# Patient Record
Sex: MALE | Race: WHITE | HISPANIC OR LATINO | Employment: FULL TIME | URBAN - METROPOLITAN AREA
[De-identification: names, ages, dates, MRNs, and addresses within clinical notes are randomized per-mention and may not be internally consistent; named-entity substitution may affect disease eponyms.]

---

## 2017-01-26 ENCOUNTER — PROCEDURE VISIT (OUTPATIENT)
Dept: OPHTHALMOLOGY | Facility: CLINIC | Age: 67
End: 2017-01-26
Payer: COMMERCIAL

## 2017-01-26 VITALS — SYSTOLIC BLOOD PRESSURE: 129 MMHG | DIASTOLIC BLOOD PRESSURE: 81 MMHG | HEART RATE: 66 BPM

## 2017-01-26 DIAGNOSIS — E11.3212 DIABETIC MACULAR EDEMA OF LEFT EYE WITH MILD NONPROLIFERATIVE RETINOPATHY ASSOCIATED WITH TYPE 2 DIABETES MELLITUS: Primary | ICD-10-CM

## 2017-01-26 DIAGNOSIS — E11.3291 MILD NONPROLIFERATIVE DIABETIC RETINOPATHY OF RIGHT EYE WITHOUT MACULAR EDEMA ASSOCIATED WITH TYPE 2 DIABETES MELLITUS: ICD-10-CM

## 2017-01-26 DIAGNOSIS — H25.13 NS (NUCLEAR SCLEROSIS), BILATERAL: ICD-10-CM

## 2017-01-26 PROCEDURE — 67028 INJECTION EYE DRUG: CPT | Mod: LT,S$GLB,, | Performed by: OPHTHALMOLOGY

## 2017-01-26 PROCEDURE — 92235 FLUORESCEIN ANGRPH MLTIFRAME: CPT | Mod: S$GLB,,, | Performed by: OPHTHALMOLOGY

## 2017-01-26 PROCEDURE — 92134 CPTRZ OPH DX IMG PST SGM RTA: CPT | Mod: S$GLB,,, | Performed by: OPHTHALMOLOGY

## 2017-01-26 PROCEDURE — 92014 COMPRE OPH EXAM EST PT 1/>: CPT | Mod: 25,S$GLB,, | Performed by: OPHTHALMOLOGY

## 2017-01-26 RX ADMIN — Medication 1.25 MG: at 03:01

## 2017-01-26 NOTE — PROGRESS NOTES
oCT - OD - No DME  OS DME     FA - late macular leakage OS > OD    A/P    1. Mild NPDR OU T2  2. DME OS    S/p Avastin OS x 1    Avastin OS #2 today    BS/BP/chol control    3. NS OU      1 month OCT      Risks, benefits, and alternatives to treatment discussed in detail with the patient.  The patient voiced understanding and wished to proceed with the procedure    Injection Procedure Note:  Diagnosis: DME OS    Topical Proparacaine and Betadine.  Inject Avastin OS at 6:00 @ 3.5-4mm posterior to limbus  Post Operative Dx: Same  Complications: None  Follow up as above.

## 2017-01-26 NOTE — MR AVS SNAPSHOT
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      [facility]     [facility] - Asistencia   Enfermeras registradas de [facility] pueden ayudarle a reservar nori [place], proveer educación para la lindsey, asesoría clínica, y otros servicios de asesoramiento.   Llame para rahat servicio gratuito a [phone].             Medicamentos           Mensaje sobre Medicamentos     Verificar los cambios y / o adiciones a sanchez régimen de medicación son los mismos que discutir con sanchez médico. Si cualquiera de estos cambios o adiciones son incorrectos, por favor notifique a sanchez proveedor de atención médica.        These medications were administered today        Dose Freq    bevacizumab (AVASTIN) 2.5 mg/0.10 mL 1.25 mg 1.25 mg Clinic/HOD 1 time    Sig: Inject 0.05 mLs (1.25 mg total) into the eye one time.    Categoría: Normal    Vía: Intraocular           Verifique que la siguiente lista de medicamentos es nori representación exacta de los medicamentos que está tomando actualmente. Si no hay ningunos reportados, la lista puede estar en schultz. Si no es correcta, por favor póngase en contacto  con sanchez proveedor de atención médica. Lleve esta lista con usted en tiffanie de emergencia.           Medicamentos Actuales     aspirin (ECOTRIN) 81 MG EC tablet Take 1 tablet (81 mg total) by mouth once daily.    blood sugar diagnostic Strp To use as directed to monitor blood sugars daily    butalbital-acetaminophen-caffeine -40 mg (FIORICET, ESGIC) -40 mg per tablet TAKE ONE TABLET BY MOUTH EVERY 4 HOURS AS NEEDED FOR PAIN OR HEADACHE    FLUARIX QUAD 8250-5972, PF, 60 mcg (15 mcg x 4)/0.5 mL Syrg     glimepiride (AMARYL) 1 MG tablet TAKE ONE TABLET BY MOUTH BEFORE BREAKFAST    levocetirizine (XYZAL) 5 MG tablet     lisinopril-hydrochlorothiazide (PRINZIDE,ZESTORETIC) 10-12.5 mg per tablet TAKE ONE TABLET BY MOUTH EVERY DAY    metformin (GLUCOPHAGE) 1000 MG tablet TAKE ONE TABLET BY MOUTH TWICE A DAY WITH MEALS    metoprolol tartrate (LOPRESSOR) 25 MG tablet Take 0.5 tablets (12.5 mg total) by mouth 2 (two) times daily.    naproxen (NAPROSYN) 500 MG tablet TK 1 T PO  BID FOR INFLAMTION    simvastatin (ZOCOR) 40 MG tablet Take 1 tablet (40 mg total) by mouth every evening.    tamsulosin (FLOMAX) 0.4 mg Cp24 Take 1 capsule (0.4 mg total) by mouth once daily.           Información de referencia clínica           Signos vitales - más recientes  Última actualización: 1/26/2017  2:29 PM por Анна AIDEE Susan    PS Pulso                129/81 (BP Location: Right arm, Patient Position: Sitting, BP Method: Automatic) 66          Blood Pressure          Most Recent Value    BP  129/81      Alergias     A partir del:  1/26/2017        No Known Allergies      Vacunas     Administradas en la fecha de la visita:  1/26/2017        None      Orders Placed During Today's Visit      Órdenes normales de esta visita    Fluorescein Angiography - OU - Both Eyes     Posterior Segment OCT Retina-Both eyes     Prior Authorization Order     Exámenes/Procedimientos futuros Se espera el Vence    Posterior Segment OCT Retina-Both eyes  As  directed 1/26/2018      Registrarse para [facility]     La activación de sanchez cuenta [facility] es tan fácil filiberto 1-2-3!    1) Ir a [email or web address], seleccione Registrarse Ahora, meter el código de activación y sanchez fecha de nacimiento, y seleccione Próximo.    [id number]  Expires: 3/12/2017  3:52 PM      2) Crear un nombre de usuario y contraseña para usar cuando se visita [facility] en el futuro y selecciona nori pregunta de seguridad en tiffanie de que pierda sanchez contraseña y seleccione Próximo.    3) Introduzca sanchez dirección de correo electrónico y queta clic en Registrarse!    Información Adicional  Si tiene alguna pregunta, por favor, e-mail [email or web address] o llame al [phone] para hablar con nuestro personal. Recuerde, [facility] no debe ser usada para necesidades urgentes. En tiffanie de emergencia médica, llcharisse al [phone].        Instrucciones    Fluorescein angiogram procedure explained to patient and FA handout attached below on AVS.       Fluorescein Angiography  Fluorescein angiography is an eye test. It is done to look at the back of your eye, including:  · The blood vessels in your eye  · The layer of tissue at the back of your eye (the retina)  · The center of your retina (the macula)  · The optic nerve  This test can diagnose diseases found in these areas. It can also diagnose other conditions that affect these areas. To do this test, a dye called fluorescein is shot (injected) into your arm. The dye goes into your bloodstream and up into the blood vessels in your eyes. A special camera is then used to take images (angiograms) of your eyes.     A fluorescein angiogram of the retina   Getting ready for your test  Tell your healthcare provider if you:  · Are pregnant or think you may be pregnant  · Are breastfeeding  · Have a history of severe allergic reactions, including to X-ray dye or other medicines  · Have kidney problems  Tell your provider about any medicines you are taking. You may need to stop  taking all or some of these before the test. This includes:  · All prescription medicines  · Over-the-counter medicines such as aspirin or ibuprofen  · Street drugs  · Herbs, vitamins, and other supplements  You should arrange for an adult family member or friend to drive you home after your test. Your vision will be blurry for up to 12 hours.  Follow any other instructions from your healthcare provider.  During your test  · You are given eye drops to enlarge (dilate) your pupils.  · You then sit in front of a special camera. You place your chin on the chin rest and look into the camera.  · Images are taken of your eyes, one eye at a time.  · Fluorescein dye is then injected into your arm. The lights in the room are turned off. You may have mild nausea. You may have a warm feeling in your arm or upper body. Tell your provider if your skin feels itchy or if you are having trouble breathing. If so, you could be having an allergic reaction to the dye.  · More pictures of your eyes are taken over 15 to 30 minutes. The camera shines a bright light into your eyes. Try to keep your head still and your eyes open.  · When enough images have been taken, the test is over.  After your test  Your vision will be blurry for up to 4 to 12 hours. This is because of your dilated pupils. Your eye will be more sensitive to light for up to 12 hours. You may want to wear sunglasses during this time. Do not drive if your vision is very blurry. You may also find it uncomfortable to read. Your skin may look yellow for a few hours. This is from the dye. Your urine will be bright yellow or orange for 24 to 48 hours after the test.     Risks and possible complications  All procedures have some risks. Possible risks of fluorescein angiography include:  · Upset stomach (nausea) and vomiting  · Leaking dye around the injection site that causes pain and swelling  · Metallic taste in your mouth  · Infection at injection site  · Allergic reaction to  the dye  · Dry mouth or too much saliva  · Faster heart rate  · Sweating  · Lower back pain   © 8106-3426 [employer]. [address] [place] [address], [place] [address]. All rights reserved. This information is not intended as a substitute for professional medical care. Always follow your healthcare professional's instructions.                      Ned Oliveira   2017 2:10 PM   Procedure visit    Description:  Male : 1950   Provider:  CHRISTOPHER Rivers MD   Department:  [place] - Op[facility]mology           Reason for Visit     Eye Problem           Diagnoses this Visit        Comments    Diabetic macular edema of left eye with mild nonproliferative retinopathy associated with type 2 diabetes mellitus    -  Primary     Mild nonproliferative diabetic retinopathy of right eye without macular edema associated with type 2 diabetes mellitus         NS (nuclear sclerosis), bilateral                To Do List           Goals     None      Follow-Up and Disposition     Return in about 4 weeks (around 2017).      [facility]     [facility] On Call Nurse [facility] -  Assistance  Registered nurses in the [facility] On Call Center provide clinical advisement, health education, appointment booking, and other advisory services.  Call for this free service at [phone].             Medications           Message regarding Medications     Verify the changes and/or additions to your medication regime listed below are the same as discussed with your clinician today.  If any of these changes or additions are incorrect, please notify your healthcare provider.        These medications were administered today        Dose Freq    bevacizumab (AVASTIN) 2.5 mg/0.10 mL 1.25 mg 1.25 mg Clinic/HOD 1 time    Sig: Inject 0.05 mLs (1.25 mg total) into the eye one time.    Class: Normal    Route: Intraocular           Verify that the below list of medications is an accurate representation of the medications you are  currently taking.  If none reported, the list may be blank. If incorrect, please contact your healthcare provider. Carry this list with you in case of emergency.           Current Medications     aspirin (ECOTRIN) 81 MG EC tablet Take 1 tablet (81 mg total) by mouth once daily.    blood sugar diagnostic Strp To use as directed to monitor blood sugars daily    butalbital-acetaminophen-caffeine -40 mg (FIORICET, ESGIC) -40 mg per tablet TAKE ONE TABLET BY MOUTH EVERY 4 HOURS AS NEEDED FOR PAIN OR HEADACHE    FLUARIX QUAD 1208-2711, PF, 60 mcg (15 mcg x 4)/0.5 mL Syrg     glimepiride (AMARYL) 1 MG tablet TAKE ONE TABLET BY MOUTH BEFORE BREAKFAST    levocetirizine (XYZAL) 5 MG tablet     lisinopril-hydrochlorothiazide (PRINZIDE,ZESTORETIC) 10-12.5 mg per tablet TAKE ONE TABLET BY MOUTH EVERY DAY    metformin (GLUCOPHAGE) 1000 MG tablet TAKE ONE TABLET BY MOUTH TWICE A DAY WITH MEALS    metoprolol tartrate (LOPRESSOR) 25 MG tablet Take 0.5 tablets (12.5 mg total) by mouth 2 (two) times daily.    naproxen (NAPROSYN) 500 MG tablet TK 1 T PO  BID FOR INFLAMTION    simvastatin (ZOCOR) 40 MG tablet Take 1 tablet (40 mg total) by mouth every evening.    tamsulosin (FLOMAX) 0.4 mg Cp24 Take 1 capsule (0.4 mg total) by mouth once daily.           Clinical Reference Information           Vital Signs - Last Recorded  Most recent update: 1/26/2017  2:29 PM by Анна Davis    BP Pulse                129/81 (BP Location: Right arm, Patient Position: Sitting, BP Method: Automatic) 66          Blood Pressure          Most Recent Value    BP  129/81      Allergies as of 1/26/2017     No Known Allergies      Immunizations Administered on Date of Encounter - 1/26/2017     None      Orders Placed During Today's Visit      Normal Orders This Visit    Fluorescein Angiography - OU - Both Eyes     Posterior Segment OCT Retina-Both eyes     Prior Authorization Order     Future Labs/Procedures Expected by Expires    Posterior Segment  OCT Retina-Both eyes  As directed 1/26/2018      [facility] Sign-Up     Activating your [facility] account is as easy as 1-2-3!     1) Visit [email or web address], select Sign Up Now, enter this activation code and your date of birth, then select Next.  [email or web address]  Expires: 3/12/2017  3:52 PM      2) Create a username and password to use when you visit [facility] in the future and select a security question in case you lose your password and select Next.    3) Enter your e-mail address and click Sign Up!    Additional Information  If you have questions, please e-mail [email or web address] or call [phone] to talk to our [facility] staff. Remember, [facility] is NOT to be used for urgent needs. For medical emergencies, dial 911.         Instructions    Fluorescein angiogram procedure explained to patient and FA handout attached below on AVS.       Fluorescein Angiography  Fluorescein angiography is an eye test. It is done to look at the back of your eye, including:  · The blood vessels in your eye  · The layer of tissue at the back of your eye (the retina)  · The center of your retina (the macula)  · The optic nerve  This test can diagnose diseases found in these areas. It can also diagnose other conditions that affect these areas. To do this test, a dye called fluorescein is shot (injected) into your arm. The dye goes into your bloodstream and up into the blood vessels in your eyes. A special camera is then used to take images (angiograms) of your eyes.     A fluorescein angiogram of the retina   Getting ready for your test  Tell your healthcare provider if you:  · Are pregnant or think you may be pregnant  · Are breastfeeding  · Have a history of severe allergic reactions, including to X-ray dye or other medicines  · Have kidney problems  Tell your provider about any medicines you are taking. You may need to stop taking all or some of these before the test. This includes:  · All prescription  medicines  · Over-the-counter medicines such as aspirin or ibuprofen  · Street drugs  · Herbs, vitamins, and other supplements  You should arrange for an adult family member or friend to drive you home after your test. Your vision will be blurry for up to 12 hours.  Follow any other instructions from your healthcare provider.  During your test  · You are given eye drops to enlarge (dilate) your pupils.  · You then sit in front of a special camera. You place your chin on the chin rest and look into the camera.  · Images are taken of your eyes, one eye at a time.  · Fluorescein dye is then injected into your arm. The lights in the room are turned off. You may have mild nausea. You may have a warm feeling in your arm or upper body. Tell your provider if your skin feels itchy or if you are having trouble breathing. If so, you could be having an allergic reaction to the dye.  · More pictures of your eyes are taken over 15 to 30 minutes. The camera shines a bright light into your eyes. Try to keep your head still and your eyes open.  · When enough images have been taken, the test is over.  After your test  Your vision will be blurry for up to 4 to 12 hours. This is because of your dilated pupils. Your eye will be more sensitive to light for up to 12 hours. You may want to wear sunglasses during this time. Do not drive if your vision is very blurry. You may also find it uncomfortable to read. Your skin may look yellow for a few hours. This is from the dye. Your urine will be bright yellow or orange for 24 to 48 hours after the test.     Risks and possible complications  All procedures have some risks. Possible risks of fluorescein angiography include:  · Upset stomach (nausea) and vomiting  · Leaking dye around the injection site that causes pain and swelling  · Metallic taste in your mouth  · Infection at injection site  · Allergic reaction to the dye  · Dry mouth or too much saliva  · Faster heart  rate  · Sweating  · Lower back pain   © 8183-3561 [employer] [address] [place] [address], [place] [address]. All rights reserved. This information is not intended as a substitute for professional medical care. Always follow your healthcare professional's instructions.

## 2017-01-26 NOTE — PATIENT INSTRUCTIONS
Fluorescein angiogram procedure explained to patient and FA handout attached below on AVS.       Fluorescein Angiography  Fluorescein angiography is an eye test. It is done to look at the back of your eye, including:  · The blood vessels in your eye  · The layer of tissue at the back of your eye (the retina)  · The center of your retina (the macula)  · The optic nerve  This test can diagnose diseases found in these areas. It can also diagnose other conditions that affect these areas. To do this test, a dye called fluorescein is shot (injected) into your arm. The dye goes into your bloodstream and up into the blood vessels in your eyes. A special camera is then used to take images (angiograms) of your eyes.     A fluorescein angiogram of the retina   Getting ready for your test  Tell your healthcare provider if you:  · Are pregnant or think you may be pregnant  · Are breastfeeding  · Have a history of severe allergic reactions, including to X-ray dye or other medicines  · Have kidney problems  Tell your provider about any medicines you are taking. You may need to stop taking all or some of these before the test. This includes:  · All prescription medicines  · Over-the-counter medicines such as aspirin or ibuprofen  · Street drugs  · Herbs, vitamins, and other supplements  You should arrange for an adult family member or friend to drive you home after your test. Your vision will be blurry for up to 12 hours.  Follow any other instructions from your healthcare provider.  During your test  · You are given eye drops to enlarge (dilate) your pupils.  · You then sit in front of a special camera. You place your chin on the chin rest and look into the camera.  · Images are taken of your eyes, one eye at a time.  · Fluorescein dye is then injected into your arm. The lights in the room are turned off. You may have mild nausea. You may have a warm feeling in your arm or upper body. Tell your provider if your skin feels itchy or  if you are having trouble breathing. If so, you could be having an allergic reaction to the dye.  · More pictures of your eyes are taken over 15 to 30 minutes. The camera shines a bright light into your eyes. Try to keep your head still and your eyes open.  · When enough images have been taken, the test is over.  After your test  Your vision will be blurry for up to 4 to 12 hours. This is because of your dilated pupils. Your eye will be more sensitive to light for up to 12 hours. You may want to wear sunglasses during this time. Do not drive if your vision is very blurry. You may also find it uncomfortable to read. Your skin may look yellow for a few hours. This is from the dye. Your urine will be bright yellow or orange for 24 to 48 hours after the test.     Risks and possible complications  All procedures have some risks. Possible risks of fluorescein angiography include:  · Upset stomach (nausea) and vomiting  · Leaking dye around the injection site that causes pain and swelling  · Metallic taste in your mouth  · Infection at injection site  · Allergic reaction to the dye  · Dry mouth or too much saliva  · Faster heart rate  · Sweating  · Lower back pain   © 8805-1407 [employer]. [address] [place] [address], [place] [address]. All rights reserved. This information is not intended as a substitute for professional medical care. Always follow your healthcare professional's instructions.      .POST INTRAVITREAL INJECTION PATIENT INSTRUCTIONS   Below are some guidelines for what to expect after your treatment and additional care instructions.   * you may experience mild discomfort in your eye after the treatment. Your eye usually feels better within 24 to 48 hours after the procedure.   * you have been given drops that numb the surface of your eye.   DO NOT rub or touch your eye, DO NOT wear contacts until numbness goes away.   * you may experience small spots that appear in your field of vision, these are  usually caused by an air bubble or from the medication. It taked a few hours or days for these to go away.   * use of sunglasses will help reduce light sensitivity and glare.   * DO NOT swim or put sink water ( tap water ) or swin for at least 24 hours after the injection   * If you have a gritty, burning, irritating or stinging feeling in the injected eye. This may be a result of the antiseptic used. Use artifical tears or eye lubricant ( from over- the- counter from your local pharmacy ). If you have some at home already please check the expiration date, so not to use anything contaminated in your eye. A cool pack over your eye brow above the injected eye may also relieve discomfort.   Call us right away or go to the Emergency Department if you have a dramatic decrease in vision or extreme pain in the eye.   [facility] [phone]

## 2017-02-20 ENCOUNTER — TELEPHONE (OUTPATIENT)
Dept: INTERNAL MEDICINE | Facility: CLINIC | Age: 67
End: 2017-02-20

## 2017-02-20 DIAGNOSIS — Z00.00 ANNUAL PHYSICAL EXAM: Primary | ICD-10-CM

## 2017-02-20 NOTE — TELEPHONE ENCOUNTER
----- Message from Cinthya Prajapati sent at 2/20/2017  2:07 PM CST -----  Contact: wife, Ileana  Doctor appointment and lab have been scheduled.  Please link lab orders to the lab appointment.  Date of doctor appointment:  4/3  Physical or EP:  Epp  Date of lab appointment:  3/27  Comments:

## 2017-03-15 DIAGNOSIS — I10 ESSENTIAL HYPERTENSION: ICD-10-CM

## 2017-03-15 DIAGNOSIS — N40.0 BENIGN NON-NODULAR PROSTATIC HYPERPLASIA WITHOUT LOWER URINARY TRACT SYMPTOMS: ICD-10-CM

## 2017-03-15 RX ORDER — LISINOPRIL AND HYDROCHLOROTHIAZIDE 10; 12.5 MG/1; MG/1
TABLET ORAL
Qty: 30 TABLET | Refills: 1 | Status: SHIPPED | OUTPATIENT
Start: 2017-03-15 | End: 2017-05-29 | Stop reason: SDUPTHER

## 2017-03-15 RX ORDER — TAMSULOSIN HYDROCHLORIDE 0.4 MG/1
CAPSULE ORAL
Qty: 30 CAPSULE | Refills: 2 | Status: SHIPPED | OUTPATIENT
Start: 2017-03-15 | End: 2017-07-27 | Stop reason: SDUPTHER

## 2017-03-15 RX ORDER — LEVOCETIRIZINE DIHYDROCHLORIDE 5 MG/1
TABLET, FILM COATED ORAL
Qty: 30 TABLET | Refills: 2 | Status: SHIPPED | OUTPATIENT
Start: 2017-03-15 | End: 2018-03-12 | Stop reason: SDUPTHER

## 2017-03-15 RX ORDER — GLIMEPIRIDE 1 MG/1
TABLET ORAL
Qty: 30 TABLET | Refills: 1 | Status: SHIPPED | OUTPATIENT
Start: 2017-03-15 | End: 2017-04-03

## 2017-03-23 ENCOUNTER — PROCEDURE VISIT (OUTPATIENT)
Dept: OPHTHALMOLOGY | Facility: CLINIC | Age: 67
End: 2017-03-23
Attending: OPHTHALMOLOGY
Payer: COMMERCIAL

## 2017-03-23 VITALS — DIASTOLIC BLOOD PRESSURE: 78 MMHG | HEART RATE: 70 BPM | SYSTOLIC BLOOD PRESSURE: 121 MMHG

## 2017-03-23 DIAGNOSIS — E11.3212 DIABETIC MACULAR EDEMA OF LEFT EYE WITH MILD NONPROLIFERATIVE RETINOPATHY ASSOCIATED WITH TYPE 2 DIABETES MELLITUS: Primary | ICD-10-CM

## 2017-03-23 DIAGNOSIS — H25.13 NS (NUCLEAR SCLEROSIS), BILATERAL: ICD-10-CM

## 2017-03-23 DIAGNOSIS — E11.3291 MILD NONPROLIFERATIVE DIABETIC RETINOPATHY OF RIGHT EYE WITHOUT MACULAR EDEMA ASSOCIATED WITH TYPE 2 DIABETES MELLITUS: ICD-10-CM

## 2017-03-23 PROCEDURE — 92134 CPTRZ OPH DX IMG PST SGM RTA: CPT | Mod: S$GLB,,, | Performed by: OPHTHALMOLOGY

## 2017-03-23 PROCEDURE — 67028 INJECTION EYE DRUG: CPT | Mod: LT,S$GLB,, | Performed by: OPHTHALMOLOGY

## 2017-03-23 PROCEDURE — 92014 COMPRE OPH EXAM EST PT 1/>: CPT | Mod: 25,S$GLB,, | Performed by: OPHTHALMOLOGY

## 2017-03-23 RX ADMIN — Medication 1.25 MG: at 05:03

## 2017-03-23 NOTE — PROGRESS NOTES
oCT - OD - No DME  OS DME     FA - late macular leakage OS > OD    A/P    1. Mild NPDR OU T2  2. DME OS    S/p Avastin OS x 2    Avastin OS #3 today    BS/BP/chol control    3. NS OU      1 month OCT      Risks, benefits, and alternatives to treatment discussed in detail with the patient.  The patient voiced understanding and wished to proceed with the procedure    Injection Procedure Note:  Diagnosis: DME OS    Topical Proparacaine and Betadine.  Inject Avastin OS at 6:00 @ 3.5-4mm posterior to limbus  Post Operative Dx: Same  Complications: None  Follow up as above.

## 2017-03-23 NOTE — PATIENT INSTRUCTIONS

## 2017-03-23 NOTE — MR AVS SNAPSHOT
[place] [facility]   [facility]  [place] [address]  Phone: [phone]  Fax: [phone]                  Ned Oliveira   3/23/2017 2:50 PM   Procedure visit    Descripción:  Male : 1950   Personal Médico:  CHRISTOPHER Rivers MD   Departamento:  [place] [facility]           [place]     Eye Problem           Diagnósticos de Esta Visita        Comentarios    Diabetic macular edema of left eye with mild nonproliferative retinopathy associated with type 2 diabetes mellitus    -  Primario     Mild nonproliferative diabetic retinopathy of right eye without macular edema associated with type 2 diabetes mellitus         NS (nuclear sclerosis), bilateral                Lista de tareas           Citas próximas        Personal Médico Departamento Tfno del dpto    3/27/2017 11:00 AM LAB, [facility] [place] [facility] [phone]    3/27/2017 11:15 AM [facility] [place] [facility] [phone]    4/3/2017 3:00 PM Jose Ramachandran DO [place] [facility] [phone]      Metas (5 Years of Data)     Ninguna      Follow-Up and Disposition     Return in about 4 weeks (around 2017).      [facility]     [facility] - Asistencia   Enfermeras registradas de [facility] pueden ayudarle a reservar nori [place], proveer educación para la lindsey, asesoría clínica, y otros servicios de asesoramiento.   Llame para rahat servicio gratuito a [phone].             Medicamentos           Mensaje sobre Medicamentos     Verificar los cambios y / o adiciones a sanchez régimen de medicación son los mismos que discutir con sanchez médico. Si cualquiera de estos cambios o adiciones son incorrectos, por favor notifique a sanchez proveedor de atención médica.        These medications were administered today        Dose Freq    bevacizumab (AVASTIN) 2.5 mg/0.10 mL 1.25 mg 1.25 mg Clinic/[facility] 1 time    Sig: Inject 0.05 mLs (1.25 mg total) into the  eye one time.    Categoría: Normal    Vía: Intraocular           Verifique que la siguiente lista de medicamentos es nori representación exacta de los medicamentos que está tomando actualmente. Si no hay ningunos reportados, la lista puede estar en schultz. Si no es correcta, por favor póngase en contacto con sanchez proveedor de atención médica. Lleve esta lista con usted en tiffanie de emergencia.           Medicamentos Actuales     aspirin (ECOTRIN) 81 MG EC tablet Take 1 tablet (81 mg total) by mouth once daily.    blood sugar diagnostic Strp To use as directed to monitor blood sugars daily    butalbital-acetaminophen-caffeine -40 mg (FIORICET, ESGIC) -40 mg per tablet TAKE ONE TABLET BY MOUTH EVERY 4 HOURS AS NEEDED FOR PAIN OR HEADACHE    FLUARIX QUAD [id number], PF, 60 mcg (15 mcg x 4)/0.5 mL Syrg     glimepiride (AMARYL) 1 MG tablet TAKE ONE TABLET BY MOUTH BEFORE BREAKFAST    levocetirizine (XYZAL) 5 MG tablet TAKE ONE TABLET BY MOUTH EVERY EVENING    lisinopril-hydrochlorothiazide (PRINZIDE,ZESTORETIC) 10-12.5 mg per tablet TAKE ONE TABLET BY MOUTH EVERY DAY    metformin (GLUCOPHAGE) 1000 MG tablet TAKE ONE TABLET BY MOUTH TWICE A DAY WITH MEALS    metoprolol tartrate (LOPRESSOR) 25 MG tablet Take 0.5 tablets (12.5 mg total) by mouth 2 (two) times daily.    naproxen (NAPROSYN) 500 MG tablet TK 1 T PO  BID FOR INFLAMTION    simvastatin (ZOCOR) 40 MG tablet Take 1 tablet (40 mg total) by mouth every evening.    tamsulosin (FLOMAX) 0.4 mg Cp24 TAKE ONE CAPSULE BY MOUTH EVERY DAY           Información de referencia clínica           Kelly signos vitales [place]     PS Pulso                121/78 (BP Location: Right arm, Patient Position: Sitting, BP Method: Automatic) 70          Blood Pressure          Most Recent Value    BP  121/78      Alergias     A partir del:  3/23/2017        No Known Allergies      Vacunas     Administradas en la fecha de la visita:  3/23/2017        None      Orders Placed During Today's  Visit      Órdenes normales de esta visita    Posterior Segment OCT Retina-Both eyes     Prior Authorization Order     Exámenes/Procedimientos futuros Se espera el Vence    Posterior Segment OCT Retina-Both eyes  As directed 3/23/2018      Registrarse para [facility]     La activación de sanchez cuenta [facility] es tan fácil filiberto 1-2-3!    1) Ir a [email or web address], seleccione Registrarse Ahora, meter el código de activación y sanchez fecha de nacimiento, y seleccione Próximo.    [id number]  Expires: 2017  4:57 PM      2) Crear un nombre de usuario y contraseña para usar cuando se visita [facility] en el futuro y selecciona nori pregunta de seguridad en tiffanie de que pierda sanchez contraseña y seleccione Próximo.    3) Introduzca sanchez dirección de correo electrónico y queta martha en Registrarse!    Información Adicional  Si tiene alguna pregunta, por favor, e-mail [email or web address] o llame al [phone] para hablar con nuestro personal. Recuerde, [facility] no debe ser usada para necesidades urgentes. En tiffanie de emergencia médica, llame al 911.        Language Assistance Services     ATTENTION: Language assistance services are available, free of charge. Please call [phone].      ATENCIÓN: Si habla español, tiene a sanchez disposición servicios gratuitos de asistencia lingüística. Llame al [phone].     CHÚ Ý: N?u b?n nói Ti?ng Vi?t, có các d?ch v? h? tr? ngôn ng? mi?n phí dành cho b?n. G?i s? [phone].         [place] [facility] cumple con las leyes federales aplicables de derechos civiles y no discrimina por motivos de farzaneh, color, origen nacional, edad, discapacidad, o sexo.                 Ned Oliveira   3/23/2017 2:50 PM   Procedure visit    Description:  Male : 1950   Provider:  CHRISTOPHER Rivers MD   Department:  [place] [facility]           Reason for Visit     Eye Problem           Diagnoses this Visit        Comments    Diabetic macular edema of left eye with mild nonproliferative  retinopathy associated with type 2 diabetes mellitus    -  Primary     Mild nonproliferative diabetic retinopathy of right eye without macular edema associated with type 2 diabetes mellitus         NS (nuclear sclerosis), bilateral                To Do List           Future Appointments        Provider Department Dept Phone    3/27/2017 11:00 AM LAB, [facility] [phone]    3/27/2017 11:15 AM SPECIMEN, [facility] [place] - Specimen Lab [phone]    4/3/2017 3:00 PM Jose Ramachandran,  [place] [facility] [phone]      Goals     None      Follow-Up and Disposition     Return in about 4 weeks (around 4/20/2017).      [facility]     [facility] On Call Nurse [facility] - 24/7 Assistance  Registered nurses in the [facility] On Call Center provide clinical advisement, health education, appointment booking, and other advisory services.  Call for this free service at [phone].             Medications           Message regarding Medications     Verify the changes and/or additions to your medication regime listed below are the same as discussed with your clinician today.  If any of these changes or additions are incorrect, please notify your healthcare provider.        These medications were administered today        Dose Freq    bevacizumab (AVASTIN) 2.5 mg/0.10 mL 1.25 mg 1.25 mg Clinic/[facility] 1 time    Sig: Inject 0.05 mLs (1.25 mg total) into the eye one time.    Class: Normal    Route: Intraocular           Verify that the below list of medications is an accurate representation of the medications you are currently taking.  If none reported, the list may be blank. If incorrect, please contact your healthcare provider. Carry this list with you in case of emergency.           Current Medications     aspirin (ECOTRIN) 81 MG EC tablet Take 1 tablet (81 mg total) by mouth once daily.    blood sugar diagnostic Strp To use as directed to monitor blood sugars daily     butalbital-acetaminophen-caffeine -40 mg (FIORICET, ESGIC) -40 mg per tablet TAKE ONE TABLET BY MOUTH EVERY 4 HOURS AS NEEDED FOR PAIN OR HEADACHE    FLUARIX QUAD [id number], PF, 60 mcg (15 mcg x 4)/0.5 mL Syrg     glimepiride (AMARYL) 1 MG tablet TAKE ONE TABLET BY MOUTH BEFORE BREAKFAST    levocetirizine (XYZAL) 5 MG tablet TAKE ONE TABLET BY MOUTH EVERY EVENING    lisinopril-hydrochlorothiazide (PRINZIDE,ZESTORETIC) 10-12.5 mg per tablet TAKE ONE TABLET BY MOUTH EVERY DAY    metformin (GLUCOPHAGE) 1000 MG tablet TAKE ONE TABLET BY MOUTH TWICE A DAY WITH MEALS    metoprolol tartrate (LOPRESSOR) 25 MG tablet Take 0.5 tablets (12.5 mg total) by mouth 2 (two) times daily.    naproxen (NAPROSYN) 500 MG tablet TK 1 T PO  BID FOR INFLAMTION    simvastatin (ZOCOR) 40 MG tablet Take 1 tablet (40 mg total) by mouth every evening.    tamsulosin (FLOMAX) 0.4 mg Cp24 TAKE ONE CAPSULE BY MOUTH EVERY DAY           Clinical Reference Information           Your Vitals Were     BP Pulse                121/78 (BP Location: Right arm, Patient Position: Sitting, BP Method: Automatic) 70          Blood Pressure          Most Recent Value    BP  121/78      Allergies as of 3/23/2017     No Known Allergies      Immunizations Administered on Date of Encounter - 3/23/2017     None      Orders Placed During Today's Visit      Normal Orders This Visit    Posterior Segment OCT Retina-Both eyes     Prior Authorization Order     Future Labs/Procedures Expected by Expires    Posterior Segment OCT Retina-Both eyes  As directed 3/23/2018      [facility] Sign-Up     Activating your [facility] account is as easy as 1-2-3!     1) Visit [email or web address], select Sign Up Now, enter this activation code and your date of birth, then select Next.  [email or web address]  Expires: 5/7/2017  4:57 PM      2) Create a username and password to use when you visit [facility] in the future and select a security question in case you lose your password and select  Next.    3) Enter your e-mail address and click Sign Up!    Additional Information  If you have questions, please e-mail [email or web address] or call [phone] to talk to our [facility] staff. Remember, [facility] is NOT to be used for urgent needs. For medical emergencies, dial 911.         Language Assistance Services     ATTENTION: Language assistance services are available, free of charge. Please call [phone].      ATENCIÓN: Si habla pinky, tiene a sanchez disposición servicios gratuitos de asistencia lingüística. Llame al [phone].     CHÚ Ý: N?u b?n nói Ti?ng Vi?t, có các d?ch v? h? tr? ngôn ng? mi?n phí dành cho b?n. G?i s? [phone].         [place] [facility] complies with applicable Federal civil rights laws and does not discriminate on the basis of race, color, national origin, age, disability, or sex.

## 2017-03-27 ENCOUNTER — LAB VISIT (OUTPATIENT)
Dept: LAB | Facility: HOSPITAL | Age: 67
End: 2017-03-27
Attending: INTERNAL MEDICINE
Payer: COMMERCIAL

## 2017-03-27 DIAGNOSIS — Z00.00 ANNUAL PHYSICAL EXAM: ICD-10-CM

## 2017-03-27 LAB
ALBUMIN SERPL BCP-MCNC: 3.7 G/DL
ALP SERPL-CCNC: 78 U/L
ALT SERPL W/O P-5'-P-CCNC: 35 U/L
ANION GAP SERPL CALC-SCNC: 9 MMOL/L
AST SERPL-CCNC: 27 U/L
BASOPHILS # BLD AUTO: 0.02 K/UL
BASOPHILS NFR BLD: 0.3 %
BILIRUB SERPL-MCNC: 1.2 MG/DL
BUN SERPL-MCNC: 21 MG/DL
CALCIUM SERPL-MCNC: 9.4 MG/DL
CHLORIDE SERPL-SCNC: 104 MMOL/L
CHOLEST/HDLC SERPL: 3.6 {RATIO}
CO2 SERPL-SCNC: 27 MMOL/L
COMPLEXED PSA SERPL-MCNC: 0.6 NG/ML
CREAT SERPL-MCNC: 1.1 MG/DL
DIFFERENTIAL METHOD: ABNORMAL
EOSINOPHIL # BLD AUTO: 0.2 K/UL
EOSINOPHIL NFR BLD: 3.4 %
ERYTHROCYTE [DISTWIDTH] IN BLOOD BY AUTOMATED COUNT: 13 %
EST. GFR  (AFRICAN AMERICAN): >60 ML/MIN/1.73 M^2
EST. GFR  (NON AFRICAN AMERICAN): >60 ML/MIN/1.73 M^2
GLUCOSE SERPL-MCNC: 174 MG/DL
HCT VFR BLD AUTO: 41.6 %
HDL/CHOLESTEROL RATIO: 27.8 %
HDLC SERPL-MCNC: 151 MG/DL
HDLC SERPL-MCNC: 42 MG/DL
HGB BLD-MCNC: 14.3 G/DL
LDLC SERPL CALC-MCNC: 96.2 MG/DL
LYMPHOCYTES # BLD AUTO: 2.1 K/UL
LYMPHOCYTES NFR BLD: 32.1 %
MCH RBC QN AUTO: 33.4 PG
MCHC RBC AUTO-ENTMCNC: 34.4 %
MCV RBC AUTO: 97 FL
MONOCYTES # BLD AUTO: 0.4 K/UL
MONOCYTES NFR BLD: 6.3 %
NEUTROPHILS # BLD AUTO: 3.8 K/UL
NEUTROPHILS NFR BLD: 57.7 %
NONHDLC SERPL-MCNC: 109 MG/DL
PLATELET # BLD AUTO: 186 K/UL
PMV BLD AUTO: 11.7 FL
POTASSIUM SERPL-SCNC: 4.5 MMOL/L
PROT SERPL-MCNC: 6.6 G/DL
RBC # BLD AUTO: 4.28 M/UL
SODIUM SERPL-SCNC: 140 MMOL/L
TRIGL SERPL-MCNC: 64 MG/DL
TSH SERPL DL<=0.005 MIU/L-ACNC: 0.96 UIU/ML
WBC # BLD AUTO: 6.55 K/UL

## 2017-03-27 PROCEDURE — 85025 COMPLETE CBC W/AUTO DIFF WBC: CPT

## 2017-03-27 PROCEDURE — 80061 LIPID PANEL: CPT

## 2017-03-27 PROCEDURE — 83036 HEMOGLOBIN GLYCOSYLATED A1C: CPT

## 2017-03-27 PROCEDURE — 84153 ASSAY OF PSA TOTAL: CPT

## 2017-03-27 PROCEDURE — 84443 ASSAY THYROID STIM HORMONE: CPT

## 2017-03-27 PROCEDURE — 36415 COLL VENOUS BLD VENIPUNCTURE: CPT | Mod: PO

## 2017-03-27 PROCEDURE — 80053 COMPREHEN METABOLIC PANEL: CPT

## 2017-03-28 LAB
ESTIMATED AVG GLUCOSE: 194 MG/DL
HBA1C MFR BLD HPLC: 8.4 %

## 2017-04-03 ENCOUNTER — OFFICE VISIT (OUTPATIENT)
Dept: INTERNAL MEDICINE | Facility: CLINIC | Age: 67
End: 2017-04-03
Payer: COMMERCIAL

## 2017-04-03 VITALS
BODY MASS INDEX: 28.03 KG/M2 | HEIGHT: 62 IN | TEMPERATURE: 98 F | SYSTOLIC BLOOD PRESSURE: 126 MMHG | DIASTOLIC BLOOD PRESSURE: 88 MMHG | HEART RATE: 69 BPM | RESPIRATION RATE: 16 BRPM | WEIGHT: 152.31 LBS

## 2017-04-03 DIAGNOSIS — Z00.00 ANNUAL PHYSICAL EXAM: Primary | ICD-10-CM

## 2017-04-03 DIAGNOSIS — I70.0 AORTIC ATHEROSCLEROSIS: ICD-10-CM

## 2017-04-03 DIAGNOSIS — E27.8 ADRENAL NODULE: ICD-10-CM

## 2017-04-03 DIAGNOSIS — E78.00 HYPERCHOLESTEREMIA: ICD-10-CM

## 2017-04-03 DIAGNOSIS — E11.3319 TYPE 2 DIABETES MELLITUS WITH MODERATE NONPROLIFERATIVE RETINOPATHY AND MACULAR EDEMA, WITHOUT LONG-TERM CURRENT USE OF INSULIN, UNSPECIFIED LATERALITY: ICD-10-CM

## 2017-04-03 DIAGNOSIS — N40.0 BENIGN NON-NODULAR PROSTATIC HYPERPLASIA WITHOUT LOWER URINARY TRACT SYMPTOMS: ICD-10-CM

## 2017-04-03 DIAGNOSIS — I25.10 CAD IN NATIVE ARTERY: ICD-10-CM

## 2017-04-03 DIAGNOSIS — I10 ESSENTIAL HYPERTENSION: ICD-10-CM

## 2017-04-03 PROCEDURE — 99396 PREV VISIT EST AGE 40-64: CPT | Mod: S$GLB,,, | Performed by: INTERNAL MEDICINE

## 2017-04-03 PROCEDURE — 3079F DIAST BP 80-89 MM HG: CPT | Mod: S$GLB,,, | Performed by: INTERNAL MEDICINE

## 2017-04-03 PROCEDURE — 99999 PR PBB SHADOW E&M-EST. PATIENT-LVL III: CPT | Mod: PBBFAC,,, | Performed by: INTERNAL MEDICINE

## 2017-04-03 PROCEDURE — 3074F SYST BP LT 130 MM HG: CPT | Mod: S$GLB,,, | Performed by: INTERNAL MEDICINE

## 2017-04-03 RX ORDER — GLIMEPIRIDE 2 MG/1
2 TABLET ORAL
Qty: 90 TABLET | Refills: 3 | Status: SHIPPED | OUTPATIENT
Start: 2017-04-03 | End: 2017-07-27

## 2017-04-03 RX ORDER — LANCETS
EACH MISCELLANEOUS
Qty: 200 EACH | Refills: 11 | Status: SHIPPED | OUTPATIENT
Start: 2017-04-03 | End: 2020-10-09 | Stop reason: SDUPTHER

## 2017-04-03 NOTE — PROGRESS NOTES
Subjective:       Patient ID: Ned Oliveira is a 66 y.o. male.    Chief Complaint: Annual Exam    HPI   66 y.o. Male          here for annual exam.      Cholesterol: (controlled)  Vaccines: Influenza (declined); Tetanus (declined); Pneumovax (2015)  Sexual Screening: no concern  Eye exam: 12/16  Colonoscopy: declined     Exercise: no  Diet: regular     Past Medical History:  Diabetes mellitus, type 2 with retinopathy   Hypertension   BPH (benign prostatic hyperplasia)   Hyperlipidemia   Adrenal adenoma  Past Surgical History:  BICEPS TENDON REPAIR Right   Social History  Marital Status:  Spouse Name:   Years of Education: Number of children: 2      Occupational History  Occupation Employer Comment   Maintenance      Social History Main Topics  Smoking Status: Former Smoker Packs/Day: 2.00 Years: 10   Types: Cigarettes  Smokeless Status: Not on file   Comment: quit smoking 20 yrs ago  Alcohol Use: No   Drug Use: No   Sexual Activity: Yes Partners with: Female     No Known Allergies  Mr. Oliveira does not currently have medications on file.  Review of Systems   Constitutional: Negative for activity change, appetite change, chills, diaphoresis, fatigue, fever and unexpected weight change.   HENT: Negative for congestion, mouth sores, postnasal drip, rhinorrhea, sinus pressure, sneezing, sore throat, trouble swallowing and voice change.    Eyes: Negative for discharge, itching and visual disturbance.   Respiratory: Negative for cough, chest tightness, shortness of breath and wheezing.    Cardiovascular: Negative for chest pain, palpitations and leg swelling.   Gastrointestinal: Negative for abdominal pain, blood in stool, constipation, diarrhea, nausea and vomiting.   Endocrine: Negative for cold intolerance and heat intolerance.   Genitourinary: Negative for difficulty urinating, dysuria, flank pain, hematuria and urgency.   Musculoskeletal: Negative for arthralgias, back pain, myalgias and neck pain.   Skin:  Negative for rash and wound.   Allergic/Immunologic: Negative for environmental allergies and food allergies.   Neurological: Negative for dizziness, tremors, seizures, syncope, weakness and headaches.   Hematological: Negative for adenopathy. Does not bruise/bleed easily.   Psychiatric/Behavioral: Negative for confusion, sleep disturbance and suicidal ideas. The patient is not nervous/anxious.        Objective:      Physical Exam   Constitutional: He is oriented to person, place, and time. He appears well-developed and well-nourished. No distress.   HENT:   Head: Normocephalic and atraumatic.   Right Ear: External ear normal.   Left Ear: External ear normal.   Nose: Nose normal.   Mouth/Throat: Oropharynx is clear and moist. No oropharyngeal exudate.   Eyes: Conjunctivae and EOM are normal. Pupils are equal, round, and reactive to light. Right eye exhibits no discharge. Left eye exhibits no discharge. No scleral icterus.   Neck: Normal range of motion. Neck supple. No JVD present. No thyromegaly present.   Cardiovascular: Normal rate, regular rhythm, normal heart sounds and intact distal pulses.    No murmur heard.  Pulses:       Dorsalis pedis pulses are 2+ on the right side, and 2+ on the left side.        Posterior tibial pulses are 2+ on the right side, and 2+ on the left side.   Pulmonary/Chest: Effort normal and breath sounds normal. No respiratory distress. He has no wheezes. He has no rales.   Abdominal: Soft. Bowel sounds are normal. He exhibits no distension. There is no tenderness. There is no guarding.   Musculoskeletal: He exhibits no edema.   Feet:   Right Foot:   Protective Sensation: 4 sites tested. 4 sites sensed.   Skin Integrity: Negative for ulcer, blister or skin breakdown.   Left Foot:   Protective Sensation: 4 sites tested. 4 sites sensed.   Skin Integrity: Negative for ulcer, blister or skin breakdown.   Lymphadenopathy:     He has no cervical adenopathy.   Neurological: He is alert and  oriented to person, place, and time.   Skin: Skin is warm and dry. No rash noted. He is not diaphoretic. No pallor.   Psychiatric: He has a normal mood and affect. Judgment normal.       Assessment:       1. Annual physical exam    2. Essential hypertension    3. Type 2 diabetes mellitus with moderate nonproliferative retinopathy and macular edema, without long-term current use of insulin, unspecified laterality    4. CAD in native artery    5. Hypercholesteremia    6. Aortic atherosclerosis    7. Benign non-nodular prostatic hyperplasia without lower urinary tract symptoms    8. Adrenal nodule        Plan:    Blood work reviewed with pt   Vaccines: Influenza (declined); Tetanus (declined) ; Pneumovax (2015)   Sexual Screening: no concern   Eye exam: 12/16   Colonoscopy: declined      1. HTN- controlled, CPT   2. T2DM with retinopathy- Fair control, followed by Endocrine       Increase Amaryl to 2 mg daily and continue Metformin 1 gm BID       Monitor blood sugar BID       A1C in 3 months   3. CAD- continue ASA/statin       Followed by Cardiology    4. Adrenal adenoma- followed by Endocrine   5. Aortic atherosclerosis- stable on ASA/statin    6. HLD- stable on Zocor 40 mg daily   6. BPH- fair control on Flomax 0.4 mg daily   7. F/u in 6 months

## 2017-04-12 RX ORDER — METFORMIN HYDROCHLORIDE 1000 MG/1
TABLET ORAL
Qty: 60 TABLET | Refills: 5 | Status: SHIPPED | OUTPATIENT
Start: 2017-04-12 | End: 2017-07-27 | Stop reason: SDUPTHER

## 2017-04-27 ENCOUNTER — PROCEDURE VISIT (OUTPATIENT)
Dept: OPHTHALMOLOGY | Facility: CLINIC | Age: 67
End: 2017-04-27
Payer: COMMERCIAL

## 2017-04-27 VITALS — HEART RATE: 63 BPM | SYSTOLIC BLOOD PRESSURE: 119 MMHG | DIASTOLIC BLOOD PRESSURE: 71 MMHG

## 2017-04-27 DIAGNOSIS — E11.3291 MILD NONPROLIFERATIVE DIABETIC RETINOPATHY OF RIGHT EYE WITHOUT MACULAR EDEMA ASSOCIATED WITH TYPE 2 DIABETES MELLITUS: ICD-10-CM

## 2017-04-27 DIAGNOSIS — E11.3212 DIABETIC MACULAR EDEMA OF LEFT EYE WITH MILD NONPROLIFERATIVE RETINOPATHY ASSOCIATED WITH TYPE 2 DIABETES MELLITUS: Primary | ICD-10-CM

## 2017-04-27 PROCEDURE — 92134 CPTRZ OPH DX IMG PST SGM RTA: CPT | Mod: S$GLB,,, | Performed by: OPHTHALMOLOGY

## 2017-04-27 PROCEDURE — 92014 COMPRE OPH EXAM EST PT 1/>: CPT | Mod: 25,S$GLB,, | Performed by: OPHTHALMOLOGY

## 2017-04-27 PROCEDURE — 67028 INJECTION EYE DRUG: CPT | Mod: LT,S$GLB,, | Performed by: OPHTHALMOLOGY

## 2017-04-27 RX ADMIN — Medication 1.25 MG: at 04:04

## 2017-04-27 NOTE — PROGRESS NOTES
oCT - OD - No DME  OS DME     FA - late macular leakage OS > OD    A/P    1. Mild NPDR OU T2  2. DME OS    S/p Avastin OS x 3    Avastin OS #4 today    BS/BP/chol control    3. NS OU      2-3 weeks Focal OS      Risks, benefits, and alternatives to treatment discussed in detail with the patient.  The patient voiced understanding and wished to proceed with the procedure    Injection Procedure Note:  Diagnosis: DME OS    Topical Proparacaine and Betadine.  Inject Avastin OS at 6:00 @ 3.5-4mm posterior to limbus  Post Operative Dx: Same  Complications: None  Follow up as above.

## 2017-04-27 NOTE — MR AVS SNAPSHOT
[place] [facility]   [facility]  [place] [address]  Phone: [phone]  Fax: [phone]                  Ned Oliveira   2017 2:50 PM   Procedure visit    Descripción:  Male : 1950   Personal Médico:  CHRISTOPHER Rivers MD   Departamento:  [place] [facility]           [place]     Diabetic Eye Exam           Diagnósticos de Esta Visita        Comentarios    Diabetic macular edema of left eye with mild nonproliferative retinopathy associated with type 2 diabetes mellitus    -  Primario     Mild nonproliferative diabetic retinopathy of right eye without macular edema associated with type 2 diabetes mellitus                Lista de tareas           Citas próximas        Personal Médico Departamento Tfno del dpto    7/3/2017 10:00 AM LAB, [facility] [phone]      Metas (5 Years of Data)     Ninguna      Follow-Up and Disposition     Return in about 2 weeks (around 2017).      [facility]     [facility] - Asistencia   Enfermeras registradas de [facility] pueden ayudarle a reservar nori [place], proveer educación para la lindsey, asesoría clínica, y otros servicios de asesoramiento.   Llame para rahat servicio gratuito a [phone].             Medicamentos           Mensaje sobre Medicamentos     Verificar los cambios y / o adiciones a sanchez régimen de medicación son los mismos que discutir con sanchez médico. Si cualquiera de estos cambios o adiciones son incorrectos, por favor notifique a sanchez proveedor de atención médica.        These medications were administered today        Dose Freq    bevacizumab (AVASTIN) 2.5 mg/0.10 mL 1.25 mg 1.25 mg Clinic/HOD 1 time    Sig: Inject 0.05 mLs (1.25 mg total) into the eye one time.    Categoría: Normal    Vía: Intraocular           Verifique que la siguiente lista de medicamentos es nori representación exacta de los medicamentos que está tomando actualmente. Si no hay  ningunos reportados, la lista puede estar en schultz. Si no es correcta, por favor póngase en contacto con sanchez proveedor de atención médica. Lleve esta lista con usted en tiffanie de emergencia.           Medicamentos Actuales     aspirin (ECOTRIN) 81 MG EC tablet Take 1 tablet (81 mg total) by mouth once daily.    blood sugar diagnostic Strp To use as directed to monitor blood sugars daily    butalbital-acetaminophen-caffeine -40 mg (FIORICET, ESGIC) -40 mg per tablet TAKE ONE TABLET BY MOUTH EVERY 4 HOURS AS NEEDED FOR PAIN OR HEADACHE    FLUARIX QUAD [id number], PF, 60 mcg (15 mcg x 4)/0.5 mL Syrg     glimepiride (AMARYL) 2 MG tablet Take 1 tablet (2 mg total) by mouth before breakfast.    lancets (ONETOUCH ULTRASOFT LANCETS) Misc Check blood sugars BID    levocetirizine (XYZAL) 5 MG tablet TAKE ONE TABLET BY MOUTH EVERY EVENING    lisinopril-hydrochlorothiazide (PRINZIDE,ZESTORETIC) 10-12.5 mg per tablet TAKE ONE TABLET BY MOUTH EVERY DAY    metformin (GLUCOPHAGE) 1000 MG tablet TAKE ONE TABLET BY MOUTH TWICE A DAY WITH MEALS    metoprolol tartrate (LOPRESSOR) 25 MG tablet Take 0.5 tablets (12.5 mg total) by mouth 2 (two) times daily.    simvastatin (ZOCOR) 40 MG tablet Take 1 tablet (40 mg total) by mouth every evening.    tamsulosin (FLOMAX) 0.4 mg Cp24 TAKE ONE CAPSULE BY MOUTH EVERY DAY           Información de referencia clínica           Kelly signos vitales [place]     PS Pulso                119/71 (BP Location: Right arm, Patient Position: Sitting, BP Method: Automatic) 63          Blood Pressure          Most Recent Value    BP  119/71      Alergias     A partir del:  4/27/2017        No Known Allergies      Vacunas     Administradas en la fecha de la visita:  4/27/2017        None      Orders Placed During Today's Visit      Órdenes normales de esta visita    Posterior Segment OCT Retina-Both eyes     Prior Authorization Order       Registrarse para [facility]     La activación de sanchez cuenta [facility]  es tan fácil filiberto 1-2-3!    1) Ir a [email or web address], seleccione Registrarse Ahora, meter el código de activación y sanchez fecha de nacimiento, y seleccione Próximo.    [id number]  Expires: 2017  4:57 PM      2) Crear un nombre de usuario y contraseña para usar cuando se visita [place] en el futuro y selecciona nori pregunta de seguridad en tiffanie de que pierda sanchez contraseña y seleccione Próximo.    3) Introduzca sanchez dirección de correo electrónico y queta clsilvio en Registrarse!    Información Adicional  Si tiene alguna pregunta, por favor, e-mail [email or web address] o llame al [phone] para hablar con nuestro personal. Recuerde, [facility] no debe ser usada para necesidades urgentes. En tiffanie de emergencia médica, llame al [phone].        Language Assistance Services     ATTENTION: Language assistance services are available, free of charge. Please call [phone].      ATENCIÓN: Si habla español, tiene a sanchez disposición servicios gratuitos de asistencia lingüística. Llame al [phone].     CHÚ Ý: N?u b?n nói Ti?ng Vi?t, có các d?ch v? h? tr? ngôn ng? mi?n phí dành cho b?n. G?i s? [phone].         [facility] cumple con las leyes federales aplicables de derechos civiles y no discrimina por motivos de farzaneh, color, origen nacional, edad, discapacidad, o sexo.                 Ned CARRASQUILLO Oliveira   2017 2:50 PM   Procedure visit    Description:  Male : 1950   Provider:  CHRISTOPHER Rivers MD   Department:  [place] [facility]           Reason for Visit     Diabetic Eye Exam           Diagnoses this Visit        Comments    Diabetic macular edema of left eye with mild nonproliferative retinopathy associated with type 2 diabetes mellitus    -  Primary     Mild nonproliferative diabetic retinopathy of right eye without macular edema associated with type 2 diabetes mellitus                To Do List           Future Appointments        Provider Department Dept Phone    7/3/2017 10:00  AM LAB, [facility] [place] [facility] [phone]      Goals     None      Follow-Up and Disposition     Return in about 2 weeks (around 5/11/2017).      [facility]     [facility] Nurse Care [facility] - 24/7 Assistance  Unless otherwise directed by your provider, please contact [facility], our nurse care line that is available for 24/7 assistance.     Registered nurses in the [facility] provide: appointment scheduling, clinical advisement, health education, and other advisory services.  Call: [phone] (toll free)               Medications           Message regarding Medications     Verify the changes and/or additions to your medication regime listed below are the same as discussed with your clinician today.  If any of these changes or additions are incorrect, please notify your healthcare provider.        These medications were administered today        Dose Freq    bevacizumab (AVASTIN) 2.5 mg/0.10 mL 1.25 mg 1.25 mg Clinic/HOD 1 time    Sig: Inject 0.05 mLs (1.25 mg total) into the eye one time.    Class: Normal    Route: Intraocular           Verify that the below list of medications is an accurate representation of the medications you are currently taking.  If none reported, the list may be blank. If incorrect, please contact your healthcare provider. Carry this list with you in case of emergency.           Current Medications     aspirin (ECOTRIN) 81 MG EC tablet Take 1 tablet (81 mg total) by mouth once daily.    blood sugar diagnostic Strp To use as directed to monitor blood sugars daily    butalbital-acetaminophen-caffeine -40 mg (FIORICET, ESGIC) -40 mg per tablet TAKE ONE TABLET BY MOUTH EVERY 4 HOURS AS NEEDED FOR PAIN OR HEADACHE    FLUARIX QUAD [id number], PF, 60 mcg (15 mcg x 4)/0.5 mL Syrg     glimepiride (AMARYL) 2 MG tablet Take 1 tablet (2 mg total) by mouth before breakfast.    lancets (ONETOUCH ULTRASOFT LANCETS) Misc Check blood sugars BID     levocetirizine (XYZAL) 5 MG tablet TAKE ONE TABLET BY MOUTH EVERY EVENING    lisinopril-hydrochlorothiazide (PRINZIDE,ZESTORETIC) 10-12.5 mg per tablet TAKE ONE TABLET BY MOUTH EVERY DAY    metformin (GLUCOPHAGE) 1000 MG tablet TAKE ONE TABLET BY MOUTH TWICE A DAY WITH MEALS    metoprolol tartrate (LOPRESSOR) 25 MG tablet Take 0.5 tablets (12.5 mg total) by mouth 2 (two) times daily.    simvastatin (ZOCOR) 40 MG tablet Take 1 tablet (40 mg total) by mouth every evening.    tamsulosin (FLOMAX) 0.4 mg Cp24 TAKE ONE CAPSULE BY MOUTH EVERY DAY           Clinical Reference Information           Your Vitals Were     BP Pulse                119/71 (BP Location: Right arm, Patient Position: Sitting, BP Method: Automatic) 63          Blood Pressure          Most Recent Value    BP  119/71      Allergies as of 4/27/2017     No Known Allergies      Immunizations Administered on Date of Encounter - 4/27/2017     None      Orders Placed During Today's Visit      Normal Orders This Visit    Posterior Segment OCT Retina-Both eyes     Prior Authorization Order       [facility] Sign-Up     Activating your [facility] account is as easy as 1-2-3!     1) Visit [email or web address], select Sign Up Now, enter this activation code and your date of birth, then select Next.  [email or web address]  Expires: 5/7/2017  4:57 PM      2) Create a username and password to use when you visit [facility] in the future and select a security question in case you lose your password and select Next.    3) Enter your e-mail address and click Sign Up!    Additional Information  If you have questions, please e-mail [email or web address] or call [phone] to talk to our [facility] staff. Remember, [facility] is NOT to be used for urgent needs. For medical emergencies, dial 911.         Language Assistance Services     ATTENTION: Language assistance services are available, free of charge. Please call [phone].      ATENCIÓN: Si habla español, tiene a sanchez disposición  servicios gratuitos de asistencia lingüística. Ramu ferreira [phone].     GOVIND Ý: N?u b?n nói Ti?ng Vi?t, có các d?ch v? h? tr? ngôn ng? mi?n phí dành cho b?n. G?i s? [phone].         [facility] complies with applicable Federal civil rights laws and does not discriminate on the basis of race, color, national origin, age, disability, or sex.

## 2017-04-27 NOTE — PATIENT INSTRUCTIONS

## 2017-05-25 ENCOUNTER — OFFICE VISIT (OUTPATIENT)
Dept: OPHTHALMOLOGY | Facility: CLINIC | Age: 67
End: 2017-05-25
Payer: COMMERCIAL

## 2017-05-25 VITALS — DIASTOLIC BLOOD PRESSURE: 73 MMHG | HEART RATE: 60 BPM | SYSTOLIC BLOOD PRESSURE: 102 MMHG

## 2017-05-25 DIAGNOSIS — E11.3212 DIABETIC MACULAR EDEMA OF LEFT EYE WITH MILD NONPROLIFERATIVE RETINOPATHY ASSOCIATED WITH TYPE 2 DIABETES MELLITUS: Primary | ICD-10-CM

## 2017-05-25 PROCEDURE — 99999 PR PBB SHADOW E&M-EST. PATIENT-LVL III: CPT | Mod: PBBFAC,,, | Performed by: OPHTHALMOLOGY

## 2017-05-25 PROCEDURE — 99499 UNLISTED E&M SERVICE: CPT | Mod: S$GLB,,, | Performed by: OPHTHALMOLOGY

## 2017-05-25 PROCEDURE — 67210 TREATMENT OF RETINAL LESION: CPT | Mod: LT,S$GLB,, | Performed by: OPHTHALMOLOGY

## 2017-05-25 RX ORDER — GLIMEPIRIDE 1 MG/1
TABLET ORAL
COMMUNITY
Start: 2017-04-12 | End: 2017-07-27

## 2017-05-25 NOTE — PROGRESS NOTES
oCT - OD - No DME  OS DME     FA - late macular leakage OS > OD    A/P    1. Mild NPDR OU T2  2. DME OS    S/p Avastin OS x 5    Focal OS today    BS/BP/chol control    3. NS OU      3 months OCT      Risks, benefits, and alternatives to treatment discussed in detail with the patient.  The patient voiced understanding and wished to proceed with the procedure    Laser Procedure Note  Dx: DME OS  Laser: Focal OS  Argon  Spot: 100  Power:   Dur: 0.1  #:   58  Complications: None  F/U as above

## 2017-05-29 DIAGNOSIS — I10 ESSENTIAL HYPERTENSION: ICD-10-CM

## 2017-05-30 RX ORDER — LISINOPRIL AND HYDROCHLOROTHIAZIDE 10; 12.5 MG/1; MG/1
1 TABLET ORAL DAILY
Qty: 30 TABLET | Refills: 5 | Status: SHIPPED | OUTPATIENT
Start: 2017-05-30 | End: 2017-07-27 | Stop reason: SDUPTHER

## 2017-05-30 RX ORDER — GLIMEPIRIDE 1 MG/1
1 TABLET ORAL
Qty: 30 TABLET | Refills: 5 | OUTPATIENT
Start: 2017-05-30

## 2017-07-07 ENCOUNTER — TELEPHONE (OUTPATIENT)
Dept: OPHTHALMOLOGY | Facility: CLINIC | Age: 67
End: 2017-07-07

## 2017-07-14 ENCOUNTER — TELEPHONE (OUTPATIENT)
Dept: OPTOMETRY | Facility: CLINIC | Age: 67
End: 2017-07-14

## 2017-07-20 ENCOUNTER — TELEPHONE (OUTPATIENT)
Dept: INTERNAL MEDICINE | Facility: CLINIC | Age: 67
End: 2017-07-20

## 2017-07-20 ENCOUNTER — LAB VISIT (OUTPATIENT)
Dept: LAB | Facility: HOSPITAL | Age: 67
End: 2017-07-20
Attending: INTERNAL MEDICINE
Payer: COMMERCIAL

## 2017-07-20 DIAGNOSIS — E11.3319 TYPE 2 DIABETES MELLITUS WITH MODERATE NONPROLIFERATIVE RETINOPATHY AND MACULAR EDEMA, WITHOUT LONG-TERM CURRENT USE OF INSULIN, UNSPECIFIED LATERALITY: ICD-10-CM

## 2017-07-20 LAB
ESTIMATED AVG GLUCOSE: 154 MG/DL
HBA1C MFR BLD HPLC: 7 %

## 2017-07-20 PROCEDURE — 36415 COLL VENOUS BLD VENIPUNCTURE: CPT | Mod: PO

## 2017-07-20 PROCEDURE — 83036 HEMOGLOBIN GLYCOSYLATED A1C: CPT

## 2017-07-20 NOTE — TELEPHONE ENCOUNTER
----- Message from Jose Ramachandran DO sent at 7/20/2017  1:30 PM CDT -----  Decent control of diabetes

## 2017-07-27 ENCOUNTER — OFFICE VISIT (OUTPATIENT)
Dept: INTERNAL MEDICINE | Facility: CLINIC | Age: 67
End: 2017-07-27
Payer: COMMERCIAL

## 2017-07-27 VITALS
SYSTOLIC BLOOD PRESSURE: 138 MMHG | HEIGHT: 62 IN | BODY MASS INDEX: 27.59 KG/M2 | TEMPERATURE: 98 F | RESPIRATION RATE: 16 BRPM | HEART RATE: 65 BPM | DIASTOLIC BLOOD PRESSURE: 88 MMHG | WEIGHT: 149.94 LBS

## 2017-07-27 DIAGNOSIS — I70.0 AORTIC ATHEROSCLEROSIS: ICD-10-CM

## 2017-07-27 DIAGNOSIS — I10 ESSENTIAL HYPERTENSION: Primary | ICD-10-CM

## 2017-07-27 DIAGNOSIS — E27.8 ADRENAL NODULE: ICD-10-CM

## 2017-07-27 DIAGNOSIS — N40.0 BENIGN NON-NODULAR PROSTATIC HYPERPLASIA WITHOUT LOWER URINARY TRACT SYMPTOMS: ICD-10-CM

## 2017-07-27 DIAGNOSIS — I25.10 CAD IN NATIVE ARTERY: ICD-10-CM

## 2017-07-27 DIAGNOSIS — E78.00 HYPERCHOLESTEREMIA: ICD-10-CM

## 2017-07-27 DIAGNOSIS — E11.3319 TYPE 2 DIABETES MELLITUS WITH MODERATE NONPROLIFERATIVE RETINOPATHY AND MACULAR EDEMA, WITHOUT LONG-TERM CURRENT USE OF INSULIN, UNSPECIFIED LATERALITY: ICD-10-CM

## 2017-07-27 PROCEDURE — 99999 PR PBB SHADOW E&M-EST. PATIENT-LVL III: CPT | Mod: PBBFAC,,, | Performed by: INTERNAL MEDICINE

## 2017-07-27 PROCEDURE — 99214 OFFICE O/P EST MOD 30 MIN: CPT | Mod: S$GLB,,, | Performed by: INTERNAL MEDICINE

## 2017-07-27 PROCEDURE — 3045F PR MOST RECENT HEMOGLOBIN A1C LEVEL 7.0-9.0%: CPT | Mod: S$GLB,,, | Performed by: INTERNAL MEDICINE

## 2017-07-27 RX ORDER — METFORMIN HYDROCHLORIDE 1000 MG/1
1000 TABLET ORAL 2 TIMES DAILY WITH MEALS
Qty: 180 TABLET | Refills: 3 | Status: SHIPPED | OUTPATIENT
Start: 2017-07-27 | End: 2018-03-12 | Stop reason: SDUPTHER

## 2017-07-27 RX ORDER — TAMSULOSIN HYDROCHLORIDE 0.4 MG/1
1 CAPSULE ORAL DAILY
Qty: 90 CAPSULE | Refills: 3 | Status: SHIPPED | OUTPATIENT
Start: 2017-07-27 | End: 2018-03-12 | Stop reason: SDUPTHER

## 2017-07-27 RX ORDER — SIMVASTATIN 40 MG/1
40 TABLET, FILM COATED ORAL NIGHTLY
Qty: 90 TABLET | Refills: 3 | Status: SHIPPED | OUTPATIENT
Start: 2017-07-27 | End: 2018-03-12 | Stop reason: SDUPTHER

## 2017-07-27 RX ORDER — METOPROLOL TARTRATE 25 MG/1
12.5 TABLET, FILM COATED ORAL 2 TIMES DAILY
Qty: 90 TABLET | Refills: 3 | Status: SHIPPED | OUTPATIENT
Start: 2017-07-27 | End: 2018-03-12 | Stop reason: SDUPTHER

## 2017-07-27 RX ORDER — GLIMEPIRIDE 4 MG/1
4 TABLET ORAL
Qty: 90 TABLET | Refills: 3 | Status: SHIPPED | OUTPATIENT
Start: 2017-07-27 | End: 2018-03-12 | Stop reason: SDUPTHER

## 2017-07-27 RX ORDER — LISINOPRIL AND HYDROCHLOROTHIAZIDE 10; 12.5 MG/1; MG/1
1 TABLET ORAL DAILY
Qty: 90 TABLET | Refills: 3 | Status: SHIPPED | OUTPATIENT
Start: 2017-07-27 | End: 2018-03-12 | Stop reason: SDUPTHER

## 2017-07-27 RX ORDER — CICLOPIROX 80 MG/ML
SOLUTION TOPICAL NIGHTLY
Qty: 6.6 ML | Refills: 11 | Status: SHIPPED | OUTPATIENT
Start: 2017-07-27 | End: 2020-10-09

## 2017-07-27 NOTE — PROGRESS NOTES
Subjective:       Patient ID: Ned Oliveira is a 67 y.o. male.    Chief Complaint: Medication Refill (lab results)    HPI   Pt with HTN, T2DM, CAD, adrenal adenoma, aortic atherosclerosis, HLD, BPH is here for 3 month f/u. Pt has been doing well and denies any acute complaints. He is here to discuss his last HA1C test which has improved.   Review of Systems   Constitutional: Negative for activity change, appetite change, chills, diaphoresis, fatigue, fever and unexpected weight change.   HENT: Negative for postnasal drip, rhinorrhea, sinus pressure, sneezing, sore throat, trouble swallowing and voice change.    Respiratory: Negative for cough, shortness of breath and wheezing.    Cardiovascular: Negative for chest pain, palpitations and leg swelling.   Gastrointestinal: Negative for abdominal pain, blood in stool, constipation, diarrhea, nausea and vomiting.   Genitourinary: Negative for dysuria.   Musculoskeletal: Negative for arthralgias and myalgias.   Skin: Negative for rash and wound.   Allergic/Immunologic: Negative for environmental allergies and food allergies.   Hematological: Negative for adenopathy. Does not bruise/bleed easily.       Objective:      Physical Exam   Constitutional: He is oriented to person, place, and time. He appears well-developed and well-nourished. No distress.   HENT:   Head: Normocephalic and atraumatic.   Eyes: Conjunctivae and EOM are normal. Pupils are equal, round, and reactive to light. Right eye exhibits no discharge. Left eye exhibits no discharge. No scleral icterus.   Neck: Normal range of motion. Neck supple. No JVD present.   Cardiovascular: Normal rate, regular rhythm and normal heart sounds.    No murmur heard.  Pulmonary/Chest: Effort normal and breath sounds normal. No respiratory distress. He has no wheezes. He has no rales.   Abdominal: Soft. Bowel sounds are normal. There is no tenderness.   Musculoskeletal: He exhibits no edema.   Lymphadenopathy:     He has no  cervical adenopathy.   Neurological: He is alert and oriented to person, place, and time.   Skin: Skin is warm and dry. No rash noted. He is not diaphoretic. No pallor.       Assessment:       1. Essential hypertension    2. Type 2 diabetes mellitus with moderate nonproliferative retinopathy and macular edema, without long-term current use of insulin, unspecified laterality    3. CAD in native artery    4. Adrenal nodule    5. Aortic atherosclerosis    6. Hypercholesteremia    7. Benign non-nodular prostatic hyperplasia without lower urinary tract symptoms        Plan:    1. HTN- controlled, CPT   2. T2DM with retinopathy- last HA1C of 7.0(7/17)<--8.4(3/17)       Increase Amaryl to 4 mg daily(previously on 2 mg) and continue Metformin 1 gm BID       Monitor blood sugar BID       A1C in 3 months   3. CAD- continue ASA/statin       Followed by Cardiology    4. Adrenal adenoma- followed by Endocrine   5. Aortic atherosclerosis- stable on ASA/statin    6. HLD- stable on Zocor 40 mg daily   6. BPH- fair control on Flomax 0.4 mg daily

## 2017-08-18 ENCOUNTER — TELEPHONE (OUTPATIENT)
Dept: OPHTHALMOLOGY | Facility: CLINIC | Age: 67
End: 2017-08-18

## 2017-08-24 ENCOUNTER — TELEPHONE (OUTPATIENT)
Dept: OPHTHALMOLOGY | Facility: CLINIC | Age: 67
End: 2017-08-24

## 2017-08-25 DIAGNOSIS — Z12.11 COLON CANCER SCREENING: ICD-10-CM

## 2017-08-28 ENCOUNTER — TELEPHONE (OUTPATIENT)
Dept: OPHTHALMOLOGY | Facility: CLINIC | Age: 67
End: 2017-08-28

## 2017-08-30 ENCOUNTER — TELEPHONE (OUTPATIENT)
Dept: OPHTHALMOLOGY | Facility: CLINIC | Age: 67
End: 2017-08-30

## 2017-11-06 ENCOUNTER — LAB VISIT (OUTPATIENT)
Dept: LAB | Facility: HOSPITAL | Age: 67
End: 2017-11-06
Attending: INTERNAL MEDICINE
Payer: MEDICARE

## 2017-11-06 DIAGNOSIS — E11.3319 TYPE 2 DIABETES MELLITUS WITH MODERATE NONPROLIFERATIVE RETINOPATHY AND MACULAR EDEMA, WITHOUT LONG-TERM CURRENT USE OF INSULIN, UNSPECIFIED LATERALITY: ICD-10-CM

## 2017-11-06 DIAGNOSIS — I10 ESSENTIAL HYPERTENSION: ICD-10-CM

## 2017-11-06 LAB
ALBUMIN SERPL BCP-MCNC: 3.5 G/DL
ALP SERPL-CCNC: 88 U/L
ALT SERPL W/O P-5'-P-CCNC: 28 U/L
ANION GAP SERPL CALC-SCNC: 6 MMOL/L
AST SERPL-CCNC: 22 U/L
BASOPHILS # BLD AUTO: 0.04 K/UL
BASOPHILS NFR BLD: 0.5 %
BILIRUB SERPL-MCNC: 0.8 MG/DL
BUN SERPL-MCNC: 20 MG/DL
CALCIUM SERPL-MCNC: 9 MG/DL
CHLORIDE SERPL-SCNC: 105 MMOL/L
CO2 SERPL-SCNC: 28 MMOL/L
CREAT SERPL-MCNC: 1.1 MG/DL
DIFFERENTIAL METHOD: ABNORMAL
EOSINOPHIL # BLD AUTO: 0.3 K/UL
EOSINOPHIL NFR BLD: 3.8 %
ERYTHROCYTE [DISTWIDTH] IN BLOOD BY AUTOMATED COUNT: 12.3 %
EST. GFR  (AFRICAN AMERICAN): >60 ML/MIN/1.73 M^2
EST. GFR  (NON AFRICAN AMERICAN): >60 ML/MIN/1.73 M^2
ESTIMATED AVG GLUCOSE: 169 MG/DL
GLUCOSE SERPL-MCNC: 159 MG/DL
HBA1C MFR BLD HPLC: 7.5 %
HCT VFR BLD AUTO: 41.7 %
HGB BLD-MCNC: 14.2 G/DL
IMM GRANULOCYTES # BLD AUTO: 0.02 K/UL
IMM GRANULOCYTES NFR BLD AUTO: 0.3 %
LYMPHOCYTES # BLD AUTO: 2.5 K/UL
LYMPHOCYTES NFR BLD: 32 %
MCH RBC QN AUTO: 32.7 PG
MCHC RBC AUTO-ENTMCNC: 34.1 G/DL
MCV RBC AUTO: 96 FL
MONOCYTES # BLD AUTO: 0.6 K/UL
MONOCYTES NFR BLD: 7.3 %
NEUTROPHILS # BLD AUTO: 4.3 K/UL
NEUTROPHILS NFR BLD: 56.1 %
NRBC BLD-RTO: 0 /100 WBC
PLATELET # BLD AUTO: 190 K/UL
PMV BLD AUTO: 11.2 FL
POTASSIUM SERPL-SCNC: 4.2 MMOL/L
PROT SERPL-MCNC: 6.5 G/DL
RBC # BLD AUTO: 4.34 M/UL
SODIUM SERPL-SCNC: 139 MMOL/L
WBC # BLD AUTO: 7.72 K/UL

## 2017-11-06 PROCEDURE — 83036 HEMOGLOBIN GLYCOSYLATED A1C: CPT

## 2017-11-06 PROCEDURE — 80053 COMPREHEN METABOLIC PANEL: CPT

## 2017-11-06 PROCEDURE — 85025 COMPLETE CBC W/AUTO DIFF WBC: CPT

## 2017-11-06 PROCEDURE — 36415 COLL VENOUS BLD VENIPUNCTURE: CPT | Mod: PO

## 2017-11-08 ENCOUNTER — PATIENT MESSAGE (OUTPATIENT)
Dept: RESEARCH | Facility: HOSPITAL | Age: 67
End: 2017-11-08

## 2017-11-13 ENCOUNTER — TELEPHONE (OUTPATIENT)
Dept: INTERNAL MEDICINE | Facility: CLINIC | Age: 67
End: 2017-11-13

## 2017-11-13 ENCOUNTER — OFFICE VISIT (OUTPATIENT)
Dept: INTERNAL MEDICINE | Facility: CLINIC | Age: 67
End: 2017-11-13
Payer: COMMERCIAL

## 2017-11-13 VITALS
DIASTOLIC BLOOD PRESSURE: 78 MMHG | SYSTOLIC BLOOD PRESSURE: 128 MMHG | WEIGHT: 147.69 LBS | TEMPERATURE: 98 F | BODY MASS INDEX: 27.18 KG/M2 | HEART RATE: 68 BPM | RESPIRATION RATE: 16 BRPM | HEIGHT: 62 IN

## 2017-11-13 DIAGNOSIS — N40.0 BENIGN NON-NODULAR PROSTATIC HYPERPLASIA WITHOUT LOWER URINARY TRACT SYMPTOMS: ICD-10-CM

## 2017-11-13 DIAGNOSIS — E27.8 ADRENAL NODULE: ICD-10-CM

## 2017-11-13 DIAGNOSIS — I25.10 CAD IN NATIVE ARTERY: ICD-10-CM

## 2017-11-13 DIAGNOSIS — I70.0 AORTIC ATHEROSCLEROSIS: ICD-10-CM

## 2017-11-13 DIAGNOSIS — E78.00 HYPERCHOLESTEREMIA: ICD-10-CM

## 2017-11-13 DIAGNOSIS — I10 ESSENTIAL HYPERTENSION: Primary | ICD-10-CM

## 2017-11-13 DIAGNOSIS — E11.3319 TYPE 2 DIABETES MELLITUS WITH MODERATE NONPROLIFERATIVE RETINOPATHY AND MACULAR EDEMA, WITHOUT LONG-TERM CURRENT USE OF INSULIN, UNSPECIFIED LATERALITY: ICD-10-CM

## 2017-11-13 DIAGNOSIS — E11.9 TYPE 2 DIABETES MELLITUS WITHOUT COMPLICATION, WITHOUT LONG-TERM CURRENT USE OF INSULIN: Primary | ICD-10-CM

## 2017-11-13 PROCEDURE — 99214 OFFICE O/P EST MOD 30 MIN: CPT | Mod: S$GLB,,, | Performed by: INTERNAL MEDICINE

## 2017-11-13 PROCEDURE — 99999 PR PBB SHADOW E&M-EST. PATIENT-LVL III: CPT | Mod: PBBFAC,,, | Performed by: INTERNAL MEDICINE

## 2017-11-13 NOTE — PROGRESS NOTES
Subjective:       Patient ID: Ned Oliveira is a 67 y.o. male.    Chief Complaint: Hypertension (and review labs, ) and Diabetes    HPI   Pt with HTN, T2DM, CAD, adrenal adenoma, aortic atherosclerosis, HLD, BPH is here for 3 month f/u. His Amary was increased at last visit. He is here to review labs.   Review of Systems   Constitutional: Negative for activity change, appetite change, chills, diaphoresis, fatigue, fever and unexpected weight change.   HENT: Negative for postnasal drip, rhinorrhea, sinus pressure, sneezing, sore throat, trouble swallowing and voice change.    Respiratory: Negative for cough, shortness of breath and wheezing.    Cardiovascular: Negative for chest pain, palpitations and leg swelling.   Gastrointestinal: Negative for abdominal pain, blood in stool, constipation, diarrhea, nausea and vomiting.   Genitourinary: Negative for dysuria.   Musculoskeletal: Negative for arthralgias and myalgias.   Skin: Negative for rash and wound.   Allergic/Immunologic: Negative for environmental allergies and food allergies.   Hematological: Negative for adenopathy. Does not bruise/bleed easily.       Objective:      Physical Exam   Constitutional: He is oriented to person, place, and time. He appears well-developed and well-nourished. No distress.   HENT:   Head: Normocephalic and atraumatic.   Eyes: Conjunctivae and EOM are normal. Pupils are equal, round, and reactive to light. Right eye exhibits no discharge. Left eye exhibits no discharge. No scleral icterus.   Neck: Normal range of motion. Neck supple. No JVD present.   Cardiovascular: Normal rate, regular rhythm and normal heart sounds.    No murmur heard.  Pulmonary/Chest: Effort normal and breath sounds normal. No respiratory distress. He has no wheezes. He has no rales.   Abdominal: Soft. Bowel sounds are normal. There is no tenderness.   Musculoskeletal: He exhibits no edema.   Lymphadenopathy:     He has no cervical adenopathy.   Neurological: He  is alert and oriented to person, place, and time.   Skin: Skin is warm and dry. No rash noted. He is not diaphoretic. No pallor.       Assessment:       1. Essential hypertension    2. Type 2 diabetes mellitus with moderate nonproliferative retinopathy and macular edema, without long-term current use of insulin, unspecified laterality    3. CAD in native artery    4. Adrenal nodule    5. Aortic atherosclerosis    6. Hypercholesteremia    7. Benign non-nodular prostatic hyperplasia without lower urinary tract symptoms        Plan:    1. HTN- controlled, CPT   2. T2DM with retinopathy- last HA1C of 7.5(11/17)<--7.0(7/17)<--8.4(3/17)       On Amaryl 4 mg daily, Metformin 1 gm BID       Rx Invokana 100 mg daily        Referral to Diabetic Education        Monitor blood sugar BID       A1C in 3 months   3. CAD- continue ASA/statin       Followed by Cardiology    4. Adrenal adenoma- followed by Endocrine   5. Aortic atherosclerosis- stable on ASA/statin    6. HLD- stable on Zocor 40 mg daily   6. BPH- fair control on Flomax 0.4 mg daily   7. F/u in 3 months for annual exam

## 2017-12-11 ENCOUNTER — CLINICAL SUPPORT (OUTPATIENT)
Dept: DIABETES | Facility: CLINIC | Age: 67
End: 2017-12-11
Payer: COMMERCIAL

## 2017-12-11 DIAGNOSIS — E11.3319 TYPE 2 DIABETES MELLITUS WITH MODERATE NONPROLIFERATIVE RETINOPATHY AND MACULAR EDEMA, WITHOUT LONG-TERM CURRENT USE OF INSULIN, UNSPECIFIED LATERALITY: ICD-10-CM

## 2017-12-11 PROCEDURE — G0108 DIAB MANAGE TRN  PER INDIV: HCPCS | Mod: S$GLB,,, | Performed by: DIETITIAN, REGISTERED

## 2017-12-19 NOTE — PROGRESS NOTES
Diabetes Education  Author: Sully Ortiz RD  Date: 12/19/2017    Diabetes Education Visit  Diabetes Education Record Assessment/Progress: Initial    Diabetes Type  Diabetes Type : Type II    Diabetes History  Diabetes Diagnosis: >10 years    Nutrition  Meal Planning: water  What type of beverages do you drink?: regular soda/tea (regular coke, orange juice, )    Meal Plan 24 Hour Recall - Breakfast:  ( coffee with splenda; toast (2 pieces), david, eggs)  Meal Plan 24 Hour Recall - Lunch:  (rice, chicken; OR potatoes, salad, fruit)  Meal Plan 24 Hour Recall - Dinner:  (meat, rice (very little), sometimes pasta)  Meal Plan 24 Hour Recall - Snack:  (no snacking)    Monitoring   Self Monitoring :  (very little; doesn't like to check)  Blood Glucose Logs: No    Exercise   Exercise Type: none, walking (minimal walking)    Current Diabetes Treatment   Current Treatment: Oral Medication (metformin 1000 mg BID: amaryl 4 mg daily; invokana 100 mg daily)    Social History  Preferred Learning Method: Face to Face  Primary Support: Self, Daughter, Family (daughter and grandson present)  Smoking Status: Ex Smoker    Barriers to Change  Barriers to Change: None  Learning Challenges : Language  Language spoken:  (C[place] Verdean)  Language -  present?: No ( refused - pt's daughter present, speaks English and provided interpretation; pt does understand some English on his own)    Readiness to Learn   Readiness to Learn : Acceptance    Cultural Influences  Cultural Influences: No      Diabetes Education Assessment/Progress  Diabetes Disease Process (diabetes disease process and treatment options): Discussion, Instructed, Individual Session, Comprehends Key Points (Reviewed disease process and need for control; reviewed current need for multiple DM medications.)    Nutrition (Incorporating nutritional management into one's lifestyle): Discussion, Instructed, Individual Session, Written Materials Provided, Comprehends Key  Points (Emphasized need to eliminate all SSB; can have 3-4 oz of OJ with breakfast if counted toward total carb allowance. Discussed carb vs non-carb foods. Reviewed appropriate amounts of carbs to have at meals. Recommended 30-45 gm carb at meals and 0-20 gm carbs at snacks. Discussed multiple meal plans and snack ideas amenable to pt.)    Physical Activity (incorporating physical activity into one's lifestyle): Discussion, Instructed, Individual Session, Comprehends Key Points (Discussed goals and benefits of regular PA. Encouraged regular walking, up to 3 hours per week.)    Medications (states correct name, dose, onset, peak, duration, side effects & timing of meds): Discussion, Instructed, Individual Session, Written Materials Provided, Comprehends Key Points (Reviewed timing and MOA of metformin, amaryl, and invokana. )    Monitoring (monitoring blood glucose/other parameters & using results): Discussion, Instructed, Individual Session (Encouraged pt to check at least once daily, fasting or pre-dinner. Reviewed BG goals.)    Acute Complications (preventing, detecting, and treating acute complications): Discussion, Instructed, Individual Session, Comprehends Key Points, Written Materials Provided (Discussed hypo symptoms and appropriate treatment.)    Chronic Complications (preventing, detecting, and treating chronic complications): Discussion, Written Materials Provided (Reviewed annual care schedule.)    Cognitive (knowledge of self-management skills, functional health literacy): Discussion, Comprehends Key Points, Needs Review  Diabetes Distress and Support Systems: Discussion, Demonstrates Understanding/Competency (verbalizes/demonstrates)  Behavioral (readiness for change, lifestyle practices, self-care behaviors): Discussion, Needs Review      Goals  Patient has selected/evaluated goals during today's session: Yes, selected    Healthy Eating: Set (Eliminate SSB)  Start Date: 12/11/17  Monitoring: Set  (SMBG once daily)  Start Date: 12/11/17         Diabetes Care Plan/Intervention  Education Plan/Intervention: Individual Follow-Up DSMT      Diabetes Meal Plan  Restrictions: Restricted Carbohydrate  Carbohydrate Per Meal: 30-45g  Carbohydrate Per Snack : 7-15g      Education Units of Time   Time Spent: 60 min      Health Maintenance Topics with due status: Not Due       Topic Last Completion Date    TETANUS VACCINE 09/01/2015    Lipid Panel 03/27/2017    Foot Exam 04/03/2017    Colonoscopy 04/03/2017    Eye Exam 04/27/2017    Hemoglobin A1c 11/06/2017     Health Maintenance Due   Topic Date Due    Pneumococcal (65+) (2 of 2 - PPSV23) 06/07/2017    Abdominal Aortic Aneurysm Screening  06/07/2017

## 2018-01-29 ENCOUNTER — LAB VISIT (OUTPATIENT)
Dept: LAB | Facility: HOSPITAL | Age: 68
End: 2018-01-29
Attending: INTERNAL MEDICINE
Payer: COMMERCIAL

## 2018-01-29 ENCOUNTER — OFFICE VISIT (OUTPATIENT)
Dept: INTERNAL MEDICINE | Facility: CLINIC | Age: 68
End: 2018-01-29
Payer: COMMERCIAL

## 2018-01-29 VITALS
BODY MASS INDEX: 27.63 KG/M2 | SYSTOLIC BLOOD PRESSURE: 138 MMHG | RESPIRATION RATE: 16 BRPM | HEART RATE: 61 BPM | DIASTOLIC BLOOD PRESSURE: 88 MMHG | WEIGHT: 150.13 LBS | TEMPERATURE: 98 F | HEIGHT: 62 IN

## 2018-01-29 DIAGNOSIS — I10 ESSENTIAL HYPERTENSION: Primary | ICD-10-CM

## 2018-01-29 DIAGNOSIS — E11.9 TYPE 2 DIABETES MELLITUS WITHOUT COMPLICATION, WITHOUT LONG-TERM CURRENT USE OF INSULIN: ICD-10-CM

## 2018-01-29 DIAGNOSIS — N40.0 BENIGN NON-NODULAR PROSTATIC HYPERPLASIA WITHOUT LOWER URINARY TRACT SYMPTOMS: ICD-10-CM

## 2018-01-29 DIAGNOSIS — I70.0 AORTIC ATHEROSCLEROSIS: ICD-10-CM

## 2018-01-29 DIAGNOSIS — E27.8 ADRENAL NODULE: ICD-10-CM

## 2018-01-29 DIAGNOSIS — I25.10 CAD IN NATIVE ARTERY: ICD-10-CM

## 2018-01-29 DIAGNOSIS — E11.69 HYPERLIPIDEMIA ASSOCIATED WITH TYPE 2 DIABETES MELLITUS: ICD-10-CM

## 2018-01-29 DIAGNOSIS — E11.3212 DIABETIC MACULAR EDEMA OF LEFT EYE WITH MILD NONPROLIFERATIVE RETINOPATHY ASSOCIATED WITH TYPE 2 DIABETES MELLITUS: ICD-10-CM

## 2018-01-29 DIAGNOSIS — E78.5 HYPERLIPIDEMIA ASSOCIATED WITH TYPE 2 DIABETES MELLITUS: ICD-10-CM

## 2018-01-29 LAB
ANION GAP SERPL CALC-SCNC: 5 MMOL/L
BASOPHILS # BLD AUTO: 0.05 K/UL
BASOPHILS NFR BLD: 0.7 %
BUN SERPL-MCNC: 21 MG/DL
CALCIUM SERPL-MCNC: 9.1 MG/DL
CHLORIDE SERPL-SCNC: 108 MMOL/L
CO2 SERPL-SCNC: 27 MMOL/L
CREAT SERPL-MCNC: 1 MG/DL
DIFFERENTIAL METHOD: ABNORMAL
EOSINOPHIL # BLD AUTO: 0.2 K/UL
EOSINOPHIL NFR BLD: 3.4 %
ERYTHROCYTE [DISTWIDTH] IN BLOOD BY AUTOMATED COUNT: 12.5 %
EST. GFR  (AFRICAN AMERICAN): >60 ML/MIN/1.73 M^2
EST. GFR  (NON AFRICAN AMERICAN): >60 ML/MIN/1.73 M^2
ESTIMATED AVG GLUCOSE: 154 MG/DL
GLUCOSE SERPL-MCNC: 149 MG/DL
HBA1C MFR BLD HPLC: 7 %
HCT VFR BLD AUTO: 41.8 %
HGB BLD-MCNC: 14.1 G/DL
IMM GRANULOCYTES # BLD AUTO: 0.02 K/UL
IMM GRANULOCYTES NFR BLD AUTO: 0.3 %
LYMPHOCYTES # BLD AUTO: 2 K/UL
LYMPHOCYTES NFR BLD: 29.3 %
MCH RBC QN AUTO: 32.5 PG
MCHC RBC AUTO-ENTMCNC: 33.7 G/DL
MCV RBC AUTO: 96 FL
MONOCYTES # BLD AUTO: 0.4 K/UL
MONOCYTES NFR BLD: 6.3 %
NEUTROPHILS # BLD AUTO: 4.1 K/UL
NEUTROPHILS NFR BLD: 60 %
NRBC BLD-RTO: 0 /100 WBC
PLATELET # BLD AUTO: 197 K/UL
PMV BLD AUTO: 11.6 FL
POTASSIUM SERPL-SCNC: 4.3 MMOL/L
RBC # BLD AUTO: 4.34 M/UL
SODIUM SERPL-SCNC: 140 MMOL/L
WBC # BLD AUTO: 6.8 K/UL

## 2018-01-29 PROCEDURE — 85025 COMPLETE CBC W/AUTO DIFF WBC: CPT

## 2018-01-29 PROCEDURE — 83036 HEMOGLOBIN GLYCOSYLATED A1C: CPT

## 2018-01-29 PROCEDURE — 99999 PR PBB SHADOW E&M-EST. PATIENT-LVL III: CPT | Mod: PBBFAC,,, | Performed by: INTERNAL MEDICINE

## 2018-01-29 PROCEDURE — 80048 BASIC METABOLIC PNL TOTAL CA: CPT

## 2018-01-29 PROCEDURE — 36415 COLL VENOUS BLD VENIPUNCTURE: CPT | Mod: PO

## 2018-01-29 PROCEDURE — 99214 OFFICE O/P EST MOD 30 MIN: CPT | Mod: S$GLB,,, | Performed by: INTERNAL MEDICINE

## 2018-01-29 NOTE — PROGRESS NOTES
Subjective:       Patient ID: Ned Oliveira is a 67 y.o. male.    Chief Complaint: Follow-up (3 mon)    HPI   Pt with HTN, T2DM, CAD, adrenal adenoma, aortic atherosclerosis, HLD, BPH is here for 3 month f/u. Pt stopped the Invokana 2/2 concerns of leg amputations. No acute complaints today.   Review of Systems   Constitutional: Negative for activity change, appetite change, chills, diaphoresis, fatigue, fever and unexpected weight change.   HENT: Negative for postnasal drip, rhinorrhea, sinus pressure, sneezing, sore throat, trouble swallowing and voice change.    Respiratory: Negative for cough, shortness of breath and wheezing.    Cardiovascular: Negative for chest pain, palpitations and leg swelling.   Gastrointestinal: Negative for abdominal pain, blood in stool, constipation, diarrhea, nausea and vomiting.   Genitourinary: Negative for dysuria.   Musculoskeletal: Negative for arthralgias and myalgias.   Skin: Negative for rash and wound.   Allergic/Immunologic: Negative for environmental allergies and food allergies.   Hematological: Negative for adenopathy. Does not bruise/bleed easily.       Objective:      Physical Exam   Constitutional: He is oriented to person, place, and time. He appears well-developed and well-nourished. No distress.   HENT:   Head: Normocephalic and atraumatic.   Eyes: Conjunctivae and EOM are normal. Pupils are equal, round, and reactive to light. Right eye exhibits no discharge. Left eye exhibits no discharge. No scleral icterus.   Neck: Normal range of motion. Neck supple. No JVD present.   Cardiovascular: Normal rate, regular rhythm and normal heart sounds.    No murmur heard.  Pulmonary/Chest: Effort normal and breath sounds normal. No respiratory distress. He has no wheezes. He has no rales.   Abdominal: Soft. Bowel sounds are normal. There is no tenderness.   Musculoskeletal: He exhibits no edema.   Lymphadenopathy:     He has no cervical adenopathy.   Neurological: He is alert  and oriented to person, place, and time.   Skin: Skin is warm and dry. No rash noted. He is not diaphoretic. No pallor.       Assessment:       1. Essential hypertension    2. 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 CAD in native artery    4. Adrenal nodule    5. Aortic atherosclerosis    6. Hyperlipidemia associated with type 2 diabetes mellitus    7. Benign non-nodular prostatic hyperplasia without lower urinary tract symptoms        Plan:    1. HTN- controlled, CPT   2. T2DM with retinopathy- last HA1C of 7.5(11/17)<--7.0(7/17)<--8.4(3/17)       On Amaryl 4 mg daily, Metformin 1 gm BID       D/c Invokana 2/2 concerns of leg amputations       Pt has seen Diabetic Education        Monitor blood sugar BID   3. CAD- continue ASA/statin       Followed by Cardiology    4. Adrenal adenoma- followed by Endocrine   5. Aortic atherosclerosis- stable on ASA/statin    6. HLD associated with diabetes- stable on Zocor 40 mg daily   6. BPH- fair control on Flomax 0.4 mg daily   7. F/u in 3 months for annual exam

## 2018-01-30 NOTE — TELEPHONE ENCOUNTER
----- Message from Justine Shah sent at 1/30/2018 12:16 PM CST -----  Contact: Rossy Tejeda [phone]  RX request - refill or new RX.  Is this a refill or new RX:Refill    RX name and strength: canagliflozin (INVOKANA) 100 mg Tab  Directions: Take 1 tablet (100 mg total) by mouth once daily. - Oral  Is this a 30 day or 90 day RX:  30  Pharmacy name and phone # :  [facility] [phone] (phone) [phone] (Fax)  Comments:  Requesting prior authorization for medication,  Pt's insurance changes on 02/01/18 would like to have it filled prior to that date.

## 2018-03-12 ENCOUNTER — OFFICE VISIT (OUTPATIENT)
Dept: INTERNAL MEDICINE | Facility: CLINIC | Age: 68
End: 2018-03-12
Payer: MEDICARE

## 2018-03-12 VITALS
SYSTOLIC BLOOD PRESSURE: 98 MMHG | BODY MASS INDEX: 28.01 KG/M2 | WEIGHT: 148.38 LBS | DIASTOLIC BLOOD PRESSURE: 62 MMHG | TEMPERATURE: 98 F | HEART RATE: 64 BPM | HEIGHT: 61 IN

## 2018-03-12 DIAGNOSIS — E11.3319 TYPE 2 DIABETES MELLITUS WITH MODERATE NONPROLIFERATIVE RETINOPATHY AND MACULAR EDEMA, WITHOUT LONG-TERM CURRENT USE OF INSULIN, UNSPECIFIED LATERALITY: ICD-10-CM

## 2018-03-12 DIAGNOSIS — I15.2 HYPERTENSION ASSOCIATED WITH DIABETES: Primary | ICD-10-CM

## 2018-03-12 DIAGNOSIS — N40.0 BENIGN NON-NODULAR PROSTATIC HYPERPLASIA WITHOUT LOWER URINARY TRACT SYMPTOMS: ICD-10-CM

## 2018-03-12 DIAGNOSIS — I10 ESSENTIAL HYPERTENSION: ICD-10-CM

## 2018-03-12 DIAGNOSIS — E78.5 HYPERLIPIDEMIA ASSOCIATED WITH TYPE 2 DIABETES MELLITUS: ICD-10-CM

## 2018-03-12 DIAGNOSIS — I70.0 AORTIC ATHEROSCLEROSIS: ICD-10-CM

## 2018-03-12 DIAGNOSIS — E11.69 HYPERLIPIDEMIA ASSOCIATED WITH TYPE 2 DIABETES MELLITUS: ICD-10-CM

## 2018-03-12 DIAGNOSIS — I25.10 CAD IN NATIVE ARTERY: ICD-10-CM

## 2018-03-12 DIAGNOSIS — E11.59 HYPERTENSION ASSOCIATED WITH DIABETES: Primary | ICD-10-CM

## 2018-03-12 PROCEDURE — 99999 PR PBB SHADOW E&M-EST. PATIENT-LVL III: CPT | Mod: PBBFAC,,, | Performed by: INTERNAL MEDICINE

## 2018-03-12 PROCEDURE — 99213 OFFICE O/P EST LOW 20 MIN: CPT | Mod: PBBFAC,PO | Performed by: INTERNAL MEDICINE

## 2018-03-12 PROCEDURE — 99214 OFFICE O/P EST MOD 30 MIN: CPT | Mod: S$PBB,,, | Performed by: INTERNAL MEDICINE

## 2018-03-12 RX ORDER — LISINOPRIL AND HYDROCHLOROTHIAZIDE 10; 12.5 MG/1; MG/1
1 TABLET ORAL DAILY
Qty: 90 TABLET | Refills: 3 | Status: SHIPPED | OUTPATIENT
Start: 2018-03-12 | End: 2018-04-06 | Stop reason: SDUPTHER

## 2018-03-12 RX ORDER — METOPROLOL TARTRATE 25 MG/1
12.5 TABLET, FILM COATED ORAL 2 TIMES DAILY
Qty: 90 TABLET | Refills: 3 | Status: SHIPPED | OUTPATIENT
Start: 2018-03-12 | End: 2018-04-06 | Stop reason: SDUPTHER

## 2018-03-12 RX ORDER — TAMSULOSIN HYDROCHLORIDE 0.4 MG/1
1 CAPSULE ORAL DAILY
Qty: 90 CAPSULE | Refills: 3 | Status: SHIPPED | OUTPATIENT
Start: 2018-03-12 | End: 2018-07-09 | Stop reason: SDUPTHER

## 2018-03-12 RX ORDER — LEVOCETIRIZINE DIHYDROCHLORIDE 5 MG/1
5 TABLET, FILM COATED ORAL NIGHTLY
Qty: 90 TABLET | Refills: 3 | Status: SHIPPED | OUTPATIENT
Start: 2018-03-12 | End: 2019-10-07

## 2018-03-12 RX ORDER — METFORMIN HYDROCHLORIDE 1000 MG/1
1000 TABLET ORAL 2 TIMES DAILY WITH MEALS
Qty: 180 TABLET | Refills: 3 | Status: SHIPPED | OUTPATIENT
Start: 2018-03-12 | End: 2018-04-06

## 2018-03-12 RX ORDER — SIMVASTATIN 40 MG/1
40 TABLET, FILM COATED ORAL NIGHTLY
Qty: 90 TABLET | Refills: 3 | Status: SHIPPED | OUTPATIENT
Start: 2018-03-12 | End: 2018-07-09 | Stop reason: SDUPTHER

## 2018-03-12 RX ORDER — GLIMEPIRIDE 4 MG/1
4 TABLET ORAL
Qty: 90 TABLET | Refills: 3 | Status: SHIPPED | OUTPATIENT
Start: 2018-03-12 | End: 2018-04-06 | Stop reason: SDUPTHER

## 2018-03-12 NOTE — PROGRESS NOTES
Subjective:       Patient ID: Ned Oliveira is a 67 y.o. male.    Chief Complaint: review lab results    HPI   Pt with HTN, T2DM, CAD, adrenal adenoma, aortic atherosclerosis, HLD, BPH is here for f/u to discuss labs. He was prescribed Invokana a few months ago but it was too expensive and he was concerned about the risk for leg amputation.   Review of Systems   Constitutional: Negative for activity change, appetite change, chills, diaphoresis, fatigue, fever and unexpected weight change.   HENT: Negative for postnasal drip, rhinorrhea, sinus pressure, sneezing, sore throat, trouble swallowing and voice change.    Respiratory: Negative for cough, shortness of breath and wheezing.    Cardiovascular: Negative for chest pain, palpitations and leg swelling.   Gastrointestinal: Negative for abdominal pain, blood in stool, constipation, diarrhea, nausea and vomiting.   Genitourinary: Negative for dysuria.   Musculoskeletal: Negative for arthralgias and myalgias.   Skin: Negative for rash and wound.   Allergic/Immunologic: Negative for environmental allergies and food allergies.   Hematological: Negative for adenopathy. Does not bruise/bleed easily.       Objective:      Physical Exam   Constitutional: He is oriented to person, place, and time. He appears well-developed and well-nourished. No distress.   HENT:   Head: Normocephalic and atraumatic.   Eyes: Conjunctivae and EOM are normal. Pupils are equal, round, and reactive to light. Right eye exhibits no discharge. Left eye exhibits no discharge. No scleral icterus.   Neck: Normal range of motion. Neck supple. No JVD present.   Cardiovascular: Normal rate, regular rhythm and normal heart sounds.    No murmur heard.  Pulmonary/Chest: Effort normal and breath sounds normal. No respiratory distress. He has no wheezes. He has no rales.   Abdominal: Soft. Bowel sounds are normal. There is no tenderness.   Musculoskeletal: He exhibits no edema.   Lymphadenopathy:     He has no  cervical adenopathy.   Neurological: He is alert and oriented to person, place, and time.   Skin: Skin is warm and dry. No rash noted. He is not diaphoretic. No pallor.       Assessment:       1. Hypertension associated with diabetes    2. Type 2 diabetes mellitus with moderate nonproliferative retinopathy and macular edema, without long-term current use of insulin, unspecified laterality    3. Benign non-nodular prostatic hyperplasia without lower urinary tract symptoms    4. Essential hypertension    5. CAD in native artery    6. Hyperlipidemia associated with type 2 diabetes mellitus    7. Aortic atherosclerosis        Plan:    1. HTN- controlled, CPT   2. T2DM with retinopathy- last HA1C of 7.0(2/18)<--7.5(11/17)<--7.0(7/17)<--8.4(3/17)       On Amaryl 4 mg daily, Metformin 1 gm BID       Pt has seen Diabetic Education        Monitor blood sugar BID   3. CAD- continue ASA/statin       Followed by Cardiology    4. Adrenal adenoma- followed by Endocrine   5. Aortic atherosclerosis- stable on ASA/statin    6. HLD associated with diabetes- stable on Zocor 40 mg daily   7. BPH- fair control on Flomax 0.4 mg daily

## 2018-04-06 ENCOUNTER — OFFICE VISIT (OUTPATIENT)
Dept: INTERNAL MEDICINE | Facility: CLINIC | Age: 68
End: 2018-04-06
Payer: MEDICARE

## 2018-04-06 DIAGNOSIS — R05.8 UPPER AIRWAY COUGH SYNDROME: Primary | ICD-10-CM

## 2018-04-06 DIAGNOSIS — I10 ESSENTIAL HYPERTENSION: ICD-10-CM

## 2018-04-06 PROCEDURE — 99999 PR PBB SHADOW E&M-EST. PATIENT-LVL II: CPT | Mod: PBBFAC,GC,, | Performed by: STUDENT IN AN ORGANIZED HEALTH CARE EDUCATION/TRAINING PROGRAM

## 2018-04-06 PROCEDURE — 99213 OFFICE O/P EST LOW 20 MIN: CPT | Mod: S$PBB,GC,, | Performed by: STUDENT IN AN ORGANIZED HEALTH CARE EDUCATION/TRAINING PROGRAM

## 2018-04-06 PROCEDURE — 99212 OFFICE O/P EST SF 10 MIN: CPT | Mod: PBBFAC | Performed by: STUDENT IN AN ORGANIZED HEALTH CARE EDUCATION/TRAINING PROGRAM

## 2018-04-06 RX ORDER — METOPROLOL TARTRATE 25 MG/1
12.5 TABLET, FILM COATED ORAL 2 TIMES DAILY
Qty: 180 TABLET | Refills: 0 | Status: SHIPPED | OUTPATIENT
Start: 2018-04-06 | End: 2018-07-09 | Stop reason: SDUPTHER

## 2018-04-06 RX ORDER — LISINOPRIL AND HYDROCHLOROTHIAZIDE 10; 12.5 MG/1; MG/1
1 TABLET ORAL DAILY
Qty: 90 TABLET | Refills: 0 | Status: SHIPPED | OUTPATIENT
Start: 2018-04-06 | End: 2018-04-06 | Stop reason: SDUPTHER

## 2018-04-06 RX ORDER — GLIMEPIRIDE 4 MG/1
4 TABLET ORAL
Qty: 90 TABLET | Refills: 0 | Status: SHIPPED | OUTPATIENT
Start: 2018-04-06 | End: 2018-07-09 | Stop reason: SDUPTHER

## 2018-04-06 RX ORDER — LORATADINE 10 MG/1
10 TABLET ORAL DAILY
Qty: 30 TABLET | Refills: 0 | Status: SHIPPED | OUTPATIENT
Start: 2018-04-06 | End: 2018-04-06 | Stop reason: SDUPTHER

## 2018-04-06 RX ORDER — METFORMIN HYDROCHLORIDE 1000 MG/1
1000 TABLET ORAL 2 TIMES DAILY WITH MEALS
Qty: 180 TABLET | Refills: 0 | Status: SHIPPED | OUTPATIENT
Start: 2018-04-06 | End: 2018-04-06 | Stop reason: SDUPTHER

## 2018-04-06 RX ORDER — FLUTICASONE PROPIONATE 50 MCG
1 SPRAY, SUSPENSION (ML) NASAL DAILY
Refills: 0 | COMMUNITY
Start: 2018-04-06 | End: 2018-11-19

## 2018-04-06 RX ORDER — LISINOPRIL AND HYDROCHLOROTHIAZIDE 10; 12.5 MG/1; MG/1
1 TABLET ORAL DAILY
Qty: 90 TABLET | Refills: 0 | Status: SHIPPED | OUTPATIENT
Start: 2018-04-06 | End: 2018-07-09 | Stop reason: SDUPTHER

## 2018-04-06 RX ORDER — METOPROLOL TARTRATE 25 MG/1
12.5 TABLET, FILM COATED ORAL 2 TIMES DAILY
Qty: 180 TABLET | Refills: 0 | Status: SHIPPED | OUTPATIENT
Start: 2018-04-06 | End: 2018-04-06 | Stop reason: SDUPTHER

## 2018-04-06 RX ORDER — LOVASTATIN 20 MG/1
20 TABLET ORAL NIGHTLY
Qty: 90 TABLET | Refills: 0 | Status: SHIPPED | OUTPATIENT
Start: 2018-04-06 | End: 2018-04-06 | Stop reason: SDUPTHER

## 2018-04-06 RX ORDER — LORATADINE 10 MG/1
10 TABLET ORAL DAILY
Qty: 30 TABLET | Refills: 0 | Status: SHIPPED | OUTPATIENT
Start: 2018-04-06 | End: 2018-11-19

## 2018-04-06 RX ORDER — GLIMEPIRIDE 4 MG/1
4 TABLET ORAL
Qty: 90 TABLET | Refills: 0 | Status: SHIPPED | OUTPATIENT
Start: 2018-04-06 | End: 2018-04-06 | Stop reason: SDUPTHER

## 2018-04-06 RX ORDER — BENZONATATE 100 MG/1
100 CAPSULE ORAL 3 TIMES DAILY PRN
Qty: 14 CAPSULE | Refills: 0 | Status: SHIPPED | OUTPATIENT
Start: 2018-04-06 | End: 2018-04-06 | Stop reason: SDUPTHER

## 2018-04-06 RX ORDER — FLUTICASONE PROPIONATE 50 MCG
1 SPRAY, SUSPENSION (ML) NASAL DAILY
Refills: 0 | COMMUNITY
Start: 2018-04-06 | End: 2018-04-06 | Stop reason: SDUPTHER

## 2018-04-06 RX ORDER — METFORMIN HYDROCHLORIDE 1000 MG/1
1000 TABLET ORAL 2 TIMES DAILY WITH MEALS
Qty: 180 TABLET | Refills: 0 | Status: SHIPPED | OUTPATIENT
Start: 2018-04-06 | End: 2018-07-09 | Stop reason: SDUPTHER

## 2018-04-06 RX ORDER — BENZONATATE 100 MG/1
100 CAPSULE ORAL 3 TIMES DAILY PRN
Qty: 14 CAPSULE | Refills: 0 | Status: SHIPPED | OUTPATIENT
Start: 2018-04-06 | End: 2018-04-16

## 2018-04-06 RX ORDER — LOVASTATIN 20 MG/1
20 TABLET ORAL NIGHTLY
Qty: 90 TABLET | Refills: 0 | Status: SHIPPED | OUTPATIENT
Start: 2018-04-06 | End: 2018-07-09

## 2018-04-06 NOTE — LETTER
April 6, 2018    [facility]  Internal Medicine  [address]  [facility] [address]  Phone: [phone]  Fax: [phone]   April 6, 2018     Patient: Ned Oliveira   YOB: 1950   Date of Visit: 4/6/2018       To Whom it May Concern:    Ned lOiveira was seen in my clinic on 4/6/2018. He may return to work on 4/7/2018. Please excuse his absence from work from 4/5/2018 to 4/6/2018.    If you have any questions or concerns, please don't hesitate to call.    Sincerely,         Louis Herrera MD

## 2018-04-06 NOTE — PROGRESS NOTES
Subjective:       Patient ID: Ned Oliveira is a 67 y.o. male.    Chief Complaint: Cough    HPI   Mr. Oliveira is a 67 y.o M with a medical history significant for HTN, T2DM, CAD, adrenal adenoma, aortic atherosclerosis, HLD, BPH who presents for a urgent care visit regarding a cough & sore throat. Patient was doing yard work over the weekend, and had excessive pollen exposure subsequently he developed rhinorrhea, congestion, watery eyes followed by irritated throat, post nasal drip and an non-productive cough. His nasal secretions are clear. Denies fevers, chills, HA, CP, SOB, wheezing, myalgias, arthralgias, abd pain.     Patient also recently switched his insurance to Medicare however he forget to apply for Part D and now needs a refill on his medications.     Review of Systems   Constitutional: Negative for chills and fever.   HENT: Positive for congestion, postnasal drip, rhinorrhea and sore throat. Negative for ear pain, sinus pain, sinus pressure and tinnitus.    Eyes: Positive for discharge (watery ) and itching. Negative for pain and visual disturbance.        Watery eyes    Respiratory: Positive for cough. Negative for chest tightness, shortness of breath and wheezing.    Cardiovascular: Negative for chest pain, palpitations and leg swelling.   Gastrointestinal: Negative for abdominal pain, constipation, diarrhea, nausea and vomiting.   Endocrine: Negative for polydipsia and polyphagia.   Genitourinary: Negative for dysuria and hematuria.   Musculoskeletal: Negative for back pain and joint swelling.   Skin: Negative for color change and pallor.   Neurological: Negative for tremors, weakness and headaches.       Objective:      Physical Exam   Constitutional: He is oriented to person, place, and time. He appears well-developed and well-nourished.   HENT:   Head: Normocephalic and atraumatic.   Nose: Mucosal edema and rhinorrhea present. Right sinus exhibits no maxillary sinus tenderness and no frontal sinus  tenderness. Left sinus exhibits no maxillary sinus tenderness and no frontal sinus tenderness.   Mouth/Throat: No oropharyngeal exudate, posterior oropharyngeal edema, posterior oropharyngeal erythema or tonsillar abscesses.   Eyes: Conjunctivae and EOM are normal. Pupils are equal, round, and reactive to light.   Neck: Normal range of motion. Neck supple.   Cardiovascular: Normal rate, regular rhythm and normal heart sounds.    Pulmonary/Chest: Effort normal and breath sounds normal. No respiratory distress. He has no wheezes.   Abdominal: Soft. Bowel sounds are normal.   Musculoskeletal: Normal range of motion. He exhibits no edema.   Neurological: He is alert and oriented to person, place, and time.       Assessment:       1. Upper airway cough syndrome    2. Essential hypertension        Plan:         Upper Airway cough Syndrome  Secondary to allergic rhinitis    - Tessalon Pearls   - OTC Flonase   - Claritin     Medications refills   - Medications were refilled/ scripts printed   - Patient will go to [facility]; medications are on the $4 dollar list   - Cholesterol med switched to lovastatin; simvastatin not on [facility] $4 dollar list            Louis Herrera MD     Internal Medicine PGY-1     #[phone]

## 2018-06-08 DIAGNOSIS — E11.9 TYPE 2 DIABETES MELLITUS WITHOUT COMPLICATION: ICD-10-CM

## 2018-06-22 ENCOUNTER — TELEPHONE (OUTPATIENT)
Dept: INTERNAL MEDICINE | Facility: CLINIC | Age: 68
End: 2018-06-22

## 2018-06-22 DIAGNOSIS — E11.3313 DIABETIC VISUAL LOSS TOTAL VISION IMPAIRMENT OF BOTH EYES, WITH MACULAR EDEMA, WITH MODERATE NONPROLIFERATIVE RETINOPATHY, ASSOCIATED WITH TYPE 2 DIABETES MELLITUS: Primary | ICD-10-CM

## 2018-06-22 DIAGNOSIS — I10 BENIGN HYPERTENSION: ICD-10-CM

## 2018-06-22 DIAGNOSIS — H54.0X55 DIABETIC VISUAL LOSS TOTAL VISION IMPAIRMENT OF BOTH EYES, WITH MACULAR EDEMA, WITH MODERATE NONPROLIFERATIVE RETINOPATHY, ASSOCIATED WITH TYPE 2 DIABETES MELLITUS: Primary | ICD-10-CM

## 2018-06-22 DIAGNOSIS — Z12.5 PROSTATE CANCER SCREENING: ICD-10-CM

## 2018-06-22 DIAGNOSIS — E78.00 PURE HYPERCHOLESTEROLEMIA: ICD-10-CM

## 2018-06-22 NOTE — TELEPHONE ENCOUNTER
----- Message from Janna Shah sent at 6/22/2018  1:10 PM CDT -----  Doctor appointment and lab have been scheduled.  Please link lab orders to the lab appointment.  Date of doctor appointment:  7/9  Physical or EP:  Physical  Date of lab appointment:  7/2  Comments:

## 2018-07-02 ENCOUNTER — LAB VISIT (OUTPATIENT)
Dept: LAB | Facility: HOSPITAL | Age: 68
End: 2018-07-02
Attending: INTERNAL MEDICINE
Payer: MEDICARE

## 2018-07-02 DIAGNOSIS — Z12.5 PROSTATE CANCER SCREENING: ICD-10-CM

## 2018-07-02 DIAGNOSIS — I10 BENIGN HYPERTENSION: ICD-10-CM

## 2018-07-02 DIAGNOSIS — H54.0X55 DIABETIC VISUAL LOSS TOTAL VISION IMPAIRMENT OF BOTH EYES, WITH MACULAR EDEMA, WITH MODERATE NONPROLIFERATIVE RETINOPATHY, ASSOCIATED WITH TYPE 2 DIABETES MELLITUS: ICD-10-CM

## 2018-07-02 DIAGNOSIS — E11.3313 DIABETIC VISUAL LOSS TOTAL VISION IMPAIRMENT OF BOTH EYES, WITH MACULAR EDEMA, WITH MODERATE NONPROLIFERATIVE RETINOPATHY, ASSOCIATED WITH TYPE 2 DIABETES MELLITUS: ICD-10-CM

## 2018-07-02 DIAGNOSIS — E78.00 PURE HYPERCHOLESTEROLEMIA: ICD-10-CM

## 2018-07-02 LAB
ALBUMIN SERPL BCP-MCNC: 3.7 G/DL
ALP SERPL-CCNC: 86 U/L
ALT SERPL W/O P-5'-P-CCNC: 24 U/L
ANION GAP SERPL CALC-SCNC: 8 MMOL/L
AST SERPL-CCNC: 19 U/L
BASOPHILS # BLD AUTO: 0.06 K/UL
BASOPHILS NFR BLD: 0.9 %
BILIRUB SERPL-MCNC: 1.1 MG/DL
BUN SERPL-MCNC: 15 MG/DL
CALCIUM SERPL-MCNC: 9.3 MG/DL
CHLORIDE SERPL-SCNC: 104 MMOL/L
CHOLEST SERPL-MCNC: 199 MG/DL
CHOLEST/HDLC SERPL: 4.5 {RATIO}
CO2 SERPL-SCNC: 25 MMOL/L
COMPLEXED PSA SERPL-MCNC: 0.56 NG/ML
CREAT SERPL-MCNC: 1.1 MG/DL
DIFFERENTIAL METHOD: ABNORMAL
EOSINOPHIL # BLD AUTO: 0.2 K/UL
EOSINOPHIL NFR BLD: 3.2 %
ERYTHROCYTE [DISTWIDTH] IN BLOOD BY AUTOMATED COUNT: 12.1 %
EST. GFR  (AFRICAN AMERICAN): >60 ML/MIN/1.73 M^2
EST. GFR  (NON AFRICAN AMERICAN): >60 ML/MIN/1.73 M^2
ESTIMATED AVG GLUCOSE: 246 MG/DL
GLUCOSE SERPL-MCNC: 242 MG/DL
HBA1C MFR BLD HPLC: 10.2 %
HCT VFR BLD AUTO: 44.1 %
HDLC SERPL-MCNC: 44 MG/DL
HDLC SERPL: 22.1 %
HGB BLD-MCNC: 14.6 G/DL
IMM GRANULOCYTES # BLD AUTO: 0.04 K/UL
IMM GRANULOCYTES NFR BLD AUTO: 0.6 %
LDLC SERPL CALC-MCNC: 140.6 MG/DL
LYMPHOCYTES # BLD AUTO: 2 K/UL
LYMPHOCYTES NFR BLD: 30.8 %
MCH RBC QN AUTO: 32.6 PG
MCHC RBC AUTO-ENTMCNC: 33.1 G/DL
MCV RBC AUTO: 98 FL
MONOCYTES # BLD AUTO: 0.5 K/UL
MONOCYTES NFR BLD: 6.9 %
NEUTROPHILS # BLD AUTO: 3.8 K/UL
NEUTROPHILS NFR BLD: 57.6 %
NONHDLC SERPL-MCNC: 155 MG/DL
NRBC BLD-RTO: 0 /100 WBC
PLATELET # BLD AUTO: 199 K/UL
PMV BLD AUTO: 11 FL
POTASSIUM SERPL-SCNC: 4.4 MMOL/L
PROT SERPL-MCNC: 6.5 G/DL
RBC # BLD AUTO: 4.48 M/UL
SODIUM SERPL-SCNC: 137 MMOL/L
TRIGL SERPL-MCNC: 72 MG/DL
TSH SERPL DL<=0.005 MIU/L-ACNC: 0.88 UIU/ML
WBC # BLD AUTO: 6.55 K/UL

## 2018-07-02 PROCEDURE — 36415 COLL VENOUS BLD VENIPUNCTURE: CPT | Mod: PO

## 2018-07-02 PROCEDURE — 84443 ASSAY THYROID STIM HORMONE: CPT

## 2018-07-02 PROCEDURE — 80061 LIPID PANEL: CPT

## 2018-07-02 PROCEDURE — 83036 HEMOGLOBIN GLYCOSYLATED A1C: CPT

## 2018-07-02 PROCEDURE — 84153 ASSAY OF PSA TOTAL: CPT

## 2018-07-02 PROCEDURE — 80053 COMPREHEN METABOLIC PANEL: CPT

## 2018-07-02 PROCEDURE — 85025 COMPLETE CBC W/AUTO DIFF WBC: CPT

## 2018-07-09 ENCOUNTER — OFFICE VISIT (OUTPATIENT)
Dept: INTERNAL MEDICINE | Facility: CLINIC | Age: 68
End: 2018-07-09
Payer: MEDICARE

## 2018-07-09 VITALS
DIASTOLIC BLOOD PRESSURE: 80 MMHG | TEMPERATURE: 98 F | HEIGHT: 62 IN | BODY MASS INDEX: 27.26 KG/M2 | WEIGHT: 148.13 LBS | SYSTOLIC BLOOD PRESSURE: 130 MMHG | HEART RATE: 62 BPM | RESPIRATION RATE: 16 BRPM

## 2018-07-09 DIAGNOSIS — I25.10 CAD IN NATIVE ARTERY: ICD-10-CM

## 2018-07-09 DIAGNOSIS — E11.69 HYPERLIPIDEMIA ASSOCIATED WITH TYPE 2 DIABETES MELLITUS: ICD-10-CM

## 2018-07-09 DIAGNOSIS — I70.0 AORTIC ATHEROSCLEROSIS: ICD-10-CM

## 2018-07-09 DIAGNOSIS — I15.2 HYPERTENSION ASSOCIATED WITH DIABETES: Primary | ICD-10-CM

## 2018-07-09 DIAGNOSIS — E11.59 HYPERTENSION ASSOCIATED WITH DIABETES: Primary | ICD-10-CM

## 2018-07-09 DIAGNOSIS — I10 ESSENTIAL HYPERTENSION: ICD-10-CM

## 2018-07-09 DIAGNOSIS — N40.0 BENIGN NON-NODULAR PROSTATIC HYPERPLASIA WITHOUT LOWER URINARY TRACT SYMPTOMS: ICD-10-CM

## 2018-07-09 DIAGNOSIS — E11.3319 TYPE 2 DIABETES MELLITUS WITH MODERATE NONPROLIFERATIVE RETINOPATHY AND MACULAR EDEMA, WITHOUT LONG-TERM CURRENT USE OF INSULIN, UNSPECIFIED LATERALITY: ICD-10-CM

## 2018-07-09 DIAGNOSIS — E27.8 ADRENAL NODULE: ICD-10-CM

## 2018-07-09 DIAGNOSIS — E78.5 HYPERLIPIDEMIA ASSOCIATED WITH TYPE 2 DIABETES MELLITUS: ICD-10-CM

## 2018-07-09 PROCEDURE — 99999 PR PBB SHADOW E&M-EST. PATIENT-LVL III: CPT | Mod: PBBFAC,,, | Performed by: INTERNAL MEDICINE

## 2018-07-09 PROCEDURE — 99213 OFFICE O/P EST LOW 20 MIN: CPT | Mod: PBBFAC,PO | Performed by: INTERNAL MEDICINE

## 2018-07-09 PROCEDURE — 99215 OFFICE O/P EST HI 40 MIN: CPT | Mod: S$PBB,,, | Performed by: INTERNAL MEDICINE

## 2018-07-09 RX ORDER — METFORMIN HYDROCHLORIDE 1000 MG/1
1000 TABLET ORAL 2 TIMES DAILY WITH MEALS
Qty: 180 TABLET | Refills: 3 | Status: SHIPPED | OUTPATIENT
Start: 2018-07-09 | End: 2018-11-16 | Stop reason: SDUPTHER

## 2018-07-09 RX ORDER — GLIMEPIRIDE 4 MG/1
4 TABLET ORAL
Qty: 90 TABLET | Refills: 3 | Status: SHIPPED | OUTPATIENT
Start: 2018-07-09 | End: 2018-11-16 | Stop reason: SDUPTHER

## 2018-07-09 RX ORDER — METOPROLOL TARTRATE 25 MG/1
12.5 TABLET, FILM COATED ORAL 2 TIMES DAILY
Qty: 180 TABLET | Refills: 3 | Status: SHIPPED | OUTPATIENT
Start: 2018-07-09 | End: 2018-11-16 | Stop reason: SDUPTHER

## 2018-07-09 RX ORDER — SIMVASTATIN 40 MG/1
40 TABLET, FILM COATED ORAL NIGHTLY
Qty: 90 TABLET | Refills: 3 | Status: SHIPPED | OUTPATIENT
Start: 2018-07-09 | End: 2018-11-16 | Stop reason: SDUPTHER

## 2018-07-09 RX ORDER — TAMSULOSIN HYDROCHLORIDE 0.4 MG/1
1 CAPSULE ORAL DAILY
Qty: 90 CAPSULE | Refills: 3 | Status: SHIPPED | OUTPATIENT
Start: 2018-07-09 | End: 2018-11-16 | Stop reason: SDUPTHER

## 2018-07-09 RX ORDER — LISINOPRIL AND HYDROCHLOROTHIAZIDE 10; 12.5 MG/1; MG/1
1 TABLET ORAL DAILY
Qty: 90 TABLET | Refills: 3 | Status: SHIPPED | OUTPATIENT
Start: 2018-07-09 | End: 2018-11-16 | Stop reason: SDUPTHER

## 2018-07-09 NOTE — PROGRESS NOTES
Subjective:       Patient ID: Ned Oliveira is a 68 y.o. male.    Chief Complaint: Annual Exam    HPI   68 y.o. Male here for annual exam.      Cholesterol: (controlled)  Vaccines: Influenza (declined); Tetanus (declined); Pneumovax (2015)  Sexual Screening: no concern  Eye exam: 12/16  Colonoscopy: declined     Exercise: no  Diet: regular     Past Medical History:  Diabetes mellitus, type 2 with retinopathy   Hypertension   BPH (benign prostatic hyperplasia)   Hyperlipidemia   Adrenal adenoma  Past Surgical History:  BICEPS TENDON REPAIR Right   Social History  Marital Status:  Spouse Name:   Years of Education: Number of children: 2      Occupational History  Occupation Employer Comment   Maintenance      Social History Main Topics  Smoking Status: Former Smoker Packs/Day: 2.00 Years: 10   Types: Cigarettes  Smokeless Status: Not on file   Comment: quit smoking 20 yrs ago  Alcohol Use: No   Drug Use: No   Sexual Activity: Yes Partners with: Female     No Known Allergies  Mr. Oliveira does not currently have medications on file.  Review of Systems   Constitutional: Negative for activity change, appetite change, chills, diaphoresis, fatigue, fever and unexpected weight change.   HENT: Negative for congestion, mouth sores, postnasal drip, rhinorrhea, sinus pressure, sneezing, sore throat, trouble swallowing and voice change.    Eyes: Negative for discharge, itching and visual disturbance.   Respiratory: Negative for cough, chest tightness, shortness of breath and wheezing.    Cardiovascular: Negative for chest pain, palpitations and leg swelling.   Gastrointestinal: Negative for abdominal pain, blood in stool, constipation, diarrhea, nausea and vomiting.   Endocrine: Negative for cold intolerance and heat intolerance.   Genitourinary: Negative for difficulty urinating, dysuria, flank pain, hematuria and urgency.   Musculoskeletal: Negative for arthralgias, back pain, myalgias and neck pain.   Skin: Negative for  rash and wound.   Allergic/Immunologic: Negative for environmental allergies and food allergies.   Neurological: Negative for dizziness, tremors, seizures, syncope, weakness and headaches.   Hematological: Negative for adenopathy. Does not bruise/bleed easily.   Psychiatric/Behavioral: Negative for confusion, sleep disturbance and suicidal ideas. The patient is not nervous/anxious.        Objective:      Physical Exam   Constitutional: He is oriented to person, place, and time. He appears well-developed and well-nourished. No distress.   HENT:   Head: Normocephalic and atraumatic.   Right Ear: External ear normal.   Left Ear: External ear normal.   Nose: Nose normal.   Mouth/Throat: Oropharynx is clear and moist. No oropharyngeal exudate.   Eyes: Conjunctivae and EOM are normal. Pupils are equal, round, and reactive to light. Right eye exhibits no discharge. Left eye exhibits no discharge. No scleral icterus.   Neck: Normal range of motion. Neck supple. No JVD present. No thyromegaly present.   Cardiovascular: Normal rate, regular rhythm, normal heart sounds and intact distal pulses.    No murmur heard.  Pulses:       Dorsalis pedis pulses are 2+ on the right side, and 2+ on the left side.        Posterior tibial pulses are 2+ on the right side, and 2+ on the left side.   Pulmonary/Chest: Effort normal and breath sounds normal. No respiratory distress. He has no wheezes. He has no rales.   Abdominal: Soft. Bowel sounds are normal. He exhibits no distension. There is no tenderness. There is no guarding.   Musculoskeletal: He exhibits no edema.   Feet:   Right Foot:   Protective Sensation: 4 sites tested. 4 sites sensed.   Skin Integrity: Negative for ulcer, blister or skin breakdown.   Left Foot:   Protective Sensation: 4 sites tested. 4 sites sensed.   Skin Integrity: Negative for ulcer, blister or skin breakdown.   Lymphadenopathy:     He has no cervical adenopathy.   Neurological: He is alert and oriented to  person, place, and time. No cranial nerve deficit. Coordination normal.   Skin: Skin is warm and dry. No rash noted. He is not diaphoretic. No pallor.   Psychiatric: He has a normal mood and affect. Judgment normal.       Assessment:       1. Hypertension associated with diabetes    2. Type 2 diabetes mellitus with moderate nonproliferative retinopathy and macular edema, without long-term current use of insulin, unspecified laterality    3. CAD in native artery    4. Adrenal nodule    5. Hyperlipidemia associated with type 2 diabetes mellitus    6. Aortic atherosclerosis    7. Benign non-nodular prostatic hyperplasia without lower urinary tract symptoms    8. Essential hypertension        Plan:    1. HTN- controlled, CPT   2. T2DM with retinopathy- last HA1C of 10.2(7/18)<--7.0(2/18)<--7.5(11/17)<--7.0(7/17)       On Amaryl 4 mg daily, Metformin 1 gm BID       Pt has seen Diabetic Education, referral back to Endocrine        Monitor blood sugar BID   3. CAD- continue ASA/statin       Followed by Cardiology    4. Adrenal adenoma- followed by Endocrine   5. Aortic atherosclerosis- stable on ASA/statin    6. HLD associated with diabetes- stable on Zocor 40 mg daily   7. BPH- fair control on Flomax 0.4 mg daily   8. F/u in 6 months           Over 1/2 of 40 minute visit spent reviewing pt's medical records, education/discussion of pt's medical conditions and medical management

## 2018-07-18 ENCOUNTER — TELEPHONE (OUTPATIENT)
Dept: INTERNAL MEDICINE | Facility: CLINIC | Age: 68
End: 2018-07-18

## 2018-08-08 RX ORDER — LOVASTATIN 20 MG/1
TABLET ORAL
Qty: 90 TABLET | Refills: 0 | OUTPATIENT
Start: 2018-08-08

## 2018-08-27 ENCOUNTER — OFFICE VISIT (OUTPATIENT)
Dept: OPHTHALMOLOGY | Facility: CLINIC | Age: 68
End: 2018-08-27
Payer: MEDICARE

## 2018-08-27 DIAGNOSIS — H25.13 NUCLEAR SCLEROSIS OF BOTH EYES: ICD-10-CM

## 2018-08-27 DIAGNOSIS — E11.3313 TYPE 2 DIABETES MELLITUS WITH BOTH EYES AFFECTED BY MODERATE NONPROLIFERATIVE RETINOPATHY AND MACULAR EDEMA, WITHOUT LONG-TERM CURRENT USE OF INSULIN: Primary | ICD-10-CM

## 2018-08-27 PROCEDURE — 92014 COMPRE OPH EXAM EST PT 1/>: CPT | Mod: 25,S$PBB,, | Performed by: OPHTHALMOLOGY

## 2018-08-27 PROCEDURE — 99999 PR PBB SHADOW E&M-EST. PATIENT-LVL III: CPT | Mod: PBBFAC,,, | Performed by: OPHTHALMOLOGY

## 2018-08-27 PROCEDURE — 99213 OFFICE O/P EST LOW 20 MIN: CPT | Mod: PBBFAC | Performed by: OPHTHALMOLOGY

## 2018-08-27 PROCEDURE — C9257 BEVACIZUMAB INJECTION: HCPCS | Mod: PBBFAC,JG | Performed by: OPHTHALMOLOGY

## 2018-08-27 PROCEDURE — 67028 INJECTION EYE DRUG: CPT | Mod: S$PBB,LT,, | Performed by: OPHTHALMOLOGY

## 2018-08-27 PROCEDURE — 67028 INJECTION EYE DRUG: CPT | Mod: PBBFAC,LT | Performed by: OPHTHALMOLOGY

## 2018-08-27 RX ADMIN — BEVACIZUMAB 1.25 MG: 100 INJECTION, SOLUTION INTRAVENOUS at 12:08

## 2018-08-27 NOTE — PROGRESS NOTES
HPI     Eye Problem      Additional comments: overdue check              Comments     DLS 5/25/17- Vision OS worse x last visit. He is now on stronger   medication for his DM. He states BS improved with change    LBS=93 last week  A1C=10.2 (7/2/18)    OCT - OD - increased  OS DME     Prior FA - late macular leakage OS > OD    A/P    1. Mild NPDR OU T2  2. DME OS    S/p Avastin OS x 5  Focal OS 5/17    Resume Avastin OS today    Watch OD for now    BS/BP/chol control    3. NS OU      1 months OCT/FA OS      Risks, benefits, and alternatives to treatment discussed in detail with the patient.  The patient voiced understanding and wished to proceed with the procedure    Injection Procedure Note:  Diagnosis: DME OS    Patient Identified and Time Out complete  Topical Proparacaine and Betadine.  Inject Avastin OS at 6:00 @ 3.5-4mm posterior to limbus  Post Operative Dx: Same  Complications: None  Follow up as above.

## 2018-08-27 NOTE — LETTER
August 27, 2018      Jose Ramachandran, DO  [address] [facility] [place] [address]           [facility]  [address]  [place] [address]  Phone: [phone]  Fax: [phone]          Patient: Ned Oliveira   MR Number: [id number]   YOB: 1950   Date of Visit: 8/27/2018       Dear Dr. Jose Ramachandran:    Thank you for referring Ned Oliveira to me for evaluation. Attached you will find relevant portions of my assessment and plan of care.    If you have questions, please do not hesitate to call me. I look forward to following Ned Oliveira along with you.    Sincerely,    CHRISTOPHER Rivers MD    Enclosure  CC:  No Recipients    If you would like to receive this communication electronically, please contact [email or web address] or [phone] to request more information on [employer] Link access.    For providers and/or their staff who would like to refer a patient to [facility], please contact us through our one-stop-shop provider referral line, [facility], at [phone].    If you feel you have received this communication in error or would no longer like to receive these types of communications, please e-mail [email or web address]

## 2018-10-02 ENCOUNTER — PROCEDURE VISIT (OUTPATIENT)
Dept: OPHTHALMOLOGY | Facility: CLINIC | Age: 68
End: 2018-10-02
Payer: MEDICARE

## 2018-10-02 VITALS — SYSTOLIC BLOOD PRESSURE: 126 MMHG | HEART RATE: 64 BPM | DIASTOLIC BLOOD PRESSURE: 80 MMHG

## 2018-10-02 DIAGNOSIS — E11.3313 TYPE 2 DIABETES MELLITUS WITH BOTH EYES AFFECTED BY MODERATE NONPROLIFERATIVE RETINOPATHY AND MACULAR EDEMA, WITHOUT LONG-TERM CURRENT USE OF INSULIN: ICD-10-CM

## 2018-10-02 DIAGNOSIS — H25.13 NS (NUCLEAR SCLEROSIS), BILATERAL: ICD-10-CM

## 2018-10-02 PROCEDURE — 67028 INJECTION EYE DRUG: CPT | Mod: 50,PBBFAC | Performed by: OPHTHALMOLOGY

## 2018-10-02 PROCEDURE — 92235 FLUORESCEIN ANGRPH MLTIFRAME: CPT | Mod: 50,PBBFAC | Performed by: OPHTHALMOLOGY

## 2018-10-02 PROCEDURE — 67028 INJECTION EYE DRUG: CPT | Mod: 50,S$PBB,, | Performed by: OPHTHALMOLOGY

## 2018-10-02 PROCEDURE — C9257 BEVACIZUMAB INJECTION: HCPCS | Mod: PBBFAC,JG | Performed by: OPHTHALMOLOGY

## 2018-10-02 PROCEDURE — 92134 CPTRZ OPH DX IMG PST SGM RTA: CPT | Mod: PBBFAC | Performed by: OPHTHALMOLOGY

## 2018-10-02 PROCEDURE — 92014 COMPRE OPH EXAM EST PT 1/>: CPT | Mod: 25,S$PBB,, | Performed by: OPHTHALMOLOGY

## 2018-10-02 RX ADMIN — BEVACIZUMAB 1.25 MG: 100 INJECTION, SOLUTION INTRAVENOUS at 12:10

## 2018-10-02 RX ADMIN — Medication 1.25 MG: at 12:10

## 2018-10-02 NOTE — PATIENT INSTRUCTIONS
Fluorescein Angiogram procedure explained to pt. FA handout attached to AVS below.    Fluorescein Angiography  Fluorescein angiography is an eye test. It is done to look at the back of your eye, including:  · The blood vessels in your eye  · The layer of tissue at the back of your eye (the retina)  · The center of your retina (the macula)  · The optic nerve  This test can diagnose diseases found in these areas. It can also diagnose other conditions that affect these areas. To do this test, a dye called fluorescein is shot (injected) into your arm. The dye goes into your bloodstream and up into the blood vessels in your eyes. A special camera is then used to take images (angiograms) of your eyes.     A fluorescein angiogram of the retina   Getting ready for your test  Tell your healthcare provider if you:  · Are pregnant or think you may be pregnant  · Are breastfeeding  · Have a history of severe allergic reactions, including to X-ray dye or other medicines  · Have kidney problems  Tell your provider about any medicines you are taking. You may need to stop taking all or some of these before the test. This includes:  · All prescription medicines  · Over-the-counter medicines such as aspirin or ibuprofen  · Street drugs  · Herbs, vitamins, and other supplements  You should arrange for an adult family member or friend to drive you home after your test. Your vision will be blurry for up to 12 hours.  Follow any other instructions from your healthcare provider.  During your test  · You are given eye drops to enlarge (dilate) your pupils.  · You then sit in front of a special camera. You place your chin on the chin rest and look into the camera.  · Images are taken of your eyes, one eye at a time.  · Fluorescein dye is then injected into your arm. The lights in the room are turned off. You may have mild nausea. You may have a warm feeling in your arm or upper body. Tell your provider if your skin feels itchy or if you  are having trouble breathing. If so, you could be having an allergic reaction to the dye.  · More pictures of your eyes are taken over 15 to 30 minutes. The camera shines a bright light into your eyes. Try to keep your head still and your eyes open.  · When enough images have been taken, the test is over.  After your test  Your vision will be blurry for up to 4 to 12 hours. This is because of your dilated pupils. Your eye will be more sensitive to light for up to 12 hours. You may want to wear sunglasses during this time. Do not drive if your vision is very blurry. You may also find it uncomfortable to read. Your skin may look yellow for a few hours. This is from the dye. Your urine will be bright yellow or orange for 24 to 48 hours after the test.     Risks and possible complications  All procedures have some risks. Possible risks of fluorescein angiography include:  · Upset stomach (nausea) and vomiting  · Leaking dye around the injection site that causes pain and swelling  · Metallic taste in your mouth  · Infection at injection site  · Allergic reaction to the dye  · Dry mouth or too much saliva  · Faster heart rate  · Sweating  · Lower back pain   © 1410-2176 [employer]. [address] [place] [address]. All rights reserved. This information is not intended as a substitute for professional medical care. Always follow your healthcare professional's instructions.        .POST INTRAVITREAL INJECTION PATIENT INSTRUCTIONS   Below are some guidelines for what to expect after your treatment and additional care instructions.   * you may experience mild discomfort in your eye after the treatment. Your eye usually feels better within 24 to 48 hours after the procedure.   * you have been given drops that numb the surface of your eye.   DO NOT rub or touch your eye, DO NOT wear contacts until numbness goes away.   * you may experience small spots that appear in your field of vision, these are  usually caused by an air bubble or from the medication. It taked a few hours or days for these to go away.   * use of sunglasses will help reduce light sensitivity and glare.   * DO NOT swim or put sink water ( tap water ) or swin for at least 24 hours after the injection   * If you have a gritty, burning, irritating or stinging feeling in the injected eye. This may be a result of the antiseptic used. Use artifical tears or eye lubricant ( from over- the- counter from your local pharmacy ). If you have some at home already please check the expiration date, so not to use anything contaminated in your eye. A cool pack over your eye brow above the injected eye may also relieve discomfort.   Call us right away or go to the Emergency Department if you have a dramatic decrease in vision or extreme pain in the eye.   [facility] [phone]

## 2018-10-02 NOTE — PROGRESS NOTES
HPI     DLS 8/27/18    4 weeks ck     No changes in VA since last exam     A1C=10.2 (7/2/18)    HPI     Eye Problem      Additional comments: overdue check              Comments     DLS 5/25/17- Vision OS worse x last visit. He is now on stronger   medication for his DM. He states BS improved with change    LBS=93 last week  A1C=10.2 (7/2/18)    OCT - OD - increased  OS DME     FA - late macular leakage OS > OD      A/P    1. Mild NPDR OU T2  2. DME OU    S/p Avastin OS x 6  Focal OS 5/17    Avastin OU today    BS/BP/chol control    3. NS OU      1 months OCT      Risks, benefits, and alternatives to treatment discussed in detail with the patient.  The patient voiced understanding and wished to proceed with the procedure    Injection Procedure Note:  Diagnosis: DME OU    Patient Identified and Time Out complete  Topical Proparacaine and Betadine.  Inject Avastin OU  at 6:00 @ 3.5-4mm posterior to limbus  Post Operative Dx: Same  Complications: None  Follow up as above.

## 2018-10-29 ENCOUNTER — LAB VISIT (OUTPATIENT)
Dept: LAB | Facility: HOSPITAL | Age: 68
End: 2018-10-29
Attending: INTERNAL MEDICINE
Payer: MEDICARE

## 2018-10-29 DIAGNOSIS — E11.9 TYPE 2 DIABETES MELLITUS WITHOUT COMPLICATION: ICD-10-CM

## 2018-10-29 LAB
CHOLEST SERPL-MCNC: 212 MG/DL
CHOLEST/HDLC SERPL: 5.3 {RATIO}
HDLC SERPL-MCNC: 40 MG/DL
HDLC SERPL: 18.9 %
LDLC SERPL CALC-MCNC: 152 MG/DL
NONHDLC SERPL-MCNC: 172 MG/DL
TRIGL SERPL-MCNC: 100 MG/DL

## 2018-10-29 PROCEDURE — 36415 COLL VENOUS BLD VENIPUNCTURE: CPT | Mod: PO

## 2018-10-29 PROCEDURE — 80061 LIPID PANEL: CPT

## 2018-11-06 ENCOUNTER — PROCEDURE VISIT (OUTPATIENT)
Dept: OPHTHALMOLOGY | Facility: CLINIC | Age: 68
End: 2018-11-06
Payer: MEDICARE

## 2018-11-06 VITALS — DIASTOLIC BLOOD PRESSURE: 73 MMHG | SYSTOLIC BLOOD PRESSURE: 106 MMHG | HEART RATE: 67 BPM

## 2018-11-06 DIAGNOSIS — H25.13 NS (NUCLEAR SCLEROSIS), BILATERAL: ICD-10-CM

## 2018-11-06 PROCEDURE — 67028 INJECTION EYE DRUG: CPT | Mod: 50,S$PBB,, | Performed by: OPHTHALMOLOGY

## 2018-11-06 PROCEDURE — C9257 BEVACIZUMAB INJECTION: HCPCS | Mod: PBBFAC,JG | Performed by: OPHTHALMOLOGY

## 2018-11-06 PROCEDURE — 67028 INJECTION EYE DRUG: CPT | Mod: 50,PBBFAC | Performed by: OPHTHALMOLOGY

## 2018-11-06 PROCEDURE — 92134 CPTRZ OPH DX IMG PST SGM RTA: CPT | Mod: PBBFAC | Performed by: OPHTHALMOLOGY

## 2018-11-06 PROCEDURE — 92014 COMPRE OPH EXAM EST PT 1/>: CPT | Mod: 25,S$PBB,, | Performed by: OPHTHALMOLOGY

## 2018-11-06 RX ADMIN — Medication 1.25 MG: at 11:11

## 2018-11-06 RX ADMIN — BEVACIZUMAB 1.25 MG: 100 INJECTION, SOLUTION INTRAVENOUS at 11:11

## 2018-11-06 NOTE — PATIENT INSTRUCTIONS

## 2018-11-06 NOTE — PROGRESS NOTES
HPI     Eye Problem      Additional comments: 1 month check              Comments     DLS 10/2/18- No changes x last visit        OCT - OD - increased  OS DME     Prior FA - late macular leakage OS > OD      A/P    1. Mild NPDR OU T2  2. DME OU    S/p Avastin OD x 1, OS x 7  Focal OS 5/17    Avastin OU today    BS/BP/chol control    Try extension, possible maintenance if recurs    3. NS OU      8-9 weeks OCT      Risks, benefits, and alternatives to treatment discussed in detail with the patient.  The patient voiced understanding and wished to proceed with the procedure    Injection Procedure Note:  Diagnosis: DME OU    Patient Identified and Time Out complete  Topical Proparacaine and Betadine.  Inject Avastin OU  at 6:00 @ 3.5-4mm posterior to limbus  Post Operative Dx: Same  Complications: None  Follow up as above.

## 2018-11-09 DIAGNOSIS — Z12.11 COLON CANCER SCREENING: ICD-10-CM

## 2018-11-16 ENCOUNTER — TELEPHONE (OUTPATIENT)
Dept: INTERNAL MEDICINE | Facility: CLINIC | Age: 68
End: 2018-11-16

## 2018-11-16 DIAGNOSIS — N40.0 BENIGN NON-NODULAR PROSTATIC HYPERPLASIA WITHOUT LOWER URINARY TRACT SYMPTOMS: ICD-10-CM

## 2018-11-16 DIAGNOSIS — I10 ESSENTIAL HYPERTENSION: ICD-10-CM

## 2018-11-16 RX ORDER — GLIMEPIRIDE 4 MG/1
4 TABLET ORAL
Qty: 90 TABLET | Refills: 3 | Status: SHIPPED | OUTPATIENT
Start: 2018-11-16 | End: 2019-03-18 | Stop reason: SDUPTHER

## 2018-11-16 RX ORDER — METFORMIN HYDROCHLORIDE 1000 MG/1
1000 TABLET ORAL 2 TIMES DAILY WITH MEALS
Qty: 180 TABLET | Refills: 3 | Status: SHIPPED | OUTPATIENT
Start: 2018-11-16 | End: 2019-03-08 | Stop reason: SDUPTHER

## 2018-11-16 RX ORDER — SIMVASTATIN 40 MG/1
40 TABLET, FILM COATED ORAL NIGHTLY
Qty: 90 TABLET | Refills: 3 | Status: SHIPPED | OUTPATIENT
Start: 2018-11-16 | End: 2019-02-12 | Stop reason: ALTCHOICE

## 2018-11-16 RX ORDER — METOPROLOL TARTRATE 25 MG/1
12.5 TABLET, FILM COATED ORAL 2 TIMES DAILY
Qty: 90 TABLET | Refills: 3 | Status: SHIPPED | OUTPATIENT
Start: 2018-11-16 | End: 2019-03-18 | Stop reason: SDUPTHER

## 2018-11-16 RX ORDER — TAMSULOSIN HYDROCHLORIDE 0.4 MG/1
1 CAPSULE ORAL DAILY
Qty: 90 CAPSULE | Refills: 3 | Status: SHIPPED | OUTPATIENT
Start: 2018-11-16 | End: 2019-01-28 | Stop reason: DRUGHIGH

## 2018-11-16 RX ORDER — LISINOPRIL AND HYDROCHLOROTHIAZIDE 10; 12.5 MG/1; MG/1
1 TABLET ORAL DAILY
Qty: 90 TABLET | Refills: 3 | Status: SHIPPED | OUTPATIENT
Start: 2018-11-16 | End: 2019-03-18 | Stop reason: SDUPTHER

## 2018-11-16 NOTE — TELEPHONE ENCOUNTER
----- Message from [place] sent at 11/16/2018  1:20 PM CST -----  Contact: Self [phone]  Patient went to [facility] pharmacy to get refills on medications and was told he needs a prior authorization. Medications are:  MetFORMIN (GLUCOPHAGE) 1000 MG tablet  Lisinopril-hydrochlorothiazide (PRINZIDE,ZESTORETIC) 10-12.5 mg per tablet  Tamsulosin (FLOMAX) 0.4 mg Cp24  Simvastatin (ZOCOR) 40 MG tablet

## 2018-11-19 ENCOUNTER — OFFICE VISIT (OUTPATIENT)
Dept: ENDOCRINOLOGY | Facility: CLINIC | Age: 68
End: 2018-11-19
Payer: MEDICARE

## 2018-11-19 VITALS
HEIGHT: 61 IN | DIASTOLIC BLOOD PRESSURE: 80 MMHG | SYSTOLIC BLOOD PRESSURE: 130 MMHG | BODY MASS INDEX: 27.39 KG/M2 | HEART RATE: 60 BPM | WEIGHT: 145.06 LBS

## 2018-11-19 DIAGNOSIS — E11.59 HYPERTENSION ASSOCIATED WITH DIABETES: ICD-10-CM

## 2018-11-19 DIAGNOSIS — E11.69 HYPERLIPIDEMIA ASSOCIATED WITH TYPE 2 DIABETES MELLITUS: ICD-10-CM

## 2018-11-19 DIAGNOSIS — E27.8 ADRENAL NODULE: ICD-10-CM

## 2018-11-19 DIAGNOSIS — E27.8: ICD-10-CM

## 2018-11-19 DIAGNOSIS — I15.2 HYPERTENSION ASSOCIATED WITH DIABETES: ICD-10-CM

## 2018-11-19 DIAGNOSIS — E78.5 HYPERLIPIDEMIA ASSOCIATED WITH TYPE 2 DIABETES MELLITUS: ICD-10-CM

## 2018-11-19 PROCEDURE — 99214 OFFICE O/P EST MOD 30 MIN: CPT | Mod: S$PBB,,, | Performed by: INTERNAL MEDICINE

## 2018-11-19 PROCEDURE — 99213 OFFICE O/P EST LOW 20 MIN: CPT | Mod: PBBFAC | Performed by: INTERNAL MEDICINE

## 2018-11-19 PROCEDURE — 99999 PR PBB SHADOW E&M-EST. PATIENT-LVL III: CPT | Mod: PBBFAC,,, | Performed by: INTERNAL MEDICINE

## 2018-11-19 RX ORDER — DEXAMETHASONE 1 MG/1
TABLET ORAL
Qty: 1 TABLET | Refills: 0 | Status: SHIPPED | OUTPATIENT
Start: 2018-11-19 | End: 2020-05-25

## 2018-11-19 NOTE — PROGRESS NOTES
Subjective:      Patient ID: Ned Oliveira is a 68 y.o. male.    Chief Complaint:  Diabetes      History of Present Illness  With regards to the diabetes:  Diagnosed: 2008   Known complications: dr     Current regimen:  Metformin 1 gm bid  amaryl 4 mg qam     Missed doses?   Rarely         # times a day testing 1   Log reviewed:  none     Pre breakfast 160s       Hypoglycemic event? None      Diabetes Management Status    Statin: Taking - does miss doses   ACE/ARB: Taking    Screening or Prevention Patient's value Goal Complete/Controlled?   HgA1C Testing and Control   Lab Results   Component Value Date    HGBA1C 10.2 (H) 07/02/2018      Annually/Less than 8% No   Lipid profile : 10/29/2018 Annually Yes   LDL control Lab Results   Component Value Date    LDLCALC 152.0 10/29/2018    Annually/Less than 100 mg/dl  No   Nephropathy screening Lab Results   Component Value Date    LABMICR 16.0 07/02/2018     Lab Results   Component Value Date    PROTEINUA Negative 07/02/2018    Annually Yes   Blood pressure BP Readings from Last 1 Encounters:   11/19/18 130/80    Less than 140/90 Yes   Dilated retinal exam : 11/06/2018 Annually Yes   Foot exam   : 07/09/2018 Annually Yes        Denies numbness or tingling of feet    Typical meals: not following a special diet     Exercise - tries to stay active     No Polyuria polydipsia nocturia or vision changes      With regards to the adrenal incidentaloma:  right adrenal gland   found incidentally during CT scan to evaluate abdominal pain.      5/21/16  Indeterminate 1.5 cm right adrenal nodule, possibly representing a small adrenal adenoma. Further evaluation with a nonemergent adrenal mass protocol could be obtained for further characterization.      Ref. Range 9/12/2016 09:45   Renin Activity Latest Units: ng/mL/h 1.0       Ref. Range 9/12/2016 09:45   Aldosterone Latest Units: ng/dL 9.3       Ref. Range 9/12/2016 09:45   DHEA-SO4 Latest Ref Range: 48.6 - 361.8 ug/dL 99.1   Nl 24  urine mets and cortisol       Pt reports no abdominal discomfort.  No h/o hypertensive emergency.  Pt denies h/o paroxysmal symptoms such as syncope, pallor, dizziness   No palpitations.    No  worsening of known HTN    No easy bruisability or abnormal stretch marks. No muscle weakness     No fractures     Review of Systems   Constitutional: Negative for unexpected weight change.   Eyes: Negative for visual disturbance.   Respiratory: Negative for shortness of breath.    Cardiovascular: Negative for chest pain.   Gastrointestinal: Negative for abdominal pain.   Musculoskeletal: Negative for myalgias.   Skin: Negative for wound.   Neurological: Negative for headaches.   Hematological: Does not bruise/bleed easily.   Psychiatric/Behavioral: Negative for sleep disturbance.       Objective:   Physical Exam   Neck: No thyromegaly present.   Cardiovascular: Normal rate.   Pulmonary/Chest: Effort normal.   Abdominal: Soft.   Musculoskeletal: He exhibits no edema.   Vitals reviewed.  Feet no cuts or  scratches  Shoes appropriate  sensation intact to vibration and monofilament      Body mass index is 27.41 kg/m².    Lab Review:   Lab Results   Component Value Date    HGBA1C 10.2 (H) 07/02/2018     Lab Results   Component Value Date    CHOL 212 (H) 10/29/2018    HDL 40 10/29/2018    LDLCALC 152.0 10/29/2018    TRIG 100 10/29/2018    CHOLHDL 18.9 (L) 10/29/2018     Lab Results   Component Value Date     07/02/2018    K 4.4 07/02/2018     07/02/2018    CO2 25 07/02/2018     (H) 07/02/2018    BUN 15 07/02/2018    CREATININE 1.1 07/02/2018    CALCIUM 9.3 07/02/2018    PROT 6.5 07/02/2018    ALBUMIN 3.7 07/02/2018    BILITOT 1.1 (H) 07/02/2018    ALKPHOS 86 07/02/2018    AST 19 07/02/2018    ALT 24 07/02/2018    ANIONGAP 8 07/02/2018    ESTGFRAFRICA >60.0 07/02/2018    EGFRNONAA >60.0 07/02/2018    TSH 0.880 07/02/2018       Assessment and Plan     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  Patient encouraged to document glucose results and bring them to every clinic visit      -Hypoglycemia precautions discussed. Instructed on precautions before driving.      -Close adherence to lifestyle changes recommended.      -Periodic follow ups for eye evaluations, foot care and dental care suggested.    rtc with me or ALEXEY in  3 months             Hypertension associated with diabetes  Controled     Hyperlipidemia associated with type 2 diabetes mellitus  Stressed compliance       Adrenal nodule  Recheck imaging   Check 1 mg dst

## 2018-11-19 NOTE — LETTER
November 19, 2018      Jose Ramachandran, DO  [address] [facility] [place] [address]           [facility]  [address]  [place] [address]  Phone: [phone]          Patient: Ned Oliveira   MR Number: [id number]   YOB: 1950   Date of Visit: 11/19/2018       Dear Dr. Jose Ramachandran:    Thank you for referring Ned Oliveira to me for evaluation. Attached you will find relevant portions of my assessment and plan of care.    If you have questions, please do not hesitate to call me. I look forward to following Ned Oliveira along with you.    Sincerely,    Alexandria Dang MD    Enclosure  CC:  No Recipients    If you would like to receive this communication electronically, please contact [email or web address] or [phone] to request more information on [employer] Link access.    For providers and/or their staff who would like to refer a patient to [facility], please contact us through our one-stop-shop provider referral line, [facility], at [phone].    If you feel you have received this communication in error or would no longer like to receive these types of communications, please e-mail [email or web address]

## 2018-11-19 NOTE — ASSESSMENT & PLAN NOTE
Reviewed goals of therapy are to get the best control we can without hypoglycemia    Refer to education for injection teaching     Medication changes:    Start: trulicity         -Advised  self blood glucose monitoring.  Patient encouraged to document glucose results and bring them to every clinic visit      -Hypoglycemia precautions discussed. Instructed on precautions before driving.      -Close adherence to lifestyle changes recommended.      -Periodic follow ups for eye evaluations, foot care and dental care suggested.    rtc with me or ALEXEY in  3 months

## 2019-01-08 ENCOUNTER — PROCEDURE VISIT (OUTPATIENT)
Dept: OPHTHALMOLOGY | Facility: CLINIC | Age: 69
End: 2019-01-08
Payer: MEDICARE

## 2019-01-08 DIAGNOSIS — H25.13 NS (NUCLEAR SCLEROSIS), BILATERAL: ICD-10-CM

## 2019-01-08 PROCEDURE — 92014 PR EYE EXAM, EST PATIENT,COMPREHESV: ICD-10-PCS | Mod: 25,S$PBB,, | Performed by: OPHTHALMOLOGY

## 2019-01-08 PROCEDURE — 92014 COMPRE OPH EXAM EST PT 1/>: CPT | Mod: 25,S$PBB,, | Performed by: OPHTHALMOLOGY

## 2019-01-08 PROCEDURE — C9257 BEVACIZUMAB INJECTION: HCPCS | Mod: PBBFAC,JG | Performed by: OPHTHALMOLOGY

## 2019-01-08 PROCEDURE — 67028 INJECTION EYE DRUG: CPT | Mod: 50,PBBFAC | Performed by: OPHTHALMOLOGY

## 2019-01-08 PROCEDURE — 92134 CPTRZ OPH DX IMG PST SGM RTA: CPT | Mod: PBBFAC | Performed by: OPHTHALMOLOGY

## 2019-01-08 PROCEDURE — 92134 POSTERIOR SEGMENT OCT RETINA (OCULAR COHERENCE TOMOGRAPHY)-BOTH EYES: ICD-10-PCS | Mod: 26,S$PBB,, | Performed by: OPHTHALMOLOGY

## 2019-01-08 PROCEDURE — 67028 PR INJECT INTRAVITREAL PHARMCOLOGIC: ICD-10-PCS | Mod: 50,S$PBB,, | Performed by: OPHTHALMOLOGY

## 2019-01-08 PROCEDURE — 67028 INJECTION EYE DRUG: CPT | Mod: 50,S$PBB,, | Performed by: OPHTHALMOLOGY

## 2019-01-08 RX ADMIN — Medication 1.25 MG: at 10:01

## 2019-01-08 RX ADMIN — BEVACIZUMAB 1.25 MG: 100 INJECTION, SOLUTION INTRAVENOUS at 10:01

## 2019-01-08 NOTE — PROGRESS NOTES
HPI     9 week / OCT / Avastin   DLS- 11/06/2018 Dr. Rivers     Pt sts no change in va since last visit denies pain.  (-)Flashes (-)Floaters  (-)Photophobia  (-)Glare    AT's PRN       OCT - OD - increased  OS DME increased    Prior FA - late macular leakage OS > OD      A/P    1. Mild NPDR OU T2  2. DME OU    S/p Avastin OD x 2, OS x 8  Focal OS 5/17 1/19 - increased DME at 9 weeks - resume 6 week tx    Avastin OU today  Will need maintenance OS  Try focal OD in 2 weeks    BS/BP/chol control        3. NS OU      2 weeks Focal OD with me, then q 6 week injections OS      Risks, benefits, and alternatives to treatment discussed in detail with the patient.  The patient voiced understanding and wished to proceed with the procedure    Injection Procedure Note:  Diagnosis: DME OU    Patient Identified and Time Out complete  Topical Proparacaine and Betadine.  Inject Avastin OU  at 6:00 @ 3.5-4mm posterior to limbus  Post Operative Dx: Same  Complications: None  Follow up as above.

## 2019-01-08 NOTE — PATIENT INSTRUCTIONS

## 2019-01-27 ENCOUNTER — HOSPITAL ENCOUNTER (EMERGENCY)
Facility: HOSPITAL | Age: 69
Discharge: HOME | End: 2019-01-27
Attending: EMERGENCY MEDICINE
Payer: MEDICARE

## 2019-01-27 VITALS
BODY MASS INDEX: 28 KG/M2 | HEART RATE: 68 BPM | OXYGEN SATURATION: 95 % | TEMPERATURE: 98 F | RESPIRATION RATE: 18 BRPM | SYSTOLIC BLOOD PRESSURE: 139 MMHG | DIASTOLIC BLOOD PRESSURE: 75 MMHG | HEIGHT: 64 IN | WEIGHT: 164 LBS

## 2019-01-27 DIAGNOSIS — R33.9 URINARY RETENTION: ICD-10-CM

## 2019-01-27 DIAGNOSIS — K40.90 RIGHT INGUINAL HERNIA: Primary | ICD-10-CM

## 2019-01-27 PROCEDURE — 99283 EMERGENCY DEPT VISIT LOW MDM: CPT

## 2019-01-27 PROCEDURE — 99283 PR EMERGENCY DEPT VISIT,LEVEL III: ICD-10-PCS | Mod: ,,, | Performed by: EMERGENCY MEDICINE

## 2019-01-27 PROCEDURE — 99283 EMERGENCY DEPT VISIT LOW MDM: CPT | Mod: ,,, | Performed by: EMERGENCY MEDICINE

## 2019-01-27 NOTE — ED TRIAGE NOTES
Presents to ER with complaint of hematuria since this am along with penial pain.  Has a a 16fr indwelling renee catheter with a 10ml ballon attached to his right anterior thigh with a stat lock.  Urine noted in leg bag is yellow and slightly cloudy.  Patient's name and date of birth checked and is correct.    LOC: The patient is awake, alert, and oriented to place, time, situation. Affect is appropriate.  Speech is appropriate and clear.      APPEARANCE: Patient resting comfortably, and is  in no acute distress.  Patient is clean and well groomed.     SKIN: The skin is warm and dry; color consistent with ethnicity.  Patient has normal skin turgor and moist mucus membranes.  Skin intact; no breakdown or bruising noted.      MUSCULOSKELETAL: Patient moving upper and lower extremities without difficulty.  Denies weakness.      RESPIRATORY: Airway is open and patent. Respirations spontaneous, even, easy, and non-labored.  Patient has a normal effort and rate.  No accessory muscle use noted. Denies cough.  BS clear.     CARDIAC:  No peripheral edema noted. No complaints of chest pain.       ABDOMEN: Soft and non tender to palpation.  No distention noted.      NEUROLOGIC: Eyes open spontaneously.  Behavior appropriate to situation.  Follows commands; facial expression symmetrical.  Purposeful motor response noted; normal sensation in all extremities.

## 2019-01-28 ENCOUNTER — OFFICE VISIT (OUTPATIENT)
Dept: UROLOGY | Facility: CLINIC | Age: 69
End: 2019-01-28
Payer: MEDICARE

## 2019-01-28 ENCOUNTER — OFFICE VISIT (OUTPATIENT)
Dept: OPHTHALMOLOGY | Facility: CLINIC | Age: 69
End: 2019-01-28
Payer: MEDICARE

## 2019-01-28 VITALS — SYSTOLIC BLOOD PRESSURE: 129 MMHG | DIASTOLIC BLOOD PRESSURE: 79 MMHG | HEART RATE: 67 BPM

## 2019-01-28 VITALS
BODY MASS INDEX: 24.65 KG/M2 | HEIGHT: 64 IN | SYSTOLIC BLOOD PRESSURE: 106 MMHG | DIASTOLIC BLOOD PRESSURE: 68 MMHG | WEIGHT: 144.38 LBS | HEART RATE: 69 BPM

## 2019-01-28 DIAGNOSIS — H25.13 NS (NUCLEAR SCLEROSIS), BILATERAL: ICD-10-CM

## 2019-01-28 DIAGNOSIS — N13.8 BPH WITH OBSTRUCTION/LOWER URINARY TRACT SYMPTOMS: ICD-10-CM

## 2019-01-28 DIAGNOSIS — N48.89 PENILE PAIN: ICD-10-CM

## 2019-01-28 DIAGNOSIS — N40.1 BPH WITH OBSTRUCTION/LOWER URINARY TRACT SYMPTOMS: ICD-10-CM

## 2019-01-28 DIAGNOSIS — R33.9 URINARY RETENTION: Primary | ICD-10-CM

## 2019-01-28 PROCEDURE — 99999 PR PBB SHADOW E&M-EST. PATIENT-LVL III: CPT | Mod: PBBFAC,,, | Performed by: OPHTHALMOLOGY

## 2019-01-28 PROCEDURE — 99204 PR OFFICE/OUTPT VISIT, NEW, LEVL IV, 45-59 MIN: ICD-10-PCS | Mod: S$PBB,,, | Performed by: PHYSICIAN ASSISTANT

## 2019-01-28 PROCEDURE — 99999 PR PBB SHADOW E&M-EST. PATIENT-LVL III: ICD-10-PCS | Mod: PBBFAC,,, | Performed by: OPHTHALMOLOGY

## 2019-01-28 PROCEDURE — 99214 OFFICE O/P EST MOD 30 MIN: CPT | Mod: PBBFAC,27 | Performed by: PHYSICIAN ASSISTANT

## 2019-01-28 PROCEDURE — 67210 TREATMENT OF RETINAL LESION: CPT | Mod: S$PBB,RT,, | Performed by: OPHTHALMOLOGY

## 2019-01-28 PROCEDURE — 99999 PR PBB SHADOW E&M-EST. PATIENT-LVL IV: CPT | Mod: PBBFAC,,, | Performed by: PHYSICIAN ASSISTANT

## 2019-01-28 PROCEDURE — 99213 OFFICE O/P EST LOW 20 MIN: CPT | Mod: PBBFAC | Performed by: OPHTHALMOLOGY

## 2019-01-28 PROCEDURE — 67210 PR DESTRUC RETINAL LESN,PHOTOCOAG: ICD-10-PCS | Mod: S$PBB,RT,, | Performed by: OPHTHALMOLOGY

## 2019-01-28 PROCEDURE — 99499 UNLISTED E&M SERVICE: CPT | Mod: S$PBB,,, | Performed by: OPHTHALMOLOGY

## 2019-01-28 PROCEDURE — 67210 TREATMENT OF RETINAL LESION: CPT | Mod: PBBFAC,RT | Performed by: OPHTHALMOLOGY

## 2019-01-28 PROCEDURE — 99204 OFFICE O/P NEW MOD 45 MIN: CPT | Mod: S$PBB,,, | Performed by: PHYSICIAN ASSISTANT

## 2019-01-28 PROCEDURE — 99499 NO LOS: ICD-10-PCS | Mod: S$PBB,,, | Performed by: OPHTHALMOLOGY

## 2019-01-28 PROCEDURE — 99999 PR PBB SHADOW E&M-EST. PATIENT-LVL IV: ICD-10-PCS | Mod: PBBFAC,,, | Performed by: PHYSICIAN ASSISTANT

## 2019-01-28 RX ORDER — TAMSULOSIN HYDROCHLORIDE 0.4 MG/1
0.8 CAPSULE ORAL NIGHTLY
Qty: 60 CAPSULE | Refills: 11 | Status: SHIPPED | OUTPATIENT
Start: 2019-01-28 | End: 2019-03-18 | Stop reason: SDUPTHER

## 2019-01-28 NOTE — ED NOTES
Wife reports they went to  last pm due to patient having rectal bleeding last pm.  Patient with an obvious inguinal hernia.

## 2019-01-28 NOTE — DISCHARGE INSTRUCTIONS
You have been given a referral to General surgery and to Urology for your inguinal hernia and your catheter care and removal.  Please follow-up with these if they have not called you in 1 week.    You may purchase a hernia belt from the store to help support your inguinal hernia.  Please refrain from any heavy lifting until follow-up with General surgery.

## 2019-01-28 NOTE — ED NOTES
Pt and wife state pt is a  and has to lift heavy  Ac units and there is no light duty in his job . Requesting  Work note for one week. DR Sun notified

## 2019-01-28 NOTE — PROGRESS NOTES
CHIEF COMPLAINT:    Mr. Oliveira is a 68 y.o. male presenting for penile pain.    PRESENTING ILLNESS:    Ned Oliveira is a 68 y.o. male  who presents for penile pain.  He went to the ED at [facility] 2 days ago due to bloody stool and painful hernia.  He reports voiding twice in the ED totaling 700ml.  However a bladder scan was done and a renee catheter was placed.  His wife states that she does not know why the catheter was placed when he voided a lot.  He reports that yesterday he started having blood in his urine and having pain.  He went to the ED for further evaluation.   He denies any trauma.  His reports having the catheter secured to his leg.    He never saw blood in his urine prior to his catheter being placed. He reports a slow stream, nocturia x 3-4, hesitancy, intermittency, incomplete bladder emptying. He has a history of BPH and has been on flomax for several years.      Previous/Current Smoker: quit 33 years ago.  Smoked for 10 years. Smoked up to 2pk/day  Radiation therapy to pelvis:no  Chemotherapy: no  Personal/ family history of bladder/ kidney/ prostate cancer: no  Exposure to harmful chemicals: exposed to lead paint and asbestos     REVIEW OF SYSTEMS:    Constitutional: Negative for fever and chills.   HENT: Negative for hearing loss.   Eyes: Negative for visual disturbance.   Respiratory: Negative for shortness of breath.   Cardiovascular: Negative for chest pain.   Gastrointestinal: Negative for vomiting, and constipation.   Genitourinary: See HPI  Neurological: Negative for dizziness.   Hematological: Does not bruise/bleed easily.   Psychiatric/Behavioral: Negative for confusion.       PATIENT HISTORY:    Past Medical History:   Diagnosis Date    Adrenal adenoma     BPH (benign prostatic hyperplasia)     Coronary artery disease     Diabetes mellitus, type 2     Hyperlipidemia     Hypertension        Past Surgical History:   Procedure Laterality Date    BICEPS TENDON REPAIR Right         Family History   Problem Relation Age of Onset    Diabetes Mother     Heart disease Mother     Heart disease Sister     No Known Problems Brother     Kidney failure Daughter         ESRD on HD    Autism Son     Asthma Son     No Known Problems Sister     No Known Problems Sister     No Known Problems Sister     No Known Problems Brother     Diabetes Brother     Cancer Neg Hx     Stroke Neg Hx        Social History     Socioeconomic History    Marital status:      Spouse name: Not on file    Number of children: 2    Years of education: Not on file    Highest education level: Not on file   Social Needs    Financial resource strain: Not on file    Food insecurity - worry: Not on file    Food insecurity - inability: Not on file    Transportation needs - medical: Not on file    Transportation needs - non-medical: Not on file   Occupational History    Occupation: Maintenance    Tobacco Use    Smoking status: Former Smoker     Packs/day: 2.00     Years: 10.00     Pack years: 20.00     Types: Cigarettes     Start date: 8/15/1987     Last attempt to quit: 8/15/1996     Years since quittin.4    Smokeless tobacco: Never Used   Substance and Sexual Activity    Alcohol use: No     Alcohol/week: 0.0 oz    Drug use: No    Sexual activity: Yes     Partners: Female   Other Topics Concern    Not on file   Social History Narrative    Not on file       Allergies:  Patient has no known allergies.    Medications:    Current Outpatient Medications:     aspirin (ECOTRIN) 81 MG EC tablet, Take 1 tablet (81 mg total) by mouth once daily., Disp: , Rfl: 0    blood sugar diagnostic Strp, To use as directed to monitor blood sugars daily, Disp: 100 strip, Rfl: 2    butalbital-acetaminophen-caffeine -40 mg (FIORICET, ESGIC) -40 mg per tablet, TAKE ONE TABLET BY MOUTH EVERY 4 HOURS AS NEEDED FOR PAIN OR HEADACHE, Disp: 30 tablet, Rfl: 1    ciclopirox (PENLAC) 8 % Soln, Apply topically  nightly., Disp: 6.6 mL, Rfl: 11    dexamethasone (DECADRON) 1 MG Tab, Take at 11 pm the night before labs are to be drawn, Disp: 1 tablet, Rfl: 0    dulaglutide (TRULICITY) 0.75 mg/0.5 mL PnIj, Inject 0.5 mLs (0.75 mg total) into the skin once a week., Disp: 2 mL, Rfl: 11    glimepiride (AMARYL) 4 MG tablet, Take 1 tablet (4 mg total) by mouth before breakfast., Disp: 90 tablet, Rfl: 3    lancets (ONETOUCH ULTRASOFT LANCETS) Misc, Check blood sugars BID, Disp: 200 each, Rfl: 11    levocetirizine (XYZAL) 5 MG tablet, Take 1 tablet (5 mg total) by mouth every evening., Disp: 90 tablet, Rfl: 3    lisinopril-hydrochlorothiazide (PRINZIDE,ZESTORETIC) 10-12.5 mg per tablet, Take 1 tablet by mouth once daily., Disp: 90 tablet, Rfl: 3    metFORMIN (GLUCOPHAGE) 1000 MG tablet, Take 1 tablet (1,000 mg total) by mouth 2 (two) times daily with meals., Disp: 180 tablet, Rfl: 3    metoprolol tartrate (LOPRESSOR) 25 MG tablet, Take 0.5 tablets (12.5 mg total) by mouth 2 (two) times daily., Disp: 90 tablet, Rfl: 3    simvastatin (ZOCOR) 40 MG tablet, Take 1 tablet (40 mg total) by mouth every evening., Disp: 90 tablet, Rfl: 3    tamsulosin (FLOMAX) 0.4 mg Cap, Take 2 capsules (0.8 mg total) by mouth every evening., Disp: 60 capsule, Rfl: 11    PHYSICAL EXAMINATION:    Constitutional: He appears well-developed and well-nourished.  He is in no apparent distress.    Eyes: No scleral icterus noted bilaterally. No discharge bilaterally.    Nose: No rhinorrhea    Cardiovascular: Normal rate.  No pitting edema noted in lower extremities bilaterally    Pulmonary/Chest: Effort normal. No respiratory distress.     Abdominal:  He exhibits no distension.  There is no CVA tenderness.     Lymphadenopathy:        Right: No supraclavicular adenopathy present.        Left: No supraclavicular adenopathy present.     Neurological: He is alert and oriented to person, place, and time.     Skin: Skin is warm and dry.     Psych: Cooperative  with normal affect.    Genitourinary: The penis is uncircumcised with no evidence of phimosis and paraphimosis. Renee catheter in place.  There is tension on the catheter.  His leg bag is around his ankles.  His catheter is not secured to his leg correctly.  Yellow urine noted in leg bag.      The prostate is 25g.  Prostate is smooth, non tender with no nodules noted.    Physical Exam      LABS:      Lab Results   Component Value Date    PSA 0.56 07/02/2018    PSA 0.60 03/27/2017    PSA 0.48 10/26/2015       IMPRESSION:    Encounter Diagnoses   Name Primary?    Urinary retention Yes    BPH with obstruction/lower urinary tract symptoms     Penile pain          PLAN:    Increased flomax to 0.8mg/day.    Adjusted bag on patients leg.  Advised him not to wear his bag on his lower leg as it will cause the catheter to pull which can lead to pain and bleeding.  Moved bag up higher on patient's leg.  Also showed patient how to properly secure catheter to leg.  Patient reports improvement in pain once catheter bag was repositioned.    Patient's bleeding likely related to renee catheter.    He will return in 1 week for a voiding trial.        Meghan Hernandez PA-C

## 2019-01-28 NOTE — PROGRESS NOTES
HPI     Eye Problem      Additional comments: laser              Comments     DLS 1/8/19- Patient here for Focal laser OD      HPI     9 week / OCT / Avastin   DLS- 11/06/2018 Dr. Rivers     Pt sts no change in va since last visit denies pain.  (-)Flashes (-)Floaters  (-)Photophobia  (-)Glare    AT's PRN       OCT - OD - increased  OS DME increased    Prior FA - late macular leakage OS > OD      A/P    1. Mild NPDR OU T2  2. DME OU    S/p Avastin OD x 3, OS x 9  Focal OS 5/17 1/19 - increased DME at 9 weeks - resume 6 week tx      Will need maintenance OS  Focal OD Today    BS/BP/chol control        3. NS OU      4 weeks OCT/Avastin, then q 6 week injections OS      Risks, benefits, and alternatives to treatment discussed in detail with the patient.  The patient voiced understanding and wished to proceed with the procedure    Laser Procedure Note  Dx: DME OD  Laser: Focal OD   Argon  Spot: 100  Power: 70-90  Dur: 0.1s  #:   24  Complications: None  F/U as above

## 2019-01-28 NOTE — ED PROVIDER NOTES
Encounter Date: 1/27/2019    SCRIBE #1 NOTE: I, Tavon Novoa , am scribing for, and in the presence of,  Kang Sun DO. I have scribed the entire note.       History     Chief Complaint   Patient presents with    Hematuria     hematuria since this am, seen at  for hernia last night, pt has catheter now that he states was placed last night      Pt is a 69 yo M w pmhx of CAD, T2DM, HLD, HTN, BPH, and adrenal adenoma presents to the ED for evaluation of a hernia with associated pain and intermittent hematuria. States that he went to [facility] last night for bloody stool and they reduced his hernia and inserted a Martinez catheter.  Today, while bearing down weight he stated his hernia on his right side popped out again.  He noticed a small amount of blood in his Martinez catheter.  Denies any chest pain, lightheadedness, dizziness, falls or trauma. He has a Martinez catheter in place to a leg bag.  Denies any SOB, n//v/d, or any other complaint at this time.  He reports 2/10 pain to his right inguinal hernia worse with palpation.       The history is provided by the patient and the spouse.     Review of patient's allergies indicates:  No Known Allergies  Past Medical History:   Diagnosis Date    Adrenal adenoma     BPH (benign prostatic hyperplasia)     Coronary artery disease     Diabetes mellitus, type 2     Hyperlipidemia     Hypertension      Past Surgical History:   Procedure Laterality Date    BICEPS TENDON REPAIR Right      Family History   Problem Relation Age of Onset    Diabetes Mother     Heart disease Mother     Heart disease Sister     No Known Problems Brother     Kidney failure Daughter         ESRD on HD    Autism Son     Asthma Son     No Known Problems Sister     No Known Problems Sister     No Known Problems Sister     No Known Problems Brother     Diabetes Brother     Cancer Neg Hx     Stroke Neg Hx      Social History     Tobacco Use    Smoking status: Former Smoker      Packs/day: 2.00     Years: 10.00     Pack years: 20.00     Types: Cigarettes     Start date: 8/15/1987     Last attempt to quit: 8/15/1996     Years since quittin.4    Smokeless tobacco: Never Used   Substance Use Topics    Alcohol use: No     Alcohol/week: 0.0 oz    Drug use: No     Review of Systems   Constitutional: Negative for chills and fever.   HENT: Negative for nosebleeds.    Eyes: Negative for visual disturbance.   Respiratory: Negative for cough.    Cardiovascular: Negative for leg swelling.   Gastrointestinal: Positive for abdominal pain.   Endocrine: Negative for cold intolerance.   Genitourinary: Positive for hematuria.   Musculoskeletal: Negative for neck pain.   Skin: Negative for wound.   Allergic/Immunologic: Negative for food allergies.   Neurological: Negative for speech difficulty.   Psychiatric/Behavioral: Negative for confusion.       Physical Exam     Initial Vitals [19 1753]   BP Pulse Resp Temp SpO2   139/75 68 18 97.7 °F (36.5 °C) 95 %      MAP       --         Physical Exam    Nursing note and vitals reviewed.      Gen/Constitutional: Interactive. No acute distress  Head: Normocephalic, Atraumatic  Neck: supple, no masses or LAD  Eyes: PERRLA, conjunctiva clear  Ears, Nose and Throat: No rhinorrhea or stridor.  Cardiac: Reg Rhythm, No murmur  Pulmonary: CTA Bilat, no wheezes, rhonchi, rales.  GI: Abdomen soft, non-distended. Right inguinal hernia. Easily reduced without incarceration or strangulation.   Rectal:  Negative for masses, negative for hemorrhoids, guaiac negative  : No CVA tenderness. Testicular exam with no tenderness to palpation. Negative Prehn's sign. Martinez catheter in place without signs of hematuria or infection  Musculoskeletal: Extremities warm, well perfused, no erythema, no edema  Skin: No rashes  Neuro: Alert and Oriented x 3; No focal motor or sensory deficits.    Psych: Normal affect    ED Course   Procedures  Labs Reviewed - No data to display        Imaging Results    None          Medical Decision Making:   History:   I obtained history from: someone other than patient.  Old Medical Records: I decided to obtain old medical records.  Old Records Summarized: records from previous admission(s).  Initial Assessment:   Pt is a 67 yo M w pmhx of CAD, T2DM, HLD, HTN, BPH, and adrenal adenoma presents to the ED for evaluation of a hernia with associated pain and hematuria.  Differential Diagnosis:   Differential diagnosis includes but is not limited to:  Incarcerated inguinal hernia, strangulated hernia, obstruction, GI bleed perforation, testicular torsion, urinary retention.      Urgent evaluation of patient presenting with an inguinal hernia on the right side and a  Martinez catheter in place that was placed last night.  He is afebrile and vital signs are stable. Physical exam findings with right inguinal hernia that is easily reducible.  No visible or palpable signs of incarceration or strangulation.  Testicular and scrotal exam without signs of edema, erythema or pain. Negative Prehn sign. Rectal exam without any gross bleeding, no masses, negative guaiac.  Abdominal exam, nontender with no focal peritoneal signs. Martinez catheter draining yellow urine without hematuria.  I had a long discussion with the patient regarding his right inguinal hernia which he has had for a long time.  He is awaiting follow-up, and I instructed him that his hernia was easily reducible without signs of incarceration. I recommended a hernia belt and no heavy lifting while awaiting surgical evaluation.  No emergent need for surgical evaluation during this ED visit and we will refer to General surgery as an outpatient, as an ambulatory referral.  We will also refer patient to Urology for voiding trial in the next 3-5 days, as patient currently has a Martinez catheter in place for urinary retention.  Patient agreeable this plan, no other red flags for any other acute or emergent pathology at  this time. Patient agreeable to discharge plan. Strict ED precautions and return instructions discussed at length and patient verbalized understanding. All questions were answered and ample time was given for questions.      Complexity:  Moderate          Scribe Attestation:   Scribe #1: I performed the above scribed service and the documentation accurately describes the services I performed. I attest to the accuracy of the note.    I, Dr. Kang Sun, personally performed the services described in this documentation. All medical record entries made by the scribe were at my direction and in my presence.  I have reviewed the chart and agree that the record reflects my personal performance and is accurate and complete.              Clinical Impression:   The primary encounter diagnosis was Right inguinal hernia. A diagnosis of Urinary retention was also pertinent to this visit.        Disposition:   Disposition: Discharged  Condition: Stable    Kang Sun DO  Dept of Emergency Medicine   [facility]  Spectralink: [id number]                    Kang Sun DO  01/28/19 1154

## 2019-01-30 ENCOUNTER — OFFICE VISIT (OUTPATIENT)
Dept: INTERNAL MEDICINE | Facility: CLINIC | Age: 69
End: 2019-01-30
Payer: MEDICARE

## 2019-01-30 VITALS
SYSTOLIC BLOOD PRESSURE: 100 MMHG | DIASTOLIC BLOOD PRESSURE: 60 MMHG | WEIGHT: 144.38 LBS | RESPIRATION RATE: 18 BRPM | BODY MASS INDEX: 24.65 KG/M2 | HEIGHT: 64 IN | HEART RATE: 76 BPM | TEMPERATURE: 98 F

## 2019-01-30 DIAGNOSIS — I25.10 CAD IN NATIVE ARTERY: ICD-10-CM

## 2019-01-30 DIAGNOSIS — K64.9 HEMORRHOIDS, UNSPECIFIED HEMORRHOID TYPE: ICD-10-CM

## 2019-01-30 DIAGNOSIS — E11.59 HYPERTENSION ASSOCIATED WITH DIABETES: Primary | ICD-10-CM

## 2019-01-30 DIAGNOSIS — E78.5 HYPERLIPIDEMIA ASSOCIATED WITH TYPE 2 DIABETES MELLITUS: ICD-10-CM

## 2019-01-30 DIAGNOSIS — K62.5 BRBPR (BRIGHT RED BLOOD PER RECTUM): ICD-10-CM

## 2019-01-30 DIAGNOSIS — E11.69 HYPERLIPIDEMIA ASSOCIATED WITH TYPE 2 DIABETES MELLITUS: ICD-10-CM

## 2019-01-30 DIAGNOSIS — K40.90 RIGHT INGUINAL HERNIA: ICD-10-CM

## 2019-01-30 DIAGNOSIS — I15.2 HYPERTENSION ASSOCIATED WITH DIABETES: Primary | ICD-10-CM

## 2019-01-30 DIAGNOSIS — I70.0 AORTIC ATHEROSCLEROSIS: ICD-10-CM

## 2019-01-30 DIAGNOSIS — E27.8 ADRENAL NODULE: ICD-10-CM

## 2019-01-30 DIAGNOSIS — N40.0 BENIGN NON-NODULAR PROSTATIC HYPERPLASIA WITHOUT LOWER URINARY TRACT SYMPTOMS: ICD-10-CM

## 2019-01-30 DIAGNOSIS — R33.9 URINARY RETENTION: ICD-10-CM

## 2019-01-30 PROCEDURE — 99214 OFFICE O/P EST MOD 30 MIN: CPT | Mod: S$PBB,,, | Performed by: INTERNAL MEDICINE

## 2019-01-30 PROCEDURE — 99214 PR OFFICE/OUTPT VISIT, EST, LEVL IV, 30-39 MIN: ICD-10-PCS | Mod: S$PBB,,, | Performed by: INTERNAL MEDICINE

## 2019-01-30 PROCEDURE — 99999 PR PBB SHADOW E&M-EST. PATIENT-LVL III: CPT | Mod: PBBFAC,,, | Performed by: INTERNAL MEDICINE

## 2019-01-30 PROCEDURE — 99213 OFFICE O/P EST LOW 20 MIN: CPT | Mod: PBBFAC,PO | Performed by: INTERNAL MEDICINE

## 2019-01-30 PROCEDURE — 99999 PR PBB SHADOW E&M-EST. PATIENT-LVL III: ICD-10-PCS | Mod: PBBFAC,,, | Performed by: INTERNAL MEDICINE

## 2019-01-30 RX ORDER — PEN NEEDLE, DIABETIC 30 GX3/16"
NEEDLE, DISPOSABLE MISCELLANEOUS
Qty: 12 EACH | Refills: 3 | Status: SHIPPED | OUTPATIENT
Start: 2019-01-30 | End: 2020-10-09 | Stop reason: SDUPTHER

## 2019-01-30 RX ORDER — HYDROCORTISONE 25 MG/G
CREAM TOPICAL 2 TIMES DAILY
Qty: 28 G | Refills: 1 | Status: SHIPPED | OUTPATIENT
Start: 2019-01-30 | End: 2019-10-07

## 2019-01-30 NOTE — PROGRESS NOTES
Subjective:       Patient ID: Ned Oliveira is a 68 y.o. male.    Chief Complaint: Follow-up ( ER- rectal bleeding- problem with bladder not emptying-renee catheter in 1-26); Abdominal Pain; and Leg Pain (left)    HPI   Pt with HTN, T2DM, CAD, Adrenal adenoma, Aortic atherosclerosis, HLD, BPH is here for 6 month f/u. Pt recently seen in the ED at  for urinary retention. He still has a renee catheter. He will f/u with Urology later this week to have it removed. He would like a referral to surgery regarding inguinal hernia.   Review of Systems   Constitutional: Negative for activity change, appetite change, chills, diaphoresis, fatigue, fever and unexpected weight change.   HENT: Negative for postnasal drip, rhinorrhea, sinus pressure, sneezing, sore throat, trouble swallowing and voice change.    Respiratory: Negative for cough, shortness of breath and wheezing.    Cardiovascular: Negative for chest pain, palpitations and leg swelling.   Gastrointestinal: Positive for abdominal pain and blood in stool. Negative for abdominal distention, anal bleeding, constipation, diarrhea, nausea, rectal pain and vomiting.   Genitourinary: Negative for dysuria.   Musculoskeletal: Negative for arthralgias and myalgias.   Skin: Negative for rash and wound.   Allergic/Immunologic: Negative for environmental allergies and food allergies.   Hematological: Negative for adenopathy. Does not bruise/bleed easily.       Objective:      Physical Exam   Constitutional: He is oriented to person, place, and time. He appears well-developed and well-nourished. No distress.   HENT:   Head: Normocephalic and atraumatic.   Right Ear: External ear normal.   Left Ear: External ear normal.   Nose: Nose normal.   Mouth/Throat: Oropharynx is clear and moist. No oropharyngeal exudate.   Eyes: Conjunctivae and EOM are normal. Pupils are equal, round, and reactive to light. Right eye exhibits no discharge. Left eye exhibits no discharge. No scleral  icterus.   Neck: Normal range of motion. Neck supple. No JVD present.   Cardiovascular: Normal rate, regular rhythm and normal heart sounds.   No murmur heard.  Pulmonary/Chest: Effort normal and breath sounds normal. No respiratory distress. He has no wheezes. He has no rales.   Abdominal: Soft. Bowel sounds are normal. There is no tenderness. A hernia is present. Hernia confirmed positive in the right inguinal area.   Musculoskeletal: He exhibits no edema.   Lymphadenopathy:     He has no cervical adenopathy.   Neurological: He is alert and oriented to person, place, and time.   Skin: Skin is warm and dry. No rash noted. He is not diaphoretic. No pallor.       Assessment:       1. Hypertension associated with diabetes    2. 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 Aortic atherosclerosis    4. CAD in native artery    5. Adrenal nodule    6. Hyperlipidemia associated with type 2 diabetes mellitus    7. Benign non-nodular prostatic hyperplasia without lower urinary tract symptoms    8. Urinary retention    9. BRBPR (bright red blood per rectum)    10. Hemorrhoids, unspecified hemorrhoid type    11. Right inguinal hernia        Plan:    1. HTN- controlled, CPT   2. T2DM with retinopathy- last HA1C of 10.2(7/18)<--7.0(2/18)<--7.5(11/17)<--7.0(7/17)       On Amaryl 4 mg daily, Metformin 1 gm BID and Trulicity       Followed by Endocrine        Monitor blood sugar BID   3. CAD- continue ASA/statin       Followed by Cardiology    4. Adrenal adenoma- followed by Endocrine   5. Aortic atherosclerosis- stable on ASA/statin    6. HLD associated with diabetes- stable on Zocor 40 mg daily   7. BPH- Flomax was increased to 0.8 mg daily   8. Pt will f/u with Urology to have catheter removed   9/10. Referral to Colorectal surgery   11. Referral to General Surgery  12. F/u in 6 months for annual exam

## 2019-02-04 ENCOUNTER — OFFICE VISIT (OUTPATIENT)
Dept: UROLOGY | Facility: CLINIC | Age: 69
End: 2019-02-04
Payer: MEDICARE

## 2019-02-04 VITALS — SYSTOLIC BLOOD PRESSURE: 116 MMHG | DIASTOLIC BLOOD PRESSURE: 70 MMHG | HEART RATE: 77 BPM

## 2019-02-04 DIAGNOSIS — N13.8 BPH WITH OBSTRUCTION/LOWER URINARY TRACT SYMPTOMS: ICD-10-CM

## 2019-02-04 DIAGNOSIS — R33.9 URINARY RETENTION: ICD-10-CM

## 2019-02-04 DIAGNOSIS — N40.1 BPH WITH OBSTRUCTION/LOWER URINARY TRACT SYMPTOMS: ICD-10-CM

## 2019-02-04 DIAGNOSIS — Z46.6 ENCOUNTER FOR FOLEY CATHETER REMOVAL: Primary | ICD-10-CM

## 2019-02-04 PROCEDURE — 99214 OFFICE O/P EST MOD 30 MIN: CPT | Mod: PBBFAC,25 | Performed by: PHYSICIAN ASSISTANT

## 2019-02-04 PROCEDURE — 51700 PR IRRIGATION, BLADDER: ICD-10-PCS | Mod: S$PBB,,, | Performed by: PHYSICIAN ASSISTANT

## 2019-02-04 PROCEDURE — 99999 PR PBB SHADOW E&M-EST. PATIENT-LVL IV: CPT | Mod: PBBFAC,,, | Performed by: PHYSICIAN ASSISTANT

## 2019-02-04 PROCEDURE — 51700 IRRIGATION OF BLADDER: CPT | Mod: PBBFAC | Performed by: PHYSICIAN ASSISTANT

## 2019-02-04 PROCEDURE — 99213 PR OFFICE/OUTPT VISIT, EST, LEVL III, 20-29 MIN: ICD-10-PCS | Mod: S$PBB,25,, | Performed by: PHYSICIAN ASSISTANT

## 2019-02-04 PROCEDURE — 99999 PR PBB SHADOW E&M-EST. PATIENT-LVL IV: ICD-10-PCS | Mod: PBBFAC,,, | Performed by: PHYSICIAN ASSISTANT

## 2019-02-04 PROCEDURE — 99213 OFFICE O/P EST LOW 20 MIN: CPT | Mod: S$PBB,25,, | Performed by: PHYSICIAN ASSISTANT

## 2019-02-04 PROCEDURE — 51700 IRRIGATION OF BLADDER: CPT | Mod: S$PBB,,, | Performed by: PHYSICIAN ASSISTANT

## 2019-02-04 NOTE — PROGRESS NOTES
CHIEF COMPLAINT:    Mr. Oliveira is a 68 y.o. male presenting for voiding trial.   PRESENTING ILLNESS:    Ned Oliveira is a 68 y.o. male  who presents for voiding trial.      Patient was last seen in clinic on 1/28/19 for penile pain and blood in his renee bag after renee catheter was placed.   His catheter has been draining well.  He denies any gross hematuria since his last visit.      He never saw blood in his urine prior to his catheter being placed. He reports a slow stream, nocturia x 3-4, hesitancy, intermittency, incomplete bladder emptying. He has a history of BPH and has been on flomax for several years.      REVIEW OF SYSTEMS:    Constitutional: Negative for fever and chills.   HENT: Negative for hearing loss.   Eyes: Negative for visual disturbance.   Respiratory: Negative for shortness of breath.   Cardiovascular: Negative for chest pain.   Gastrointestinal: Negative for vomiting, and constipation.   Genitourinary: See HPI  Neurological: Negative for dizziness.   Hematological: Does not bruise/bleed easily.   Psychiatric/Behavioral: Negative for confusion.       PATIENT HISTORY:    Past Medical History:   Diagnosis Date    Adrenal adenoma     BPH (benign prostatic hyperplasia)     Coronary artery disease     Diabetes mellitus, type 2     Hyperlipidemia     Hypertension        Past Surgical History:   Procedure Laterality Date    BICEPS TENDON REPAIR Right        Family History   Problem Relation Age of Onset    Diabetes Mother     Heart disease Mother     Heart disease Sister     No Known Problems Brother     Kidney failure Daughter         ESRD on HD    Autism Son     Asthma Son     No Known Problems Sister     No Known Problems Sister     No Known Problems Sister     No Known Problems Brother     Diabetes Brother     Cancer Neg Hx     Stroke Neg Hx        Social History     Socioeconomic History    Marital status:      Spouse name: Not on file    Number of children: 2     Years of education: Not on file    Highest education level: Not on file   Social Needs    Financial resource strain: Not on file    Food insecurity - worry: Not on file    Food insecurity - inability: Not on file    Transportation needs - medical: Not on file    Transportation needs - non-medical: Not on file   Occupational History    Occupation: Maintenance    Tobacco Use    Smoking status: Former Smoker     Packs/day: 2.00     Years: 10.00     Pack years: 20.00     Types: Cigarettes     Start date: 8/15/1987     Last attempt to quit: 8/15/1996     Years since quittin.4    Smokeless tobacco: Never Used   Substance and Sexual Activity    Alcohol use: No     Alcohol/week: 0.0 oz    Drug use: No    Sexual activity: Yes     Partners: Female   Other Topics Concern    Not on file   Social History Narrative    Not on file       Allergies:  Patient has no known allergies.    Medications:    Current Outpatient Medications:     aspirin (ECOTRIN) 81 MG EC tablet, Take 1 tablet (81 mg total) by mouth once daily., Disp: , Rfl: 0    blood sugar diagnostic Strp, To use as directed to monitor blood sugars daily, Disp: 200 strip, Rfl: 2    butalbital-acetaminophen-caffeine -40 mg (FIORICET, ESGIC) -40 mg per tablet, TAKE ONE TABLET BY MOUTH EVERY 4 HOURS AS NEEDED FOR PAIN OR HEADACHE, Disp: 30 tablet, Rfl: 1    ciclopirox (PENLAC) 8 % Soln, Apply topically nightly., Disp: 6.6 mL, Rfl: 11    dexamethasone (DECADRON) 1 MG Tab, Take at 11 pm the night before labs are to be drawn, Disp: 1 tablet, Rfl: 0    dulaglutide (TRULICITY) 0.75 mg/0.5 mL PnIj, Inject 0.5 mLs (0.75 mg total) into the skin once a week., Disp: 2 mL, Rfl: 11    glimepiride (AMARYL) 4 MG tablet, Take 1 tablet (4 mg total) by mouth before breakfast., Disp: 90 tablet, Rfl: 3    hydrocortisone 2.5 % cream, Apply topically 2 (two) times daily., Disp: 28 g, Rfl: 1    lancets (ONETOUCH ULTRASOFT LANCETS) Misc, Check blood sugars  "BID, Disp: 200 each, Rfl: 11    levocetirizine (XYZAL) 5 MG tablet, Take 1 tablet (5 mg total) by mouth every evening., Disp: 90 tablet, Rfl: 3    lisinopril-hydrochlorothiazide (PRINZIDE,ZESTORETIC) 10-12.5 mg per tablet, Take 1 tablet by mouth once daily., Disp: 90 tablet, Rfl: 3    metFORMIN (GLUCOPHAGE) 1000 MG tablet, Take 1 tablet (1,000 mg total) by mouth 2 (two) times daily with meals., Disp: 180 tablet, Rfl: 3    metoprolol tartrate (LOPRESSOR) 25 MG tablet, Take 0.5 tablets (12.5 mg total) by mouth 2 (two) times daily., Disp: 90 tablet, Rfl: 3    pen needle, diabetic (BD ULTRA-FINE WESTON PEN NEEDLE) 32 gauge x 5/32" Ndle, Use to inject Trulicity qwk, Disp: 12 each, Rfl: 3    simvastatin (ZOCOR) 40 MG tablet, Take 1 tablet (40 mg total) by mouth every evening., Disp: 90 tablet, Rfl: 3    tamsulosin (FLOMAX) 0.4 mg Cap, Take 2 capsules (0.8 mg total) by mouth every evening., Disp: 60 capsule, Rfl: 11    PHYSICAL EXAMINATION:    Constitutional: He appears well-developed and well-nourished.  He is in no apparent distress.    Eyes: No scleral icterus noted bilaterally. No discharge bilaterally.    Nose: No rhinorrhea    Cardiovascular: Normal rate.  No pitting edema noted in lower extremities bilaterally    Pulmonary/Chest: Effort normal. No respiratory distress.     Abdominal:  He exhibits no distension.  There is no CVA tenderness.     Lymphadenopathy:        Right: No supraclavicular adenopathy present.        Left: No supraclavicular adenopathy present.     Neurological: He is alert and oriented to person, place, and time.     Skin: Skin is warm and dry.     Psych: Cooperative with normal affect.    Genitourinary: MINO done 1/25/19.    Physical Exam    Yellow urine in bag.      LABS:    Lab Results   Component Value Date    PSA 0.56 07/02/2018    PSA 0.60 03/27/2017    PSA 0.48 10/26/2015       IMPRESSION:    Encounter Diagnoses   Name Primary?    Encounter for Martinez catheter removal Yes    BPH with " obstruction/lower urinary tract symptoms     Urinary retention          PLAN:    Voiding trial performed by Nurse Vizcaino.  180ml of sterile water was instilled into bladder.  Martinez catheter was removed. Patient urinated 200ml    Voiding trial passed   continue flomax 0.8mg    Return to clinic in 6-8 weeks for pvr.    Meghan Hernandez PA-C

## 2019-02-06 ENCOUNTER — HOSPITAL ENCOUNTER (OUTPATIENT)
Dept: CARDIOLOGY | Facility: CLINIC | Age: 69
Discharge: HOME | End: 2019-02-06
Attending: SURGERY
Payer: MEDICARE

## 2019-02-06 ENCOUNTER — TELEPHONE (OUTPATIENT)
Dept: ENDOSCOPY | Facility: HOSPITAL | Age: 69
End: 2019-02-06

## 2019-02-06 ENCOUNTER — OFFICE VISIT (OUTPATIENT)
Dept: SURGERY | Facility: CLINIC | Age: 69
End: 2019-02-06
Payer: MEDICARE

## 2019-02-06 VITALS
HEART RATE: 58 BPM | WEIGHT: 142.88 LBS | HEIGHT: 64 IN | SYSTOLIC BLOOD PRESSURE: 120 MMHG | DIASTOLIC BLOOD PRESSURE: 74 MMHG | TEMPERATURE: 98 F | BODY MASS INDEX: 24.39 KG/M2

## 2019-02-06 DIAGNOSIS — E11.649 UNCONTROLLED TYPE 2 DIABETES MELLITUS WITH HYPOGLYCEMIA, UNSPECIFIED HYPOGLYCEMIA COMA STATUS: ICD-10-CM

## 2019-02-06 DIAGNOSIS — Z12.11 SPECIAL SCREENING FOR MALIGNANT NEOPLASMS, COLON: Primary | ICD-10-CM

## 2019-02-06 DIAGNOSIS — K40.90 RIGHT INGUINAL HERNIA: ICD-10-CM

## 2019-02-06 DIAGNOSIS — Z12.11 SCREENING FOR COLON CANCER: Primary | ICD-10-CM

## 2019-02-06 DIAGNOSIS — K40.90 RIGHT INGUINAL HERNIA: Primary | ICD-10-CM

## 2019-02-06 PROCEDURE — 99999 PR PBB SHADOW E&M-EST. PATIENT-LVL III: CPT | Mod: PBBFAC,,, | Performed by: NURSE PRACTITIONER

## 2019-02-06 PROCEDURE — 93010 EKG 12-LEAD: ICD-10-PCS | Mod: S$PBB,,, | Performed by: INTERNAL MEDICINE

## 2019-02-06 PROCEDURE — 99214 OFFICE O/P EST MOD 30 MIN: CPT | Mod: PBBFAC,25,27 | Performed by: SURGERY

## 2019-02-06 PROCEDURE — 46600 DIAGNOSTIC ANOSCOPY SPX: CPT | Mod: PBBFAC | Performed by: NURSE PRACTITIONER

## 2019-02-06 PROCEDURE — 99999 PR PBB SHADOW E&M-EST. PATIENT-LVL IV: ICD-10-PCS | Mod: PBBFAC,,, | Performed by: SURGERY

## 2019-02-06 PROCEDURE — 99999 PR PBB SHADOW E&M-EST. PATIENT-LVL III: ICD-10-PCS | Mod: PBBFAC,,, | Performed by: NURSE PRACTITIONER

## 2019-02-06 PROCEDURE — 93005 ELECTROCARDIOGRAM TRACING: CPT | Mod: PBBFAC | Performed by: INTERNAL MEDICINE

## 2019-02-06 PROCEDURE — 99203 OFFICE O/P NEW LOW 30 MIN: CPT | Mod: S$PBB,,, | Performed by: SURGERY

## 2019-02-06 PROCEDURE — 99999 PR PBB SHADOW E&M-EST. PATIENT-LVL IV: CPT | Mod: PBBFAC,,, | Performed by: SURGERY

## 2019-02-06 PROCEDURE — 46600 PR DIAG2STIC A2SCOPY: ICD-10-PCS | Mod: S$PBB,,, | Performed by: NURSE PRACTITIONER

## 2019-02-06 PROCEDURE — 93010 ELECTROCARDIOGRAM REPORT: CPT | Mod: S$PBB,,, | Performed by: INTERNAL MEDICINE

## 2019-02-06 PROCEDURE — 99204 OFFICE O/P NEW MOD 45 MIN: CPT | Mod: S$PBB,25,, | Performed by: NURSE PRACTITIONER

## 2019-02-06 PROCEDURE — 99213 OFFICE O/P EST LOW 20 MIN: CPT | Mod: PBBFAC,25 | Performed by: NURSE PRACTITIONER

## 2019-02-06 PROCEDURE — 99204 PR OFFICE/OUTPT VISIT, NEW, LEVL IV, 45-59 MIN: ICD-10-PCS | Mod: S$PBB,25,, | Performed by: NURSE PRACTITIONER

## 2019-02-06 PROCEDURE — 99203 PR OFFICE/OUTPT VISIT, NEW, LEVL III, 30-44 MIN: ICD-10-PCS | Mod: S$PBB,,, | Performed by: SURGERY

## 2019-02-06 PROCEDURE — 46600 DIAGNOSTIC ANOSCOPY SPX: CPT | Mod: S$PBB,,, | Performed by: NURSE PRACTITIONER

## 2019-02-06 RX ORDER — SODIUM CHLORIDE 9 MG/ML
INJECTION, SOLUTION INTRAVENOUS CONTINUOUS
Status: CANCELLED | OUTPATIENT
Start: 2019-02-06

## 2019-02-06 RX ORDER — HYDROCORTISONE ACETATE 25 MG/1
25 SUPPOSITORY RECTAL 2 TIMES DAILY
Qty: 20 SUPPOSITORY | Refills: 0 | Status: SHIPPED | OUTPATIENT
Start: 2019-02-06 | End: 2019-02-06 | Stop reason: SDUPTHER

## 2019-02-06 RX ORDER — POLYETHYLENE GLYCOL 3350, SODIUM SULFATE ANHYDROUS, SODIUM BICARBONATE, SODIUM CHLORIDE, POTASSIUM CHLORIDE 236; 22.74; 6.74; 5.86; 2.97 G/4L; G/4L; G/4L; G/4L; G/4L
4 POWDER, FOR SOLUTION ORAL ONCE
Qty: 4000 ML | Refills: 0 | Status: SHIPPED | OUTPATIENT
Start: 2019-02-06 | End: 2019-02-06

## 2019-02-06 RX ORDER — HYDROCORTISONE ACETATE 25 MG/1
25 SUPPOSITORY RECTAL 2 TIMES DAILY
Qty: 20 SUPPOSITORY | Refills: 0 | Status: SHIPPED | OUTPATIENT
Start: 2019-02-06 | End: 2019-02-16

## 2019-02-06 NOTE — PROGRESS NOTES
Patient ID: Ned Oliveira is a 68 y.o. male presenting with an inguinal hernia     Chief Complaint: Consult      HPI:  HPI   Mr. Oliveira is an 88 year old male with HTN, HLD, T2DM. CAD, BPH presenting with a right inguinal hernia.     Review of Systems   Constitutional: Negative.    HENT: Negative.    Eyes: Negative.    Respiratory: Negative.    Cardiovascular: Negative.    Gastrointestinal: Negative.    Musculoskeletal: Negative.    Allergic/Immunologic: Negative.    Neurological: Negative.    Hematological: Negative.    Psychiatric/Behavioral: Negative.        Current Outpatient Medications   Medication Sig Dispense Refill    aspirin (ECOTRIN) 81 MG EC tablet Take 1 tablet (81 mg total) by mouth once daily.  0    blood sugar diagnostic Strp To use as directed to monitor blood sugars daily 200 strip 2    butalbital-acetaminophen-caffeine -40 mg (FIORICET, ESGIC) -40 mg per tablet TAKE ONE TABLET BY MOUTH EVERY 4 HOURS AS NEEDED FOR PAIN OR HEADACHE 30 tablet 1    ciclopirox (PENLAC) 8 % Soln Apply topically nightly. 6.6 mL 11    dexamethasone (DECADRON) 1 MG Tab Take at 11 pm the night before labs are to be drawn 1 tablet 0    dulaglutide (TRULICITY) 0.75 mg/0.5 mL PnIj Inject 0.5 mLs (0.75 mg total) into the skin once a week. 2 mL 11    glimepiride (AMARYL) 4 MG tablet Take 1 tablet (4 mg total) by mouth before breakfast. 90 tablet 3    hydrocortisone (ANUSOL-HC) 25 mg suppository Place 1 suppository (25 mg total) rectally 2 (two) times daily. for 10 days 20 suppository 0    hydrocortisone 2.5 % cream Apply topically 2 (two) times daily. 28 g 1    lancets (ONETOUCH ULTRASOFT LANCETS) Misc Check blood sugars  each 11    levocetirizine (XYZAL) 5 MG tablet Take 1 tablet (5 mg total) by mouth every evening. 90 tablet 3    lisinopril-hydrochlorothiazide (PRINZIDE,ZESTORETIC) 10-12.5 mg per tablet Take 1 tablet by mouth once daily. 90 tablet 3    metFORMIN (GLUCOPHAGE) 1000 MG tablet Take  "1 tablet (1,000 mg total) by mouth 2 (two) times daily with meals. 180 tablet 3    metoprolol tartrate (LOPRESSOR) 25 MG tablet Take 0.5 tablets (12.5 mg total) by mouth 2 (two) times daily. 90 tablet 3    pen needle, diabetic (BD ULTRA-FINE WESTON PEN NEEDLE) 32 gauge x 5/32" Ndle Use to inject Trulicity qwk 12 each 3    polyethylene glycol (GOLYTELY,NULYTELY) 236-22.74-6.74 -5.86 gram suspension Take 4,000 mLs (4 L total) by mouth once. for 1 dose 4000 mL 0    simvastatin (ZOCOR) 40 MG tablet Take 1 tablet (40 mg total) by mouth every evening. 90 tablet 3    tamsulosin (FLOMAX) 0.4 mg Cap Take 2 capsules (0.8 mg total) by mouth every evening. 60 capsule 11     No current facility-administered medications for this visit.        Review of patient's allergies indicates:  No Known Allergies    Past Medical History:   Diagnosis Date    Adrenal adenoma     BPH (benign prostatic hyperplasia)     Coronary artery disease     Diabetes mellitus, type 2     Hyperlipidemia     Hypertension        Past Surgical History:   Procedure Laterality Date    BICEPS TENDON REPAIR Right        Family History   Problem Relation Age of Onset    Diabetes Mother     Heart disease Mother     Heart disease Sister     No Known Problems Brother     Kidney failure Daughter         ESRD on HD    Autism Son     Asthma Son     No Known Problems Sister     No Known Problems Sister     No Known Problems Sister     No Known Problems Brother     Diabetes Brother     Cancer Neg Hx     Stroke Neg Hx        Social History     Socioeconomic History    Marital status:      Spouse name: Not on file    Number of children: 2    Years of education: Not on file    Highest education level: Not on file   Social Needs    Financial resource strain: Not on file    Food insecurity - worry: Not on file    Food insecurity - inability: Not on file    Transportation needs - medical: Not on file    Transportation needs - non-medical: " Not on file   Occupational History    Occupation: Maintenance    Tobacco Use    Smoking status: Former Smoker     Packs/day: 2.00     Years: 10.00     Pack years: 20.00     Types: Cigarettes     Start date: 8/15/1987     Last attempt to quit: 8/15/1996     Years since quittin.4    Smokeless tobacco: Never Used   Substance and Sexual Activity    Alcohol use: No     Alcohol/week: 0.0 oz    Drug use: No    Sexual activity: Yes     Partners: Female   Other Topics Concern    Not on file   Social History Narrative    Not on file       Vitals:    19 1447   BP: 120/74   Pulse: (!) 58   Temp: 97.7 °F (36.5 °C)       Physical Exam   Constitutional: He appears well-developed.   HENT:   Head: Normocephalic and atraumatic.   Eyes: EOM are normal. Pupils are equal, round, and reactive to light.   Neck: Normal range of motion. Neck supple.   Cardiovascular: Regular rhythm.   Pulmonary/Chest: Effort normal and breath sounds normal.   Abdominal: Soft. Bowel sounds are normal. A hernia is present.           Assessment:  Mr. Oliveira is an 88 year old male with HTN, HLD, T2DM. CAD, BPH presenting with a right inguinal hernia.       Plan:   - consent for right inguinal hernia repair

## 2019-02-06 NOTE — LETTER
February 6, 2019      [facility]  [address]  [facility] [address]  Phone: [phone]       Patient: Ned Oliveira   YOB: 1950  Date of Visit: 02/06/2019    To Whom It May Concern:    Elvira Oliveira  was at [facility] on 02/06/2019. He may return to work/school on 2/7/2019 with no restrictions. If you have any questions or concerns, or if I can be of further assistance, please do not hesitate to contact me.    Sincerely,    Bharti Calderon NP

## 2019-02-06 NOTE — PATIENT INSTRUCTIONS
¿Qué es nori hernia?    Nori hernia es nori debilidad o un defecto en la pared del abdomen. Esta debilidad puede existir desde el nacimiento o producirse a causa del desgaste y el esfuerzo de la reinaldo cotidiana. Si se ashley sin tratar, la hernia puede empeorar con el tiempo y con el esfuerzo físico.  Cuando se forma nori protuberancia  Si hay nori stacie debilitada en la pared abdominal, el contenido del abdomen puede hacer presión hacia afuera y crear nori protuberancia o abultamiento visible bajo la piel. La protuberancia puede aumentar de tamaño al estar de pie y desaparecer al estar acostado. También es posible que sienta presión o malestar al levantar objetos, toser, orinar o al realizar otras actividades.  Tipos de hernias  Las hernias se clasifican según la stacie del cuerpo en que se encuentran. La mayoría de las hernias se carmen en la joseph, en la stacie del anillo interno o alrededores. Esta es la entrada de un canal entre el abdomen y la joseph. Las hernias también pueden ocurrir en el abdomen, en los muslos y en los genitales.  Tipos de hernias  · La hernia incisional tiene lugar en el sitio de nori incisión quirúrgica previa.  · La hernia umbilical tiene lugar en el ombligo.  · La hernia inguinal indirecta tiene lugar en la joseph, en el anillo interno.  · La hernia inguinal directa tiene lugar en la joseph, cerca del anillo interno.  · La hernia femoral tiene lugar anthony debajo de la joseph.  · La hernia epigástrica tiene lugar en el abdomen superior a la altura de la línea media.  Cirugía: el mejor tratamiento  Las hernias no se curan por sí solas, sino que se necesita cirugía para reparar el defecto en la pared abdominal. Si no recibe tratamiento, la hernia puede aumentar de tamaño y producir complicaciones médicas serias. Ponce la cirugía de hernia puede llevarse a cabo de manera rápida y meza. En algunos casos, podrá regresar a casa el mismo día de la cirugía.  Date Last Reviewed: 3/29/2014  © 1874-6882 The  [employer]. [address] [place] [address]. Todos los derechos reservados. Esta información no pretende sustituir la atención médica profesional. Sólo sanchez médico puede diagnosticar y tratar un problema de lindsey.        La cirugía de hernia: reparación mediante parche  La cirugía trata la hernia reparando la stacie debilitada de la pared abdominal. El cirujano realiza nori incisión para tener acceso directo a la hernia y llevar a cabo la reparación a través de dicha incisión (cirugía abierta). Para reparar el área defectuosa se usa nori ketty de un material especial, a modo de parche para reforzar el área debilitada y lograr nori reparación sin tensión. Siga las recomendaciones de sanchez proveedor de atención médica sobre cómo prepararse para el procedimiento. Probablemente podrá regresar a sanchez casa el mismo día de la operación. En algunos casos, sin embargo, es posible que deba quedarse en el hospital hasta el día siguiente.  Preparativos para la operación  Sanchez proveedor de atención médica le explicará cómo prepararse para la cirugía. Siga todas las instrucciones que le den y recuerde lo siguiente:   · Informe a sanchez proveedor de atención médica de todos los medicamentos, suplementos o hierbas medicinales que esté tomando (con o sin receta).   · Deje de ermelinda aspirina, ibuprofeno, naproxeno y otros analgésicos no esteroides (NSAID, por ara siglas en inglés) según le indiquen.  · Pida a un miembro adulto de la rose o a un amigo que lo lleve a casa después de la operación.  · Deje de fumar. Fumar afecta el flujo sanguíneo y puede retardar la cicatrización.  · Lave suavemente la stacie de la operación la noche anterior a la cirugía.  · No coma ni dinesh nada a partir de la medianoche anterior a la operación. Es posible que tenga que ermelinda algún medicamento con un sorbo de agua el día de la cirugía; consulte a sanchez proveedor de atención médica sobre esto.        El día de la operación  Llegue al hospital o al  centro quirúrgico a la hora programada. Le pedirán que se ponga nori bata de hospital. A continuación le pondrán nori sonda intravenosa para administrarle líquidos y medicamentos. Un anestesiólogo o enfermera anestesista hablará con usted poco antes de la operación y le explicará los tipos de anestesia que se usan para evitar el dolor gurmeet la cirugía. Le administrarán cristian o más de los siguientes tipos de anestesia:  · Sedación controlada para que se mantenga relajado y adormilado.  · Anestesia local para bloquear el dolor en la stacie de la cirugía.  · Anestesia regional para bloquear el dolor en ciertas zonas específicas del cuerpo.  · Anestesia general para permitirle dormir gurmeet la cirugía.  Gurmeet la cirugía  La mayoría de las hernias se tratan utilizando reparación sin tensión, con nori ketty de un material especial para fortalecer el área debilitada. A diferencia de las reparaciones tradicionales, no se sutura la fascia abdominal (tejido alrededor del músculo que da fortaleza a la pared abdominal), sino que se recubre la stacie debilitada con nori ketty, filiberto si fuera un parche. Markesan repara el área defectuosa sin crear tensión en los músculos, y hace que sea menos probable que se repital la hernia. Esta ketty está hecha de un plástico edgar y flexible que permanece en el cuerpo. Con el tiempo, el tejido crece entre la ketty y fortalece la reparación.   Después de la operación  Nori vez terminado el procedimiento, lo trasladarán a nori leonora de recuperación para que descanse. Le vigilarán la presión arterial y la frecuencia cardíaca, y le pondrán un vendaje sobre la stacie operada. Para aliviarle las molestias le darán medicamentos contra el dolor. También es posible que deba hacer ejercicios de respiración para mantener los pulmones despejados. Más tarde le pedirán que se levante y que camine. Markesan le ayudará a prevenir la formación de coágulos en las piernas. Podrá regresar a casa cuando sanchez proveedor de  atención médica se lo indique.     Riesgos y complicaciones  La cirugía de hernia es meza, maggie implica ciertos riesgos, entre los cuales se encuentran los siguientes:  · Sangrado  · Infección  · Adormecimiento o dolor en la joseph o en la pierna  · Riesgo de que la hernia reaparezca  · Daño a los testículos o a la función testicular · Riesgos de la anestesia  · Complicaciones de la ketty  · Incapacidad de orinar  · Daño al intestino o a la vejiga       Date Last Reviewed: 3/17/2014  © 6296-8584 [employer] [address] [place] [address]. Todos los derechos reservados. Esta información no pretende sustituir la atención médica profesional. Sólo sanchez médico puede diagnosticar y tratar un problema de lindsey.

## 2019-02-06 NOTE — PROGRESS NOTES
Subjective:       Patient ID: Ned Oliveira is a 68 y.o. male.    Chief Complaint: No chief complaint on file.    HPI   68 M who presents to clinic for two episodes of blood in stool. Has a daily bowel movement, usually soft. +rectal pain, +abdominal pain. No unexplained weight loss or change in bowel habits.     Offered , patient declined. Wife interpreted throughout visit.     Colonoscopy: Never  Family history of colon or rectal CA: None         Review of Systems   Constitutional: Negative for appetite change, chills, fatigue, fever and unexpected weight change.   Respiratory: Negative for shortness of breath.    Cardiovascular: Negative for chest pain.   Gastrointestinal: Positive for abdominal pain, blood in stool and rectal pain. Negative for abdominal distention, anal bleeding, constipation, diarrhea, nausea and vomiting.       Objective:      Physical Exam   Constitutional: He is oriented to person, place, and time. He appears well-developed and well-nourished.   Eyes: Conjunctivae and EOM are normal.   Pulmonary/Chest: Effort normal. No respiratory distress.   Abdominal: Soft. He exhibits no distension. There is no tenderness.   Genitourinary: Rectal exam shows no mass and no tenderness.   Genitourinary Comments: Normal perianal skin. eversion of anus revealed no abnormality or fissure, MINO revealed no masses, blood or stool in vault, normal sphincter tone, anoscopy revealed grade Iinternal hemorrhoids with no bleeding or stigmata of same.     Musculoskeletal: Normal range of motion.   Neurological: He is alert and oriented to person, place, and time.   Skin: Skin is warm and dry.   Psychiatric: He has a normal mood and affect. His behavior is normal.       Assessment:       1. Screening for colon cancer        Plan:       Overdue for screening colonoscopy  anusol suppositories  High fiber diet  rtc pending cscope results

## 2019-02-06 NOTE — LETTER
February 6, 2019      Jose Ramachandran, DO  [address] [facility]  [place] [address]           [facility]  [address]  [facility] [address]  Phone: [phone]          Patient: Ned Oliveira   MR Number: [id number]   YOB: 1950   Date of Visit: 2/6/2019       Dear Dr. Jose Ramachandran:    Thank you for referring Ned Oliveira to me for evaluation. Attached you will find relevant portions of my assessment and plan of care.    If you have questions, please do not hesitate to call me. I look forward to following Ned Oliveira along with you.    Sincerely,    Bharti Calderon, NP    Enclosure  CC:  No Recipients    If you would like to receive this communication electronically, please contact [email or web address] or [phone] to request more information on [employer] Link access.    For providers and/or their staff who would like to refer a patient to [facility], please contact us through our one-stop-shop provider referral line, [facility] , at [phone].    If you feel you have received this communication in error or would no longer like to receive these types of communications, please e-mail [email or web address]

## 2019-02-06 NOTE — LETTER
February 7, 2019      Jose Ramachandran, DO  [address] [facility] [place] [address]           [facility]  [address]  [place] [address]  Phone: [phone]          Patient: Ned Oliveira   MR Number: [id number]   YOB: 1950   Date of Visit: 2/6/2019       Dear Dr. Jose Ramachandran:    Thank you for referring Ned Oliveira to me for evaluation. Attached you will find relevant portions of my assessment and plan of care.    If you have questions, please do not hesitate to call me. I look forward to following Ned Oliveira along with you.    Sincerely,    Hugh Jalloh MD    Enclosure  CC:  No Recipients    If you would like to receive this communication electronically, please contact [email or web address] or [phone] to request more information on [employer] Link access.    For providers and/or their staff who would like to refer a patient to [facility], please contact us through our one-stop-shop provider referral line, [facility], at [phone].    If you feel you have received this communication in error or would no longer like to receive these types of communications, please e-mail [email or web address]

## 2019-02-07 NOTE — TELEPHONE ENCOUNTER
Patient attempting to schedule colonoscopy ASAP.  Spoke with Ana in [employer] and she assured me that there would be an  available for the patient's arrival time of 6:45 am on 2/8/19.   form faxed as requested per Ana.

## 2019-02-08 ENCOUNTER — ANESTHESIA (OUTPATIENT)
Dept: ENDOSCOPY | Facility: HOSPITAL | Age: 69
End: 2019-02-08
Payer: MEDICARE

## 2019-02-08 ENCOUNTER — ANESTHESIA EVENT (OUTPATIENT)
Dept: SURGERY | Facility: HOSPITAL | Age: 69
End: 2019-02-08
Payer: MEDICARE

## 2019-02-08 ENCOUNTER — HOSPITAL ENCOUNTER (OUTPATIENT)
Facility: HOSPITAL | Age: 69
Discharge: HOME | End: 2019-02-08
Attending: COLON & RECTAL SURGERY | Admitting: COLON & RECTAL SURGERY
Payer: MEDICARE

## 2019-02-08 ENCOUNTER — ANESTHESIA EVENT (OUTPATIENT)
Dept: ENDOSCOPY | Facility: HOSPITAL | Age: 69
End: 2019-02-08
Payer: MEDICARE

## 2019-02-08 VITALS
DIASTOLIC BLOOD PRESSURE: 83 MMHG | SYSTOLIC BLOOD PRESSURE: 150 MMHG | HEART RATE: 55 BPM | BODY MASS INDEX: 24.24 KG/M2 | HEIGHT: 64 IN | RESPIRATION RATE: 20 BRPM | OXYGEN SATURATION: 100 % | WEIGHT: 142 LBS | TEMPERATURE: 98 F

## 2019-02-08 DIAGNOSIS — K62.5 RECTAL BLEEDING: Primary | ICD-10-CM

## 2019-02-08 LAB — POCT GLUCOSE: 136 MG/DL (ref 70–110)

## 2019-02-08 PROCEDURE — E9220 PRA ENDO ANESTHESIA: HCPCS | Mod: ,,, | Performed by: NURSE ANESTHETIST, CERTIFIED REGISTERED

## 2019-02-08 PROCEDURE — 37000009 HC ANESTHESIA EA ADD 15 MINS: Performed by: COLON & RECTAL SURGERY

## 2019-02-08 PROCEDURE — E9220 PRA ENDO ANESTHESIA: ICD-10-PCS | Mod: ,,, | Performed by: NURSE ANESTHETIST, CERTIFIED REGISTERED

## 2019-02-08 PROCEDURE — 37000008 HC ANESTHESIA 1ST 15 MINUTES: Performed by: COLON & RECTAL SURGERY

## 2019-02-08 PROCEDURE — 45378 DIAGNOSTIC COLONOSCOPY: CPT | Mod: ,,, | Performed by: COLON & RECTAL SURGERY

## 2019-02-08 PROCEDURE — 25000003 PHARM REV CODE 250: Performed by: COLON & RECTAL SURGERY

## 2019-02-08 PROCEDURE — 45378 PR COLONOSCOPY,DIAGNOSTIC: ICD-10-PCS | Mod: ,,, | Performed by: COLON & RECTAL SURGERY

## 2019-02-08 PROCEDURE — 63600175 PHARM REV CODE 636 W HCPCS: Performed by: NURSE ANESTHETIST, CERTIFIED REGISTERED

## 2019-02-08 PROCEDURE — 45378 DIAGNOSTIC COLONOSCOPY: CPT | Performed by: COLON & RECTAL SURGERY

## 2019-02-08 RX ORDER — SODIUM CHLORIDE 0.9 % (FLUSH) 0.9 %
3 SYRINGE (ML) INJECTION
Status: DISCONTINUED | OUTPATIENT
Start: 2019-02-08 | End: 2019-02-08 | Stop reason: HOSPADM

## 2019-02-08 RX ORDER — LIDOCAINE HCL/PF 100 MG/5ML
SYRINGE (ML) INTRAVENOUS
Status: DISCONTINUED | OUTPATIENT
Start: 2019-02-08 | End: 2019-02-08

## 2019-02-08 RX ORDER — PROPOFOL 10 MG/ML
VIAL (ML) INTRAVENOUS CONTINUOUS PRN
Status: DISCONTINUED | OUTPATIENT
Start: 2019-02-08 | End: 2019-02-08

## 2019-02-08 RX ORDER — SODIUM CHLORIDE 9 MG/ML
INJECTION, SOLUTION INTRAVENOUS CONTINUOUS
Status: DISCONTINUED | OUTPATIENT
Start: 2019-02-08 | End: 2019-02-08 | Stop reason: HOSPADM

## 2019-02-08 RX ORDER — PROPOFOL 10 MG/ML
VIAL (ML) INTRAVENOUS
Status: DISCONTINUED | OUTPATIENT
Start: 2019-02-08 | End: 2019-02-08

## 2019-02-08 RX ADMIN — SODIUM CHLORIDE: 0.9 INJECTION, SOLUTION INTRAVENOUS at 07:02

## 2019-02-08 RX ADMIN — PROPOFOL 100 MG: 10 INJECTION, EMULSION INTRAVENOUS at 07:02

## 2019-02-08 RX ADMIN — LIDOCAINE HYDROCHLORIDE 100 MG: 20 INJECTION, SOLUTION INTRAVENOUS at 07:02

## 2019-02-08 RX ADMIN — PROPOFOL 200 MCG/KG/MIN: 10 INJECTION, EMULSION INTRAVENOUS at 07:02

## 2019-02-08 NOTE — ANESTHESIA POSTPROCEDURE EVALUATION
"Anesthesia Post Evaluation    Patient: Ned Oliveira    Procedure(s) Performed: Procedure(s) (LRB):  COLONOSCOPY (N/A)    Final Anesthesia Type: general  Patient location during evaluation: PACU  Patient participation: Yes- Able to Participate  Level of consciousness: awake and alert  Post-procedure vital signs: reviewed and stable  Pain management: adequate  Airway patency: patent  PONV status at discharge: No PONV  Anesthetic complications: no      Cardiovascular status: blood pressure returned to baseline  Respiratory status: spontaneous ventilation and room air  Hydration status: euvolemic  Follow-up not needed.        Visit Vitals  BP (!) 150/83   Pulse (!) 55   Temp 36.8 °C (98.2 °F)   Resp 20   Ht 5' 4" (1.626 m)   Wt 64.4 kg (142 lb)   SpO2 100%   BMI 24.37 kg/m²       Pain/Sigrid Score: Sigrid Score: 10 (2/8/2019  8:27 AM)        "

## 2019-02-08 NOTE — DISCHARGE INSTRUCTIONS
La colonoscopia     Se usa nori cámara acoplada a un tubo flexible con nori lente que reese imágenes de video.     La colonoscopia es un examen que permite mirar el interior del aparato digestivo inferior (el colon y el recto). Algunas veces de puede mirar la última parte del intestino luther llamada íleon. Rahat examen puede ayudar a detectar si hay cáncer de colon, así filiberto a encontrar el origen de un dolor abdominal, sangrado y cambios en los hábitos intestinales.  Gurmeet la colonoscopia, un procedimiento ambulatorio que suele efectuarse en el hospital, el proveedor de atención médica puede extirpar (sacar) nori pequeña muestra de tejido, o biopsia, para enviarla a análisis; también puede extirpar pequeñas masas, filiberto los pólipos. La frecuencia con la que se requiere nori colonoscopia depende de muchos factores incluyendo  la edad, las afecciones médicas, los antecedentes familiares, los síntomas y lo que se descubra gurmeet la colonoscopia anterior.  Los preparativos  · Asegúrese de mencionar al proveedor de atención médica todos los medicamentos que usted reese, así filiberto cualquier problema de lindsey que tenga.   · Hable con sanchze proveedor de atención médica acerca de los riesgos de rahat examen, que incluyen sangrado y perforación del intestino, riesgos de la anestesia y los riesgos de que se pase por alto nori lesión.  · Ya que usted debe tener el recto y el colon vacíos para rahat examen, asegúrese de seguir la dieta y las instrucciones de preparación de sanchez intestino al pie de la letra. Si no lo hace, podría ser necesario postergarle el examen.  · Pregunte a sanchez médico si necesita tener a un amigo o familiar preparado para que lo lleven a sanchez casa después del examen.  Gurmeet el examen  · Le administrarán un sedante (medicamento relajante) a través de nori sonda IV. Quizás usted esté soñoliento o completamente dormido gurmeet el examen.  · El procedimiento lleva filiberto mínimo 30 minutos.  · Gurmeet esta prueba podrían  hacerse biopsias (muestras de tejido), remoción de pólipos y otros tratamientos.  · El médico realiza un examen digital del recto para nika si hay algún problema allí o en el ano. Se lubrica el recto y se introduce el colonoscopio.   · Si usted está despierto, quizás sienta nori sensación similar a la que tiene cuando necesita evacuar. También podría sentir presión a medida que le bombean aire dentro del colon. No se preocupe si tiene que eliminar gases gurmeet el procedimiento.     Nori colonoscopia permite al médico nika todo el interior del colon.     Después del examen  · Es posible que sanchez médico le informe de los resultados de inmediato, o en nori visita posterior.  · Trate de eliminar todos los gases anthony después del examen, para evitar nori hinchazón y cólicos.  · Después del examen podrá volver a sanchez alimentación normal y reanudar ara actividades.  Los riesgos y posibles complicaciones incluyen:  · Sangrado (hemorragia)  · Punción o rasgadura en el colon  · Riesgos propios de la anestesia  · Pasar por alto nori lesión          Date Last Reviewed: 3/30/2014  © 8067-4766 [employer]. [address] [place] [address]. Todos los derechos reservados. Esta información no pretende sustituir la atención médica profesional. Sólo sanchez médico puede diagnosticar y tratar un problema de lindsey.

## 2019-02-08 NOTE — TRANSFER OF CARE
"Anesthesia Transfer of Care Note    Patient: Ned Oliveira    Procedure(s) Performed: Procedure(s) (LRB):  COLONOSCOPY (N/A)    Patient location: PACU    Anesthesia Type: general    Transport from OR: Transported from OR on room air with adequate spontaneous ventilation    Post pain: adequate analgesia    Post assessment: no apparent anesthetic complications    Post vital signs: stable    Level of consciousness: sedated    Nausea/Vomiting: no nausea/vomiting    Complications: none    Transfer of care protocol was followed      Last vitals:   Visit Vitals  /61   Pulse 60   Temp 36.8 °C (98.2 °F)   Resp 18   Ht 5' 4" (1.626 m)   Wt 64.4 kg (142 lb)   SpO2 99%   BMI 24.37 kg/m²     "

## 2019-02-08 NOTE — PLAN OF CARE
ID band applied and verified. POC and procedure reviewed with patient. Patient verbalizes understanding and has no questions at this time.

## 2019-02-08 NOTE — ADDENDUM NOTE
Addendum  created 02/08/19 1044 by Saturnino Rodriguez CRNA    Intraprocedure Attestations filed

## 2019-02-08 NOTE — PLAN OF CARE
Discharge instructions given to patient per hospital . Verbalized understanding, states I'm ready for discharge.

## 2019-02-08 NOTE — [employer] PATIENT INSTRUCTIONS
Discharge Summary/Instructions after an Endoscopic Procedure  Patient Name: Ned Oliveira  Patient MRN: [id number]  Patient YOB: 1950 Friday, February 08, 2019  Tavon Montes MD  RESTRICTIONS:  During your procedure today, you received medications for sedation.  These   medications may affect your judgment, balance and coordination.  Therefore,   for 24 hours, you have the following restrictions:   - DO NOT drive a car, operate machinery, make legal/financial decisions,   sign important papers or drink alcohol.    ACTIVITY:  Today: no heavy lifting, straining or running due to procedural   sedation/anesthesia.  The following day: return to full activity including work.  DIET:  Eat and drink normally unless instructed otherwise.     TREATMENT FOR COMMON SIDE EFFECTS:  - Mild abdominal pain, nausea, belching, bloating or excessive gas:  rest,   eat lightly and use a heating pad.  - Sore Throat: treat with throat lozenges and/or gargle with warm salt   water.  - Because air was used during the procedure, expelling large amounts of air   from your rectum or belching is normal.  - If a bowel prep was taken, you may not have a bowel movement for 1-3 days.    This is normal.  SYMPTOMS TO WATCH FOR AND REPORT TO YOUR PHYSICIAN:  1. Abdominal pain or bloating, other than gas cramps.  2. Chest pain.  3. Back pain.  4. Signs of infection such as: chills or fever occurring within 24 hours   after the procedure.  5. Rectal bleeding, which would show as bright red, maroon, or black stools.   (A tablespoon of blood from the rectum is not serious, especially if   hemorrhoids are present.)  6. Vomiting.  7. Weakness or dizziness.  GO DIRECTLY TO THE NEAREST EMERGENCY ROOM IF YOU HAVE ANY OF THE FOLLOWING:      Difficulty breathing              Chills and/or fever over 101 F   Persistent vomiting and/or vomiting blood   Severe abdominal pain   Severe chest pain   Black, tarry stools   Bleeding- more than one  tablespoon   Any other symptom or condition that you feel may need urgent attention  Your doctor recommends these additional instructions:  If any biopsies were taken, your doctors clinic will contact you in 1 to 2   weeks with any results.  - Repeat colonoscopy in 10 years for screening purposes.   - Discharge patient to home (ambulatory).   - Resume previous diet.   - Continue present medications.  For questions, problems or results please call your physician - Tavon Montes MD at Work:  [phone].  [facility], EMERGENCY ROOM PHONE NUMBER: [phone]  IF A COMPLICATION OR EMERGENCY SITUATION ARISES AND YOU ARE UNABLE TO REACH   YOUR PHYSICIAN - GO DIRECTLY TO THE EMERGENCY ROOM.  Tavon Montes MD  2/8/2019 7:59:06 AM  This report has been verified and signed electronically.  [employer]

## 2019-02-08 NOTE — H&P
COLONOSCOPY HISTORY AND PE    Procedure : Colonoscopy      INDICATIONS: rectal bleeding    Family Hx of CRC: none    Last Colonoscopy:  never  Findings: n/a       Past Medical History:   Diagnosis Date    Adrenal adenoma     BPH (benign prostatic hyperplasia)     Coronary artery disease     Diabetes mellitus, type 2     Hyperlipidemia     Hypertension      Sedation Problems: NO  Family History   Problem Relation Age of Onset    Diabetes Mother     Heart disease Mother     Heart disease Sister     No Known Problems Brother     Kidney failure Daughter         ESRD on HD    Autism Son     Asthma Son     No Known Problems Sister     No Known Problems Sister     No Known Problems Sister     No Known Problems Brother     Diabetes Brother     Cancer Neg Hx     Stroke Neg Hx      Fam Hx of Sedation Problems: NO  Social History     Socioeconomic History    Marital status:      Spouse name: Not on file    Number of children: 2    Years of education: Not on file    Highest education level: Not on file   Social Needs    Financial resource strain: Not on file    Food insecurity - worry: Not on file    Food insecurity - inability: Not on file    Transportation needs - medical: Not on file    Transportation needs - non-medical: Not on file   Occupational History    Occupation: Maintenance    Tobacco Use    Smoking status: Former Smoker     Packs/day: 2.00     Years: 10.00     Pack years: 20.00     Types: Cigarettes     Start date: 8/15/1987     Last attempt to quit: 8/15/1996     Years since quittin.4    Smokeless tobacco: Never Used   Substance and Sexual Activity    Alcohol use: No     Alcohol/week: 0.0 oz    Drug use: No    Sexual activity: Yes     Partners: Female   Other Topics Concern    Not on file   Social History Narrative    Not on file       Review of Systems - Negative except   Respiratory ROS: no dyspnea  Cardiovascular ROS: no exertional chest pain  Gastrointestinal  ROS: YES intermittent abdominal discomfort,  YES intermittent rectal bleeding  Musculoskeletal ROS: no muscular pain  Neurological ROS: no recent stroke    Physical Exam:  There were no vitals taken for this visit.  General: no distress  Head: normocephalic  Mallampati Score   Neck: supple, symmetrical, trachea midline  Lungs:  clear to auscultation bilaterally and normal respiratory effort  Heart: regular rate and rhythm and no murmur  Abdomen: soft, non-tender non-distented; bowel sounds normal; no masses,  no organomegaly  Extremities: no cyanosis or edema, or clubbing    ASA:  II    PLAN  COLONOSCOPY.    SedationPlan :MAC    The details of the procedure, the possible need for biopsy or polypectomy and the potential risks including bleeding, perforation, missed polyps were discussed in detail.

## 2019-02-12 ENCOUNTER — OFFICE VISIT (OUTPATIENT)
Dept: CARDIOLOGY | Facility: CLINIC | Age: 69
End: 2019-02-12
Payer: MEDICARE

## 2019-02-12 VITALS
HEIGHT: 62 IN | BODY MASS INDEX: 26.49 KG/M2 | HEART RATE: 73 BPM | WEIGHT: 143.94 LBS | SYSTOLIC BLOOD PRESSURE: 103 MMHG | DIASTOLIC BLOOD PRESSURE: 63 MMHG

## 2019-02-12 DIAGNOSIS — Z01.810 PREOP CARDIOVASCULAR EXAM: ICD-10-CM

## 2019-02-12 DIAGNOSIS — I15.2 HYPERTENSION ASSOCIATED WITH DIABETES: ICD-10-CM

## 2019-02-12 DIAGNOSIS — E78.5 HYPERLIPIDEMIA ASSOCIATED WITH TYPE 2 DIABETES MELLITUS: ICD-10-CM

## 2019-02-12 DIAGNOSIS — E11.69 HYPERLIPIDEMIA ASSOCIATED WITH TYPE 2 DIABETES MELLITUS: ICD-10-CM

## 2019-02-12 DIAGNOSIS — E11.59 HYPERTENSION ASSOCIATED WITH DIABETES: ICD-10-CM

## 2019-02-12 DIAGNOSIS — I25.10 CAD IN NATIVE ARTERY: Primary | ICD-10-CM

## 2019-02-12 PROCEDURE — 99214 OFFICE O/P EST MOD 30 MIN: CPT | Mod: S$PBB,,, | Performed by: INTERNAL MEDICINE

## 2019-02-12 PROCEDURE — 99214 OFFICE O/P EST MOD 30 MIN: CPT | Mod: PBBFAC | Performed by: INTERNAL MEDICINE

## 2019-02-12 PROCEDURE — 99999 PR PBB SHADOW E&M-EST. PATIENT-LVL IV: CPT | Mod: PBBFAC,,, | Performed by: INTERNAL MEDICINE

## 2019-02-12 PROCEDURE — 99999 PR PBB SHADOW E&M-EST. PATIENT-LVL IV: ICD-10-PCS | Mod: PBBFAC,,, | Performed by: INTERNAL MEDICINE

## 2019-02-12 PROCEDURE — 99214 PR OFFICE/OUTPT VISIT, EST, LEVL IV, 30-39 MIN: ICD-10-PCS | Mod: S$PBB,,, | Performed by: INTERNAL MEDICINE

## 2019-02-12 RX ORDER — ROSUVASTATIN CALCIUM 40 MG/1
40 TABLET, COATED ORAL DAILY
Qty: 90 TABLET | Refills: 3 | Status: SHIPPED | OUTPATIENT
Start: 2019-02-12 | End: 2019-03-18 | Stop reason: ALTCHOICE

## 2019-02-12 NOTE — PROGRESS NOTES
"Subjective:   Patient ID:  Ned Oliveira is a 68 y.o. male who presents for follow-up of Pre-op Exam (Hernia repair)      HPI:   Pt is a 67 y/o man here for pre-op prior to inguinal hernia repair.  He has seen Dr. Parker once in the past.  He has T2DM, HTN, HLD and CAD.  In 2009 he had an abnormal stress echo (developed angina) and LHC demonstrated diffuse "3VD".  Referred to CT surgery for CABG considering DMII, but was lost to follow up because of financial reasons.  He was placed on medical therapy which has controlled his angina quite well.     On assessment today, the patient states that he denies any exertional chest discomfort, exertional dyspnea, palpitations, TIA's, syncope or presyncope.  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    Is compliant with simvastatin.    Echo   CONCLUSIONS     1 - Normal left ventricular systolic function (EF 55-60%).     2 - Normal left ventricular diastolic function.     3 - Mild mitral regurgitation.     4 - Normal right ventricular systolic function .       Vitals:    02/12/19 0843   BP: 103/63   BP Location: Left arm   Patient Position: Sitting   BP Method: Medium (Automatic)   Pulse: 73   Weight: 65.3 kg (143 lb 15.4 oz)   Height: 5' 2" (1.575 m)     Body mass index is 26.33 kg/m².  Estimated Creatinine Clearance: 62.4 mL/min (based on SCr of 0.9 mg/dL).    Lab Results   Component Value Date     02/06/2019    K 4.7 02/06/2019     02/06/2019    CO2 28 02/06/2019    BUN 21 02/06/2019    CREATININE 0.9 02/06/2019     (H) 02/06/2019    HGBA1C 7.4 (H) 02/06/2019    MG 2.3 07/27/2009    AST 19 07/02/2018    ALT 24 07/02/2018    ALBUMIN 3.7 07/02/2018    PROT 6.5 07/02/2018    BILITOT 1.1 (H) 07/02/2018    WBC 6.87 02/06/2019    HGB 14.3 02/06/2019    HCT 41.4 02/06/2019    HCT 45 05/21/2016    MCV 98 02/06/2019     02/06/2019    INR 1.0 12/05/2016    PSA 0.56 07/02/2018    TSH 0.880 07/02/2018    CHOL 212 " "(H) 10/29/2018    HDL 40 10/29/2018    LDLCALC 152.0 10/29/2018    TRIG 100 10/29/2018       Current Outpatient Medications   Medication Sig    aspirin (ECOTRIN) 81 MG EC tablet Take 1 tablet (81 mg total) by mouth once daily.    blood sugar diagnostic Strp To use as directed to monitor blood sugars daily    butalbital-acetaminophen-caffeine -40 mg (FIORICET, ESGIC) -40 mg per tablet TAKE ONE TABLET BY MOUTH EVERY 4 HOURS AS NEEDED FOR PAIN OR HEADACHE    ciclopirox (PENLAC) 8 % Soln Apply topically nightly.    dexamethasone (DECADRON) 1 MG Tab Take at 11 pm the night before labs are to be drawn    dulaglutide (TRULICITY) 0.75 mg/0.5 mL PnIj Inject 0.5 mLs (0.75 mg total) into the skin once a week.    glimepiride (AMARYL) 4 MG tablet Take 1 tablet (4 mg total) by mouth before breakfast.    hydrocortisone (ANUSOL-HC) 25 mg suppository Place 1 suppository (25 mg total) rectally 2 (two) times daily. for 10 days    hydrocortisone 2.5 % cream Apply topically 2 (two) times daily.    lancets (ONETOUCH ULTRASOFT LANCETS) Misc Check blood sugars BID    levocetirizine (XYZAL) 5 MG tablet Take 1 tablet (5 mg total) by mouth every evening.    lisinopril-hydrochlorothiazide (PRINZIDE,ZESTORETIC) 10-12.5 mg per tablet Take 1 tablet by mouth once daily.    metFORMIN (GLUCOPHAGE) 1000 MG tablet Take 1 tablet (1,000 mg total) by mouth 2 (two) times daily with meals.    metoprolol tartrate (LOPRESSOR) 25 MG tablet Take 0.5 tablets (12.5 mg total) by mouth 2 (two) times daily.    pen needle, diabetic (BD ULTRA-FINE WESTON PEN NEEDLE) 32 gauge x 5/32" Ndle Use to inject Trulicity qwk    tamsulosin (FLOMAX) 0.4 mg Cap Take 2 capsules (0.8 mg total) by mouth every evening.     No current facility-administered medications for this visit.        Review of Systems   Constitution: Negative for decreased appetite, weakness, malaise/fatigue, weight gain and weight loss.   Eyes: Negative for visual disturbance. "   Cardiovascular: Negative for chest pain, claudication, dyspnea on exertion, irregular heartbeat, orthopnea, palpitations, paroxysmal nocturnal dyspnea and syncope.   Respiratory: Negative for cough, shortness of breath and snoring.    Skin: Negative for rash.   Musculoskeletal: Negative for arthritis, muscle cramps, muscle weakness and myalgias.   Gastrointestinal: Negative for abdominal pain, anorexia, change in bowel habit and nausea.   Genitourinary: Negative for dysuria and frequency.   Neurological: Negative for excessive daytime sleepiness, dizziness, headaches, loss of balance and numbness.   Psychiatric/Behavioral: Negative for depression.       Objective:   Physical Exam   Constitutional: He is oriented to person, place, and time. He appears well-developed and well-nourished.   HENT:   Head: Normocephalic and atraumatic.   Eyes: Pupils are equal, round, and reactive to light.   Neck: Normal range of motion. Neck supple.   Cardiovascular: Normal rate, regular rhythm, normal heart sounds, intact distal pulses and normal pulses. Exam reveals no gallop.   No murmur heard.  Pulmonary/Chest: Effort normal and breath sounds normal.   Abdominal: Soft. Bowel sounds are normal. There is no hepatosplenomegaly. There is no tenderness.   Musculoskeletal: Normal range of motion.   Neurological: He is alert and oriented to person, place, and time.   Skin: Skin is warm and dry.   Psychiatric: He has a normal mood and affect. His speech is normal and behavior is normal. Judgment and thought content normal.       Assessment:     1. CAD in native artery    2. Hyperlipidemia associated with type 2 diabetes mellitus    3. Hypertension associated with diabetes    4. Preop cardiovascular exam        Plan:   Change simvastatin to rosuvastatin  Ok to proceed with hernia surgery.  Revised risk index is .9% for MACE.   Pt has no active cardiac condition (ACS/USA, decompenstated CHF, significant arrhythmias or severe valvular  disease) and can easily achieve 4 METS.  Pt does not require further cardiac evaluation prior to undergoing hernia surgery.  Pt should remain on beta-blockers throughout the entire darrius-procedure time period.  Aspirin therapy can be held but should be restarted as soon as safely possible.  The remaining cardiac meds can beheld as needed but should also be restarted after the procedure. These recommendations follow the most current Guideline on Perioperative Cardiovascular Evaluation and Management of Patients Undergoing Noncardiac Surgery released by the ACC/AHA. (JACC 2014.07.944).    F/u in ~ 6 months - consider PET at that time given past history.       No orders of the defined types were placed in this encounter.

## 2019-02-15 ENCOUNTER — TELEPHONE (OUTPATIENT)
Dept: ENDOSCOPY | Facility: HOSPITAL | Age: 69
End: 2019-02-15

## 2019-02-18 ENCOUNTER — TELEPHONE (OUTPATIENT)
Dept: SURGERY | Facility: CLINIC | Age: 69
End: 2019-02-18

## 2019-02-19 ENCOUNTER — ANESTHESIA (OUTPATIENT)
Dept: SURGERY | Facility: HOSPITAL | Age: 69
End: 2019-02-19
Payer: MEDICARE

## 2019-02-19 ENCOUNTER — HOSPITAL ENCOUNTER (OUTPATIENT)
Facility: HOSPITAL | Age: 69
Discharge: HOME | End: 2019-02-19
Attending: SURGERY | Admitting: SURGERY
Payer: MEDICARE

## 2019-02-19 VITALS
HEART RATE: 61 BPM | DIASTOLIC BLOOD PRESSURE: 69 MMHG | WEIGHT: 144 LBS | RESPIRATION RATE: 16 BRPM | BODY MASS INDEX: 24.59 KG/M2 | TEMPERATURE: 97 F | SYSTOLIC BLOOD PRESSURE: 122 MMHG | OXYGEN SATURATION: 98 % | HEIGHT: 64 IN

## 2019-02-19 DIAGNOSIS — K40.90 RIGHT INGUINAL HERNIA: Primary | ICD-10-CM

## 2019-02-19 LAB
POCT GLUCOSE: 115 MG/DL (ref 70–110)
POCT GLUCOSE: 129 MG/DL (ref 70–110)

## 2019-02-19 PROCEDURE — 88302 TISSUE EXAM BY PATHOLOGIST: CPT | Mod: 26,,, | Performed by: PATHOLOGY

## 2019-02-19 PROCEDURE — D9220A PRA ANESTHESIA: ICD-10-PCS | Mod: CRNA,,, | Performed by: NURSE ANESTHETIST, CERTIFIED REGISTERED

## 2019-02-19 PROCEDURE — 25000003 PHARM REV CODE 250: Performed by: STUDENT IN AN ORGANIZED HEALTH CARE EDUCATION/TRAINING PROGRAM

## 2019-02-19 PROCEDURE — 36000707: Performed by: SURGERY

## 2019-02-19 PROCEDURE — 37000009 HC ANESTHESIA EA ADD 15 MINS: Performed by: SURGERY

## 2019-02-19 PROCEDURE — S0020 INJECTION, BUPIVICAINE HYDRO: HCPCS | Performed by: SURGERY

## 2019-02-19 PROCEDURE — 82962 GLUCOSE BLOOD TEST: CPT | Performed by: SURGERY

## 2019-02-19 PROCEDURE — D9220A PRA ANESTHESIA: Mod: CRNA,,, | Performed by: NURSE ANESTHETIST, CERTIFIED REGISTERED

## 2019-02-19 PROCEDURE — 49505 PR REPAIR ING HERNIA,5+Y/O,REDUCIBL: ICD-10-PCS | Mod: RT,GC,, | Performed by: SURGERY

## 2019-02-19 PROCEDURE — 49505 PRP I/HERN INIT REDUC >5 YR: CPT | Mod: RT,GC,, | Performed by: SURGERY

## 2019-02-19 PROCEDURE — D9220A PRA ANESTHESIA: ICD-10-PCS | Mod: ANES,,, | Performed by: ANESTHESIOLOGY

## 2019-02-19 PROCEDURE — 36000706: Performed by: SURGERY

## 2019-02-19 PROCEDURE — 88302 TISSUE EXAM BY PATHOLOGIST: CPT | Performed by: PATHOLOGY

## 2019-02-19 PROCEDURE — 63600175 PHARM REV CODE 636 W HCPCS: Performed by: PHYSICIAN ASSISTANT

## 2019-02-19 PROCEDURE — 88302 TISSUE SPECIMEN TO PATHOLOGY - SURGERY: ICD-10-PCS | Mod: 26,,, | Performed by: PATHOLOGY

## 2019-02-19 PROCEDURE — D9220A PRA ANESTHESIA: Mod: ANES,,, | Performed by: ANESTHESIOLOGY

## 2019-02-19 PROCEDURE — 27000221 HC OXYGEN, UP TO 24 HOURS

## 2019-02-19 PROCEDURE — 94761 N-INVAS EAR/PLS OXIMETRY MLT: CPT

## 2019-02-19 PROCEDURE — 71000016 HC POSTOP RECOV ADDL HR: Performed by: SURGERY

## 2019-02-19 PROCEDURE — 37000008 HC ANESTHESIA 1ST 15 MINUTES: Performed by: SURGERY

## 2019-02-19 PROCEDURE — 25000003 PHARM REV CODE 250: Performed by: SURGERY

## 2019-02-19 PROCEDURE — 71000033 HC RECOVERY, INTIAL HOUR: Performed by: SURGERY

## 2019-02-19 PROCEDURE — C1781 MESH (IMPLANTABLE): HCPCS | Performed by: SURGERY

## 2019-02-19 PROCEDURE — 71000015 HC POSTOP RECOV 1ST HR: Performed by: SURGERY

## 2019-02-19 PROCEDURE — 25000003 PHARM REV CODE 250: Performed by: PHYSICIAN ASSISTANT

## 2019-02-19 PROCEDURE — 25000003 PHARM REV CODE 250: Performed by: NURSE ANESTHETIST, CERTIFIED REGISTERED

## 2019-02-19 PROCEDURE — 63600175 PHARM REV CODE 636 W HCPCS: Performed by: NURSE ANESTHETIST, CERTIFIED REGISTERED

## 2019-02-19 DEVICE — MESH PROGRIP RIGHT: Type: IMPLANTABLE DEVICE | Site: INGUINAL | Status: FUNCTIONAL

## 2019-02-19 RX ORDER — CEFAZOLIN SODIUM 1 G/3ML
2 INJECTION, POWDER, FOR SOLUTION INTRAMUSCULAR; INTRAVENOUS
Status: COMPLETED | OUTPATIENT
Start: 2019-02-19 | End: 2019-02-19

## 2019-02-19 RX ORDER — MIDAZOLAM HYDROCHLORIDE 1 MG/ML
INJECTION, SOLUTION INTRAMUSCULAR; INTRAVENOUS
Status: DISCONTINUED | OUTPATIENT
Start: 2019-02-19 | End: 2019-02-19

## 2019-02-19 RX ORDER — ONDANSETRON 2 MG/ML
INJECTION INTRAMUSCULAR; INTRAVENOUS
Status: DISCONTINUED | OUTPATIENT
Start: 2019-02-19 | End: 2019-02-19

## 2019-02-19 RX ORDER — GLYCOPYRROLATE 0.2 MG/ML
INJECTION INTRAMUSCULAR; INTRAVENOUS
Status: DISCONTINUED | OUTPATIENT
Start: 2019-02-19 | End: 2019-02-19

## 2019-02-19 RX ORDER — SODIUM CHLORIDE 9 MG/ML
INJECTION, SOLUTION INTRAVENOUS CONTINUOUS
Status: DISCONTINUED | OUTPATIENT
Start: 2019-02-19 | End: 2019-02-19 | Stop reason: HOSPADM

## 2019-02-19 RX ORDER — ACETAMINOPHEN 10 MG/ML
INJECTION, SOLUTION INTRAVENOUS
Status: DISCONTINUED | OUTPATIENT
Start: 2019-02-19 | End: 2019-02-19

## 2019-02-19 RX ORDER — BUPIVACAINE HYDROCHLORIDE 5 MG/ML
INJECTION, SOLUTION EPIDURAL; INTRACAUDAL
Status: DISCONTINUED | OUTPATIENT
Start: 2019-02-19 | End: 2019-02-19 | Stop reason: HOSPADM

## 2019-02-19 RX ORDER — LIDOCAINE HCL/PF 100 MG/5ML
SYRINGE (ML) INTRAVENOUS
Status: DISCONTINUED | OUTPATIENT
Start: 2019-02-19 | End: 2019-02-19

## 2019-02-19 RX ORDER — OXYCODONE AND ACETAMINOPHEN 5; 325 MG/1; MG/1
1 TABLET ORAL EVERY 4 HOURS PRN
Qty: 31 TABLET | Refills: 0 | Status: SHIPPED | OUTPATIENT
Start: 2019-02-19 | End: 2019-03-07

## 2019-02-19 RX ORDER — EPHEDRINE SULFATE 50 MG/ML
INJECTION, SOLUTION INTRAVENOUS
Status: DISCONTINUED | OUTPATIENT
Start: 2019-02-19 | End: 2019-02-19

## 2019-02-19 RX ORDER — NEOSTIGMINE METHYLSULFATE 1 MG/ML
INJECTION, SOLUTION INTRAVENOUS
Status: DISCONTINUED | OUTPATIENT
Start: 2019-02-19 | End: 2019-02-19

## 2019-02-19 RX ORDER — OXYCODONE AND ACETAMINOPHEN 5; 325 MG/1; MG/1
TABLET ORAL
Status: DISCONTINUED
Start: 2019-02-19 | End: 2019-02-19 | Stop reason: HOSPADM

## 2019-02-19 RX ORDER — ROCURONIUM BROMIDE 10 MG/ML
INJECTION, SOLUTION INTRAVENOUS
Status: DISCONTINUED | OUTPATIENT
Start: 2019-02-19 | End: 2019-02-19

## 2019-02-19 RX ORDER — OXYCODONE AND ACETAMINOPHEN 5; 325 MG/1; MG/1
1 TABLET ORAL EVERY 4 HOURS PRN
Status: DISCONTINUED | OUTPATIENT
Start: 2019-02-19 | End: 2019-02-19 | Stop reason: HOSPADM

## 2019-02-19 RX ORDER — FENTANYL CITRATE 50 UG/ML
INJECTION, SOLUTION INTRAMUSCULAR; INTRAVENOUS
Status: DISCONTINUED | OUTPATIENT
Start: 2019-02-19 | End: 2019-02-19

## 2019-02-19 RX ORDER — PROPOFOL 10 MG/ML
VIAL (ML) INTRAVENOUS
Status: DISCONTINUED | OUTPATIENT
Start: 2019-02-19 | End: 2019-02-19

## 2019-02-19 RX ADMIN — CEFAZOLIN 2 G: 330 INJECTION, POWDER, FOR SOLUTION INTRAMUSCULAR; INTRAVENOUS at 07:02

## 2019-02-19 RX ADMIN — GLYCOPYRROLATE 0.4 MG: 0.2 INJECTION, SOLUTION INTRAMUSCULAR; INTRAVENOUS at 07:02

## 2019-02-19 RX ADMIN — NEOSTIGMINE METHYLSULFATE 4 MG: 1 INJECTION INTRAVENOUS at 07:02

## 2019-02-19 RX ADMIN — SODIUM CHLORIDE, SODIUM GLUCONATE, SODIUM ACETATE, POTASSIUM CHLORIDE, MAGNESIUM CHLORIDE, SODIUM PHOSPHATE, DIBASIC, AND POTASSIUM PHOSPHATE: .53; .5; .37; .037; .03; .012; .00082 INJECTION, SOLUTION INTRAVENOUS at 07:02

## 2019-02-19 RX ADMIN — EPHEDRINE SULFATE 10 MG: 50 INJECTION, SOLUTION INTRAMUSCULAR; INTRAVENOUS; SUBCUTANEOUS at 08:02

## 2019-02-19 RX ADMIN — SODIUM CHLORIDE: 0.9 INJECTION, SOLUTION INTRAVENOUS at 05:02

## 2019-02-19 RX ADMIN — EPHEDRINE SULFATE 5 MG: 50 INJECTION, SOLUTION INTRAMUSCULAR; INTRAVENOUS; SUBCUTANEOUS at 08:02

## 2019-02-19 RX ADMIN — MIDAZOLAM HYDROCHLORIDE 2 MG: 1 INJECTION, SOLUTION INTRAMUSCULAR; INTRAVENOUS at 06:02

## 2019-02-19 RX ADMIN — ACETAMINOPHEN 1000 MG: 10 INJECTION, SOLUTION INTRAVENOUS at 07:02

## 2019-02-19 RX ADMIN — LIDOCAINE HYDROCHLORIDE 50 MG: 20 INJECTION, SOLUTION INTRAVENOUS at 06:02

## 2019-02-19 RX ADMIN — ONDANSETRON 4 MG: 2 INJECTION INTRAMUSCULAR; INTRAVENOUS at 07:02

## 2019-02-19 RX ADMIN — OXYCODONE HYDROCHLORIDE AND ACETAMINOPHEN 1 TABLET: 5; 325 TABLET ORAL at 08:02

## 2019-02-19 RX ADMIN — ROCURONIUM BROMIDE 40 MG: 10 INJECTION, SOLUTION INTRAVENOUS at 06:02

## 2019-02-19 RX ADMIN — FENTANYL CITRATE 25 MCG: 50 INJECTION, SOLUTION INTRAMUSCULAR; INTRAVENOUS at 08:02

## 2019-02-19 RX ADMIN — FENTANYL CITRATE 50 MCG: 50 INJECTION, SOLUTION INTRAMUSCULAR; INTRAVENOUS at 06:02

## 2019-02-19 RX ADMIN — PROPOFOL 120 MG: 10 INJECTION, EMULSION INTRAVENOUS at 06:02

## 2019-02-19 NOTE — ANESTHESIA PREPROCEDURE EVALUATION
02/19/2019  Ned Oliveira is a 68 y.o., male.    Anesthesia Evaluation    I have reviewed the Patient Summary Reports.     I have reviewed the Medications.     Review of Systems  Anesthesia Hx:  History of prior surgery of interest to airway management or planning:  Denies Personal Hx of Anesthesia complications.   Social:  Non-Smoker, No Alcohol Use    Hematology/Oncology:  Hematology Normal   Oncology Normal     EENT/Dental:EENT/Dental Normal   Cardiovascular:   Exercise tolerance: good Hypertension CAD (pEF) asymptomatic     Pulmonary:  Pulmonary Normal    Renal/:  Renal/ Normal     Musculoskeletal:  Musculoskeletal Normal    Neurological:  Neurology Normal    Endocrine:   Diabetes, well controlled, type 2    Dermatological:  Skin Normal    Psych:  Psychiatric Normal           Physical Exam  General:  Well nourished    Airway/Jaw/Neck:  Airway Findings: Mouth Opening: Normal Tongue: Normal  General Airway Assessment: Adult, Good  Mallampati: III  TM Distance: Normal, at least 6 cm     Eyes/Ears/Nose:  EYES/EARS/NOSE FINDINGS: Normal   Dental:  Dental Findings: In tact   Chest/Lungs:  Chest/Lungs Findings: Clear to auscultation, Normal Respiratory Rate     Heart/Vascular:  Heart Findings: Rate: Normal  Rhythm: Regular Rhythm  Sounds: Normal  Heart murmur: negative       Mental Status:  Mental Status Findings:  Cooperative, Alert and Oriented         Anesthesia Plan  Type of Anesthesia, risks & benefits discussed:  Anesthesia Type:  general  Patient's Preference:   Intra-op Monitoring Plan:   Intra-op Monitoring Plan Comments:   Post Op Pain Control Plan:   Post Op Pain Control Plan Comments:   Induction:   IV  Beta Blocker:  Patient is not currently on a Beta-Blocker (No further documentation required).       Informed Consent: Patient understands risks and agrees with Anesthesia plan.  Questions  answered. Anesthesia consent signed with patient.  ASA Score: 2     Day of Surgery Review of History & Physical:    H&P update referred to the surgeon.     Anesthesia Plan Notes:   68M stable CAD/pEF, HTN, DM2, hernia for open repair under GETA        Ready For Surgery From Anesthesia Perspective.

## 2019-02-19 NOTE — DISCHARGE INSTRUCTIONS
charge Instructions: After Your Surgery  Youve just had surgery. During surgery, you were given medicine called anesthesia to keep you relaxed and free of pain. After surgery, you may have some pain or nausea. This is common. Here are some tips for feeling better and getting well after surgery.     Stay on schedule with your medicine.   Going home  Your healthcare provider will show you how to take care of yourself when you go home. He or she will also answer your questions. Have an adult family member or friend drive you home. For the first 24 hours after your surgery:  · Do not drive or use heavy equipment.  · Do not make important decisions or sign legal papers.  · Do not drink alcohol.  · Have someone stay with you, if needed. He or she can watch for problems and help keep you safe.  Be sure to go to all follow-up visits with your healthcare provider. And rest after your surgery for as long as your healthcare provider tells you to.  Coping with pain  If you have pain after surgery, pain medicine will help you feel better. Take it as told, before pain becomes severe. Also, ask your healthcare provider or pharmacist about other ways to control pain. This might be with heat, ice, or relaxation. And follow any other instructions your surgeon or nurse gives you.  Tips for taking pain medicine  To get the best relief possible, remember these points:  · Pain medicines can upset your stomach. Taking them with a little food may help.  · Most pain relievers taken by mouth need at least 20 to 30 minutes to start to work.  · Taking medicine on a schedule can help you remember to take it. Try to time your medicine so that you can take it before starting an activity. This might be before you get dressed, go for a walk, or sit down for dinner.  · Constipation is a common side effect of pain medicines. Call your healthcare provider before taking any medicines such as laxatives or stool softeners to help ease constipation. Also  ask if you should skip any foods. Drinking lots of fluids and eating foods such as fruits and vegetables that are high in fiber can also help. Remember, do not take laxatives unless your surgeon has prescribed them.  · Drinking alcohol and taking pain medicine can cause dizziness and slow your breathing. It can even be deadly. Do not drink alcohol while taking pain medicine.  · Pain medicine can make you react more slowly to things. Do not drive or run machinery while taking pain medicine.  Your healthcare provider may tell you to take acetaminophen to help ease your pain. Ask him or her how much you are supposed to take each day. Acetaminophen or other pain relievers may interact with your prescription medicines or other over-the-counter (OTC) medicines. Some prescription medicines have acetaminophen and other ingredients. Using both prescription and OTC acetaminophen for pain can cause you to overdose. Read the labels on your OTC medicines with care. This will help you to clearly know the list of ingredients, how much to take, and any warnings. It may also help you not take too much acetaminophen. If you have questions or do not understand the information, ask your pharmacist or healthcare provider to explain it to you before you take the OTC medicine.  Managing nausea  Some people have an upset stomach after surgery. This is often because of anesthesia, pain, or pain medicine, or the stress of surgery. These tips will help you handle nausea and eat healthy foods as you get better. If you were on a special food plan before surgery, ask your healthcare provider if you should follow it while you get better. These tips may help:  · Do not push yourself to eat. Your body will tell you when to eat and how much.  · Start off with clear liquids and soup. They are easier to digest.  · Next try semi-solid foods, such as mashed potatoes, applesauce, and gelatin, as you feel ready.  · Slowly move to solid foods. Dont eat  fatty, rich, or spicy foods at first.  · Do not force yourself to have 3 large meals a day. Instead eat smaller amounts more often.  · Take pain medicines with a small amount of solid food, such as crackers or toast, to avoid nausea.     Call your surgeon if  · You still have pain an hour after taking medicine. The medicine may not be strong enough.  · You feel too sleepy, dizzy, or groggy. The medicine may be too strong.  · You have side effects like nausea, vomiting, or skin changes, such as rash, itching, or hives.       If you have obstructive sleep apnea  You were given anesthesia medicine during surgery to keep you comfortable and free of pain. After surgery, you may have more apnea spells because of this medicine and other medicines you were given. The spells may last longer than usual.   At home:  · Keep using the continuous positive airway pressure (CPAP) device when you sleep. Unless your healthcare provider tells you not to, use it when you sleep, day or night. CPAP is a common device used to treat obstructive sleep apnea.  · Talk with your provider before taking any pain medicine, muscle relaxants, or sedatives. Your provider will tell you about the possible dangers of taking these medicines.  Date Last Reviewed: 12/1/2016  © 3887-2729 [employer]. [address] [place] [address]. All rights reserved. This information is not intended as a substitute for professional medical care. Always follow your healthcare professional's instructions.      ACTIVITY LEVEL:  If you received sedation and/or an anesthetic, you may feel sleepy for several hours. Rest until you feel more  awake. Gradually resume your normal activities in 2-3 days. No heavy lifting for 5-6 weeks. (Nothing heavier  than a gallon of milk.) You will be able to return to light work 1-2 weeks after surgery. You may expect some  discomfort after surgery. This will diminish gradually between two weeks and three months. Use  this  discomfort as a guideline for your level of activity. Do not be alarmed if you notice a lump or firmness under  the scar for about 6 months after surgery. This is normal and should eventually disappear.  DIET:  At home, continue with liquids. If there is no nausea, you may eat a soft diet and gradually resume your normal  diet.  BATHING:  _____ Lap. Repair - You may shower in 1 day. Do not soak the incision for one week.  _X____ Open repair- You may shower in 1 day. Do not soak the incision for one week.    CARE:  You have Dermabond in place of a dressing.  No dressing is needed.  You may experience some bruising. This is normal.   Men may also experience some testicular swelling. This is normal and can be decreased by wearing a scrotal  support, which may be purchased at any drug store or athletic store.    MEDICATIONS:  You will receive instructions for any pain and/or antibiotic prescriptions. Pain medication should be taken only  if needed, and as directed. Antibiotics, if ordered, should be taken as directed until the entire prescription is  Completed.    ADDITIONAL INSTRUCTIONS: _________________________________________________________    WHEN TO CALL THE DOCTOR:   Any obvious bleeding   Fever over 101ºF (38.4ºC)   Redness around the surgical site   Purulent (pus) drainage   Persistent pain not relieved by the pain medication   Persistent nausea/vomiting    RETURN APPOINTMENT:________________________________________________________________  FOR EMERGENCIES: If any unusual problems or difficulties occur, contact  _______________________  or the resident at [phone] (page ) or at the Clinic office, [phone].

## 2019-02-19 NOTE — OP NOTE
DATE OF PROCEDURE:  02/19/2019    PREOPERATIVE DIAGNOSIS:  Right inguinal hernia.    POSTOPERATIVE DIAGNOSIS:  Right inguinal hernia.    PROCEDURE:  Repair of right inguinal hernia with mesh.    SURGEON:  Hugh Jalloh M.D.    ASSISTANT:  Dr. Doss.    ANESTHESIA:  General.    CLINICAL NOTE:  The patient is a 68-year-old male with a symptomatic right   inguinal hernia.    PROCEDURE NOTE:  Following induction of adequate general anesthesia, the   patient's lower abdomen and groin were shaved, prepped and draped in a sterile   fashion with ChloraPrep.  Following this, I made a transverse incision over the   lower aspect of the abdominal wall.  I dissected down to the aponeurosis of the   external oblique.  I opened the oblique in the direction of its fibers to and   through the external ring and then reflected back flaps underneath the oblique.    This exposed Hesselbach triangle.  We isolated the spermatic cord and    a very large indirect inguinal hernia sac from this.  We then opened the sac   and demonstrated that it was without contents.  We performed a high ligation   with a running Prolene suture because of wide base at the internal ring and then   removed the sac and sent it to pathology.  I then reinforced the entire floor   of Hesselbach triangle with a piece of Parietex ProGrip mesh, secured inferiorly   at the pubic symphysis, laterally along the shelving edge and medially along   the conjoined tendon to and above the internal ring.  This reinforced the   complete floor in a tension free fashion and tightened the internal ring.  I   then irrigated and obtained hemostasis with cautery.  We then closed the   external oblique with running Vicryl, the Sara fascia with interrupted Vicryl   and the skin with subcuticular 4-0 Monocryl suture.  Sterile dressings were   applied and the procedure was terminated with the patient tolerating it well.    ESTIMATED BLOOD LOSS:  10 mL      MCT/HN  dd:  02/19/2019 11:29:02 (CST)  td: 02/19/2019 13:53:18 (CST)  Doc ID   #[id number]  Job ID #[id number]    CC:

## 2019-02-19 NOTE — TRANSFER OF CARE
"Anesthesia Transfer of Care Note    Patient: Ned Oliveira    Procedure(s) Performed: Procedure(s) (LRB):  REPAIR, HERNIA, INGUINAL, WITHOUT HISTORY OF PRIOR REPAIR, AGE 5 YEARS OR OLDER Open Right with Mesh (Right)    Patient location: PACU    Anesthesia Type: general    Transport from OR: Transported from OR on 6-10 L/min O2 by face mask with adequate spontaneous ventilation    Post pain: adequate analgesia    Post assessment: no apparent anesthetic complications    Post vital signs: stable    Level of consciousness: sedated    Nausea/Vomiting: no nausea/vomiting    Complications: none    Transfer of care protocol was followed      Last vitals:   Visit Vitals  BP (!) 155/72 (BP Location: Left arm, Patient Position: Lying)   Pulse 60   Temp 36.6 °C (97.8 °F) (Oral)   Resp 16   Ht 5' 4" (1.626 m)   Wt 65.3 kg (144 lb)   SpO2 99%   BMI 24.72 kg/m²     "

## 2019-02-19 NOTE — BRIEF OP NOTE
[facility]  Brief Operative Note     SUMMARY     Surgery Date: 2/19/2019     Surgeon(s) and Role:     * Hugh Jalloh MD - Primary     * Jerardo Doss MD - Resident - Assisting        Pre-op Diagnosis:  Right inguinal hernia [K40.90]    Post-op Diagnosis:  Post-Op Diagnosis Codes:     * Right inguinal hernia [K40.90]    Procedure(s) (LRB):  REPAIR, HERNIA, INGUINAL, WITHOUT HISTORY OF PRIOR REPAIR, AGE 5 YEARS OR OLDER Open Right with Mesh (Right)    Anesthesia: General    Description of the findings of the procedure: Right inguinal hernia repair with mesh without apparent complication    Findings/Key Components: large indirect right inguinal hernia    Estimated Blood Loss: 10cc         Specimens:   Specimen (12h ago, onward)    Start     Ordered    02/19/19 0733  Specimen to Pathology - Surgery  Once     Comments:  1. Hernia sac- perm     Start Status   02/19/19 0733 Collected (02/19/19 0739)       02/19/19 0733          Discharge Note    SUMMARY     Admit Date: 2/19/2019    Discharge Date and Time:  02/19/2019 8:38 AM    Hospital Course (synopsis of major diagnoses, care, treatment, and services provided during the course of the hospital stay):     The patient came into the hospital for an outpatient procedure, tolerated it well, and was discharged in good condition from the recovery area with the below follow-up, instructions, and medications.       Final Diagnosis: Post-Op Diagnosis Codes:     * Right inguinal hernia [K40.90]    Disposition: Home or Self Care    Follow Up/Patient Instructions:     Medications:  Reconciled Home Medications:      Medication List      START taking these medications    oxyCODONE-acetaminophen 5-325 mg per tablet  Commonly known as:  PERCOCET  Take 1 tablet by mouth every 4 (four) hours as needed for Pain.        CONTINUE taking these medications    aspirin 81 MG EC tablet  Commonly known as:  ECOTRIN  Take 1 tablet (81 mg total) by mouth once daily.    "  blood sugar diagnostic Strp  To use as directed to monitor blood sugars daily     butalbital-acetaminophen-caffeine -40 mg -40 mg per tablet  Commonly known as:  FIORICET, ESGIC  TAKE ONE TABLET BY MOUTH EVERY 4 HOURS AS NEEDED FOR PAIN OR HEADACHE     ciclopirox 8 % Soln  Commonly known as:  PENLAC  Apply topically nightly.     dexamethasone 1 MG Tab  Commonly known as:  DECADRON  Take at 11 pm the night before labs are to be drawn     dulaglutide 0.75 mg/0.5 mL Pnij  Commonly known as:  TRULICITY  Inject 0.5 mLs (0.75 mg total) into the skin once a week.     glimepiride 4 MG tablet  Commonly known as:  AMARYL  Take 1 tablet (4 mg total) by mouth before breakfast.     hydrocortisone 2.5 % cream  Apply topically 2 (two) times daily.     lancets Misc  Commonly known as:  ONETOUCH ULTRASOFT LANCETS  Check blood sugars BID     levocetirizine 5 MG tablet  Commonly known as:  XYZAL  Take 1 tablet (5 mg total) by mouth every evening.     lisinopril-hydrochlorothiazide 10-12.5 mg per tablet  Commonly known as:  PRINZIDE,ZESTORETIC  Take 1 tablet by mouth once daily.     metFORMIN 1000 MG tablet  Commonly known as:  GLUCOPHAGE  Take 1 tablet (1,000 mg total) by mouth 2 (two) times daily with meals.     metoprolol tartrate 25 MG tablet  Commonly known as:  LOPRESSOR  Take 0.5 tablets (12.5 mg total) by mouth 2 (two) times daily.     pen needle, diabetic 32 gauge x 5/32" Ndle  Commonly known as:  BD ULTRA-FINE [place] PEN NEEDLE  Use to inject Trulicity qwk     rosuvastatin 40 MG Tab  Commonly known as:  CRESTOR  Take 1 tablet (40 mg total) by mouth once daily.     tamsulosin 0.4 mg Cap  Commonly known as:  FLOMAX  Take 2 capsules (0.8 mg total) by mouth every evening.          Discharge Procedure Orders   Diet diabetic     Call MD for:  temperature >100.4     Call MD for:  persistent nausea and vomiting or diarrhea     Call MD for:  severe uncontrolled pain     Call MD for:  difficulty breathing or increased cough "     Lifting restrictions   Order Comments: Nothing greater than 10Lbs for at least 6 weeks     No dressing needed   Order Comments: Surgical glue was applied to your incision, it will wear off on its own over the next 2 weeks.     Activity as tolerated     Shower on day dressing removed (No bath)   Order Comments: You can start showering 24 hours after surgery, avoid soaking in tub or pool for at least 2 weeks     Follow-up Information     Hugh Jalloh MD. Schedule an appointment as soon as possible for a visit in 2 weeks.    Specialty:  General Surgery  Why:  For wound re-check  Contact information:  Lashell GARCIA  [facility] [id number] [phone]

## 2019-02-19 NOTE — ANESTHESIA RELEASE NOTE
"Anesthesia Release from PACU Note    Patient: Ned Oliveira    Procedure(s) Performed: Procedure(s) (LRB):  REPAIR, HERNIA, INGUINAL, WITHOUT HISTORY OF PRIOR REPAIR, AGE 5 YEARS OR OLDER Open Right with Mesh (Right)    Anesthesia type: general    Post pain: Adequate analgesia    Post assessment: no apparent anesthetic complications and tolerated procedure well    Last Vitals:   Visit Vitals  /62   Pulse (!) 52   Temp 36.6 °C (97.8 °F) (Oral)   Resp 14   Ht 5' 4" (1.626 m)   Wt 65.3 kg (144 lb)   SpO2 97%   BMI 24.72 kg/m²       Post vital signs: stable    Level of consciousness: awake and alert     Nausea/Vomiting: no nausea/no vomiting    Complications: none    Airway Patency: patent    Respiratory: unassisted, spontaneous ventilation, room air    Cardiovascular: stable and blood pressure at baseline    Hydration: euvolemic  "

## 2019-03-07 ENCOUNTER — OFFICE VISIT (OUTPATIENT)
Dept: SURGERY | Facility: CLINIC | Age: 69
End: 2019-03-07
Payer: MEDICARE

## 2019-03-07 VITALS
DIASTOLIC BLOOD PRESSURE: 80 MMHG | HEIGHT: 64 IN | SYSTOLIC BLOOD PRESSURE: 133 MMHG | BODY MASS INDEX: 24.59 KG/M2 | HEART RATE: 60 BPM | WEIGHT: 144 LBS

## 2019-03-07 DIAGNOSIS — Z98.890 S/P RIGHT INGUINAL HERNIA REPAIR: Primary | ICD-10-CM

## 2019-03-07 DIAGNOSIS — Z87.19 S/P RIGHT INGUINAL HERNIA REPAIR: Primary | ICD-10-CM

## 2019-03-07 PROCEDURE — 99999 PR PBB SHADOW E&M-EST. PATIENT-LVL IV: ICD-10-PCS | Mod: PBBFAC,,, | Performed by: PHYSICIAN ASSISTANT

## 2019-03-07 PROCEDURE — 99024 POSTOP FOLLOW-UP VISIT: CPT | Mod: POP,,, | Performed by: PHYSICIAN ASSISTANT

## 2019-03-07 PROCEDURE — 99999 PR PBB SHADOW E&M-EST. PATIENT-LVL IV: CPT | Mod: PBBFAC,,, | Performed by: PHYSICIAN ASSISTANT

## 2019-03-07 PROCEDURE — 99024 PR POST-OP FOLLOW-UP VISIT: ICD-10-PCS | Mod: POP,,, | Performed by: PHYSICIAN ASSISTANT

## 2019-03-07 PROCEDURE — 99214 OFFICE O/P EST MOD 30 MIN: CPT | Mod: PBBFAC | Performed by: PHYSICIAN ASSISTANT

## 2019-03-07 NOTE — PROGRESS NOTES
SUBJECTIVE:  The patient is a 68 y.o. y/o male 2 weeks s/p right inguinal hernia repair. He denies pain, fevers, chills, nausea, vomiting, diarrhea, or constipation. Eating well with normal appetite and bowel function. Denies redness around or drainage from incisions.    OBJECTIVE:  GEN: male in NAD  ABD: soft, non-tender, non-distended  INCISIONS: clean, dry and intact, healing well without signs of infection or hernia    ASSESSMENT/PLAN:  Doing well 2 weeks s/p right inguinal hernia repair. Patient is advised to avoid heavy lifting or strenuous activity for another 2-4 weeks. May resume light cardio. Patient may bathe and continue to take a regular diet. Will follow-up with me on an as-needed basis. All questions answered; patient is comfortable with follow-up plan.

## 2019-03-08 RX ORDER — METFORMIN HYDROCHLORIDE 1000 MG/1
1000 TABLET ORAL 2 TIMES DAILY WITH MEALS
Qty: 180 TABLET | Refills: 3 | Status: SHIPPED | OUTPATIENT
Start: 2019-03-08 | End: 2020-03-16

## 2019-03-18 ENCOUNTER — OFFICE VISIT (OUTPATIENT)
Dept: INTERNAL MEDICINE | Facility: CLINIC | Age: 69
End: 2019-03-18
Payer: MEDICARE

## 2019-03-18 VITALS
BODY MASS INDEX: 25.39 KG/M2 | WEIGHT: 143.31 LBS | SYSTOLIC BLOOD PRESSURE: 135 MMHG | HEART RATE: 64 BPM | OXYGEN SATURATION: 98 % | DIASTOLIC BLOOD PRESSURE: 75 MMHG | HEIGHT: 63 IN

## 2019-03-18 DIAGNOSIS — Z76.0 MEDICATION REFILL: Primary | ICD-10-CM

## 2019-03-18 DIAGNOSIS — I10 ESSENTIAL HYPERTENSION: ICD-10-CM

## 2019-03-18 DIAGNOSIS — Z13.6 SCREENING FOR AAA (ABDOMINAL AORTIC ANEURYSM): ICD-10-CM

## 2019-03-18 DIAGNOSIS — E78.5 HYPERLIPIDEMIA, UNSPECIFIED HYPERLIPIDEMIA TYPE: ICD-10-CM

## 2019-03-18 DIAGNOSIS — R33.9 URINARY RETENTION: ICD-10-CM

## 2019-03-18 DIAGNOSIS — E11.8 TYPE 2 DIABETES MELLITUS WITH COMPLICATION, WITHOUT LONG-TERM CURRENT USE OF INSULIN: ICD-10-CM

## 2019-03-18 DIAGNOSIS — N13.8 BPH WITH OBSTRUCTION/LOWER URINARY TRACT SYMPTOMS: ICD-10-CM

## 2019-03-18 DIAGNOSIS — N40.1 BPH WITH OBSTRUCTION/LOWER URINARY TRACT SYMPTOMS: ICD-10-CM

## 2019-03-18 DIAGNOSIS — Z23 NEED FOR PNEUMOCOCCAL VACCINATION: ICD-10-CM

## 2019-03-18 PROCEDURE — 99214 PR OFFICE/OUTPT VISIT, EST, LEVL IV, 30-39 MIN: ICD-10-PCS | Mod: S$PBB,GC,, | Performed by: STUDENT IN AN ORGANIZED HEALTH CARE EDUCATION/TRAINING PROGRAM

## 2019-03-18 PROCEDURE — 99214 OFFICE O/P EST MOD 30 MIN: CPT | Mod: S$PBB,GC,, | Performed by: STUDENT IN AN ORGANIZED HEALTH CARE EDUCATION/TRAINING PROGRAM

## 2019-03-18 PROCEDURE — 99999 PR PBB SHADOW E&M-EST. PATIENT-LVL III: CPT | Mod: PBBFAC,GC,, | Performed by: STUDENT IN AN ORGANIZED HEALTH CARE EDUCATION/TRAINING PROGRAM

## 2019-03-18 PROCEDURE — G0009 ADMIN PNEUMOCOCCAL VACCINE: HCPCS | Mod: PBBFAC

## 2019-03-18 PROCEDURE — 99999 PR PBB SHADOW E&M-EST. PATIENT-LVL III: ICD-10-PCS | Mod: PBBFAC,GC,, | Performed by: STUDENT IN AN ORGANIZED HEALTH CARE EDUCATION/TRAINING PROGRAM

## 2019-03-18 PROCEDURE — 99213 OFFICE O/P EST LOW 20 MIN: CPT | Mod: PBBFAC | Performed by: STUDENT IN AN ORGANIZED HEALTH CARE EDUCATION/TRAINING PROGRAM

## 2019-03-18 RX ORDER — GLIMEPIRIDE 4 MG/1
4 TABLET ORAL
Qty: 90 TABLET | Refills: 3 | Status: SHIPPED | OUTPATIENT
Start: 2019-03-18 | End: 2020-05-19 | Stop reason: SDUPTHER

## 2019-03-18 RX ORDER — ATORVASTATIN CALCIUM 40 MG/1
40 TABLET, FILM COATED ORAL DAILY
Qty: 90 TABLET | Refills: 3 | Status: SHIPPED | OUTPATIENT
Start: 2019-03-18 | End: 2019-10-07

## 2019-03-18 RX ORDER — METOPROLOL TARTRATE 25 MG/1
12.5 TABLET, FILM COATED ORAL 2 TIMES DAILY
Qty: 90 TABLET | Refills: 3 | Status: SHIPPED | OUTPATIENT
Start: 2019-03-18 | End: 2019-10-07

## 2019-03-18 RX ORDER — TAMSULOSIN HYDROCHLORIDE 0.4 MG/1
0.8 CAPSULE ORAL NIGHTLY
Qty: 60 CAPSULE | Refills: 11 | Status: SHIPPED | OUTPATIENT
Start: 2019-03-18 | End: 2020-05-19 | Stop reason: SDUPTHER

## 2019-03-18 RX ORDER — LISINOPRIL AND HYDROCHLOROTHIAZIDE 10; 12.5 MG/1; MG/1
1 TABLET ORAL DAILY
Qty: 90 TABLET | Refills: 3 | Status: SHIPPED | OUTPATIENT
Start: 2019-03-18 | End: 2020-05-19 | Stop reason: SDUPTHER

## 2019-03-25 ENCOUNTER — LAB VISIT (OUTPATIENT)
Dept: LAB | Facility: HOSPITAL | Age: 69
End: 2019-03-25
Attending: INTERNAL MEDICINE
Payer: MEDICARE

## 2019-03-25 ENCOUNTER — CLINICAL SUPPORT (OUTPATIENT)
Dept: CARDIOLOGY | Facility: CLINIC | Age: 69
End: 2019-03-25
Attending: STUDENT IN AN ORGANIZED HEALTH CARE EDUCATION/TRAINING PROGRAM
Payer: MEDICARE

## 2019-03-25 DIAGNOSIS — E11.69 HYPERLIPIDEMIA ASSOCIATED WITH TYPE 2 DIABETES MELLITUS: ICD-10-CM

## 2019-03-25 DIAGNOSIS — E11.59 HYPERTENSION ASSOCIATED WITH DIABETES: ICD-10-CM

## 2019-03-25 DIAGNOSIS — I15.2 HYPERTENSION ASSOCIATED WITH DIABETES: ICD-10-CM

## 2019-03-25 DIAGNOSIS — Z13.6 SCREENING FOR AAA (ABDOMINAL AORTIC ANEURYSM): ICD-10-CM

## 2019-03-25 DIAGNOSIS — E78.5 HYPERLIPIDEMIA ASSOCIATED WITH TYPE 2 DIABETES MELLITUS: ICD-10-CM

## 2019-03-25 LAB
ABDOMINAL IMA AP: 1.53 CM
ABDOMINAL IMA ED VEL: 0 CM/S
ABDOMINAL IMA PS VEL: 93 CM/S
ABDOMINAL IMA TRANS: 1.53 CM
ABDOMINAL INFRARENAL AORTA AP: 1.83 CM
ABDOMINAL INFRARENAL AORTA ED VEL: 0 CM/S
ABDOMINAL INFRARENAL AORTA PS VEL: 93 CM/S
ABDOMINAL INFRARENAL AORTA TRANS: 1.82 CM
ABDOMINAL JUXTARENAL AORTA AP: 1.72 CM
ABDOMINAL JUXTARENAL AORTA ED VEL: 0 CM/S
ABDOMINAL JUXTARENAL AORTA PS VEL: 72 CM/S
ABDOMINAL JUXTARENAL AORTA TRANS: 1.7 CM
ABDOMINAL LT COM ILIAC AP: 1.04 CM
ABDOMINAL LT COM ILIAC TRANS: 1.23 CM
ABDOMINAL LT COM ILIAC VEL: 65 CM/S
ABDOMINAL LT COM ILLIAC ED VEL: 0 CM/S
ABDOMINAL RT COM ILIAC AP: 0.96 CM
ABDOMINAL RT COM ILIAC TRANS: 1.05 CM
ABDOMINAL RT COM ILIAC VEL: 88 CM/S
ABDOMINAL RT COM ILLIAC ED VEL: 0 CM/S
ABDOMINAL SUPRARENAL AORTA AP: 2.47 CM
ABDOMINAL SUPRARENAL AORTA ED VEL: 9 CM/S
ABDOMINAL SUPRARENAL AORTA PS VEL: 71 CM/S
ABDOMINAL SUPRARENAL AORTA TRANS: 2.47 CM
ALBUMIN SERPL BCP-MCNC: 3.7 G/DL (ref 3.5–5.2)
ALP SERPL-CCNC: 88 U/L (ref 55–135)
ALT SERPL W/O P-5'-P-CCNC: 15 U/L (ref 10–44)
ANION GAP SERPL CALC-SCNC: 7 MMOL/L (ref 8–16)
AST SERPL-CCNC: 19 U/L (ref 10–40)
BILIRUB SERPL-MCNC: 1.3 MG/DL (ref 0.1–1)
BUN SERPL-MCNC: 11 MG/DL (ref 8–23)
CALCIUM SERPL-MCNC: 9.3 MG/DL (ref 8.7–10.5)
CHLORIDE SERPL-SCNC: 105 MMOL/L (ref 95–110)
CHOLEST SERPL-MCNC: 154 MG/DL (ref 120–199)
CHOLEST/HDLC SERPL: 4.1 {RATIO} (ref 2–5)
CO2 SERPL-SCNC: 29 MMOL/L (ref 23–29)
CREAT SERPL-MCNC: 0.9 MG/DL (ref 0.5–1.4)
EST. GFR  (AFRICAN AMERICAN): >60 ML/MIN/1.73 M^2
EST. GFR  (NON AFRICAN AMERICAN): >60 ML/MIN/1.73 M^2
GLUCOSE SERPL-MCNC: 133 MG/DL (ref 70–110)
HDLC SERPL-MCNC: 38 MG/DL (ref 40–75)
HDLC SERPL: 24.7 % (ref 20–50)
LDLC SERPL CALC-MCNC: 103.8 MG/DL (ref 63–159)
NONHDLC SERPL-MCNC: 116 MG/DL
POTASSIUM SERPL-SCNC: 4.3 MMOL/L (ref 3.5–5.1)
PROT SERPL-MCNC: 6.3 G/DL (ref 6–8.4)
SODIUM SERPL-SCNC: 141 MMOL/L (ref 136–145)
TRIGL SERPL-MCNC: 61 MG/DL (ref 30–150)

## 2019-03-25 PROCEDURE — 93978 VASCULAR STUDY: CPT | Mod: PBBFAC | Performed by: INTERNAL MEDICINE

## 2019-03-25 PROCEDURE — 93978 CV US ABDOMINAL AORTA EVALUATION (CUPID ONLY): ICD-10-PCS | Mod: 26,S$PBB,, | Performed by: INTERNAL MEDICINE

## 2019-03-25 PROCEDURE — 80061 LIPID PANEL: CPT

## 2019-03-25 PROCEDURE — 80053 COMPREHEN METABOLIC PANEL: CPT

## 2019-03-25 PROCEDURE — 36415 COLL VENOUS BLD VENIPUNCTURE: CPT

## 2019-03-26 ENCOUNTER — TELEPHONE (OUTPATIENT)
Dept: HEPATOLOGY | Facility: HOSPITAL | Age: 69
End: 2019-03-26

## 2019-03-26 ENCOUNTER — PROCEDURE VISIT (OUTPATIENT)
Dept: OPHTHALMOLOGY | Facility: CLINIC | Age: 69
End: 2019-03-26
Payer: MEDICARE

## 2019-03-26 VITALS — DIASTOLIC BLOOD PRESSURE: 76 MMHG | HEART RATE: 59 BPM | SYSTOLIC BLOOD PRESSURE: 124 MMHG

## 2019-03-26 DIAGNOSIS — H25.13 NS (NUCLEAR SCLEROSIS), BILATERAL: ICD-10-CM

## 2019-03-26 PROCEDURE — 92014 PR EYE EXAM, EST PATIENT,COMPREHESV: ICD-10-PCS | Mod: 24,25,S$PBB, | Performed by: OPHTHALMOLOGY

## 2019-03-26 PROCEDURE — 67028 INJECTION EYE DRUG: CPT | Mod: 79,S$PBB,LT, | Performed by: OPHTHALMOLOGY

## 2019-03-26 PROCEDURE — 92134 CPTRZ OPH DX IMG PST SGM RTA: CPT | Mod: PBBFAC | Performed by: OPHTHALMOLOGY

## 2019-03-26 PROCEDURE — 67028 INJECTION EYE DRUG: CPT | Mod: PBBFAC,LT | Performed by: OPHTHALMOLOGY

## 2019-03-26 PROCEDURE — C9257 BEVACIZUMAB INJECTION: HCPCS | Mod: PBBFAC,JG | Performed by: OPHTHALMOLOGY

## 2019-03-26 PROCEDURE — 92134 POSTERIOR SEGMENT OCT RETINA (OCULAR COHERENCE TOMOGRAPHY)-BOTH EYES: ICD-10-PCS | Mod: 26,S$PBB,, | Performed by: OPHTHALMOLOGY

## 2019-03-26 PROCEDURE — 92014 COMPRE OPH EXAM EST PT 1/>: CPT | Mod: 24,25,S$PBB, | Performed by: OPHTHALMOLOGY

## 2019-03-26 PROCEDURE — 67028 PR INJECT INTRAVITREAL PHARMCOLOGIC: ICD-10-PCS | Mod: 79,S$PBB,LT, | Performed by: OPHTHALMOLOGY

## 2019-03-26 RX ADMIN — BEVACIZUMAB 1.25 MG: 100 INJECTION, SOLUTION INTRAVENOUS at 02:03

## 2019-03-26 NOTE — PROGRESS NOTES
HPI     4 wk / OCT / Avastin   DLS- 01/28/2019 Dr. Rivers     Pt sts declining va since having laser done took him a while to be able to see anything out of OD after the laser the same day can see today but not clear.   Denies pain   (-)Flashes (+)Floaters  (-)Photophobia  (-)Glare    No gtts       OCT - OD - Decreased  OS DME decreased    Prior FA - late macular leakage OS > OD      A/P    1. Mild NPDR OU T2  2. DME OU    S/p Avastin OD x 3, OS x 9  Focal OS 5/17, OD 1-19 1/19 - increased DME at 9 weeks - resume 6 week tx      Will need maintenance OS    Avastin OS today  Watch OD    BS/BP/chol control        3. NS OU      6 weeks OCT      Risks, benefits, and alternatives to treatment discussed in detail with the patient.  The patient voiced understanding and wished to proceed with the procedure    Injection Procedure Note:  Diagnosis: DME OS    Patient Identified and Time Out complete  Topical Proparacaine and Betadine.  Inject Avastin OS at 6:00 @ 3.5-4mm posterior to limbus  Post Operative Dx: Same  Complications: None  Follow up as above.

## 2019-03-26 NOTE — PATIENT INSTRUCTIONS

## 2019-03-29 NOTE — PROGRESS NOTES
I have reviewed and concur with the resident's history, physical, assessment, and plan.  I have personally interviewed and examined the patient  Ned Oliveira  at bedside.  See below addendum for my evaluation and additional findings.  Patient does describe angina, but clearly symptoms are stable and have not worsened. Clearly communicated to pt to proceed to ED if any chest pains that do not go away/and call if any crescendoing.

## 2019-04-01 ENCOUNTER — OFFICE VISIT (OUTPATIENT)
Dept: INTERNAL MEDICINE | Facility: CLINIC | Age: 69
End: 2019-04-01
Payer: MEDICARE

## 2019-04-01 VITALS
WEIGHT: 141.13 LBS | TEMPERATURE: 98 F | HEART RATE: 63 BPM | HEIGHT: 63 IN | SYSTOLIC BLOOD PRESSURE: 120 MMHG | BODY MASS INDEX: 25.01 KG/M2 | OXYGEN SATURATION: 96 % | DIASTOLIC BLOOD PRESSURE: 73 MMHG

## 2019-04-01 DIAGNOSIS — K40.90 INGUINAL HERNIA OF LEFT SIDE WITHOUT OBSTRUCTION OR GANGRENE: ICD-10-CM

## 2019-04-01 DIAGNOSIS — G47.9 SLEEP DISORDER: ICD-10-CM

## 2019-04-01 DIAGNOSIS — Z13.6 SCREENING FOR AAA (ABDOMINAL AORTIC ANEURYSM): ICD-10-CM

## 2019-04-01 DIAGNOSIS — E11.8 TYPE 2 DIABETES MELLITUS WITH COMPLICATION, WITHOUT LONG-TERM CURRENT USE OF INSULIN: ICD-10-CM

## 2019-04-01 DIAGNOSIS — G44.209 TENSION HEADACHE: ICD-10-CM

## 2019-04-01 DIAGNOSIS — I10 ESSENTIAL HYPERTENSION: ICD-10-CM

## 2019-04-01 DIAGNOSIS — E78.5 HYPERLIPIDEMIA, UNSPECIFIED HYPERLIPIDEMIA TYPE: ICD-10-CM

## 2019-04-01 DIAGNOSIS — I70.0 AORTIC ATHEROSCLEROSIS: ICD-10-CM

## 2019-04-01 DIAGNOSIS — I25.10 CAD IN NATIVE ARTERY: ICD-10-CM

## 2019-04-01 DIAGNOSIS — K40.90 RIGHT INGUINAL HERNIA: Primary | ICD-10-CM

## 2019-04-01 PROCEDURE — 99999 PR PBB SHADOW E&M-EST. PATIENT-LVL V: ICD-10-PCS | Mod: PBBFAC,,, | Performed by: INTERNAL MEDICINE

## 2019-04-01 PROCEDURE — 3045F PR MOST RECENT HEMOGLOBIN A1C LEVEL 7.0-9.0%: CPT | Mod: CPTII,S$GLB,, | Performed by: INTERNAL MEDICINE

## 2019-04-01 PROCEDURE — 3078F DIAST BP <80 MM HG: CPT | Mod: CPTII,S$GLB,, | Performed by: INTERNAL MEDICINE

## 2019-04-01 PROCEDURE — 1101F PR PT FALLS ASSESS DOC 0-1 FALLS W/OUT INJ PAST YR: ICD-10-PCS | Mod: CPTII,S$GLB,, | Performed by: INTERNAL MEDICINE

## 2019-04-01 PROCEDURE — 1101F PT FALLS ASSESS-DOCD LE1/YR: CPT | Mod: CPTII,S$GLB,, | Performed by: INTERNAL MEDICINE

## 2019-04-01 PROCEDURE — 99214 PR OFFICE/OUTPT VISIT, EST, LEVL IV, 30-39 MIN: ICD-10-PCS | Mod: S$GLB,,, | Performed by: INTERNAL MEDICINE

## 2019-04-01 PROCEDURE — 3045F PR MOST RECENT HEMOGLOBIN A1C LEVEL 7.0-9.0%: ICD-10-PCS | Mod: CPTII,S$GLB,, | Performed by: INTERNAL MEDICINE

## 2019-04-01 PROCEDURE — 3074F PR MOST RECENT SYSTOLIC BLOOD PRESSURE < 130 MM HG: ICD-10-PCS | Mod: CPTII,S$GLB,, | Performed by: INTERNAL MEDICINE

## 2019-04-01 PROCEDURE — 99999 PR PBB SHADOW E&M-EST. PATIENT-LVL V: CPT | Mod: PBBFAC,,, | Performed by: INTERNAL MEDICINE

## 2019-04-01 PROCEDURE — 3074F SYST BP LT 130 MM HG: CPT | Mod: CPTII,S$GLB,, | Performed by: INTERNAL MEDICINE

## 2019-04-01 PROCEDURE — 3078F PR MOST RECENT DIASTOLIC BLOOD PRESSURE < 80 MM HG: ICD-10-PCS | Mod: CPTII,S$GLB,, | Performed by: INTERNAL MEDICINE

## 2019-04-01 PROCEDURE — 99214 OFFICE O/P EST MOD 30 MIN: CPT | Mod: S$GLB,,, | Performed by: INTERNAL MEDICINE

## 2019-04-07 NOTE — PROGRESS NOTES
Subjective:       Patient ID: Ned Oliveira is a 68 y.o. male.    Chief Complaint: Follow-up (needs clearance for work)  HISTORY OF PRESENT ILLNESS:  A 68-year-old male patient of Dr. Estevez,   who had inguinal surgery on the right side done by Dr. Jalloh on February 19.    He was told he could return to work about a month later.  The patient is in to   get cleared for his work.  In the meantime, he had a small inguinal hernia on   the left and that has gotten larger in size.  He is having some discomfort with   that.  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    PHYSICAL EXAMINATION:  VITAL SIGNS:  Normal.  SKIN:  Fair, healthy.  ABDOMEN:  Nontender.  He has a fairly good size left inguinal hernia now.  The   right one is well healed and there is no evidence of any problem there.    IMPRESSION:  Cleared for work, but told not to lift more than 40 pounds and he   would need to get the left side repaired as well.      JDC/HN  dd: 04/07/2019 16:24:45 (CDT)  td: 04/08/2019 12:28:29 (CDT)  Doc ID   #[id number]  Job ID #[id number]    CC:     Dict [id number]  [facility]  Review of Systems   Constitutional: Negative for activity change, appetite change, fatigue and unexpected weight change.   HENT: Negative for dental problem, hearing loss, mouth sores, postnasal drip and sinus pressure.    Eyes: Negative for discharge and visual disturbance.   Respiratory: Negative for cough and shortness of breath.    Cardiovascular: Negative for chest pain and palpitations.   Gastrointestinal: Negative for abdominal pain, blood in stool, constipation, diarrhea and nausea.   Genitourinary: Negative for dysuria, hematuria and testicular pain.   Musculoskeletal: Negative for arthralgias, back pain, joint swelling and neck pain.   Skin: Negative for rash.   Neurological: Negative for weakness and headaches.   Psychiatric/Behavioral: Negative for agitation and sleep disturbance.       Objective:      Physical Exam    Constitutional: He is oriented to person, place, and time. He appears well-developed and well-nourished.   HENT:   Head: Normocephalic.   Right Ear: External ear normal.   Left Ear: External ear normal.   Mouth/Throat: Oropharynx is clear and moist.   Eyes: Pupils are equal, round, and reactive to light. EOM are normal. Right eye exhibits no discharge.   Neck: Normal range of motion. No JVD present. No tracheal deviation present. No thyromegaly present.   Cardiovascular: Normal rate, regular rhythm, normal heart sounds and intact distal pulses. Exam reveals no gallop.   No murmur heard.  Pulmonary/Chest: Effort normal and breath sounds normal. He has no wheezes. He has no rales.   Abdominal: Soft. Bowel sounds are normal. He exhibits no mass. There is no tenderness.   Genitourinary: Prostate normal and penis normal. Rectal exam shows guaiac negative stool.   Musculoskeletal: Normal range of motion. He exhibits no edema.   Lymphadenopathy:     He has no cervical adenopathy.   Neurological: He is oriented to person, place, and time. He displays normal reflexes. No cranial nerve deficit. Coordination normal.   Skin: Skin is warm. No rash noted. No pallor.   Psychiatric: He has a normal mood and affect.       Assessment:       1. Right inguinal hernia    2. Sleep disorder    3. Inguinal hernia of left side without obstruction or gangrene    4. Essential hypertension    5. Screening for AAA (abdominal aortic aneurysm)    6. Type 2 diabetes mellitus with complication, without long-term current use of insulin    7. Hyperlipidemia, unspecified hyperlipidemia type    8. 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 Aortic atherosclerosis    10. Tension headache    11. CAD in native artery        Plan:

## 2019-04-29 ENCOUNTER — OFFICE VISIT (OUTPATIENT)
Dept: INTERNAL MEDICINE | Facility: CLINIC | Age: 69
End: 2019-04-29
Payer: MEDICARE

## 2019-04-29 VITALS
BODY MASS INDEX: 25.39 KG/M2 | WEIGHT: 143.31 LBS | RESPIRATION RATE: 18 BRPM | HEART RATE: 71 BPM | TEMPERATURE: 98 F | DIASTOLIC BLOOD PRESSURE: 70 MMHG | SYSTOLIC BLOOD PRESSURE: 110 MMHG | HEIGHT: 63 IN

## 2019-04-29 DIAGNOSIS — I10 ESSENTIAL HYPERTENSION: ICD-10-CM

## 2019-04-29 DIAGNOSIS — E27.8 ADRENAL NODULE: ICD-10-CM

## 2019-04-29 DIAGNOSIS — I70.0 AORTIC ATHEROSCLEROSIS: ICD-10-CM

## 2019-04-29 DIAGNOSIS — Z76.89 RETURN TO WORK EVALUATION: Primary | ICD-10-CM

## 2019-04-29 DIAGNOSIS — E78.5 HYPERLIPIDEMIA ASSOCIATED WITH TYPE 2 DIABETES MELLITUS: ICD-10-CM

## 2019-04-29 DIAGNOSIS — E11.69 HYPERLIPIDEMIA ASSOCIATED WITH TYPE 2 DIABETES MELLITUS: ICD-10-CM

## 2019-04-29 DIAGNOSIS — E11.59 HYPERTENSION ASSOCIATED WITH DIABETES: ICD-10-CM

## 2019-04-29 DIAGNOSIS — E11.8 TYPE 2 DIABETES MELLITUS WITH COMPLICATION, WITHOUT LONG-TERM CURRENT USE OF INSULIN: ICD-10-CM

## 2019-04-29 DIAGNOSIS — N40.0 BENIGN NON-NODULAR PROSTATIC HYPERPLASIA WITHOUT LOWER URINARY TRACT SYMPTOMS: ICD-10-CM

## 2019-04-29 DIAGNOSIS — Z98.890 HISTORY OF INGUINAL HERNIA REPAIR: ICD-10-CM

## 2019-04-29 DIAGNOSIS — I15.2 HYPERTENSION ASSOCIATED WITH DIABETES: ICD-10-CM

## 2019-04-29 DIAGNOSIS — I25.10 CAD IN NATIVE ARTERY: ICD-10-CM

## 2019-04-29 DIAGNOSIS — Z87.19 HISTORY OF INGUINAL HERNIA REPAIR: ICD-10-CM

## 2019-04-29 PROCEDURE — 99214 OFFICE O/P EST MOD 30 MIN: CPT | Mod: S$GLB,,, | Performed by: INTERNAL MEDICINE

## 2019-04-29 PROCEDURE — 99999 PR PBB SHADOW E&M-EST. PATIENT-LVL III: CPT | Mod: PBBFAC,,, | Performed by: INTERNAL MEDICINE

## 2019-04-29 PROCEDURE — 99999 PR PBB SHADOW E&M-EST. PATIENT-LVL III: ICD-10-PCS | Mod: PBBFAC,,, | Performed by: INTERNAL MEDICINE

## 2019-04-29 PROCEDURE — 1101F PT FALLS ASSESS-DOCD LE1/YR: CPT | Mod: CPTII,S$GLB,, | Performed by: INTERNAL MEDICINE

## 2019-04-29 PROCEDURE — 3074F PR MOST RECENT SYSTOLIC BLOOD PRESSURE < 130 MM HG: ICD-10-PCS | Mod: CPTII,S$GLB,, | Performed by: INTERNAL MEDICINE

## 2019-04-29 PROCEDURE — 99214 PR OFFICE/OUTPT VISIT, EST, LEVL IV, 30-39 MIN: ICD-10-PCS | Mod: S$GLB,,, | Performed by: INTERNAL MEDICINE

## 2019-04-29 PROCEDURE — 1101F PR PT FALLS ASSESS DOC 0-1 FALLS W/OUT INJ PAST YR: ICD-10-PCS | Mod: CPTII,S$GLB,, | Performed by: INTERNAL MEDICINE

## 2019-04-29 PROCEDURE — 3045F PR MOST RECENT HEMOGLOBIN A1C LEVEL 7.0-9.0%: CPT | Mod: CPTII,S$GLB,, | Performed by: INTERNAL MEDICINE

## 2019-04-29 PROCEDURE — 3078F DIAST BP <80 MM HG: CPT | Mod: CPTII,S$GLB,, | Performed by: INTERNAL MEDICINE

## 2019-04-29 PROCEDURE — 3078F PR MOST RECENT DIASTOLIC BLOOD PRESSURE < 80 MM HG: ICD-10-PCS | Mod: CPTII,S$GLB,, | Performed by: INTERNAL MEDICINE

## 2019-04-29 PROCEDURE — 3045F PR MOST RECENT HEMOGLOBIN A1C LEVEL 7.0-9.0%: ICD-10-PCS | Mod: CPTII,S$GLB,, | Performed by: INTERNAL MEDICINE

## 2019-04-29 PROCEDURE — 3074F SYST BP LT 130 MM HG: CPT | Mod: CPTII,S$GLB,, | Performed by: INTERNAL MEDICINE

## 2019-04-29 NOTE — PROGRESS NOTES
Subjective:       Patient ID: Ned Oliveira is a 68 y.o. male.    Chief Complaint: Follow-up (1 month)    HPI   Pt s/p right inguinal hernia repair on 2/19, with HTN, T2DM, CAD, Adrenal adenoma, Aortic atherosclerosis, HLD, BPH is here requesting a letter stating he can return to work without any restrictions. No groin pain.   Review of Systems   Constitutional: Negative for activity change, appetite change, chills, diaphoresis, fatigue, fever and unexpected weight change.   HENT: Negative for postnasal drip, rhinorrhea, sinus pressure, sneezing, sore throat, trouble swallowing and voice change.    Respiratory: Negative for cough, shortness of breath and wheezing.    Cardiovascular: Negative for chest pain, palpitations and leg swelling.   Gastrointestinal: Negative for abdominal pain, blood in stool, constipation, diarrhea, nausea and vomiting.   Genitourinary: Negative for dysuria.   Musculoskeletal: Negative for arthralgias and myalgias.   Skin: Negative for rash and wound.   Allergic/Immunologic: Negative for environmental allergies and food allergies.   Hematological: Negative for adenopathy. Does not bruise/bleed easily.       Objective:      Physical Exam   Constitutional: He is oriented to person, place, and time. He appears well-developed and well-nourished. No distress.   HENT:   Head: Normocephalic and atraumatic.   Eyes: Pupils are equal, round, and reactive to light. Conjunctivae and EOM are normal. Right eye exhibits no discharge. Left eye exhibits no discharge. No scleral icterus.   Neck: Normal range of motion. Neck supple. No JVD present.   Cardiovascular: Normal rate, regular rhythm and normal heart sounds.   No murmur heard.  Pulmonary/Chest: Effort normal and breath sounds normal. No respiratory distress. He has no wheezes. He has no rales.   Musculoskeletal: He exhibits no edema.   Lymphadenopathy:     He has no cervical adenopathy.   Neurological: He is alert and oriented to person, place, and  time.   Skin: Skin is warm and dry. No rash noted. He is not diaphoretic. No pallor.       Assessment:       1. Return to work evaluation    2. History of inguinal hernia repair    3. 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 Type 2 diabetes mellitus with complication, without long-term current use of insulin    5. CAD in native artery    6. Adrenal nodule    7. Aortic atherosclerosis    8. Essential hypertension    9. Hyperlipidemia associated with type 2 diabetes mellitus    10. Hypertension associated with diabetes    11. Benign non-nodular prostatic hyperplasia without lower urinary tract symptoms        Plan:    1/2. RTW note given to patient   3. HTN- controlled, CPT   4. T2DM with retinopathy- last HA1C of 7.1(2/19)<--10.2(7/18)<--7.0(2/18)<--7.5(11/17)<--7.0(7/17)       On Amaryl 4 mg daily, Metformin 1 gm BID and Trulicity       Followed by Endocrine        Monitor blood sugar BID   5. CAD- continue ASA/statin       Followed by Cardiology    6. Adrenal adenoma- followed by Endocrine   7. Aortic atherosclerosis- stable on ASA/statin    8. HLD associated with diabetes- stable on Zocor 40 mg daily   9. BPH- Flomax was increased to 0.8 mg daily

## 2019-05-06 ENCOUNTER — TELEPHONE (OUTPATIENT)
Dept: INTERNAL MEDICINE | Facility: CLINIC | Age: 69
End: 2019-05-06

## 2019-05-06 NOTE — TELEPHONE ENCOUNTER
Fax from [employer] stating prior auth needed on one touch ultra blue test strips to use twice daily.    Phone is    Id# [id number]   gp Id# [id number]    I will fax [employer] a form Tuesday.

## 2019-05-09 ENCOUNTER — TELEPHONE (OUTPATIENT)
Dept: INTERNAL MEDICINE | Facility: CLINIC | Age: 69
End: 2019-05-09

## 2019-05-09 ENCOUNTER — PROCEDURE VISIT (OUTPATIENT)
Dept: OPHTHALMOLOGY | Facility: CLINIC | Age: 69
End: 2019-05-09
Attending: OPHTHALMOLOGY
Payer: MEDICARE

## 2019-05-09 VITALS — SYSTOLIC BLOOD PRESSURE: 114 MMHG | DIASTOLIC BLOOD PRESSURE: 75 MMHG | HEART RATE: 52 BPM

## 2019-05-09 DIAGNOSIS — H25.13 NS (NUCLEAR SCLEROSIS), BILATERAL: ICD-10-CM

## 2019-05-09 PROCEDURE — 92014 COMPRE OPH EXAM EST PT 1/>: CPT | Mod: 25,S$GLB,, | Performed by: OPHTHALMOLOGY

## 2019-05-09 PROCEDURE — 92134 CPTRZ OPH DX IMG PST SGM RTA: CPT | Mod: S$GLB,,, | Performed by: OPHTHALMOLOGY

## 2019-05-09 PROCEDURE — 67028 PR INJECT INTRAVITREAL PHARMCOLOGIC: ICD-10-PCS | Mod: LT,S$GLB,, | Performed by: OPHTHALMOLOGY

## 2019-05-09 PROCEDURE — 67028 INJECTION EYE DRUG: CPT | Mod: LT,S$GLB,, | Performed by: OPHTHALMOLOGY

## 2019-05-09 PROCEDURE — 92014 PR EYE EXAM, EST PATIENT,COMPREHESV: ICD-10-PCS | Mod: 25,S$GLB,, | Performed by: OPHTHALMOLOGY

## 2019-05-09 PROCEDURE — 92134 POSTERIOR SEGMENT OCT RETINA (OCULAR COHERENCE TOMOGRAPHY)-BOTH EYES: ICD-10-PCS | Mod: S$GLB,,, | Performed by: OPHTHALMOLOGY

## 2019-05-09 RX ADMIN — Medication 1.25 MG: at 09:05

## 2019-05-09 NOTE — TELEPHONE ENCOUNTER
I just faxed back the questionaire.    I called and answered questions over phone too.    Formulary on website. Patient can research.    I left a message a few minutes ago for patient to advise us what meter to order from [employer].  They will need to research what is covered over phone with [employer], It depends on their plan.

## 2019-05-09 NOTE — PROGRESS NOTES
HPI     6 wk / OCT / Avastin   DLS- 03/26/2019 Dr. Rivers     Pt sts no change since last visit denies pain   (-)Flashes (-)Floaters  (-)Photophobia  (-)Glare    No gtts         OCT - OD - Decreased  OS DME decreased    Prior FA - late macular leakage OS > OD      A/P    1. Mild NPDR OU T2  2. DME OU    S/p Avastin OD x 3, OS x 10  Focal OS 5/17, OD 1-19 1/19 - increased DME at 9 weeks - resume 6 week tx    Will need maintenance OS    Get approval for Ozurdex    Avastin OS today  OD stable - continue to monitor    BS/BP/chol control    3. NS OU      6 weeks OCT      Risks, benefits, and alternatives to treatment discussed in detail with the patient.  The patient voiced understanding and wished to proceed with the procedure    Injection Procedure Note:  Diagnosis: DME OS    Patient Identified and Time Out complete  Topical Proparacaine and Betadine.  Inject Avastin OS at 6:00 @ 3.5-4mm posterior to limbus  Post Operative Dx: Same  Complications: None  Follow up as above.

## 2019-05-09 NOTE — TELEPHONE ENCOUNTER
----- Message from Ninfa Fischer sent at 5/9/2019  8:15 AM CDT -----  Contact: Dianelys hunter/[facility]  Ref #[id number]  Dianelys needs clinical questions answered  Please call  ref#[id number]

## 2019-05-09 NOTE — PATIENT INSTRUCTIONS

## 2019-05-09 NOTE — TELEPHONE ENCOUNTER
Prior auth response says won't cover the one touch ultra blue test strips.    The meter Is not preferred by [employer].    I  Am sending email to patient- voicemail on phone is in [place], ??    If he is agreeable we will need to send true metrix meter with supplies to [facility]    I left msg at [facility] prior auth not approved. Not preferred. msg sent to patient.

## 2019-06-25 ENCOUNTER — TELEPHONE (OUTPATIENT)
Dept: OPHTHALMOLOGY | Facility: CLINIC | Age: 69
End: 2019-06-25

## 2019-07-01 PROBLEM — Z13.6 SCREENING FOR AAA (ABDOMINAL AORTIC ANEURYSM): Status: RESOLVED | Noted: 2019-04-01 | Resolved: 2019-07-01

## 2019-08-01 ENCOUNTER — PROCEDURE VISIT (OUTPATIENT)
Dept: OPHTHALMOLOGY | Facility: CLINIC | Age: 69
End: 2019-08-01
Attending: OPHTHALMOLOGY
Payer: MEDICARE

## 2019-08-01 VITALS — SYSTOLIC BLOOD PRESSURE: 119 MMHG | DIASTOLIC BLOOD PRESSURE: 71 MMHG | HEART RATE: 62 BPM

## 2019-08-01 DIAGNOSIS — H25.13 NS (NUCLEAR SCLEROSIS), BILATERAL: ICD-10-CM

## 2019-08-01 PROCEDURE — 92134 CPTRZ OPH DX IMG PST SGM RTA: CPT | Mod: S$GLB,,, | Performed by: OPHTHALMOLOGY

## 2019-08-01 PROCEDURE — 92014 PR EYE EXAM, EST PATIENT,COMPREHESV: ICD-10-PCS | Mod: 25,S$GLB,, | Performed by: OPHTHALMOLOGY

## 2019-08-01 PROCEDURE — 67028 INJECTION EYE DRUG: CPT | Mod: LT,S$GLB,, | Performed by: OPHTHALMOLOGY

## 2019-08-01 PROCEDURE — 67028 PR INJECT INTRAVITREAL PHARMCOLOGIC: ICD-10-PCS | Mod: LT,S$GLB,, | Performed by: OPHTHALMOLOGY

## 2019-08-01 PROCEDURE — 92134 POSTERIOR SEGMENT OCT RETINA (OCULAR COHERENCE TOMOGRAPHY)-BOTH EYES: ICD-10-PCS | Mod: S$GLB,,, | Performed by: OPHTHALMOLOGY

## 2019-08-01 PROCEDURE — 92014 COMPRE OPH EXAM EST PT 1/>: CPT | Mod: 25,S$GLB,, | Performed by: OPHTHALMOLOGY

## 2019-08-01 RX ADMIN — Medication 1.25 MG: at 04:08

## 2019-08-01 NOTE — PROGRESS NOTES
HPI     6 wk / OCT / Avastin   DLS: 05/09/2019 Dr. Rivers     Patient states his vision has been a little worse since his last visit.   (-)flashes/floaters.     Eye meds:  AT's PRN OU    HPI     6 wk / OCT / Avastin   DLS- 03/26/2019 Dr. Rivers     Pt sts no change since last visit denies pain   (-)Flashes (-)Floaters  (-)Photophobia  (-)Glare    No gtts         OCT - OD - Decreased  OS DME decreased    Prior FA - late macular leakage OS > OD    Patient needs Monday appointments only    A/P    1. Mild NPDR OU T2  2. DME OU    S/p Avastin OD x 3, OS x 11  Focal OS 5/17, OD 1-19 1/19 - increased DME at 9 weeks - resume 6 week tx    Will need maintenance OS    Get approval for Ozurdex - hold on steroid until glc eval    Avastin OS today  OD stable - continue to monitor    BS/BP/chol control    3. NS OU  Increasing posterior changes    4. Glc suspect  Elevated IOP left      12 weeks OCT      Risks, benefits, and alternatives to treatment discussed in detail with the patient.  The patient voiced understanding and wished to proceed with the procedure    Injection Procedure Note:  Diagnosis: DME OS    Patient Identified and Time Out complete  Topical Proparacaine and Betadine.  Inject Avastin OS at 6:00 @ 3.5-4mm posterior to limbus  Post Operative Dx: Same  Complications: None  Follow up as above.

## 2019-08-01 NOTE — PATIENT INSTRUCTIONS

## 2019-08-26 ENCOUNTER — OFFICE VISIT (OUTPATIENT)
Dept: OPHTHALMOLOGY | Facility: CLINIC | Age: 69
End: 2019-08-26
Payer: MEDICARE

## 2019-08-26 DIAGNOSIS — H40.023 OPEN ANGLE WITH BORDERLINE FINDINGS AND HIGH GLAUCOMA RISK IN BOTH EYES: Primary | ICD-10-CM

## 2019-08-26 DIAGNOSIS — H40.053 BORDERLINE GLAUCOMA OF BOTH EYES WITH OCULAR HYPERTENSION: ICD-10-CM

## 2019-08-26 DIAGNOSIS — E11.3313 MODERATE NONPROLIFERATIVE DIABETIC RETINOPATHY OF BOTH EYES WITH MACULAR EDEMA ASSOCIATED WITH TYPE 2 DIABETES MELLITUS: ICD-10-CM

## 2019-08-26 PROCEDURE — 92012 PR EYE EXAM, EST PATIENT,INTERMED: ICD-10-PCS | Mod: HCNC,S$GLB,, | Performed by: OPHTHALMOLOGY

## 2019-08-26 PROCEDURE — 99999 PR PBB SHADOW E&M-EST. PATIENT-LVL II: ICD-10-PCS | Mod: PBBFAC,HCNC,, | Performed by: OPHTHALMOLOGY

## 2019-08-26 PROCEDURE — 99999 PR PBB SHADOW E&M-EST. PATIENT-LVL II: CPT | Mod: PBBFAC,HCNC,, | Performed by: OPHTHALMOLOGY

## 2019-08-26 PROCEDURE — 92012 INTRM OPH EXAM EST PATIENT: CPT | Mod: HCNC,S$GLB,, | Performed by: OPHTHALMOLOGY

## 2019-08-26 PROCEDURE — 92020 GONIOSCOPY: CPT | Mod: HCNC,S$GLB,, | Performed by: OPHTHALMOLOGY

## 2019-08-26 PROCEDURE — 92020 PR SPECIAL EYE EVAL,GONIOSCOPY: ICD-10-PCS | Mod: HCNC,S$GLB,, | Performed by: OPHTHALMOLOGY

## 2019-08-26 NOTE — PROGRESS NOTES
HPI     DLS: 8/01/19 with Dr. Rivers    Pt here for Glaucoma Consult per Dr. Rivers;    Meds; AT's PRN OU    1. Mild NPDR OU  2. DME OU  3. NS OU  4. Glaucoma Suspect      Last edited by Stephanie Mirza on 8/26/2019  2:37 PM. (History)            Assessment /Plan     For exam results, see Encounter Report.    Open angle with borderline findings and high glaucoma risk in both eyes    Borderline glaucoma of both eyes with ocular hypertension    Moderate nonproliferative diabetic retinopathy of both eyes with macular edema associated with type 2 diabetes mellitus             Glaucoma (type and duration)    Suspect - followed at [facility] since 2007    First HVF   2007   First photos   2009   Treatment / Drops started   none           Family history    Neg (+for DR - mom)         Glaucoma meds    None         H/O adverse rxn to glaucoma drops    none        LASERS    Laser for DR // none for glaucoma         GLAUCOMA SURGERIES    none        OTHER EYE SURGERIES    None         CDR    (( by fundus photos 0.5 / 0.6 )         Tbase    15-25 / 15-31          Tmax    25/31            Ttarget    ?             HVF    4 test 2007 to  2009 - full od // full os        Gonio    +3 ou        CCT    518/525        OCT    ? test 20/ to 20/ - RNFL - / od // ? os        HRT    1 test 2009 to 2009 - MR - nl od // nl os        Disc photos    Fundus photos 2009, 2017, 2018    - Ttoday    21/24  - Test done today    Gonio (declined dilation - has to drive wife to work)     2. BDR w/ ME    Sees Silvestre    S/P avastin ou    S/P laser ou    Waiting for approval for ozurdex     3. NS

## 2019-10-07 ENCOUNTER — OFFICE VISIT (OUTPATIENT)
Dept: INTERNAL MEDICINE | Facility: CLINIC | Age: 69
End: 2019-10-07
Payer: MEDICARE

## 2019-10-07 ENCOUNTER — IMMUNIZATION (OUTPATIENT)
Dept: PHARMACY | Facility: CLINIC | Age: 69
End: 2019-10-07
Payer: MEDICARE

## 2019-10-07 VITALS
TEMPERATURE: 98 F | SYSTOLIC BLOOD PRESSURE: 100 MMHG | DIASTOLIC BLOOD PRESSURE: 60 MMHG | BODY MASS INDEX: 25.08 KG/M2 | WEIGHT: 141.56 LBS | HEART RATE: 71 BPM | HEIGHT: 63 IN

## 2019-10-07 DIAGNOSIS — I25.10 CORONARY ARTERY DISEASE, ANGINA PRESENCE UNSPECIFIED, UNSPECIFIED VESSEL OR LESION TYPE, UNSPECIFIED WHETHER NATIVE OR TRANSPLANTED HEART: ICD-10-CM

## 2019-10-07 DIAGNOSIS — E11.69 HYPERLIPIDEMIA ASSOCIATED WITH TYPE 2 DIABETES MELLITUS: ICD-10-CM

## 2019-10-07 DIAGNOSIS — I20.89 CHRONIC STABLE ANGINA: ICD-10-CM

## 2019-10-07 DIAGNOSIS — E11.59 HYPERTENSION ASSOCIATED WITH DIABETES: ICD-10-CM

## 2019-10-07 DIAGNOSIS — E78.5 HYPERLIPIDEMIA ASSOCIATED WITH TYPE 2 DIABETES MELLITUS: ICD-10-CM

## 2019-10-07 DIAGNOSIS — I15.2 HYPERTENSION ASSOCIATED WITH DIABETES: ICD-10-CM

## 2019-10-07 DIAGNOSIS — H40.1131 PRIMARY OPEN ANGLE GLAUCOMA (POAG) OF BOTH EYES, MILD STAGE: ICD-10-CM

## 2019-10-07 PROBLEM — H40.1130 PRIMARY OPEN ANGLE GLAUCOMA (POAG) OF BOTH EYES: Status: ACTIVE | Noted: 2019-10-07

## 2019-10-07 PROCEDURE — 1101F PT FALLS ASSESS-DOCD LE1/YR: CPT | Mod: HCNC,CPTII,GC,S$GLB | Performed by: STUDENT IN AN ORGANIZED HEALTH CARE EDUCATION/TRAINING PROGRAM

## 2019-10-07 PROCEDURE — 3074F PR MOST RECENT SYSTOLIC BLOOD PRESSURE < 130 MM HG: ICD-10-PCS | Mod: HCNC,CPTII,GC,S$GLB | Performed by: STUDENT IN AN ORGANIZED HEALTH CARE EDUCATION/TRAINING PROGRAM

## 2019-10-07 PROCEDURE — 1101F PR PT FALLS ASSESS DOC 0-1 FALLS W/OUT INJ PAST YR: ICD-10-PCS | Mod: HCNC,CPTII,GC,S$GLB | Performed by: STUDENT IN AN ORGANIZED HEALTH CARE EDUCATION/TRAINING PROGRAM

## 2019-10-07 PROCEDURE — 3078F DIAST BP <80 MM HG: CPT | Mod: HCNC,CPTII,GC,S$GLB | Performed by: STUDENT IN AN ORGANIZED HEALTH CARE EDUCATION/TRAINING PROGRAM

## 2019-10-07 PROCEDURE — 3078F PR MOST RECENT DIASTOLIC BLOOD PRESSURE < 80 MM HG: ICD-10-PCS | Mod: HCNC,CPTII,GC,S$GLB | Performed by: STUDENT IN AN ORGANIZED HEALTH CARE EDUCATION/TRAINING PROGRAM

## 2019-10-07 PROCEDURE — 3051F PR MOST RECENT HEMOGLOBIN A1C LEVEL 7.0 - < 8.0%: ICD-10-PCS | Mod: HCNC,CPTII,GC,S$GLB | Performed by: STUDENT IN AN ORGANIZED HEALTH CARE EDUCATION/TRAINING PROGRAM

## 2019-10-07 PROCEDURE — 99214 OFFICE O/P EST MOD 30 MIN: CPT | Mod: HCNC,GC,S$GLB, | Performed by: STUDENT IN AN ORGANIZED HEALTH CARE EDUCATION/TRAINING PROGRAM

## 2019-10-07 PROCEDURE — 99999 PR PBB SHADOW E&M-EST. PATIENT-LVL V: CPT | Mod: PBBFAC,HCNC,GC, | Performed by: STUDENT IN AN ORGANIZED HEALTH CARE EDUCATION/TRAINING PROGRAM

## 2019-10-07 PROCEDURE — 99214 PR OFFICE/OUTPT VISIT, EST, LEVL IV, 30-39 MIN: ICD-10-PCS | Mod: HCNC,GC,S$GLB, | Performed by: STUDENT IN AN ORGANIZED HEALTH CARE EDUCATION/TRAINING PROGRAM

## 2019-10-07 PROCEDURE — 3051F HG A1C>EQUAL 7.0%<8.0%: CPT | Mod: HCNC,CPTII,GC,S$GLB | Performed by: STUDENT IN AN ORGANIZED HEALTH CARE EDUCATION/TRAINING PROGRAM

## 2019-10-07 PROCEDURE — 99999 PR PBB SHADOW E&M-EST. PATIENT-LVL V: ICD-10-PCS | Mod: PBBFAC,HCNC,GC, | Performed by: STUDENT IN AN ORGANIZED HEALTH CARE EDUCATION/TRAINING PROGRAM

## 2019-10-07 PROCEDURE — 3074F SYST BP LT 130 MM HG: CPT | Mod: HCNC,CPTII,GC,S$GLB | Performed by: STUDENT IN AN ORGANIZED HEALTH CARE EDUCATION/TRAINING PROGRAM

## 2019-10-07 RX ORDER — METOPROLOL SUCCINATE 25 MG/1
25 TABLET, EXTENDED RELEASE ORAL DAILY
Qty: 90 TABLET | Refills: 3 | Status: SHIPPED | OUTPATIENT
Start: 2019-10-07 | End: 2020-05-19 | Stop reason: SDUPTHER

## 2019-10-07 RX ORDER — NITROGLYCERIN 0.4 MG/1
0.4 TABLET SUBLINGUAL EVERY 5 MIN PRN
Qty: 30 TABLET | Refills: 0 | Status: SHIPPED | OUTPATIENT
Start: 2019-10-07 | End: 2021-02-22 | Stop reason: SDUPTHER

## 2019-10-07 RX ORDER — ATORVASTATIN CALCIUM 80 MG/1
80 TABLET, FILM COATED ORAL DAILY
Qty: 90 TABLET | Refills: 3 | Status: SHIPPED | OUTPATIENT
Start: 2019-10-07 | End: 2020-05-19 | Stop reason: SDUPTHER

## 2019-10-07 RX ORDER — ACETAMINOPHEN 500 MG
1000 TABLET ORAL 2 TIMES DAILY
Refills: 0 | COMMUNITY
Start: 2019-10-07 | End: 2020-12-07

## 2019-10-07 NOTE — ASSESSMENT & PLAN NOTE
Sees Silvestre               S/P avastin ou               S/P laser ou               Waiting for approval for ozurdex  Follows with opth

## 2019-10-07 NOTE — PROGRESS NOTES
"Subjective:       Patient ID: Ned Oliveira is a 69 y.o. male.    Chief Complaint: Diabetes (check up)    Diabetes   Pertinent negatives for hypoglycemia include no dizziness or pallor. Associated symptoms include chest pain (strenous exercise ).      Mr. Oliveira is a 68 y.o M w/ a medical history significant for HTN, T2DM, CAD, Adrenal adenoma, Aortic atherosclerosis, HLD, BPH s/p inguinal hernia repair who presents to clinic for follow-up.     DM  Patient reports he was previously checking his sugars, however his A1C has been stable 7.1 and 7.4 previously, discussed there is no need for him to check his sugars at home. He is not on insulin.      CAD/Chronic Stable Angina   Patient has a history of CP and reports mild SOB w/ strenuous exertion, walking up >4 stairs, which improves w/ rest (5 min's). However, this has been going on for many years the pain is unchanged and not severe.      Per chart review; In 2009 he had an abnormal stress echo (developed angina) and LHC demonstrated diffuse "3VD".  Referred to CT surgery for CABG considering DMII, but was lost to follow up because of financial reasons.    HTN  Controlled on medications    HLD  On statin       Review of Systems   Constitutional: Negative for chills and fever.   HENT: Negative for sore throat and tinnitus.    Eyes: Negative for photophobia.        Blurry vision    Respiratory: Negative for cough, chest tightness, shortness of breath and wheezing.    Cardiovascular: Positive for chest pain (strenous exercise ). Negative for palpitations and leg swelling.   Gastrointestinal: Negative for abdominal pain, diarrhea, nausea and vomiting.        Chest burning    Genitourinary: Negative for difficulty urinating and dysuria.   Musculoskeletal: Negative for arthralgias and joint swelling.   Skin: Negative for color change and pallor.   Neurological: Negative for dizziness and light-headedness.       Objective:         Vitals:    10/07/19 1533   BP: 100/60   BP " "Location: Right arm   Patient Position: Sitting   BP Method: Large (Manual)   Pulse: 71   Temp: 98.1 °F (36.7 °C)   Weight: 64.2 kg (141 lb 8.6 oz)   Height: 5' 3" (1.6 m)       Physical Exam   Constitutional: He is oriented to person, place, and time. He appears well-developed and well-nourished.   HENT:   Head: Normocephalic and atraumatic.   Eyes: Pupils are equal, round, and reactive to light. EOM are normal.   Neck: Normal range of motion.   Cardiovascular: Normal rate and regular rhythm.   Pulmonary/Chest: Effort normal and breath sounds normal.   Abdominal: Soft. Bowel sounds are normal.   Musculoskeletal: Normal range of motion. He exhibits no edema.   Neurological: He is alert and oriented to person, place, and time.       Assessment:       1. 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 Hypertension associated with diabetes    3. Hyperlipidemia associated with type 2 diabetes mellitus    4. Primary open angle glaucoma (POAG) of both eyes, mild stage    5. Chronic stable angina    6. Coronary artery disease, angina presence unspecified, unspecified vessel or lesion type, unspecified whether native or transplanted heart        Plan:         DM  - repeat A1C  - refer to podiatry     CAD/ stable angina   Discussed with patient if his pain occurs more frequently, intensity changes he needs to go to ED immediately  - continue ASA and metoprolol will consider up titrating if symptoms persist  - continue high intensity statin 80mg  - switch metoprolol to XR  - nitroglycerin PRN   - referral to cardiology, needs PET scan    Health Maintenance   - flu shot    HLD  - continue Statin          Louis Herrera MD     Internal Medicine PGY-3        "

## 2019-10-08 NOTE — PROGRESS NOTES
"I have personally taken the history and examined this patient and agree with the resident's note as stated above with the following thoughts:  /60 (BP Location: Right arm, Patient Position: Sitting, BP Method: Large (Manual))   Pulse 71   Temp 98.1 °F (36.7 °C)   Ht 5' 3" (1.6 m)   Wt 64.2 kg (141 lb 8.6 oz)   BMI 25.07 kg/m²     Discussed getting vaccines up to date.  Discussed importance of low fat diet and exercise .  His heart disease, he really does need to follow up with  Cardiology as the requested.  "

## 2019-10-28 ENCOUNTER — OFFICE VISIT (OUTPATIENT)
Dept: OPHTHALMOLOGY | Facility: CLINIC | Age: 69
End: 2019-10-28
Payer: MEDICARE

## 2019-10-28 ENCOUNTER — CLINICAL SUPPORT (OUTPATIENT)
Dept: OPHTHALMOLOGY | Facility: CLINIC | Age: 69
End: 2019-10-28
Payer: MEDICARE

## 2019-10-28 DIAGNOSIS — E11.3313 MODERATE NONPROLIFERATIVE DIABETIC RETINOPATHY OF BOTH EYES WITH MACULAR EDEMA ASSOCIATED WITH TYPE 2 DIABETES MELLITUS: ICD-10-CM

## 2019-10-28 DIAGNOSIS — H25.13 NS (NUCLEAR SCLEROSIS), BILATERAL: ICD-10-CM

## 2019-10-28 DIAGNOSIS — H40.023 OPEN ANGLE WITH BORDERLINE FINDINGS AND HIGH GLAUCOMA RISK IN BOTH EYES: Primary | ICD-10-CM

## 2019-10-28 PROCEDURE — 92083 HUMPHREY VISUAL FIELD - OU - BOTH EYES: ICD-10-PCS | Mod: HCNC,S$GLB,, | Performed by: OPHTHALMOLOGY

## 2019-10-28 PROCEDURE — 92014 COMPRE OPH EXAM EST PT 1/>: CPT | Mod: HCNC,S$GLB,, | Performed by: OPHTHALMOLOGY

## 2019-10-28 PROCEDURE — 99499 RISK ADDL DX/OHS AUDIT: ICD-10-PCS | Mod: HCNC,S$GLB,, | Performed by: OPHTHALMOLOGY

## 2019-10-28 PROCEDURE — 92014 PR EYE EXAM, EST PATIENT,COMPREHESV: ICD-10-PCS | Mod: HCNC,S$GLB,, | Performed by: OPHTHALMOLOGY

## 2019-10-28 PROCEDURE — 99999 PR PBB SHADOW E&M-EST. PATIENT-LVL II: ICD-10-PCS | Mod: PBBFAC,HCNC,, | Performed by: OPHTHALMOLOGY

## 2019-10-28 PROCEDURE — 92020 PR SPECIAL EYE EVAL,GONIOSCOPY: ICD-10-PCS | Mod: HCNC,S$GLB,, | Performed by: OPHTHALMOLOGY

## 2019-10-28 PROCEDURE — 92083 EXTENDED VISUAL FIELD XM: CPT | Mod: HCNC,S$GLB,, | Performed by: OPHTHALMOLOGY

## 2019-10-28 PROCEDURE — 92133 CPTRZD OPH DX IMG PST SGM ON: CPT | Mod: HCNC,S$GLB,, | Performed by: OPHTHALMOLOGY

## 2019-10-28 PROCEDURE — 99499 UNLISTED E&M SERVICE: CPT | Mod: HCNC,S$GLB,, | Performed by: OPHTHALMOLOGY

## 2019-10-28 PROCEDURE — 92133 POSTERIOR SEGMENT OCT OPTIC NERVE(OCULAR COHERENCE TOMOGRAPHY) - OU - BOTH EYES: ICD-10-PCS | Mod: HCNC,S$GLB,, | Performed by: OPHTHALMOLOGY

## 2019-10-28 PROCEDURE — 92020 GONIOSCOPY: CPT | Mod: HCNC,S$GLB,, | Performed by: OPHTHALMOLOGY

## 2019-10-28 PROCEDURE — 99999 PR PBB SHADOW E&M-EST. PATIENT-LVL II: CPT | Mod: PBBFAC,HCNC,, | Performed by: OPHTHALMOLOGY

## 2019-10-28 NOTE — PROGRESS NOTES
HPI     Glaucoma      Additional comments: HVF and OCT review today               Blurred Vision      Additional comments: Pt c/o difficulty reading small print              Comments     DLS: 8/26/19    1. Glaucoma Suspect  2. Type 2 DM  3. BDR with ME OU  4. NS OU    MEDS:  AT's PRN OU            Last edited by Brittany Villavicencio MA on 10/28/2019  2:21 PM. (History)            Assessment /Plan     For exam results, see Encounter Report.    There are no diagnoses linked to this encounter.                  Glaucoma (type and duration)    Suspect - followed at [facility] since 2007    First HVF   2007   First photos   2009   Treatment / Drops started   none           Family history    Neg (+for DR - mom)         Glaucoma meds    None         H/O adverse rxn to glaucoma drops    none        LASERS    Laser for DR // none for glaucoma         GLAUCOMA SURGERIES    none        OTHER EYE SURGERIES    None         CDR    (( by fundus photos 0.5 / 0.6 )         Tbase    15-25 / 15-31  (but ? If the high IOP's are post injection - had injection on those days and only 1 IOP recorded for each eye)         Tmax    24 od (11/6/2018)  - ?? Post injection // /31 os( 8/1/2019) - ? Post injection            Ttarget    ?             HVF    4 test 2007 to  2009 - full od // full os        Gonio    +3 ou        CCT    518/525        OCT    1 test 2019/ to 2019/ - RNFL WNL-od // WNL-Os---OCT MAc with DME OS        HRT    1 test 2009 to 2009 - MR - nl od // nl os        Disc photos    Fundus photos 2009, 2017, 2018    - Ttoday    22/22  - Test done today   HVF / DFE / OCT     2. BDR w/ ME    Sees Ghanshyamllkrys    S/P avastin ou    S/P laser ou    Waiting for approval for ozurdex     3. NS / cortical cat OU   Monitor     PLAN   Good HVF    nl RNFL ou   IOP 22 ou today (no injection today )  Will cont to monitor off glaucoma gtts for now - can start prn - avoid PG's 2/2 DME ou     ADDENDUM - 9/14/2020  ASKED BY KACIE - RETIN TO CONSIDER PHACO/IOL AND  GLAUCOMA SURGERY   IS GOING TO NEED LONG TERM STEROIDS   SUGGEST PHACO/IOL AND GDD - ? baerveldt vs ahmed

## 2019-10-29 NOTE — PROGRESS NOTES
HVF done;rel/fix fair OD Good OS coop. Good ou.;chart checked for latex allergy.-[facility]

## 2019-11-18 ENCOUNTER — OFFICE VISIT (OUTPATIENT)
Dept: INTERNAL MEDICINE | Facility: CLINIC | Age: 69
End: 2019-11-18
Payer: MEDICARE

## 2019-11-18 VITALS
HEART RATE: 67 BPM | SYSTOLIC BLOOD PRESSURE: 122 MMHG | HEIGHT: 60 IN | BODY MASS INDEX: 26.27 KG/M2 | TEMPERATURE: 99 F | WEIGHT: 133.81 LBS | RESPIRATION RATE: 16 BRPM | DIASTOLIC BLOOD PRESSURE: 73 MMHG

## 2019-11-18 DIAGNOSIS — J20.9 ACUTE BRONCHITIS, UNSPECIFIED ORGANISM: ICD-10-CM

## 2019-11-18 DIAGNOSIS — J32.9 VIRAL SINUSITIS: Primary | ICD-10-CM

## 2019-11-18 DIAGNOSIS — I10 HTN, GOAL BELOW 130/80: ICD-10-CM

## 2019-11-18 DIAGNOSIS — B97.89 VIRAL SINUSITIS: Primary | ICD-10-CM

## 2019-11-18 PROCEDURE — 3078F DIAST BP <80 MM HG: CPT | Mod: HCNC,CPTII,S$GLB, | Performed by: INTERNAL MEDICINE

## 2019-11-18 PROCEDURE — 99499 UNLISTED E&M SERVICE: CPT | Mod: HCNC,S$GLB,, | Performed by: INTERNAL MEDICINE

## 2019-11-18 PROCEDURE — 99499 RISK ADDL DX/OHS AUDIT: ICD-10-PCS | Mod: HCNC,S$GLB,, | Performed by: INTERNAL MEDICINE

## 2019-11-18 PROCEDURE — 99999 PR PBB SHADOW E&M-EST. PATIENT-LVL III: ICD-10-PCS | Mod: PBBFAC,HCNC,, | Performed by: INTERNAL MEDICINE

## 2019-11-18 PROCEDURE — 1101F PT FALLS ASSESS-DOCD LE1/YR: CPT | Mod: HCNC,CPTII,S$GLB, | Performed by: INTERNAL MEDICINE

## 2019-11-18 PROCEDURE — 3078F PR MOST RECENT DIASTOLIC BLOOD PRESSURE < 80 MM HG: ICD-10-PCS | Mod: HCNC,CPTII,S$GLB, | Performed by: INTERNAL MEDICINE

## 2019-11-18 PROCEDURE — 3074F PR MOST RECENT SYSTOLIC BLOOD PRESSURE < 130 MM HG: ICD-10-PCS | Mod: HCNC,CPTII,S$GLB, | Performed by: INTERNAL MEDICINE

## 2019-11-18 PROCEDURE — 99214 PR OFFICE/OUTPT VISIT, EST, LEVL IV, 30-39 MIN: ICD-10-PCS | Mod: HCNC,S$GLB,, | Performed by: INTERNAL MEDICINE

## 2019-11-18 PROCEDURE — 99214 OFFICE O/P EST MOD 30 MIN: CPT | Mod: HCNC,S$GLB,, | Performed by: INTERNAL MEDICINE

## 2019-11-18 PROCEDURE — 99999 PR PBB SHADOW E&M-EST. PATIENT-LVL III: CPT | Mod: PBBFAC,HCNC,, | Performed by: INTERNAL MEDICINE

## 2019-11-18 PROCEDURE — 3074F SYST BP LT 130 MM HG: CPT | Mod: HCNC,CPTII,S$GLB, | Performed by: INTERNAL MEDICINE

## 2019-11-18 PROCEDURE — 1101F PR PT FALLS ASSESS DOC 0-1 FALLS W/OUT INJ PAST YR: ICD-10-PCS | Mod: HCNC,CPTII,S$GLB, | Performed by: INTERNAL MEDICINE

## 2019-11-18 RX ORDER — FLUTICASONE PROPIONATE 50 MCG
2 SPRAY, SUSPENSION (ML) NASAL DAILY
Qty: 16 G | Refills: 12 | Status: SHIPPED | OUTPATIENT
Start: 2019-11-18 | End: 2019-12-18

## 2019-11-18 RX ORDER — CODEINE PHOSPHATE AND GUAIFENESIN 10; 100 MG/5ML; MG/5ML
5 SOLUTION ORAL 3 TIMES DAILY PRN
Qty: 236 ML | Refills: 0 | Status: SHIPPED | OUTPATIENT
Start: 2019-11-18 | End: 2019-11-28

## 2019-11-18 RX ORDER — LEVOCETIRIZINE DIHYDROCHLORIDE 5 MG/1
5 TABLET, FILM COATED ORAL NIGHTLY
Qty: 30 TABLET | Refills: 11 | Status: SHIPPED | OUTPATIENT
Start: 2019-11-18 | End: 2020-10-09

## 2019-11-18 NOTE — PROGRESS NOTES
Subjective:       Patient ID: Ned Oliveira is a 69 y.o. male.    Chief Complaint: Cough and Sore Throat    HPI   Pt with HTN is c/o 4 days of worsening sinus/chest congestion, dry cough, post nasal drip, sore throat. Minimal relief with Nyquil.   Review of Systems   Constitutional: Negative for activity change, appetite change, chills, diaphoresis, fatigue, fever and unexpected weight change.   HENT: Positive for congestion, postnasal drip, rhinorrhea, sinus pressure, sinus pain and sore throat. Negative for sneezing, trouble swallowing and voice change.    Respiratory: Positive for cough. Negative for shortness of breath and wheezing.    Cardiovascular: Negative for chest pain, palpitations and leg swelling.   Gastrointestinal: Negative for abdominal pain, blood in stool, constipation, diarrhea, nausea and vomiting.   Genitourinary: Negative for dysuria.   Musculoskeletal: Negative for arthralgias and myalgias.   Skin: Negative for rash and wound.   Allergic/Immunologic: Negative for environmental allergies and food allergies.   Hematological: Negative for adenopathy. Does not bruise/bleed easily.       Objective:      Physical Exam   Constitutional: He is oriented to person, place, and time. He appears well-developed and well-nourished. No distress.   HENT:   Head: Normocephalic and atraumatic.   Right Ear: External ear normal.   Left Ear: External ear normal.   Nose: Mucosal edema and rhinorrhea present.   Mouth/Throat: Oropharynx is clear and moist. No oropharyngeal exudate.   Eyes: Pupils are equal, round, and reactive to light. Conjunctivae and EOM are normal. Right eye exhibits no discharge. Left eye exhibits no discharge. No scleral icterus.   Neck: Normal range of motion. Neck supple. No JVD present.   Cardiovascular: Normal rate, regular rhythm and normal heart sounds.   No murmur heard.  Pulmonary/Chest: Effort normal and breath sounds normal. No respiratory distress. He has no wheezes. He has no rales.    Musculoskeletal: He exhibits no edema.   Lymphadenopathy:     He has no cervical adenopathy.   Neurological: He is alert and oriented to person, place, and time.   Skin: Skin is warm and dry. No rash noted. He is not diaphoretic. No pallor.       Assessment:       1. Viral sinusitis    2. Acute bronchitis, unspecified organism    3. HTN, goal below 130/80        Plan:    1. Rx Xyzal/Flonase, Kenalog 40 mg IM       No decongestants 2/2 HTN   2. Rx Cheratussin AC TID PRN   3. Stable, CPT

## 2019-11-25 ENCOUNTER — PROCEDURE VISIT (OUTPATIENT)
Dept: OPHTHALMOLOGY | Facility: CLINIC | Age: 69
End: 2019-11-25
Payer: MEDICARE

## 2019-11-25 VITALS — DIASTOLIC BLOOD PRESSURE: 72 MMHG | SYSTOLIC BLOOD PRESSURE: 118 MMHG | HEART RATE: 68 BPM

## 2019-11-25 DIAGNOSIS — H25.13 NS (NUCLEAR SCLEROSIS), BILATERAL: ICD-10-CM

## 2019-11-25 DIAGNOSIS — H40.1131 PRIMARY OPEN ANGLE GLAUCOMA (POAG) OF BOTH EYES, MILD STAGE: ICD-10-CM

## 2019-11-25 PROCEDURE — 92014 PR EYE EXAM, EST PATIENT,COMPREHESV: ICD-10-PCS | Mod: 25,HCNC,S$GLB, | Performed by: OPHTHALMOLOGY

## 2019-11-25 PROCEDURE — 67028 PR INJECT INTRAVITREAL PHARMCOLOGIC: ICD-10-PCS | Mod: HCNC,LT,S$GLB, | Performed by: OPHTHALMOLOGY

## 2019-11-25 PROCEDURE — 67028 INJECTION EYE DRUG: CPT | Mod: HCNC,LT,S$GLB, | Performed by: OPHTHALMOLOGY

## 2019-11-25 PROCEDURE — 92134 CPTRZ OPH DX IMG PST SGM RTA: CPT | Mod: HCNC,S$GLB,, | Performed by: OPHTHALMOLOGY

## 2019-11-25 PROCEDURE — 92134 POSTERIOR SEGMENT OCT RETINA (OCULAR COHERENCE TOMOGRAPHY)-BOTH EYES: ICD-10-PCS | Mod: HCNC,S$GLB,, | Performed by: OPHTHALMOLOGY

## 2019-11-25 PROCEDURE — 92014 COMPRE OPH EXAM EST PT 1/>: CPT | Mod: 25,HCNC,S$GLB, | Performed by: OPHTHALMOLOGY

## 2019-11-25 RX ADMIN — Medication 1.25 MG: at 03:11

## 2019-11-25 NOTE — PROGRESS NOTES
HPI     DLS: 08/1/19     3 mons ck       HPI     6 wk / OCT / Avastin   DLS: 05/09/2019 Dr. Rivers     Patient states his vision has been a little worse since his last visit.   (-)flashes/floaters.     Eye meds:  AT's PRN OU    HPI     6 wk / OCT / Avastin   DLS- 03/26/2019 Dr. Rivers     Pt sts no change since last visit denies pain   (-)Flashes (-)Floaters  (-)Photophobia  (-)Glare    No gtts         OCT - OD - stable paracentral ME  OS stable paracentral ME    Prior FA - late macular leakage OS > OD    Patient needs Monday appointments only    A/P    1. Mild NPDR OU T2  2. DME OU    S/p Avastin OD x 3, OS x 11  Focal OS 5/17, OD 1-19 1/19 - increased DME at 9 weeks - resume 6 week tx    Will need maintenance OS    Get approval for Ozurdex - hold on steroid until glc eval    Avastin OS today  OD stable - continue to monitor    BS/BP/chol control    3. NS OU  Increasing posterior changes    4. Glc suspect  Elevated IOP left      12 weeks OCT      Risks, benefits, and alternatives to treatment discussed in detail with the patient.  The patient voiced understanding and wished to proceed with the procedure    Injection Procedure Note:  Diagnosis: DME OS    Patient Identified and Time Out complete  Topical Proparacaine and Betadine.  Inject Avastin OS at 6:00 @ 3.5-4mm posterior to limbus  Post Operative Dx: Same  Complications: None  Follow up as above.

## 2019-11-25 NOTE — PATIENT INSTRUCTIONS

## 2020-01-20 ENCOUNTER — TELEPHONE (OUTPATIENT)
Dept: PODIATRY | Facility: CLINIC | Age: 70
End: 2020-01-20

## 2020-01-20 NOTE — TELEPHONE ENCOUNTER
Called pt to reshedule his appointment , he would like to cx he was at the hospital with his daughter. He will call back to reschedule

## 2020-02-18 ENCOUNTER — PROCEDURE VISIT (OUTPATIENT)
Dept: OPHTHALMOLOGY | Facility: CLINIC | Age: 70
End: 2020-02-18
Payer: MEDICARE

## 2020-02-18 VITALS — SYSTOLIC BLOOD PRESSURE: 123 MMHG | DIASTOLIC BLOOD PRESSURE: 78 MMHG

## 2020-02-18 DIAGNOSIS — H25.13 NS (NUCLEAR SCLEROSIS), BILATERAL: ICD-10-CM

## 2020-02-18 PROCEDURE — 92014 COMPRE OPH EXAM EST PT 1/>: CPT | Mod: HCNC,25,S$GLB, | Performed by: OPHTHALMOLOGY

## 2020-02-18 PROCEDURE — 92014 PR EYE EXAM, EST PATIENT,COMPREHESV: ICD-10-PCS | Mod: HCNC,25,S$GLB, | Performed by: OPHTHALMOLOGY

## 2020-02-18 PROCEDURE — 67028 INJECTION EYE DRUG: CPT | Mod: HCNC,LT,S$GLB, | Performed by: OPHTHALMOLOGY

## 2020-02-18 PROCEDURE — 67028 PR INJECT INTRAVITREAL PHARMCOLOGIC: ICD-10-PCS | Mod: HCNC,LT,S$GLB, | Performed by: OPHTHALMOLOGY

## 2020-02-18 NOTE — PROGRESS NOTES
HPI     12 wk OCT  DLS-11/25/2019 Dr. Rivers     Pt sts no change in va since last visit. OS very dry.   (-)Flashes (+)Floaters  (-)Photophobia  (-)Glare    Visine PRN         OCT - OD - stable paracentral ME  OS stable paracentral ME    Prior FA - late macular leakage OS > OD    Patient needs Monday appointments only    A/P    1. Mild NPDR OU T2  2. DME OU    S/p Avastin OD x 3, OS x 12  Focal OS 5/17, OD 1-19 1/19 - increased DME at 9 weeks - resume 6 week tx    Will need maintenance OS    Had Glc suspect eval with [facility]    Ozurdex OS  OD stable - continue to monitor    BS/BP/chol control    3. NS OU  Increasing posterior changes    4. Glc suspect  Elevated IOP left      8 weeks OCT      Risks, benefits, and alternatives to treatment discussed in detail with the patient.  The patient voiced understanding and wished to proceed with the procedure    Injection Procedure Note:  Diagnosis: DME OS    Patient Identified and Time Out complete  Topical Proparacaine and Betadine. subconj lido  Inject Ozurdex OS at 6:00 @ 3.5-4mm posterior to limbus  Post Operative Dx: Same  Complications: None  Follow up as above.

## 2020-02-18 NOTE — PATIENT INSTRUCTIONS
Please rinse the eye/eyes with artificial tears as needed during the first 1-2 for any irritation , burning, itching, or grittiness you may be experiencing. It is normal to see a small dash line or sausage like figure in your upper vision area which you may see for a few days or up to a week. Please call our office immediately before your next scheduled appointment if you are experiencing any pain , pressure or vision decrease . The phone number to call can be found on your appointment slip.

## 2020-03-16 RX ORDER — METFORMIN HYDROCHLORIDE 1000 MG/1
TABLET ORAL
Qty: 180 TABLET | Refills: 3 | Status: SHIPPED | OUTPATIENT
Start: 2020-03-16 | End: 2020-05-19 | Stop reason: SDUPTHER

## 2020-04-14 ENCOUNTER — PROCEDURE VISIT (OUTPATIENT)
Dept: OPHTHALMOLOGY | Facility: CLINIC | Age: 70
End: 2020-04-14
Attending: OPHTHALMOLOGY
Payer: MEDICARE

## 2020-04-14 VITALS — SYSTOLIC BLOOD PRESSURE: 147 MMHG | DIASTOLIC BLOOD PRESSURE: 77 MMHG | HEART RATE: 58 BPM

## 2020-04-14 DIAGNOSIS — H40.042 STEROID RESPONDERS, LEFT: ICD-10-CM

## 2020-04-14 PROCEDURE — 92014 COMPRE OPH EXAM EST PT 1/>: CPT | Mod: HCNC,S$GLB,, | Performed by: OPHTHALMOLOGY

## 2020-04-14 PROCEDURE — 92202 OPSCPY EXTND ON/MAC DRAW: CPT | Mod: HCNC,S$GLB,, | Performed by: OPHTHALMOLOGY

## 2020-04-14 PROCEDURE — 92014 PR EYE EXAM, EST PATIENT,COMPREHESV: ICD-10-PCS | Mod: HCNC,S$GLB,, | Performed by: OPHTHALMOLOGY

## 2020-04-14 PROCEDURE — 92134 POSTERIOR SEGMENT OCT RETINA (OCULAR COHERENCE TOMOGRAPHY)-BOTH EYES: ICD-10-PCS | Mod: HCNC,S$GLB,, | Performed by: OPHTHALMOLOGY

## 2020-04-14 PROCEDURE — 92202 PR OPHTHALMOSCOPY, EXT, W/DRAW OPTIC NERVE/MACULA, I&R, UNI/BI: ICD-10-PCS | Mod: HCNC,S$GLB,, | Performed by: OPHTHALMOLOGY

## 2020-04-14 PROCEDURE — 92134 CPTRZ OPH DX IMG PST SGM RTA: CPT | Mod: HCNC,S$GLB,, | Performed by: OPHTHALMOLOGY

## 2020-04-14 RX ORDER — DORZOLAMIDE HYDROCHLORIDE AND TIMOLOL MALEATE 20; 5 MG/ML; MG/ML
1 SOLUTION/ DROPS OPHTHALMIC 2 TIMES DAILY
Qty: 10 ML | Refills: 12 | Status: SHIPPED | OUTPATIENT
Start: 2020-04-14 | End: 2020-07-23 | Stop reason: SDUPTHER

## 2020-04-14 NOTE — PROGRESS NOTES
HPI     8 wk OCT OZurdex  DLS-02/18/20 Dr. Rivers     Pt sts no changes and doing well.  Denies pain   (-)Flashes (-)Floaters  (-)Photophobia  (-)Glare    Visine PRN       OCT - OD - stable paracentral ME  OS stable paracentral ME    Prior FA - late macular leakage OS > OD    Patient needs Monday appointments only    A/P    1. Mild NPDR OU T2  2. DME OU    S/p Avastin OD x 3, OS x 12  Ozurdex OS 2/20  Focal OS 5/17, OD 1-19 1/19 - increased DME at 9 weeks - resumed 6 week tx    Will need maintenance OS    Had Glc suspect eval with KL  IOP elevated today - mild steroid response    Start Cosopt BID    OD stable - continue to monitor    BS/BP/chol control    3. NS OU  Increasing posterior changes    4. Glc suspect  Elevated IOP left      8 weeks OCT

## 2020-05-19 ENCOUNTER — OFFICE VISIT (OUTPATIENT)
Dept: INTERNAL MEDICINE | Facility: CLINIC | Age: 70
End: 2020-05-19
Payer: MEDICARE

## 2020-05-19 ENCOUNTER — LAB VISIT (OUTPATIENT)
Dept: LAB | Facility: HOSPITAL | Age: 70
End: 2020-05-19
Payer: MEDICARE

## 2020-05-19 VITALS
WEIGHT: 142 LBS | SYSTOLIC BLOOD PRESSURE: 138 MMHG | BODY MASS INDEX: 27.88 KG/M2 | HEART RATE: 65 BPM | HEIGHT: 60 IN | DIASTOLIC BLOOD PRESSURE: 84 MMHG | OXYGEN SATURATION: 98 %

## 2020-05-19 DIAGNOSIS — N13.8 BPH WITH OBSTRUCTION/LOWER URINARY TRACT SYMPTOMS: ICD-10-CM

## 2020-05-19 DIAGNOSIS — E78.5 HYPERLIPIDEMIA ASSOCIATED WITH TYPE 2 DIABETES MELLITUS: ICD-10-CM

## 2020-05-19 DIAGNOSIS — I10 ESSENTIAL HYPERTENSION: ICD-10-CM

## 2020-05-19 DIAGNOSIS — R33.9 URINARY RETENTION: ICD-10-CM

## 2020-05-19 DIAGNOSIS — N40.1 BPH WITH OBSTRUCTION/LOWER URINARY TRACT SYMPTOMS: ICD-10-CM

## 2020-05-19 DIAGNOSIS — I25.10 CAD IN NATIVE ARTERY: ICD-10-CM

## 2020-05-19 DIAGNOSIS — E11.69 HYPERLIPIDEMIA ASSOCIATED WITH TYPE 2 DIABETES MELLITUS: ICD-10-CM

## 2020-05-19 DIAGNOSIS — E11.59 HYPERTENSION ASSOCIATED WITH DIABETES: ICD-10-CM

## 2020-05-19 DIAGNOSIS — I20.89 CHRONIC STABLE ANGINA: ICD-10-CM

## 2020-05-19 DIAGNOSIS — I15.2 HYPERTENSION ASSOCIATED WITH DIABETES: ICD-10-CM

## 2020-05-19 LAB
ALBUMIN SERPL BCP-MCNC: 3.9 G/DL (ref 3.5–5.2)
ALP SERPL-CCNC: 132 U/L (ref 55–135)
ALT SERPL W/O P-5'-P-CCNC: 53 U/L (ref 10–44)
ANION GAP SERPL CALC-SCNC: 7 MMOL/L (ref 8–16)
AST SERPL-CCNC: 37 U/L (ref 10–40)
BASOPHILS # BLD AUTO: 0.06 K/UL (ref 0–0.2)
BASOPHILS NFR BLD: 0.8 % (ref 0–1.9)
BILIRUB SERPL-MCNC: 1.1 MG/DL (ref 0.1–1)
BUN SERPL-MCNC: 14 MG/DL (ref 8–23)
CALCIUM SERPL-MCNC: 9 MG/DL (ref 8.7–10.5)
CHLORIDE SERPL-SCNC: 106 MMOL/L (ref 95–110)
CHOLEST SERPL-MCNC: 98 MG/DL (ref 120–199)
CHOLEST/HDLC SERPL: 2.5 {RATIO} (ref 2–5)
CO2 SERPL-SCNC: 25 MMOL/L (ref 23–29)
CREAT SERPL-MCNC: 1.1 MG/DL (ref 0.5–1.4)
DIFFERENTIAL METHOD: ABNORMAL
EOSINOPHIL # BLD AUTO: 0.2 K/UL (ref 0–0.5)
EOSINOPHIL NFR BLD: 2.5 % (ref 0–8)
ERYTHROCYTE [DISTWIDTH] IN BLOOD BY AUTOMATED COUNT: 12 % (ref 11.5–14.5)
EST. GFR  (AFRICAN AMERICAN): >60 ML/MIN/1.73 M^2
EST. GFR  (NON AFRICAN AMERICAN): >60 ML/MIN/1.73 M^2
ESTIMATED AVG GLUCOSE: 237 MG/DL (ref 68–131)
GLUCOSE SERPL-MCNC: 218 MG/DL (ref 70–110)
HBA1C MFR BLD HPLC: 9.9 % (ref 4–5.6)
HCT VFR BLD AUTO: 46.4 % (ref 40–54)
HDLC SERPL-MCNC: 39 MG/DL (ref 40–75)
HDLC SERPL: 39.8 % (ref 20–50)
HGB BLD-MCNC: 15.2 G/DL (ref 14–18)
IMM GRANULOCYTES # BLD AUTO: 0.01 K/UL (ref 0–0.04)
IMM GRANULOCYTES NFR BLD AUTO: 0.1 % (ref 0–0.5)
LDLC SERPL CALC-MCNC: 50.6 MG/DL (ref 63–159)
LYMPHOCYTES # BLD AUTO: 2.3 K/UL (ref 1–4.8)
LYMPHOCYTES NFR BLD: 32.3 % (ref 18–48)
MCH RBC QN AUTO: 32.9 PG (ref 27–31)
MCHC RBC AUTO-ENTMCNC: 32.8 G/DL (ref 32–36)
MCV RBC AUTO: 100 FL (ref 82–98)
MONOCYTES # BLD AUTO: 0.6 K/UL (ref 0.3–1)
MONOCYTES NFR BLD: 7.7 % (ref 4–15)
NEUTROPHILS # BLD AUTO: 4 K/UL (ref 1.8–7.7)
NEUTROPHILS NFR BLD: 56.6 % (ref 38–73)
NONHDLC SERPL-MCNC: 59 MG/DL
NRBC BLD-RTO: 0 /100 WBC
PLATELET # BLD AUTO: 180 K/UL (ref 150–350)
PMV BLD AUTO: 12.2 FL (ref 9.2–12.9)
POTASSIUM SERPL-SCNC: 4.3 MMOL/L (ref 3.5–5.1)
PROT SERPL-MCNC: 6.8 G/DL (ref 6–8.4)
RBC # BLD AUTO: 4.62 M/UL (ref 4.6–6.2)
SODIUM SERPL-SCNC: 138 MMOL/L (ref 136–145)
TRIGL SERPL-MCNC: 42 MG/DL (ref 30–150)
WBC # BLD AUTO: 7.15 K/UL (ref 3.9–12.7)

## 2020-05-19 PROCEDURE — 99999 PR PBB SHADOW E&M-EST. PATIENT-LVL IV: CPT | Mod: PBBFAC,HCNC,GC, | Performed by: STUDENT IN AN ORGANIZED HEALTH CARE EDUCATION/TRAINING PROGRAM

## 2020-05-19 PROCEDURE — 1126F PR PAIN SEVERITY QUANTIFIED, NO PAIN PRESENT: ICD-10-PCS | Mod: HCNC,GC,S$GLB, | Performed by: STUDENT IN AN ORGANIZED HEALTH CARE EDUCATION/TRAINING PROGRAM

## 2020-05-19 PROCEDURE — 1126F AMNT PAIN NOTED NONE PRSNT: CPT | Mod: HCNC,GC,S$GLB, | Performed by: STUDENT IN AN ORGANIZED HEALTH CARE EDUCATION/TRAINING PROGRAM

## 2020-05-19 PROCEDURE — 80061 LIPID PANEL: CPT | Mod: HCNC

## 2020-05-19 PROCEDURE — 80053 COMPREHEN METABOLIC PANEL: CPT | Mod: HCNC

## 2020-05-19 PROCEDURE — 3075F PR MOST RECENT SYSTOLIC BLOOD PRESS GE 130-139MM HG: ICD-10-PCS | Mod: HCNC,CPTII,GC,S$GLB | Performed by: STUDENT IN AN ORGANIZED HEALTH CARE EDUCATION/TRAINING PROGRAM

## 2020-05-19 PROCEDURE — 3075F SYST BP GE 130 - 139MM HG: CPT | Mod: HCNC,CPTII,GC,S$GLB | Performed by: STUDENT IN AN ORGANIZED HEALTH CARE EDUCATION/TRAINING PROGRAM

## 2020-05-19 PROCEDURE — 99213 PR OFFICE/OUTPT VISIT, EST, LEVL III, 20-29 MIN: ICD-10-PCS | Mod: HCNC,GC,S$GLB, | Performed by: STUDENT IN AN ORGANIZED HEALTH CARE EDUCATION/TRAINING PROGRAM

## 2020-05-19 PROCEDURE — 85025 COMPLETE CBC W/AUTO DIFF WBC: CPT | Mod: HCNC

## 2020-05-19 PROCEDURE — 1101F PT FALLS ASSESS-DOCD LE1/YR: CPT | Mod: HCNC,CPTII,GC,S$GLB | Performed by: STUDENT IN AN ORGANIZED HEALTH CARE EDUCATION/TRAINING PROGRAM

## 2020-05-19 PROCEDURE — 3079F PR MOST RECENT DIASTOLIC BLOOD PRESSURE 80-89 MM HG: ICD-10-PCS | Mod: HCNC,CPTII,GC,S$GLB | Performed by: STUDENT IN AN ORGANIZED HEALTH CARE EDUCATION/TRAINING PROGRAM

## 2020-05-19 PROCEDURE — 99213 OFFICE O/P EST LOW 20 MIN: CPT | Mod: HCNC,GC,S$GLB, | Performed by: STUDENT IN AN ORGANIZED HEALTH CARE EDUCATION/TRAINING PROGRAM

## 2020-05-19 PROCEDURE — 1159F MED LIST DOCD IN RCRD: CPT | Mod: HCNC,GC,S$GLB, | Performed by: STUDENT IN AN ORGANIZED HEALTH CARE EDUCATION/TRAINING PROGRAM

## 2020-05-19 PROCEDURE — 99999 PR PBB SHADOW E&M-EST. PATIENT-LVL IV: ICD-10-PCS | Mod: PBBFAC,HCNC,GC, | Performed by: STUDENT IN AN ORGANIZED HEALTH CARE EDUCATION/TRAINING PROGRAM

## 2020-05-19 PROCEDURE — 1101F PR PT FALLS ASSESS DOC 0-1 FALLS W/OUT INJ PAST YR: ICD-10-PCS | Mod: HCNC,CPTII,GC,S$GLB | Performed by: STUDENT IN AN ORGANIZED HEALTH CARE EDUCATION/TRAINING PROGRAM

## 2020-05-19 PROCEDURE — 1159F PR MEDICATION LIST DOCUMENTED IN MEDICAL RECORD: ICD-10-PCS | Mod: HCNC,GC,S$GLB, | Performed by: STUDENT IN AN ORGANIZED HEALTH CARE EDUCATION/TRAINING PROGRAM

## 2020-05-19 PROCEDURE — 3046F PR MOST RECENT HEMOGLOBIN A1C LEVEL > 9.0%: ICD-10-PCS | Mod: HCNC,CPTII,GC,S$GLB | Performed by: STUDENT IN AN ORGANIZED HEALTH CARE EDUCATION/TRAINING PROGRAM

## 2020-05-19 PROCEDURE — 3046F HEMOGLOBIN A1C LEVEL >9.0%: CPT | Mod: HCNC,CPTII,GC,S$GLB | Performed by: STUDENT IN AN ORGANIZED HEALTH CARE EDUCATION/TRAINING PROGRAM

## 2020-05-19 PROCEDURE — 3079F DIAST BP 80-89 MM HG: CPT | Mod: HCNC,CPTII,GC,S$GLB | Performed by: STUDENT IN AN ORGANIZED HEALTH CARE EDUCATION/TRAINING PROGRAM

## 2020-05-19 PROCEDURE — 36415 COLL VENOUS BLD VENIPUNCTURE: CPT | Mod: HCNC

## 2020-05-19 PROCEDURE — 83036 HEMOGLOBIN GLYCOSYLATED A1C: CPT | Mod: HCNC

## 2020-05-19 RX ORDER — ATORVASTATIN CALCIUM 80 MG/1
80 TABLET, FILM COATED ORAL DAILY
Qty: 90 TABLET | Refills: 3 | Status: SHIPPED | OUTPATIENT
Start: 2020-05-19 | End: 2020-05-25 | Stop reason: SDUPTHER

## 2020-05-19 RX ORDER — METOPROLOL SUCCINATE 25 MG/1
12.5 TABLET, EXTENDED RELEASE ORAL DAILY
Qty: 45 TABLET | Refills: 3 | Status: SHIPPED | OUTPATIENT
Start: 2020-05-19 | End: 2020-07-23 | Stop reason: SDUPTHER

## 2020-05-19 RX ORDER — GLIMEPIRIDE 4 MG/1
4 TABLET ORAL
Qty: 90 TABLET | Refills: 3 | Status: SHIPPED | OUTPATIENT
Start: 2020-05-19 | End: 2020-05-25 | Stop reason: SDUPTHER

## 2020-05-19 RX ORDER — TAMSULOSIN HYDROCHLORIDE 0.4 MG/1
0.8 CAPSULE ORAL NIGHTLY
Qty: 60 CAPSULE | Refills: 11 | Status: SHIPPED | OUTPATIENT
Start: 2020-05-19 | End: 2020-07-23 | Stop reason: SDUPTHER

## 2020-05-19 RX ORDER — METFORMIN HYDROCHLORIDE 1000 MG/1
1000 TABLET ORAL 2 TIMES DAILY WITH MEALS
Qty: 180 TABLET | Refills: 3 | Status: SHIPPED | OUTPATIENT
Start: 2020-05-19 | End: 2020-07-23 | Stop reason: SDUPTHER

## 2020-05-19 RX ORDER — LISINOPRIL AND HYDROCHLOROTHIAZIDE 10; 12.5 MG/1; MG/1
1 TABLET ORAL DAILY
Qty: 90 TABLET | Refills: 3 | Status: SHIPPED | OUTPATIENT
Start: 2020-05-19 | End: 2020-07-23 | Stop reason: SDUPTHER

## 2020-05-19 NOTE — PROGRESS NOTES
"Subjective:       Patient ID: Ned Oliveira is a 69 y.o. male.    Chief Complaint: Follow-up    Diabetes   Pertinent negatives for hypoglycemia include no dizziness or pallor. Associated symptoms include chest pain (strenous exercise ).   Follow-up   Associated symptoms include chest pain (strenous exercise ). Pertinent negatives include no abdominal pain, arthralgias, chills, coughing, fever, joint swelling, nausea, sore throat or vomiting.      Mr. Oliveira is a 68 y.o M w/ a medical history significant for HTN, T2DM, CAD, Adrenal adenoma, Aortic atherosclerosis, HLD, BPH s/p inguinal hernia repair who presents to clinic for follow-up.     Patient has not been on his medications jan 2021 (minus statin). Has no new complaints. The plan from our last visit in October, was to see cardiology for further evaluation of CAD and then return to clinic in jan. Unfortunately, he was not able to schedule an appointment with cardiology. And has been off medications since then.     DM  No recent a1c, has not been on medications  Previous A1C has been stable 7.1 and 7.4 .      CAD/Chronic Stable Angina   Patient has a history of CP and reports mild SOB w/ strenuous exertion, walking up >4 stairs, which improves w/ rest (5 min's). However, this has been going on for many years the pain is unchanged and not severe.      Per chart review; In 2009 he had an abnormal stress echo (developed angina) and LHC demonstrated diffuse "3VD".  Referred to CT surgery for CABG considering DMII, but was lost to follow up because of financial reasons.    HTN  Controlled on medications    HLD  On statin       Review of Systems   Constitutional: Negative for chills and fever.   HENT: Negative for sore throat and tinnitus.    Eyes: Negative for photophobia.        Blurry vision    Respiratory: Negative for cough, chest tightness, shortness of breath and wheezing.    Cardiovascular: Positive for chest pain (strenous exercise ). Negative for palpitations " and leg swelling.   Gastrointestinal: Negative for abdominal pain, diarrhea, nausea and vomiting.        Chest burning    Genitourinary: Negative for difficulty urinating and dysuria.   Musculoskeletal: Negative for arthralgias and joint swelling.   Skin: Negative for color change and pallor.   Neurological: Negative for dizziness and light-headedness.       Objective:         Vitals:    05/19/20 1406   BP: 138/84   Pulse: 65   SpO2: 98%   Weight: 64.4 kg (141 lb 15.6 oz)   Height: 5' (1.524 m)       Physical Exam   Constitutional: He is oriented to person, place, and time. He appears well-developed and well-nourished.   HENT:   Head: Normocephalic and atraumatic.   Eyes: Pupils are equal, round, and reactive to light. EOM are normal.   Neck: Normal range of motion.   Cardiovascular: Normal rate and regular rhythm.   Pulmonary/Chest: Effort normal and breath sounds normal.   Abdominal: Soft. Bowel sounds are normal.   Musculoskeletal: Normal range of motion. He exhibits no edema.   Neurological: He is alert and oriented to person, place, and time.       Assessment:       1. 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 Essential hypertension    3. Urinary retention    4. BPH with obstruction/lower urinary tract symptoms    5. CAD in native artery    6. Chronic stable angina    7. Hyperlipidemia associated with type 2 diabetes mellitus    8. Hypertension associated with diabetes        Plan:         DM  - repeat A1C  - referal to podiatry   - continue metformin 1000mg BID and amaryl 4mg     CAD/ stable angina   Discussed with patient if his pain occurs more frequently, intensity changes he needs to go to ED immediately  - continue ASA and metoprolol XR will consider up titrating if symptoms persist  - continue high intensity statin 80mg  - nitroglycerin PRN   - referral to cardiology, needs PET  scan    HLD  - continue atorvastatin 80mg    HTN  - lisinopril -12.5mg daily    BPH  - flomax       RTC next week           Louis Herrera MD     Internal Medicine PGY-3

## 2020-05-20 ENCOUNTER — LAB VISIT (OUTPATIENT)
Dept: PRIMARY CARE CLINIC | Facility: CLINIC | Age: 70
End: 2020-05-20
Payer: MEDICARE

## 2020-05-20 DIAGNOSIS — R05.9 COUGH: Primary | ICD-10-CM

## 2020-05-20 PROCEDURE — U0003 INFECTIOUS AGENT DETECTION BY NUCLEIC ACID (DNA OR RNA); SEVERE ACUTE RESPIRATORY SYNDROME CORONAVIRUS 2 (SARS-COV-2) (CORONAVIRUS DISEASE [COVID-19]), AMPLIFIED PROBE TECHNIQUE, MAKING USE OF HIGH THROUGHPUT TECHNOLOGIES AS DESCRIBED BY CMS-2020-01-R: HCPCS | Mod: HCNC

## 2020-05-20 NOTE — PROGRESS NOTES
I have reviewed the notes, assessments, and/or procedures performed by Dr. Herrera, I concur with his documentation of Ned Oliveira. In addition, medication non-adherence. Restart all medications medications. Check and record home BP and Pulse and bring to clinic next week. Advised using  for future visits. He and his wife are native [place] speakers. Although the wife speaks English it is not clear how well she may or may not have understood previous instructions. Trial home BP monitoring with instructions to f/u in one week to assess adherence to BP monitoring recommendations

## 2020-05-21 LAB — SARS-COV-2 RNA RESP QL NAA+PROBE: NOT DETECTED

## 2020-05-22 NOTE — PROGRESS NOTES
HPI     Glaucoma      Additional comments: overdue 4 month ck               Spots and/or Floaters      Additional comments: Pt c/o constant floater in left eye but denies   flashes or other symptoms              Comments     DLS: 10/28/19    1. Glaucoma Suspect  2. Type 2 DM  3. BDR with ME OU  4. NS OU    MEDS:  Cosopt BID OS  AT's PRN OU            Last edited by Brittany Villavicencio MA on 5/25/2020  8:08 AM. (History)            Assessment /Plan     For exam results, see Encounter Report.    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 If the high IOP's are post injection - had injection on those days and only 1 IOP recorded for each eye)         Tmax    24 od (11/6/2018)  - ?? Post injection // /34os( 4/14/2020) - ? Post injection            Ttarget    ?             HVF    4 test 2007 to  2009 - full od // full os        Gonio    +3 ou        CCT     518/525        OCT    1 test 2019/ to 2019/ - RNFL WNL-od // WNL-Os---OCT MAc with DME OS        HRT    1 test 2009 to 2009 - MR - nl od // nl os        Disc photos    Fundus photos 2009, 2017, 2018    - Ttoday    24/24  - Test done today  Gonio     2. BDR w/ ME    Sees [facility]    S/P avastin ou    S/P laser ou    Waiting for approval for ozurdex     3. NS / cortical cat OU   Monitor     PLAN   Good HVF    nl RNFL ou   IOP 24 ou  On cosopt os for IOP of 33 os on 4/14/2020 (good resp 34-->24)   Ok to use cosopt os only for now   + ozurdex IOP elevation - got ozurdex os 2/2020 and IOP up to 34 os on   If IOP goes up od can add cosopt   If IOP goes up os can add brimonidine     F/U 4 months with HVF - 24-2 ss ou  / DFE and disc photos - sooner prn

## 2020-05-25 ENCOUNTER — OFFICE VISIT (OUTPATIENT)
Dept: OPHTHALMOLOGY | Facility: CLINIC | Age: 70
End: 2020-05-25
Payer: MEDICARE

## 2020-05-25 ENCOUNTER — OFFICE VISIT (OUTPATIENT)
Dept: INTERNAL MEDICINE | Facility: CLINIC | Age: 70
End: 2020-05-25
Payer: MEDICARE

## 2020-05-25 VITALS
HEIGHT: 60 IN | HEART RATE: 68 BPM | BODY MASS INDEX: 27.92 KG/M2 | OXYGEN SATURATION: 99 % | SYSTOLIC BLOOD PRESSURE: 110 MMHG | WEIGHT: 142.19 LBS | DIASTOLIC BLOOD PRESSURE: 60 MMHG | TEMPERATURE: 97 F

## 2020-05-25 DIAGNOSIS — I70.0 AORTIC ATHEROSCLEROSIS: ICD-10-CM

## 2020-05-25 DIAGNOSIS — H40.1121 PRIMARY OPEN-ANGLE GLAUCOMA, LEFT EYE, MILD STAGE: Primary | ICD-10-CM

## 2020-05-25 DIAGNOSIS — H40.042 STEROID RESPONDERS, LEFT: ICD-10-CM

## 2020-05-25 DIAGNOSIS — I10 ESSENTIAL HYPERTENSION: ICD-10-CM

## 2020-05-25 DIAGNOSIS — E11.3313 TYPE 2 DIABETES MELLITUS WITH BOTH EYES AFFECTED BY MODERATE NONPROLIFERATIVE RETINOPATHY AND MACULAR EDEMA, WITHOUT LONG-TERM CURRENT USE OF INSULIN: ICD-10-CM

## 2020-05-25 DIAGNOSIS — E78.5 HYPERLIPIDEMIA ASSOCIATED WITH TYPE 2 DIABETES MELLITUS: ICD-10-CM

## 2020-05-25 DIAGNOSIS — E11.69 HYPERLIPIDEMIA ASSOCIATED WITH TYPE 2 DIABETES MELLITUS: ICD-10-CM

## 2020-05-25 DIAGNOSIS — I20.89 CHRONIC STABLE ANGINA: ICD-10-CM

## 2020-05-25 DIAGNOSIS — H40.023 OPEN ANGLE WITH BORDERLINE FINDINGS AND HIGH GLAUCOMA RISK IN BOTH EYES: ICD-10-CM

## 2020-05-25 DIAGNOSIS — E11.8 TYPE 2 DIABETES MELLITUS WITH COMPLICATION, WITHOUT LONG-TERM CURRENT USE OF INSULIN: Primary | ICD-10-CM

## 2020-05-25 DIAGNOSIS — E27.8 ADRENAL NODULE: ICD-10-CM

## 2020-05-25 DIAGNOSIS — I25.10 CAD IN NATIVE ARTERY: ICD-10-CM

## 2020-05-25 DIAGNOSIS — H25.13 NS (NUCLEAR SCLEROSIS), BILATERAL: ICD-10-CM

## 2020-05-25 DIAGNOSIS — E11.3313 MODERATE NONPROLIFERATIVE DIABETIC RETINOPATHY OF BOTH EYES WITH MACULAR EDEMA ASSOCIATED WITH TYPE 2 DIABETES MELLITUS: ICD-10-CM

## 2020-05-25 PROCEDURE — 1126F AMNT PAIN NOTED NONE PRSNT: CPT | Mod: HCNC,GC,S$GLB, | Performed by: STUDENT IN AN ORGANIZED HEALTH CARE EDUCATION/TRAINING PROGRAM

## 2020-05-25 PROCEDURE — 1159F MED LIST DOCD IN RCRD: CPT | Mod: HCNC,GC,S$GLB, | Performed by: STUDENT IN AN ORGANIZED HEALTH CARE EDUCATION/TRAINING PROGRAM

## 2020-05-25 PROCEDURE — 1159F PR MEDICATION LIST DOCUMENTED IN MEDICAL RECORD: ICD-10-PCS | Mod: HCNC,GC,S$GLB, | Performed by: STUDENT IN AN ORGANIZED HEALTH CARE EDUCATION/TRAINING PROGRAM

## 2020-05-25 PROCEDURE — 3074F SYST BP LT 130 MM HG: CPT | Mod: HCNC,CPTII,GC,S$GLB | Performed by: STUDENT IN AN ORGANIZED HEALTH CARE EDUCATION/TRAINING PROGRAM

## 2020-05-25 PROCEDURE — 92012 PR EYE EXAM, EST PATIENT,INTERMED: ICD-10-PCS | Mod: HCNC,S$GLB,, | Performed by: OPHTHALMOLOGY

## 2020-05-25 PROCEDURE — 3078F PR MOST RECENT DIASTOLIC BLOOD PRESSURE < 80 MM HG: ICD-10-PCS | Mod: HCNC,CPTII,GC,S$GLB | Performed by: STUDENT IN AN ORGANIZED HEALTH CARE EDUCATION/TRAINING PROGRAM

## 2020-05-25 PROCEDURE — 92020 GONIOSCOPY: CPT | Mod: HCNC,S$GLB,, | Performed by: OPHTHALMOLOGY

## 2020-05-25 PROCEDURE — 99214 PR OFFICE/OUTPT VISIT, EST, LEVL IV, 30-39 MIN: ICD-10-PCS | Mod: HCNC,GC,S$GLB, | Performed by: STUDENT IN AN ORGANIZED HEALTH CARE EDUCATION/TRAINING PROGRAM

## 2020-05-25 PROCEDURE — 3074F PR MOST RECENT SYSTOLIC BLOOD PRESSURE < 130 MM HG: ICD-10-PCS | Mod: HCNC,CPTII,GC,S$GLB | Performed by: STUDENT IN AN ORGANIZED HEALTH CARE EDUCATION/TRAINING PROGRAM

## 2020-05-25 PROCEDURE — 99999 PR PBB SHADOW E&M-EST. PATIENT-LVL III: CPT | Mod: PBBFAC,HCNC,GC, | Performed by: STUDENT IN AN ORGANIZED HEALTH CARE EDUCATION/TRAINING PROGRAM

## 2020-05-25 PROCEDURE — 99499 RISK ADDL DX/OHS AUDIT: ICD-10-PCS | Mod: HCNC,S$GLB,, | Performed by: OPHTHALMOLOGY

## 2020-05-25 PROCEDURE — 92020 PR SPECIAL EYE EVAL,GONIOSCOPY: ICD-10-PCS | Mod: HCNC,S$GLB,, | Performed by: OPHTHALMOLOGY

## 2020-05-25 PROCEDURE — 3046F HEMOGLOBIN A1C LEVEL >9.0%: CPT | Mod: HCNC,CPTII,GC,S$GLB | Performed by: STUDENT IN AN ORGANIZED HEALTH CARE EDUCATION/TRAINING PROGRAM

## 2020-05-25 PROCEDURE — 99999 PR PBB SHADOW E&M-EST. PATIENT-LVL II: CPT | Mod: PBBFAC,HCNC,, | Performed by: OPHTHALMOLOGY

## 2020-05-25 PROCEDURE — 1101F PR PT FALLS ASSESS DOC 0-1 FALLS W/OUT INJ PAST YR: ICD-10-PCS | Mod: HCNC,CPTII,GC,S$GLB | Performed by: STUDENT IN AN ORGANIZED HEALTH CARE EDUCATION/TRAINING PROGRAM

## 2020-05-25 PROCEDURE — 3046F PR MOST RECENT HEMOGLOBIN A1C LEVEL > 9.0%: ICD-10-PCS | Mod: HCNC,CPTII,GC,S$GLB | Performed by: STUDENT IN AN ORGANIZED HEALTH CARE EDUCATION/TRAINING PROGRAM

## 2020-05-25 PROCEDURE — 99214 OFFICE O/P EST MOD 30 MIN: CPT | Mod: HCNC,GC,S$GLB, | Performed by: STUDENT IN AN ORGANIZED HEALTH CARE EDUCATION/TRAINING PROGRAM

## 2020-05-25 PROCEDURE — 92012 INTRM OPH EXAM EST PATIENT: CPT | Mod: HCNC,S$GLB,, | Performed by: OPHTHALMOLOGY

## 2020-05-25 PROCEDURE — 99999 PR PBB SHADOW E&M-EST. PATIENT-LVL II: ICD-10-PCS | Mod: PBBFAC,HCNC,, | Performed by: OPHTHALMOLOGY

## 2020-05-25 PROCEDURE — 1126F PR PAIN SEVERITY QUANTIFIED, NO PAIN PRESENT: ICD-10-PCS | Mod: HCNC,GC,S$GLB, | Performed by: STUDENT IN AN ORGANIZED HEALTH CARE EDUCATION/TRAINING PROGRAM

## 2020-05-25 PROCEDURE — 1101F PT FALLS ASSESS-DOCD LE1/YR: CPT | Mod: HCNC,CPTII,GC,S$GLB | Performed by: STUDENT IN AN ORGANIZED HEALTH CARE EDUCATION/TRAINING PROGRAM

## 2020-05-25 PROCEDURE — 3078F DIAST BP <80 MM HG: CPT | Mod: HCNC,CPTII,GC,S$GLB | Performed by: STUDENT IN AN ORGANIZED HEALTH CARE EDUCATION/TRAINING PROGRAM

## 2020-05-25 PROCEDURE — 99499 UNLISTED E&M SERVICE: CPT | Mod: HCNC,S$GLB,, | Performed by: OPHTHALMOLOGY

## 2020-05-25 PROCEDURE — 99999 PR PBB SHADOW E&M-EST. PATIENT-LVL III: ICD-10-PCS | Mod: PBBFAC,HCNC,GC, | Performed by: STUDENT IN AN ORGANIZED HEALTH CARE EDUCATION/TRAINING PROGRAM

## 2020-05-25 RX ORDER — GLIMEPIRIDE 4 MG/1
4 TABLET ORAL
Qty: 90 TABLET | Refills: 3 | Status: SHIPPED | OUTPATIENT
Start: 2020-05-25 | End: 2020-07-23 | Stop reason: SDUPTHER

## 2020-05-25 RX ORDER — ATORVASTATIN CALCIUM 80 MG/1
80 TABLET, FILM COATED ORAL DAILY
Qty: 90 TABLET | Refills: 3 | Status: SHIPPED | OUTPATIENT
Start: 2020-05-25 | End: 2020-07-23 | Stop reason: SDUPTHER

## 2020-05-25 NOTE — PROGRESS NOTES
"Subjective:       Patient ID: Ned Oliveira is a 69 y.o. male.    Chief Complaint: No chief complaint on file.    Follow-up   Associated symptoms include chest pain (strenous exercise ). Pertinent negatives include no abdominal pain, arthralgias, chills, coughing, fever, joint swelling, nausea, sore throat or vomiting.   Diabetes   Pertinent negatives for hypoglycemia include no dizziness or pallor. Associated symptoms include chest pain (strenous exercise ).      Mr. Oliveira is a 68 y.o M w/ a medical history significant for HTN, T2DM, CAD, Adrenal adenoma, Aortic atherosclerosis, HLD, BPH s/p inguinal hernia repair who presents to clinic for follow-up.     Patient was seen in clinic last week (5/19/20) at which time patient reported non-compliance with medications since jan 2020 (minus statin). I restarted his all his medications, and this appointment was scheduled as a follow-up for compliance, questions, monitor any adverse effects and new labs which were obtained at previous visit.       1) DM  No recent a1c, has not been on medications  Recent A1c 9.9%, previous A1C has been stable 7.4 .     - appointment for opthalmology and podiatry scheduled, June 4    2) CAD/Chronic Stable Angina   Patient has a history of CP and reports mild SOB w/ strenuous exertion, walking up >4 stairs, which improves w/ rest (5 min's). However, this has been going on for many years the pain is unchanged and not severe.    Per chart review; In 2009 he had an abnormal stress echo (developed angina) and LHC demonstrated diffuse "3VD".  Referred to CT surgery for CABG considering DMII, but was lost to follow up because of financial reasons.  Coronary Angiogram 7/28/2009  LAD, Circ, RCA with diffuse disease, PAMELA 3 flow     - Cardiology appointment scheduled June 4 2020    3) HTN  Controlled on medications    4) HLD  On statin     Review of Systems   Constitutional: Negative for chills and fever.   HENT: Negative for sore throat and " tinnitus.    Eyes: Negative for photophobia.        Blurry vision    Respiratory: Negative for cough, chest tightness, shortness of breath and wheezing.    Cardiovascular: Positive for chest pain (strenous exercise ). Negative for palpitations and leg swelling.   Gastrointestinal: Negative for abdominal pain, diarrhea, nausea and vomiting.        Chest burning    Genitourinary: Negative for difficulty urinating and dysuria.   Musculoskeletal: Negative for arthralgias and joint swelling.   Skin: Negative for color change and pallor.   Neurological: Negative for dizziness and light-headedness.       Objective:         Vitals:    05/25/20 1414   BP: 110/60   BP Location: Left arm   Patient Position: Sitting   BP Method: Medium (Manual)   Pulse: 68   Temp: 97.2 °F (36.2 °C)   SpO2: 99%   Weight: 64.5 kg (142 lb 3.2 oz)   Height: 5' (1.524 m)       Physical Exam   Constitutional: He is oriented to person, place, and time. He appears well-developed and well-nourished.   HENT:   Head: Normocephalic and atraumatic.   Eyes: Pupils are equal, round, and reactive to light. EOM are normal.   Neck: Normal range of motion.   Cardiovascular: Normal rate and regular rhythm.   Pulmonary/Chest: Effort normal and breath sounds normal.   Abdominal: Soft. Bowel sounds are normal.   Musculoskeletal: Normal range of motion. He exhibits no edema.   Neurological: He is alert and oriented to person, place, and time.       Assessment:       1. Type 2 diabetes mellitus with complication, without long-term current use of insulin    2. Hyperlipidemia associated with type 2 diabetes mellitus    3. CAD in native artery    4. Essential hypertension    5. Chronic stable angina        Plan:         DM  - A1C repeated 9.9%  - referal to podiatry and opthalmology   - continue metformin 1000mg BID and amaryl 4mg   - stressed the importance of medication compliance, will not start insulin, repeat a1c in 3 months if high- start insulin     CAD/ stable  "angina   Per chart review; In 2009 he had an abnormal stress echo (developed angina) and LHC demonstrated diffuse "3VD".  Referred to CT surgery for CABG considering DMII, but was lost to follow up because of financial reasons.  Coronary Angiogram 7/28/2009  LAD, Circ, RCA with diffuse disease, PAMELA 3 flow    Discussed with patient if his pain occurs more frequently, intensity changes he needs to go to ED immediately  - continue ASA and metoprolol XR will consider up titrating if symptoms persist  - continue high intensity statin 80mg  - nitroglycerin PRN   - referral to cardiology        HLD  - continue atorvastatin 80mg    HTN  - lisinopril - HCTZ 10/12.5mg daily    BPH  - flomax       RTC in 3 months  # repeat a1c, start insulin if needed  # discuss cardiology appointment/   # compliance with meds          Louis Herrera MD     Internal Medicine PGY-3        "

## 2020-06-04 ENCOUNTER — OFFICE VISIT (OUTPATIENT)
Dept: CARDIOLOGY | Facility: CLINIC | Age: 70
End: 2020-06-04
Payer: MEDICARE

## 2020-06-04 ENCOUNTER — TELEPHONE (OUTPATIENT)
Dept: PODIATRY | Facility: CLINIC | Age: 70
End: 2020-06-04

## 2020-06-04 VITALS
HEIGHT: 64 IN | DIASTOLIC BLOOD PRESSURE: 71 MMHG | WEIGHT: 140 LBS | SYSTOLIC BLOOD PRESSURE: 123 MMHG | BODY MASS INDEX: 23.9 KG/M2 | HEART RATE: 69 BPM

## 2020-06-04 DIAGNOSIS — E11.69 HYPERLIPIDEMIA ASSOCIATED WITH TYPE 2 DIABETES MELLITUS: Primary | ICD-10-CM

## 2020-06-04 DIAGNOSIS — I25.10 CAD IN NATIVE ARTERY: ICD-10-CM

## 2020-06-04 DIAGNOSIS — E11.59 HYPERTENSION ASSOCIATED WITH DIABETES: ICD-10-CM

## 2020-06-04 DIAGNOSIS — E78.5 HYPERLIPIDEMIA ASSOCIATED WITH TYPE 2 DIABETES MELLITUS: Primary | ICD-10-CM

## 2020-06-04 DIAGNOSIS — N40.0 BENIGN PROSTATIC HYPERPLASIA, UNSPECIFIED WHETHER LOWER URINARY TRACT SYMPTOMS PRESENT: ICD-10-CM

## 2020-06-04 DIAGNOSIS — I15.2 HYPERTENSION ASSOCIATED WITH DIABETES: ICD-10-CM

## 2020-06-04 PROCEDURE — 99999 PR PBB SHADOW E&M-EST. PATIENT-LVL IV: ICD-10-PCS | Mod: PBBFAC,HCNC,GC, | Performed by: STUDENT IN AN ORGANIZED HEALTH CARE EDUCATION/TRAINING PROGRAM

## 2020-06-04 PROCEDURE — 1126F AMNT PAIN NOTED NONE PRSNT: CPT | Mod: HCNC,GC,S$GLB, | Performed by: STUDENT IN AN ORGANIZED HEALTH CARE EDUCATION/TRAINING PROGRAM

## 2020-06-04 PROCEDURE — 3046F HEMOGLOBIN A1C LEVEL >9.0%: CPT | Mod: HCNC,CPTII,GC,S$GLB | Performed by: STUDENT IN AN ORGANIZED HEALTH CARE EDUCATION/TRAINING PROGRAM

## 2020-06-04 PROCEDURE — 3078F DIAST BP <80 MM HG: CPT | Mod: HCNC,CPTII,GC,S$GLB | Performed by: STUDENT IN AN ORGANIZED HEALTH CARE EDUCATION/TRAINING PROGRAM

## 2020-06-04 PROCEDURE — 3078F PR MOST RECENT DIASTOLIC BLOOD PRESSURE < 80 MM HG: ICD-10-PCS | Mod: HCNC,CPTII,GC,S$GLB | Performed by: STUDENT IN AN ORGANIZED HEALTH CARE EDUCATION/TRAINING PROGRAM

## 2020-06-04 PROCEDURE — 3046F PR MOST RECENT HEMOGLOBIN A1C LEVEL > 9.0%: ICD-10-PCS | Mod: HCNC,CPTII,GC,S$GLB | Performed by: STUDENT IN AN ORGANIZED HEALTH CARE EDUCATION/TRAINING PROGRAM

## 2020-06-04 PROCEDURE — 99999 PR PBB SHADOW E&M-EST. PATIENT-LVL IV: CPT | Mod: PBBFAC,HCNC,GC, | Performed by: STUDENT IN AN ORGANIZED HEALTH CARE EDUCATION/TRAINING PROGRAM

## 2020-06-04 PROCEDURE — 1101F PT FALLS ASSESS-DOCD LE1/YR: CPT | Mod: HCNC,CPTII,GC,S$GLB | Performed by: STUDENT IN AN ORGANIZED HEALTH CARE EDUCATION/TRAINING PROGRAM

## 2020-06-04 PROCEDURE — 3074F PR MOST RECENT SYSTOLIC BLOOD PRESSURE < 130 MM HG: ICD-10-PCS | Mod: HCNC,CPTII,GC,S$GLB | Performed by: STUDENT IN AN ORGANIZED HEALTH CARE EDUCATION/TRAINING PROGRAM

## 2020-06-04 PROCEDURE — 1159F PR MEDICATION LIST DOCUMENTED IN MEDICAL RECORD: ICD-10-PCS | Mod: HCNC,GC,S$GLB, | Performed by: STUDENT IN AN ORGANIZED HEALTH CARE EDUCATION/TRAINING PROGRAM

## 2020-06-04 PROCEDURE — 99214 PR OFFICE/OUTPT VISIT, EST, LEVL IV, 30-39 MIN: ICD-10-PCS | Mod: HCNC,GC,S$GLB, | Performed by: STUDENT IN AN ORGANIZED HEALTH CARE EDUCATION/TRAINING PROGRAM

## 2020-06-04 PROCEDURE — 1126F PR PAIN SEVERITY QUANTIFIED, NO PAIN PRESENT: ICD-10-PCS | Mod: HCNC,GC,S$GLB, | Performed by: STUDENT IN AN ORGANIZED HEALTH CARE EDUCATION/TRAINING PROGRAM

## 2020-06-04 PROCEDURE — 1159F MED LIST DOCD IN RCRD: CPT | Mod: HCNC,GC,S$GLB, | Performed by: STUDENT IN AN ORGANIZED HEALTH CARE EDUCATION/TRAINING PROGRAM

## 2020-06-04 PROCEDURE — 99214 OFFICE O/P EST MOD 30 MIN: CPT | Mod: HCNC,GC,S$GLB, | Performed by: STUDENT IN AN ORGANIZED HEALTH CARE EDUCATION/TRAINING PROGRAM

## 2020-06-04 PROCEDURE — 3074F SYST BP LT 130 MM HG: CPT | Mod: HCNC,CPTII,GC,S$GLB | Performed by: STUDENT IN AN ORGANIZED HEALTH CARE EDUCATION/TRAINING PROGRAM

## 2020-06-04 PROCEDURE — 1101F PR PT FALLS ASSESS DOC 0-1 FALLS W/OUT INJ PAST YR: ICD-10-PCS | Mod: HCNC,CPTII,GC,S$GLB | Performed by: STUDENT IN AN ORGANIZED HEALTH CARE EDUCATION/TRAINING PROGRAM

## 2020-06-04 NOTE — TELEPHONE ENCOUNTER
----- Message from Iris Marin sent at 6/4/2020 11:35 AM CDT -----  Pt returned a missed call and asked for another call back.        Pt contact # [phone].      Thanks

## 2020-06-04 NOTE — PROGRESS NOTES
Have personally discussed this patient's H&P with the resident.  I agree with the resident's note including the assessment and plan with the following additions or corrections:    /60 (BP Location: Left arm, Patient Position: Sitting, BP Method: Medium (Manual))   Pulse 68   Temp 97.2 °F (36.2 °C)   Ht 5' (1.524 m)   Wt 64.5 kg (142 lb 3.2 oz)   SpO2 99%   BMI 27.77 kg/m²        He should probably start on insulin and we discussed this.  If his some noncompliance with the metformin.  He is going to try to make sure he is takes that regularly and will follow-up again in 3 months to make sure his sugars are getting better.  Also we stressed the importance of diet and also the importance of exercise and weight loss.

## 2020-06-04 NOTE — PROGRESS NOTES
"Subjective:   Patient ID:  Ned Oliveira is a 69 y.o. male who presents for follow-up of CAD in native artery      HPI:   Pt is a 68 y/o man here for follow-up.  He has seen Dr. Parker and Dr. Milton in the past.  He has T2DM, HTN, HLD and CAD and was a former smoker.  In 2009 he had an abnormal stress echo (developed angina) and LHC demonstrated diffuse "3VD".  Referred to CT surgery for CABG considering DMII, but was lost to follow up because of financial reasons.  He was placed on medical therapy which has controlled his angina quite well.     On assessment today, the patient states that he denies any exertional chest discomfort, exertional dyspnea, palpitations, TIA's, syncope or presyncope. Stopped working April 2020 due to COVID-19. Reports that he has been experiencing fatigue over the last year, but has worsened since he stopped working (prior he was a  and was walking several miles a day).     Has been taking SL NTG occasionally, reports only when he feels "tingling" in the left side of his chest, above clavicle usually when he is sitting. Associated with fatigue.     Reports compliance with ASA, statin.     Echo   CONCLUSIONS     1 - Normal left ventricular systolic function (EF 55-60%).     2 - Normal left ventricular diastolic function.     3 - Mild mitral regurgitation.     4 - Normal right ventricular systolic function .       Vitals:    06/04/20 1358   BP: 123/71   BP Location: Left arm   Patient Position: Sitting   BP Method: Large (Automatic)   Pulse: 69   Weight: 63.5 kg (139 lb 15.9 oz)   Height: 5' 4" (1.626 m)     Body mass index is 24.03 kg/m².  CrCl cannot be calculated (Patient's most recent lab result is older than the maximum 7 days allowed.).    Lab Results   Component Value Date     05/19/2020    K 4.3 05/19/2020     05/19/2020    CO2 25 05/19/2020    BUN 14 05/19/2020    CREATININE 1.1 05/19/2020     (H) 05/19/2020    HGBA1C 9.9 (H) " "05/19/2020    MG 2.3 07/27/2009    AST 37 05/19/2020    ALT 53 (H) 05/19/2020    ALBUMIN 3.9 05/19/2020    PROT 6.8 05/19/2020    BILITOT 1.1 (H) 05/19/2020    WBC 7.15 05/19/2020    HGB 15.2 05/19/2020    HCT 46.4 05/19/2020    HCT 45 05/21/2016     (H) 05/19/2020     05/19/2020    INR 1.0 12/05/2016    PSA 0.56 07/02/2018    TSH 0.880 07/02/2018    CHOL 98 (L) 05/19/2020    HDL 39 (L) 05/19/2020    LDLCALC 50.6 (L) 05/19/2020    TRIG 42 05/19/2020       Current Outpatient Medications   Medication Sig    acetaminophen (TYLENOL) 500 MG tablet Take 2 tablets (1,000 mg total) by mouth 2 (two) times daily.    aspirin (ECOTRIN) 81 MG EC tablet Take 1 tablet (81 mg total) by mouth once daily.    atorvastatin (LIPITOR) 80 MG tablet Take 1 tablet (80 mg total) by mouth once daily.    blood sugar diagnostic Strp To use as directed to monitor blood sugars daily    ciclopirox (PENLAC) 8 % Soln Apply topically nightly.    dorzolamide-timolol 2-0.5% (COSOPT) 22.3-6.8 mg/mL ophthalmic solution Place 1 drop into the left eye 2 (two) times daily.    glimepiride (AMARYL) 4 MG tablet Take 1 tablet (4 mg total) by mouth before breakfast.    lancets (ONETOUCH ULTRASOFT LANCETS) Misc Check blood sugars BID    levocetirizine (XYZAL) 5 MG tablet Take 1 tablet (5 mg total) by mouth every evening.    lisinopriL-hydrochlorothiazide (PRINZIDE,ZESTORETIC) 10-12.5 mg per tablet Take 1 tablet by mouth once daily.    metoprolol succinate (TOPROL-XL) 25 MG 24 hr tablet Take 0.5 tablets (12.5 mg total) by mouth once daily.    nitroGLYCERIN (NITROSTAT) 0.4 MG SL tablet Place 1 tablet (0.4 mg total) under the tongue every 5 (five) minutes as needed for Chest pain (ONLY IF HAVE CHEST PAIN,).    pen needle, diabetic (BD ULTRA-FINE WESTON PEN NEEDLE) 32 gauge x 5/32" Ndle Use to inject Trulicity qwk    tamsulosin (FLOMAX) 0.4 mg Cap Take 2 capsules (0.8 mg total) by mouth every evening.    metFORMIN (GLUCOPHAGE) 1000 MG " tablet Take 1 tablet (1,000 mg total) by mouth 2 (two) times daily with meals. (Patient not taking: Reported on 6/4/2020)     No current facility-administered medications for this visit.        Review of Systems   Constitution: Positive for malaise/fatigue. Negative for decreased appetite, weight gain and weight loss.   Eyes: Negative for visual disturbance.   Cardiovascular: Negative for chest pain, claudication, dyspnea on exertion, irregular heartbeat, orthopnea, palpitations, paroxysmal nocturnal dyspnea and syncope.   Respiratory: Negative for cough, shortness of breath and snoring.    Skin: Negative for rash.   Musculoskeletal: Negative for arthritis, muscle cramps, muscle weakness and myalgias.   Gastrointestinal: Negative for abdominal pain, anorexia, change in bowel habit and nausea.   Genitourinary: Negative for dysuria and frequency.   Neurological: Negative for excessive daytime sleepiness, dizziness, headaches, loss of balance, numbness and weakness.   Psychiatric/Behavioral: Negative for depression.       Objective:   Physical Exam   Constitutional: He is oriented to person, place, and time. He appears well-developed and well-nourished.   HENT:   Head: Normocephalic and atraumatic.   Eyes: Pupils are equal, round, and reactive to light.   Neck: Normal range of motion. Neck supple.   Cardiovascular: Normal rate, regular rhythm, normal heart sounds, intact distal pulses and normal pulses. Exam reveals no gallop.   No murmur heard.  Pulmonary/Chest: Effort normal and breath sounds normal.   Abdominal: Soft. Bowel sounds are normal. There is no hepatosplenomegaly. There is no tenderness.   Musculoskeletal: Normal range of motion.   Neurological: He is alert and oriented to person, place, and time.   Skin: Skin is warm and dry.   Psychiatric: He has a normal mood and affect. His speech is normal and behavior is normal. Judgment and thought content normal.       Assessment:   Hyperlipidemia associated with  type 2 diabetes mellitus    CAD in native artery  -     Ambulatory referral/consult to Cardiology  -     Cardiac PET Scan Stress; Future    Hypertension associated with diabetes    Benign prostatic hyperplasia, unspecified whether lower urinary tract symptoms present      Plan:     1. CAD  Per chart review; In 2009 he had an abnormal stress echo (developed angina) and [facility] demonstrated diffuse three vessel disease. Referred to CT surgery for CABG considering DMII, but was lost to follow up because of financial reasons.  Coronary Angiogram 7/28/2009  LAD, Circ, RCA with diffuse disease, PAMELA 3 flow  Denies chest discomfort, but endorsing [place] and worsening fatigue over the last year that are concerning as an anginal equivalent  - Continue metoprolol-XL 12.5 mg daily   - Continue atorvastatin 80 mg qHS  - Continue Aspirin 81mg PO Daily  - Was prescribed NTG 0.4 mg SL by PCP   - Will order PET stress/rest    2. HTN - BP at goal (<130/80)  - Takes lisinopril-HCTZ (10-12.5), metoprolol-XL 12.5 mg daily  - Continue current regimen  - Discussed low-salt diet, exercise (at least 150 minutes weekly)    3. DMII  - Continue metformin, glimepiride (management per PCP)    4. HLD - LDL 05/2020 was 51   - Continue atorvastatin 80 mg qHS     5. BPH  - Continue Flomax 0.4 mg     Follow-up in 3 months    Staffed with Dr. Shirley.    Kendal Mendoza MD  Cardiology - PGY4  Pager [phone]

## 2020-06-05 ENCOUNTER — OFFICE VISIT (OUTPATIENT)
Dept: PODIATRY | Facility: CLINIC | Age: 70
End: 2020-06-05
Payer: MEDICARE

## 2020-06-05 VITALS
HEART RATE: 55 BPM | BODY MASS INDEX: 23.9 KG/M2 | DIASTOLIC BLOOD PRESSURE: 68 MMHG | HEIGHT: 64 IN | SYSTOLIC BLOOD PRESSURE: 112 MMHG | WEIGHT: 140 LBS

## 2020-06-05 DIAGNOSIS — M20.42 HAMMER TOES OF BOTH FEET: Primary | ICD-10-CM

## 2020-06-05 DIAGNOSIS — E11.8 TYPE 2 DIABETES MELLITUS WITH COMPLICATION, WITHOUT LONG-TERM CURRENT USE OF INSULIN: ICD-10-CM

## 2020-06-05 DIAGNOSIS — B35.3 TINEA PEDIS OF RIGHT FOOT: ICD-10-CM

## 2020-06-05 DIAGNOSIS — M89.8X7 EXOSTOSIS OF RIGHT FOOT: ICD-10-CM

## 2020-06-05 DIAGNOSIS — M20.41 HAMMER TOES OF BOTH FEET: Primary | ICD-10-CM

## 2020-06-05 PROCEDURE — 99999 PR PBB SHADOW E&M-EST. PATIENT-LVL III: ICD-10-PCS | Mod: PBBFAC,HCNC,, | Performed by: PODIATRIST

## 2020-06-05 PROCEDURE — 3074F PR MOST RECENT SYSTOLIC BLOOD PRESSURE < 130 MM HG: ICD-10-PCS | Mod: HCNC,CPTII,S$GLB, | Performed by: PODIATRIST

## 2020-06-05 PROCEDURE — 99203 PR OFFICE/OUTPT VISIT, NEW, LEVL III, 30-44 MIN: ICD-10-PCS | Mod: HCNC,S$GLB,, | Performed by: PODIATRIST

## 2020-06-05 PROCEDURE — 1159F MED LIST DOCD IN RCRD: CPT | Mod: HCNC,S$GLB,, | Performed by: PODIATRIST

## 2020-06-05 PROCEDURE — 3078F DIAST BP <80 MM HG: CPT | Mod: HCNC,CPTII,S$GLB, | Performed by: PODIATRIST

## 2020-06-05 PROCEDURE — 1101F PT FALLS ASSESS-DOCD LE1/YR: CPT | Mod: HCNC,CPTII,S$GLB, | Performed by: PODIATRIST

## 2020-06-05 PROCEDURE — 99999 PR PBB SHADOW E&M-EST. PATIENT-LVL III: CPT | Mod: PBBFAC,HCNC,, | Performed by: PODIATRIST

## 2020-06-05 PROCEDURE — 1126F PR PAIN SEVERITY QUANTIFIED, NO PAIN PRESENT: ICD-10-PCS | Mod: HCNC,S$GLB,, | Performed by: PODIATRIST

## 2020-06-05 PROCEDURE — 3074F SYST BP LT 130 MM HG: CPT | Mod: HCNC,CPTII,S$GLB, | Performed by: PODIATRIST

## 2020-06-05 PROCEDURE — 1101F PR PT FALLS ASSESS DOC 0-1 FALLS W/OUT INJ PAST YR: ICD-10-PCS | Mod: HCNC,CPTII,S$GLB, | Performed by: PODIATRIST

## 2020-06-05 PROCEDURE — 1126F AMNT PAIN NOTED NONE PRSNT: CPT | Mod: HCNC,S$GLB,, | Performed by: PODIATRIST

## 2020-06-05 PROCEDURE — 99499 RISK ADDL DX/OHS AUDIT: ICD-10-PCS | Mod: HCNC,S$GLB,, | Performed by: PODIATRIST

## 2020-06-05 PROCEDURE — 3078F PR MOST RECENT DIASTOLIC BLOOD PRESSURE < 80 MM HG: ICD-10-PCS | Mod: HCNC,CPTII,S$GLB, | Performed by: PODIATRIST

## 2020-06-05 PROCEDURE — 3046F PR MOST RECENT HEMOGLOBIN A1C LEVEL > 9.0%: ICD-10-PCS | Mod: HCNC,CPTII,S$GLB, | Performed by: PODIATRIST

## 2020-06-05 PROCEDURE — 3046F HEMOGLOBIN A1C LEVEL >9.0%: CPT | Mod: HCNC,CPTII,S$GLB, | Performed by: PODIATRIST

## 2020-06-05 PROCEDURE — 99499 UNLISTED E&M SERVICE: CPT | Mod: HCNC,S$GLB,, | Performed by: PODIATRIST

## 2020-06-05 PROCEDURE — 1159F PR MEDICATION LIST DOCUMENTED IN MEDICAL RECORD: ICD-10-PCS | Mod: HCNC,S$GLB,, | Performed by: PODIATRIST

## 2020-06-05 PROCEDURE — 99203 OFFICE O/P NEW LOW 30 MIN: CPT | Mod: HCNC,S$GLB,, | Performed by: PODIATRIST

## 2020-06-05 NOTE — PROGRESS NOTES
Subjective:      Patient ID: Ned Oliveira is a 69 y.o. male.    Chief Complaint:   Diabetes Mellitus (pt seen pcp Dr Louis Herrera on 05/25/20) and Routine Foot Care    Ned is a 69 y.o. male who presents to the clinic upon referral from pcp for evaluation and treatment of diabetic feet. Ned has a past medical history of Adrenal adenoma, BPH (benign prostatic hyperplasia), Cataract, Coronary artery disease, Diabetes mellitus, type 2, Diabetic retinopathy, Glaucoma, Hyperlipidemia, and Hypertension. Patient relates no major problem with feet. Only complaints today consist of on and off swelling at the end of the day in the legs. Pt relates he is not swelling now. He is fairly active. Denies any numbness or tingling in his feet. Relates his glucose numbers are high.. He is working on them.     Pt relates his bp has been ok.  Pt saw cardiology yesterday... He did not mention his leg swelling  .  When questioned about the lump on his right foot he relates it grew there about 20 years ago. It is not painful. He does not remember any injury    PCP: Louis Herrera MD    Date Last Seen by PCP: 5/25/20    Current shoe gear: Extra depth shoes    Hemoglobin A1C   Date Value Ref Range Status   05/19/2020 9.9 (H) 4.0 - 5.6 % Final     Comment:     ADA Screening Guidelines:  5.7-6.4%  Consistent with prediabetes  >or=6.5%  Consistent with diabetes  High levels of fetal hemoglobin interfere with the HbA1C  assay. Heterozygous hemoglobin variants (HbS, HgC, etc)do  not significantly interfere with this assay.   However, presence of multiple variants may affect accuracy.     10/07/2019 7.4 (H) 4.0 - 5.6 % Final     Comment:     ADA Screening Guidelines:  5.7-6.4%  Consistent with prediabetes  >or=6.5%  Consistent with diabetes  High levels of fetal hemoglobin interfere with the HbA1C  assay. Heterozygous hemoglobin variants (HbS, HgC, etc)do  not significantly interfere with this assay.   However, presence of multiple  variants may affect accuracy.     02/18/2019 7.1 (H) 4.0 - 5.6 % Final     Comment:     ADA Screening Guidelines:  5.7-6.4%  Consistent with prediabetes  >or=6.5%  Consistent with diabetes  High levels of fetal hemoglobin interfere with the HbA1C  assay. Heterozygous hemoglobin variants (HbS, HgC, etc)do  not significantly interfere with this assay.   However, presence of multiple variants may affect accuracy.              Past Medical History:   Diagnosis Date    Adrenal adenoma     BPH (benign prostatic hyperplasia)     Cataract     Coronary artery disease     Diabetes mellitus, type 2     Diabetic retinopathy     Glaucoma     Hyperlipidemia     Hypertension      Past Surgical History:   Procedure Laterality Date    BICEPS TENDON REPAIR Right     COLONOSCOPY N/A 2/8/2019    Procedure: COLONOSCOPY;  Surgeon: Tavon Montes MD;  Location: [facility] ([address] [place] [address]);  Service: Colon and Rectal;  Laterality: N/A;  will need . pt requesting ASAP. per Ana (international) ok to schedule at this time. will send  up for 6:45 am arrival time     Current Outpatient Medications on File Prior to Visit   Medication Sig Dispense Refill    acetaminophen (TYLENOL) 500 MG tablet Take 2 tablets (1,000 mg total) by mouth 2 (two) times daily.  0    aspirin (ECOTRIN) 81 MG EC tablet Take 1 tablet (81 mg total) by mouth once daily.  0    atorvastatin (LIPITOR) 80 MG tablet Take 1 tablet (80 mg total) by mouth once daily. 90 tablet 3    blood sugar diagnostic Strp To use as directed to monitor blood sugars daily 200 strip 2    ciclopirox (PENLAC) 8 % Soln Apply topically nightly. 6.6 mL 11    dorzolamide-timolol 2-0.5% (COSOPT) 22.3-6.8 mg/mL ophthalmic solution Place 1 drop into the left eye 2 (two) times daily. 10 mL 12    glimepiride (AMARYL) 4 MG tablet Take 1 tablet (4 mg total) by mouth before breakfast. 90 tablet 3    lancets (ONETOUCH ULTRASOFT LANCETS) Misc Check blood sugars  " each 11    levocetirizine (XYZAL) 5 MG tablet Take 1 tablet (5 mg total) by mouth every evening. 30 tablet 11    lisinopriL-hydrochlorothiazide (PRINZIDE,ZESTORETIC) 10-12.5 mg per tablet Take 1 tablet by mouth once daily. 90 tablet 3    metFORMIN (GLUCOPHAGE) 1000 MG tablet Take 1 tablet (1,000 mg total) by mouth 2 (two) times daily with meals. (Patient not taking: Reported on 6/4/2020) 180 tablet 3    metoprolol succinate (TOPROL-XL) 25 MG 24 hr tablet Take 0.5 tablets (12.5 mg total) by mouth once daily. 45 tablet 3    nitroGLYCERIN (NITROSTAT) 0.4 MG SL tablet Place 1 tablet (0.4 mg total) under the tongue every 5 (five) minutes as needed for Chest pain (ONLY IF HAVE CHEST PAIN,). 30 tablet 0    pen needle, diabetic (BD ULTRA-FINE WESTON PEN NEEDLE) 32 gauge x 5/32" Ndle Use to inject Trulicity qwk 12 each 3    tamsulosin (FLOMAX) 0.4 mg Cap Take 2 capsules (0.8 mg total) by mouth every evening. 60 capsule 11     No current facility-administered medications on file prior to visit.      Review of patient's allergies indicates:   Allergen Reactions    Percocet [oxycodone-acetaminophen] Itching       Review of Systems   Constitution: Negative for chills, decreased appetite, fever, malaise/fatigue, night sweats, weight gain and weight loss.   Cardiovascular: Positive for leg swelling. Negative for chest pain, claudication, dyspnea on exertion, palpitations and syncope.   Respiratory: Negative for cough and shortness of breath.    Endocrine: Negative for cold intolerance and heat intolerance.   Hematologic/Lymphatic: Negative for bleeding problem. Does not bruise/bleed easily.   Skin: Positive for dry skin. Negative for color change, flushing, itching, nail changes, poor wound healing, rash, skin cancer, suspicious lesions and unusual hair distribution.   Musculoskeletal: Negative for arthritis, back pain, falls, gout, joint pain, joint swelling, muscle cramps, muscle weakness, myalgias, neck pain and " "stiffness.   Gastrointestinal: Negative for diarrhea, nausea and vomiting.   Neurological: Negative for dizziness, focal weakness, light-headedness, numbness, paresthesias, tremors, vertigo and weakness.   Psychiatric/Behavioral: Negative for altered mental status and depression. The patient does not have insomnia.    Allergic/Immunologic: Negative.            Objective:       Vitals:    06/05/20 0920   BP: 112/68   Pulse: (!) 55   Weight: 63.5 kg (139 lb 15.9 oz)   Height: 5' 4" (1.626 m)   PainSc: 0-No pain   63.5 kg (139 lb 15.9 oz)     Physical Exam   Constitutional: He is oriented to person, place, and time. He appears well-developed and well-nourished. He is cooperative. No distress.   Cardiovascular:   Pulses:       Dorsalis pedis pulses are 2+ on the right side, and 2+ on the left side.        Posterior tibial pulses are 2+ on the right side, and 2+ on the left side.   Hair growth to digits     No edema b/l legs or feet   Musculoskeletal: Normal range of motion. He exhibits deformity. He exhibits no edema or tenderness.        Right foot: There is deformity. There is normal range of motion.        Left foot: There is deformity. There is normal range of motion.   Musculoskeletal:  Muscle strength is 5/5 in all groups bilaterally.  Metatarsophalangeal, subtalar, and ankle range of motion are within normal limits without crepitus bilaterally.    There is + evidence of a bunion left foot and  hammertoes at 2-5 b/l.      Moderate arches.       Right midfoot dorsal medial to area of navicular prominent bony prominence. No flucutance, no pop and no overlying soft tissue lesion noted    Not present on left   Feet:   Right Foot:   Protective Sensation: 10 sites tested. 10 sites sensed.   Skin Integrity: Positive for dry skin. Negative for ulcer, blister, skin breakdown, erythema, warmth or callus.   Left Foot:   Protective Sensation: 10 sites tested. 10 sites sensed.   Skin Integrity: Positive for dry skin. Negative " for ulcer, blister, skin breakdown, erythema, warmth or callus.   Neurological: He is alert and oriented to person, place, and time. He has normal strength. He displays atrophy. No sensory deficit. Gait normal.   Skin: Skin is warm and intact. Capillary refill takes 2 to 3 seconds. No rash noted. He is not diaphoretic. No cyanosis or erythema. No pallor. Nails show no clubbing.   No open lesions    Nails 1-10 clear    No eccymosis   Psychiatric: He has a normal mood and affect. His behavior is normal. Judgment and thought content normal.   Nursing note and vitals reviewed.                Assessment:       Encounter Diagnoses   Name Primary?    Hammer toes of both feet Yes    Type 2 diabetes mellitus with complication, without long-term current use of insulin     Tinea pedis of right foot     Exostosis of right foot          Plan:       Ned was seen today for diabetes mellitus and routine foot care.    Diagnoses and all orders for this visit:    Hammer toes of both feet    Type 2 diabetes mellitus with complication, without long-term current use of insulin    Tinea pedis of right foot    Exostosis of right foot  -     X-Ray Foot Complete Right; Standing      I counseled the patient on his conditions, their implications and medical management.    - recommend try compression socks and pt can d/w pcp or cardiology. No edema present today    - recommend xray to r/o any bone masses/tumors... It is not typical and pt denies injury.     - d/w pt has good dm foot health. Need to continue to improve HbA1C    - Shoe inspection. Diabetic Foot Education. Patient reminded of the importance of good nutrition and blood sugar control to help prevent podiatric complications of diabetes. Patient instructed on proper foot hygeine. We discussed wearing proper shoe gear, daily foot inspections, never walking without protective shoe gear, never putting sharp instruments to feet.    - f/u 6 months or sooner .will call with xray  results

## 2020-06-09 ENCOUNTER — OFFICE VISIT (OUTPATIENT)
Dept: OPHTHALMOLOGY | Facility: CLINIC | Age: 70
End: 2020-06-09
Payer: MEDICARE

## 2020-06-09 VITALS — HEART RATE: 58 BPM | SYSTOLIC BLOOD PRESSURE: 86 MMHG | DIASTOLIC BLOOD PRESSURE: 53 MMHG

## 2020-06-09 DIAGNOSIS — H25.13 NS (NUCLEAR SCLEROSIS), BILATERAL: ICD-10-CM

## 2020-06-09 DIAGNOSIS — H40.042 STEROID RESPONDERS, LEFT: ICD-10-CM

## 2020-06-09 PROCEDURE — 92134 POSTERIOR SEGMENT OCT RETINA (OCULAR COHERENCE TOMOGRAPHY)-BOTH EYES: ICD-10-PCS | Mod: HCNC,S$GLB,, | Performed by: OPHTHALMOLOGY

## 2020-06-09 PROCEDURE — 99999 PR PBB SHADOW E&M-EST. PATIENT-LVL III: CPT | Mod: PBBFAC,HCNC,, | Performed by: OPHTHALMOLOGY

## 2020-06-09 PROCEDURE — 67028 INJECTION EYE DRUG: CPT | Mod: 50,HCNC,S$GLB, | Performed by: OPHTHALMOLOGY

## 2020-06-09 PROCEDURE — 92134 CPTRZ OPH DX IMG PST SGM RTA: CPT | Mod: HCNC,S$GLB,, | Performed by: OPHTHALMOLOGY

## 2020-06-09 PROCEDURE — 92014 COMPRE OPH EXAM EST PT 1/>: CPT | Mod: 25,HCNC,S$GLB, | Performed by: OPHTHALMOLOGY

## 2020-06-09 PROCEDURE — 67028 PR INJECT INTRAVITREAL PHARMCOLOGIC: ICD-10-PCS | Mod: 50,HCNC,S$GLB, | Performed by: OPHTHALMOLOGY

## 2020-06-09 PROCEDURE — 92014 PR EYE EXAM, EST PATIENT,COMPREHESV: ICD-10-PCS | Mod: 25,HCNC,S$GLB, | Performed by: OPHTHALMOLOGY

## 2020-06-09 PROCEDURE — 99999 PR PBB SHADOW E&M-EST. PATIENT-LVL III: ICD-10-PCS | Mod: PBBFAC,HCNC,, | Performed by: OPHTHALMOLOGY

## 2020-06-09 RX ADMIN — Medication 1.25 MG: at 10:06

## 2020-06-09 NOTE — PATIENT INSTRUCTIONS
Please rinse the eye/eyes with artificial tears as needed during the first 1-2 for any irritation , burning, itching, or grittiness you may be experiencing. It is normal to see a small dash line or sausage like figure in your upper vision area which you may see for a few days or up to a week. Please call our office immediately before your next scheduled appointment if you are experiencing any pain , pressure or vision decrease . The phone number to call can be found on your appointment slip.       .POST INTRAVITREAL INJECTION PATIENT INSTRUCTIONS   Below are some guidelines for what to expect after your treatment and additional care instructions.   * you may experience mild discomfort in your eye after the treatment. Your eye usually feels better within 24 to 48 hours after the procedure.   * you have been given drops that numb the surface of your eye.   DO NOT rub or touch your eye, DO NOT wear contacts until numbness goes away.   * you may experience small spots that appear in your field of vision, these are usually caused by an air bubble or from the medication. It taked a few hours or days for these to go away.   * use of sunglasses will help reduce light sensitivity and glare.   * DO NOT swim   for at least 3 hours after the injection   * If you have a gritty, burning, irritating or stinging feeling in the injected eye. This may be a result of the antiseptic used. Use artifical tears or eye lubricant ( from over- the- counter from your local pharmacy ). If you have some at home already please check the expiration date, so not to use anything contaminated in your eye. A cool pack over your eye brow above the injected eye may also relieve discomfort.   Call us right away or go to the Emergency Department if you have a dramatic decrease in vision or extreme pain in the eye.   [facility] [phone]

## 2020-06-09 NOTE — PROGRESS NOTES
HPI     8 wk OCT   DLS-04/14/20 Dr. Rivers     Pt sts no changes vision still blurry seen flashing light in OS twice over   the last 2 days.   Denies pain     (-)Flashes (-)Floaters  (-)Photophobia  (-)Glare    Cosopt BID      OCT - OD -  paracentral ME with VMT component  OS  increased paracentral ME    Prior FA - late macular leakage OS > OD    Patient needs Monday appointments only    A/P    1. Mild NPDR OU T2  2. DME OU    S/p Avastin OD x 3, OS x 12  Ozurdex OS 2/20  Focal OS 5/17, OD 1-19 1/19 - increased DME at 9 weeks - resumed 6 week tx    Avastin OD  Ozurdex OS    Will need maintenance OS    Had Glc suspect eval with KL  mild steroid response    Continue Cosopt BID OS    OD stable - continue to monitor    BS/BP/chol control    3. NS OU  Increasing posterior changes    4. Glc suspect  Elevated IOP left      8 weeks OCT    Risks, benefits, and alternatives to treatment discussed in detail with the patient.  The patient voiced understanding and wished to proceed with the procedure    Injection Procedure Note:  Diagnosis: DME OU    Patient Identified and Time Out complete  Pt Prefers to be marked with sticker rather than ink marker ([id number] #[email or web address])  Topical Proparacaine and Betadine. subconj lido OS  Inject AVASTIN OD, Ozurdex OS at 6:00 @ 3.5-4mm posterior to limbus  Post Operative Dx: Same  Complications: None  Follow up as above.

## 2020-06-22 ENCOUNTER — TELEPHONE (OUTPATIENT)
Dept: INTERNAL MEDICINE | Facility: CLINIC | Age: 70
End: 2020-06-22

## 2020-06-22 ENCOUNTER — CLINICAL SUPPORT (OUTPATIENT)
Dept: DIABETES | Facility: CLINIC | Age: 70
End: 2020-06-22
Payer: MEDICARE

## 2020-06-22 DIAGNOSIS — E11.8 TYPE 2 DIABETES MELLITUS WITH COMPLICATION, WITHOUT LONG-TERM CURRENT USE OF INSULIN: Primary | ICD-10-CM

## 2020-06-22 PROCEDURE — 99999 PR PBB SHADOW E&M-EST. PATIENT-LVL I: CPT | Mod: PBBFAC,HCNC,,

## 2020-06-22 PROCEDURE — G0108 DIAB MANAGE TRN  PER INDIV: HCPCS | Mod: HCNC,S$GLB,, | Performed by: INTERNAL MEDICINE

## 2020-06-22 PROCEDURE — G0108 PR DIAB MANAGE TRN  PER INDIV: ICD-10-PCS | Mod: HCNC,S$GLB,, | Performed by: INTERNAL MEDICINE

## 2020-06-22 PROCEDURE — 99999 PR PBB SHADOW E&M-EST. PATIENT-LVL I: ICD-10-PCS | Mod: PBBFAC,HCNC,,

## 2020-06-22 RX ORDER — LANCETS
EACH MISCELLANEOUS
Qty: 100 EACH | Refills: 11 | Status: SHIPPED | OUTPATIENT
Start: 2020-06-22 | End: 2020-10-09 | Stop reason: SDUPTHER

## 2020-06-22 RX ORDER — INSULIN PUMP SYRINGE, 3 ML
EACH MISCELLANEOUS
Qty: 1 EACH | Refills: 0 | Status: SHIPPED | OUTPATIENT
Start: 2020-06-22 | End: 2020-10-09 | Stop reason: SDUPTHER

## 2020-06-22 NOTE — TELEPHONE ENCOUNTER
----- Message from Annika Meza RN sent at 6/22/2020  2:13 PM CDT -----  Good Morning,    Patient was seen today for Diabetes Education. Patient would like to discuss with you the need for a glucometer, We went over general information about blood sugar goals.    Please notify patient of your recommendations.    Thank you,  Annika Meza RN CCM  Diabetes Education

## 2020-06-22 NOTE — TELEPHONE ENCOUNTER
Hi, I am covering Dr. Louis Herrera MD  I have sent in   Orders Placed This Encounter    blood-glucose meter kit    lancets Misc    blood sugar diagnostic Strp     Let me know if patient has any questions.  Thank you, Adolfo Perkins

## 2020-06-22 NOTE — PROGRESS NOTES
Diabetes Education  Author: Annika Meza RN  Date: 6/22/2020    Diabetes Care Management Summary  Diabetes Education Record Assessment/Progress: Initial  Current Diabetes Risk Level: High     Last A1c:   Lab Results   Component Value Date    HGBA1C 9.9 (H) 05/19/2020     Last Visit with Diabetes Educator: : 06/22/2020  Last OPCM Referral: : Not Found   Enrolled in OPCM: No      Diabetes Type  Diabetes Type : Type II    Diabetes History  Diabetes Diagnosis: 5-10 years  Current Treatment: Oral Medication  Reviewed Problem List with Patient: Yes    Health Maintenance was reviewed today with patient. Discussed with patient importance of routine eye exams, foot exams/foot care, blood work (i.e.: A1c, microalbumin, and lipid), dental visits, yearly flu vaccine, and pneumonia vaccine as indicated by PCP. Patient verbalized understanding.     Health Maintenance Topics with due status: Not Due       Topic Last Completion Date    TETANUS VACCINE 09/01/2015    Colorectal Cancer Screening 02/08/2019    Lipid Panel 05/19/2020    Hemoglobin A1c 05/19/2020    Foot Exam 06/05/2020    High Dose Statin 06/09/2020    Aspirin/Antiplatelet Therapy 06/09/2020    Eye Exam 06/09/2020     Health Maintenance Due   Topic Date Due    Shingles Vaccine (2 of 3) 01/19/2016       Nutrition  Meal Planning: water, eats out seldom, 3 meals per day, artificial sweeteners, snacks between meal  What type of sweetener do you use?: Splenda  What type of beverages do you drink?: other (see comments), water, milk(coffee with splenda)  Meal Plan 24 Hour Recall - Breakfast: corn flakes, coffee with splenda  Meal Plan 24 Hour Recall - Lunch: rice, steak, water  Meal Plan 24 Hour Recall - Dinner: rice, steak, water  Meal Plan 24 Hour Recall - Snack: Fritos    Monitoring   Monitoring: (P) (Patient is currently not monitoring blood sugar, patient does not have a glucometer. Patientdoes not want meter- has discussed with his PCP.)    Exercise   Exercise Type:  (P) none    Current Diabetes Treatment   Current Treatment: Oral Medication    Social History  Preferred Learning Method: (P) Face to Face  Primary Support: (P) Friends, Family  Smoking Status: (P) Ex Smoker       Barriers to Change  Barriers to Change: (P) None  Learning Challenges : (P) None         Cultural Influences  Cultural Influences: (P) None    Diabetes Education Assessment/Progress      Diabetes Disease Process (diabetes disease process and treatment options): Discussion, Individual Session, Written Materials Provided, Instructed, Comprehends Key Points  Discussed qualifying parameters of diabetes dx. Reviewed/Instructed on disease process and pt's most likely causes of recently 9.9 A1c. Discussed current treatment options, especially dietary and lifestyle changes as well as medication additions and/or changes. Patient verbalizes understanding of received information/education.         Nutrition (Incorporating nutritional management into one's lifestyle): Discussion, Individual Session, Written Materials Provided, Instructed, Comprehends Key Points  Emphasized importance of eliminating all SSB. Discussed SF drink options. Discussed carb vs non-carb foods and reviewed appropriate amounts of carbs to have at meals vs snacks. Recommended 45-60 gm carb at meals and 15 gm carb at snacks. Instructed on appropriate label reading and serving sizes of specific carb containing foods. Reviewed need to limit total/saturated fats. Discussed meal plans and snack ideas amenable to pt. Reviewed the plate method. Patient verbalizes understanding of received information/education.       Physical Activity (incorporating physical activity into one's lifestyle): Discussion, Individual Session, Written Materials Provided, Instructed, Comprehends Key Points  Discussed goals and benefits of regular Physical Activity. Reviewed difference between active lifestyle and structured physical activity. Encouraged Physical Activity with  increased heart rate for sustained duration as tolerated. Reviewed Physical Activity goals of >150 minutes weekly. Patient verbalizes understanding of received information/education.         Medications (states correct name, dose, onset, peak, duration, side effects & timing of meds): Discussion, Individual Session, Written Materials Provided, Instructed, Comprehends Key Points  Discussed mechanism of action of oral diabetic medication metformin. Reviewed signs and symptoms of hypoglycemia and treatment with Rule of 15. Discussed general vs severe hyperglycemia and risk of DKA. Patient verbalizes understanding of received information/education.   Advised patient that to decrease chances of developing lactic acidosis, if undergoing a procedure that uses a contrast medium patient should discuss the need to temporarily stop taking metformin with physician.       Monitoring (monitoring blood glucose/other parameters & using results): Discussion, Individual Session, Written Materials Provided, Instructed, Comprehends Key Points  Patient is currently not monitoring blood sugar as  Per what the pt reports:His previous PCP told him that since he is not on insulin that he does not need to monitor blood sugar. I asked patient if he would like me to send his PCP a message asking for an order for a glucometer- pt states he would prefer tot alk to PCP about the need. I will send message to PCP.    Discussed goal BGs for different times of day and in relation to meals. If pt gets glucometer, Instructed pt to test BG QDay: fasting and 2-hours after any meal. Patient verbalizes understanding of received information/education.     Acute Complications (preventing, detecting, and treating acute complications): Discussion, Individual Session, Written Materials Provided, Instructed, Comprehends Key Points  Discussed hypoglycemia vs hyperglycemia symptoms and discussed appropriate treatments for each. Reviewed that pt is at low risk of  hypo with current medication regimen. Discussed general vs severe hyperglycemia and risk of DKA.    Chronic Complications (preventing, detecting, and treating chronic complications): Discussion, Individual Session, Written Materials Provided, Instructed, Comprehends Key Points(Pt compliant with podiatry adn optometry appts)  Reviewed annual diabetes care schedule and patient priorities. Patient verbalizes understanding of received information/education.       Clinical (diabetes, other pertinent medical history, and relevant comorbidities reviewed during visit): Discussion, Individual Session, Instructed  Reviewed current medical history including co-morbidities.    Cognitive (knowledge of self-management skills, functional health literacy): Discussion, Individual Session, Instructed  Arrives with adequate health management knowledge - poor specific knowledge of diabetes management. Leaves with increased knowledge base - will benefit from f/u in  3 months.    Psychosocial (emotional response to diabetes): Not Covered/Deferred(Unable to complete due to Time Constraints)  Diabetes Distress and Support Systems: Not Covered/Deferred(Unable to complete due to Time Constraints)      Behavioral (readiness for change, lifestyle practices, self-care behaviors): Discussion, Individual Session, Instructed  Appears somewhat motivated to make recommended changes.      Goals  Patient has selected/evaluated goals during today's session: Yes, selected  Healthy Eating: Set(Will measure food and read food labels. Will count carbohydrates to equal 45-60 gm per meal. Will abstain from drinking Sugar Beverages and utilize Diet and/or artificial sweetener.)  Start Date: 06/22/20  Target Date: 09/22/20         Diabetes Care Plan/Intervention  Education Plan/Intervention: Individual Follow-Up DSMT    Diabetes Meal Plan  Restrictions: Restricted Carbohydrate  Carbohydrate Per Meal: 45-60g  Carbohydrate Per Snack : 7-15g    Today's  Self-Management Care Plan was developed with the patient's input and is based on barriers identified during today's assessment.    The long and short-term goals in the care plan were written with the patient/caregiver's input. The patient has agreed to work toward these goals to improve his overall diabetes control.      The patient received a copy of today's self-management plan and verbalized understanding of the care plan, goals, and all of today's instructions.      The patient was encouraged to communicate with his physician and care team regarding his condition(s) and treatment.  I provided the patient with my contact information today and encouraged him to contact me via phone or patient portal as needed.     Education Units of Time   Time Spent: 60 min

## 2020-06-24 ENCOUNTER — PATIENT OUTREACH (OUTPATIENT)
Dept: DIABETES | Facility: CLINIC | Age: 70
End: 2020-06-24

## 2020-06-24 NOTE — TELEPHONE ENCOUNTER
Lacho Flores, please call the patient and explain that his resident doctor has graduated, Dr. Louis Herrera MD and his new doctor will be Dr. Vamsi Rubio. Please offer him an appt with the new resident doctor.  Thank you, Adolfo Perkins

## 2020-06-24 NOTE — PROGRESS NOTES
Left message with  and notified that MD sent order for glucometer and supplies to his pharmacy. Notified pt to let me know if he needs to come in to see me to lean how to use glucometer.         Hi, I am covering Dr. Louis Herrera MD  I have sent in       Orders Placed This Encounter    blood-glucose meter kit    lancets Misc    blood sugar diagnostic Strp     Let me know if patient has any questions.  Thank you, Adolfo Perkins

## 2020-07-14 ENCOUNTER — OFFICE VISIT (OUTPATIENT)
Dept: INTERNAL MEDICINE | Facility: CLINIC | Age: 70
End: 2020-07-14
Payer: MEDICARE

## 2020-07-14 VITALS
DIASTOLIC BLOOD PRESSURE: 78 MMHG | OXYGEN SATURATION: 98 % | SYSTOLIC BLOOD PRESSURE: 132 MMHG | HEART RATE: 90 BPM | BODY MASS INDEX: 24.03 KG/M2 | WEIGHT: 140 LBS

## 2020-07-14 DIAGNOSIS — N40.0 BENIGN NON-NODULAR PROSTATIC HYPERPLASIA WITHOUT LOWER URINARY TRACT SYMPTOMS: ICD-10-CM

## 2020-07-14 DIAGNOSIS — I20.89 CHRONIC STABLE ANGINA: ICD-10-CM

## 2020-07-14 DIAGNOSIS — N40.0 BENIGN PROSTATIC HYPERPLASIA, UNSPECIFIED WHETHER LOWER URINARY TRACT SYMPTOMS PRESENT: ICD-10-CM

## 2020-07-14 DIAGNOSIS — E78.5 HYPERLIPIDEMIA ASSOCIATED WITH TYPE 2 DIABETES MELLITUS: ICD-10-CM

## 2020-07-14 DIAGNOSIS — E11.69 HYPERLIPIDEMIA ASSOCIATED WITH TYPE 2 DIABETES MELLITUS: ICD-10-CM

## 2020-07-14 DIAGNOSIS — I25.10 CAD IN NATIVE ARTERY: ICD-10-CM

## 2020-07-14 DIAGNOSIS — I10 ESSENTIAL HYPERTENSION: ICD-10-CM

## 2020-07-14 DIAGNOSIS — I25.10 CORONARY ARTERY DISEASE, ANGINA PRESENCE UNSPECIFIED, UNSPECIFIED VESSEL OR LESION TYPE, UNSPECIFIED WHETHER NATIVE OR TRANSPLANTED HEART: ICD-10-CM

## 2020-07-14 PROCEDURE — 99214 PR OFFICE/OUTPT VISIT, EST, LEVL IV, 30-39 MIN: ICD-10-PCS | Mod: HCNC,GC,S$GLB, | Performed by: STUDENT IN AN ORGANIZED HEALTH CARE EDUCATION/TRAINING PROGRAM

## 2020-07-14 PROCEDURE — 1101F PR PT FALLS ASSESS DOC 0-1 FALLS W/OUT INJ PAST YR: ICD-10-PCS | Mod: HCNC,CPTII,GC,S$GLB | Performed by: STUDENT IN AN ORGANIZED HEALTH CARE EDUCATION/TRAINING PROGRAM

## 2020-07-14 PROCEDURE — 99999 PR PBB SHADOW E&M-EST. PATIENT-LVL IV: CPT | Mod: PBBFAC,HCNC,GC, | Performed by: STUDENT IN AN ORGANIZED HEALTH CARE EDUCATION/TRAINING PROGRAM

## 2020-07-14 PROCEDURE — 3078F PR MOST RECENT DIASTOLIC BLOOD PRESSURE < 80 MM HG: ICD-10-PCS | Mod: HCNC,CPTII,GC,S$GLB | Performed by: STUDENT IN AN ORGANIZED HEALTH CARE EDUCATION/TRAINING PROGRAM

## 2020-07-14 PROCEDURE — 1101F PT FALLS ASSESS-DOCD LE1/YR: CPT | Mod: HCNC,CPTII,GC,S$GLB | Performed by: STUDENT IN AN ORGANIZED HEALTH CARE EDUCATION/TRAINING PROGRAM

## 2020-07-14 PROCEDURE — 3078F DIAST BP <80 MM HG: CPT | Mod: HCNC,CPTII,GC,S$GLB | Performed by: STUDENT IN AN ORGANIZED HEALTH CARE EDUCATION/TRAINING PROGRAM

## 2020-07-14 PROCEDURE — 3008F BODY MASS INDEX DOCD: CPT | Mod: HCNC,CPTII,GC,S$GLB | Performed by: STUDENT IN AN ORGANIZED HEALTH CARE EDUCATION/TRAINING PROGRAM

## 2020-07-14 PROCEDURE — 3008F PR BODY MASS INDEX (BMI) DOCUMENTED: ICD-10-PCS | Mod: HCNC,CPTII,GC,S$GLB | Performed by: STUDENT IN AN ORGANIZED HEALTH CARE EDUCATION/TRAINING PROGRAM

## 2020-07-14 PROCEDURE — 3046F HEMOGLOBIN A1C LEVEL >9.0%: CPT | Mod: HCNC,CPTII,GC,S$GLB | Performed by: STUDENT IN AN ORGANIZED HEALTH CARE EDUCATION/TRAINING PROGRAM

## 2020-07-14 PROCEDURE — 3075F PR MOST RECENT SYSTOLIC BLOOD PRESS GE 130-139MM HG: ICD-10-PCS | Mod: HCNC,CPTII,GC,S$GLB | Performed by: STUDENT IN AN ORGANIZED HEALTH CARE EDUCATION/TRAINING PROGRAM

## 2020-07-14 PROCEDURE — 3046F PR MOST RECENT HEMOGLOBIN A1C LEVEL > 9.0%: ICD-10-PCS | Mod: HCNC,CPTII,GC,S$GLB | Performed by: STUDENT IN AN ORGANIZED HEALTH CARE EDUCATION/TRAINING PROGRAM

## 2020-07-14 PROCEDURE — 1159F PR MEDICATION LIST DOCUMENTED IN MEDICAL RECORD: ICD-10-PCS | Mod: HCNC,GC,S$GLB, | Performed by: STUDENT IN AN ORGANIZED HEALTH CARE EDUCATION/TRAINING PROGRAM

## 2020-07-14 PROCEDURE — 3075F SYST BP GE 130 - 139MM HG: CPT | Mod: HCNC,CPTII,GC,S$GLB | Performed by: STUDENT IN AN ORGANIZED HEALTH CARE EDUCATION/TRAINING PROGRAM

## 2020-07-14 PROCEDURE — 99214 OFFICE O/P EST MOD 30 MIN: CPT | Mod: HCNC,GC,S$GLB, | Performed by: STUDENT IN AN ORGANIZED HEALTH CARE EDUCATION/TRAINING PROGRAM

## 2020-07-14 PROCEDURE — 99999 PR PBB SHADOW E&M-EST. PATIENT-LVL IV: ICD-10-PCS | Mod: PBBFAC,HCNC,GC, | Performed by: STUDENT IN AN ORGANIZED HEALTH CARE EDUCATION/TRAINING PROGRAM

## 2020-07-14 PROCEDURE — 1159F MED LIST DOCD IN RCRD: CPT | Mod: HCNC,GC,S$GLB, | Performed by: STUDENT IN AN ORGANIZED HEALTH CARE EDUCATION/TRAINING PROGRAM

## 2020-07-14 RX ORDER — INSULIN GLARGINE 100 [IU]/ML
15 INJECTION, SOLUTION SUBCUTANEOUS DAILY
Qty: 13.5 ML | Refills: 3 | Status: SHIPPED | OUTPATIENT
Start: 2020-07-14 | End: 2020-07-17 | Stop reason: SDUPTHER

## 2020-07-14 RX ORDER — INSULIN PUMP SYRINGE, 3 ML
EACH MISCELLANEOUS
Qty: 1 EACH | Refills: 0 | Status: SHIPPED | OUTPATIENT
Start: 2020-07-14 | End: 2020-07-17 | Stop reason: SDUPTHER

## 2020-07-14 RX ORDER — PEN NEEDLE, DIABETIC 30 GX3/16"
1 NEEDLE, DISPOSABLE MISCELLANEOUS 2 TIMES DAILY WITH MEALS
Qty: 100 EACH | Refills: 11 | Status: SHIPPED | OUTPATIENT
Start: 2020-07-14 | End: 2020-07-17 | Stop reason: SDUPTHER

## 2020-07-14 RX ORDER — LANCING DEVICE
1 EACH MISCELLANEOUS 2 TIMES DAILY WITH MEALS
Qty: 1 EACH | Refills: 0 | Status: SHIPPED | OUTPATIENT
Start: 2020-07-14 | End: 2020-07-17 | Stop reason: SDUPTHER

## 2020-07-14 NOTE — PROGRESS NOTES
[facility]  CLINIC NOTE    Patient Name: Ned Oliveira  YOB: 1950    PRESENTING HISTORY       History of Present Illness:  Mr. Ned Oliveira is a 70 y.o. male w/ significant PMHx of CAD/chronic stable angina, HTN, HLD, BPH and poorly controlled T2DM diagnosed in 2007, presenting for a 3 month follow up on his diabetes. Pt states that due to Covid, he was unable to refill and take his medications from March to May but was able to get them refilled at his last appointment in May and has been taking his meds for about 2 months. He doesn't exercise and has been eating whatever he wants up until about a week ago where he has been making modifications to his diet. He been trying to cut out white, refined bread and eating wheat bread, eating salads for lunch and fruit for snacks. He does not take blood sugar levels at home and has been on insulin many years ago. He as seen a dietician and diabetes educator a couple months ago. He does have blurry vision in his left eye as a result of his diabetes and follows ophthalmology every year. He denies increased thirst, urination, chest pain, or shortness of breath. He has no changes in sensation, numbness or pain in the extremities.    BPH: Since diagnosed 5 years ago, he has been on flomax with minimal improvement. He still frequently (2-3 times extra a day) has to go to the restroom and has trouble initiating a stream. At night, he gets up once or twice to use the restroom. It doesn't interfere with his daily activities but he would like it improved. He denies fevers, chills, dysuria, blood in the urine, or abdominal distension.     CAD, chronic stable angina: Patient has a history of CP and reports mild SOB w/ strenuous exertion, walking up >4 stairs, which improves w/ rest. However, this has been going on for many years the pain is unchanged and not severe. Since his last visit a couple of months ago, he denies any chest pain at  rest, palpitations, SOB, or orthopnea. He has not been needing to take nitroglycerin. He has mild chest pain on exertion. He has been compliant with his medications and will follow up with cardiology. Last time he saw them was on 6/4/2020.      HTN/HLD:  Pt doesn't check blood pressures at home but BP's have been normal in previous visits. Controlled on current medications    Review of Systems   Constitutional: Negative for chills, fever and weight loss.   HENT: Negative for congestion and sore throat.    Eyes: Positive for blurred vision. Negative for double vision, pain and discharge.   Respiratory: Negative for cough, sputum production and shortness of breath.    Cardiovascular: Negative for chest pain, palpitations, orthopnea, claudication and leg swelling.   Gastrointestinal: Negative for abdominal pain, constipation, diarrhea, heartburn, nausea and vomiting.   Genitourinary: Positive for frequency. Negative for dysuria, hematuria and urgency.   Musculoskeletal: Negative for back pain and myalgias.   Skin: Negative for rash.   Neurological: Negative for dizziness, tingling, focal weakness and loss of consciousness.   Psychiatric/Behavioral: Negative for depression and substance abuse. The patient is not nervous/anxious.        PAST HISTORY:     Past Medical History:   Diagnosis Date    Adrenal adenoma     BPH (benign prostatic hyperplasia)     Cataract     Coronary artery disease     Diabetes mellitus, type 2     Diabetic retinopathy     Glaucoma     Hyperlipidemia     Hypertension        Past Surgical History:   Procedure Laterality Date    BICEPS TENDON REPAIR Right     COLONOSCOPY N/A 2/8/2019    Procedure: COLONOSCOPY;  Surgeon: Tavon Montes MD;  Location: [address]);  Service: Colon and Rectal;  Laterality: N/A;  will need . pt requesting ASAP. gaudencio Perez ([employer]) ok to schedule at this time. will send  up for 6:45 am arrival time        Family History   Problem Relation Age of Onset    Diabetes Mother     Heart disease Mother     Heart disease Sister     No Known Problems Brother     Kidney failure Daughter         ESRD on HD    Autism Son     Asthma Son     No Known Problems Sister     No Known Problems Sister     No Known Problems Sister     No Known Problems Brother     Diabetes Brother     Stroke Neg Hx     Amblyopia Neg Hx     Blindness Neg Hx     Cataracts Neg Hx     Glaucoma Neg Hx     Macular degeneration Neg Hx     Retinal detachment Neg Hx     Strabismus Neg Hx        Social History     Socioeconomic History    Marital status:      Spouse name: Not on file    Number of children: 2    Years of education: Not on file    Highest education level: Not on file   Occupational History    Occupation: Maintenance    Social Needs    Financial resource strain: Not on file    Food insecurity     Worry: Not on file     Inability: Not on file    Transportation needs     Medical: Not on file     Non-medical: Not on file   Tobacco Use    Smoking status: Former Smoker     Packs/day: 2.00     Years: 10.00     Pack years: 20.00     Types: Cigarettes     Start date: 8/15/1987     Quit date: 8/15/1996     Years since quittin.9    Smokeless tobacco: Never Used   Substance and Sexual Activity    Alcohol use: No     Alcohol/week: 0.0 standard drinks    Drug use: No    Sexual activity: Yes     Partners: Female   Lifestyle    Physical activity     Days per week: Not on file     Minutes per session: Not on file    Stress: Not on file   Relationships    Social connections     Talks on phone: Not on file     Gets together: Not on file     Attends Alevism service: Not on file     Active member of club or organization: Not on file     Attends meetings of clubs or organizations: Not on file     Relationship status: Not on file   Other Topics Concern    Not on file   Social History Narrative    Not on file  "      MEDICATIONS & ALLERGIES:     Current Outpatient Medications on File Prior to Visit   Medication Sig    acetaminophen (TYLENOL) 500 MG tablet Take 2 tablets (1,000 mg total) by mouth 2 (two) times daily.    aspirin (ECOTRIN) 81 MG EC tablet Take 1 tablet (81 mg total) by mouth once daily.    atorvastatin (LIPITOR) 80 MG tablet Take 1 tablet (80 mg total) by mouth once daily.    blood sugar diagnostic Strp To use as directed to monitor blood sugars daily    blood sugar diagnostic Strp To check BG 1 times daily, to use with insurance preferred meter    blood-glucose meter kit To check BG 1 times daily, to use with insurance preferred meter    ciclopirox (PENLAC) 8 % Soln Apply topically nightly.    dorzolamide-timolol 2-0.5% (COSOPT) 22.3-6.8 mg/mL ophthalmic solution Place 1 drop into the left eye 2 (two) times daily.    glimepiride (AMARYL) 4 MG tablet Take 1 tablet (4 mg total) by mouth before breakfast.    lancets (ONETOUCH ULTRASOFT LANCETS) Misc Check blood sugars BID    lancets Misc To check BG 1 times daily, to use with insurance preferred meter    levocetirizine (XYZAL) 5 MG tablet Take 1 tablet (5 mg total) by mouth every evening.    lisinopriL-hydrochlorothiazide (PRINZIDE,ZESTORETIC) 10-12.5 mg per tablet Take 1 tablet by mouth once daily.    metFORMIN (GLUCOPHAGE) 1000 MG tablet Take 1 tablet (1,000 mg total) by mouth 2 (two) times daily with meals.    metoprolol succinate (TOPROL-XL) 25 MG 24 hr tablet Take 0.5 tablets (12.5 mg total) by mouth once daily.    nitroGLYCERIN (NITROSTAT) 0.4 MG SL tablet Place 1 tablet (0.4 mg total) under the tongue every 5 (five) minutes as needed for Chest pain (ONLY IF HAVE CHEST PAIN,).    pen needle, diabetic (BD ULTRA-FINE WESTON PEN NEEDLE) 32 gauge x 5/32" Ndle Use to inject Trulicity qwk    tamsulosin (FLOMAX) 0.4 mg Cap Take 2 capsules (0.8 mg total) by mouth every evening.     No current facility-administered medications on file prior to " visit.        Review of patient's allergies indicates:   Allergen Reactions    Percocet [oxycodone-acetaminophen] Itching       OBJECTIVE:   Vital Signs:  Vitals:    07/14/20 1316   BP: 132/78   Pulse: 90   SpO2: 98%   Weight: 63.5 kg (139 lb 15.9 oz)       No results found for this or any previous visit (from the past 24 hour(s)).      Physical Exam   Constitutional: He is oriented to person, place, and time and well-developed, well-nourished, and in no distress. No distress.   HENT:   Head: Normocephalic and atraumatic.   Eyes: Conjunctivae and EOM are normal. Right eye exhibits no discharge.   Neck: Normal range of motion.   Cardiovascular: Normal rate, regular rhythm and intact distal pulses. Exam reveals no gallop.   No murmur heard.  Pulmonary/Chest: Effort normal and breath sounds normal. No respiratory distress. He has no wheezes. He has no rales.   Abdominal: Soft. He exhibits no distension. There is no abdominal tenderness. There is no rebound and no guarding.   Musculoskeletal: Normal range of motion.         General: No edema.   Neurological: He is alert and oriented to person, place, and time.   Foot exam: no ulcers or rashes, sensation to pinprick intact bilaterally, vibration and proprioception intact bilaterally   Skin: Skin is warm and dry. He is not diaphoretic.   Psychiatric: Mood and affect normal.       ASSESSMENT & PLAN:   Ned Oliveira is a 70 y.o. male w/ significant PMHx of CAD/chronic stable angina, HTN, HLD, BPH and poorly controlled T2DM diagnosed in 2007, presenting for a 3 month follow up on his diabetes. Will need to start basal insulin.     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 Previously in 7.3.   > 07/14/2020 in clinic foot exam intact        -     Will start lantus 15 units daily at night    -  "Strips, lancets, needles and glucometer ordered; told to measure keep log of sugars and measure 2-3 times daily        -     Continue home metformin 1000 mg BID and Amaryl 4 mg.          -     Continue to follow up with ophthalmology and cardiology regularly        -     Educated on the risks of insulin and the symptoms and signs of hypoglycemia         -     Encouraged to continue improving diet and exercise  -     Microalbumin to creatinine ratio; Future  -     Hemoglobin A1C; Future  -     Comprehensive metabolic panel; Future  -     Encouraged to follow up with podiatry yearly  -     RTC in 3 months for evaluation of diabetes on insulin    Benign non-nodular prostatic hyperplasia without lower urinary tract symptoms        -     Continue home flomax 0.4mg for now        -     Pt deferred in house prostate exam today, will do it next visit        -     Informed that next visit, we would discuss other options such as changing to different alpha blocker or adding Proscar to help his symptoms     Chronic stable angina  CAD in native artery   > Per chart review; In 2009 he had an abnormal stress echo (developed angina) and LHC demonstrated diffuse "3VD".  Referred to CT surgery for CABG considering DMII, but was lost to follow up because of financial reasons.        -     Continue to Follow up with cardiology        -     Continue home metoprolol succinate 25 mg and aspirin        -     Nitroglycerin PRN    Essential hypertension  Hyperlipidemia associated with type 2 diabetes mellitus  > HTN well controlled        -     Lipid panel; future        -     Continue Lipitor 80 mg        -     Continue home lisinopril-HCTZ 10/12.5 mg      Health maintenence        -      TSH; future        -      Eye and foot exams yearly         Vamsi Rubio MD  Internal Medicine PGY-1    The above plan has been discussed and attested with Dr. Hauser.        I have personally seen  patient and agree with the A/P as noted above.  Peter " [facility].

## 2020-07-17 ENCOUNTER — TELEPHONE (OUTPATIENT)
Dept: INTERNAL MEDICINE | Facility: CLINIC | Age: 70
End: 2020-07-17

## 2020-07-17 RX ORDER — INSULIN GLARGINE 100 [IU]/ML
15 INJECTION, SOLUTION SUBCUTANEOUS DAILY
Qty: 13.5 ML | Refills: 3 | Status: SHIPPED | OUTPATIENT
Start: 2020-07-17 | End: 2020-11-16 | Stop reason: SDUPTHER

## 2020-07-17 RX ORDER — PEN NEEDLE, DIABETIC 30 GX3/16"
1 NEEDLE, DISPOSABLE MISCELLANEOUS 2 TIMES DAILY WITH MEALS
Qty: 100 EACH | Refills: 11 | Status: SHIPPED | OUTPATIENT
Start: 2020-07-17 | End: 2020-10-09 | Stop reason: SDUPTHER

## 2020-07-17 RX ORDER — INSULIN PUMP SYRINGE, 3 ML
EACH MISCELLANEOUS
Qty: 1 EACH | Refills: 0 | Status: SHIPPED | OUTPATIENT
Start: 2020-07-17 | End: 2020-10-09 | Stop reason: SDUPTHER

## 2020-07-17 RX ORDER — LANCING DEVICE
1 EACH MISCELLANEOUS 2 TIMES DAILY WITH MEALS
Qty: 1 EACH | Refills: 0 | Status: SHIPPED | OUTPATIENT
Start: 2020-07-17 | End: 2020-10-09 | Stop reason: SDUPTHER

## 2020-07-17 NOTE — TELEPHONE ENCOUNTER
----- Message from Fariba Mejia sent at 7/17/2020  3:01 PM CDT -----  Regarding: Pts wife Mrs. Tejeda Mobile [phone] or Home [phone]  Patient is returning a phone call.  Who left a message for the patient:   Does patient know what this is regarding:    Comments: Mrs. Tejeda said that her  received a call on today and she would like for you to give him a call back please.

## 2020-07-19 NOTE — TELEPHONE ENCOUNTER
Follow-up with your doctor in 1 week for staple removal.  Return with any new or worsening symptoms or any concerns.   Pt called and was told [facility]supplies [facility]. They said [facility] will fax over a form for me to fill out.

## 2020-07-23 DIAGNOSIS — I10 ESSENTIAL HYPERTENSION: ICD-10-CM

## 2020-07-23 DIAGNOSIS — N40.1 BPH WITH OBSTRUCTION/LOWER URINARY TRACT SYMPTOMS: ICD-10-CM

## 2020-07-23 DIAGNOSIS — N13.8 BPH WITH OBSTRUCTION/LOWER URINARY TRACT SYMPTOMS: ICD-10-CM

## 2020-07-23 DIAGNOSIS — R33.9 URINARY RETENTION: ICD-10-CM

## 2020-07-23 RX ORDER — LISINOPRIL AND HYDROCHLOROTHIAZIDE 10; 12.5 MG/1; MG/1
1 TABLET ORAL DAILY
Qty: 90 TABLET | Refills: 3 | Status: SHIPPED | OUTPATIENT
Start: 2020-07-23 | End: 2021-02-22 | Stop reason: SDUPTHER

## 2020-07-23 RX ORDER — ATORVASTATIN CALCIUM 80 MG/1
80 TABLET, FILM COATED ORAL DAILY
Qty: 90 TABLET | Refills: 3 | Status: SHIPPED | OUTPATIENT
Start: 2020-07-23 | End: 2021-02-22 | Stop reason: SDUPTHER

## 2020-07-23 RX ORDER — TAMSULOSIN HYDROCHLORIDE 0.4 MG/1
0.8 CAPSULE ORAL NIGHTLY
Qty: 60 CAPSULE | Refills: 11 | Status: SHIPPED | OUTPATIENT
Start: 2020-07-23 | End: 2021-02-22 | Stop reason: SDUPTHER

## 2020-07-23 RX ORDER — DORZOLAMIDE HYDROCHLORIDE AND TIMOLOL MALEATE 20; 5 MG/ML; MG/ML
1 SOLUTION/ DROPS OPHTHALMIC 2 TIMES DAILY
Qty: 10 ML | Refills: 12 | Status: SHIPPED | OUTPATIENT
Start: 2020-07-23 | End: 2020-08-07 | Stop reason: SDUPTHER

## 2020-07-23 RX ORDER — GLIMEPIRIDE 4 MG/1
4 TABLET ORAL
Qty: 90 TABLET | Refills: 3 | Status: SHIPPED | OUTPATIENT
Start: 2020-07-23 | End: 2020-11-16 | Stop reason: ALTCHOICE

## 2020-07-23 RX ORDER — METOPROLOL SUCCINATE 25 MG/1
12.5 TABLET, EXTENDED RELEASE ORAL DAILY
Qty: 45 TABLET | Refills: 3 | Status: SHIPPED | OUTPATIENT
Start: 2020-07-23 | End: 2021-02-22 | Stop reason: SDUPTHER

## 2020-07-23 RX ORDER — METFORMIN HYDROCHLORIDE 1000 MG/1
1000 TABLET ORAL 2 TIMES DAILY WITH MEALS
Qty: 180 TABLET | Refills: 3 | Status: SHIPPED | OUTPATIENT
Start: 2020-07-23 | End: 2021-02-22 | Stop reason: SDUPTHER

## 2020-07-23 NOTE — TELEPHONE ENCOUNTER
----- Message from Brooke Bentley sent at 7/22/2020  3:12 PM CDT -----  Type:  Needs Medical Advice    Who Called:[employer]       Pharmacy name and phone #:  [facility] - [place], [place] - [address] [phone] (Phone)  [phone] (Fax)        Additional Information: these medication need to be refill    atorvastatin (LIPITOR) 80 MG tablet  dorzolamide-timolol 2-0.5% (COSOPT) 22.3-6.8 mg/mL ophthalmic solution  glimepiride (AMARYL) 4 MG tablet  lisinopriL-hydrochlorothiazide (PRINZIDE,ZESTORETIC) 10-12.5 mg per tablet  metFORMIN (GLUCOPHAGE) 1000 MG tablet  metoprolol succinate (TOPROL-XL) 25 MG 24 hr tablet  tamsulosin (FLOMAX) 0.4 mg Cap  Accu-chek alejandro solution   Alcohol pads

## 2020-08-07 ENCOUNTER — TELEPHONE (OUTPATIENT)
Dept: CARDIOLOGY | Facility: CLINIC | Age: 70
End: 2020-08-07

## 2020-08-07 ENCOUNTER — OFFICE VISIT (OUTPATIENT)
Dept: OPHTHALMOLOGY | Facility: CLINIC | Age: 70
End: 2020-08-07
Payer: MEDICARE

## 2020-08-07 VITALS — SYSTOLIC BLOOD PRESSURE: 124 MMHG | DIASTOLIC BLOOD PRESSURE: 74 MMHG | HEART RATE: 61 BPM

## 2020-08-07 DIAGNOSIS — H40.042 STEROID RESPONDERS, LEFT: ICD-10-CM

## 2020-08-07 DIAGNOSIS — H40.1132 PRIMARY OPEN ANGLE GLAUCOMA (POAG) OF BOTH EYES, MODERATE STAGE: ICD-10-CM

## 2020-08-07 DIAGNOSIS — H25.13 NS (NUCLEAR SCLEROSIS), BILATERAL: ICD-10-CM

## 2020-08-07 PROCEDURE — 92014 PR EYE EXAM, EST PATIENT,COMPREHESV: ICD-10-PCS | Mod: 25,HCNC,S$GLB, | Performed by: OPHTHALMOLOGY

## 2020-08-07 PROCEDURE — 92014 COMPRE OPH EXAM EST PT 1/>: CPT | Mod: 25,HCNC,S$GLB, | Performed by: OPHTHALMOLOGY

## 2020-08-07 PROCEDURE — 92134 POSTERIOR SEGMENT OCT RETINA (OCULAR COHERENCE TOMOGRAPHY)-BOTH EYES: ICD-10-PCS | Mod: HCNC,S$GLB,, | Performed by: OPHTHALMOLOGY

## 2020-08-07 PROCEDURE — 67028 INJECTION EYE DRUG: CPT | Mod: HCNC,RT,S$GLB, | Performed by: OPHTHALMOLOGY

## 2020-08-07 PROCEDURE — 92134 CPTRZ OPH DX IMG PST SGM RTA: CPT | Mod: HCNC,S$GLB,, | Performed by: OPHTHALMOLOGY

## 2020-08-07 PROCEDURE — 99999 PR PBB SHADOW E&M-EST. PATIENT-LVL IV: ICD-10-PCS | Mod: PBBFAC,HCNC,, | Performed by: OPHTHALMOLOGY

## 2020-08-07 PROCEDURE — 67028 PR INJECT INTRAVITREAL PHARMCOLOGIC: ICD-10-PCS | Mod: HCNC,RT,S$GLB, | Performed by: OPHTHALMOLOGY

## 2020-08-07 PROCEDURE — 99999 PR PBB SHADOW E&M-EST. PATIENT-LVL IV: CPT | Mod: PBBFAC,HCNC,, | Performed by: OPHTHALMOLOGY

## 2020-08-07 RX ORDER — DORZOLAMIDE HYDROCHLORIDE AND TIMOLOL MALEATE 20; 5 MG/ML; MG/ML
1 SOLUTION/ DROPS OPHTHALMIC 2 TIMES DAILY
Qty: 15 ML | Refills: 12 | Status: SHIPPED | OUTPATIENT
Start: 2020-08-07 | End: 2020-10-09 | Stop reason: SDUPTHER

## 2020-08-07 RX ORDER — BRIMONIDINE TARTRATE 2 MG/ML
1 SOLUTION/ DROPS OPHTHALMIC 3 TIMES DAILY
Qty: 10 ML | Refills: 12 | Status: SHIPPED | OUTPATIENT
Start: 2020-08-07 | End: 2020-10-09 | Stop reason: SDUPTHER

## 2020-08-07 RX ADMIN — Medication 1.25 MG: at 10:08

## 2020-08-07 NOTE — PATIENT INSTRUCTIONS

## 2020-08-07 NOTE — PROGRESS NOTES
HPI     DLS 06/09/2020 by Dr. BRANDY Rivers MD      71 YO M Here today for 8 wk Avastin OD /Ozurdex OS.     CC: Pt c/o blurred vision OS x 2 wks and light flashes OD.   Eye Meds:   Cosopt BID         OCT - OD -  paracentral ME with VMT component  OS  Improved paracentral ME    Prior FA - late macular leakage OS > OD    Patient needs Monday appointments only    A/P    1. Mild NPDR OU T2  2. DME OU    S/p Avastin OD x 3, OS x 12  Ozurdex OS x2  6/20  Focal OS 5/17, OD 1-19 1/19 - increased DME at 9 weeks - resumed 6 week tx    Avastin OD    Will need maintenance OS    Had Glc suspect eval with KL  mild steroid response - increased IOP today    Continue Cosopt BID OS  Add brimonidine OS BID      BS/BP/chol control    3. NS OU  Increasing posterior changes OS     Ok for CE OS - may need combined GLC procedure - will require long term steroid OS    4. POAG  Elevated IOP OS>OD  Start Cosopt BID OD  Continue Cosopt BID OS  Add brimonidine TID OS        8 weeks OCT    Risks, benefits, and alternatives to treatment discussed in detail with the patient.  The patient voiced understanding and wished to proceed with the procedure    Injection Procedure Note:  Diagnosis: DME OD    Patient Identified and Time Out complete  Pt Prefers to be marked with sticker rather than ink marker ([email or web address].[id number] #[email or web address])  Topical Proparacaine and Betadine  Inject AVASTIN OD at 6:00 @ 3.5-4mm posterior to limbus  Post Operative Dx: Same  Complications: None  Follow up as above.

## 2020-08-11 ENCOUNTER — HOSPITAL ENCOUNTER (OUTPATIENT)
Dept: CARDIOLOGY | Facility: HOSPITAL | Age: 70
Discharge: HOME | End: 2020-08-11
Attending: STUDENT IN AN ORGANIZED HEALTH CARE EDUCATION/TRAINING PROGRAM
Payer: MEDICARE

## 2020-08-11 VITALS — HEART RATE: 57 BPM | DIASTOLIC BLOOD PRESSURE: 65 MMHG | SYSTOLIC BLOOD PRESSURE: 86 MMHG

## 2020-08-11 DIAGNOSIS — I25.10 CAD IN NATIVE ARTERY: ICD-10-CM

## 2020-08-11 LAB
% MYOCARDIUM- DEFECT 1: 18 %
CFR FLOW - ANTERIOR: 1.5
CFR FLOW - INFERIOR: 1.7
CFR FLOW - LATERAL: 1.76
CFR FLOW - MAX: 2.21
CFR FLOW - MIN: 0.89
CFR FLOW - SEPTAL: 1.62
CFR FLOW - WHOLE HEART: 1.65
CFR FLOW- DEFECT 1: 1.12 CC/MIN/G
CV PHARM DOSE: 35.4 MG
CV STRESS BASE HR: 51 BPM
DIASTOLIC BLOOD PRESSURE: 77 MMHG
END DIASTOLIC INDEX-HIGH: 170 ML/M2
END SYSTOLIC INDEX-HIGH: 70 ML/M2
NUC REST DIASTOLIC VOLUME INDEX: 42
NUC REST EJECTION FRACTION: 58
NUC REST SYSTOLIC VOLUME INDEX: 18
NUC STRESS DIASTOLIC VOLUME INDEX: 90
NUC STRESS EJECTION FRACTION: 76 %
NUC STRESS SYSTOLIC VOLUME INDEX: 21
OHS CV CPX 85 PERCENT MAX PREDICTED HEART RATE MALE: 129
OHS CV CPX MAX PREDICTED HEART RATE: 152
OHS CV CPX PATIENT IS FEMALE: 0
OHS CV CPX PATIENT IS MALE: 1
OHS CV CPX PEAK DIASTOLIC BLOOD PRESSURE: 63 MMHG
OHS CV CPX PEAK HEAR RATE: 57 BPM
OHS CV CPX PEAK RATE PRESSURE PRODUCT: 5586
OHS CV CPX PEAK SYSTOLIC BLOOD PRESSURE: 98 MMHG
OHS CV CPX PERCENT MAX PREDICTED HEART RATE ACHIEVED: 38
OHS CV CPX RATE PRESSURE PRODUCT PRESENTING: 6783
PERFUSION DEFECT 1 SIZE IN %: 7 %
REST FLOW - ANTERIOR: 0.97 CC/MIN/G
REST FLOW - INFERIOR: 0.99 CC/MIN/G
REST FLOW - LATERAL: 0.96 CC/MIN/G
REST FLOW - MAX: 1.32 CC/MIN/G
REST FLOW - MIN: 0.62 CC/MIN/G
REST FLOW - SEPTAL: 0.89 CC/MIN/G
REST FLOW - WHOLE HEART: 0.95 CC/MIN/G
REST FLOW- DEFECT 1: 0.84 CC/MIN/G
RETIRED EF AND QEF - SEE NOTES: 51 %
STRESS FLOW - ANTERIOR: 1.44 CC/MIN/G
STRESS FLOW - INFERIOR: 1.67 CC/MIN/G
STRESS FLOW - LATERAL: 1.68 CC/MIN/G
STRESS FLOW - MAX: 2.14 CC/MIN/G
STRESS FLOW - MIN: 0.74 CC/MIN/G
STRESS FLOW - SEPTAL: 1.45 CC/MIN/G
STRESS FLOW - WHOLE HEART: 1.56 CC/MIN/G
STRESS FLOW- DEFECT 1: 0.94 CC/MIN/G
SYSTOLIC BLOOD PRESSURE: 133 MMHG

## 2020-08-11 PROCEDURE — 78492 MYOCRD IMG PET MLT RST&STRS: CPT | Mod: HCNC

## 2020-08-11 PROCEDURE — 93018 CV STRESS TEST I&R ONLY: CPT | Mod: HCNC,,, | Performed by: INTERNAL MEDICINE

## 2020-08-11 PROCEDURE — 93016 CV STRESS TEST SUPVJ ONLY: CPT | Mod: HCNC,,, | Performed by: INTERNAL MEDICINE

## 2020-08-11 PROCEDURE — 93017 CV STRESS TEST TRACING ONLY: CPT | Mod: HCNC

## 2020-08-11 PROCEDURE — 78492 CARDIAC PET SCAN STRESS (CUPID ONLY): ICD-10-PCS | Mod: 26,HCNC,, | Performed by: INTERNAL MEDICINE

## 2020-08-11 PROCEDURE — 78434 CARDIAC PET SCAN STRESS (CUPID ONLY): ICD-10-PCS | Mod: 26,HCNC,, | Performed by: INTERNAL MEDICINE

## 2020-08-11 PROCEDURE — 78434 AQMBF PET REST & RX STRESS: CPT | Mod: 26,HCNC,, | Performed by: INTERNAL MEDICINE

## 2020-08-11 PROCEDURE — 93016 CARDIAC PET SCAN STRESS (CUPID ONLY): ICD-10-PCS | Mod: HCNC,,, | Performed by: INTERNAL MEDICINE

## 2020-08-11 PROCEDURE — 78434 AQMBF PET REST & RX STRESS: CPT

## 2020-08-11 PROCEDURE — A9555 RB82 RUBIDIUM: HCPCS

## 2020-08-11 PROCEDURE — 78492 MYOCRD IMG PET MLT RST&STRS: CPT | Mod: 26,HCNC,, | Performed by: INTERNAL MEDICINE

## 2020-08-11 PROCEDURE — 93018 CARDIAC PET SCAN STRESS (CUPID ONLY): ICD-10-PCS | Mod: HCNC,,, | Performed by: INTERNAL MEDICINE

## 2020-08-11 RX ORDER — DIPYRIDAMOLE 5 MG/ML
35.38 INJECTION INTRAVENOUS ONCE
Status: COMPLETED | OUTPATIENT
Start: 2020-08-11 | End: 2020-08-11

## 2020-08-11 RX ADMIN — DIPYRIDAMOLE 35.4 MG: 5 INJECTION INTRAVENOUS at 11:08

## 2020-08-21 ENCOUNTER — TELEPHONE (OUTPATIENT)
Dept: CARDIOLOGY | Facility: HOSPITAL | Age: 70
End: 2020-08-21

## 2020-08-21 NOTE — TELEPHONE ENCOUNTER
Interval update;    Discussed patient's PET findings with him this morning.  Discussed findings of a small 7% atypical defect in the distal LAD distribution.  At this time would recommend medical management.  Patient states he does not have any further anginal symptoms.  From a cardiovascular standpoint he has been stable and doing well without any physically limiting symptoms.    Recommend patient to be re-evaluated in clinic in 3 months.    Brandee Rodney M.D.  Page # [phone]  Cardiovascular Fellow PGY-V  [facility]

## 2020-09-14 ENCOUNTER — TELEPHONE (OUTPATIENT)
Dept: OPHTHALMOLOGY | Facility: CLINIC | Age: 70
End: 2020-09-14

## 2020-09-14 ENCOUNTER — TELEPHONE (OUTPATIENT)
Dept: INTERNAL MEDICINE | Facility: CLINIC | Age: 70
End: 2020-09-14

## 2020-09-14 DIAGNOSIS — Z13.9 SCREENING PROCEDURE: ICD-10-CM

## 2020-09-14 DIAGNOSIS — H40.1132 PRIMARY OPEN ANGLE GLAUCOMA (POAG) OF BOTH EYES, MODERATE STAGE: Primary | ICD-10-CM

## 2020-09-14 DIAGNOSIS — H40.1121 PRIMARY OPEN-ANGLE GLAUCOMA, LEFT EYE, MILD STAGE: ICD-10-CM

## 2020-09-14 DIAGNOSIS — H25.12 NUCLEAR SCLEROTIC CATARACT OF LEFT EYE: ICD-10-CM

## 2020-09-14 NOTE — TELEPHONE ENCOUNTER
----- Message from Kiki Helms sent at 9/14/2020  3:20 PM CDT -----  Regarding: appointment  Patient needs an sooner appointment due to his surgery date is 10/21/2020,   please contact the patient to discuss more   Thanks,

## 2020-09-17 ENCOUNTER — PATIENT OUTREACH (OUTPATIENT)
Dept: ADMINISTRATIVE | Facility: HOSPITAL | Age: 70
End: 2020-09-17

## 2020-09-17 NOTE — PROGRESS NOTES
Health Maintenance Due   Topic Date Due    Shingles Vaccine (2 of 3) 01/19/2016    Influenza Vaccine (1) 08/01/2020     Chart review completed.

## 2020-09-23 ENCOUNTER — PATIENT MESSAGE (OUTPATIENT)
Dept: DIABETES | Facility: CLINIC | Age: 70
End: 2020-09-23

## 2020-10-02 ENCOUNTER — PROCEDURE VISIT (OUTPATIENT)
Dept: OPHTHALMOLOGY | Facility: CLINIC | Age: 70
End: 2020-10-02
Payer: MEDICARE

## 2020-10-02 DIAGNOSIS — H25.13 NS (NUCLEAR SCLEROSIS), BILATERAL: ICD-10-CM

## 2020-10-02 DIAGNOSIS — H40.1132 PRIMARY OPEN ANGLE GLAUCOMA (POAG) OF BOTH EYES, MODERATE STAGE: ICD-10-CM

## 2020-10-02 PROCEDURE — 92134 POSTERIOR SEGMENT OCT RETINA (OCULAR COHERENCE TOMOGRAPHY)-BOTH EYES: ICD-10-PCS | Mod: HCNC,S$GLB,, | Performed by: OPHTHALMOLOGY

## 2020-10-02 PROCEDURE — 92134 CPTRZ OPH DX IMG PST SGM RTA: CPT | Mod: HCNC,S$GLB,, | Performed by: OPHTHALMOLOGY

## 2020-10-02 PROCEDURE — 92012 INTRM OPH EXAM EST PATIENT: CPT | Mod: 25,HCNC,S$GLB, | Performed by: OPHTHALMOLOGY

## 2020-10-02 PROCEDURE — 92012 PR EYE EXAM, EST PATIENT,INTERMED: ICD-10-PCS | Mod: 25,HCNC,S$GLB, | Performed by: OPHTHALMOLOGY

## 2020-10-02 PROCEDURE — 67028 PR INJECT INTRAVITREAL PHARMCOLOGIC: ICD-10-PCS | Mod: 50,HCNC,S$GLB, | Performed by: OPHTHALMOLOGY

## 2020-10-02 PROCEDURE — 67028 INJECTION EYE DRUG: CPT | Mod: 50,HCNC,S$GLB, | Performed by: OPHTHALMOLOGY

## 2020-10-02 RX ADMIN — Medication 1.25 MG: at 09:10

## 2020-10-02 NOTE — PATIENT INSTRUCTIONS

## 2020-10-02 NOTE — PROGRESS NOTES
HPI     8wk OCT Avastin  DLS 08/07/20 by Dr. BRANDY Rivers MD    Pt sts slight improvement in OS.   Denies pain   (-)Flashes (+)Floaters  (-)Photophobia  (-)Glare    Eye Meds:   Cosopt BID OD  Brimonidine TID OS        OCT - OD -  Decreased paracentral ME with VMT component  OS increased paracentral ME    Prior FA - late macular leakage OS > OD    Patient needs Monday appointments only    A/P    1. Mild NPDR OU T2  2. DME OU    S/p Avastin OD x 4, OS x 12  Ozurdex OS x2  6/20  Focal OS 5/17, OD 1-19  1/19 - increased DME at 9 weeks - resumed 6 week tx    Avastin OD/Ozurdex OS today    Will need maintenance OS    Had Glc suspect eval with KL  mild steroid response - increased IOP today    Continue Cosopt BID OS  Add brimonidine OS BID      BS/BP/chol control    3. NS OU  Increasing posterior changes OS     Ok for CE OS - may need combined GLC procedure - will require long term steroid OS    4. POAG  Elevated IOP OS>OD    Continue Cosopt BID OU, brimonidine TID OS        8 weeks OCT    Risks, benefits, and alternatives to treatment discussed in detail with the patient.  The patient voiced understanding and wished to proceed with the procedure    Injection Procedure Note:  Diagnosis: DME OU    Patient Identified and Time Out complete  Pt Prefers to be marked with sticker rather than ink marker ([id number] #[email or web address])  Topical Proparacaine and Betadine subconj lido OS  Inject AVASTIN OD, Ozurdex OS at 6:00 @ 3.5-4mm posterior to limbus  Post Operative Dx: Same  Complications: None  Follow up as above.

## 2020-10-09 ENCOUNTER — OFFICE VISIT (OUTPATIENT)
Dept: OPHTHALMOLOGY | Facility: CLINIC | Age: 70
End: 2020-10-09
Payer: MEDICARE

## 2020-10-09 ENCOUNTER — OFFICE VISIT (OUTPATIENT)
Dept: INTERNAL MEDICINE | Facility: CLINIC | Age: 70
End: 2020-10-09
Payer: MEDICARE

## 2020-10-09 ENCOUNTER — TELEPHONE (OUTPATIENT)
Dept: INTERNAL MEDICINE | Facility: CLINIC | Age: 70
End: 2020-10-09

## 2020-10-09 VITALS
HEIGHT: 64 IN | BODY MASS INDEX: 23.9 KG/M2 | DIASTOLIC BLOOD PRESSURE: 70 MMHG | HEART RATE: 60 BPM | WEIGHT: 140 LBS | SYSTOLIC BLOOD PRESSURE: 108 MMHG

## 2020-10-09 DIAGNOSIS — H40.1132 PRIMARY OPEN ANGLE GLAUCOMA (POAG) OF BOTH EYES, MODERATE STAGE: ICD-10-CM

## 2020-10-09 DIAGNOSIS — Z01.818 PREOP EXAM FOR INTERNAL MEDICINE: Primary | ICD-10-CM

## 2020-10-09 DIAGNOSIS — E11.65 TYPE 2 DIABETES MELLITUS WITH HYPERGLYCEMIA, WITH LONG-TERM CURRENT USE OF INSULIN: ICD-10-CM

## 2020-10-09 DIAGNOSIS — H25.042 POSTERIOR SUBCAPSULAR AGE-RELATED CATARACT, LEFT EYE: ICD-10-CM

## 2020-10-09 DIAGNOSIS — I20.89 CHRONIC STABLE ANGINA: Chronic | ICD-10-CM

## 2020-10-09 DIAGNOSIS — Z79.4 TYPE 2 DIABETES MELLITUS WITH HYPERGLYCEMIA, WITH LONG-TERM CURRENT USE OF INSULIN: ICD-10-CM

## 2020-10-09 DIAGNOSIS — H26.9 CATARACT OF LEFT EYE, UNSPECIFIED CATARACT TYPE: ICD-10-CM

## 2020-10-09 DIAGNOSIS — E11.3313 TYPE 2 DIABETES MELLITUS WITH BOTH EYES AFFECTED BY MODERATE NONPROLIFERATIVE RETINOPATHY AND MACULAR EDEMA, WITHOUT LONG-TERM CURRENT USE OF INSULIN: ICD-10-CM

## 2020-10-09 DIAGNOSIS — H25.12 NUCLEAR SCLEROTIC CATARACT OF LEFT EYE: Primary | ICD-10-CM

## 2020-10-09 DIAGNOSIS — I25.10 CORONARY ARTERIOSCLEROSIS IN NATIVE ARTERY: Chronic | ICD-10-CM

## 2020-10-09 DIAGNOSIS — H25.13 NS (NUCLEAR SCLEROSIS), BILATERAL: ICD-10-CM

## 2020-10-09 DIAGNOSIS — H40.042 STEROID RESPONDERS, LEFT: ICD-10-CM

## 2020-10-09 PROBLEM — K40.90 INGUINAL HERNIA OF LEFT SIDE WITHOUT OBSTRUCTION OR GANGRENE: Status: RESOLVED | Noted: 2019-02-06 | Resolved: 2020-10-09

## 2020-10-09 PROBLEM — H40.1130 PRIMARY OPEN ANGLE GLAUCOMA (POAG) OF BOTH EYES: Chronic | Status: ACTIVE | Noted: 2019-10-07

## 2020-10-09 PROBLEM — E11.9 TYPE 2 DIABETES MELLITUS, WITHOUT LONG-TERM CURRENT USE OF INSULIN: Status: ACTIVE | Noted: 2019-05-09

## 2020-10-09 PROBLEM — K62.5 RECTAL BLEEDING: Status: RESOLVED | Noted: 2019-02-08 | Resolved: 2020-10-09

## 2020-10-09 PROBLEM — E78.5 HYPERLIPIDEMIA ASSOCIATED WITH TYPE 2 DIABETES MELLITUS: Chronic | Status: ACTIVE | Noted: 2018-01-29

## 2020-10-09 PROBLEM — E11.8 TYPE 2 DIABETES MELLITUS WITH COMPLICATION, WITHOUT LONG-TERM CURRENT USE OF INSULIN: Status: RESOLVED | Noted: 2017-04-03 | Resolved: 2020-10-09

## 2020-10-09 PROBLEM — G47.9 SLEEP DISORDER: Status: RESOLVED | Noted: 2019-04-01 | Resolved: 2020-10-09

## 2020-10-09 PROBLEM — K40.90 RIGHT INGUINAL HERNIA: Status: RESOLVED | Noted: 2019-02-19 | Resolved: 2020-10-09

## 2020-10-09 PROBLEM — E11.3213 TYPE 2 DIABETES MELLITUS WITH BOTH EYES AFFECTED BY MILD NONPROLIFERATIVE RETINOPATHY AND MACULAR EDEMA, WITHOUT LONG-TERM CURRENT USE OF INSULIN: Status: ACTIVE | Noted: 2019-05-09

## 2020-10-09 PROBLEM — E11.3213 TYPE 2 DIABETES MELLITUS WITH BOTH EYES AFFECTED BY MILD NONPROLIFERATIVE RETINOPATHY AND MACULAR EDEMA, WITHOUT LONG-TERM CURRENT USE OF INSULIN: Chronic | Status: ACTIVE | Noted: 2019-05-09

## 2020-10-09 PROBLEM — E11.69 HYPERLIPIDEMIA ASSOCIATED WITH TYPE 2 DIABETES MELLITUS: Chronic | Status: ACTIVE | Noted: 2018-01-29

## 2020-10-09 PROCEDURE — 99499 UNLISTED E&M SERVICE: CPT | Mod: HCNC,S$GLB,, | Performed by: OPHTHALMOLOGY

## 2020-10-09 PROCEDURE — 92136 IOL MASTER - OU - BOTH EYES: ICD-10-PCS | Mod: HCNC,LT,S$GLB, | Performed by: OPHTHALMOLOGY

## 2020-10-09 PROCEDURE — 3046F HEMOGLOBIN A1C LEVEL >9.0%: CPT | Mod: HCNC,CPTII,S$GLB, | Performed by: INTERNAL MEDICINE

## 2020-10-09 PROCEDURE — 1159F PR MEDICATION LIST DOCUMENTED IN MEDICAL RECORD: ICD-10-PCS | Mod: HCNC,S$GLB,, | Performed by: INTERNAL MEDICINE

## 2020-10-09 PROCEDURE — 1101F PR PT FALLS ASSESS DOC 0-1 FALLS W/OUT INJ PAST YR: ICD-10-PCS | Mod: HCNC,CPTII,S$GLB, | Performed by: INTERNAL MEDICINE

## 2020-10-09 PROCEDURE — 1126F AMNT PAIN NOTED NONE PRSNT: CPT | Mod: HCNC,S$GLB,, | Performed by: INTERNAL MEDICINE

## 2020-10-09 PROCEDURE — 1159F MED LIST DOCD IN RCRD: CPT | Mod: HCNC,S$GLB,, | Performed by: INTERNAL MEDICINE

## 2020-10-09 PROCEDURE — 3008F PR BODY MASS INDEX (BMI) DOCUMENTED: ICD-10-PCS | Mod: HCNC,CPTII,S$GLB, | Performed by: INTERNAL MEDICINE

## 2020-10-09 PROCEDURE — 3078F PR MOST RECENT DIASTOLIC BLOOD PRESSURE < 80 MM HG: ICD-10-PCS | Mod: HCNC,CPTII,S$GLB, | Performed by: INTERNAL MEDICINE

## 2020-10-09 PROCEDURE — 99999 PR PBB SHADOW E&M-EST. PATIENT-LVL III: CPT | Mod: PBBFAC,HCNC,, | Performed by: INTERNAL MEDICINE

## 2020-10-09 PROCEDURE — 1126F PR PAIN SEVERITY QUANTIFIED, NO PAIN PRESENT: ICD-10-PCS | Mod: HCNC,S$GLB,, | Performed by: INTERNAL MEDICINE

## 2020-10-09 PROCEDURE — 99214 OFFICE O/P EST MOD 30 MIN: CPT | Mod: HCNC,S$GLB,, | Performed by: INTERNAL MEDICINE

## 2020-10-09 PROCEDURE — 3074F SYST BP LT 130 MM HG: CPT | Mod: HCNC,CPTII,S$GLB, | Performed by: INTERNAL MEDICINE

## 2020-10-09 PROCEDURE — 99499 UNLISTED E&M SERVICE: CPT | Mod: S$GLB,,, | Performed by: INTERNAL MEDICINE

## 2020-10-09 PROCEDURE — 3008F BODY MASS INDEX DOCD: CPT | Mod: HCNC,CPTII,S$GLB, | Performed by: INTERNAL MEDICINE

## 2020-10-09 PROCEDURE — 3078F DIAST BP <80 MM HG: CPT | Mod: HCNC,CPTII,S$GLB, | Performed by: INTERNAL MEDICINE

## 2020-10-09 PROCEDURE — 99999 PR PBB SHADOW E&M-EST. PATIENT-LVL III: ICD-10-PCS | Mod: PBBFAC,HCNC,, | Performed by: OPHTHALMOLOGY

## 2020-10-09 PROCEDURE — 92136 OPHTHALMIC BIOMETRY: CPT | Mod: HCNC,LT,S$GLB, | Performed by: OPHTHALMOLOGY

## 2020-10-09 PROCEDURE — 99214 PR OFFICE/OUTPT VISIT, EST, LEVL IV, 30-39 MIN: ICD-10-PCS | Mod: HCNC,S$GLB,, | Performed by: INTERNAL MEDICINE

## 2020-10-09 PROCEDURE — 99499 NO LOS: ICD-10-PCS | Mod: HCNC,S$GLB,, | Performed by: OPHTHALMOLOGY

## 2020-10-09 PROCEDURE — 99999 PR PBB SHADOW E&M-EST. PATIENT-LVL III: ICD-10-PCS | Mod: PBBFAC,HCNC,, | Performed by: INTERNAL MEDICINE

## 2020-10-09 PROCEDURE — 3074F PR MOST RECENT SYSTOLIC BLOOD PRESSURE < 130 MM HG: ICD-10-PCS | Mod: HCNC,CPTII,S$GLB, | Performed by: INTERNAL MEDICINE

## 2020-10-09 PROCEDURE — 99499 RISK ADDL DX/OHS AUDIT: ICD-10-PCS | Mod: S$GLB,,, | Performed by: INTERNAL MEDICINE

## 2020-10-09 PROCEDURE — 1101F PT FALLS ASSESS-DOCD LE1/YR: CPT | Mod: HCNC,CPTII,S$GLB, | Performed by: INTERNAL MEDICINE

## 2020-10-09 PROCEDURE — 3046F PR MOST RECENT HEMOGLOBIN A1C LEVEL > 9.0%: ICD-10-PCS | Mod: HCNC,CPTII,S$GLB, | Performed by: INTERNAL MEDICINE

## 2020-10-09 PROCEDURE — 99999 PR PBB SHADOW E&M-EST. PATIENT-LVL III: CPT | Mod: PBBFAC,HCNC,, | Performed by: OPHTHALMOLOGY

## 2020-10-09 RX ORDER — MOXIFLOXACIN 5 MG/ML
1 SOLUTION/ DROPS OPHTHALMIC
Status: CANCELLED | OUTPATIENT
Start: 2020-10-09

## 2020-10-09 RX ORDER — SODIUM CHLORIDE 0.9 % (FLUSH) 0.9 %
10 SYRINGE (ML) INJECTION
Status: DISCONTINUED | OUTPATIENT
Start: 2020-10-09 | End: 2020-10-29

## 2020-10-09 RX ORDER — DORZOLAMIDE HYDROCHLORIDE AND TIMOLOL MALEATE 20; 5 MG/ML; MG/ML
1 SOLUTION/ DROPS OPHTHALMIC 2 TIMES DAILY
Qty: 30 ML | Refills: 3 | Status: SHIPPED | OUTPATIENT
Start: 2020-10-09 | End: 2021-12-30

## 2020-10-09 RX ORDER — KETOROLAC TROMETHAMINE 5 MG/ML
1 SOLUTION OPHTHALMIC
Status: CANCELLED | OUTPATIENT
Start: 2020-10-09

## 2020-10-09 RX ORDER — PHENYLEPHRINE HYDROCHLORIDE 100 MG/ML
1 SOLUTION/ DROPS OPHTHALMIC
Status: CANCELLED | OUTPATIENT
Start: 2020-10-09

## 2020-10-09 RX ORDER — TROPICAMIDE 10 MG/ML
1 SOLUTION/ DROPS OPHTHALMIC
Status: CANCELLED | OUTPATIENT
Start: 2020-10-09

## 2020-10-09 RX ORDER — BRIMONIDINE TARTRATE 2 MG/ML
1 SOLUTION/ DROPS OPHTHALMIC 3 TIMES DAILY
Qty: 15 ML | Refills: 3 | Status: SHIPPED | OUTPATIENT
Start: 2020-10-09 | End: 2020-12-07

## 2020-10-09 NOTE — Clinical Note
Please disregard previous message on this pt - he is NOT and istent but a GDD - as you had already hopefully booked him // phaco/IOL with trypan and GDD - possibly ahemd or possibly baerveldt  - left eye

## 2020-10-09 NOTE — Clinical Note
Can you add an istent onto this pts cataract surgery - for weds oct 21st. (( all 4 patients that day will be needing the I-stnet along with their cataract surgery - I will let Micah know

## 2020-10-09 NOTE — PROGRESS NOTES
Patient presents for pre-operative examination.   The patient will be having surgery for Left cataract with general anesthesia     Other complaints today: None. He is in his normal state of health and has no particular complaints today. This is my first time seeing this patient as he walked in to this [facility] clinic this morning.     This patient's active chronic problem list was reviewed and updated as indicated here:  Patient Active Problem List   Diagnosis    Essential hypertension    Benign non-nodular prostatic hyperplasia without lower urinary tract symptoms    Adrenal nodule    Coronary arteriosclerosis in native artery    Aortic atherosclerosis    NS (nuclear sclerosis), bilateral    Hyperlipidemia associated with type 2 diabetes mellitus    Type 2 diabetes mellitus with both eyes affected by mild nonproliferative retinopathy and macular edema, with long-term current use of insulin    Primary open angle glaucoma (POAG) of both eyes    Chronic stable angina    Type 2 diabetes mellitus with hyperglycemia, with long-term current use of insulin     Review of patient's allergies indicates:   Allergen Reactions    Percocet [oxycodone-acetaminophen] Itching     Current Outpatient Medications   Medication Sig Dispense Refill    acetaminophen (TYLENOL) 500 MG tablet Take 2 tablets (1,000 mg total) by mouth 2 (two) times daily.  0    aspirin (ECOTRIN) 81 MG EC tablet Take 1 tablet (81 mg total) by mouth once daily.  0    atorvastatin (LIPITOR) 80 MG tablet Take 1 tablet (80 mg total) by mouth once daily. 90 tablet 3    brimonidine 0.2% (ALPHAGAN) 0.2 % Drop Place 1 drop into the left eye 3 (three) times daily. 10 mL 12    dorzolamide-timolol 2-0.5% (COSOPT) 22.3-6.8 mg/mL ophthalmic solution Place 1 drop into both eyes 2 (two) times daily. 15 mL 12    glimepiride (AMARYL) 4 MG tablet Take 1 tablet (4 mg total) by mouth before breakfast. 90 tablet 3    insulin (LANTUS SOLOSTAR U-100 INSULIN) glargine  "100 units/mL (3mL) SubQ pen Inject 15 Units into the skin once daily. Inject 15 Units into skin at night (after dinner or before bedtime) 13.5 mL 3    lisinopriL-hydrochlorothiazide (PRINZIDE,ZESTORETIC) 10-12.5 mg per tablet Take 1 tablet by mouth once daily. 90 tablet 3    metFORMIN (GLUCOPHAGE) 1000 MG tablet Take 1 tablet (1,000 mg total) by mouth 2 (two) times daily with meals. 180 tablet 3    metoprolol succinate (TOPROL-XL) 25 MG 24 hr tablet Take 0.5 tablets (12.5 mg total) by mouth once daily. 45 tablet 3    tamsulosin (FLOMAX) 0.4 mg Cap Take 2 capsules (0.8 mg total) by mouth every evening. 60 capsule 11    nitroGLYCERIN (NITROSTAT) 0.4 MG SL tablet Place 1 tablet (0.4 mg total) under the tongue every 5 (five) minutes as needed for Chest pain (ONLY IF HAVE CHEST PAIN,). 30 tablet 0     No current facility-administered medications for this visit.         Last PET stress: Abnormal. See results dated 8/11/2020 with note from cardiology stating "maintane medical therapy at this time".   Lab Results   Component Value Date    WBC 7.15 05/19/2020    HGB 15.2 05/19/2020    HCT 46.4 05/19/2020     05/19/2020    CHOL 98 (L) 05/19/2020    TRIG 42 05/19/2020    HDL 39 (L) 05/19/2020    ALT 53 (H) 05/19/2020    AST 37 05/19/2020     05/19/2020    K 4.3 05/19/2020     05/19/2020    CREATININE 1.1 05/19/2020    BUN 14 05/19/2020    CO2 25 05/19/2020    TSH 0.880 07/02/2018    PSA 0.56 07/02/2018    INR 1.0 12/05/2016    HGBA1C 9.9 (H) 05/19/2020        Revised Richardson cardiac risk index (RCRI)  Six independent predictors of major cardiac complications:  1. High-risk type of surgery (examples include vascular surgery and any open intraperitoneal or intrathoracic procedures):no  2. History of ischemic heart disease (history of MI or a positive exercise test, current complaint of chest pain considered to be secondary to myocardial ischemia, use of nitrate therapy, or ECG with pathological Q waves; " "do not count prior coronary revascularization procedure unless one of the other criteria for ischemic heart disease is present):yes  3. History of HF:no  4. History of cerebrovascular disease:no  5. Diabetes mellitus requiring treatment with insulin:yes  6. Preoperative serum creatinine >2.0 mg/dL:  Lab Results   Component Value Date    CREATININE 1.1 05/19/2020      Risk of cardiac death, nonfatal myocardial infarction, and nonfatal cardiac arrest according to the number of predictors:  Two risk factors - 6.6%     Review of Systems   All other systems reviewed and are negative.    Physical Exam  Vitals signs reviewed.   Constitutional:       Appearance: He is well-developed.   HENT:      Head: Normocephalic and atraumatic.   Neck:      Musculoskeletal: Neck supple.      Thyroid: No thyromegaly.      Vascular: No JVD.   Cardiovascular:      Rate and Rhythm: Normal rate and regular rhythm.      Heart sounds: Normal heart sounds.   Pulmonary:      Effort: Pulmonary effort is normal.      Breath sounds: Normal breath sounds. No wheezing or rales.   Abdominal:      General: Bowel sounds are normal.      Palpations: Abdomen is soft. There is no mass.      Tenderness: There is no abdominal tenderness.   Musculoskeletal: Normal range of motion.   Lymphadenopathy:      Cervical: No cervical adenopathy.   Neurological:      Mental Status: He is alert and oriented to person, place, and time.      Deep Tendon Reflexes: Reflexes are normal and symmetric.   Psychiatric:         Behavior: Behavior normal.          ASSESSMENT:  1. Preop exam for internal medicine  Pt. Is moderate risk.     2. Cataract of left eye, unspecified cataract type    3. Type 2 diabetes mellitus with hyperglycemia, with long-term current use of insulin  His DM is uncontrolled and he only started a DM diet and trying to get this better controlled as of 2 weeks ago.  His FBS at home has be "up and down" but he has no readings for me to review.     4. " Coronary arteriosclerosis in native artery  Chronic but stable.     5. Chronic stable angina  Chronic but stable.       PLAN:   Medications:  - continue all medicines as previously prescribed.  According to the ACC/AHA and ACP guidelines for preoperative cardiovascular risk assesment this patient is medium risk for this scheduled elective procedure/surgery.    I would suggest that his DM needs better control before proceeding with surgery to help increase his odds of a better outcome. This may take months. He will need to follow up with his PCP and get clearance from them before proceeding.   I also suggest that clearance from his cardiologist be obtained since he never had in person follow up after the stress test and they are unaware of this surgery decision.

## 2020-10-09 NOTE — PROGRESS NOTES
HPI     Preop Combined OD (Complex Phaco IOL and GDD)   (sx 10/21/20)    1. Glaucoma Suspect  2. Type 2 DM  3. BDR with ME OU  4. NS OU    MEDS:  Cosopt BID OU  Brimonidine TID OS = Dr. Rivers added drop on 8/7/20 but pt states he   never started it  AT's PRN OU      Last edited by Brittany Villavicencio MA on 10/9/2020  1:21 PM. (History)            Assessment /Plan     For exam results, see Encounter Report.    Nuclear sclerotic cataract of left eye  -     IOL Master - OU - Both Eyes  -     Diet NPO; Standing    Posterior subcapsular age-related cataract, left eye    NS (nuclear sclerosis), bilateral  -     IOL Master - OU - Both Eyes    Primary open angle glaucoma (POAG) of both eyes, moderate stage  -     dorzolamide-timolol 2-0.5% (COSOPT) 22.3-6.8 mg/mL ophthalmic solution; Place 1 drop into both eyes 2 (two) times daily.  Dispense: 30 mL; Refill: 3  -     brimonidine 0.2% (ALPHAGAN) 0.2 % Drop; Place 1 drop into the left eye 3 (three) times daily.  Dispense: 15 mL; Refill: 3    Steroid responders, left  -     dorzolamide-timolol 2-0.5% (COSOPT) 22.3-6.8 mg/mL ophthalmic solution; Place 1 drop into both eyes 2 (two) times daily.  Dispense: 30 mL; Refill: 3  -     brimonidine 0.2% (ALPHAGAN) 0.2 % Drop; Place 1 drop into the left eye 3 (three) times daily.  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 moderate nonproliferative retinopathy and macular edema, without long-term current use of insulin    Other orders  -     sodium chloride 0.9% flush 10 mL                    Glaucoma (type and duration)    Suspect - followed at [facility] since 2007    First HVF   2007   First photos   2009   Treatment / Drops started   none           Family history    Neg (+for DR - mom)         Glaucoma meds    None         H/O adverse rxn to glaucoma drops    none        LASERS    Laser for DR // none for glaucoma         GLAUCOMA SURGERIES    none        OTHER EYE SURGERIES    None         CDR    (( by  fundus photos 0.5 / 0.6 )         Tbase    15-25 / 15-31  (but ? If the high IOP's are post injection - had injection on those days and only 1 IOP recorded for each eye)         Tmax    24 od (11/6/2018)  - ?? Post injection // /34os( 4/14/2020) - ? Post injection            Ttarget    ?             HVF    4 test 2007 to  2009 - full od // full os        Gonio    +3 ou        CCT    518/525        OCT    1 test 2019/ to 2019/ - RNFL WNL-od // WNL-Os---OCT MAc with DME OS        HRT    1 test 2009 to 2009 - MR - nl od // nl os        Disc photos    Fundus photos 2009, 2017, 2018    - Ttoday    20/28   - Test done today  Pre - op phaco/IOL os // GDD os     2. BDR w/ ME    Sees Silvestre    S/P avastin ou    S/P laser ou    Waiting for approval for ozurdex     3. NS / cortical cat OU   Monitor     PLAN   Good HVF    nl RNFL ou   IOP 24 ou  On cosopt os for IOP of 33 os on 4/14/2020 (good resp 34-->24)   Ok to use cosopt os only for now   + ozurdex IOP elevation - got ozurdex os 2/2020 and IOP up to 34 os on   If IOP goes up od can add cosopt   If IOP goes up os can add brimonidine       CE sx along with a glaucoma surgery os - per silvestre - has developed a dense PSC w/ decrease in vision - will need long term steroids and is a steroid responder     >> ahmed vs baerveldt     IOL calc os   PCB00 - 21.5  AC IOL 18.0     Risks and benefits discussed and consent signed.

## 2020-10-09 NOTE — H&P
Subjective:       Patient ID: Ned Oliveira is a 70 y.o. male.    Chief Complaint: Pre-op Exam    HPI  Review of Systems   Constitutional: Negative.    HENT: Negative.    Eyes: Positive for visual disturbance.   Respiratory: Negative.    Cardiovascular: Negative.    Gastrointestinal: Negative.    Endocrine: Negative.    Genitourinary: Negative.          Objective:      Physical Exam  HENT:      Head: Normocephalic and atraumatic.   Cardiovascular:      Rate and Rhythm: Normal rate.   Pulmonary:      Effort: Pulmonary effort is normal.   Skin:     General: Skin is warm and dry.   Neurological:      Mental Status: He is alert and oriented to person, place, and time.         Assessment:       1. Nuclear sclerotic cataract of left eye    2. NS (nuclear sclerosis), bilateral    3. Primary open angle glaucoma (POAG) of both eyes, moderate stage    4. 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 Steroid responders, left        Plan:       Phaco/IOL os - per retina

## 2020-10-09 NOTE — TELEPHONE ENCOUNTER
----- Message from Jessica Bender sent at 10/8/2020  4:12 PM CDT -----  Contact: [phone]  Caller is requesting an earlier appt than we can schedule.  Caller declined first available appointment listed below. Caller will not accept being placed on the wait list and is requesting a message be sent to the provider.  When is the next available appointment:  November  Reason for the appointment:  pre op eye surgery  Patient preference of timeframe to be scheduled:  before 10/21  What is the reason the patient is requesting a sooner appointment? (insurance terminating, changing jobs):    Comments:  Patient's eye surgery is scheduled for 10/21

## 2020-10-14 ENCOUNTER — TELEPHONE (OUTPATIENT)
Dept: OPHTHALMOLOGY | Facility: CLINIC | Age: 70
End: 2020-10-14

## 2020-10-14 NOTE — TELEPHONE ENCOUNTER
----- Message from ZABRINA Farrell II, MD sent at 10/14/2020  8:20 AM CDT -----  OK to go to surgery.  I was previously told this would be under general anesthesia but since it's not he may proceed to surgery as planned.   DM  ----- Message -----  From: Carlos Pimentel  Sent: 10/12/2020   9:27 AM CDT  To: ZABRINA Farrell II, MD    Good Morning,   We received a message stating Mr. Oliveira would not be cleared at this time for his scheduled Glaucoma/Cataract surgery.  This procedure will be done under local/MAC anesthesia.  This is a straight forward surgery mainly to control his glaucoma.  Would he be able to clear?  Thanks,   Carlos Pimentel  Surgery Coordinator  [phone]

## 2020-10-19 ENCOUNTER — LAB VISIT (OUTPATIENT)
Dept: SURGERY | Facility: CLINIC | Age: 70
End: 2020-10-19
Payer: MEDICARE

## 2020-10-19 DIAGNOSIS — Z13.9 SCREENING PROCEDURE: ICD-10-CM

## 2020-10-19 LAB — SARS-COV-2 RNA RESP QL NAA+PROBE: NOT DETECTED

## 2020-10-19 PROCEDURE — U0003 INFECTIOUS AGENT DETECTION BY NUCLEIC ACID (DNA OR RNA); SEVERE ACUTE RESPIRATORY SYNDROME CORONAVIRUS 2 (SARS-COV-2) (CORONAVIRUS DISEASE [COVID-19]), AMPLIFIED PROBE TECHNIQUE, MAKING USE OF HIGH THROUGHPUT TECHNOLOGIES AS DESCRIBED BY CMS-2020-01-R: HCPCS | Mod: HCNC

## 2020-10-20 ENCOUNTER — TELEPHONE (OUTPATIENT)
Dept: OPTOMETRY | Facility: CLINIC | Age: 70
End: 2020-10-20

## 2020-10-20 NOTE — PRE-PROCEDURE INSTRUCTIONS
PREOP INSTRUCTIONS:No solid food ,milk or milk products for 8 hours prior to procedure.Clear liquids are allowed up to 2 hours before procedure.Clear liquids are:water,apple juice,gatorade & powerade.Instructed to follow the surgeon's instructions if they differ from these.Shower instructions as well as directions to the [facility] were given.Encouraged to wear loose fitting,comfortable clothing.Medication instructions for pm prior to and am of procedure reviewed.Instructed to avoid taking vitamins,supplements,aspirin and ibuprofen the morning of surgery. Patient's questions and concerns addressed .Patient informed of the current visitor policy and advised patient that one visitor may accompany each patient into the hospital and wait (socially distanced) until a member of the medical team provides an update at the conclusion of the procedure.When they enter the hospital both patient and visitor will have their temperature checked.All visitors are asked to arrive with a mask and to keep their mask on throughout the visit.     Patient denies any side effects or issues with anesthesia or sedation.     Patient does not know arrival time.Explained that this information comes from the surgeon's office and if they haven't heard from them by 2 or 3 pm to call the office.Patient stated an understanding.

## 2020-10-21 ENCOUNTER — ANESTHESIA (OUTPATIENT)
Dept: SURGERY | Facility: HOSPITAL | Age: 70
End: 2020-10-21
Payer: MEDICARE

## 2020-10-21 ENCOUNTER — ANESTHESIA EVENT (OUTPATIENT)
Dept: SURGERY | Facility: HOSPITAL | Age: 70
End: 2020-10-21
Payer: MEDICARE

## 2020-10-21 ENCOUNTER — HOSPITAL ENCOUNTER (OUTPATIENT)
Facility: HOSPITAL | Age: 70
Discharge: HOME | End: 2020-10-21
Attending: OPHTHALMOLOGY | Admitting: OPHTHALMOLOGY
Payer: MEDICARE

## 2020-10-21 VITALS
OXYGEN SATURATION: 99 % | HEART RATE: 62 BPM | SYSTOLIC BLOOD PRESSURE: 137 MMHG | BODY MASS INDEX: 25.75 KG/M2 | TEMPERATURE: 98 F | RESPIRATION RATE: 18 BRPM | DIASTOLIC BLOOD PRESSURE: 75 MMHG | WEIGHT: 150 LBS

## 2020-10-21 DIAGNOSIS — H40.1121 PRIMARY OPEN-ANGLE GLAUCOMA, LEFT EYE, MILD STAGE: Primary | ICD-10-CM

## 2020-10-21 DIAGNOSIS — H25.12 NUCLEAR SCLEROTIC CATARACT OF LEFT EYE: ICD-10-CM

## 2020-10-21 LAB — POCT GLUCOSE: 127 MG/DL (ref 70–110)

## 2020-10-21 PROCEDURE — 37000008 HC ANESTHESIA 1ST 15 MINUTES: Mod: HCNC | Performed by: OPHTHALMOLOGY

## 2020-10-21 PROCEDURE — 36000707: Mod: HCNC | Performed by: OPHTHALMOLOGY

## 2020-10-21 PROCEDURE — 36000706: Mod: HCNC | Performed by: OPHTHALMOLOGY

## 2020-10-21 PROCEDURE — D9220A PRA ANESTHESIA: ICD-10-PCS | Mod: HCNC,ANES,, | Performed by: ANESTHESIOLOGY

## 2020-10-21 PROCEDURE — D9220A PRA ANESTHESIA: Mod: HCNC,CRNA,, | Performed by: NURSE ANESTHETIST, CERTIFIED REGISTERED

## 2020-10-21 PROCEDURE — 66180 PR WATER SHUNT-EXTRAOCUL RESERV: ICD-10-PCS | Mod: HCNC,LT,, | Performed by: OPHTHALMOLOGY

## 2020-10-21 PROCEDURE — 25000003 PHARM REV CODE 250: Mod: HCNC

## 2020-10-21 PROCEDURE — 63600175 PHARM REV CODE 636 W HCPCS: Mod: HCNC | Performed by: OPHTHALMOLOGY

## 2020-10-21 PROCEDURE — 66982 XCAPSL CTRC RMVL CPLX WO ECP: CPT | Mod: 51,HCNC,LT, | Performed by: OPHTHALMOLOGY

## 2020-10-21 PROCEDURE — 25000003 PHARM REV CODE 250: Mod: HCNC | Performed by: NURSE ANESTHETIST, CERTIFIED REGISTERED

## 2020-10-21 PROCEDURE — 27201423 OPTIME MED/SURG SUP & DEVICES STERILE SUPPLY: Mod: HCNC | Performed by: OPHTHALMOLOGY

## 2020-10-21 PROCEDURE — 25000003 PHARM REV CODE 250: Mod: HCNC | Performed by: OPHTHALMOLOGY

## 2020-10-21 PROCEDURE — 37000009 HC ANESTHESIA EA ADD 15 MINS: Mod: HCNC | Performed by: OPHTHALMOLOGY

## 2020-10-21 PROCEDURE — V2632 POST CHMBR INTRAOCULAR LENS: HCPCS | Mod: HCNC | Performed by: OPHTHALMOLOGY

## 2020-10-21 PROCEDURE — C1783 OCULAR IMP, AQUEOUS DRAIN DE: HCPCS | Mod: HCNC | Performed by: OPHTHALMOLOGY

## 2020-10-21 PROCEDURE — D9220A PRA ANESTHESIA: Mod: HCNC,ANES,, | Performed by: ANESTHESIOLOGY

## 2020-10-21 PROCEDURE — 63600175 PHARM REV CODE 636 W HCPCS: Mod: HCNC | Performed by: NURSE ANESTHETIST, CERTIFIED REGISTERED

## 2020-10-21 PROCEDURE — 66180 AQUEOUS SHUNT EYE W/GRAFT: CPT | Mod: HCNC,LT,, | Performed by: OPHTHALMOLOGY

## 2020-10-21 PROCEDURE — 27800903 OPTIME MED/SURG SUP & DEVICES OTHER IMPLANTS: Mod: HCNC | Performed by: OPHTHALMOLOGY

## 2020-10-21 PROCEDURE — 71000015 HC POSTOP RECOV 1ST HR: Mod: HCNC | Performed by: OPHTHALMOLOGY

## 2020-10-21 PROCEDURE — D9220A PRA ANESTHESIA: ICD-10-PCS | Mod: HCNC,CRNA,, | Performed by: NURSE ANESTHETIST, CERTIFIED REGISTERED

## 2020-10-21 PROCEDURE — 82962 GLUCOSE BLOOD TEST: CPT | Mod: HCNC | Performed by: OPHTHALMOLOGY

## 2020-10-21 PROCEDURE — 66982 PR REMOVAL, CATARACT, W/INSRT INTRAOC LENS, W/O ENDO CYCLO, CMPLX: ICD-10-PCS | Mod: 51,HCNC,LT, | Performed by: OPHTHALMOLOGY

## 2020-10-21 DEVICE — PERICARDIUM TUTOPLAST 1.5X1.5: Type: IMPLANTABLE DEVICE | Site: EYE | Status: FUNCTIONAL

## 2020-10-21 DEVICE — IMPLANT BAERVELDT GLAUCOMA 350: Type: IMPLANTABLE DEVICE | Site: EYE | Status: FUNCTIONAL

## 2020-10-21 DEVICE — LENS IOL ITEC PRELOAD 21.5D: Type: IMPLANTABLE DEVICE | Site: EYE | Status: FUNCTIONAL

## 2020-10-21 RX ORDER — LIDOCAINE HYDROCHLORIDE 10 MG/ML
INJECTION, SOLUTION EPIDURAL; INFILTRATION; INTRACAUDAL; PERINEURAL
Status: DISCONTINUED | OUTPATIENT
Start: 2020-10-21 | End: 2020-10-21 | Stop reason: HOSPADM

## 2020-10-21 RX ORDER — GENTAMICIN SULFATE 40 MG/ML
INJECTION, SOLUTION INTRAMUSCULAR; INTRAVENOUS
Status: DISCONTINUED
Start: 2020-10-21 | End: 2020-10-21 | Stop reason: HOSPADM

## 2020-10-21 RX ORDER — GENTAMICIN SULFATE 40 MG/ML
INJECTION, SOLUTION INTRAMUSCULAR; INTRAVENOUS
Status: DISCONTINUED | OUTPATIENT
Start: 2020-10-21 | End: 2020-10-21 | Stop reason: HOSPADM

## 2020-10-21 RX ORDER — PHENYLEPHRINE HYDROCHLORIDE 100 MG/ML
1 SOLUTION/ DROPS OPHTHALMIC
Status: DISCONTINUED | OUTPATIENT
Start: 2020-10-21 | End: 2020-10-21 | Stop reason: HOSPADM

## 2020-10-21 RX ORDER — MOXIFLOXACIN 5 MG/ML
1 SOLUTION/ DROPS OPHTHALMIC
Status: DISCONTINUED | OUTPATIENT
Start: 2020-10-21 | End: 2020-10-21 | Stop reason: HOSPADM

## 2020-10-21 RX ORDER — LIDOCAINE HYDROCHLORIDE 20 MG/ML
JELLY TOPICAL
Status: DISCONTINUED | OUTPATIENT
Start: 2020-10-21 | End: 2020-10-21 | Stop reason: HOSPADM

## 2020-10-21 RX ORDER — SODIUM CHLORIDE 9 MG/ML
INJECTION, SOLUTION INTRAVENOUS CONTINUOUS PRN
Status: DISCONTINUED | OUTPATIENT
Start: 2020-10-21 | End: 2020-10-21

## 2020-10-21 RX ORDER — LIDOCAINE HYDROCHLORIDE 40 MG/ML
INJECTION, SOLUTION RETROBULBAR
Status: DISCONTINUED
Start: 2020-10-21 | End: 2020-10-21 | Stop reason: HOSPADM

## 2020-10-21 RX ORDER — KETOROLAC TROMETHAMINE 5 MG/ML
1 SOLUTION OPHTHALMIC
Status: DISCONTINUED | OUTPATIENT
Start: 2020-10-21 | End: 2020-10-21 | Stop reason: HOSPADM

## 2020-10-21 RX ORDER — PREDNISOLONE ACETATE 10 MG/ML
SUSPENSION/ DROPS OPHTHALMIC
Status: DISCONTINUED
Start: 2020-10-21 | End: 2020-10-21 | Stop reason: HOSPADM

## 2020-10-21 RX ORDER — LIDOCAINE HYDROCHLORIDE 10 MG/ML
INJECTION, SOLUTION EPIDURAL; INFILTRATION; INTRACAUDAL; PERINEURAL
Status: DISCONTINUED
Start: 2020-10-21 | End: 2020-10-21 | Stop reason: HOSPADM

## 2020-10-21 RX ORDER — LIDOCAINE HYDROCHLORIDE 40 MG/ML
INJECTION, SOLUTION RETROBULBAR
Status: DISCONTINUED | OUTPATIENT
Start: 2020-10-21 | End: 2020-10-21 | Stop reason: HOSPADM

## 2020-10-21 RX ORDER — TROPICAMIDE 10 MG/ML
1 SOLUTION/ DROPS OPHTHALMIC
Status: DISCONTINUED | OUTPATIENT
Start: 2020-10-21 | End: 2020-10-21 | Stop reason: HOSPADM

## 2020-10-21 RX ORDER — ACETAZOLAMIDE 500 MG/1
500 CAPSULE, EXTENDED RELEASE ORAL ONCE
Status: COMPLETED | OUTPATIENT
Start: 2020-10-21 | End: 2020-10-21

## 2020-10-21 RX ORDER — MIDAZOLAM HYDROCHLORIDE 1 MG/ML
INJECTION, SOLUTION INTRAMUSCULAR; INTRAVENOUS
Status: DISCONTINUED | OUTPATIENT
Start: 2020-10-21 | End: 2020-10-21

## 2020-10-21 RX ORDER — ATROPINE SULFATE 10 MG/ML
SOLUTION/ DROPS OPHTHALMIC
Status: DISCONTINUED | OUTPATIENT
Start: 2020-10-21 | End: 2020-10-21 | Stop reason: HOSPADM

## 2020-10-21 RX ORDER — PREDNISOLONE ACETATE 10 MG/ML
SUSPENSION/ DROPS OPHTHALMIC
Status: DISCONTINUED | OUTPATIENT
Start: 2020-10-21 | End: 2020-10-21 | Stop reason: HOSPADM

## 2020-10-21 RX ORDER — ACETAMINOPHEN 325 MG/1
650 TABLET ORAL EVERY 6 HOURS PRN
Status: DISCONTINUED | OUTPATIENT
Start: 2020-10-21 | End: 2020-10-21 | Stop reason: HOSPADM

## 2020-10-21 RX ORDER — LIDOCAINE HYDROCHLORIDE 20 MG/ML
JELLY TOPICAL
Status: DISCONTINUED
Start: 2020-10-21 | End: 2020-10-21 | Stop reason: HOSPADM

## 2020-10-21 RX ORDER — FENTANYL CITRATE 50 UG/ML
INJECTION, SOLUTION INTRAMUSCULAR; INTRAVENOUS
Status: DISCONTINUED | OUTPATIENT
Start: 2020-10-21 | End: 2020-10-21

## 2020-10-21 RX ORDER — MOXIFLOXACIN 5 MG/ML
SOLUTION/ DROPS OPHTHALMIC
Status: DISCONTINUED | OUTPATIENT
Start: 2020-10-21 | End: 2020-10-21 | Stop reason: HOSPADM

## 2020-10-21 RX ADMIN — TROPICAMIDE 1 DROP: 10 SOLUTION/ DROPS OPHTHALMIC at 10:10

## 2020-10-21 RX ADMIN — ACETAZOLAMIDE 500 MG: 500 CAPSULE, EXTENDED RELEASE ORAL at 02:10

## 2020-10-21 RX ADMIN — MOXIFLOXACIN 1 DROP: 5 SOLUTION/ DROPS OPHTHALMIC at 10:10

## 2020-10-21 RX ADMIN — PHENYLEPHRINE HYDROCHLORIDE 1 DROP: 100 SOLUTION/ DROPS OPHTHALMIC at 10:10

## 2020-10-21 RX ADMIN — KETOROLAC TROMETHAMINE 1 DROP: 5 SOLUTION/ DROPS OPHTHALMIC at 10:10

## 2020-10-21 RX ADMIN — MIDAZOLAM HYDROCHLORIDE 1 MG: 1 INJECTION, SOLUTION INTRAMUSCULAR; INTRAVENOUS at 12:10

## 2020-10-21 RX ADMIN — SODIUM CHLORIDE: 0.9 INJECTION, SOLUTION INTRAVENOUS at 12:10

## 2020-10-21 RX ADMIN — ACETAMINOPHEN 650 MG: 325 TABLET ORAL at 02:10

## 2020-10-21 RX ADMIN — FENTANYL CITRATE 25 MCG: 50 INJECTION, SOLUTION INTRAMUSCULAR; INTRAVENOUS at 01:10

## 2020-10-21 NOTE — DISCHARGE SUMMARY
[facility]  Discharge Note  Short Stay    Procedure(s) (LRB):  PHACOEMULSIFICATION, CATARACT (Left)  INSERTION, IOL PROSTHESIS (Left)  INSERTION, DEVICE, FOR GLAUCOMA (Left)    OUTCOME: Patient tolerated treatment/procedure well without complication and is now ready for discharge.    DISPOSITION: Home or Self Care    FINAL DIAGNOSIS:  Primary open-angle glaucoma, left eye, mild stage    FOLLOWUP: In clinic    DISCHARGE INSTRUCTIONS:    Discharge Procedure Orders   Leave dressing on - Keep it clean, dry, and intact until clinic visit

## 2020-10-21 NOTE — PLAN OF CARE
Pt received his post op meds. Dr Monge spoke to pt at bedside. Pt with no c/o pain, no nausea. Pt tolerating juice. Pt reminded of dr smith tomorrow. Dc instructions given .

## 2020-10-21 NOTE — TRANSFER OF CARE
Anesthesia Transfer of Care Note    Patient: Ned Oliveira    Procedure(s) Performed: Procedure(s) (LRB):  PHACOEMULSIFICATION, CATARACT (Left)  INSERTION, IOL PROSTHESIS (Left)  INSERTION, DEVICE, FOR GLAUCOMA (Left)    Patient location: PACU    Transport from OR: Transported from OR on room air with adequate spontaneous ventilation    Post pain: adequate analgesia    Post assessment: no apparent anesthetic complications and tolerated procedure well    Post vital signs: stable    Level of consciousness: awake, alert and oriented    Nausea/Vomiting: no nausea/vomiting    Complications: none    Transfer of care protocol was followed      Last vitals:   Visit Vitals  BP (!) 155/82 (BP Location: Left arm, Patient Position: Lying)   Pulse 62   Temp 36 °C (96.8 °F) (Oral)   Resp 18   Wt 68 kg (150 lb)   SpO2 100%   BMI 25.75 kg/m²

## 2020-10-21 NOTE — ANESTHESIA PREPROCEDURE EVALUATION
10/21/2020  Ned Oliveira is a 70 y.o., male.  Pre-operative evaluation for Procedure(s) (LRB):  PHACOEMULSIFICATION, CATARACT (Left)  INSERTION, IOL PROSTHESIS (Left)  INSERTION, DEVICE, FOR GLAUCOMA (Left)    Ned Oliveira is a 70 y.o. male     Patient Active Problem List   Diagnosis    Essential hypertension    Benign non-nodular prostatic hyperplasia without lower urinary tract symptoms    Adrenal nodule    Coronary arteriosclerosis in native artery    Aortic atherosclerosis    NS (nuclear sclerosis), bilateral    Hyperlipidemia associated with type 2 diabetes mellitus    Type 2 diabetes mellitus with both eyes affected by mild nonproliferative retinopathy and macular edema, with long-term current use of insulin    Primary open angle glaucoma (POAG) of both eyes    Chronic stable angina    Type 2 diabetes mellitus with hyperglycemia, with long-term current use of insulin    Nuclear sclerotic cataract of left eye       Review of patient's allergies indicates:   Allergen Reactions    Percocet [oxycodone-acetaminophen] Itching       No current facility-administered medications on file prior to encounter.      Current Outpatient Medications on File Prior to Encounter   Medication Sig Dispense Refill    aspirin (ECOTRIN) 81 MG EC tablet Take 1 tablet (81 mg total) by mouth once daily.  0    atorvastatin (LIPITOR) 80 MG tablet Take 1 tablet (80 mg total) by mouth once daily. (Patient taking differently: Take 80 mg by mouth every evening. ) 90 tablet 3    glimepiride (AMARYL) 4 MG tablet Take 1 tablet (4 mg total) by mouth before breakfast. 90 tablet 3    insulin (LANTUS SOLOSTAR U-100 INSULIN) glargine 100 units/mL (3mL) SubQ pen Inject 15 Units into the skin once daily. Inject 15 Units into skin at night (after dinner or before bedtime) 13.5 mL 3    lisinopriL-hydrochlorothiazide  (PRINZIDE,ZESTORETIC) 10-12.5 mg per tablet Take 1 tablet by mouth once daily. 90 tablet 3    metFORMIN (GLUCOPHAGE) 1000 MG tablet Take 1 tablet (1,000 mg total) by mouth 2 (two) times daily with meals. 180 tablet 3    metoprolol succinate (TOPROL-XL) 25 MG 24 hr tablet Take 0.5 tablets (12.5 mg total) by mouth once daily. 45 tablet 3    tamsulosin (FLOMAX) 0.4 mg Cap Take 2 capsules (0.8 mg total) by mouth every evening. 60 capsule 11    acetaminophen (TYLENOL) 500 MG tablet Take 2 tablets (1,000 mg total) by mouth 2 (two) times daily.  0    nitroGLYCERIN (NITROSTAT) 0.4 MG SL tablet Place 1 tablet (0.4 mg total) under the tongue every 5 (five) minutes as needed for Chest pain (ONLY IF HAVE CHEST PAIN,). 30 tablet 0       Past Surgical History:   Procedure Laterality Date    BICEPS TENDON REPAIR Right     COLONOSCOPY N/A 2019    Procedure: COLONOSCOPY;  Surgeon: Tavon Montes MD;  Location: [facility] ([address] [place] [address]);  Service: Colon and Rectal;  Laterality: N/A;  will need . pt requesting ASAP. gaudencio Perez (international) ok to schedule at this time. will send  up for 6:45 am arrival time       Social History     Socioeconomic History    Marital status:      Spouse name: Not on file    Number of children: 2    Years of education: Not on file    Highest education level: Not on file   Occupational History    Occupation: Maintenance    Social Needs    Financial resource strain: Not on file    Food insecurity     Worry: Not on file     Inability: Not on file    Transportation needs     Medical: Not on file     Non-medical: Not on file   Tobacco Use    Smoking status: Former Smoker     Packs/day: 2.00     Years: 10.00     Pack years: 20.00     Types: Cigarettes     Start date: 8/15/1987     Quit date: 8/15/1996     Years since quittin.2    Smokeless tobacco: Never Used   Substance and Sexual Activity    Alcohol use: No     Alcohol/week: 0.0 standard drinks     Drug use: No    Sexual activity: Yes     Partners: Female   Lifestyle    Physical activity     Days per week: Not on file     Minutes per session: Not on file    Stress: Not on file   Relationships    Social connections     Talks on phone: Not on file     Gets together: Not on file     Attends [employer] service: Not on file     Active member of club or organization: Not on file     Attends meetings of clubs or organizations: Not on file     Relationship status: Not on file   Other Topics Concern    Not on file   Social History Narrative    Not on file             2D Echo:  Results for orders placed or performed during the hospital encounter of 10/10/16   2D Echo w/ Color Flow Doppler   Result Value Ref Range    QEF 55 55 - 65    Mitral Valve Regurgitation MILD     Diastolic Dysfunction No     Mitral Valve Mobility NORMAL          Anesthesia Evaluation    I have reviewed the Patient Summary Reports.    I have reviewed the Nursing Notes.    I have reviewed the Medications.     Review of Systems  Anesthesia Hx:  No problems with previous Anesthesia  History of prior surgery of interest to airway management or planning: Denies Family Hx of Anesthesia complications.   Denies Personal Hx of Anesthesia complications.   Social:  Non-Smoker    Hematology/Oncology:  Hematology Normal   Oncology Normal     EENT/Dental:EENT/Dental Normal   Cardiovascular:   Hypertension CAD   Angina    Pulmonary:  Pulmonary Normal    Renal/:   Chronic Renal Disease    Hepatic/GI:  Hepatic/GI Normal    Musculoskeletal:  Musculoskeletal Normal    Neurological:  Neurology Normal    Endocrine:   Diabetes, poorly controlled    Psych:  Psychiatric Normal           Physical Exam  General:  Well nourished    Airway/Jaw/Neck:  Airway Findings: Mouth Opening: Normal Tongue: Normal  General Airway Assessment: Adult  Mallampati: I  TM Distance: Normal, at least 6 cm  Jaw/Neck Findings:  Neck ROM: Normal ROM      Dental:  Dental Findings: In  tact   Chest/Lungs:  Chest/Lungs Findings: Clear to auscultation, Normal Respiratory Rate     Heart/Vascular:  Heart Findings: Rate: Normal  Rhythm: Regular Rhythm  Sounds: Normal        Mental Status:  Mental Status Findings:  Cooperative, Alert and Oriented         Anesthesia Plan  Type of Anesthesia, risks & benefits discussed:  Anesthesia Type:  general, MAC  Patient's Preference:   Intra-op Monitoring Plan: standard ASA monitors  Intra-op Monitoring Plan Comments:   Post Op Pain Control Plan: multimodal analgesia  Post Op Pain Control Plan Comments:   Induction:   IV  Beta Blocker:  Patient is not currently on a Beta-Blocker (No further documentation required).       Informed Consent: Patient understands risks and agrees with Anesthesia plan.  Questions answered. Anesthesia consent signed with patient.  ASA Score: 3     Day of Surgery Review of History & Physical:    H&P update referred to the surgeon.         Ready For Surgery From Anesthesia Perspective.

## 2020-10-21 NOTE — OP NOTE
DATE OF PROCEDURE: 10/21/2020    PREOPERATIVE DIAGNOSIS: Complex/small pupil/floppy iris Cataract of the OS. Primary open-angle glaucoma, left eye, mild stage     POSTOPERATIVE DIAGNOSIS: Complex/small pupil/floppy iris Cataract of theOS, status post procedure. Primary open-angle glaucoma, left eye, mild stage    PROCEDURE PERFORMED: 1. Complex Cataract extraction with Malyugin ring and trypan blue  ,phacoemulsification, and posterior chamber intraocular lens placement.    2. GLAUCOMA DRAINAGE DEVICE - BAERVELDT - 350 - LEFT EYE      SURGEON: Saranya Monge M.D.     ASSISTANT: lisandro starkey md      COMPLICATIONS: None.     BLOOD LOSS: Less than 5 mL.     ANESTHESIA: Local MAC    IMPLANT DATA:   1. Lantigua PCB00 , power 21.5 diopters, serial #  2. baerveldt 350     PROCEDURE IN DETAIL: After informed consent including risks, benefits, alternatives, the patient was brought to the operating room table, placed in supine position. Monitored anesthesia care was used throughout the entire procedure. Once adequate anesthesia was confirmed, the patient was then prepped and draped in usual sterile fashion for intraocular surgery. Wire speculum was used to hold the eyelids apart and the procedure was initiated by making a partial thickness corneal wound with a dieudonne blade temporally.  supersharp blade was then used to make a second entry into the eye via a paracentesis incision. Intracameral lidocaine followed by trypan blue, followed by Viscoat were placed in the anterior chamber. A 2.4 mm blade was then used to make to complete the clear corneal incision temporally. A Malyugin ring was inserted to dilated and maintain pupillary dilation.  A cystotome was used to make a tear in the anterior capsular flap, which was continued around with Utrata forceps for continuous curvilinear capsulorrhexis. BSS on a Mckinley cannula was then used for hydrodissection and hydrodelineation of lens. The lens was noted to spin freely in the  bag. Phacoemulsification handpiece was then used to remove the lens in a divide-and-conquer manner. Irrigation aspiration handpiece removed   the remaining cortical material. Provisc was placed in the eye followed by the lens as mentioned above without any complications. Once the lens was in proper Position, The Malyugin was disengaged from the iris and removed from the eye.  The irrigation aspiration handpiece was used to remove the remaining Provisc. Two 10-0 vicryl sutures were placed in the corneal incision. The wounds were then hydrated with BSS and noted to be watertight.    Next attention was directed to the glaucoma drainage device surgery.  A superotemporal fornix-based conjunctival peritomy was performed with Vanjayme and Dank scissor dissection.  The  implant was inspected and the stent was tied off in a watertight fashion using a  7-0 vicryl suture and tested with 30-gauge cannula on a BSS syringe and demonstrated to be watertight.  A sub-Tenons pocket was formed, the rectus muscles were identified on successive throws with muscle hooks and the shunt implant was then placed in the sug-Tenons space without difficulty.  The implant was fixed to the globe 9 to 10 mm posterior to the limbus using 7-0 Vicryl sutures.  The tubing was then cut to size and the eye was first entered at the paracentesis site and then the eye was reentered to form a stent tract site using a 23-gauge butterfly needle.  The shunt tubing  was placed in the anterior chamber in good position through this tract without difficulty.  The silicone stent was fixated to the globe using interrupted Vicryl suture and a pericardial patch graft was cut to shape, fitting  well over the entrance site and tubing length and fixed to the globe with 8-0 Vicryl suture.  The conjunctiva was then secured to the limbus in a watertight fashion using 8-0 Vicryl sutures.  The anterior chamber was well formed throughout the case and there were no  complications.  Following the procedure, a collagen shield soaked in vigamox and prednisone 1% along with a drop homatropine 5% was placed on the cornea.  The eye was closed, patched, and a Valles shield was placed.  The patient was taken to the recovery room in good and stable condition.  The patient tolerated the procedure well.  The patient  was instructed to refrain from any heavy lifting, bending, stooping, or straining activities, and to follow up in the morning for routine postoperative care with Dr. Monge.           The eye had a good physiological pressure and the lid speculum was removed under the microscope without any shallowing. The eye was then covered with a collagen shield soaked in Pred Forte and Vigamox and turned over to Anesthesia in stable condition after placement of patch and shield.

## 2020-10-22 ENCOUNTER — OFFICE VISIT (OUTPATIENT)
Dept: OPHTHALMOLOGY | Facility: CLINIC | Age: 70
End: 2020-10-22
Payer: MEDICARE

## 2020-10-22 VITALS — DIASTOLIC BLOOD PRESSURE: 66 MMHG | HEART RATE: 65 BPM | SYSTOLIC BLOOD PRESSURE: 99 MMHG

## 2020-10-22 DIAGNOSIS — H40.1132 PRIMARY OPEN ANGLE GLAUCOMA (POAG) OF BOTH EYES, MODERATE STAGE: ICD-10-CM

## 2020-10-22 DIAGNOSIS — Z96.89 HISTORY OF GLAUCOMA TUBE SHUNT PROCEDURE: ICD-10-CM

## 2020-10-22 DIAGNOSIS — H25.13 NS (NUCLEAR SCLEROSIS), BILATERAL: ICD-10-CM

## 2020-10-22 DIAGNOSIS — Z96.1 PSEUDOPHAKIA, LEFT EYE: ICD-10-CM

## 2020-10-22 DIAGNOSIS — E11.3313 TYPE 2 DIABETES MELLITUS WITH BOTH EYES AFFECTED BY MODERATE NONPROLIFERATIVE RETINOPATHY AND MACULAR EDEMA, WITHOUT LONG-TERM CURRENT USE OF INSULIN: ICD-10-CM

## 2020-10-22 DIAGNOSIS — Z48.89 ENCOUNTER FOR POSTOPERATIVE CARE: Primary | ICD-10-CM

## 2020-10-22 DIAGNOSIS — H40.042 STEROID RESPONDERS, LEFT: ICD-10-CM

## 2020-10-22 PROCEDURE — 99024 PR POST-OP FOLLOW-UP VISIT: ICD-10-PCS | Mod: HCNC,S$GLB,, | Performed by: OPHTHALMOLOGY

## 2020-10-22 PROCEDURE — 99024 POSTOP FOLLOW-UP VISIT: CPT | Mod: HCNC,S$GLB,, | Performed by: OPHTHALMOLOGY

## 2020-10-22 PROCEDURE — 99999 PR PBB SHADOW E&M-EST. PATIENT-LVL III: CPT | Mod: PBBFAC,HCNC,, | Performed by: OPHTHALMOLOGY

## 2020-10-22 PROCEDURE — 99999 PR PBB SHADOW E&M-EST. PATIENT-LVL III: ICD-10-PCS | Mod: PBBFAC,HCNC,, | Performed by: OPHTHALMOLOGY

## 2020-10-22 RX ORDER — PREDNISOLONE ACETATE 10 MG/ML
1 SUSPENSION/ DROPS OPHTHALMIC 4 TIMES DAILY
COMMUNITY
Start: 2020-10-22 | End: 2020-10-29 | Stop reason: SDUPTHER

## 2020-10-22 RX ORDER — MOXIFLOXACIN 5 MG/ML
1 SOLUTION/ DROPS OPHTHALMIC 4 TIMES DAILY
COMMUNITY
Start: 2020-10-22 | End: 2020-10-29

## 2020-10-22 NOTE — ANESTHESIA POSTPROCEDURE EVALUATION
Anesthesia Post Evaluation    Patient: Ned Oliveira    Procedure(s) Performed: Procedure(s) (LRB):  PHACOEMULSIFICATION, CATARACT (Left)  INSERTION, IOL PROSTHESIS (Left)  INSERTION, DEVICE, FOR GLAUCOMA (Left)    Final Anesthesia Type: MAC    Patient location during evaluation: PACU  Patient participation: Yes- Able to Participate  Level of consciousness: awake and alert  Post-procedure vital signs: reviewed and stable  Pain management: adequate  Airway patency: patent    PONV status at discharge: No PONV  Anesthetic complications: no      Cardiovascular status: blood pressure returned to baseline  Respiratory status: unassisted, spontaneous ventilation and room air  Hydration status: euvolemic  Follow-up not needed.          Vitals Value Taken Time   /75 10/21/20 1446   Temp  10/22/20 1122   Pulse 62 10/21/20 1500   Resp 18 10/21/20 1500   SpO2 99 % 10/21/20 1500         No case tracking events are documented in the log.      Pain/Sigrid Score: No data recorded

## 2020-10-22 NOTE — PROGRESS NOTES
HPI     DLS: 10/09/20    Pt here for 1 day post phaco w/IOL and GDD (barevelddt w/ slits)  OS-   10/21/20  Pt states no eye pain but some discomfort this AM.     1. Glaucoma Suspect   2. Type 2 DM   3. BDR with ME OU   4. NS OU     Eye drops   cosopt bid ou   Brimonidine tid os     Last edited by Saranya Monge MD on 10/22/2020  9:51 AM. (History)            Assessment /Plan     For exam results, see Encounter Report.    Encounter for postoperative care    Primary open angle glaucoma (POAG) of both eyes, moderate stage    Steroid responders, left    Type 2 diabetes mellitus with both eyes affected by moderate nonproliferative retinopathy and macular edema, without long-term current use of insulin    NS (nuclear sclerosis), bilateral    Pseudophakia, left eye    History of glaucoma tube shunt procedure                    Glaucoma (type and duration)    Suspect - followed at [facility] since 2007    First HVF   2007   First photos   2009   Treatment / Drops started   none           Family history    Neg (+for DR - mom)         Glaucoma meds    None         H/O adverse rxn to glaucoma drops    none        LASERS    Laser for DR // none for glaucoma         GLAUCOMA SURGERIES    none        OTHER EYE SURGERIES    None         CDR    (( by fundus photos 0.5 / 0.6 )         Tbase    15-25 / 15-31  (but ? If the high IOP's are post injection - had injection on those days and only 1 IOP recorded for each eye)         Tmax    24 od (11/6/2018)  - ?? Post injection // /34os( 4/14/2020) - ? Post injection            Ttarget    ?             HVF    4 test 2007 to  2009 - full od // full os        Gonio    +3 ou        CCT    518/525        OCT    1 test 2019/ to 2019/ - RNFL WNL-od // WNL-Os---OCT MAc with DME OS        HRT    1 test 2009 to 2009 - MR - nl od // nl os        Disc photos    Fundus photos 2009, 2017, 2018    - Ttoday    ??/ 22   - Test done today  Post  - op phaco/IOL os // BGI w/ slits os - 10/22/2020     2.  BDR w/ ME    Sees Mazzulla    S/P avastin ou    S/P laser ou    Waiting for approval for ozurdex     3. NS / cortical cat OU   Monitor     PLAN   Good HVF    nl RNFL ou   IOP ??/ 22   On cosopt os for IOP of 33 os on 4/14/2020 (good resp 34-->24)   Ok to use cosopt os only for now   + ozurdex IOP elevation - got ozurdex os 2/2020 and IOP up to 34 os on   If IOP goes up od can add cosopt   If IOP goes up os can add brimonidine       Phaco / IOL OS Date: 10/21/2020 - PCB00 21.5  POD # 1 - phaco/IOL   Doing well  Begin Pred Acetate QID   Begin vigamox QID  Begin ketorolac qid   Hold atropine - bottle taped shut - will use around time tube should open   Shield at night  Glasses day  No lifting, no bending, no eye rubbing  F/U 1 week, AR/MR ou    Cont cosopt bid ou   Cont brimonidine tid os     IOL calc os   PCB00 - 21.5  AC IOL 18.0

## 2020-10-28 NOTE — PROGRESS NOTES
HPI     Post-op Evaluation      Additional comments: Pt c/o itchy eyes and headache              Itchy Eye      Additional comments: Pt states that eyes are itchy              Headache      Additional comments: Pt states he has a constant headache              Comments     S/p Combined OS (Complex Phaco IOL and  Baerveldt) 10/21/2020    MEDS:  PA QID OS  Ketorolac QID OS  Vigamox QID OS  Brimonidine TID OS   Cosopt BID OU  AT's PRN OU            Last edited by Saranya Monge MD on 10/29/2020  2:54 PM. (History)            Assessment /Plan     For exam results, see Encounter Report.    Encounter for postoperative care    Primary open angle glaucoma (POAG) of both eyes, moderate stage    Steroid responders, left    Type 2 diabetes mellitus with both eyes affected by moderate nonproliferative retinopathy and macular edema, without long-term current use of insulin    NS (nuclear sclerosis), bilateral    Pseudophakia, left eye    History of glaucoma tube shunt procedure                  Glaucoma (type and duration)    Suspect - followed at [facility] since 2007    First HVF   2007   First photos   2009   Treatment / Drops started   none           Family history    Neg (+for DR - mom)         Glaucoma meds    None         H/O adverse rxn to glaucoma drops    none        LASERS    Laser for DR // none for glaucoma         GLAUCOMA SURGERIES    none        OTHER EYE SURGERIES    None         CDR    (( by fundus photos 0.5 / 0.6 )         Tbase    15-25 / 15-31  (but ? If the high IOP's are post injection - had injection on those days and only 1 IOP recorded for each eye)         Tmax    24 od (11/6/2018)  - ?? Post injection // /34os( 4/14/2020) - ? Post injection            Ttarget    ?             HVF    4 test 2007 to  2009 - full od // full os        Gonio    +3 ou        CCT    518/525        OCT    1 test 2019/ to 2019/ - RNFL WNL-od // WNL-Os---OCT MAc with DME OS        HRT    1 test 2009 to 2009 - MR - nl od //  nl os        Disc photos    Fundus photos 2009, 2017, 2018    - Ttoday   17/20  - Test done today  Post  - op phaco/IOL os // BGI w/ slits os - 10/22/2020     2. BDR w/ ME    Sees Mazzulla    S/P avastin ou    S/P laser ou    Waiting for approval for ozurdex     3. NS / cortical cat OU   Monitor     PLAN   Good HVF    nl RNFL ou   IOP ??/ 22   On cosopt os for IOP of 33 os on 4/14/2020 (good resp 34-->24)   Ok to use cosopt os only for now   + ozurdex IOP elevation - got ozurdex os 2/2020 and IOP up to 34 os on   If IOP goes up od can add cosopt   If IOP goes up os can add brimonidine       Phaco / IOL OS Date: 10/21/2020 - PCB00 21.5  POW # 1 - phaco/IOL   Doing well  Begin Pred Acetate - decrease to tid   Begin vigamox QID - stop   Begin ketorolac - tid    Hold atropine - bottle taped shut - will use around time tube should open   Shield at night  Glasses day  No lifting, no bending, no eye rubbing  F/U 4 week, try  AR/MR ou - ?? Potential - needs ozurdex for DME - ?? potentail     Cont cosopt bid ou   Cont brimonidine tid os     IOL calc os   PCB00 - 21.5  AC IOL 18.0

## 2020-10-29 ENCOUNTER — OFFICE VISIT (OUTPATIENT)
Dept: OPHTHALMOLOGY | Facility: CLINIC | Age: 70
End: 2020-10-29
Payer: MEDICARE

## 2020-10-29 DIAGNOSIS — Z96.89 HISTORY OF GLAUCOMA TUBE SHUNT PROCEDURE: ICD-10-CM

## 2020-10-29 DIAGNOSIS — Z96.1 PSEUDOPHAKIA, LEFT EYE: ICD-10-CM

## 2020-10-29 DIAGNOSIS — H25.13 NS (NUCLEAR SCLEROSIS), BILATERAL: ICD-10-CM

## 2020-10-29 DIAGNOSIS — H40.042 STEROID RESPONDERS, LEFT: ICD-10-CM

## 2020-10-29 DIAGNOSIS — H40.1132 PRIMARY OPEN ANGLE GLAUCOMA (POAG) OF BOTH EYES, MODERATE STAGE: ICD-10-CM

## 2020-10-29 DIAGNOSIS — Z48.89 ENCOUNTER FOR POSTOPERATIVE CARE: Primary | ICD-10-CM

## 2020-10-29 DIAGNOSIS — E11.3313 TYPE 2 DIABETES MELLITUS WITH BOTH EYES AFFECTED BY MODERATE NONPROLIFERATIVE RETINOPATHY AND MACULAR EDEMA, WITHOUT LONG-TERM CURRENT USE OF INSULIN: ICD-10-CM

## 2020-10-29 PROCEDURE — 99999 PR PBB SHADOW E&M-EST. PATIENT-LVL III: ICD-10-PCS | Mod: PBBFAC,HCNC,, | Performed by: OPHTHALMOLOGY

## 2020-10-29 PROCEDURE — 99024 POSTOP FOLLOW-UP VISIT: CPT | Mod: HCNC,S$GLB,, | Performed by: OPHTHALMOLOGY

## 2020-10-29 PROCEDURE — 99024 PR POST-OP FOLLOW-UP VISIT: ICD-10-PCS | Mod: HCNC,S$GLB,, | Performed by: OPHTHALMOLOGY

## 2020-10-29 PROCEDURE — 99999 PR PBB SHADOW E&M-EST. PATIENT-LVL III: CPT | Mod: PBBFAC,HCNC,, | Performed by: OPHTHALMOLOGY

## 2020-10-29 RX ORDER — KETOROLAC TROMETHAMINE 5 MG/ML
1 SOLUTION OPHTHALMIC 4 TIMES DAILY
COMMUNITY
Start: 2020-10-22 | End: 2020-10-29 | Stop reason: SDUPTHER

## 2020-10-29 RX ORDER — KETOROLAC TROMETHAMINE 5 MG/ML
1 SOLUTION OPHTHALMIC 3 TIMES DAILY
Qty: 10 ML | Refills: 1 | Status: SHIPPED | OUTPATIENT
Start: 2020-10-29 | End: 2020-12-07

## 2020-10-29 RX ORDER — PREDNISOLONE ACETATE 10 MG/ML
1 SUSPENSION/ DROPS OPHTHALMIC 3 TIMES DAILY
Qty: 10 ML | Refills: 1 | Status: SHIPPED | OUTPATIENT
Start: 2020-10-29 | End: 2020-12-10 | Stop reason: ALTCHOICE

## 2020-11-10 ENCOUNTER — LAB VISIT (OUTPATIENT)
Dept: LAB | Facility: HOSPITAL | Age: 70
End: 2020-11-10
Attending: STUDENT IN AN ORGANIZED HEALTH CARE EDUCATION/TRAINING PROGRAM
Payer: MEDICARE

## 2020-11-10 DIAGNOSIS — I25.10 CORONARY ARTERY DISEASE, ANGINA PRESENCE UNSPECIFIED, UNSPECIFIED VESSEL OR LESION TYPE, UNSPECIFIED WHETHER NATIVE OR TRANSPLANTED HEART: ICD-10-CM

## 2020-11-10 LAB
ALBUMIN SERPL BCP-MCNC: 3.6 G/DL (ref 3.5–5.2)
ALP SERPL-CCNC: 82 U/L (ref 55–135)
ALT SERPL W/O P-5'-P-CCNC: 60 U/L (ref 10–44)
ANION GAP SERPL CALC-SCNC: 6 MMOL/L (ref 8–16)
AST SERPL-CCNC: 42 U/L (ref 10–40)
BILIRUB SERPL-MCNC: 0.6 MG/DL (ref 0.1–1)
BUN SERPL-MCNC: 14 MG/DL (ref 8–23)
CALCIUM SERPL-MCNC: 8.5 MG/DL (ref 8.7–10.5)
CHLORIDE SERPL-SCNC: 106 MMOL/L (ref 95–110)
CHOLEST SERPL-MCNC: 88 MG/DL (ref 120–199)
CHOLEST/HDLC SERPL: 2.9 {RATIO} (ref 2–5)
CO2 SERPL-SCNC: 30 MMOL/L (ref 23–29)
CREAT SERPL-MCNC: 0.9 MG/DL (ref 0.5–1.4)
EST. GFR  (AFRICAN AMERICAN): >60 ML/MIN/1.73 M^2
EST. GFR  (NON AFRICAN AMERICAN): >60 ML/MIN/1.73 M^2
ESTIMATED AVG GLUCOSE: 157 MG/DL (ref 68–131)
GLUCOSE SERPL-MCNC: 90 MG/DL (ref 70–110)
HBA1C MFR BLD HPLC: 7.1 % (ref 4–5.6)
HDLC SERPL-MCNC: 30 MG/DL (ref 40–75)
HDLC SERPL: 34.1 % (ref 20–50)
LDLC SERPL CALC-MCNC: 51.2 MG/DL (ref 63–159)
NONHDLC SERPL-MCNC: 58 MG/DL
POTASSIUM SERPL-SCNC: 3.9 MMOL/L (ref 3.5–5.1)
PROT SERPL-MCNC: 6 G/DL (ref 6–8.4)
SODIUM SERPL-SCNC: 142 MMOL/L (ref 136–145)
TRIGL SERPL-MCNC: 34 MG/DL (ref 30–150)
TSH SERPL DL<=0.005 MIU/L-ACNC: 1.16 UIU/ML (ref 0.4–4)

## 2020-11-10 PROCEDURE — 83036 HEMOGLOBIN GLYCOSYLATED A1C: CPT | Mod: HCNC

## 2020-11-10 PROCEDURE — 80061 LIPID PANEL: CPT | Mod: HCNC

## 2020-11-10 PROCEDURE — 80053 COMPREHEN METABOLIC PANEL: CPT | Mod: HCNC

## 2020-11-10 PROCEDURE — 36415 COLL VENOUS BLD VENIPUNCTURE: CPT | Mod: HCNC

## 2020-11-10 PROCEDURE — 84443 ASSAY THYROID STIM HORMONE: CPT | Mod: HCNC

## 2020-11-16 ENCOUNTER — PATIENT MESSAGE (OUTPATIENT)
Dept: PHARMACY | Facility: CLINIC | Age: 70
End: 2020-11-16

## 2020-11-16 ENCOUNTER — OFFICE VISIT (OUTPATIENT)
Dept: INTERNAL MEDICINE | Facility: CLINIC | Age: 70
End: 2020-11-16
Payer: MEDICARE

## 2020-11-16 ENCOUNTER — IMMUNIZATION (OUTPATIENT)
Dept: PHARMACY | Facility: CLINIC | Age: 70
End: 2020-11-16
Payer: MEDICARE

## 2020-11-16 ENCOUNTER — IMMUNIZATION (OUTPATIENT)
Dept: PHARMACY | Facility: CLINIC | Age: 70
End: 2020-11-16

## 2020-11-16 ENCOUNTER — LAB VISIT (OUTPATIENT)
Dept: LAB | Facility: HOSPITAL | Age: 70
End: 2020-11-16
Payer: MEDICARE

## 2020-11-16 VITALS
WEIGHT: 139.56 LBS | SYSTOLIC BLOOD PRESSURE: 116 MMHG | HEART RATE: 64 BPM | OXYGEN SATURATION: 99 % | HEIGHT: 64 IN | BODY MASS INDEX: 23.82 KG/M2 | DIASTOLIC BLOOD PRESSURE: 70 MMHG

## 2020-11-16 DIAGNOSIS — R79.89 ABNORMAL LFTS: Primary | ICD-10-CM

## 2020-11-16 DIAGNOSIS — R79.89 ABNORMAL LFTS: ICD-10-CM

## 2020-11-16 DIAGNOSIS — Z79.4 TYPE 2 DIABETES MELLITUS WITH BOTH EYES AFFECTED BY MILD NONPROLIFERATIVE RETINOPATHY AND MACULAR EDEMA, WITH LONG-TERM CURRENT USE OF INSULIN: ICD-10-CM

## 2020-11-16 DIAGNOSIS — E11.3213 TYPE 2 DIABETES MELLITUS WITH BOTH EYES AFFECTED BY MILD NONPROLIFERATIVE RETINOPATHY AND MACULAR EDEMA, WITH LONG-TERM CURRENT USE OF INSULIN: ICD-10-CM

## 2020-11-16 LAB
ALBUMIN SERPL BCP-MCNC: 3.9 G/DL (ref 3.5–5.2)
ALP SERPL-CCNC: 91 U/L (ref 55–135)
ALT SERPL W/O P-5'-P-CCNC: 34 U/L (ref 10–44)
ANION GAP SERPL CALC-SCNC: 7 MMOL/L (ref 8–16)
AST SERPL-CCNC: 27 U/L (ref 10–40)
BILIRUB SERPL-MCNC: 0.9 MG/DL (ref 0.1–1)
BUN SERPL-MCNC: 16 MG/DL (ref 8–23)
CALCIUM SERPL-MCNC: 9.3 MG/DL (ref 8.7–10.5)
CHLORIDE SERPL-SCNC: 105 MMOL/L (ref 95–110)
CO2 SERPL-SCNC: 31 MMOL/L (ref 23–29)
CREAT SERPL-MCNC: 1 MG/DL (ref 0.5–1.4)
EST. GFR  (AFRICAN AMERICAN): >60 ML/MIN/1.73 M^2
EST. GFR  (NON AFRICAN AMERICAN): >60 ML/MIN/1.73 M^2
ESTIMATED AVG GLUCOSE: 154 MG/DL (ref 68–131)
GLUCOSE SERPL-MCNC: 92 MG/DL (ref 70–110)
HBA1C MFR BLD HPLC: 7 % (ref 4–5.6)
POTASSIUM SERPL-SCNC: 4.3 MMOL/L (ref 3.5–5.1)
PROT SERPL-MCNC: 6.4 G/DL (ref 6–8.4)
SODIUM SERPL-SCNC: 143 MMOL/L (ref 136–145)

## 2020-11-16 PROCEDURE — 80053 COMPREHEN METABOLIC PANEL: CPT | Mod: HCNC

## 2020-11-16 PROCEDURE — 3051F PR MOST RECENT HEMOGLOBIN A1C LEVEL 7.0 - < 8.0%: ICD-10-PCS | Mod: HCNC,CPTII,GC,S$GLB | Performed by: STUDENT IN AN ORGANIZED HEALTH CARE EDUCATION/TRAINING PROGRAM

## 2020-11-16 PROCEDURE — 1159F MED LIST DOCD IN RCRD: CPT | Mod: HCNC,GC,S$GLB, | Performed by: STUDENT IN AN ORGANIZED HEALTH CARE EDUCATION/TRAINING PROGRAM

## 2020-11-16 PROCEDURE — 99213 OFFICE O/P EST LOW 20 MIN: CPT | Mod: HCNC,GC,S$GLB, | Performed by: STUDENT IN AN ORGANIZED HEALTH CARE EDUCATION/TRAINING PROGRAM

## 2020-11-16 PROCEDURE — 3051F HG A1C>EQUAL 7.0%<8.0%: CPT | Mod: HCNC,CPTII,GC,S$GLB | Performed by: STUDENT IN AN ORGANIZED HEALTH CARE EDUCATION/TRAINING PROGRAM

## 2020-11-16 PROCEDURE — 3078F PR MOST RECENT DIASTOLIC BLOOD PRESSURE < 80 MM HG: ICD-10-PCS | Mod: HCNC,CPTII,GC,S$GLB | Performed by: STUDENT IN AN ORGANIZED HEALTH CARE EDUCATION/TRAINING PROGRAM

## 2020-11-16 PROCEDURE — 80074 ACUTE HEPATITIS PANEL: CPT | Mod: HCNC

## 2020-11-16 PROCEDURE — 83036 HEMOGLOBIN GLYCOSYLATED A1C: CPT | Mod: HCNC

## 2020-11-16 PROCEDURE — 36415 COLL VENOUS BLD VENIPUNCTURE: CPT | Mod: HCNC

## 2020-11-16 PROCEDURE — 99999 PR PBB SHADOW E&M-EST. PATIENT-LVL III: CPT | Mod: PBBFAC,HCNC,GC, | Performed by: STUDENT IN AN ORGANIZED HEALTH CARE EDUCATION/TRAINING PROGRAM

## 2020-11-16 PROCEDURE — 99213 PR OFFICE/OUTPT VISIT, EST, LEVL III, 20-29 MIN: ICD-10-PCS | Mod: HCNC,GC,S$GLB, | Performed by: STUDENT IN AN ORGANIZED HEALTH CARE EDUCATION/TRAINING PROGRAM

## 2020-11-16 PROCEDURE — 3074F PR MOST RECENT SYSTOLIC BLOOD PRESSURE < 130 MM HG: ICD-10-PCS | Mod: HCNC,CPTII,GC,S$GLB | Performed by: STUDENT IN AN ORGANIZED HEALTH CARE EDUCATION/TRAINING PROGRAM

## 2020-11-16 PROCEDURE — 3074F SYST BP LT 130 MM HG: CPT | Mod: HCNC,CPTII,GC,S$GLB | Performed by: STUDENT IN AN ORGANIZED HEALTH CARE EDUCATION/TRAINING PROGRAM

## 2020-11-16 PROCEDURE — 1159F PR MEDICATION LIST DOCUMENTED IN MEDICAL RECORD: ICD-10-PCS | Mod: HCNC,GC,S$GLB, | Performed by: STUDENT IN AN ORGANIZED HEALTH CARE EDUCATION/TRAINING PROGRAM

## 2020-11-16 PROCEDURE — 3078F DIAST BP <80 MM HG: CPT | Mod: HCNC,CPTII,GC,S$GLB | Performed by: STUDENT IN AN ORGANIZED HEALTH CARE EDUCATION/TRAINING PROGRAM

## 2020-11-16 PROCEDURE — 99999 PR PBB SHADOW E&M-EST. PATIENT-LVL III: ICD-10-PCS | Mod: PBBFAC,HCNC,GC, | Performed by: STUDENT IN AN ORGANIZED HEALTH CARE EDUCATION/TRAINING PROGRAM

## 2020-11-16 RX ORDER — LANCETS
1 EACH MISCELLANEOUS
Qty: 100 EACH | Refills: 2 | Status: SHIPPED | OUTPATIENT
Start: 2020-11-16 | End: 2021-02-22 | Stop reason: SDUPTHER

## 2020-11-16 RX ORDER — INSULIN GLARGINE 100 [IU]/ML
20 INJECTION, SOLUTION SUBCUTANEOUS DAILY
Qty: 18 ML | Refills: 3 | Status: SHIPPED | OUTPATIENT
Start: 2020-11-16 | End: 2021-02-22 | Stop reason: SDUPTHER

## 2020-11-16 NOTE — PROGRESS NOTES
INTERNAL MEDICINE [facility]IDENT [facility]INIC  CLINIC NOTE    Patient Name: Ned Oliveira  YOB: 1950    PRESENTING HISTORY       History of Present Illness:  Mr. Ned Oliveira is a 70 y.o. male w/ significant PMHx of CAD/chronic stable angina, HTN, HLD, BPH and poorly controlled T2DM diagnosed in 2007, presenting for a 3 month follow up on his diabetes. Patient recently had eye procedure done that went well. Ran out of lancets at home so has not been logging his blood sugars at home. States that he has improved his diet by eating more fruits and vegetables in his diet. Decreased the amount of rice as well. Denies any hypoglycemic episodes. Pt denies chest pain, sob, cough, sore throat, abdominal pain, n/v/d, constipation, fever, chills, headache, numbness or tingling or weakness of the extremities     CAD, chronic stable angina: Patient has a history of CP and reports mild SOB w/ strenuous exertion, walking up >4 stairs, which improves w/ rest. However, this has been going on for many years the pain is unchanged and not severe. Since his last visit a couple of months ago, he denies any chest pain at rest, palpitations, SOB, or orthopnea. Patient hat a PET Stress test on 8/11/2020 showing a 7% apical defect in LAD distribution. Cardiology deferred to medical management for now and close follow up.      HTN/HLD:  Pt doesn't check blood pressures at home but BP's have been normal in previous visits. Controlled on current medications    Review of Systems   Constitutional: Negative for chills, fever, malaise/fatigue and weight loss.   HENT: Negative for congestion and sore throat.    Eyes: Positive for blurred vision.   Respiratory: Negative for cough, sputum production and shortness of breath.    Cardiovascular: Negative for chest pain, palpitations, orthopnea, claudication and leg swelling.   Gastrointestinal: Negative for abdominal pain, blood in stool, constipation, diarrhea, heartburn, melena, nausea and  vomiting.   Genitourinary: Negative for dysuria and frequency.   Musculoskeletal: Negative for back pain and myalgias.   Skin: Negative for rash.   Neurological: Negative for dizziness, tingling, focal weakness and loss of consciousness.   Psychiatric/Behavioral: Negative for depression and substance abuse. The patient is not nervous/anxious and does not have insomnia.        PAST HISTORY:     Past Medical History:   Diagnosis Date    Adrenal adenoma     BPH (benign prostatic hyperplasia)     Cataract     Coronary artery disease     Diabetes mellitus, type 2     Diabetic retinopathy     Glaucoma     Hyperlipidemia     Hypertension     Inguinal hernia of left side without obstruction or gangrene 2/6/2019    Nephrolithiasis 5/26/2016    Steroid responders, left 4/14/2020       Past Surgical History:   Procedure Laterality Date    BICEPS TENDON REPAIR Right     CATARACT EXTRACTION W/  INTRAOCULAR LENS IMPLANT Left 10/21/2020    PCE AND BAERVELDT ()    COLONOSCOPY N/A 2/8/2019    Procedure: COLONOSCOPY;  Surgeon: Tavon Montes MD;  Location: [facility] (4TH FLR);  Service: Colon and Rectal;  Laterality: N/A;  will need . pt requesting ASAP. gaudencio Perez ([facility]) ok to schedule at this time. will send  up for 6:45 am arrival time    IMPLANTATION OF DEVICE FOR GLAUCOMA Left 10/21/2020    Procedure: INSERTION, DEVICE, FOR GLAUCOMA;  Surgeon: Saranya Monge MD;  [address]  Service: Ophthalmology;  Laterality: Left;    INTRAOCULAR PROSTHESES INSERTION Left 10/21/2020    Procedure: INSERTION, IOL PROSTHESIS;  Surgeon: Saranya Monge MD;  [facility];  Service: Ophthalmology;  Laterality: Left;    PHACOEMULSIFICATION OF CATARACT Left 10/21/2020    Procedure: PHACOEMULSIFICATION, CATARACT;  Surgeon: Saranya Monge MD;  [facility];  Service: Ophthalmology;  Laterality: Left;       Family History    Problem Relation Age of Onset    Diabetes Mother     Heart disease Mother     Heart disease Sister     No Known Problems Brother     Kidney failure Daughter         ESRD on HD    Autism Son     Asthma Son     No Known Problems Sister     No Known Problems Sister     No Known Problems Sister     No Known Problems Brother     Diabetes Brother     Stroke Neg Hx     Amblyopia Neg Hx     Blindness Neg Hx     Cataracts Neg Hx     Glaucoma Neg Hx     Macular degeneration Neg Hx     Retinal detachment Neg Hx     Strabismus Neg Hx        Social History     Socioeconomic History    Marital status:      Spouse name: Not on file    Number of children: 2    Years of education: Not on file    Highest education level: Not on file   Occupational History    Occupation: Maintenance    Social Needs    Financial resource strain: Not on file    Food insecurity     Worry: Not on file     Inability: Not on file    Transportation needs     Medical: Not on file     Non-medical: Not on file   Tobacco Use    Smoking status: Former Smoker     Packs/day: 2.00     Years: 10.00     Pack years: 20.00     Types: Cigarettes     Start date: 8/15/1987     Quit date: 8/15/1996     Years since quittin.2    Smokeless tobacco: Never Used   Substance and Sexual Activity    Alcohol use: No     Alcohol/week: 0.0 standard drinks    Drug use: No    Sexual activity: Yes     Partners: Female   Lifestyle    Physical activity     Days per week: Not on file     Minutes per session: Not on file    Stress: Not on file   Relationships    Social connections     Talks on phone: Not on file     Gets together: Not on file     Attends Synagogue service: Not on file     Active member of club or organization: Not on file     Attends meetings of clubs or organizations: Not on file     Relationship status: Not on file   Other Topics Concern    Not on file   Social History Narrative    Not on file       MEDICATIONS &  ALLERGIES:     Current Outpatient Medications on File Prior to Visit   Medication Sig    acetaminophen (TYLENOL) 500 MG tablet Take 2 tablets (1,000 mg total) by mouth 2 (two) times daily.    aspirin (ECOTRIN) 81 MG EC tablet Take 1 tablet (81 mg total) by mouth once daily.    atorvastatin (LIPITOR) 80 MG tablet Take 1 tablet (80 mg total) by mouth once daily.    brimonidine 0.2% (ALPHAGAN) 0.2 % Drop Place 1 drop into the left eye 3 (three) times daily.    dorzolamide-timolol 2-0.5% (COSOPT) 22.3-6.8 mg/mL ophthalmic solution Place 1 drop into both eyes 2 (two) times daily.    ketorolac 0.5% (ACULAR) 0.5 % Drop Place 1 drop into the left eye 3 (three) times daily.    lisinopriL-hydrochlorothiazide (PRINZIDE,ZESTORETIC) 10-12.5 mg per tablet Take 1 tablet by mouth once daily.    metFORMIN (GLUCOPHAGE) 1000 MG tablet Take 1 tablet (1,000 mg total) by mouth 2 (two) times daily with meals.    metoprolol succinate (TOPROL-XL) 25 MG 24 hr tablet Take 0.5 tablets (12.5 mg total) by mouth once daily.    nitroGLYCERIN (NITROSTAT) 0.4 MG SL tablet Place 1 tablet (0.4 mg total) under the tongue every 5 (five) minutes as needed for Chest pain (ONLY IF HAVE CHEST PAIN,).    prednisoLONE acetate (PRED FORTE) 1 % DrpS Place 1 drop into the left eye 3 (three) times daily.    tamsulosin (FLOMAX) 0.4 mg Cap Take 2 capsules (0.8 mg total) by mouth every evening.    [DISCONTINUED] glimepiride (AMARYL) 4 MG tablet Take 1 tablet (4 mg total) by mouth before breakfast.    [DISCONTINUED] insulin (LANTUS SOLOSTAR U-100 INSULIN) glargine 100 units/mL (3mL) SubQ pen Inject 15 Units into the skin once daily. Inject 15 Units into skin at night (after dinner or before bedtime)     No current facility-administered medications on file prior to visit.        Review of patient's allergies indicates:   Allergen Reactions    Percocet [oxycodone-acetaminophen] Itching       OBJECTIVE:   Vital Signs:  Vitals:    11/16/20 1324   BP:  "116/70   Pulse: 64   SpO2: 99%   Weight: 63.3 kg (139 lb 8.8 oz)   Height: 5' 4" (1.626 m)       No results found for this or any previous visit (from the past 24 hour(s)).      Physical Exam   Constitutional: He is oriented to person, place, and time and well-developed, well-nourished, and in no distress. No distress.   HENT:   Head: Normocephalic and atraumatic.   Eyes: Conjunctivae and EOM are normal.   Neck: Normal range of motion.   Cardiovascular: Normal rate and regular rhythm. Exam reveals no gallop.   No murmur heard.  Pulmonary/Chest: Effort normal and breath sounds normal. No respiratory distress. He has no wheezes. He has no rales.   Abdominal: Soft. He exhibits no distension. There is no abdominal tenderness. There is no rebound and no guarding.   Musculoskeletal: Normal range of motion.         General: No edema.   Neurological: He is alert and oriented to person, place, and time.   Skin: Skin is warm and dry. He is not diaphoretic.   Psychiatric: Mood and affect normal.       Laboratory  Lab Results   Component Value Date    WBC 7.15 05/19/2020    HGB 15.2 05/19/2020    HCT 46.4 05/19/2020     (H) 05/19/2020     05/19/2020     @ALOEBYFEC63(GLU,NA,K,Cl,CO2,BUN,Creatinine,Calcium,MG)@  Lab Results   Component Value Date    INR 1.0 12/05/2016    INR 0.9 07/27/2009    INR 1.0 07/21/2009     Lab Results   Component Value Date    HGBA1C 7.1 (H) 11/10/2020     No results for input(s): POCTGLUCOSE in the last 72 hours.    Diagnostic Results:  Labs: Reviewed    ASSESSMENT & PLAN:     Diagnoses and all orders for this visit:    Uncontrolled type 2 diabetes mellitus with both eyes affected by mild nonproliferative retinopathy and macular edema, without long-term current use of insulin  > 05/19/2020 A1C: 9.9. Currently 7.3.   > 07/14/2020 in clinic foot exam intact        -     Will increase lantus to 20 units daily at night; will discontinue amaryl due to risk of hypoglycemia in his age group       " "                   - Strips, lancets, needles and glucometer ordered; told to measure keep log of sugars and measure 2-3 times daily        -     Continue home metformin 1000 mg BID        -     Continue to follow up with ophthalmology and cardiology regularly         -     Educated on the risks of insulin and the symptoms and signs of hypoglycemia         -     Encouraged to continue improving diet and exercise  -     Comprehensive metabolic panel, A1C before next visit     Abnormal LFTs        -     LFT's slightly elevated; possible from lipitor, long standing diabetes, less likely hepatitis         -     Hep C screen in 2016 normal  -     HEPATITIS PANEL, ACUTE  -     Repeat CMP in 3 months before next visit      Chronic stable angina  CAD in native artery   > Per chart review; In 2009 he had an abnormal stress echo (developed angina) and LHC demonstrated diffuse "3VD".  Referred to CT surgery for CABG considering DMII, but was lost to follow up because of financial reasons.  > 8/11/2020 PET Stress: 7% atypical defect in LAD distribution        -     Continue to Follow up with cardiology; continue medical management        -     Continue home metoprolol succinate 25 mg, aspirin and statin        -     Nitroglycerin PRN     Essential hypertension  Hyperlipidemia associated with type 2 diabetes mellitus  > HTN well controlled  > LFT's slightly elevated this visit and LDL's are normal        -     will adjust lipitor to half a tab daily (40 mg daily)        -     Continue home lisinopril-HCTZ 10/12.5 mg       Health maintenence        -      flu shot and shingrix done today         -      Eye and foot exams yearly     Benign non-nodular prostatic hyperplasia without lower urinary tract symptoms        -     Continue home flomax 0.4mg       RTC in 3 months    Discussed with Dr. Sequeira  - staff attestation to follow    Vamsi Rubio MD  Internal Medicine PGY-1              "

## 2020-11-16 NOTE — PATIENT INSTRUCTIONS
Please decrease your lipitor dose to 40 mg daily (half a pill). Please stop taking your glimepiride and increase your insulin dose to 20 units at night.

## 2020-11-17 ENCOUNTER — TELEPHONE (OUTPATIENT)
Dept: INTERNAL MEDICINE | Facility: CLINIC | Age: 70
End: 2020-11-17

## 2020-11-17 LAB
HAV IGM SERPL QL IA: NEGATIVE
HBV CORE IGM SERPL QL IA: NEGATIVE
HBV SURFACE AG SERPL QL IA: NEGATIVE
HCV AB SERPL QL IA: NEGATIVE

## 2020-11-25 ENCOUNTER — TELEPHONE (OUTPATIENT)
Dept: INTERNAL MEDICINE | Facility: CLINIC | Age: 70
End: 2020-11-25

## 2020-11-25 NOTE — TELEPHONE ENCOUNTER
----- Message from Harrietsandra Toussaint sent at 11/25/2020  9:40 AM CST -----  Contact: [facility] pharmacy  Cc: Vamsi Rubio MD  Pharmacy is requesting direction insulin (LANTUS SOLOSTAR U-100 INSULIN) glargine 100 units/mL (3mL) SubQ pen. [facility] - [address] [phone] (Phone) [phone] (Fax) . Please call and adivse. thank you

## 2020-11-30 ENCOUNTER — OFFICE VISIT (OUTPATIENT)
Dept: OPHTHALMOLOGY | Facility: CLINIC | Age: 70
End: 2020-11-30
Payer: MEDICARE

## 2020-11-30 DIAGNOSIS — H40.1132 PRIMARY OPEN ANGLE GLAUCOMA (POAG) OF BOTH EYES, MODERATE STAGE: ICD-10-CM

## 2020-11-30 DIAGNOSIS — Z96.1 PSEUDOPHAKIA, LEFT EYE: ICD-10-CM

## 2020-11-30 DIAGNOSIS — E11.3313 TYPE 2 DIABETES MELLITUS WITH BOTH EYES AFFECTED BY MODERATE NONPROLIFERATIVE RETINOPATHY AND MACULAR EDEMA, WITHOUT LONG-TERM CURRENT USE OF INSULIN: ICD-10-CM

## 2020-11-30 DIAGNOSIS — Z96.89 HISTORY OF GLAUCOMA TUBE SHUNT PROCEDURE: ICD-10-CM

## 2020-11-30 DIAGNOSIS — H40.042 STEROID RESPONDERS, LEFT: ICD-10-CM

## 2020-11-30 DIAGNOSIS — Z48.89 ENCOUNTER FOR POSTOPERATIVE CARE: Primary | ICD-10-CM

## 2020-11-30 PROCEDURE — 92134 CPTRZ OPH DX IMG PST SGM RTA: CPT | Mod: HCNC,S$GLB,, | Performed by: OPHTHALMOLOGY

## 2020-11-30 PROCEDURE — 99024 PR POST-OP FOLLOW-UP VISIT: ICD-10-PCS | Mod: HCNC,S$GLB,, | Performed by: OPHTHALMOLOGY

## 2020-11-30 PROCEDURE — 99999 PR PBB SHADOW E&M-EST. PATIENT-LVL II: CPT | Mod: PBBFAC,HCNC,, | Performed by: OPHTHALMOLOGY

## 2020-11-30 PROCEDURE — 99999 PR PBB SHADOW E&M-EST. PATIENT-LVL II: ICD-10-PCS | Mod: PBBFAC,HCNC,, | Performed by: OPHTHALMOLOGY

## 2020-11-30 PROCEDURE — 99024 POSTOP FOLLOW-UP VISIT: CPT | Mod: HCNC,S$GLB,, | Performed by: OPHTHALMOLOGY

## 2020-11-30 PROCEDURE — 92134 POSTERIOR SEGMENT OCT RETINA (OCULAR COHERENCE TOMOGRAPHY)-BOTH EYES: ICD-10-PCS | Mod: HCNC,S$GLB,, | Performed by: OPHTHALMOLOGY

## 2020-11-30 NOTE — PROGRESS NOTES
HPI     Pt is here today for 1 month post op visit.  S/p Combined OS (Complex Phaco IOL and  Baerveldt) 10/21/2020  Pt states his vision OS is still not as clear as he would like it to be.    MEDS:  PA QID OS  Ketorolac QID OS  Vigamox QID OS  Brimonidine TID OS   Cosopt BID OU  AT's PRN OU      Last edited by Lee Rader on 11/30/2020  2:51 PM. (History)        Assessment /Plan     For exam results, see Encounter Report.    Encounter for postoperative care    Primary open angle glaucoma (POAG) of both eyes, moderate stage    Steroid responders, left    Type 2 diabetes mellitus with both eyes affected by moderate nonproliferative retinopathy and macular edema, without long-term current use of insulin    Pseudophakia, left eye    History of glaucoma tube shunt procedure                Glaucoma (type and duration)    Suspect - followed at [facility] since 2007    First HVF   2007   First photos   2009   Treatment / Drops started   none           Family history    Neg (+for DR - mom)         Glaucoma meds    None         H/O adverse rxn to glaucoma drops    none        LASERS    Laser for DR // none for glaucoma         GLAUCOMA SURGERIES    none        OTHER EYE SURGERIES    None         CDR    (( by fundus photos 0.5 / 0.6 )         Tbase    15-25 / 15-31  (but ? If the high IOP's are post injection - had injection on those days and only 1 IOP recorded for each eye)         Tmax    24 od (11/6/2018)  - ?? Post injection // /34os( 4/14/2020) - ? Post injection            Ttarget    ?             HVF    4 test 2007 to  2009 - full od // full os        Gonio    +3 ou        CCT    518/525        OCT    1 test 2019/ to 2019/ - RNFL WNL-od // WNL-Os---OCT MAc with DME OS        HRT    1 test 2009 to 2009 - MR - nl od // nl os        Disc photos    Fundus photos 2009, 2017, 2018    - Ttoday   12/14  - Test done today  Post  - op phaco/IOL os // BGI w/ slits os - 10/22/2020     2. BDR w/ ME    Sees Gustavoa    S/P avastin ou     S/P laser ou    Waiting for approval for ozurdex     3. NS / cortical cat OU   Monitor     PLAN   Good HVF    nl RNFL ou   IOP ??/ 22   On cosopt os for IOP of 33 os on 4/14/2020 (good resp 34-->24)   + ozurdex IOP elevation - got ozurdex os 2/2020 and IOP up to 34 os on     If IOP goes up os can add brimonidine back       Phaco / IOL OS Date: 10/21/2020 - PCB00 21.5  POW # 5.5 - phaco/IOL   Doing well  Pred Acetate - decrease to tid   START atropine OS - bid - in anticipation of tube opening   Shield at night  Glasses day  No lifting, no bending, no eye rubbing for 2-3 more weeks    Keep appt with chelsy - 12/4/2020- ? Needs ozurdex for DME     F/U 1- 2 weeks, try  AR/MR ou - ?? Potential - needs ozurdex for DME - ?? potentail     Cont cosopt bid ou -- CONTINUE - but may need to stop os in future if IOP goes low   Cont brimonidine tid os -- STOP

## 2020-12-04 ENCOUNTER — PROCEDURE VISIT (OUTPATIENT)
Dept: OPHTHALMOLOGY | Facility: CLINIC | Age: 70
End: 2020-12-04
Payer: MEDICARE

## 2020-12-04 DIAGNOSIS — E11.3313 CONTROLLED TYPE 2 DIABETES MELLITUS WITH BOTH EYES AFFECTED BY MODERATE NONPROLIFERATIVE RETINOPATHY AND MACULAR EDEMA, WITHOUT LONG-TERM CURRENT USE OF INSULIN: Primary | ICD-10-CM

## 2020-12-04 PROCEDURE — 92014 PR EYE EXAM, EST PATIENT,COMPREHESV: ICD-10-PCS | Mod: 25,HCNC,S$GLB, | Performed by: OPHTHALMOLOGY

## 2020-12-04 PROCEDURE — 92134 CPTRZ OPH DX IMG PST SGM RTA: CPT | Mod: HCNC,S$GLB,, | Performed by: OPHTHALMOLOGY

## 2020-12-04 PROCEDURE — 67028 INJECTION EYE DRUG: CPT | Mod: HCNC,79,RT,S$GLB | Performed by: OPHTHALMOLOGY

## 2020-12-04 PROCEDURE — 92014 COMPRE OPH EXAM EST PT 1/>: CPT | Mod: 25,HCNC,S$GLB, | Performed by: OPHTHALMOLOGY

## 2020-12-04 PROCEDURE — 67028 PR INJECT INTRAVITREAL PHARMCOLOGIC: ICD-10-PCS | Mod: HCNC,79,RT,S$GLB | Performed by: OPHTHALMOLOGY

## 2020-12-04 PROCEDURE — 92134 POSTERIOR SEGMENT OCT RETINA (OCULAR COHERENCE TOMOGRAPHY)-BOTH EYES: ICD-10-PCS | Mod: HCNC,S$GLB,, | Performed by: OPHTHALMOLOGY

## 2020-12-04 RX ORDER — FLUORESCEIN 500 MG/ML
5 INJECTION INTRAVENOUS ONCE
Status: DISCONTINUED | OUTPATIENT
Start: 2020-12-04 | End: 2021-01-21

## 2020-12-04 RX ADMIN — Medication 1.25 MG: at 09:12

## 2020-12-04 NOTE — PATIENT INSTRUCTIONS

## 2020-12-04 NOTE — PROGRESS NOTES
HPI     8wk OCT Avastin  DLS 10/02/20 by Dr. BRANDY Rivers MD    Pt states that eyes and vision the same almost slight improvement in OS.   Denies pain,Flashes (+)Floaters    Eye Meds:   Cosopt BID OD  Prednisolone - OS      OCT - OD -  Decreased paracentral ME with VMT component  OS increased paracentral ME    Prior FA - late macular leakage OS > OD    Patient needs Monday appointments only    A/P    1. Mild NPDR OU T2  2. DME OU    S/p Avastin OD x 5, OS x 12  Ozurdex OS x3  10/20  Focal OS 5/17, OD 1-19  1/19 - increased DME at 9 weeks - resumed 6 week tx    Avastin OD    Will need maintenance OS    Had Glc suspect eval with KL  S/p CE/BV OS 10/20      BS/BP/chol control    3. NS OD  PCIOL OS    4. POAG  Elevated IOP OS>OD  S/p BV OS10/20    Continue Cosopt BID OU, atropine, PF OS        8 weeks OCT and dilate OU OCT/WFFA OS    Risks, benefits, and alternatives to treatment discussed in detail with the patient.  The patient voiced understanding and wished to proceed with the procedure    Injection Procedure Note:  Diagnosis: DME OU    Patient Identified and Time Out complete  Pt Prefers to be marked with sticker rather than ink marker (OHS.[id number] #5)  Topical Proparacaine and Betadine subconj lido OS  Inject AVASTIN OD at 6:00 @ 3.5-4mm posterior to limbus  Post Operative Dx: Same  Complications: None  Follow up as above.

## 2020-12-07 ENCOUNTER — OFFICE VISIT (OUTPATIENT)
Dept: OPHTHALMOLOGY | Facility: CLINIC | Age: 70
End: 2020-12-07
Payer: MEDICARE

## 2020-12-07 DIAGNOSIS — Z96.1 PSEUDOPHAKIA, LEFT EYE: ICD-10-CM

## 2020-12-07 DIAGNOSIS — Z96.89 HISTORY OF GLAUCOMA TUBE SHUNT PROCEDURE: ICD-10-CM

## 2020-12-07 DIAGNOSIS — E11.3313 TYPE 2 DIABETES MELLITUS WITH BOTH EYES AFFECTED BY MODERATE NONPROLIFERATIVE RETINOPATHY AND MACULAR EDEMA, WITHOUT LONG-TERM CURRENT USE OF INSULIN: ICD-10-CM

## 2020-12-07 DIAGNOSIS — H57.9 SHALLOW ANTERIOR CHAMBER OF EYE: ICD-10-CM

## 2020-12-07 DIAGNOSIS — Z48.89 ENCOUNTER FOR POSTOPERATIVE CARE: Primary | ICD-10-CM

## 2020-12-07 DIAGNOSIS — H40.042 STEROID RESPONDERS, LEFT: ICD-10-CM

## 2020-12-07 DIAGNOSIS — H40.1132 PRIMARY OPEN ANGLE GLAUCOMA (POAG) OF BOTH EYES, MODERATE STAGE: ICD-10-CM

## 2020-12-07 PROCEDURE — 1101F PR PT FALLS ASSESS DOC 0-1 FALLS W/OUT INJ PAST YR: ICD-10-PCS | Mod: HCNC,CPTII,S$GLB, | Performed by: OPHTHALMOLOGY

## 2020-12-07 PROCEDURE — 1125F AMNT PAIN NOTED PAIN PRSNT: CPT | Mod: HCNC,S$GLB,, | Performed by: OPHTHALMOLOGY

## 2020-12-07 PROCEDURE — 1101F PT FALLS ASSESS-DOCD LE1/YR: CPT | Mod: HCNC,CPTII,S$GLB, | Performed by: OPHTHALMOLOGY

## 2020-12-07 PROCEDURE — 99999 PR PBB SHADOW E&M-EST. PATIENT-LVL II: ICD-10-PCS | Mod: PBBFAC,HCNC,, | Performed by: OPHTHALMOLOGY

## 2020-12-07 PROCEDURE — 99024 POSTOP FOLLOW-UP VISIT: CPT | Mod: HCNC,S$GLB,, | Performed by: OPHTHALMOLOGY

## 2020-12-07 PROCEDURE — 99024 PR POST-OP FOLLOW-UP VISIT: ICD-10-PCS | Mod: HCNC,S$GLB,, | Performed by: OPHTHALMOLOGY

## 2020-12-07 PROCEDURE — 1125F PR PAIN SEVERITY QUANTIFIED, PAIN PRESENT: ICD-10-PCS | Mod: HCNC,S$GLB,, | Performed by: OPHTHALMOLOGY

## 2020-12-07 PROCEDURE — 99999 PR PBB SHADOW E&M-EST. PATIENT-LVL II: CPT | Mod: PBBFAC,HCNC,, | Performed by: OPHTHALMOLOGY

## 2020-12-07 PROCEDURE — 3288F PR FALLS RISK ASSESSMENT DOCUMENTED: ICD-10-PCS | Mod: HCNC,CPTII,S$GLB, | Performed by: OPHTHALMOLOGY

## 2020-12-07 PROCEDURE — 3288F FALL RISK ASSESSMENT DOCD: CPT | Mod: HCNC,CPTII,S$GLB, | Performed by: OPHTHALMOLOGY

## 2020-12-07 RX ORDER — ATROPINE SULFATE 10 MG/ML
1 SOLUTION/ DROPS OPHTHALMIC 3 TIMES DAILY
COMMUNITY
Start: 2020-11-30 | End: 2020-12-15 | Stop reason: SDUPTHER

## 2020-12-07 RX ORDER — DUREZOL 0.5 MG/ML
1 EMULSION OPHTHALMIC
Qty: 5 ML | Refills: 1 | Status: SHIPPED | OUTPATIENT
Start: 2020-12-07 | End: 2020-12-15 | Stop reason: SDUPTHER

## 2020-12-07 NOTE — PROGRESS NOTES
HPI     Post-op Evaluation     Comments: Pt c/o painful OS x 2 days              Comments     S/p Combined OS (Complex Phaco and Baerveld) 10/21/20    -Pt states that his left eye became very painful and red on Saturday   night. Pt states he took a Percocet for the pain and went to sleep. Pt   states he woke up Sunday and eye was very blurry and still painful.    MEDS:  PA TID OS  Atropine BID OS  Cosopt BID OU  AT's PRN OU            Last edited by Brittany Villavicencio MA on 12/7/2020 11:44 AM. (History)        Assessment /Plan     For exam results, see Encounter Report.    Encounter for postoperative care    Primary open angle glaucoma (POAG) of both eyes, moderate stage    Steroid responders, left    History of glaucoma tube shunt procedure    Type 2 diabetes mellitus with both eyes affected by moderate nonproliferative retinopathy and macular edema, without long-term current use of insulin    Pseudophakia, left eye           Glaucoma (type and duration)    Suspect - followed at [facility] since 2007    First HVF   2007   First photos   2009   Treatment / Drops started   none           Family history    Neg (+for DR - mom)         Glaucoma meds    None         H/O adverse rxn to glaucoma drops    none        LASERS    Laser for DR // none for glaucoma         GLAUCOMA SURGERIES    none        OTHER EYE SURGERIES    None         CDR    (( by fundus photos 0.5 / 0.6 )         Tbase    15-25 / 15-31  (but ? If the high IOP's are post injection - had injection on those days and only 1 IOP recorded for each eye)         Tmax    24 od (11/6/2018)  - ?? Post injection // /34os( 4/14/2020) - ? Post injection            Ttarget    ?             HVF    4 test 2007 to  2009 - full od // full os        Gonio    +3 ou        CCT    518/525        OCT    1 test 2019/ to 2019/ - RNFL WNL-od // WNL-Os---OCT MAc with DME OS        HRT    1 test 2009 to 2009 - MR - nl od // nl os        Disc photos    Fundus photos 2009, 2017, 2018    -  Ttoday   /14  - Test done today  [facility] today 12/7/2020 - Post  - op phaco/IOL os // BGI w/ slits os - 10/22/2020    tube has opened / hypotony and AC shallowing and choroidals     2. BDR w/ ME    Sees Mazzulla    S/P avastin ou    S/P laser ou    Waiting for approval for ozurdex     3. NS / cortical cat OU   Monitor     PLAN   Good HVF    nl RNFL ou   IOP ??/ 14  On cosopt os for IOP of 33 os on 4/14/2020 (good resp 34-->24)   + ozurdex IOP elevation - got ozurdex os 2/2020 and IOP up to 34 os on     If IOP goes up os can add brimonidine back       Phaco / IOL + BVG OS Date: 10/21/2020 - PCB00 21.5  POW # 6.5 - phaco/IOL   Tube opened 12/5/20 -- now with shallow chamber and choroidals 12/7/20  Pred Acetate - increase to 6x/day - switch to durezol - [facility] - [facility] $60  Continue atropine BID - increase to tid   STOP cosopt OS  Continue OFF brimonidine OS  Shield at night  Glasses day  No lifting, no bending, no eye rubbing for 2-3 more weeks  ?Medrol dose pack? But diabetic    Cont cosopt bid od only     F/U tomorrow at [facility] to see if AC deepening at all - consider reforming with healon at the slit lamp - if not deepening // avoid systemic steroids - IDDM

## 2020-12-08 ENCOUNTER — OFFICE VISIT (OUTPATIENT)
Dept: OPHTHALMOLOGY | Facility: CLINIC | Age: 70
End: 2020-12-08
Payer: MEDICARE

## 2020-12-08 DIAGNOSIS — Z48.89 ENCOUNTER FOR POSTOPERATIVE CARE: Primary | ICD-10-CM

## 2020-12-08 DIAGNOSIS — E11.3313 TYPE 2 DIABETES MELLITUS WITH BOTH EYES AFFECTED BY MODERATE NONPROLIFERATIVE RETINOPATHY AND MACULAR EDEMA, WITHOUT LONG-TERM CURRENT USE OF INSULIN: ICD-10-CM

## 2020-12-08 DIAGNOSIS — H57.9 SHALLOW ANTERIOR CHAMBER OF EYE: ICD-10-CM

## 2020-12-08 DIAGNOSIS — H40.1132 PRIMARY OPEN ANGLE GLAUCOMA (POAG) OF BOTH EYES, MODERATE STAGE: ICD-10-CM

## 2020-12-08 DIAGNOSIS — E11.3313 CONTROLLED TYPE 2 DIABETES MELLITUS WITH BOTH EYES AFFECTED BY MODERATE NONPROLIFERATIVE RETINOPATHY AND MACULAR EDEMA, WITHOUT LONG-TERM CURRENT USE OF INSULIN: ICD-10-CM

## 2020-12-08 DIAGNOSIS — Z96.89 HISTORY OF GLAUCOMA TUBE SHUNT PROCEDURE: ICD-10-CM

## 2020-12-08 DIAGNOSIS — H40.042 STEROID RESPONDERS, LEFT: ICD-10-CM

## 2020-12-08 DIAGNOSIS — Z96.1 PSEUDOPHAKIA, LEFT EYE: ICD-10-CM

## 2020-12-08 PROCEDURE — 1125F PR PAIN SEVERITY QUANTIFIED, PAIN PRESENT: ICD-10-PCS | Mod: HCNC,S$GLB,, | Performed by: OPHTHALMOLOGY

## 2020-12-08 PROCEDURE — 99024 POSTOP FOLLOW-UP VISIT: CPT | Mod: HCNC,S$GLB,, | Performed by: OPHTHALMOLOGY

## 2020-12-08 PROCEDURE — 99999 PR PBB SHADOW E&M-EST. PATIENT-LVL III: CPT | Mod: PBBFAC,HCNC,, | Performed by: OPHTHALMOLOGY

## 2020-12-08 PROCEDURE — 1125F AMNT PAIN NOTED PAIN PRSNT: CPT | Mod: HCNC,S$GLB,, | Performed by: OPHTHALMOLOGY

## 2020-12-08 PROCEDURE — 99999 PR PBB SHADOW E&M-EST. PATIENT-LVL III: ICD-10-PCS | Mod: PBBFAC,HCNC,, | Performed by: OPHTHALMOLOGY

## 2020-12-08 PROCEDURE — 99024 PR POST-OP FOLLOW-UP VISIT: ICD-10-PCS | Mod: HCNC,S$GLB,, | Performed by: OPHTHALMOLOGY

## 2020-12-08 NOTE — PROGRESS NOTES
HPI     Post-op Evaluation     Comments: Pt states he still has pain OS               Comments     S/p Combined OS (Complex Phaco and Baerveld) 10/21/20    Pt reports pain about the same - VA still poor. Took Tylenol this morning   and it helped.       MEDS:  Durezol 6x's OS  Atropine TID OS  Cosopt BID OD  AT's PRN OU            Last edited by Dagmar Farris MD on 12/8/2020  2:07 PM. (History)        Assessment /Plan     For exam results, see Encounter Report.    Encounter for postoperative care    Shallow anterior chamber of eye    Primary open angle glaucoma (POAG) of both eyes, moderate stage    Steroid responders, left    Type 2 diabetes mellitus with both eyes affected by moderate nonproliferative retinopathy and macular edema, without long-term current use of insulin    Pseudophakia, left eye    History of glaucoma tube shunt procedure    Controlled type 2 diabetes mellitus with both eyes affected by moderate nonproliferative retinopathy and macular edema, without long-term current use of insulin         Glaucoma (type and duration)    Suspect - followed at [facility] since 2007    First HVF   2007   First photos   2009   Treatment / Drops started   none           Family history    Neg (+for DR - mom)         Glaucoma meds    None         H/O adverse rxn to glaucoma drops    none        LASERS    Laser for DR // none for glaucoma         GLAUCOMA SURGERIES    none        OTHER EYE SURGERIES    None         CDR    (( by fundus photos 0.5 / 0.6 )         Tbase    15-25 / 15-31  (but ? If the high IOP's are post injection - had injection on those days and only 1 IOP recorded for each eye)         Tmax    24 od (11/6/2018)  - ?? Post injection // /34os( 4/14/2020) - ? Post injection            Ttarget    ?             HVF    4 test 2007 to  2009 - full od // full os        Gonio    +3 ou        CCT    518/525        OCT    1 test 2019/ to 2019/ - RNFL WNL-od // WNL-Os---OCT MAc with DME OS        HRT    1 test  2009 to 2009 - MR - nl od // nl os        Disc photos    Fundus photos 2009, 2017, 2018    - Ttoday   /17  - Test done today  F/y hypotony and choroidals after BV opened - Post  - op phaco/IOL os // BGI w/ slits os - 10/22/2020    tube has opened / hypotony and AC shallowing and choroidals     2. BDR w/ ME    Sees Mazzulla    S/P avastin ou    S/P laser ou    Waiting for approval for ozurdex     3. NS / cortical cat OU   Monitor     PLAN   Good HVF    nl RNFL ou   IOP ??/ 14  On cosopt os for IOP of 33 os on 4/14/2020 (good resp 34-->24)   + ozurdex IOP elevation - got ozurdex os 2/2020 and IOP up to 34 os on     If IOP goes up os can add brimonidine back       Phaco / IOL + BVG OS Date: 10/21/2020 - PCB00 21.5  POW # 6.5 - phaco/IOL   Tube opened 12/5/20 -- now with shallow chamber and choroidals 12/7/20  Pred Acetate - increase to 6x/day - switch to durezol - [facility] - [facility] $60  Continue atropine BID - increase to tid   STOP cosopt OS  Continue OFF brimonidine OS  Shield at night  Glasses day  No lifting, no bending, no eye rubbing for 2-3 more weeks  Unable to get Medrol dose pack 2/2 diabetic    Cont cosopt bid od only     12/8/20  Still very shallow with IK touch - not dilating well - formed cnetrally - no IOL / cornea touch     Cont meds // F/U 2 days - caonsider yag to cyclitic membrane and laser PI if possible - ?? Just pupillary block vs aqueous misdirection     F/U Thursday - get B-scan OS - try yag to cyclitic membrane and ?? Attempt laser PI

## 2020-12-09 NOTE — PROGRESS NOTES
HPI     Post-op Evaluation      Additional comments: Pt states no changes since last exam               Comments     S/p Combined OS (Complex Phaco and Baerveld) 10/21/20      MEDS:  Durezol 6x's OS  Atropine TID OS  Cosopt BID OD  AT's PRN OU            Last edited by Brittany Villavicencio MA on 12/10/2020  2:01 PM. (History)            Assessment /Plan     For exam results, see Encounter Report.    Encounter for postoperative care    Shallow anterior chamber of eye    Primary open angle glaucoma (POAG) of both eyes, moderate stage    Steroid responders, left    Type 2 diabetes mellitus with both eyes affected by moderate nonproliferative retinopathy and macular edema, without long-term current use of insulin    Pseudophakia, left eye    History of glaucoma tube shunt procedure    Pupillary membrane of left eye           Glaucoma (type and duration)    Suspect - followed at [facility] since 2007    First HVF   2007   First photos   2009   Treatment / Drops started   none           Family history    Neg (+for DR - mom)         Glaucoma meds    None         H/O adverse rxn to glaucoma drops    none        LASERS    Laser for DR // none for glaucoma         GLAUCOMA SURGERIES    none        OTHER EYE SURGERIES    None         CDR    (( by fundus photos 0.5 / 0.6 )         Tbase    15-25 / 15-31  (but ? If the high IOP's are post injection - had injection on those days and only 1 IOP recorded for each eye)         Tmax    24 od (11/6/2018)  - ?? Post injection // /34os( 4/14/2020) - ? Post injection            Ttarget    ?             HVF    4 test 2007 to  2009 - full od // full os        Gonio    +3 ou        CCT    518/525        OCT    1 test 2019/ to 2019/ - RNFL WNL-od // WNL-Os---OCT MAc with DME OS        HRT    1 test 2009 to 2009 - MR - nl od // nl os        Disc photos    Fundus photos 2009, 2017, 2018    - Ttoday 19/14  - Test done today  F/u  hypotony and choroidals after BV opened - Post  - op phaco/IOL os // BGI  w/ slits os - 10/22/2020    tube has opened / hypotony and AC shallowing and choroidals    2. BDR w/ ME    Sees Mazzulla    S/P avastin ou    S/P laser ou    Waiting for approval for ozurdex     3. NS / cortical cat OU   Monitor     PLAN   Good HVF    nl RNFL ou   IOP ??/ 14  On cosopt os for IOP of 33 os on 4/14/2020 (good resp 34-->24)   + ozurdex IOP elevation - got ozurdex os 2/2020 and IOP up to 34 os on     If IOP goes up os can add brimonidine back       Phaco / IOL + BVG OS Date: 10/21/2020 - PCB00 21.5  POW # 6.5  - phaco/IOL   Tube opened 12/5/20 -- now with shallow chamber and choroidals 12/7/20  Pred Acetate - increase to 6x/day - switch to durezol - [facility] - [facility] $60  Continue atropine BID - increase to tid   STOP cosopt OS  Continue OFF brimonidine OS  Shield at night  Glasses day  No lifting, no bending, no eye rubbing for 2-3 more weeks  Unable to get Medrol dose pack 2/2 diabetic    Cont cosopt bid od only     12/8/20  Still very shallow with IK touch - not dilating well - formed cnetrally - no IOL / cornea touch     12/10/2020   Still shallow / nearly flat  with iris / cornea touch with tube enveloped by iris   B scan - + choroidals / not hemorrhagic   yag to cyclitic pupillary membrane   Attempted yag to post capsule - unable to perform 2/2 poor visualization  attempted laser PI - unable to perform as the iris is appositional to the cornea    PLAN  Cont same meds   F/U tomorrow - consider returning to OR for AC reformation dn tying tube back off and surgical PI    Pt has a H/O DME and has had to have ozurdex injections in past which caused markedly elevated IOP - this is one of the reasons the phaco/IOL BGI was done      yag os / cyclitic membrane   Power 4-6  # shots 60  Power 333    Cont meds // F/U 2 days - caonsider yag to cyclitic membrane and laser PI if possible - ?? Just pupillary block vs aqueous misdirection

## 2020-12-10 ENCOUNTER — OFFICE VISIT (OUTPATIENT)
Dept: OPHTHALMOLOGY | Facility: CLINIC | Age: 70
End: 2020-12-10
Payer: MEDICARE

## 2020-12-10 DIAGNOSIS — H40.042 STEROID RESPONDERS, LEFT: ICD-10-CM

## 2020-12-10 DIAGNOSIS — Z96.1 PSEUDOPHAKIA, LEFT EYE: ICD-10-CM

## 2020-12-10 DIAGNOSIS — E11.3313 TYPE 2 DIABETES MELLITUS WITH BOTH EYES AFFECTED BY MODERATE NONPROLIFERATIVE RETINOPATHY AND MACULAR EDEMA, WITHOUT LONG-TERM CURRENT USE OF INSULIN: ICD-10-CM

## 2020-12-10 DIAGNOSIS — H57.9 SHALLOW ANTERIOR CHAMBER OF EYE: ICD-10-CM

## 2020-12-10 DIAGNOSIS — H21.42 PUPILLARY MEMBRANE OF LEFT EYE: ICD-10-CM

## 2020-12-10 DIAGNOSIS — H40.1132 PRIMARY OPEN ANGLE GLAUCOMA (POAG) OF BOTH EYES, MODERATE STAGE: ICD-10-CM

## 2020-12-10 DIAGNOSIS — Z96.89 HISTORY OF GLAUCOMA TUBE SHUNT PROCEDURE: ICD-10-CM

## 2020-12-10 DIAGNOSIS — Z48.89 ENCOUNTER FOR POSTOPERATIVE CARE: Primary | ICD-10-CM

## 2020-12-10 PROCEDURE — 3288F FALL RISK ASSESSMENT DOCD: CPT | Mod: HCNC,CPTII,S$GLB, | Performed by: OPHTHALMOLOGY

## 2020-12-10 PROCEDURE — 1101F PT FALLS ASSESS-DOCD LE1/YR: CPT | Mod: HCNC,CPTII,S$GLB, | Performed by: OPHTHALMOLOGY

## 2020-12-10 PROCEDURE — 1126F PR PAIN SEVERITY QUANTIFIED, NO PAIN PRESENT: ICD-10-PCS | Mod: HCNC,S$GLB,, | Performed by: OPHTHALMOLOGY

## 2020-12-10 PROCEDURE — 1101F PR PT FALLS ASSESS DOC 0-1 FALLS W/OUT INJ PAST YR: ICD-10-PCS | Mod: HCNC,CPTII,S$GLB, | Performed by: OPHTHALMOLOGY

## 2020-12-10 PROCEDURE — 3288F PR FALLS RISK ASSESSMENT DOCUMENTED: ICD-10-PCS | Mod: HCNC,CPTII,S$GLB, | Performed by: OPHTHALMOLOGY

## 2020-12-10 PROCEDURE — 1126F AMNT PAIN NOTED NONE PRSNT: CPT | Mod: HCNC,S$GLB,, | Performed by: OPHTHALMOLOGY

## 2020-12-10 PROCEDURE — 99999 PR PBB SHADOW E&M-EST. PATIENT-LVL III: CPT | Mod: PBBFAC,HCNC,, | Performed by: OPHTHALMOLOGY

## 2020-12-10 PROCEDURE — 99024 POSTOP FOLLOW-UP VISIT: CPT | Mod: HCNC,S$GLB,, | Performed by: OPHTHALMOLOGY

## 2020-12-10 PROCEDURE — 99999 PR PBB SHADOW E&M-EST. PATIENT-LVL III: ICD-10-PCS | Mod: PBBFAC,HCNC,, | Performed by: OPHTHALMOLOGY

## 2020-12-10 PROCEDURE — 99024 PR POST-OP FOLLOW-UP VISIT: ICD-10-PCS | Mod: HCNC,S$GLB,, | Performed by: OPHTHALMOLOGY

## 2020-12-11 ENCOUNTER — OFFICE VISIT (OUTPATIENT)
Dept: OPHTHALMOLOGY | Facility: CLINIC | Age: 70
End: 2020-12-11
Payer: MEDICARE

## 2020-12-11 DIAGNOSIS — Z96.89 HISTORY OF GLAUCOMA TUBE SHUNT PROCEDURE: ICD-10-CM

## 2020-12-11 DIAGNOSIS — Z48.89 ENCOUNTER FOR POSTOPERATIVE CARE: Primary | ICD-10-CM

## 2020-12-11 DIAGNOSIS — H57.9 SHALLOW ANTERIOR CHAMBER OF EYE: ICD-10-CM

## 2020-12-11 DIAGNOSIS — Z96.1 PSEUDOPHAKIA, LEFT EYE: ICD-10-CM

## 2020-12-11 DIAGNOSIS — H21.42 PUPILLARY MEMBRANE OF LEFT EYE: ICD-10-CM

## 2020-12-11 DIAGNOSIS — E11.3313 TYPE 2 DIABETES MELLITUS WITH BOTH EYES AFFECTED BY MODERATE NONPROLIFERATIVE RETINOPATHY AND MACULAR EDEMA, WITHOUT LONG-TERM CURRENT USE OF INSULIN: ICD-10-CM

## 2020-12-11 DIAGNOSIS — H40.1132 PRIMARY OPEN ANGLE GLAUCOMA (POAG) OF BOTH EYES, MODERATE STAGE: ICD-10-CM

## 2020-12-11 DIAGNOSIS — H40.042 STEROID RESPONDERS, LEFT: ICD-10-CM

## 2020-12-11 PROCEDURE — 3288F FALL RISK ASSESSMENT DOCD: CPT | Mod: HCNC,CPTII,S$GLB, | Performed by: OPHTHALMOLOGY

## 2020-12-11 PROCEDURE — 1101F PR PT FALLS ASSESS DOC 0-1 FALLS W/OUT INJ PAST YR: ICD-10-PCS | Mod: HCNC,CPTII,S$GLB, | Performed by: OPHTHALMOLOGY

## 2020-12-11 PROCEDURE — 99024 POSTOP FOLLOW-UP VISIT: CPT | Mod: HCNC,S$GLB,, | Performed by: OPHTHALMOLOGY

## 2020-12-11 PROCEDURE — 99999 PR PBB SHADOW E&M-EST. PATIENT-LVL III: CPT | Mod: PBBFAC,HCNC,, | Performed by: OPHTHALMOLOGY

## 2020-12-11 PROCEDURE — 3288F PR FALLS RISK ASSESSMENT DOCUMENTED: ICD-10-PCS | Mod: HCNC,CPTII,S$GLB, | Performed by: OPHTHALMOLOGY

## 2020-12-11 PROCEDURE — 99024 PR POST-OP FOLLOW-UP VISIT: ICD-10-PCS | Mod: HCNC,S$GLB,, | Performed by: OPHTHALMOLOGY

## 2020-12-11 PROCEDURE — 1101F PT FALLS ASSESS-DOCD LE1/YR: CPT | Mod: HCNC,CPTII,S$GLB, | Performed by: OPHTHALMOLOGY

## 2020-12-11 PROCEDURE — 1126F AMNT PAIN NOTED NONE PRSNT: CPT | Mod: HCNC,S$GLB,, | Performed by: OPHTHALMOLOGY

## 2020-12-11 PROCEDURE — 1126F PR PAIN SEVERITY QUANTIFIED, NO PAIN PRESENT: ICD-10-PCS | Mod: HCNC,S$GLB,, | Performed by: OPHTHALMOLOGY

## 2020-12-11 PROCEDURE — 99999 PR PBB SHADOW E&M-EST. PATIENT-LVL III: ICD-10-PCS | Mod: PBBFAC,HCNC,, | Performed by: OPHTHALMOLOGY

## 2020-12-11 NOTE — PROGRESS NOTES
"HPI     Post-op Evaluation      Additional comments: Pt states he cannot see anything this morning  and   that he only sees "white"              Comments     -S/p Yag to Cyclitic Membrane OS 12/10/20  -S/p Combined OS (Complex Phaco and Baerveld) 10/21/20    Pt states that he cannot see anything out his left eye and that he only   sees "white".    MEDS:  Durezol 6x's OS  Atropine TID OS  Cosopt BID OD  AT's PRN OU            Last edited by Brittany Villavicencio MA on 12/11/2020 11:11 AM. (History)            Assessment /Plan     For exam results, see Encounter Report.    Encounter for postoperative care    Shallow anterior chamber of eye    Primary open angle glaucoma (POAG) of both eyes, moderate stage    Steroid responders, left    Type 2 diabetes mellitus with both eyes affected by moderate nonproliferative retinopathy and macular edema, without long-term current use of insulin    Pseudophakia, left eye    History of glaucoma tube shunt procedure    Pupillary membrane of left eye           Glaucoma (type and duration)    Suspect - followed at [facility] since 2007    First HVF   2007   First photos   2009   Treatment / Drops started   none           Family history    Neg (+for DR - mom)         Glaucoma meds    None         H/O adverse rxn to glaucoma drops    none        LASERS    Laser for DR // none for glaucoma         GLAUCOMA SURGERIES    none        OTHER EYE SURGERIES    None         CDR    (( by fundus photos 0.5 / 0.6 )         Tbase    15-25 / 15-31  (but ? If the high IOP's are post injection - had injection on those days and only 1 IOP recorded for each eye)         Tmax    24 od (11/6/2018)  - ?? Post injection // /34os( 4/14/2020) - ? Post injection            Ttarget    ?             HVF    4 test 2007 to  2009 - full od // full os        Gonio    +3 ou        CCT    518/525        OCT    1 test 2019/ to 2019/ - RNFL WNL-od // WNL-Os---OCT MAc with DME OS        HRT    1 test 2009 to 2009 - MR - nl od // nl " os        Disc photos    Fundus photos 2009, 2017, 2018    - Ttoday 19/14  - Test done today  F/u  hypotony and choroidals after BV opened - Post  - op phaco/IOL os // BGI w/ slits os - 10/22/2020    tube has opened / hypotony and AC shallowing and choroidals    2. BDR w/ ME    Sees Mazzulla    S/P avastin ou    S/P laser ou    Waiting for approval for ozurdex     3. NS / cortical cat OU   Monitor     PLAN   Good HVF    nl RNFL ou   IOP ??/ 14  On cosopt os for IOP of 33 os on 4/14/2020 (good resp 34-->24)   + ozurdex IOP elevation - got ozurdex os 2/2020 and IOP up to 34 os on     If IOP goes up os can add brimonidine back       Phaco / IOL + BVG OS Date: 10/21/2020 - PCB00 21.5  POW # 6.5  - phaco/IOL   Tube opened 12/5/20 -- now with shallow chamber and choroidals 12/7/20  Pred Acetate - increase to 6x/day - switch to durezol - [facility] - [facility] $60  Continue atropine BID - increase to tid   STOP cosopt OS  Continue OFF brimonidine OS  Shield at night  Glasses day  No lifting, no bending, no eye rubbing for 2-3 more weeks  Unable to get Medrol dose pack 2/2 diabetic    Cont cosopt bid od only     12/8/20  Still very shallow with IK touch - not dilating well - formed cnetrally - no IOL / cornea touch     12/10/2020   Still shallow / nearly flat  with iris / cornea touch with tube enveloped by iris   B scan - + choroidals / not hemorrhagic   yag to cyclitic pupillary membrane   Attempted yag to post capsule - unable to perform 2/2 poor visualization  attempted laser PI - unable to perform as the iris is appositional to the cornea      yag os / cyclitic membrane (12/10/2020)   Power 4-6  # shots 60  Power 333    12/11    AC has reformed  -    IOP 20    +fibrin in AC    Tube tip is embedded in the iris - occluded     PLAN  Cont same meds   durezl 6-7 x day   Atropine tid     F/U - Tuesday - check to se if choroidals are gone / check AC depth and reaction     - may need to create a laser PI and use yag to try  and clear  Tip of tube in future

## 2020-12-15 ENCOUNTER — OFFICE VISIT (OUTPATIENT)
Dept: OPHTHALMOLOGY | Facility: CLINIC | Age: 70
End: 2020-12-15
Payer: MEDICARE

## 2020-12-15 DIAGNOSIS — Z96.1 PSEUDOPHAKIA, LEFT EYE: ICD-10-CM

## 2020-12-15 DIAGNOSIS — Z48.89 ENCOUNTER FOR POSTOPERATIVE CARE: Primary | ICD-10-CM

## 2020-12-15 DIAGNOSIS — H40.042 STEROID RESPONDERS, LEFT: ICD-10-CM

## 2020-12-15 DIAGNOSIS — H21.81 INTRAOPERATIVE FLOPPY IRIS SYNDROME (IFIS): ICD-10-CM

## 2020-12-15 DIAGNOSIS — H57.9 SHALLOW ANTERIOR CHAMBER OF EYE: ICD-10-CM

## 2020-12-15 DIAGNOSIS — H21.42 PUPILLARY MEMBRANE OF LEFT EYE: ICD-10-CM

## 2020-12-15 DIAGNOSIS — Z96.89 HISTORY OF GLAUCOMA TUBE SHUNT PROCEDURE: ICD-10-CM

## 2020-12-15 DIAGNOSIS — H40.1132 PRIMARY OPEN ANGLE GLAUCOMA (POAG) OF BOTH EYES, MODERATE STAGE: ICD-10-CM

## 2020-12-15 DIAGNOSIS — E11.3313 CONTROLLED TYPE 2 DIABETES MELLITUS WITH BOTH EYES AFFECTED BY MODERATE NONPROLIFERATIVE RETINOPATHY AND MACULAR EDEMA, WITHOUT LONG-TERM CURRENT USE OF INSULIN: ICD-10-CM

## 2020-12-15 PROCEDURE — 99999 PR PBB SHADOW E&M-EST. PATIENT-LVL III: CPT | Mod: PBBFAC,HCNC,, | Performed by: OPHTHALMOLOGY

## 2020-12-15 PROCEDURE — 99024 PR POST-OP FOLLOW-UP VISIT: ICD-10-PCS | Mod: HCNC,S$GLB,, | Performed by: OPHTHALMOLOGY

## 2020-12-15 PROCEDURE — 3288F PR FALLS RISK ASSESSMENT DOCUMENTED: ICD-10-PCS | Mod: HCNC,CPTII,S$GLB, | Performed by: OPHTHALMOLOGY

## 2020-12-15 PROCEDURE — 1125F AMNT PAIN NOTED PAIN PRSNT: CPT | Mod: HCNC,S$GLB,, | Performed by: OPHTHALMOLOGY

## 2020-12-15 PROCEDURE — 99024 POSTOP FOLLOW-UP VISIT: CPT | Mod: HCNC,S$GLB,, | Performed by: OPHTHALMOLOGY

## 2020-12-15 PROCEDURE — 99999 PR PBB SHADOW E&M-EST. PATIENT-LVL III: ICD-10-PCS | Mod: PBBFAC,HCNC,, | Performed by: OPHTHALMOLOGY

## 2020-12-15 PROCEDURE — 1101F PR PT FALLS ASSESS DOC 0-1 FALLS W/OUT INJ PAST YR: ICD-10-PCS | Mod: HCNC,CPTII,S$GLB, | Performed by: OPHTHALMOLOGY

## 2020-12-15 PROCEDURE — 3288F FALL RISK ASSESSMENT DOCD: CPT | Mod: HCNC,CPTII,S$GLB, | Performed by: OPHTHALMOLOGY

## 2020-12-15 PROCEDURE — 1125F PR PAIN SEVERITY QUANTIFIED, PAIN PRESENT: ICD-10-PCS | Mod: HCNC,S$GLB,, | Performed by: OPHTHALMOLOGY

## 2020-12-15 PROCEDURE — 1101F PT FALLS ASSESS-DOCD LE1/YR: CPT | Mod: HCNC,CPTII,S$GLB, | Performed by: OPHTHALMOLOGY

## 2020-12-15 RX ORDER — ATROPINE SULFATE 10 MG/ML
1 SOLUTION/ DROPS OPHTHALMIC 2 TIMES DAILY
Qty: 1 BOTTLE | Refills: 1 | Status: SHIPPED | OUTPATIENT
Start: 2020-12-15 | End: 2022-08-05

## 2020-12-15 RX ORDER — DUREZOL 0.5 MG/ML
1 EMULSION OPHTHALMIC 4 TIMES DAILY
Qty: 5 ML | Refills: 1 | Status: SHIPPED | OUTPATIENT
Start: 2020-12-15 | End: 2021-04-26 | Stop reason: ALTCHOICE

## 2020-12-15 NOTE — PROGRESS NOTES
HPI     Post-op Evaluation      Additional comments: Pt c/o itchy FB sensation OS               Comments     -S/p Yag to Cyclitic Membrane OS 12/10/20  -S/p Combined OS (Complex Phaco and Baerveld) 10/21/20    Pt states that he feels something sticking his left eye causing it be be   itchy and irritated. Pt states his vision is still foggy and has slight   pain.    MEDS:  Durezol 6x's OS  Atropine TID OS  Cosopt BID OD  AT's PRN OU            Last edited by Brittany Villavicencio MA on 12/15/2020  2:52 PM. (History)              Assessment /Plan     For exam results, see Encounter Report.    Encounter for postoperative care    Shallow anterior chamber of eye    Primary open angle glaucoma (POAG) of both eyes, moderate stage    Steroid responders, left    Pseudophakia, left eye    History of glaucoma tube shunt procedure    Pupillary membrane of left eye    Controlled type 2 diabetes mellitus with both eyes affected by moderate nonproliferative retinopathy and macular edema, without long-term current use of insulin    Intraoperative floppy iris syndrome (IFIS)           Glaucoma (type and duration)    Suspect - followed at [facility] since 2007    First HVF   2007   First photos   2009   Treatment / Drops started   none           Family history    Neg (+for DR - mom)         Glaucoma meds    None         H/O adverse rxn to glaucoma drops    none        LASERS    Laser for DR // none for glaucoma         GLAUCOMA SURGERIES    none        OTHER EYE SURGERIES    None         CDR    (( by fundus photos 0.5 / 0.6 )         Tbase    15-25 / 15-31  (but ? If the high IOP's are post injection - had injection on those days and only 1 IOP recorded for each eye)         Tmax    24 od (11/6/2018)  - ?? Post injection // /34os( 4/14/2020) - ? Post injection            Ttarget    ?             HVF    4 test 2007 to  2009 - full od // full os        Gonio    +3 ou        CCT    518/525        OCT    1 test 2019/ to 2019/ - RNFL WNL-od //  WNL-Os---OCT MAc with DME OS        HRT    1 test 2009 to 2009 - MR - nl od // nl os        Disc photos    Fundus photos 2009, 2017, 2018    - Ttoday 17/15  - Test done today  F/u  hypotony and choroidals after BV opened - Post  - op phaco/IOL os // BGI w/ slits os - 10/22/2020    tube has opened / hypotony and AC shallowing and choroidals    2. BDR w/ ME    Sees Mazzulla    S/P avastin ou    S/P laser ou    Waiting for approval for ozurdex     3. NS / cortical cat OU   Monitor     PLAN   Good HVF    nl RNFL ou   IOP ??/ 14  On cosopt os for IOP of 33 os on 4/14/2020 (good resp 34-->24)   + ozurdex IOP elevation - got ozurdex os 2/2020 and IOP up to 34 os on     If IOP goes up os can add brimonidine back       Phaco / IOL + BVG OS Date: 10/21/2020 - PCB00 21.5  POW # 6.5  - phaco/IOL   Tube opened 12/5/20 -- now with shallow chamber and choroidals 12/7/20   durezol - [facility] - [facility] $60 - decrease to qid    atropine - decrease to bid   Choroidals - not seen 12/15/2020 - but poor view     Cont cosopt bid od only     12/8/20  Still very shallow with IK touch - not dilating well - formed cnetrally - no IOL / cornea touch     12/10/2020   Still shallow / nearly flat  with iris / cornea touch with tube enveloped by iris   B scan - + choroidals / not hemorrhagic   yag to cyclitic pupillary membrane   Attempted yag to post capsule - unable to perform 2/2 poor visualization  attempted laser PI - unable to perform as the iris is appositional to the cornea    yag os / cyclitic membrane (12/10/2020)   Power 4-6  # shots 60  Power 333    12/11    AC has reformed  -    IOP 20    +fibrin in AC    Tube tip is embedded in the iris - occluded     12/15/2020   AC still deep   + fibrin touching descemets - cornea clearing but thick   Tube engulfed by iris   No choroidals seen     PLAN  Cont same meds   durezl 6-7 x day   Atropine tid     F/U - 1 week - sooner prn    - check to se if choroidals are gone / check AC depth and  reaction     - may need to create a laser PI and use yag to try and clear  Tip of tube in future   randa 128 may be helpful in future   If fibrin will not clear - consider TPA

## 2020-12-23 ENCOUNTER — OFFICE VISIT (OUTPATIENT)
Dept: OPHTHALMOLOGY | Facility: CLINIC | Age: 70
End: 2020-12-23
Payer: MEDICARE

## 2020-12-23 DIAGNOSIS — Z48.89 ENCOUNTER FOR POSTOPERATIVE CARE: Primary | ICD-10-CM

## 2020-12-23 DIAGNOSIS — Z96.89 HISTORY OF GLAUCOMA TUBE SHUNT PROCEDURE: ICD-10-CM

## 2020-12-23 DIAGNOSIS — H40.1132 PRIMARY OPEN ANGLE GLAUCOMA (POAG) OF BOTH EYES, MODERATE STAGE: ICD-10-CM

## 2020-12-23 DIAGNOSIS — H21.42 PUPILLARY MEMBRANE OF LEFT EYE: ICD-10-CM

## 2020-12-23 DIAGNOSIS — H57.9 SHALLOW ANTERIOR CHAMBER OF EYE: ICD-10-CM

## 2020-12-23 DIAGNOSIS — Z96.1 PSEUDOPHAKIA, LEFT EYE: ICD-10-CM

## 2020-12-23 DIAGNOSIS — H40.042 STEROID RESPONDERS, LEFT: ICD-10-CM

## 2020-12-23 PROCEDURE — 3288F FALL RISK ASSESSMENT DOCD: CPT | Mod: HCNC,CPTII,S$GLB, | Performed by: OPHTHALMOLOGY

## 2020-12-23 PROCEDURE — 99024 POSTOP FOLLOW-UP VISIT: CPT | Mod: HCNC,S$GLB,, | Performed by: OPHTHALMOLOGY

## 2020-12-23 PROCEDURE — 3288F PR FALLS RISK ASSESSMENT DOCUMENTED: ICD-10-PCS | Mod: HCNC,CPTII,S$GLB, | Performed by: OPHTHALMOLOGY

## 2020-12-23 PROCEDURE — 1101F PR PT FALLS ASSESS DOC 0-1 FALLS W/OUT INJ PAST YR: ICD-10-PCS | Mod: HCNC,CPTII,S$GLB, | Performed by: OPHTHALMOLOGY

## 2020-12-23 PROCEDURE — 99999 PR PBB SHADOW E&M-EST. PATIENT-LVL III: ICD-10-PCS | Mod: PBBFAC,HCNC,, | Performed by: OPHTHALMOLOGY

## 2020-12-23 PROCEDURE — 1125F PR PAIN SEVERITY QUANTIFIED, PAIN PRESENT: ICD-10-PCS | Mod: HCNC,S$GLB,, | Performed by: OPHTHALMOLOGY

## 2020-12-23 PROCEDURE — 99024 PR POST-OP FOLLOW-UP VISIT: ICD-10-PCS | Mod: HCNC,S$GLB,, | Performed by: OPHTHALMOLOGY

## 2020-12-23 PROCEDURE — 1125F AMNT PAIN NOTED PAIN PRSNT: CPT | Mod: HCNC,S$GLB,, | Performed by: OPHTHALMOLOGY

## 2020-12-23 PROCEDURE — 99999 PR PBB SHADOW E&M-EST. PATIENT-LVL III: CPT | Mod: PBBFAC,HCNC,, | Performed by: OPHTHALMOLOGY

## 2020-12-23 PROCEDURE — 1101F PT FALLS ASSESS-DOCD LE1/YR: CPT | Mod: HCNC,CPTII,S$GLB, | Performed by: OPHTHALMOLOGY

## 2020-12-23 NOTE — PROGRESS NOTES
HPI     Post-op Evaluation     Comments: Pt states that left eye is sore and tender this morning              Comments     -S/p Yag to Cyclitic Membrane OS 12/10/20  -S/p Combined OS (Complex Phaco and Baerveld) 10/21/20    Had some pain OD with tenderness to the touch on the eyelid.     MEDS:  Durezol QID OS  Atropine BID OS  Cosopt BID OD  AT's PRN OU            Last edited by Dagmar Farris MD on 12/23/2020 12:01 PM. (History)            Assessment /Plan     For exam results, see Encounter Report.    Encounter for postoperative care    Shallow anterior chamber of eye    Primary open angle glaucoma (POAG) of both eyes, moderate stage    Steroid responders, left    Pseudophakia, left eye    History of glaucoma tube shunt procedure    Pupillary membrane of left eye           Glaucoma (type and duration)    Suspect - followed at [facility] since 2007    First HVF   2007   First photos   2009   Treatment / Drops started   none           Family history    Neg (+for DR - mom)         Glaucoma meds    None         H/O adverse rxn to glaucoma drops    none        LASERS    Laser for DR // none for glaucoma         GLAUCOMA SURGERIES    none        OTHER EYE SURGERIES    None         CDR    (( by fundus photos 0.5 / 0.6 )         Tbase    15-25 / 15-31  (but ? If the high IOP's are post injection - had injection on those days and only 1 IOP recorded for each eye)         Tmax    24 od (11/6/2018)  - ?? Post injection // /34os( 4/14/2020) - ? Post injection            Ttarget    ?             HVF    4 test 2007 to  2009 - full od // full os        Gonio    +3 ou        CCT    518/525        OCT    1 test 2019/ to 2019/ - RNFL WNL-od // WNL-Os---OCT MAc with DME OS        HRT    1 test 2009 to 2009 - MR - nl od // nl os        Disc photos    Fundus photos 2009, 2017, 2018    - Ttoday 17/15  - Test done today  F/u  hypotony and choroidals after BV opened - Post  - op phaco/IOL os // BGI w/ slits os - 10/22/2020    tube has  opened / hypotony and AC shallowing and choroidals    2. BDR w/ ME    Sees Mazzulla    S/P avastin ou    S/P laser ou    Waiting for approval for ozurdex     3. NS / cortical cat OU   Monitor     PLAN   Good HVF    nl RNFL ou   IOP ??/ 14  On cosopt os for IOP of 33 os on 4/14/2020 (good resp 34-->24)   + ozurdex IOP elevation - got ozurdex os 2/2020 and IOP up to 34 os on     If IOP goes up os can add brimonidine back       Phaco / IOL + BVG OS Date: 10/21/2020 - PCB00 21.5  POW # 6.5  - phaco/IOL   Tube opened 12/5/20 -- now with shallow chamber and choroidals 12/7/20   durezol - [facility] - [facility] $60 - decrease to qid    atropine - decrease to bid   Choroidals - not seen 12/15/2020 - but poor view     Cont cosopt bid od only     12/8/20  Still very shallow with IK touch - not dilating well - formed cnetrally - no IOL / cornea touch     12/10/2020   Still shallow / nearly flat  with iris / cornea touch with tube enveloped by iris   B scan - + choroidals / not hemorrhagic   yag to cyclitic pupillary membrane   Attempted yag to post capsule - unable to perform 2/2 poor visualization  attempted laser PI - unable to perform as the iris is appositional to the cornea    yag os / cyclitic membrane (12/10/2020)   Power 4-6  # shots 60  Power 333    12/11    AC has reformed  -    IOP 20    +fibrin in AC    Tube tip is embedded in the iris - occluded     12/15/2020   AC still deep   + fibrin touching descemets - cornea clearing but thick   Tube engulfed by iris   No choroidals seen     12/23/20  AC shallow again, but deep-mckay centrally  Fibrin touching descemets - cornea clear centrally but d-folds temporally  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    12/23/2020  Pre - procedure dx - shallow AC and hypotony post GDD os   Post procedure dx - same  Procedure - AC reformation w/ Healon injected into AC at slit lamp   Eye preped with betadine and  proparacaine and vigamox   AC deepened / IOP increased   Pt tolerata procedure well - will follow up with Dr Farris in the AM     Cont same meds   durezl 4 x day   Atropine bid  vigamox qid     F/u in AM with Dr Farris - if shallow again can reform with more healon // if continues to have low IOP and choroidals and shallow AC, even after healon injection,  - may need to have the tube re-tied off with a 6 or 7 -0 vicryl - and consider a surgical PI at the same time

## 2020-12-23 NOTE — PROGRESS NOTES
HPI     Post-op Evaluation      Additional comments: Pt states that left eye is sore and tender this   morning              Comments     -S/p Yag to Cyclitic Membrane OS 12/10/20  -S/p Combined OS (Complex Phaco and Baerveld) 10/21/20    Had some pain OD with tenderness to the touch on the eyelid.     MEDS:  Durezol QID OS  Atropine BID OS  Cosopt BID OD  AT's PRN OU            Last edited by Dagmar Farris MD on 12/23/2020 12:01 PM. (History)            Assessment /Plan     For exam results, see Encounter Report.    Encounter for postoperative care    Shallow anterior chamber of eye    Primary open angle glaucoma (POAG) of both eyes, moderate stage    Steroid responders, left    Pseudophakia, left eye    History of glaucoma tube shunt procedure    Pupillary membrane of left eye      ***

## 2020-12-24 ENCOUNTER — OFFICE VISIT (OUTPATIENT)
Dept: OPHTHALMOLOGY | Facility: CLINIC | Age: 70
End: 2020-12-24
Payer: MEDICARE

## 2020-12-24 DIAGNOSIS — Z96.1 PSEUDOPHAKIA, LEFT EYE: ICD-10-CM

## 2020-12-24 DIAGNOSIS — H21.42 PUPILLARY MEMBRANE OF LEFT EYE: ICD-10-CM

## 2020-12-24 DIAGNOSIS — E11.3313 TYPE 2 DIABETES MELLITUS WITH BOTH EYES AFFECTED BY MODERATE NONPROLIFERATIVE RETINOPATHY AND MACULAR EDEMA, WITHOUT LONG-TERM CURRENT USE OF INSULIN: ICD-10-CM

## 2020-12-24 DIAGNOSIS — H57.9 SHALLOW ANTERIOR CHAMBER OF EYE: ICD-10-CM

## 2020-12-24 DIAGNOSIS — H40.1132 PRIMARY OPEN ANGLE GLAUCOMA (POAG) OF BOTH EYES, MODERATE STAGE: ICD-10-CM

## 2020-12-24 DIAGNOSIS — Z48.89 ENCOUNTER FOR POSTOPERATIVE CARE: Primary | ICD-10-CM

## 2020-12-24 DIAGNOSIS — Z96.89 HISTORY OF GLAUCOMA TUBE SHUNT PROCEDURE: ICD-10-CM

## 2020-12-24 DIAGNOSIS — H21.81 INTRAOPERATIVE FLOPPY IRIS SYNDROME (IFIS): ICD-10-CM

## 2020-12-24 DIAGNOSIS — H40.042 STEROID RESPONDERS, LEFT: ICD-10-CM

## 2020-12-24 PROCEDURE — 92012 PR EYE EXAM, EST PATIENT,INTERMED: ICD-10-PCS | Mod: HCNC,S$GLB,, | Performed by: STUDENT IN AN ORGANIZED HEALTH CARE EDUCATION/TRAINING PROGRAM

## 2020-12-24 PROCEDURE — 1101F PR PT FALLS ASSESS DOC 0-1 FALLS W/OUT INJ PAST YR: ICD-10-PCS | Mod: HCNC,CPTII,S$GLB, | Performed by: STUDENT IN AN ORGANIZED HEALTH CARE EDUCATION/TRAINING PROGRAM

## 2020-12-24 PROCEDURE — 99999 PR PBB SHADOW E&M-EST. PATIENT-LVL III: CPT | Mod: PBBFAC,HCNC,, | Performed by: STUDENT IN AN ORGANIZED HEALTH CARE EDUCATION/TRAINING PROGRAM

## 2020-12-24 PROCEDURE — 1101F PT FALLS ASSESS-DOCD LE1/YR: CPT | Mod: HCNC,CPTII,S$GLB, | Performed by: STUDENT IN AN ORGANIZED HEALTH CARE EDUCATION/TRAINING PROGRAM

## 2020-12-24 PROCEDURE — 92012 INTRM OPH EXAM EST PATIENT: CPT | Mod: HCNC,S$GLB,, | Performed by: STUDENT IN AN ORGANIZED HEALTH CARE EDUCATION/TRAINING PROGRAM

## 2020-12-24 PROCEDURE — 3288F PR FALLS RISK ASSESSMENT DOCUMENTED: ICD-10-PCS | Mod: HCNC,CPTII,S$GLB, | Performed by: STUDENT IN AN ORGANIZED HEALTH CARE EDUCATION/TRAINING PROGRAM

## 2020-12-24 PROCEDURE — 99999 PR PBB SHADOW E&M-EST. PATIENT-LVL III: ICD-10-PCS | Mod: PBBFAC,HCNC,, | Performed by: STUDENT IN AN ORGANIZED HEALTH CARE EDUCATION/TRAINING PROGRAM

## 2020-12-24 PROCEDURE — 3288F FALL RISK ASSESSMENT DOCD: CPT | Mod: HCNC,CPTII,S$GLB, | Performed by: STUDENT IN AN ORGANIZED HEALTH CARE EDUCATION/TRAINING PROGRAM

## 2020-12-24 PROCEDURE — 1126F AMNT PAIN NOTED NONE PRSNT: CPT | Mod: HCNC,S$GLB,, | Performed by: STUDENT IN AN ORGANIZED HEALTH CARE EDUCATION/TRAINING PROGRAM

## 2020-12-24 PROCEDURE — 1126F PR PAIN SEVERITY QUANTIFIED, NO PAIN PRESENT: ICD-10-PCS | Mod: HCNC,S$GLB,, | Performed by: STUDENT IN AN ORGANIZED HEALTH CARE EDUCATION/TRAINING PROGRAM

## 2020-12-24 NOTE — PROGRESS NOTES
HPI     DLS: 12/23/20    Pt reports pain gone. Vision the same. He is scared his vision will never   return. He is reluctant to have anything done to OD.     Pt here for -S/p Healon to AC OS 12/24/20  Pt here for -S/p Yag to Cyclitic Membrane OS 12/10/20   -S/p Combined OS (Complex Phaco and Baerveld) 10/21/20   Pt states OS isn't tender to touch like it was yesterday.     MEDS:   Durezol QID OS   Atropine BID OS   Cosopt BID OD  Vigamox QID OS   AT's PRN OU     1. Glaucoma Suspect   2. Type 2 DM   3. BDR with ME OU   4. NS OU     Last edited by Dagmar Farris MD on 12/24/2020  9:27 AM. (History)            Assessment /Plan     For exam results, see Encounter Report.    Encounter for postoperative care    Shallow anterior chamber of eye    Primary open angle glaucoma (POAG) of both eyes, moderate stage    Steroid responders, left    Pseudophakia, left eye    History of glaucoma tube shunt procedure    Pupillary membrane of left eye    Intraoperative floppy iris syndrome (IFIS)    Type 2 diabetes mellitus with both eyes affected by moderate nonproliferative retinopathy and macular edema, without long-term current use of insulin           Glaucoma (type and duration)    Suspect - followed at [facility] since 2007    First HVF   2007   First photos   2009   Treatment / Drops started   none           Family history    Neg (+for DR - mom)         Glaucoma meds    None         H/O adverse rxn to glaucoma drops    none        LASERS    Laser for DR // none for glaucoma         GLAUCOMA SURGERIES    none        OTHER EYE SURGERIES    None         CDR    (( by fundus photos 0.5 / 0.6 )         Tbase    15-25 / 15-31  (but ? If the high IOP's are post injection - had injection on those days and only 1 IOP recorded for each eye)         Tmax    24 od (11/6/2018)  - ?? Post injection // /34os( 4/14/2020) - ? Post injection            Ttarget    ?             HVF    4 test 2007 to  2009 - full od // full os        Gonio    +3  ou        CCT    518/525        OCT    1 test 2019/ to 2019/ - RNFL WNL-od // WNL-Os---OCT MAc with DME OS        HRT    1 test 2009 to 2009 - MR - nl od // nl os        Disc photos    Fundus photos 2009, 2017, 2018    - Ttoday 17/15  - Test done today  F/u  hypotony and choroidals after BV opened - Post  - op phaco/IOL os // BGI w/ slits os - 10/22/2020    tube has opened / hypotony and AC shallowing and choroidals    2. BDR w/ ME    Sees Mazzulla    S/P avastin ou    S/P laser ou    Waiting for approval for ozurdex     3. NS / cortical cat OU   Monitor     PLAN   Good HVF    nl RNFL ou   IOP ??/ 14  On cosopt os for IOP of 33 os on 4/14/2020 (good resp 34-->24)   + ozurdex IOP elevation - got ozurdex os 2/2020 and IOP up to 34 os on     If IOP goes up os can add brimonidine back       Phaco / IOL + BVG OS Date: 10/21/2020 - PCB00 21.5  POW # 6.5  - phaco/IOL   Tube opened 12/5/20 -- now with shallow chamber and choroidals 12/7/20   durezol - [facility] - [facility] $60 - decrease to qid    atropine - decrease to bid   Choroidals - not seen 12/15/2020 - but poor view     Cont cosopt bid od only     12/8/20  Still very shallow with IK touch - not dilating well - formed cnetrally - no IOL / cornea touch     12/10/2020   Still shallow / nearly flat  with iris / cornea touch with tube enveloped by iris   B scan - + choroidals / not hemorrhagic   yag to cyclitic pupillary membrane   Attempted yag to post capsule - unable to perform 2/2 poor visualization  attempted laser PI - unable to perform as the iris is appositional to the cornea    yag os / cyclitic membrane (12/10/2020)   Power 4-6  # shots 60  Power 333    12/11    AC has reformed  -    IOP 20    +fibrin in AC    Tube tip is embedded in the iris - occluded     12/15/2020   AC still deep   + fibrin touching descemets - cornea clearing but thick   Tube engulfed by iris   No choroidals seen     12/23/20  AC shallow again, but deep-mckay centrally  Fibrin touching  descemets - cornea clear centrally but d-folds temporally  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    12/23/2020  Pre - procedure dx - shallow AC and hypotony post GDD os   Post procedure dx - same  Procedure - AC reformation w/ Healon injected into AC at slit lamp   Eye preped with betadine and proparacaine and vigamox   AC deepened / IOP increased   Pt tolerata procedure well - will follow up with Dr Farris in the AM     12/24/2020  VA still 20/200; IOP low again at 10 mmHg, however AC is formed and no iris-endothelial ahesions or exudative fibinous response  Still with healon in AC  Choroidals resolved   Still with dot-blot heme -- decompression retinopathy  Reassured patient, this is still within the realm of healing response  Encouraged him to call and seek medical attention sooner should pain return    Cont same meds   durezl 4 x day   Atropine bid  vigamox qid     F/u in Tuesday at 9 AM with Dr Farris     - if shallow again can reform with more healon // if continues to have low IOP and choroidals and shallow AC, even after healon injection,  - may need to have the tube re-tied off with a 6 or 7 -0 vicryl - and consider a surgical PI at the same time

## 2020-12-29 ENCOUNTER — OFFICE VISIT (OUTPATIENT)
Dept: OPHTHALMOLOGY | Facility: CLINIC | Age: 70
End: 2020-12-29
Payer: MEDICARE

## 2020-12-29 DIAGNOSIS — H40.042 STEROID RESPONDERS, LEFT: ICD-10-CM

## 2020-12-29 DIAGNOSIS — H40.1132 PRIMARY OPEN ANGLE GLAUCOMA (POAG) OF BOTH EYES, MODERATE STAGE: ICD-10-CM

## 2020-12-29 DIAGNOSIS — Z48.89 ENCOUNTER FOR POSTOPERATIVE CARE: Primary | ICD-10-CM

## 2020-12-29 DIAGNOSIS — Z96.1 PSEUDOPHAKIA, LEFT EYE: ICD-10-CM

## 2020-12-29 DIAGNOSIS — E11.3313 TYPE 2 DIABETES MELLITUS WITH BOTH EYES AFFECTED BY MODERATE NONPROLIFERATIVE RETINOPATHY AND MACULAR EDEMA, WITHOUT LONG-TERM CURRENT USE OF INSULIN: ICD-10-CM

## 2020-12-29 DIAGNOSIS — H21.81 INTRAOPERATIVE FLOPPY IRIS SYNDROME (IFIS): ICD-10-CM

## 2020-12-29 DIAGNOSIS — Z96.89 HISTORY OF GLAUCOMA TUBE SHUNT PROCEDURE: ICD-10-CM

## 2020-12-29 DIAGNOSIS — H57.9 SHALLOW ANTERIOR CHAMBER OF EYE: ICD-10-CM

## 2020-12-29 PROCEDURE — 99024 POSTOP FOLLOW-UP VISIT: CPT | Mod: HCNC,S$GLB,, | Performed by: STUDENT IN AN ORGANIZED HEALTH CARE EDUCATION/TRAINING PROGRAM

## 2020-12-29 PROCEDURE — 99999 PR PBB SHADOW E&M-EST. PATIENT-LVL I: ICD-10-PCS | Mod: PBBFAC,HCNC,, | Performed by: STUDENT IN AN ORGANIZED HEALTH CARE EDUCATION/TRAINING PROGRAM

## 2020-12-29 PROCEDURE — 99024 PR POST-OP FOLLOW-UP VISIT: ICD-10-PCS | Mod: HCNC,S$GLB,, | Performed by: STUDENT IN AN ORGANIZED HEALTH CARE EDUCATION/TRAINING PROGRAM

## 2020-12-29 PROCEDURE — 99999 PR PBB SHADOW E&M-EST. PATIENT-LVL I: CPT | Mod: PBBFAC,HCNC,, | Performed by: STUDENT IN AN ORGANIZED HEALTH CARE EDUCATION/TRAINING PROGRAM

## 2020-12-29 NOTE — PROGRESS NOTES
HPI     DLS: 12/24/2020  Pt states doing well. Starting to feel better. Vision still fuzzy. Not   lifting anything heavy right now, but wife reports that he was lifting   heavy things and was rubbing the eye.       S/p Healon to AC OS 12/24/20   Pt here for -S/p Yag to Cyclitic Membrane OS 12/10/20   -S/p Combined OS (Complex Phaco and [place]) 10/21/20   Pt states OS isn't tender to touch like it was yesterday.     MEDS:   Durezol QID OS   Atropine BID OS   Cosopt BID OD   Vigamox QID OS   AT's PRN OU     1. Glaucoma Suspect   2. Type 2 DM   3. BDR with ME OU   4. NS OU     Last edited by Dagmar Farris MD on 12/29/2020  9:21 AM. (History)            Assessment /Plan     For exam results, see Encounter Report.    Encounter for postoperative care    Shallow anterior chamber of eye    Primary open angle glaucoma (POAG) of both eyes, moderate stage    Steroid responders, left    Pseudophakia, left eye    History of glaucoma tube shunt procedure    Intraoperative floppy iris syndrome (IFIS)    Type 2 diabetes mellitus with both eyes affected by moderate nonproliferative retinopathy and macular edema, without long-term current use of insulin           Glaucoma (type and duration)    Suspect - followed at [facility] since 2007    First HVF   2007   First photos   2009   Treatment / Drops started   none           Family history    Neg (+for DR - mom)         Glaucoma meds    None         H/O adverse rxn to glaucoma drops    none        LASERS    Laser for DR // none for glaucoma         GLAUCOMA SURGERIES    none        OTHER EYE SURGERIES    None         CDR    (( by fundus photos 0.5 / 0.6 )         Tbase    15-25 / 15-31  (but ? If the high IOP's are post injection - had injection on those days and only 1 IOP recorded for each eye)         Tmax    24 od (11/6/2018)  - ?? Post injection // /34os( 4/14/2020) - ? Post injection            Ttarget    ?             HVF    4 test 2007 to  2009 - full od // full os         Gonio    +3 ou        CCT    518/525        OCT    1 test 2019/ to 2019/ - RNFL WNL-od // WNL-Os---OCT MAc with DME OS        HRT    1 test 2009 to 2009 - MR - nl od // nl os        Disc photos    Fundus photos 2009, 2017, 2018    - Ttoday 14/16  - Test done today  F/u  hypotony and choroidals after BV opened - Post  - op phaco/IOL os // BGI w/ slits os - 10/22/2020    tube has opened / hypotony and AC shallowing and choroidals    2. BDR w/ ME    Sees [facility]    S/P avastin ou    S/P laser ou    Waiting for approval for ozurdex     3. NS / cortical cat OU   Monitor     PLAN   Good HVF    nl RNFL ou   IOP ??/ 14  On cosopt os for IOP of 33 os on 4/14/2020 (good resp 34-->24)   + ozurdex IOP elevation - got ozurdex os 2/2020 and IOP up to 34 os on     If IOP goes up os can add brimonidine back       Phaco / IOL + BVG OS Date: 10/21/2020 - PCB00 21.5  POW # 6.5  - phaco/IOL   Tube opened 12/5/20 -- now with shallow chamber and choroidals 12/7/20   durezol - [facility] - [facility] $60 - decrease to qid    atropine - decrease to bid   Choroidals - not seen 12/15/2020 - but poor view     Cont cosopt bid od only     12/8/20  Still very shallow with IK touch - not dilating well - formed cnetrally - no IOL / cornea touch     12/10/2020   Still shallow / nearly flat  with iris / cornea touch with tube enveloped by iris   B scan - + choroidals / not hemorrhagic   yag to cyclitic pupillary membrane   Attempted yag to post capsule - unable to perform 2/2 poor visualization  attempted laser PI - unable to perform as the iris is appositional to the cornea    yag os / cyclitic membrane (12/10/2020)   Power 4-6  # shots 60  Power 333    12/11    AC has reformed  -    IOP 20    +fibrin in AC    Tube tip is embedded in the iris - occluded     12/15/2020   AC still deep   + fibrin touching descemets - cornea clearing but thick   Tube engulfed by iris   No choroidals seen     12/23/20  AC shallow again, but deep-mckay centrally  Fibrin  touching descemets - cornea clear centrally but d-folds temporally  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    12/23/2020  Pre - procedure dx - shallow AC and hypotony post GDD os   Post procedure dx - same  Procedure - AC reformation w/ Healon injected into AC at slit lamp   Eye preped with betadine and proparacaine and vigamox   AC deepened / IOP increased   Pt tolerata procedure well - will follow up with Dr Farris in the AM     12/24/2020  VA still 20/200; IOP low again at 10 mmHg, however AC is formed and no iris-endothelial ahesions or exudative fibinous response  Still with healon in AC  Choroidals resolved   Still with dot-blot heme -- decompression retinopathy  Reassured patient, this is still within the realm of healing response  Encouraged him to call and seek medical attention sooner should pain return    12/29/20  VA improved one line, IOP great at 16 mmHg, and AC formed. Similar appearance to 12/24 visit.   Continues to have NO choroidals, but still with dot-blot heme   Encouraged no lifting, no eye rubbing    Cont same meds   durezl 4 x day   Atropine bid  vigamox qid     F/u in 1 week with Saleem    - if shallow again can reform with more healon // if continues to have low IOP and choroidals and shallow AC, even after healon injection,  - may need to have the tube re-tied off with a 6 or 7 -0 vicryl - and consider a surgical PI at the same time

## 2021-01-05 ENCOUNTER — OFFICE VISIT (OUTPATIENT)
Dept: OPHTHALMOLOGY | Facility: CLINIC | Age: 71
End: 2021-01-05
Payer: MEDICARE

## 2021-01-05 DIAGNOSIS — Z96.89 HISTORY OF GLAUCOMA TUBE SHUNT PROCEDURE: ICD-10-CM

## 2021-01-05 DIAGNOSIS — H21.42 PUPILLARY MEMBRANE OF LEFT EYE: ICD-10-CM

## 2021-01-05 DIAGNOSIS — Z48.89 ENCOUNTER FOR POSTOPERATIVE CARE: Primary | ICD-10-CM

## 2021-01-05 DIAGNOSIS — H40.042 STEROID RESPONDERS, LEFT: ICD-10-CM

## 2021-01-05 DIAGNOSIS — H40.1132 PRIMARY OPEN ANGLE GLAUCOMA (POAG) OF BOTH EYES, MODERATE STAGE: ICD-10-CM

## 2021-01-05 DIAGNOSIS — H21.81 INTRAOPERATIVE FLOPPY IRIS SYNDROME (IFIS): ICD-10-CM

## 2021-01-05 PROCEDURE — 1101F PT FALLS ASSESS-DOCD LE1/YR: CPT | Mod: HCNC,CPTII,S$GLB, | Performed by: OPHTHALMOLOGY

## 2021-01-05 PROCEDURE — 99024 POSTOP FOLLOW-UP VISIT: CPT | Mod: HCNC,S$GLB,, | Performed by: OPHTHALMOLOGY

## 2021-01-05 PROCEDURE — 1126F PR PAIN SEVERITY QUANTIFIED, NO PAIN PRESENT: ICD-10-PCS | Mod: HCNC,S$GLB,, | Performed by: OPHTHALMOLOGY

## 2021-01-05 PROCEDURE — 3288F FALL RISK ASSESSMENT DOCD: CPT | Mod: HCNC,CPTII,S$GLB, | Performed by: OPHTHALMOLOGY

## 2021-01-05 PROCEDURE — 1101F PR PT FALLS ASSESS DOC 0-1 FALLS W/OUT INJ PAST YR: ICD-10-PCS | Mod: HCNC,CPTII,S$GLB, | Performed by: OPHTHALMOLOGY

## 2021-01-05 PROCEDURE — 99999 PR PBB SHADOW E&M-EST. PATIENT-LVL III: CPT | Mod: PBBFAC,HCNC,, | Performed by: OPHTHALMOLOGY

## 2021-01-05 PROCEDURE — 3288F PR FALLS RISK ASSESSMENT DOCUMENTED: ICD-10-PCS | Mod: HCNC,CPTII,S$GLB, | Performed by: OPHTHALMOLOGY

## 2021-01-05 PROCEDURE — 1126F AMNT PAIN NOTED NONE PRSNT: CPT | Mod: HCNC,S$GLB,, | Performed by: OPHTHALMOLOGY

## 2021-01-05 PROCEDURE — 99024 PR POST-OP FOLLOW-UP VISIT: ICD-10-PCS | Mod: HCNC,S$GLB,, | Performed by: OPHTHALMOLOGY

## 2021-01-05 PROCEDURE — 99999 PR PBB SHADOW E&M-EST. PATIENT-LVL III: ICD-10-PCS | Mod: PBBFAC,HCNC,, | Performed by: OPHTHALMOLOGY

## 2021-01-05 RX ORDER — MOXIFLOXACIN 5 MG/ML
1 SOLUTION/ DROPS OPHTHALMIC 4 TIMES DAILY
COMMUNITY
Start: 2020-12-23 | End: 2021-01-21 | Stop reason: ALTCHOICE

## 2021-01-21 ENCOUNTER — OFFICE VISIT (OUTPATIENT)
Dept: OPHTHALMOLOGY | Facility: CLINIC | Age: 71
End: 2021-01-21
Payer: MEDICARE

## 2021-01-21 DIAGNOSIS — H21.42 PUPILLARY MEMBRANE OF LEFT EYE: ICD-10-CM

## 2021-01-21 DIAGNOSIS — H40.1132 PRIMARY OPEN ANGLE GLAUCOMA (POAG) OF BOTH EYES, MODERATE STAGE: ICD-10-CM

## 2021-01-21 DIAGNOSIS — Z96.89 HISTORY OF GLAUCOMA TUBE SHUNT PROCEDURE: ICD-10-CM

## 2021-01-21 DIAGNOSIS — E11.3313 TYPE 2 DIABETES MELLITUS WITH BOTH EYES AFFECTED BY MODERATE NONPROLIFERATIVE RETINOPATHY AND MACULAR EDEMA, WITHOUT LONG-TERM CURRENT USE OF INSULIN: ICD-10-CM

## 2021-01-21 DIAGNOSIS — H40.042 STEROID RESPONDERS, LEFT: ICD-10-CM

## 2021-01-21 DIAGNOSIS — H21.81 INTRAOPERATIVE FLOPPY IRIS SYNDROME (IFIS): ICD-10-CM

## 2021-01-21 DIAGNOSIS — H57.9 SHALLOW ANTERIOR CHAMBER OF EYE: ICD-10-CM

## 2021-01-21 DIAGNOSIS — Z96.1 PSEUDOPHAKIA, LEFT EYE: ICD-10-CM

## 2021-01-21 DIAGNOSIS — Z48.89 ENCOUNTER FOR POSTOPERATIVE CARE: Primary | ICD-10-CM

## 2021-01-21 PROCEDURE — 3288F FALL RISK ASSESSMENT DOCD: CPT | Mod: CPTII,S$GLB,, | Performed by: OPHTHALMOLOGY

## 2021-01-21 PROCEDURE — 92134 POSTERIOR SEGMENT OCT RETINA (OCULAR COHERENCE TOMOGRAPHY)-BOTH EYES: ICD-10-PCS | Mod: S$GLB,,, | Performed by: OPHTHALMOLOGY

## 2021-01-21 PROCEDURE — 1126F AMNT PAIN NOTED NONE PRSNT: CPT | Mod: S$GLB,,, | Performed by: OPHTHALMOLOGY

## 2021-01-21 PROCEDURE — 99999 PR PBB SHADOW E&M-EST. PATIENT-LVL III: CPT | Mod: PBBFAC,,, | Performed by: OPHTHALMOLOGY

## 2021-01-21 PROCEDURE — 1101F PT FALLS ASSESS-DOCD LE1/YR: CPT | Mod: CPTII,S$GLB,, | Performed by: OPHTHALMOLOGY

## 2021-01-21 PROCEDURE — 99499 RISK ADDL DX/OHS AUDIT: ICD-10-PCS | Mod: S$GLB,,, | Performed by: OPHTHALMOLOGY

## 2021-01-21 PROCEDURE — 1101F PR PT FALLS ASSESS DOC 0-1 FALLS W/OUT INJ PAST YR: ICD-10-PCS | Mod: CPTII,S$GLB,, | Performed by: OPHTHALMOLOGY

## 2021-01-21 PROCEDURE — 99499 UNLISTED E&M SERVICE: CPT | Mod: S$GLB,,, | Performed by: OPHTHALMOLOGY

## 2021-01-21 PROCEDURE — 3288F PR FALLS RISK ASSESSMENT DOCUMENTED: ICD-10-PCS | Mod: CPTII,S$GLB,, | Performed by: OPHTHALMOLOGY

## 2021-01-21 PROCEDURE — 92134 CPTRZ OPH DX IMG PST SGM RTA: CPT | Mod: S$GLB,,, | Performed by: OPHTHALMOLOGY

## 2021-01-21 PROCEDURE — 1126F PR PAIN SEVERITY QUANTIFIED, NO PAIN PRESENT: ICD-10-PCS | Mod: S$GLB,,, | Performed by: OPHTHALMOLOGY

## 2021-01-21 PROCEDURE — 99024 POSTOP FOLLOW-UP VISIT: CPT | Mod: S$GLB,,, | Performed by: OPHTHALMOLOGY

## 2021-01-21 PROCEDURE — 99999 PR PBB SHADOW E&M-EST. PATIENT-LVL III: ICD-10-PCS | Mod: PBBFAC,,, | Performed by: OPHTHALMOLOGY

## 2021-01-21 PROCEDURE — 99024 PR POST-OP FOLLOW-UP VISIT: ICD-10-PCS | Mod: S$GLB,,, | Performed by: OPHTHALMOLOGY

## 2021-01-26 ENCOUNTER — PROCEDURE VISIT (OUTPATIENT)
Dept: OPHTHALMOLOGY | Facility: CLINIC | Age: 71
End: 2021-01-26
Payer: MEDICARE

## 2021-01-26 DIAGNOSIS — E11.3313 CONTROLLED TYPE 2 DIABETES MELLITUS WITH BOTH EYES AFFECTED BY MODERATE NONPROLIFERATIVE RETINOPATHY AND MACULAR EDEMA, WITHOUT LONG-TERM CURRENT USE OF INSULIN: Primary | ICD-10-CM

## 2021-01-26 DIAGNOSIS — H25.11 NS (NUCLEAR SCLEROSIS), RIGHT: ICD-10-CM

## 2021-01-26 DIAGNOSIS — E11.3313 TYPE 2 DIABETES MELLITUS WITH BOTH EYES AFFECTED BY MODERATE NONPROLIFERATIVE RETINOPATHY AND MACULAR EDEMA, WITHOUT LONG-TERM CURRENT USE OF INSULIN: ICD-10-CM

## 2021-01-26 PROCEDURE — 92202 OPSCPY EXTND ON/MAC DRAW: CPT | Mod: S$GLB,,, | Performed by: OPHTHALMOLOGY

## 2021-01-26 PROCEDURE — 3051F HG A1C>EQUAL 7.0%<8.0%: CPT | Mod: CPTII,S$GLB,, | Performed by: OPHTHALMOLOGY

## 2021-01-26 PROCEDURE — 92235 FLUORESCEIN ANGRPH MLTIFRAME: CPT | Mod: S$GLB,,, | Performed by: OPHTHALMOLOGY

## 2021-01-26 PROCEDURE — 99214 OFFICE O/P EST MOD 30 MIN: CPT | Mod: S$GLB,,, | Performed by: OPHTHALMOLOGY

## 2021-01-26 PROCEDURE — 92202 PR OPHTHALMOSCOPY, EXT, W/DRAW OPTIC NERVE/MACULA, I&R, UNI/BI: ICD-10-PCS | Mod: S$GLB,,, | Performed by: OPHTHALMOLOGY

## 2021-01-26 PROCEDURE — 92134 POSTERIOR SEGMENT OCT RETINA (OCULAR COHERENCE TOMOGRAPHY)-BOTH EYES: ICD-10-PCS | Mod: S$GLB,,, | Performed by: OPHTHALMOLOGY

## 2021-01-26 PROCEDURE — 3051F PR MOST RECENT HEMOGLOBIN A1C LEVEL 7.0 - < 8.0%: ICD-10-PCS | Mod: CPTII,S$GLB,, | Performed by: OPHTHALMOLOGY

## 2021-01-26 PROCEDURE — 99214 PR OFFICE/OUTPT VISIT, EST, LEVL IV, 30-39 MIN: ICD-10-PCS | Mod: S$GLB,,, | Performed by: OPHTHALMOLOGY

## 2021-01-26 PROCEDURE — 92235 FLUORESCEIN ANGIOGRAPHY - OU - BOTH EYES: ICD-10-PCS | Mod: S$GLB,,, | Performed by: OPHTHALMOLOGY

## 2021-01-26 PROCEDURE — 92134 CPTRZ OPH DX IMG PST SGM RTA: CPT | Mod: S$GLB,,, | Performed by: OPHTHALMOLOGY

## 2021-01-26 RX ORDER — FLUORESCEIN 500 MG/ML
5 INJECTION INTRAVENOUS
Status: COMPLETED | OUTPATIENT
Start: 2021-01-26 | End: 2021-01-26

## 2021-01-26 RX ADMIN — FLUORESCEIN 500 MG: 500 INJECTION INTRAVENOUS at 02:01

## 2021-02-17 ENCOUNTER — TELEPHONE (OUTPATIENT)
Dept: INTERNAL MEDICINE | Facility: CLINIC | Age: 71
End: 2021-02-17

## 2021-02-17 DIAGNOSIS — E11.9 TYPE 2 DIABETES MELLITUS WITHOUT COMPLICATION, WITH LONG-TERM CURRENT USE OF INSULIN: Primary | ICD-10-CM

## 2021-02-17 DIAGNOSIS — Z79.4 TYPE 2 DIABETES MELLITUS WITHOUT COMPLICATION, WITH LONG-TERM CURRENT USE OF INSULIN: Primary | ICD-10-CM

## 2021-02-18 ENCOUNTER — LAB VISIT (OUTPATIENT)
Dept: LAB | Facility: HOSPITAL | Age: 71
End: 2021-02-18
Attending: STUDENT IN AN ORGANIZED HEALTH CARE EDUCATION/TRAINING PROGRAM
Payer: MEDICARE

## 2021-02-18 DIAGNOSIS — Z79.4 TYPE 2 DIABETES MELLITUS WITHOUT COMPLICATION, WITH LONG-TERM CURRENT USE OF INSULIN: ICD-10-CM

## 2021-02-18 DIAGNOSIS — E11.9 TYPE 2 DIABETES MELLITUS WITHOUT COMPLICATION, WITH LONG-TERM CURRENT USE OF INSULIN: ICD-10-CM

## 2021-02-18 LAB
ALBUMIN SERPL BCP-MCNC: 3.9 G/DL (ref 3.5–5.2)
ALP SERPL-CCNC: 88 U/L (ref 55–135)
ALT SERPL W/O P-5'-P-CCNC: 21 U/L (ref 10–44)
ANION GAP SERPL CALC-SCNC: 5 MMOL/L (ref 8–16)
AST SERPL-CCNC: 19 U/L (ref 10–40)
BILIRUB SERPL-MCNC: 0.8 MG/DL (ref 0.1–1)
BUN SERPL-MCNC: 16 MG/DL (ref 8–23)
CALCIUM SERPL-MCNC: 9 MG/DL (ref 8.7–10.5)
CHLORIDE SERPL-SCNC: 104 MMOL/L (ref 95–110)
CO2 SERPL-SCNC: 31 MMOL/L (ref 23–29)
CREAT SERPL-MCNC: 1.1 MG/DL (ref 0.5–1.4)
EST. GFR  (AFRICAN AMERICAN): >60 ML/MIN/1.73 M^2
EST. GFR  (NON AFRICAN AMERICAN): >60 ML/MIN/1.73 M^2
ESTIMATED AVG GLUCOSE: 151 MG/DL (ref 68–131)
GLUCOSE SERPL-MCNC: 141 MG/DL (ref 70–110)
HBA1C MFR BLD: 6.9 % (ref 4–5.6)
POTASSIUM SERPL-SCNC: 4.5 MMOL/L (ref 3.5–5.1)
PROT SERPL-MCNC: 6.4 G/DL (ref 6–8.4)
SODIUM SERPL-SCNC: 140 MMOL/L (ref 136–145)

## 2021-02-18 PROCEDURE — 36415 COLL VENOUS BLD VENIPUNCTURE: CPT

## 2021-02-18 PROCEDURE — 80053 COMPREHEN METABOLIC PANEL: CPT

## 2021-02-18 PROCEDURE — 83036 HEMOGLOBIN GLYCOSYLATED A1C: CPT

## 2021-02-22 ENCOUNTER — OFFICE VISIT (OUTPATIENT)
Dept: INTERNAL MEDICINE | Facility: CLINIC | Age: 71
End: 2021-02-22
Payer: MEDICARE

## 2021-02-22 VITALS
HEIGHT: 64 IN | SYSTOLIC BLOOD PRESSURE: 118 MMHG | HEART RATE: 80 BPM | WEIGHT: 139.13 LBS | DIASTOLIC BLOOD PRESSURE: 76 MMHG | BODY MASS INDEX: 23.75 KG/M2 | OXYGEN SATURATION: 98 %

## 2021-02-22 DIAGNOSIS — N13.8 BPH WITH OBSTRUCTION/LOWER URINARY TRACT SYMPTOMS: ICD-10-CM

## 2021-02-22 DIAGNOSIS — R33.9 URINARY RETENTION: ICD-10-CM

## 2021-02-22 DIAGNOSIS — I10 ESSENTIAL HYPERTENSION: ICD-10-CM

## 2021-02-22 DIAGNOSIS — E11.3299 TYPE 2 DIABETES MELLITUS WITH MILD NONPROLIFERATIVE RETINOPATHY, WITH LONG-TERM CURRENT USE OF INSULIN, MACULAR EDEMA PRESENCE UNSPECIFIED, UNSPECIFIED LATERALITY: Primary | ICD-10-CM

## 2021-02-22 DIAGNOSIS — M25.512 ACUTE PAIN OF LEFT SHOULDER: ICD-10-CM

## 2021-02-22 DIAGNOSIS — I25.10 CORONARY ARTERY DISEASE, ANGINA PRESENCE UNSPECIFIED, UNSPECIFIED VESSEL OR LESION TYPE, UNSPECIFIED WHETHER NATIVE OR TRANSPLANTED HEART: ICD-10-CM

## 2021-02-22 DIAGNOSIS — Z00.00 HEALTHCARE MAINTENANCE: ICD-10-CM

## 2021-02-22 DIAGNOSIS — Z79.4 TYPE 2 DIABETES MELLITUS WITH MILD NONPROLIFERATIVE RETINOPATHY, WITH LONG-TERM CURRENT USE OF INSULIN, MACULAR EDEMA PRESENCE UNSPECIFIED, UNSPECIFIED LATERALITY: Primary | ICD-10-CM

## 2021-02-22 DIAGNOSIS — N40.0 BENIGN PROSTATIC HYPERPLASIA WITHOUT LOWER URINARY TRACT SYMPTOMS: ICD-10-CM

## 2021-02-22 DIAGNOSIS — N40.1 BPH WITH OBSTRUCTION/LOWER URINARY TRACT SYMPTOMS: ICD-10-CM

## 2021-02-22 DIAGNOSIS — E78.5 HYPERLIPIDEMIA, UNSPECIFIED HYPERLIPIDEMIA TYPE: ICD-10-CM

## 2021-02-22 PROCEDURE — 99214 PR OFFICE/OUTPT VISIT, EST, LEVL IV, 30-39 MIN: ICD-10-PCS | Mod: GC,S$GLB,, | Performed by: STUDENT IN AN ORGANIZED HEALTH CARE EDUCATION/TRAINING PROGRAM

## 2021-02-22 PROCEDURE — 3044F HG A1C LEVEL LT 7.0%: CPT | Mod: CPTII,GC,S$GLB, | Performed by: STUDENT IN AN ORGANIZED HEALTH CARE EDUCATION/TRAINING PROGRAM

## 2021-02-22 PROCEDURE — 3078F DIAST BP <80 MM HG: CPT | Mod: CPTII,GC,S$GLB, | Performed by: STUDENT IN AN ORGANIZED HEALTH CARE EDUCATION/TRAINING PROGRAM

## 2021-02-22 PROCEDURE — 1159F MED LIST DOCD IN RCRD: CPT | Mod: GC,S$GLB,, | Performed by: STUDENT IN AN ORGANIZED HEALTH CARE EDUCATION/TRAINING PROGRAM

## 2021-02-22 PROCEDURE — 99999 PR PBB SHADOW E&M-EST. PATIENT-LVL III: ICD-10-PCS | Mod: PBBFAC,GC,, | Performed by: STUDENT IN AN ORGANIZED HEALTH CARE EDUCATION/TRAINING PROGRAM

## 2021-02-22 PROCEDURE — 99999 PR PBB SHADOW E&M-EST. PATIENT-LVL III: CPT | Mod: PBBFAC,GC,, | Performed by: STUDENT IN AN ORGANIZED HEALTH CARE EDUCATION/TRAINING PROGRAM

## 2021-02-22 PROCEDURE — 3074F SYST BP LT 130 MM HG: CPT | Mod: CPTII,GC,S$GLB, | Performed by: STUDENT IN AN ORGANIZED HEALTH CARE EDUCATION/TRAINING PROGRAM

## 2021-02-22 PROCEDURE — 1159F PR MEDICATION LIST DOCUMENTED IN MEDICAL RECORD: ICD-10-PCS | Mod: GC,S$GLB,, | Performed by: STUDENT IN AN ORGANIZED HEALTH CARE EDUCATION/TRAINING PROGRAM

## 2021-02-22 PROCEDURE — 3078F PR MOST RECENT DIASTOLIC BLOOD PRESSURE < 80 MM HG: ICD-10-PCS | Mod: CPTII,GC,S$GLB, | Performed by: STUDENT IN AN ORGANIZED HEALTH CARE EDUCATION/TRAINING PROGRAM

## 2021-02-22 PROCEDURE — 3044F PR MOST RECENT HEMOGLOBIN A1C LEVEL <7.0%: ICD-10-PCS | Mod: CPTII,GC,S$GLB, | Performed by: STUDENT IN AN ORGANIZED HEALTH CARE EDUCATION/TRAINING PROGRAM

## 2021-02-22 PROCEDURE — 3074F PR MOST RECENT SYSTOLIC BLOOD PRESSURE < 130 MM HG: ICD-10-PCS | Mod: CPTII,GC,S$GLB, | Performed by: STUDENT IN AN ORGANIZED HEALTH CARE EDUCATION/TRAINING PROGRAM

## 2021-02-22 PROCEDURE — 99214 OFFICE O/P EST MOD 30 MIN: CPT | Mod: GC,S$GLB,, | Performed by: STUDENT IN AN ORGANIZED HEALTH CARE EDUCATION/TRAINING PROGRAM

## 2021-02-22 RX ORDER — DICLOFENAC SODIUM 10 MG/G
2 GEL TOPICAL 3 TIMES DAILY
Qty: 50 G | Refills: 2 | Status: SHIPPED | OUTPATIENT
Start: 2021-02-22 | End: 2024-01-24

## 2021-02-22 RX ORDER — METOPROLOL SUCCINATE 25 MG/1
12.5 TABLET, EXTENDED RELEASE ORAL DAILY
Qty: 45 TABLET | Refills: 3 | Status: SHIPPED | OUTPATIENT
Start: 2021-02-22 | End: 2022-05-04 | Stop reason: SDUPTHER

## 2021-02-22 RX ORDER — INSULIN GLARGINE 100 [IU]/ML
20 INJECTION, SOLUTION SUBCUTANEOUS DAILY
Qty: 18 ML | Refills: 3 | Status: SHIPPED | OUTPATIENT
Start: 2021-02-22 | End: 2021-07-22 | Stop reason: SDUPTHER

## 2021-02-22 RX ORDER — ASPIRIN 81 MG/1
81 TABLET ORAL DAILY
Refills: 0 | Status: ON HOLD | COMMUNITY
Start: 2021-02-22 | End: 2022-12-12

## 2021-02-22 RX ORDER — METFORMIN HYDROCHLORIDE 1000 MG/1
1000 TABLET ORAL 2 TIMES DAILY WITH MEALS
Qty: 180 TABLET | Refills: 3 | Status: SHIPPED | OUTPATIENT
Start: 2021-02-22 | End: 2023-04-12 | Stop reason: SDUPTHER

## 2021-02-22 RX ORDER — LANCETS
1 EACH MISCELLANEOUS
Qty: 100 EACH | Refills: 2 | Status: SHIPPED | OUTPATIENT
Start: 2021-02-22 | End: 2021-07-22 | Stop reason: SDUPTHER

## 2021-02-22 RX ORDER — NITROGLYCERIN 0.4 MG/1
0.4 TABLET SUBLINGUAL EVERY 5 MIN PRN
Qty: 30 TABLET | Refills: 0 | Status: SHIPPED | OUTPATIENT
Start: 2021-02-22 | End: 2022-05-04 | Stop reason: SDUPTHER

## 2021-02-22 RX ORDER — TAMSULOSIN HYDROCHLORIDE 0.4 MG/1
0.8 CAPSULE ORAL NIGHTLY
Qty: 60 CAPSULE | Refills: 11 | Status: SHIPPED | OUTPATIENT
Start: 2021-02-22 | End: 2022-06-15 | Stop reason: SDUPTHER

## 2021-02-22 RX ORDER — LISINOPRIL AND HYDROCHLOROTHIAZIDE 10; 12.5 MG/1; MG/1
1 TABLET ORAL DAILY
Qty: 90 TABLET | Refills: 3 | Status: SHIPPED | OUTPATIENT
Start: 2021-02-22 | End: 2022-05-04 | Stop reason: SDUPTHER

## 2021-02-22 RX ORDER — ATORVASTATIN CALCIUM 80 MG/1
80 TABLET, FILM COATED ORAL DAILY
Qty: 90 TABLET | Refills: 3 | Status: SHIPPED | OUTPATIENT
Start: 2021-02-22 | End: 2022-05-04 | Stop reason: SDUPTHER

## 2021-02-25 ENCOUNTER — OFFICE VISIT (OUTPATIENT)
Dept: OPHTHALMOLOGY | Facility: CLINIC | Age: 71
End: 2021-02-25
Payer: MEDICARE

## 2021-02-25 DIAGNOSIS — H21.42 PUPILLARY MEMBRANE OF LEFT EYE: ICD-10-CM

## 2021-02-25 DIAGNOSIS — E11.3313 TYPE 2 DIABETES MELLITUS WITH BOTH EYES AFFECTED BY MODERATE NONPROLIFERATIVE RETINOPATHY AND MACULAR EDEMA, WITHOUT LONG-TERM CURRENT USE OF INSULIN: ICD-10-CM

## 2021-02-25 DIAGNOSIS — H40.1132 PRIMARY OPEN ANGLE GLAUCOMA (POAG) OF BOTH EYES, MODERATE STAGE: ICD-10-CM

## 2021-02-25 DIAGNOSIS — H21.81 INTRAOPERATIVE FLOPPY IRIS SYNDROME (IFIS): ICD-10-CM

## 2021-02-25 DIAGNOSIS — H40.042 STEROID RESPONDERS, LEFT: ICD-10-CM

## 2021-02-25 DIAGNOSIS — Z48.89 ENCOUNTER FOR POSTOPERATIVE CARE: Primary | ICD-10-CM

## 2021-02-25 DIAGNOSIS — H25.11 NS (NUCLEAR SCLEROSIS), RIGHT: ICD-10-CM

## 2021-02-25 DIAGNOSIS — Z96.1 PSEUDOPHAKIA, LEFT EYE: ICD-10-CM

## 2021-02-25 PROCEDURE — 3288F PR FALLS RISK ASSESSMENT DOCUMENTED: ICD-10-PCS | Mod: CPTII,S$GLB,, | Performed by: OPHTHALMOLOGY

## 2021-02-25 PROCEDURE — 1101F PT FALLS ASSESS-DOCD LE1/YR: CPT | Mod: CPTII,S$GLB,, | Performed by: OPHTHALMOLOGY

## 2021-02-25 PROCEDURE — 92012 INTRM OPH EXAM EST PATIENT: CPT | Mod: S$GLB,,, | Performed by: OPHTHALMOLOGY

## 2021-02-25 PROCEDURE — 3288F FALL RISK ASSESSMENT DOCD: CPT | Mod: CPTII,S$GLB,, | Performed by: OPHTHALMOLOGY

## 2021-02-25 PROCEDURE — 92012 PR EYE EXAM, EST PATIENT,INTERMED: ICD-10-PCS | Mod: S$GLB,,, | Performed by: OPHTHALMOLOGY

## 2021-02-25 PROCEDURE — 1126F AMNT PAIN NOTED NONE PRSNT: CPT | Mod: S$GLB,,, | Performed by: OPHTHALMOLOGY

## 2021-02-25 PROCEDURE — 99999 PR PBB SHADOW E&M-EST. PATIENT-LVL III: ICD-10-PCS | Mod: PBBFAC,,, | Performed by: OPHTHALMOLOGY

## 2021-02-25 PROCEDURE — 1126F PR PAIN SEVERITY QUANTIFIED, NO PAIN PRESENT: ICD-10-PCS | Mod: S$GLB,,, | Performed by: OPHTHALMOLOGY

## 2021-02-25 PROCEDURE — 1101F PR PT FALLS ASSESS DOC 0-1 FALLS W/OUT INJ PAST YR: ICD-10-PCS | Mod: CPTII,S$GLB,, | Performed by: OPHTHALMOLOGY

## 2021-02-25 PROCEDURE — 99999 PR PBB SHADOW E&M-EST. PATIENT-LVL III: CPT | Mod: PBBFAC,,, | Performed by: OPHTHALMOLOGY

## 2021-03-03 ENCOUNTER — TELEPHONE (OUTPATIENT)
Dept: INTERNAL MEDICINE | Facility: CLINIC | Age: 71
End: 2021-03-03

## 2021-03-09 ENCOUNTER — PES CALL (OUTPATIENT)
Dept: ADMINISTRATIVE | Facility: CLINIC | Age: 71
End: 2021-03-09

## 2021-03-30 ENCOUNTER — OFFICE VISIT (OUTPATIENT)
Dept: OPHTHALMOLOGY | Facility: CLINIC | Age: 71
End: 2021-03-30
Payer: MEDICARE

## 2021-03-30 DIAGNOSIS — H25.11 NS (NUCLEAR SCLEROSIS), RIGHT: ICD-10-CM

## 2021-03-30 DIAGNOSIS — E11.3313 CONTROLLED TYPE 2 DIABETES MELLITUS WITH BOTH EYES AFFECTED BY MODERATE NONPROLIFERATIVE RETINOPATHY AND MACULAR EDEMA, WITHOUT LONG-TERM CURRENT USE OF INSULIN: Primary | ICD-10-CM

## 2021-03-30 PROCEDURE — 1126F AMNT PAIN NOTED NONE PRSNT: CPT | Mod: S$GLB,,, | Performed by: OPHTHALMOLOGY

## 2021-03-30 PROCEDURE — 92134 POSTERIOR SEGMENT OCT RETINA (OCULAR COHERENCE TOMOGRAPHY)-BOTH EYES: ICD-10-PCS | Mod: S$GLB,,, | Performed by: OPHTHALMOLOGY

## 2021-03-30 PROCEDURE — 92134 CPTRZ OPH DX IMG PST SGM RTA: CPT | Mod: S$GLB,,, | Performed by: OPHTHALMOLOGY

## 2021-03-30 PROCEDURE — 2023F DILAT RTA XM W/O RTNOPTHY: CPT | Mod: S$GLB,,, | Performed by: OPHTHALMOLOGY

## 2021-03-30 PROCEDURE — 3288F FALL RISK ASSESSMENT DOCD: CPT | Mod: CPTII,S$GLB,, | Performed by: OPHTHALMOLOGY

## 2021-03-30 PROCEDURE — 1126F PR PAIN SEVERITY QUANTIFIED, NO PAIN PRESENT: ICD-10-PCS | Mod: S$GLB,,, | Performed by: OPHTHALMOLOGY

## 2021-03-30 PROCEDURE — 3288F PR FALLS RISK ASSESSMENT DOCUMENTED: ICD-10-PCS | Mod: CPTII,S$GLB,, | Performed by: OPHTHALMOLOGY

## 2021-03-30 PROCEDURE — 1101F PT FALLS ASSESS-DOCD LE1/YR: CPT | Mod: CPTII,S$GLB,, | Performed by: OPHTHALMOLOGY

## 2021-03-30 PROCEDURE — 92012 INTRM OPH EXAM EST PATIENT: CPT | Mod: S$GLB,,, | Performed by: OPHTHALMOLOGY

## 2021-03-30 PROCEDURE — 1101F PR PT FALLS ASSESS DOC 0-1 FALLS W/OUT INJ PAST YR: ICD-10-PCS | Mod: CPTII,S$GLB,, | Performed by: OPHTHALMOLOGY

## 2021-03-30 PROCEDURE — 99999 PR PBB SHADOW E&M-EST. PATIENT-LVL III: ICD-10-PCS | Mod: PBBFAC,,, | Performed by: OPHTHALMOLOGY

## 2021-03-30 PROCEDURE — 2023F PR DILATED RETINAL EXAM W/O EVID OF RETINOPATHY: ICD-10-PCS | Mod: S$GLB,,, | Performed by: OPHTHALMOLOGY

## 2021-03-30 PROCEDURE — 92012 PR EYE EXAM, EST PATIENT,INTERMED: ICD-10-PCS | Mod: S$GLB,,, | Performed by: OPHTHALMOLOGY

## 2021-03-30 PROCEDURE — 99999 PR PBB SHADOW E&M-EST. PATIENT-LVL III: CPT | Mod: PBBFAC,,, | Performed by: OPHTHALMOLOGY

## 2021-03-30 RX ORDER — GLIMEPIRIDE 4 MG/1
4 TABLET ORAL
COMMUNITY
Start: 2021-02-03 | End: 2021-07-22

## 2021-04-15 ENCOUNTER — TELEPHONE (OUTPATIENT)
Dept: INTERNAL MEDICINE | Facility: CLINIC | Age: 71
End: 2021-04-15

## 2021-04-16 ENCOUNTER — OFFICE VISIT (OUTPATIENT)
Dept: INTERNAL MEDICINE | Facility: CLINIC | Age: 71
End: 2021-04-16
Payer: MEDICARE

## 2021-04-16 VITALS
DIASTOLIC BLOOD PRESSURE: 82 MMHG | SYSTOLIC BLOOD PRESSURE: 124 MMHG | HEART RATE: 61 BPM | HEIGHT: 64 IN | OXYGEN SATURATION: 99 % | WEIGHT: 140.63 LBS | BODY MASS INDEX: 24.01 KG/M2

## 2021-04-16 DIAGNOSIS — I10 ESSENTIAL HYPERTENSION: Chronic | ICD-10-CM

## 2021-04-16 DIAGNOSIS — E11.3213 TYPE 2 DIABETES MELLITUS WITH BOTH EYES AFFECTED BY MILD NONPROLIFERATIVE RETINOPATHY AND MACULAR EDEMA, WITH LONG-TERM CURRENT USE OF INSULIN: ICD-10-CM

## 2021-04-16 DIAGNOSIS — Z00.00 ENCOUNTER FOR PREVENTIVE HEALTH EXAMINATION: Primary | ICD-10-CM

## 2021-04-16 DIAGNOSIS — E27.8 ADRENAL NODULE: Chronic | ICD-10-CM

## 2021-04-16 DIAGNOSIS — N40.0 BENIGN NON-NODULAR PROSTATIC HYPERPLASIA WITHOUT LOWER URINARY TRACT SYMPTOMS: Chronic | ICD-10-CM

## 2021-04-16 DIAGNOSIS — I20.89 CHRONIC STABLE ANGINA: Chronic | ICD-10-CM

## 2021-04-16 DIAGNOSIS — Z79.4 TYPE 2 DIABETES MELLITUS WITH BOTH EYES AFFECTED BY MILD NONPROLIFERATIVE RETINOPATHY AND MACULAR EDEMA, WITH LONG-TERM CURRENT USE OF INSULIN: ICD-10-CM

## 2021-04-16 DIAGNOSIS — E11.69 HYPERLIPIDEMIA ASSOCIATED WITH TYPE 2 DIABETES MELLITUS: Chronic | ICD-10-CM

## 2021-04-16 DIAGNOSIS — I25.10 CORONARY ARTERIOSCLEROSIS IN NATIVE ARTERY: Chronic | ICD-10-CM

## 2021-04-16 DIAGNOSIS — E78.5 HYPERLIPIDEMIA ASSOCIATED WITH TYPE 2 DIABETES MELLITUS: Chronic | ICD-10-CM

## 2021-04-16 DIAGNOSIS — E11.65 TYPE 2 DIABETES MELLITUS WITH HYPERGLYCEMIA, WITH LONG-TERM CURRENT USE OF INSULIN: ICD-10-CM

## 2021-04-16 DIAGNOSIS — E11.3313 CONTROLLED TYPE 2 DIABETES MELLITUS WITH BOTH EYES AFFECTED BY MODERATE NONPROLIFERATIVE RETINOPATHY AND MACULAR EDEMA, WITHOUT LONG-TERM CURRENT USE OF INSULIN: ICD-10-CM

## 2021-04-16 DIAGNOSIS — Z79.4 TYPE 2 DIABETES MELLITUS WITH HYPERGLYCEMIA, WITH LONG-TERM CURRENT USE OF INSULIN: ICD-10-CM

## 2021-04-16 DIAGNOSIS — I70.0 AORTIC ATHEROSCLEROSIS: Chronic | ICD-10-CM

## 2021-04-16 DIAGNOSIS — H40.1132 PRIMARY OPEN ANGLE GLAUCOMA (POAG) OF BOTH EYES, MODERATE STAGE: Chronic | ICD-10-CM

## 2021-04-16 PROCEDURE — 1101F PR PT FALLS ASSESS DOC 0-1 FALLS W/OUT INJ PAST YR: ICD-10-PCS | Mod: CPTII,S$GLB,, | Performed by: NURSE PRACTITIONER

## 2021-04-16 PROCEDURE — 3288F PR FALLS RISK ASSESSMENT DOCUMENTED: ICD-10-PCS | Mod: CPTII,S$GLB,, | Performed by: NURSE PRACTITIONER

## 2021-04-16 PROCEDURE — 3072F PR LOW RISK FOR RETINOPATHY: ICD-10-PCS | Mod: S$GLB,,, | Performed by: NURSE PRACTITIONER

## 2021-04-16 PROCEDURE — 3072F LOW RISK FOR RETINOPATHY: CPT | Mod: S$GLB,,, | Performed by: NURSE PRACTITIONER

## 2021-04-16 PROCEDURE — G0439 PPPS, SUBSEQ VISIT: HCPCS | Mod: S$GLB,,, | Performed by: NURSE PRACTITIONER

## 2021-04-16 PROCEDURE — G0439 PR MEDICARE ANNUAL WELLNESS SUBSEQUENT VISIT: ICD-10-PCS | Mod: S$GLB,,, | Performed by: NURSE PRACTITIONER

## 2021-04-16 PROCEDURE — 1101F PT FALLS ASSESS-DOCD LE1/YR: CPT | Mod: CPTII,S$GLB,, | Performed by: NURSE PRACTITIONER

## 2021-04-16 PROCEDURE — 99999 PR PBB SHADOW E&M-EST. PATIENT-LVL IV: ICD-10-PCS | Mod: PBBFAC,,, | Performed by: NURSE PRACTITIONER

## 2021-04-16 PROCEDURE — 99499 UNLISTED E&M SERVICE: CPT | Mod: S$GLB,,, | Performed by: NURSE PRACTITIONER

## 2021-04-16 PROCEDURE — 3008F PR BODY MASS INDEX (BMI) DOCUMENTED: ICD-10-PCS | Mod: CPTII,S$GLB,, | Performed by: NURSE PRACTITIONER

## 2021-04-16 PROCEDURE — 1126F AMNT PAIN NOTED NONE PRSNT: CPT | Mod: S$GLB,,, | Performed by: NURSE PRACTITIONER

## 2021-04-16 PROCEDURE — 99999 PR PBB SHADOW E&M-EST. PATIENT-LVL IV: CPT | Mod: PBBFAC,,, | Performed by: NURSE PRACTITIONER

## 2021-04-16 PROCEDURE — 3008F BODY MASS INDEX DOCD: CPT | Mod: CPTII,S$GLB,, | Performed by: NURSE PRACTITIONER

## 2021-04-16 PROCEDURE — 3288F FALL RISK ASSESSMENT DOCD: CPT | Mod: CPTII,S$GLB,, | Performed by: NURSE PRACTITIONER

## 2021-04-16 PROCEDURE — 99499 RISK ADDL DX/OHS AUDIT: ICD-10-PCS | Mod: S$GLB,,, | Performed by: NURSE PRACTITIONER

## 2021-04-16 PROCEDURE — 1126F PR PAIN SEVERITY QUANTIFIED, NO PAIN PRESENT: ICD-10-PCS | Mod: S$GLB,,, | Performed by: NURSE PRACTITIONER

## 2021-04-26 ENCOUNTER — OFFICE VISIT (OUTPATIENT)
Dept: OPHTHALMOLOGY | Facility: CLINIC | Age: 71
End: 2021-04-26
Payer: MEDICARE

## 2021-04-26 DIAGNOSIS — Z96.89 HISTORY OF GLAUCOMA TUBE SHUNT PROCEDURE: ICD-10-CM

## 2021-04-26 DIAGNOSIS — H21.42 PUPILLARY MEMBRANE OF LEFT EYE: ICD-10-CM

## 2021-04-26 DIAGNOSIS — E11.3313 CONTROLLED TYPE 2 DIABETES MELLITUS WITH BOTH EYES AFFECTED BY MODERATE NONPROLIFERATIVE RETINOPATHY AND MACULAR EDEMA, WITHOUT LONG-TERM CURRENT USE OF INSULIN: ICD-10-CM

## 2021-04-26 DIAGNOSIS — H40.1132 PRIMARY OPEN ANGLE GLAUCOMA (POAG) OF BOTH EYES, MODERATE STAGE: Primary | ICD-10-CM

## 2021-04-26 DIAGNOSIS — E11.3313 TYPE 2 DIABETES MELLITUS WITH BOTH EYES AFFECTED BY MODERATE NONPROLIFERATIVE RETINOPATHY AND MACULAR EDEMA, WITHOUT LONG-TERM CURRENT USE OF INSULIN: ICD-10-CM

## 2021-04-26 DIAGNOSIS — H57.9 SHALLOW ANTERIOR CHAMBER OF EYE: ICD-10-CM

## 2021-04-26 DIAGNOSIS — H25.11 NS (NUCLEAR SCLEROSIS), RIGHT: ICD-10-CM

## 2021-04-26 DIAGNOSIS — H40.042 STEROID RESPONDERS, LEFT: ICD-10-CM

## 2021-04-26 DIAGNOSIS — H21.81 INTRAOPERATIVE FLOPPY IRIS SYNDROME (IFIS): ICD-10-CM

## 2021-04-26 DIAGNOSIS — Z96.1 PSEUDOPHAKIA, LEFT EYE: ICD-10-CM

## 2021-04-26 PROCEDURE — 92012 PR EYE EXAM, EST PATIENT,INTERMED: ICD-10-PCS | Mod: S$GLB,,, | Performed by: OPHTHALMOLOGY

## 2021-04-26 PROCEDURE — 1101F PR PT FALLS ASSESS DOC 0-1 FALLS W/OUT INJ PAST YR: ICD-10-PCS | Mod: CPTII,S$GLB,, | Performed by: OPHTHALMOLOGY

## 2021-04-26 PROCEDURE — 3288F FALL RISK ASSESSMENT DOCD: CPT | Mod: CPTII,S$GLB,, | Performed by: OPHTHALMOLOGY

## 2021-04-26 PROCEDURE — 99999 PR PBB SHADOW E&M-EST. PATIENT-LVL III: ICD-10-PCS | Mod: PBBFAC,,, | Performed by: OPHTHALMOLOGY

## 2021-04-26 PROCEDURE — 1101F PT FALLS ASSESS-DOCD LE1/YR: CPT | Mod: CPTII,S$GLB,, | Performed by: OPHTHALMOLOGY

## 2021-04-26 PROCEDURE — 92012 INTRM OPH EXAM EST PATIENT: CPT | Mod: S$GLB,,, | Performed by: OPHTHALMOLOGY

## 2021-04-26 PROCEDURE — 1126F PR PAIN SEVERITY QUANTIFIED, NO PAIN PRESENT: ICD-10-PCS | Mod: S$GLB,,, | Performed by: OPHTHALMOLOGY

## 2021-04-26 PROCEDURE — 99999 PR PBB SHADOW E&M-EST. PATIENT-LVL III: CPT | Mod: PBBFAC,,, | Performed by: OPHTHALMOLOGY

## 2021-04-26 PROCEDURE — 1126F AMNT PAIN NOTED NONE PRSNT: CPT | Mod: S$GLB,,, | Performed by: OPHTHALMOLOGY

## 2021-04-26 PROCEDURE — 3288F PR FALLS RISK ASSESSMENT DOCUMENTED: ICD-10-PCS | Mod: CPTII,S$GLB,, | Performed by: OPHTHALMOLOGY

## 2021-05-25 ENCOUNTER — PROCEDURE VISIT (OUTPATIENT)
Dept: OPHTHALMOLOGY | Facility: CLINIC | Age: 71
End: 2021-05-25
Payer: MEDICARE

## 2021-05-25 DIAGNOSIS — H25.11 NS (NUCLEAR SCLEROSIS), RIGHT: ICD-10-CM

## 2021-05-25 DIAGNOSIS — E11.3313 CONTROLLED TYPE 2 DIABETES MELLITUS WITH BOTH EYES AFFECTED BY MODERATE NONPROLIFERATIVE RETINOPATHY AND MACULAR EDEMA, WITHOUT LONG-TERM CURRENT USE OF INSULIN: Primary | ICD-10-CM

## 2021-05-25 DIAGNOSIS — H40.1132 PRIMARY OPEN ANGLE GLAUCOMA (POAG) OF BOTH EYES, MODERATE STAGE: Chronic | ICD-10-CM

## 2021-05-25 PROCEDURE — 92014 COMPRE OPH EXAM EST PT 1/>: CPT | Mod: 25,S$GLB,, | Performed by: OPHTHALMOLOGY

## 2021-05-25 PROCEDURE — 67028 PR INJECT INTRAVITREAL PHARMCOLOGIC: ICD-10-PCS | Mod: LT,S$GLB,, | Performed by: OPHTHALMOLOGY

## 2021-05-25 PROCEDURE — 92134 POSTERIOR SEGMENT OCT RETINA (OCULAR COHERENCE TOMOGRAPHY)-BOTH EYES: ICD-10-PCS | Mod: S$GLB,,, | Performed by: OPHTHALMOLOGY

## 2021-05-25 PROCEDURE — 92014 PR EYE EXAM, EST PATIENT,COMPREHESV: ICD-10-PCS | Mod: 25,S$GLB,, | Performed by: OPHTHALMOLOGY

## 2021-05-25 PROCEDURE — 92134 CPTRZ OPH DX IMG PST SGM RTA: CPT | Mod: S$GLB,,, | Performed by: OPHTHALMOLOGY

## 2021-05-25 PROCEDURE — 67028 INJECTION EYE DRUG: CPT | Mod: LT,S$GLB,, | Performed by: OPHTHALMOLOGY

## 2021-05-25 RX ORDER — BEVACIZUMAB 100 MG/4ML
INJECTION, SOLUTION INTRAVENOUS
COMMUNITY
Start: 2020-12-04 | End: 2022-08-05

## 2021-06-08 ENCOUNTER — PROCEDURE VISIT (OUTPATIENT)
Dept: OPHTHALMOLOGY | Facility: CLINIC | Age: 71
End: 2021-06-08
Payer: MEDICARE

## 2021-06-08 DIAGNOSIS — E11.3313 CONTROLLED TYPE 2 DIABETES MELLITUS WITH BOTH EYES AFFECTED BY MODERATE NONPROLIFERATIVE RETINOPATHY AND MACULAR EDEMA, WITHOUT LONG-TERM CURRENT USE OF INSULIN: Primary | ICD-10-CM

## 2021-06-08 PROCEDURE — 67028 PR INJECT INTRAVITREAL PHARMCOLOGIC: ICD-10-PCS | Mod: LT,S$GLB,, | Performed by: OPHTHALMOLOGY

## 2021-06-08 PROCEDURE — 99499 UNLISTED E&M SERVICE: CPT | Mod: S$GLB,,, | Performed by: OPHTHALMOLOGY

## 2021-06-08 PROCEDURE — 67028 INJECTION EYE DRUG: CPT | Mod: LT,S$GLB,, | Performed by: OPHTHALMOLOGY

## 2021-06-08 PROCEDURE — 99499 NO LOS: ICD-10-PCS | Mod: S$GLB,,, | Performed by: OPHTHALMOLOGY

## 2021-07-19 ENCOUNTER — OFFICE VISIT (OUTPATIENT)
Dept: OPHTHALMOLOGY | Facility: CLINIC | Age: 71
End: 2021-07-19
Payer: MEDICARE

## 2021-07-19 DIAGNOSIS — E11.3313 CONTROLLED TYPE 2 DIABETES MELLITUS WITH BOTH EYES AFFECTED BY MODERATE NONPROLIFERATIVE RETINOPATHY AND MACULAR EDEMA, WITHOUT LONG-TERM CURRENT USE OF INSULIN: ICD-10-CM

## 2021-07-19 DIAGNOSIS — H40.1132 PRIMARY OPEN ANGLE GLAUCOMA (POAG) OF BOTH EYES, MODERATE STAGE: Primary | ICD-10-CM

## 2021-07-19 DIAGNOSIS — Z96.1 PSEUDOPHAKIA, LEFT EYE: ICD-10-CM

## 2021-07-19 DIAGNOSIS — Z96.89 HISTORY OF GLAUCOMA TUBE SHUNT PROCEDURE: ICD-10-CM

## 2021-07-19 DIAGNOSIS — H40.042 STEROID RESPONDERS, LEFT: ICD-10-CM

## 2021-07-19 DIAGNOSIS — H25.11 NS (NUCLEAR SCLEROSIS), RIGHT: ICD-10-CM

## 2021-07-19 PROCEDURE — 3288F PR FALLS RISK ASSESSMENT DOCUMENTED: ICD-10-PCS | Mod: CPTII,S$GLB,, | Performed by: OPHTHALMOLOGY

## 2021-07-19 PROCEDURE — 1101F PR PT FALLS ASSESS DOC 0-1 FALLS W/OUT INJ PAST YR: ICD-10-PCS | Mod: CPTII,S$GLB,, | Performed by: OPHTHALMOLOGY

## 2021-07-19 PROCEDURE — 99499 UNLISTED E&M SERVICE: CPT | Mod: S$GLB,,, | Performed by: OPHTHALMOLOGY

## 2021-07-19 PROCEDURE — 3288F FALL RISK ASSESSMENT DOCD: CPT | Mod: CPTII,S$GLB,, | Performed by: OPHTHALMOLOGY

## 2021-07-19 PROCEDURE — 3044F PR MOST RECENT HEMOGLOBIN A1C LEVEL <7.0%: ICD-10-PCS | Mod: CPTII,S$GLB,, | Performed by: OPHTHALMOLOGY

## 2021-07-19 PROCEDURE — 1126F PR PAIN SEVERITY QUANTIFIED, NO PAIN PRESENT: ICD-10-PCS | Mod: CPTII,S$GLB,, | Performed by: OPHTHALMOLOGY

## 2021-07-19 PROCEDURE — 92012 INTRM OPH EXAM EST PATIENT: CPT | Mod: S$GLB,,, | Performed by: OPHTHALMOLOGY

## 2021-07-19 PROCEDURE — 92012 PR EYE EXAM, EST PATIENT,INTERMED: ICD-10-PCS | Mod: S$GLB,,, | Performed by: OPHTHALMOLOGY

## 2021-07-19 PROCEDURE — 99999 PR PBB SHADOW E&M-EST. PATIENT-LVL III: ICD-10-PCS | Mod: PBBFAC,,, | Performed by: OPHTHALMOLOGY

## 2021-07-19 PROCEDURE — 1126F AMNT PAIN NOTED NONE PRSNT: CPT | Mod: CPTII,S$GLB,, | Performed by: OPHTHALMOLOGY

## 2021-07-19 PROCEDURE — 3044F HG A1C LEVEL LT 7.0%: CPT | Mod: CPTII,S$GLB,, | Performed by: OPHTHALMOLOGY

## 2021-07-19 PROCEDURE — 99999 PR PBB SHADOW E&M-EST. PATIENT-LVL III: CPT | Mod: PBBFAC,,, | Performed by: OPHTHALMOLOGY

## 2021-07-19 PROCEDURE — 99499 RISK ADDL DX/OHS AUDIT: ICD-10-PCS | Mod: S$GLB,,, | Performed by: OPHTHALMOLOGY

## 2021-07-19 PROCEDURE — 1101F PT FALLS ASSESS-DOCD LE1/YR: CPT | Mod: CPTII,S$GLB,, | Performed by: OPHTHALMOLOGY

## 2021-07-22 ENCOUNTER — OFFICE VISIT (OUTPATIENT)
Dept: INTERNAL MEDICINE | Facility: CLINIC | Age: 71
End: 2021-07-22
Payer: MEDICARE

## 2021-07-22 ENCOUNTER — LAB VISIT (OUTPATIENT)
Dept: LAB | Facility: HOSPITAL | Age: 71
End: 2021-07-22
Payer: MEDICARE

## 2021-07-22 VITALS
DIASTOLIC BLOOD PRESSURE: 60 MMHG | WEIGHT: 138.25 LBS | SYSTOLIC BLOOD PRESSURE: 100 MMHG | BODY MASS INDEX: 23.6 KG/M2 | OXYGEN SATURATION: 98 % | HEART RATE: 83 BPM | HEIGHT: 64 IN

## 2021-07-22 DIAGNOSIS — Z00.00 HEALTHCARE MAINTENANCE: ICD-10-CM

## 2021-07-22 DIAGNOSIS — N40.0 BENIGN PROSTATIC HYPERPLASIA WITHOUT LOWER URINARY TRACT SYMPTOMS: ICD-10-CM

## 2021-07-22 DIAGNOSIS — E11.65 TYPE 2 DIABETES MELLITUS WITH HYPERGLYCEMIA, WITH LONG-TERM CURRENT USE OF INSULIN: Primary | ICD-10-CM

## 2021-07-22 DIAGNOSIS — I25.10 CORONARY ARTERY DISEASE, ANGINA PRESENCE UNSPECIFIED, UNSPECIFIED VESSEL OR LESION TYPE, UNSPECIFIED WHETHER NATIVE OR TRANSPLANTED HEART: ICD-10-CM

## 2021-07-22 DIAGNOSIS — Z79.4 TYPE 2 DIABETES MELLITUS WITH HYPERGLYCEMIA, WITH LONG-TERM CURRENT USE OF INSULIN: Primary | ICD-10-CM

## 2021-07-22 DIAGNOSIS — E11.65 TYPE 2 DIABETES MELLITUS WITH HYPERGLYCEMIA, WITH LONG-TERM CURRENT USE OF INSULIN: ICD-10-CM

## 2021-07-22 DIAGNOSIS — Z79.4 TYPE 2 DIABETES MELLITUS WITH HYPERGLYCEMIA, WITH LONG-TERM CURRENT USE OF INSULIN: ICD-10-CM

## 2021-07-22 DIAGNOSIS — I15.2 HYPERTENSION ASSOCIATED WITH DIABETES: ICD-10-CM

## 2021-07-22 DIAGNOSIS — E11.59 HYPERTENSION ASSOCIATED WITH DIABETES: ICD-10-CM

## 2021-07-22 PROCEDURE — 99999 PR PBB SHADOW E&M-EST. PATIENT-LVL III: CPT | Mod: PBBFAC,GC,, | Performed by: STUDENT IN AN ORGANIZED HEALTH CARE EDUCATION/TRAINING PROGRAM

## 2021-07-22 PROCEDURE — 36415 COLL VENOUS BLD VENIPUNCTURE: CPT | Performed by: STUDENT IN AN ORGANIZED HEALTH CARE EDUCATION/TRAINING PROGRAM

## 2021-07-22 PROCEDURE — 1159F PR MEDICATION LIST DOCUMENTED IN MEDICAL RECORD: ICD-10-PCS | Mod: CPTII,GC,S$GLB, | Performed by: STUDENT IN AN ORGANIZED HEALTH CARE EDUCATION/TRAINING PROGRAM

## 2021-07-22 PROCEDURE — 99999 PR PBB SHADOW E&M-EST. PATIENT-LVL III: ICD-10-PCS | Mod: PBBFAC,GC,, | Performed by: STUDENT IN AN ORGANIZED HEALTH CARE EDUCATION/TRAINING PROGRAM

## 2021-07-22 PROCEDURE — 83036 HEMOGLOBIN GLYCOSYLATED A1C: CPT | Performed by: STUDENT IN AN ORGANIZED HEALTH CARE EDUCATION/TRAINING PROGRAM

## 2021-07-22 PROCEDURE — 99214 PR OFFICE/OUTPT VISIT, EST, LEVL IV, 30-39 MIN: ICD-10-PCS | Mod: GC,S$GLB,, | Performed by: STUDENT IN AN ORGANIZED HEALTH CARE EDUCATION/TRAINING PROGRAM

## 2021-07-22 PROCEDURE — 99214 OFFICE O/P EST MOD 30 MIN: CPT | Mod: GC,S$GLB,, | Performed by: STUDENT IN AN ORGANIZED HEALTH CARE EDUCATION/TRAINING PROGRAM

## 2021-07-22 PROCEDURE — 1159F MED LIST DOCD IN RCRD: CPT | Mod: CPTII,GC,S$GLB, | Performed by: STUDENT IN AN ORGANIZED HEALTH CARE EDUCATION/TRAINING PROGRAM

## 2021-07-22 RX ORDER — INSULIN GLARGINE 100 [IU]/ML
20 INJECTION, SOLUTION SUBCUTANEOUS NIGHTLY
Qty: 18 ML | Refills: 3 | Status: SHIPPED | OUTPATIENT
Start: 2021-07-22 | End: 2021-08-07 | Stop reason: SDUPTHER

## 2021-07-22 RX ORDER — LANCETS
1 EACH MISCELLANEOUS
Qty: 100 EACH | Refills: 2 | Status: SHIPPED | OUTPATIENT
Start: 2021-07-22 | End: 2021-10-20 | Stop reason: SDUPTHER

## 2021-07-23 LAB
ESTIMATED AVG GLUCOSE: 140 MG/DL (ref 68–131)
HBA1C MFR BLD: 6.5 % (ref 4–5.6)

## 2021-08-03 ENCOUNTER — OFFICE VISIT (OUTPATIENT)
Dept: OPHTHALMOLOGY | Facility: CLINIC | Age: 71
End: 2021-08-03
Payer: MEDICARE

## 2021-08-03 ENCOUNTER — TELEPHONE (OUTPATIENT)
Dept: INTERNAL MEDICINE | Facility: CLINIC | Age: 71
End: 2021-08-03

## 2021-08-03 DIAGNOSIS — H25.11 NS (NUCLEAR SCLEROSIS), RIGHT: ICD-10-CM

## 2021-08-03 DIAGNOSIS — E11.3313 CONTROLLED TYPE 2 DIABETES MELLITUS WITH BOTH EYES AFFECTED BY MODERATE NONPROLIFERATIVE RETINOPATHY AND MACULAR EDEMA, WITHOUT LONG-TERM CURRENT USE OF INSULIN: Primary | ICD-10-CM

## 2021-08-03 DIAGNOSIS — E11.65 TYPE 2 DIABETES MELLITUS WITH HYPERGLYCEMIA, WITH LONG-TERM CURRENT USE OF INSULIN: Primary | ICD-10-CM

## 2021-08-03 DIAGNOSIS — Z79.4 TYPE 2 DIABETES MELLITUS WITH HYPERGLYCEMIA, WITH LONG-TERM CURRENT USE OF INSULIN: Primary | ICD-10-CM

## 2021-08-03 PROCEDURE — 99999 PR PBB SHADOW E&M-EST. PATIENT-LVL III: ICD-10-PCS | Mod: PBBFAC,,, | Performed by: OPHTHALMOLOGY

## 2021-08-03 PROCEDURE — 1126F PR PAIN SEVERITY QUANTIFIED, NO PAIN PRESENT: ICD-10-PCS | Mod: CPTII,S$GLB,, | Performed by: OPHTHALMOLOGY

## 2021-08-03 PROCEDURE — 92014 COMPRE OPH EXAM EST PT 1/>: CPT | Mod: S$GLB,,, | Performed by: OPHTHALMOLOGY

## 2021-08-03 PROCEDURE — 3288F FALL RISK ASSESSMENT DOCD: CPT | Mod: CPTII,S$GLB,, | Performed by: OPHTHALMOLOGY

## 2021-08-03 PROCEDURE — 92014 PR EYE EXAM, EST PATIENT,COMPREHESV: ICD-10-PCS | Mod: S$GLB,,, | Performed by: OPHTHALMOLOGY

## 2021-08-03 PROCEDURE — 3288F PR FALLS RISK ASSESSMENT DOCUMENTED: ICD-10-PCS | Mod: CPTII,S$GLB,, | Performed by: OPHTHALMOLOGY

## 2021-08-03 PROCEDURE — 92134 CPTRZ OPH DX IMG PST SGM RTA: CPT | Mod: S$GLB,,, | Performed by: OPHTHALMOLOGY

## 2021-08-03 PROCEDURE — 1101F PR PT FALLS ASSESS DOC 0-1 FALLS W/OUT INJ PAST YR: ICD-10-PCS | Mod: CPTII,S$GLB,, | Performed by: OPHTHALMOLOGY

## 2021-08-03 PROCEDURE — 3044F HG A1C LEVEL LT 7.0%: CPT | Mod: CPTII,S$GLB,, | Performed by: OPHTHALMOLOGY

## 2021-08-03 PROCEDURE — 2023F DILAT RTA XM W/O RTNOPTHY: CPT | Mod: CPTII,S$GLB,, | Performed by: OPHTHALMOLOGY

## 2021-08-03 PROCEDURE — 1159F MED LIST DOCD IN RCRD: CPT | Mod: CPTII,S$GLB,, | Performed by: OPHTHALMOLOGY

## 2021-08-03 PROCEDURE — 1159F PR MEDICATION LIST DOCUMENTED IN MEDICAL RECORD: ICD-10-PCS | Mod: CPTII,S$GLB,, | Performed by: OPHTHALMOLOGY

## 2021-08-03 PROCEDURE — 3044F PR MOST RECENT HEMOGLOBIN A1C LEVEL <7.0%: ICD-10-PCS | Mod: CPTII,S$GLB,, | Performed by: OPHTHALMOLOGY

## 2021-08-03 PROCEDURE — 99999 PR PBB SHADOW E&M-EST. PATIENT-LVL III: CPT | Mod: PBBFAC,,, | Performed by: OPHTHALMOLOGY

## 2021-08-03 PROCEDURE — 1126F AMNT PAIN NOTED NONE PRSNT: CPT | Mod: CPTII,S$GLB,, | Performed by: OPHTHALMOLOGY

## 2021-08-03 PROCEDURE — 1101F PT FALLS ASSESS-DOCD LE1/YR: CPT | Mod: CPTII,S$GLB,, | Performed by: OPHTHALMOLOGY

## 2021-08-03 PROCEDURE — 92134 POSTERIOR SEGMENT OCT RETINA (OCULAR COHERENCE TOMOGRAPHY)-BOTH EYES: ICD-10-PCS | Mod: S$GLB,,, | Performed by: OPHTHALMOLOGY

## 2021-08-03 PROCEDURE — 2023F PR DILATED RETINAL EXAM W/O EVID OF RETINOPATHY: ICD-10-PCS | Mod: CPTII,S$GLB,, | Performed by: OPHTHALMOLOGY

## 2021-08-07 RX ORDER — INSULIN GLARGINE 100 [IU]/ML
20 INJECTION, SOLUTION SUBCUTANEOUS NIGHTLY
Qty: 18 ML | Refills: 3 | Status: SHIPPED | OUTPATIENT
Start: 2021-08-07 | End: 2021-10-20 | Stop reason: SDUPTHER

## 2021-10-20 ENCOUNTER — OFFICE VISIT (OUTPATIENT)
Dept: INTERNAL MEDICINE | Facility: CLINIC | Age: 71
End: 2021-10-20
Payer: MEDICARE

## 2021-10-20 ENCOUNTER — LAB VISIT (OUTPATIENT)
Dept: LAB | Facility: HOSPITAL | Age: 71
End: 2021-10-20
Payer: MEDICARE

## 2021-10-20 VITALS — SYSTOLIC BLOOD PRESSURE: 110 MMHG | DIASTOLIC BLOOD PRESSURE: 72 MMHG

## 2021-10-20 DIAGNOSIS — Z79.4 TYPE 2 DIABETES MELLITUS WITH HYPERGLYCEMIA, WITH LONG-TERM CURRENT USE OF INSULIN: ICD-10-CM

## 2021-10-20 DIAGNOSIS — I20.89 STABLE ANGINA: ICD-10-CM

## 2021-10-20 DIAGNOSIS — E11.65 TYPE 2 DIABETES MELLITUS WITH HYPERGLYCEMIA, WITH LONG-TERM CURRENT USE OF INSULIN: ICD-10-CM

## 2021-10-20 DIAGNOSIS — Z87.891 EX-SMOKER: Primary | ICD-10-CM

## 2021-10-20 LAB
CHOLEST SERPL-MCNC: 115 MG/DL (ref 120–199)
CHOLEST/HDLC SERPL: 2.4 {RATIO} (ref 2–5)
ESTIMATED AVG GLUCOSE: 146 MG/DL (ref 68–131)
HBA1C MFR BLD: 6.7 % (ref 4–5.6)
HDLC SERPL-MCNC: 47 MG/DL (ref 40–75)
HDLC SERPL: 40.9 % (ref 20–50)
LDLC SERPL CALC-MCNC: 49.2 MG/DL (ref 63–159)
NONHDLC SERPL-MCNC: 68 MG/DL
TRIGL SERPL-MCNC: 94 MG/DL (ref 30–150)

## 2021-10-20 PROCEDURE — 99214 PR OFFICE/OUTPT VISIT, EST, LEVL IV, 30-39 MIN: ICD-10-PCS | Mod: GC,S$GLB,, | Performed by: STUDENT IN AN ORGANIZED HEALTH CARE EDUCATION/TRAINING PROGRAM

## 2021-10-20 PROCEDURE — 99214 OFFICE O/P EST MOD 30 MIN: CPT | Mod: GC,S$GLB,, | Performed by: STUDENT IN AN ORGANIZED HEALTH CARE EDUCATION/TRAINING PROGRAM

## 2021-10-20 PROCEDURE — 83036 HEMOGLOBIN GLYCOSYLATED A1C: CPT | Performed by: STUDENT IN AN ORGANIZED HEALTH CARE EDUCATION/TRAINING PROGRAM

## 2021-10-20 PROCEDURE — 80061 LIPID PANEL: CPT | Performed by: STUDENT IN AN ORGANIZED HEALTH CARE EDUCATION/TRAINING PROGRAM

## 2021-10-20 PROCEDURE — 99999 PR PBB SHADOW E&M-EST. PATIENT-LVL II: CPT | Mod: PBBFAC,GC,, | Performed by: STUDENT IN AN ORGANIZED HEALTH CARE EDUCATION/TRAINING PROGRAM

## 2021-10-20 PROCEDURE — 99999 PR PBB SHADOW E&M-EST. PATIENT-LVL II: ICD-10-PCS | Mod: PBBFAC,GC,, | Performed by: STUDENT IN AN ORGANIZED HEALTH CARE EDUCATION/TRAINING PROGRAM

## 2021-10-20 PROCEDURE — 36415 COLL VENOUS BLD VENIPUNCTURE: CPT | Performed by: STUDENT IN AN ORGANIZED HEALTH CARE EDUCATION/TRAINING PROGRAM

## 2021-10-20 RX ORDER — INSULIN GLARGINE 100 [IU]/ML
20 INJECTION, SOLUTION SUBCUTANEOUS NIGHTLY
Qty: 18 ML | Refills: 3 | Status: SHIPPED | OUTPATIENT
Start: 2021-10-20 | End: 2022-09-27 | Stop reason: SDUPTHER

## 2021-10-20 RX ORDER — LANCETS
1 EACH MISCELLANEOUS
Qty: 100 EACH | Refills: 2 | Status: SHIPPED | OUTPATIENT
Start: 2021-10-20 | End: 2021-11-03 | Stop reason: SDUPTHER

## 2021-10-26 ENCOUNTER — HOSPITAL ENCOUNTER (OUTPATIENT)
Dept: RADIOLOGY | Facility: HOSPITAL | Age: 71
Discharge: HOME | End: 2021-10-26
Attending: STUDENT IN AN ORGANIZED HEALTH CARE EDUCATION/TRAINING PROGRAM
Payer: MEDICARE

## 2021-10-26 ENCOUNTER — OFFICE VISIT (OUTPATIENT)
Dept: OPHTHALMOLOGY | Facility: CLINIC | Age: 71
End: 2021-10-26
Payer: MEDICARE

## 2021-10-26 DIAGNOSIS — Z87.891 EX-SMOKER: ICD-10-CM

## 2021-10-26 DIAGNOSIS — H40.1121 PRIMARY OPEN-ANGLE GLAUCOMA, LEFT EYE, MILD STAGE: ICD-10-CM

## 2021-10-26 DIAGNOSIS — E11.3313 CONTROLLED TYPE 2 DIABETES MELLITUS WITH BOTH EYES AFFECTED BY MODERATE NONPROLIFERATIVE RETINOPATHY AND MACULAR EDEMA, WITHOUT LONG-TERM CURRENT USE OF INSULIN: Primary | ICD-10-CM

## 2021-10-26 DIAGNOSIS — H25.11 NS (NUCLEAR SCLEROSIS), RIGHT: ICD-10-CM

## 2021-10-26 PROCEDURE — 1159F MED LIST DOCD IN RCRD: CPT | Mod: CPTII,S$GLB,, | Performed by: OPHTHALMOLOGY

## 2021-10-26 PROCEDURE — 76775 US ABDOMINAL AORTA: ICD-10-PCS | Mod: 26,,, | Performed by: RADIOLOGY

## 2021-10-26 PROCEDURE — 1159F PR MEDICATION LIST DOCUMENTED IN MEDICAL RECORD: ICD-10-PCS | Mod: CPTII,S$GLB,, | Performed by: OPHTHALMOLOGY

## 2021-10-26 PROCEDURE — 92201 PR OPHTHALMOSCOPY, EXT, W/RET DRAW/SCLERAL DEPR, I&R, UNI/BI: ICD-10-PCS | Mod: S$GLB,,, | Performed by: OPHTHALMOLOGY

## 2021-10-26 PROCEDURE — 3044F PR MOST RECENT HEMOGLOBIN A1C LEVEL <7.0%: ICD-10-PCS | Mod: CPTII,S$GLB,, | Performed by: OPHTHALMOLOGY

## 2021-10-26 PROCEDURE — 92201 OPSCPY EXTND RTA DRAW UNI/BI: CPT | Mod: S$GLB,,, | Performed by: OPHTHALMOLOGY

## 2021-10-26 PROCEDURE — 76775 US EXAM ABDO BACK WALL LIM: CPT | Mod: 26,,, | Performed by: RADIOLOGY

## 2021-10-26 PROCEDURE — 3288F FALL RISK ASSESSMENT DOCD: CPT | Mod: CPTII,S$GLB,, | Performed by: OPHTHALMOLOGY

## 2021-10-26 PROCEDURE — 4010F PR ACE/ARB THEARPY RXD/TAKEN: ICD-10-PCS | Mod: CPTII,S$GLB,, | Performed by: OPHTHALMOLOGY

## 2021-10-26 PROCEDURE — 92014 COMPRE OPH EXAM EST PT 1/>: CPT | Mod: S$GLB,,, | Performed by: OPHTHALMOLOGY

## 2021-10-26 PROCEDURE — 1160F RVW MEDS BY RX/DR IN RCRD: CPT | Mod: CPTII,S$GLB,, | Performed by: OPHTHALMOLOGY

## 2021-10-26 PROCEDURE — 92134 POSTERIOR SEGMENT OCT RETINA (OCULAR COHERENCE TOMOGRAPHY)-BOTH EYES: ICD-10-PCS | Mod: S$GLB,,, | Performed by: OPHTHALMOLOGY

## 2021-10-26 PROCEDURE — 4010F ACE/ARB THERAPY RXD/TAKEN: CPT | Mod: CPTII,S$GLB,, | Performed by: OPHTHALMOLOGY

## 2021-10-26 PROCEDURE — 1160F PR REVIEW ALL MEDS BY PRESCRIBER/CLIN PHARMACIST DOCUMENTED: ICD-10-PCS | Mod: CPTII,S$GLB,, | Performed by: OPHTHALMOLOGY

## 2021-10-26 PROCEDURE — 1101F PT FALLS ASSESS-DOCD LE1/YR: CPT | Mod: CPTII,S$GLB,, | Performed by: OPHTHALMOLOGY

## 2021-10-26 PROCEDURE — 92134 CPTRZ OPH DX IMG PST SGM RTA: CPT | Mod: S$GLB,,, | Performed by: OPHTHALMOLOGY

## 2021-10-26 PROCEDURE — 92014 PR EYE EXAM, EST PATIENT,COMPREHESV: ICD-10-PCS | Mod: S$GLB,,, | Performed by: OPHTHALMOLOGY

## 2021-10-26 PROCEDURE — 76775 US EXAM ABDO BACK WALL LIM: CPT | Mod: TC

## 2021-10-26 PROCEDURE — 3288F PR FALLS RISK ASSESSMENT DOCUMENTED: ICD-10-PCS | Mod: CPTII,S$GLB,, | Performed by: OPHTHALMOLOGY

## 2021-10-26 PROCEDURE — 1126F PR PAIN SEVERITY QUANTIFIED, NO PAIN PRESENT: ICD-10-PCS | Mod: CPTII,S$GLB,, | Performed by: OPHTHALMOLOGY

## 2021-10-26 PROCEDURE — 1126F AMNT PAIN NOTED NONE PRSNT: CPT | Mod: CPTII,S$GLB,, | Performed by: OPHTHALMOLOGY

## 2021-10-26 PROCEDURE — 3044F HG A1C LEVEL LT 7.0%: CPT | Mod: CPTII,S$GLB,, | Performed by: OPHTHALMOLOGY

## 2021-10-26 PROCEDURE — 99999 PR PBB SHADOW E&M-EST. PATIENT-LVL III: ICD-10-PCS | Mod: PBBFAC,,, | Performed by: OPHTHALMOLOGY

## 2021-10-26 PROCEDURE — 99999 PR PBB SHADOW E&M-EST. PATIENT-LVL III: CPT | Mod: PBBFAC,,, | Performed by: OPHTHALMOLOGY

## 2021-10-26 PROCEDURE — 1101F PR PT FALLS ASSESS DOC 0-1 FALLS W/OUT INJ PAST YR: ICD-10-PCS | Mod: CPTII,S$GLB,, | Performed by: OPHTHALMOLOGY

## 2021-10-26 RX ORDER — DEXAMETHASONE 0.7 MG/1
IMPLANT INTRAVITREAL
COMMUNITY
Start: 2021-05-25

## 2021-10-26 RX ORDER — FLUOCINOLONE ACETONIDE 0.19 MG/1
IMPLANT INTRAVITREAL
COMMUNITY
Start: 2021-06-08 | End: 2022-08-05

## 2021-11-02 ENCOUNTER — TELEPHONE (OUTPATIENT)
Dept: INTERNAL MEDICINE | Facility: CLINIC | Age: 71
End: 2021-11-02
Payer: MEDICARE

## 2021-11-03 ENCOUNTER — TELEPHONE (OUTPATIENT)
Dept: INTERNAL MEDICINE | Facility: CLINIC | Age: 71
End: 2021-11-03
Payer: MEDICARE

## 2021-11-03 DIAGNOSIS — Z79.4 TYPE 2 DIABETES MELLITUS WITHOUT COMPLICATION, WITH LONG-TERM CURRENT USE OF INSULIN: Primary | ICD-10-CM

## 2021-11-03 DIAGNOSIS — E11.9 TYPE 2 DIABETES MELLITUS WITHOUT COMPLICATION, WITH LONG-TERM CURRENT USE OF INSULIN: Primary | ICD-10-CM

## 2021-11-03 RX ORDER — LANCETS
1 EACH MISCELLANEOUS
Qty: 100 EACH | Refills: 2 | Status: SHIPPED | OUTPATIENT
Start: 2021-11-03 | End: 2023-02-15

## 2021-11-03 RX ORDER — PEN NEEDLE, DIABETIC 29 G X1/2"
20 NEEDLE, DISPOSABLE MISCELLANEOUS DAILY
Qty: 90 EACH | Refills: 1 | Status: SHIPPED | OUTPATIENT
Start: 2021-11-03 | End: 2022-06-15 | Stop reason: SDUPTHER

## 2021-11-24 NOTE — PATIENT INSTRUCTIONS

## 2022-01-06 ENCOUNTER — IMMUNIZATION (OUTPATIENT)
Dept: INTERNAL MEDICINE | Facility: CLINIC | Age: 72
End: 2022-01-06
Payer: MEDICARE

## 2022-01-06 DIAGNOSIS — Z23 NEED FOR VACCINATION: Primary | ICD-10-CM

## 2022-01-06 PROCEDURE — 0004A COVID-19, MRNA, LNP-S, PF, 30 MCG/0.3 ML DOSE VACCINE: CPT | Mod: CV19,PBBFAC | Performed by: INTERNAL MEDICINE

## 2022-01-13 ENCOUNTER — LAB VISIT (OUTPATIENT)
Dept: LAB | Facility: HOSPITAL | Age: 72
End: 2022-01-13
Payer: MEDICARE

## 2022-01-13 ENCOUNTER — TELEPHONE (OUTPATIENT)
Dept: INTERNAL MEDICINE | Facility: CLINIC | Age: 72
End: 2022-01-13
Payer: MEDICARE

## 2022-01-13 DIAGNOSIS — E11.9 TYPE 2 DIABETES MELLITUS WITHOUT COMPLICATION, WITH LONG-TERM CURRENT USE OF INSULIN: Primary | ICD-10-CM

## 2022-01-13 DIAGNOSIS — Z79.4 TYPE 2 DIABETES MELLITUS WITHOUT COMPLICATION, WITH LONG-TERM CURRENT USE OF INSULIN: Primary | ICD-10-CM

## 2022-01-13 DIAGNOSIS — E11.9 TYPE 2 DIABETES MELLITUS WITHOUT COMPLICATION, WITH LONG-TERM CURRENT USE OF INSULIN: ICD-10-CM

## 2022-01-13 DIAGNOSIS — Z79.4 TYPE 2 DIABETES MELLITUS WITHOUT COMPLICATION, WITH LONG-TERM CURRENT USE OF INSULIN: ICD-10-CM

## 2022-01-13 LAB
ESTIMATED AVG GLUCOSE: 166 MG/DL (ref 68–131)
HBA1C MFR BLD: 7.4 % (ref 4–5.6)

## 2022-01-13 PROCEDURE — 83036 HEMOGLOBIN GLYCOSYLATED A1C: CPT | Performed by: STUDENT IN AN ORGANIZED HEALTH CARE EDUCATION/TRAINING PROGRAM

## 2022-01-13 PROCEDURE — 36415 COLL VENOUS BLD VENIPUNCTURE: CPT | Performed by: STUDENT IN AN ORGANIZED HEALTH CARE EDUCATION/TRAINING PROGRAM

## 2022-01-20 ENCOUNTER — LAB VISIT (OUTPATIENT)
Dept: LAB | Facility: HOSPITAL | Age: 72
End: 2022-01-20
Payer: MEDICARE

## 2022-01-20 ENCOUNTER — OFFICE VISIT (OUTPATIENT)
Dept: INTERNAL MEDICINE | Facility: CLINIC | Age: 72
End: 2022-01-20
Payer: MEDICARE

## 2022-01-20 VITALS
BODY MASS INDEX: 24.17 KG/M2 | SYSTOLIC BLOOD PRESSURE: 120 MMHG | OXYGEN SATURATION: 99 % | HEART RATE: 64 BPM | DIASTOLIC BLOOD PRESSURE: 70 MMHG | WEIGHT: 141.56 LBS | HEIGHT: 64 IN

## 2022-01-20 DIAGNOSIS — I15.2 HYPERTENSION ASSOCIATED WITH DIABETES: ICD-10-CM

## 2022-01-20 DIAGNOSIS — N40.0 BENIGN PROSTATIC HYPERPLASIA, UNSPECIFIED WHETHER LOWER URINARY TRACT SYMPTOMS PRESENT: ICD-10-CM

## 2022-01-20 DIAGNOSIS — E11.9 TYPE 2 DIABETES MELLITUS WITHOUT COMPLICATION, WITH LONG-TERM CURRENT USE OF INSULIN: Primary | ICD-10-CM

## 2022-01-20 DIAGNOSIS — I20.89 STABLE ANGINA: ICD-10-CM

## 2022-01-20 DIAGNOSIS — Z12.5 SCREENING PSA (PROSTATE SPECIFIC ANTIGEN): ICD-10-CM

## 2022-01-20 DIAGNOSIS — E11.59 HYPERTENSION ASSOCIATED WITH DIABETES: ICD-10-CM

## 2022-01-20 DIAGNOSIS — Z79.4 TYPE 2 DIABETES MELLITUS WITHOUT COMPLICATION, WITH LONG-TERM CURRENT USE OF INSULIN: Primary | ICD-10-CM

## 2022-01-20 DIAGNOSIS — I71.40 ABDOMINAL AORTIC ANEURYSM (AAA) WITHOUT RUPTURE: ICD-10-CM

## 2022-01-20 LAB
ALBUMIN/CREAT UR: 15.8 UG/MG (ref 0–30)
COMPLEXED PSA SERPL-MCNC: 0.77 NG/ML (ref 0–4)
CREAT UR-MCNC: 38 MG/DL (ref 23–375)
MICROALBUMIN UR DL<=1MG/L-MCNC: 6 UG/ML

## 2022-01-20 PROCEDURE — 82570 ASSAY OF URINE CREATININE: CPT | Performed by: STUDENT IN AN ORGANIZED HEALTH CARE EDUCATION/TRAINING PROGRAM

## 2022-01-20 PROCEDURE — 99999 PR PBB SHADOW E&M-EST. PATIENT-LVL III: CPT | Mod: PBBFAC,GC,, | Performed by: STUDENT IN AN ORGANIZED HEALTH CARE EDUCATION/TRAINING PROGRAM

## 2022-01-20 PROCEDURE — 99999 PR PBB SHADOW E&M-EST. PATIENT-LVL III: ICD-10-PCS | Mod: PBBFAC,GC,, | Performed by: STUDENT IN AN ORGANIZED HEALTH CARE EDUCATION/TRAINING PROGRAM

## 2022-01-20 PROCEDURE — 3051F HG A1C>EQUAL 7.0%<8.0%: CPT | Mod: CPTII,GC,S$GLB, | Performed by: STUDENT IN AN ORGANIZED HEALTH CARE EDUCATION/TRAINING PROGRAM

## 2022-01-20 PROCEDURE — 99213 PR OFFICE/OUTPT VISIT, EST, LEVL III, 20-29 MIN: ICD-10-PCS | Mod: GC,S$GLB,, | Performed by: STUDENT IN AN ORGANIZED HEALTH CARE EDUCATION/TRAINING PROGRAM

## 2022-01-20 PROCEDURE — 99213 OFFICE O/P EST LOW 20 MIN: CPT | Mod: GC,S$GLB,, | Performed by: STUDENT IN AN ORGANIZED HEALTH CARE EDUCATION/TRAINING PROGRAM

## 2022-01-20 PROCEDURE — 82043 UR ALBUMIN QUANTITATIVE: CPT | Performed by: STUDENT IN AN ORGANIZED HEALTH CARE EDUCATION/TRAINING PROGRAM

## 2022-01-20 PROCEDURE — 36415 COLL VENOUS BLD VENIPUNCTURE: CPT | Performed by: STUDENT IN AN ORGANIZED HEALTH CARE EDUCATION/TRAINING PROGRAM

## 2022-01-20 PROCEDURE — 3051F PR MOST RECENT HEMOGLOBIN A1C LEVEL 7.0 - < 8.0%: ICD-10-PCS | Mod: CPTII,GC,S$GLB, | Performed by: STUDENT IN AN ORGANIZED HEALTH CARE EDUCATION/TRAINING PROGRAM

## 2022-01-20 PROCEDURE — 84153 ASSAY OF PSA TOTAL: CPT | Performed by: STUDENT IN AN ORGANIZED HEALTH CARE EDUCATION/TRAINING PROGRAM

## 2022-01-20 RX ORDER — FINASTERIDE 5 MG/1
5 TABLET, FILM COATED ORAL DAILY
Qty: 30 TABLET | Refills: 11 | Status: SHIPPED | OUTPATIENT
Start: 2022-01-20 | End: 2022-06-15

## 2022-01-20 NOTE — PATIENT INSTRUCTIONS
Patient Education       Hiperplasia prostática benigna (próstata agrandada)   Conceptos Básicos   Redactado por los médicos y editores de UpToDate   ¿Qué es la hiperplasia prostática benigna? -- Hiperplasia prostática benigna es el término médico para referirse al agrandamiento de la próstata. La próstata es nori glándula que rodea la uretra (el tubo que transporta la orina desde la vejiga hasta el pene) (figura 1). A menudo, esta glándula se agranda a medida que la persona envejece.  La hiperplasia prostática benigna, también llamada HPB, es un problema común. No tiene nada que nika con el cáncer de próstata. De hecho, la palabra benigna significa no cancerosa.  ¿Cuáles son los síntomas de la HPB? -- Muchas personas con HPB no sienten ningún síntoma, maggie cuando aparecen síntomas, pueden ser los siguientes, entre otros:  · Necesidad de orinar con frecuencia, especialmente de noche  · Problemas para empezar a orinar (esto significa que quizás deba esperar o hacer un esfuerzo antes de que salga la orina)  · Flujo de orina débil  · Goteo o pérdida de orina  · Sensación de que la vejiga no está vacía incluso después de orinar  En casos inusuales, la HPB impide orinar en absoluto, lo cual es un problema grave. Si no puede orinar en absoluto, llame inmediatamente a sanchez médico.  ¿Existe alguna prueba para detectar la HPB? -- Sí. Sanchez médico puede detectar la HPB mediante un examen rectal. [place] significa que le insertará un dedo en el ano para revisar el tamaño de la próstata y sanchez apariencia al tacto (figura 2). El médico también podría hacer pruebas de orina o stephanie para nika si algunos de ara síntomas son causados por otro problema, filiberto nori infección en la vejiga.  ¿Hay algo que pueda hacer por mi cuenta para sentirme mejor? -- Sí. Es posible que pueda mejorar ara síntomas de HPB si:  · Reduce la cantidad de líquido que reese, especialmente antes de acostarse.  · Limita la cantidad de alcohol y cafeína que jahaira. Estas  "bebidas lo hacen orinar más a menudo.  · Nabeel las medicinas para la alergia y el resfrío que contengan antihistamínicos o descongestivos. Estas medicinas pueden empeorar los síntomas de la HPB.  · Hace algo que los médicos llaman "doble evacuación". Eso significa que después de vaciar sanchez vejiga, espera un momento, se relaja y trata de orinar de nuevo.  ¿Arianne consultar a un médico o enfermero? -- Si tiene síntomas filiberto los enumerados arriba, consulte a sanchez médico o enfermero para confirmar si realmente los causa la HPB. Estos síntomas pueden tener otras causas, de modo que es importante verificarlo.  Si tiene HPB, sanchez médico puede ofrecerle diversas opciones de tratamiento, maggie no es necesario que se trate si los síntomas no lo molestan. A menos que pierda completamente la capacidad de orinar, no tratar la HPB no le hará daño.  ¿Cómo se trata la HPB? -- Estas son algunas opciones de tratamiento:  · Vigilancia activa - Vigilancia activa significa que usted espera para nika si los síntomas cambian maggie no se hace un tratamiento inmediatamente. Si elige esta opción, puede decidir probar un tratamiento más adelante si ara síntomas empeoran o si empiezan a ser más molestos.  · Medicinas - Hay 2 tipos de medicinas que se utilizan comúnmente para tratar la HPB: cristian que relaja los músculos que rodean la uretra y otro que nabeel que la próstata crezca más o incluso ayuda a que se achique. En algunos casos, los médicos sugieren ermelinda ambas clases de medicinas al mismo tiempo. Según ara síntomas, es posible que el médico también sugiera otras medicinas.  · Cirugía - Existen varias maneras de tratar la HPB con cirugía. Pueden consistir en quitar nori parte de la próstata, reducir el tamaño de la próstata o hacerla más ancha para permitir un mayor flujo de orina. En la mayoría de estos procedimientos, un médico coloca instrumentos especiales en la uretra.   ¿Cómo decido qué tratamiento seguir? -- El tratamiento adecuado para usted " dependerá de:  · Cuánto le molestan los síntomas  · Cómo se siente con respecto a las distintas opciones de tratamiento  Si ara síntomas no son muy molestos, es posible que no necesite ningún tratamiento. Por otro lado, si lo son, debería tratarse.  A menudo los médicos sugieren probar medicinas para nika si ayudan. Si las medicinas no sirven de mucho, la cirugía es otra opción. Al pensar en ara opciones, recuerde que los tratamientos pueden tener nori desventaja. Por ejemplo, las medicinas pueden tener efectos secundarios. Y la cirugía tiene algunos riesgos generales, y a veces también puede causar problemas sexuales y otros efectos secundarios.  Cuando piense qué tratamiento hacerse, hágale a sanchez médico o enfermero las siguientes preguntas:  · ¿Qué probabilidades hay de que rahat tratamiento mejore mis síntomas?  · ¿Cuáles son los riesgos o efectos secundarios de rahat tratamiento?  · ¿Qué sucede si no me hago rahat tratamiento?  Todos los artículos se actualizan a medida que se descubre nueva evidencia y culmina nuestro proceso de evaluación por homólogos   Rahat artículo se recuperó de UpToDate el: Sep 21, 2021.  Artículo 21767 Versión 16.0.es-419.1  Release: 29.4.2 - C29.263  © 2021 UpToDate, Inc. Todos los derechos reservados.  figura 1: Próstata     En rahat dibujo se muestran los órganos masculinos internos y nori imagen ampliada de la próstata.  Gráfico 73239 Versión 5.0    figura 2: Examen rectal     [place] un examen rectal, el médico o enfermero inserta un dedo en el recto y palpa la glándula prostática. Economy se hace para determinar sanchez tamaño y detectar bultos o depresiones, o algo inusual.  Gráfico 86279 Versión 5.0    Exención de responsabilidad y uso de la información del consumidor   Esta información no es un consejo médico específico ni es un sustituto de la información que le erickson sanchez proveedor de atención médica. Es solamente un resumen breve de datos generales. NO incluye toda la información sobre  padecimientos, enfermedades, lesiones, pruebas, procedimientos, tratamientos, terapias, instrucciones de domitila u opciones de estilo de reinaldo que podrían corresponder en sanchez tiffanie. Debe hablar con sanchez proveedor de atención médica para obtener información completa sobre sanchez lindsey y ara opciones de tratamiento. Esta información no debe utilizarse para decidir si aceptar o no el consejo, las instrucciones o las recomendaciones de sanchez proveedor de atención médica, y solo él posee los conocimientos y la capacitación para darle consejos adecuados para usted.El uso de rahat sitio web se rige por los Términos de uso de UpToDate ©2021 [employer]. Todos los derechos reservados.  Copyright   © 2021 UpToDate, Inc. Todos los derechos reservados.

## 2022-01-20 NOTE — PROGRESS NOTES
Clinic Note  1/20/2022      Subjective:       Patient ID:  Ned is a 71 y.o. male being seen for an established visit.    Chief Complaint: Follow-up    HPI    Mr. Ned Oliveira is a 70 y.o. male w/ significant PMHx of CAD/chronic stable angina, HTN, HLD, BPH and T2DM diagnosed in 2007, presenting for a 3 month follow up. He has no complaints. He is back working as a  and has had no further episodes of chest pain. Does refer some dyspnea, though only when walking up stairs, and has not worsened from his baseline. Denies exertional chest pain, PARIS, orthopnea, leg swelling, palpitations, nausea, vomiting, or epigastric discomfort. He refers compliance to his diet and listed medications.      CAD, chronic stable angina: Patient has a history of CP and a reported hx ofmild SOB w/ strenuous exertion, walking up >4 stairs, which improves w/ rest. However, when asking him he denies exertional angina. He does refer an isolated episode of chest pain that occurred sudden onset about 2mo ago. He took nitroglycerin x1 to improvement.  Patient hat a PET Stress test on 8/11/2020 showing a 7% apical defect in LAD distribution. Cardiology deferred to medical management for now and following up in 3 months however he was lost to follow up.      HTN/HLD:  Remains on his listed medications    T2DM: Does not reliably measure BG at home though A1C remains ~6.5. A1C here 7.4.     BPH: refers worsening urinary retention and dribbling. Feels like his Flomax does not work any more.     Abdominal Aortic Aneursym: Former smoker. Imaging shows Mild fusiform dilatation of the proximal abdominal aorta (3.0 x 2.1 cm).     Shoulder pain: notes bilateral shoulder pain exacerbated by movement and improved with Aspirin and tylenol.        Review of Systems   Constitutional: Negative for chills, fever and weight loss.   HENT: Negative for sore throat.    Eyes: Negative for blurred vision.   Respiratory: Negative for cough, shortness of breath  and wheezing.    Cardiovascular: Negative for chest pain, orthopnea and leg swelling.   Gastrointestinal: Negative for abdominal pain, diarrhea, nausea and vomiting.   Genitourinary: Negative for dysuria, flank pain, frequency, hematuria and urgency.   Musculoskeletal: Positive for joint pain. Negative for back pain and neck pain.   Skin: Negative for rash.   Neurological: Negative for dizziness and headaches.       Past Medical History:   Diagnosis Date    Adrenal adenoma     BPH (benign prostatic hyperplasia)     Cataract     Coronary artery disease     Diabetes mellitus, type 2     Diabetic retinopathy     Glaucoma     Hyperlipidemia     Hypertension     Inguinal hernia of left side without obstruction or gangrene 2/6/2019    Nephrolithiasis 5/26/2016    Steroid responders, left 4/14/2020       Family History   Problem Relation Age of Onset    Diabetes Mother     Heart disease Mother     Heart disease Sister     No Known Problems Brother     Kidney failure Daughter         ESRD on HD    Autism Son     Asthma Son     No Known Problems Sister     No Known Problems Sister     No Known Problems Sister     No Known Problems Brother     Diabetes Brother     Stroke Neg Hx     Amblyopia Neg Hx     Blindness Neg Hx     Cataracts Neg Hx     Glaucoma Neg Hx     Macular degeneration Neg Hx     Retinal detachment Neg Hx     Strabismus Neg Hx         reports that he quit smoking about 25 years ago. His smoking use included cigarettes. He started smoking about 34 years ago. He has a 20.00 pack-year smoking history. He has never used smokeless tobacco. He reports that he does not drink alcohol and does not use drugs.    Medication List with Changes/Refills   New Medications    FINASTERIDE (PROSCAR) 5 MG TABLET    Take 1 tablet (5 mg total) by mouth once daily.   Current Medications    ASPIRIN (ECOTRIN) 81 MG EC TABLET    Take 1 tablet (81 mg total) by mouth once daily.    ATORVASTATIN (LIPITOR) 80  "MG TABLET    Take 1 tablet (80 mg total) by mouth once daily.    ATROPINE 1% (ISOPTO ATROPINE) 1 % DROP    Place 1 drop into the left eye 2 (two) times daily.    AVASTIN 25 MG/ML INJECTION        DICLOFENAC SODIUM (VOLTAREN) 1 % GEL    Apply 2 g topically 3 (three) times daily.    DORZOLAMIDE-TIMOLOL 2-0.5% (COSOPT) 22.3-6.8 MG/ML OPHTHALMIC SOLUTION    Place 1 drop into the right eye 2 (two) times daily.    ILUVIEN 0.19 MG INTRAVITREAL IMPLANT        INSULIN (LANTUS SOLOSTAR U-100 INSULIN) GLARGINE 100 UNITS/ML (3ML) SUBQ PEN    Inject 20 Units into the skin every evening.    LANCETS (ACCU-CHEK SOFTCLIX LANCETS) MISC    1 Box by Misc.(Non-Drug; Combo Route) route 2 hours after meals and at bedtime.    LISINOPRIL-HYDROCHLOROTHIAZIDE (PRINZIDE,ZESTORETIC) 10-12.5 MG PER TABLET    Take 1 tablet by mouth once daily.    METFORMIN (GLUCOPHAGE) 1000 MG TABLET    Take 1 tablet (1,000 mg total) by mouth 2 (two) times daily with meals.    METOPROLOL SUCCINATE (TOPROL-XL) 25 MG 24 HR TABLET    Take 0.5 tablets (12.5 mg total) by mouth once daily.    NITROGLYCERIN (NITROSTAT) 0.4 MG SL TABLET    Place 1 tablet (0.4 mg total) under the tongue every 5 (five) minutes as needed for Chest pain (ONLY IF HAVE CHEST PAIN,).    OZURDEX 0.7 MG IMPL INTRAVITREAL IMPLANT        PEN NEEDLE, DIABETIC (BD ULTRA-FINE ORIG PEN NEEDLE) 29 GAUGE X 1/2" NDLE    20 Units by Misc.(Non-Drug; Combo Route) route once daily.    TAMSULOSIN (FLOMAX) 0.4 MG CAP    Take 2 capsules (0.8 mg total) by mouth every evening.     Review of patient's allergies indicates:   Allergen Reactions    Percocet [oxycodone-acetaminophen] Itching       Patient Active Problem List   Diagnosis    Essential hypertension    Benign non-nodular prostatic hyperplasia without lower urinary tract symptoms    Adrenal nodule    Coronary arteriosclerosis in native artery    Aortic atherosclerosis    NS (nuclear sclerosis), right    Hyperlipidemia associated with type 2 diabetes " "mellitus    Type 2 diabetes mellitus with both eyes affected by mild nonproliferative retinopathy and macular edema, with long-term current use of insulin    Primary open angle glaucoma (POAG) of both eyes    Chronic stable angina    Type 2 diabetes mellitus with hyperglycemia, with long-term current use of insulin    Nuclear sclerotic cataract of left eye    Primary open-angle glaucoma, left eye, mild stage    Controlled type 2 diabetes mellitus with both eyes affected by moderate nonproliferative retinopathy and macular edema, without long-term current use of insulin           Objective:      /70 (BP Location: Left arm, Patient Position: Sitting, BP Method: Medium (Manual))   Pulse 64   Ht 5' 4" (1.626 m)   Wt 64.2 kg (141 lb 8.6 oz)   SpO2 99%   BMI 24.29 kg/m²   Estimated body mass index is 24.29 kg/m² as calculated from the following:    Height as of this encounter: 5' 4" (1.626 m).    Weight as of this encounter: 64.2 kg (141 lb 8.6 oz).  Physical Exam  Constitutional:       General: He is not in acute distress.     Appearance: Normal appearance.   HENT:      Mouth/Throat:      Mouth: Mucous membranes are moist.      Pharynx: Oropharynx is clear. No oropharyngeal exudate.   Eyes:      Extraocular Movements: Extraocular movements intact.      Pupils: Pupils are equal, round, and reactive to light.   Cardiovascular:      Rate and Rhythm: Normal rate and regular rhythm.      Heart sounds: No murmur heard.      Pulmonary:      Effort: Pulmonary effort is normal.      Breath sounds: No wheezing or rales.   Abdominal:      General: There is no distension.      Palpations: Abdomen is soft.      Tenderness: There is no abdominal tenderness.   Musculoskeletal:         General: Normal range of motion.      Cervical back: Normal range of motion.      Right lower leg: No edema.      Left lower leg: No edema.   Lymphadenopathy:      Cervical: No cervical adenopathy.   Skin:     General: Skin is warm.      " Coloration: Skin is not jaundiced.      Findings: No bruising.   Neurological:      General: No focal deficit present.      Mental Status: He is alert and oriented to person, place, and time.           Assessment and Plan:         Ned was seen today for follow-up.    Diagnoses and all orders for this visit:    Type 2 diabetes mellitus without complication, with long-term current use of insulin  Remains on his listed medications. A1C here 7.4. Admits to not keeping to his diet. Again encouraged, however remains controlled.   -     MICROALBUMIN / CREATININE RATIO URINE    Abdominal aortic aneurysm (AAA) without rupture  Mild fusiform dilatation of the proximal abdominal aorta (3.0 x 2.1 cm). Former smoker. Will plan for repeat US in 1yr.     Stable angina  Stable and chronic. Denies any more episodes of chest pain.     Hypertension associated with diabetes  /70 here. Stable    Screening PSA (prostate specific antigen)  Discussed risks and benefits of screening, patient ultimately agreeing.   -     PSA, Screening; Future    Benign prostatic hyperplasia, unspecified whether lower urinary tract symptoms present  Worsenign LUTS despite flomax. Will add Proscar  -     Ambulatory referral/consult to Urology; Future    Other orders  -     finasteride (PROSCAR) 5 mg tablet; Take 1 tablet (5 mg total) by mouth once daily.        Follow up in about 6 months (around 7/20/2022).    Other Orders Placed This Visit:  Orders Placed This Encounter   Procedures    MICROALBUMIN / CREATININE RATIO URINE    PSA, Screening    Ambulatory referral/consult to Urology         Liang Willis

## 2022-01-20 NOTE — LETTER
January 20, 2022    Ned Oliveira  [address] [place] [address]             [facility]  [address]  [facility] [address]  Phone: [phone]  Fax: [phone] To whom it may concern:    Mr. Oliveira was seen at our [facility] on 1/20/22 and as such he should be excused for any work-related activities for the day.     If you have any questions or concerns, please don't hesitate to call.    Sincerely,        Liang Willis MD

## 2022-02-10 ENCOUNTER — OFFICE VISIT (OUTPATIENT)
Dept: UROLOGY | Facility: CLINIC | Age: 72
End: 2022-02-10
Payer: MEDICARE

## 2022-02-10 ENCOUNTER — TELEPHONE (OUTPATIENT)
Dept: INTERNAL MEDICINE | Facility: CLINIC | Age: 72
End: 2022-02-10
Payer: MEDICARE

## 2022-02-10 VITALS
HEIGHT: 64 IN | WEIGHT: 141 LBS | HEART RATE: 64 BPM | BODY MASS INDEX: 24.07 KG/M2 | SYSTOLIC BLOOD PRESSURE: 113 MMHG | DIASTOLIC BLOOD PRESSURE: 68 MMHG

## 2022-02-10 DIAGNOSIS — N40.0 BENIGN PROSTATIC HYPERPLASIA, UNSPECIFIED WHETHER LOWER URINARY TRACT SYMPTOMS PRESENT: ICD-10-CM

## 2022-02-10 LAB
BILIRUB SERPL-MCNC: NORMAL MG/DL
BLOOD URINE, POC: NORMAL
CLARITY, POC UA: CLEAR
COLOR, POC UA: YELLOW
GLUCOSE UR QL STRIP: NORMAL
KETONES UR QL STRIP: NORMAL
LEUKOCYTE ESTERASE URINE, POC: NORMAL
NITRITE, POC UA: NORMAL
PH, POC UA: 5
POC RESIDUAL URINE VOLUME: 169 ML (ref 0–100)
PROTEIN, POC: NORMAL
SPECIFIC GRAVITY, POC UA: 1.01
UROBILINOGEN, POC UA: NORMAL

## 2022-02-10 PROCEDURE — 3072F LOW RISK FOR RETINOPATHY: CPT | Mod: CPTII,S$GLB,, | Performed by: NURSE PRACTITIONER

## 2022-02-10 PROCEDURE — 3061F NEG MICROALBUMINURIA REV: CPT | Mod: CPTII,S$GLB,, | Performed by: NURSE PRACTITIONER

## 2022-02-10 PROCEDURE — 1126F PR PAIN SEVERITY QUANTIFIED, NO PAIN PRESENT: ICD-10-PCS | Mod: CPTII,S$GLB,, | Performed by: NURSE PRACTITIONER

## 2022-02-10 PROCEDURE — 99999 PR PBB SHADOW E&M-EST. PATIENT-LVL V: ICD-10-PCS | Mod: PBBFAC,,, | Performed by: NURSE PRACTITIONER

## 2022-02-10 PROCEDURE — 3074F PR MOST RECENT SYSTOLIC BLOOD PRESSURE < 130 MM HG: ICD-10-PCS | Mod: CPTII,S$GLB,, | Performed by: NURSE PRACTITIONER

## 2022-02-10 PROCEDURE — 99204 PR OFFICE/OUTPT VISIT, NEW, LEVL IV, 45-59 MIN: ICD-10-PCS | Mod: S$GLB,,, | Performed by: NURSE PRACTITIONER

## 2022-02-10 PROCEDURE — 3066F PR DOCUMENTATION OF TREATMENT FOR NEPHROPATHY: ICD-10-PCS | Mod: CPTII,S$GLB,, | Performed by: NURSE PRACTITIONER

## 2022-02-10 PROCEDURE — 1126F AMNT PAIN NOTED NONE PRSNT: CPT | Mod: CPTII,S$GLB,, | Performed by: NURSE PRACTITIONER

## 2022-02-10 PROCEDURE — 81002 POCT URINE DIPSTICK WITHOUT MICROSCOPE: ICD-10-PCS | Mod: S$GLB,,, | Performed by: NURSE PRACTITIONER

## 2022-02-10 PROCEDURE — 3078F PR MOST RECENT DIASTOLIC BLOOD PRESSURE < 80 MM HG: ICD-10-PCS | Mod: CPTII,S$GLB,, | Performed by: NURSE PRACTITIONER

## 2022-02-10 PROCEDURE — 3072F PR LOW RISK FOR RETINOPATHY: ICD-10-PCS | Mod: CPTII,S$GLB,, | Performed by: NURSE PRACTITIONER

## 2022-02-10 PROCEDURE — 3061F PR NEG MICROALBUMINURIA RESULT DOCUMENTED/REVIEW: ICD-10-PCS | Mod: CPTII,S$GLB,, | Performed by: NURSE PRACTITIONER

## 2022-02-10 PROCEDURE — 3008F PR BODY MASS INDEX (BMI) DOCUMENTED: ICD-10-PCS | Mod: CPTII,S$GLB,, | Performed by: NURSE PRACTITIONER

## 2022-02-10 PROCEDURE — 1160F RVW MEDS BY RX/DR IN RCRD: CPT | Mod: CPTII,S$GLB,, | Performed by: NURSE PRACTITIONER

## 2022-02-10 PROCEDURE — 3008F BODY MASS INDEX DOCD: CPT | Mod: CPTII,S$GLB,, | Performed by: NURSE PRACTITIONER

## 2022-02-10 PROCEDURE — 81002 URINALYSIS NONAUTO W/O SCOPE: CPT | Mod: S$GLB,,, | Performed by: NURSE PRACTITIONER

## 2022-02-10 PROCEDURE — 1159F PR MEDICATION LIST DOCUMENTED IN MEDICAL RECORD: ICD-10-PCS | Mod: CPTII,S$GLB,, | Performed by: NURSE PRACTITIONER

## 2022-02-10 PROCEDURE — 99999 PR PBB SHADOW E&M-EST. PATIENT-LVL V: CPT | Mod: PBBFAC,,, | Performed by: NURSE PRACTITIONER

## 2022-02-10 PROCEDURE — 51798 POCT BLADDER SCAN: ICD-10-PCS | Mod: S$GLB,,, | Performed by: NURSE PRACTITIONER

## 2022-02-10 PROCEDURE — 1159F MED LIST DOCD IN RCRD: CPT | Mod: CPTII,S$GLB,, | Performed by: NURSE PRACTITIONER

## 2022-02-10 PROCEDURE — 1101F PT FALLS ASSESS-DOCD LE1/YR: CPT | Mod: CPTII,S$GLB,, | Performed by: NURSE PRACTITIONER

## 2022-02-10 PROCEDURE — 51798 US URINE CAPACITY MEASURE: CPT | Mod: S$GLB,,, | Performed by: NURSE PRACTITIONER

## 2022-02-10 PROCEDURE — 3288F FALL RISK ASSESSMENT DOCD: CPT | Mod: CPTII,S$GLB,, | Performed by: NURSE PRACTITIONER

## 2022-02-10 PROCEDURE — 3074F SYST BP LT 130 MM HG: CPT | Mod: CPTII,S$GLB,, | Performed by: NURSE PRACTITIONER

## 2022-02-10 PROCEDURE — 1160F PR REVIEW ALL MEDS BY PRESCRIBER/CLIN PHARMACIST DOCUMENTED: ICD-10-PCS | Mod: CPTII,S$GLB,, | Performed by: NURSE PRACTITIONER

## 2022-02-10 PROCEDURE — 3288F PR FALLS RISK ASSESSMENT DOCUMENTED: ICD-10-PCS | Mod: CPTII,S$GLB,, | Performed by: NURSE PRACTITIONER

## 2022-02-10 PROCEDURE — 3078F DIAST BP <80 MM HG: CPT | Mod: CPTII,S$GLB,, | Performed by: NURSE PRACTITIONER

## 2022-02-10 PROCEDURE — 3051F HG A1C>EQUAL 7.0%<8.0%: CPT | Mod: CPTII,S$GLB,, | Performed by: NURSE PRACTITIONER

## 2022-02-10 PROCEDURE — 3066F NEPHROPATHY DOC TX: CPT | Mod: CPTII,S$GLB,, | Performed by: NURSE PRACTITIONER

## 2022-02-10 PROCEDURE — 99204 OFFICE O/P NEW MOD 45 MIN: CPT | Mod: S$GLB,,, | Performed by: NURSE PRACTITIONER

## 2022-02-10 PROCEDURE — 1101F PR PT FALLS ASSESS DOC 0-1 FALLS W/OUT INJ PAST YR: ICD-10-PCS | Mod: CPTII,S$GLB,, | Performed by: NURSE PRACTITIONER

## 2022-02-10 PROCEDURE — 3051F PR MOST RECENT HEMOGLOBIN A1C LEVEL 7.0 - < 8.0%: ICD-10-PCS | Mod: CPTII,S$GLB,, | Performed by: NURSE PRACTITIONER

## 2022-02-10 NOTE — PROGRESS NOTES
CHIEF COMPLAINT:    Mr. Oliveira is a 71 y.o. male presenting for bph with obstruction.      PRESENTING ILLNESS:    Ned Oliveira is a 71 y.o. male with a PMH of diabetes type 2, htn, hld who presents for bph with obstruction.    Last seen in urology department by DA Jones 2/4/19.  Presents today due to bph with worsening LUTs.  Reports not emptying bladder with wea Recently evaluated by PCP who increased flomax and added proscar.  Discussed this is the max dose of medications to treat bph.  The next step would be surgical intervention.  Will have patient follow up with Dr. Molina in 3 months to discuss surgical options.  This will give time to assess effect of finasteride.    Of note patient's English limited. Offered , but declined. Wife at bedside who speaks English.    REVIEW OF SYSTEMS:    Review of Systems   Constitutional: Negative for chills and fever.   Respiratory: Negative for shortness of breath.    Cardiovascular: Negative for chest pain.   Gastrointestinal: Negative for constipation and diarrhea.   Genitourinary: Positive for frequency. Negative for dysuria, flank pain, hematuria and urgency.        Fos weak   Neurological: Negative for dizziness and weakness.       PATIENT HISTORY:    Past Medical History:   Diagnosis Date    Adrenal adenoma     BPH (benign prostatic hyperplasia)     Cataract     Coronary artery disease     Diabetes mellitus, type 2     Diabetic retinopathy     Glaucoma     Hyperlipidemia     Hypertension     Inguinal hernia of left side without obstruction or gangrene 2/6/2019    Nephrolithiasis 5/26/2016    Steroid responders, left 4/14/2020       Family History   Problem Relation Age of Onset    Diabetes Mother     Heart disease Mother     Heart disease Sister     No Known Problems Brother     Kidney failure Daughter         ESRD on HD    Autism Son     Asthma Son     No Known Problems Sister     No Known Problems Sister     No Known  Problems Sister     No Known Problems Brother     Diabetes Brother     Stroke Neg Hx     Amblyopia Neg Hx     Blindness Neg Hx     Cataracts Neg Hx     Glaucoma Neg Hx     Macular degeneration Neg Hx     Retinal detachment Neg Hx     Strabismus Neg Hx        Allergies:  Percocet [oxycodone-acetaminophen]    Medications:    Current Outpatient Medications:     aspirin (ECOTRIN) 81 MG EC tablet, Take 1 tablet (81 mg total) by mouth once daily., Disp: , Rfl: 0    atorvastatin (LIPITOR) 80 MG tablet, Take 1 tablet (80 mg total) by mouth once daily., Disp: 90 tablet, Rfl: 3    AVASTIN 25 mg/mL injection, , Disp: , Rfl:     diclofenac sodium (VOLTAREN) 1 % Gel, Apply 2 g topically 3 (three) times daily., Disp: 50 g, Rfl: 2    dorzolamide-timolol 2-0.5% (COSOPT) 22.3-6.8 mg/mL ophthalmic solution, Place 1 drop into the right eye 2 (two) times daily., Disp: 20 mL, Rfl: 3    finasteride (PROSCAR) 5 mg tablet, Take 1 tablet (5 mg total) by mouth once daily., Disp: 30 tablet, Rfl: 11    ILUVIEN 0.19 mg intravitreal implant, , Disp: , Rfl:     insulin (LANTUS SOLOSTAR U-100 INSULIN) glargine 100 units/mL (3mL) SubQ pen, Inject 20 Units into the skin every evening., Disp: 18 mL, Rfl: 3    lancets (ACCU-CHEK SOFTCLIX LANCETS) Misc, 1 Box by Misc.(Non-Drug; Combo Route) route 2 hours after meals and at bedtime., Disp: 100 each, Rfl: 2    lisinopriL-hydrochlorothiazide (PRINZIDE,ZESTORETIC) 10-12.5 mg per tablet, Take 1 tablet by mouth once daily., Disp: 90 tablet, Rfl: 3    metFORMIN (GLUCOPHAGE) 1000 MG tablet, Take 1 tablet (1,000 mg total) by mouth 2 (two) times daily with meals., Disp: 180 tablet, Rfl: 3    metoprolol succinate (TOPROL-XL) 25 MG 24 hr tablet, Take 0.5 tablets (12.5 mg total) by mouth once daily., Disp: 45 tablet, Rfl: 3    nitroGLYCERIN (NITROSTAT) 0.4 MG SL tablet, Place 1 tablet (0.4 mg total) under the tongue every 5 (five) minutes as needed for Chest pain (ONLY IF HAVE CHEST PAIN,).,  "Disp: 30 tablet, Rfl: 0    OZURDEX 0.7 mg Impl intravitreal implant, , Disp: , Rfl:     pen needle, diabetic (BD ULTRA-FINE ORIG PEN NEEDLE) 29 gauge x 1/2" Ndle, 20 Units by Misc.(Non-Drug; Combo Route) route once daily., Disp: 90 each, Rfl: 1    tamsulosin (FLOMAX) 0.4 mg Cap, Take 2 capsules (0.8 mg total) by mouth every evening., Disp: 60 capsule, Rfl: 11    atropine 1% (ISOPTO ATROPINE) 1 % Drop, Place 1 drop into the left eye 2 (two) times daily. (Patient not taking: Reported on 2/10/2022), Disp: 1 Bottle, Rfl: 1    PHYSICAL EXAMINATION:    Physical Exam  Vitals and nursing note reviewed.   Constitutional:       Appearance: Normal appearance. He is well-developed.   HENT:      Head: Normocephalic and atraumatic.   Eyes:      Pupils: Pupils are equal, round, and reactive to light.   Pulmonary:      Effort: Pulmonary effort is normal.   Genitourinary:     Penis: Normal.       Testes: Normal.      Prostate: Enlarged. Not tender.      Rectum: Normal.      Comments: Prostate - Smooth, no nodules  Musculoskeletal:         General: Normal range of motion.      Cervical back: Normal range of motion.   Skin:     General: Skin is warm and dry.   Neurological:      Mental Status: He is alert and oriented to person, place, and time.   Psychiatric:         Mood and Affect: Mood and affect normal.         Behavior: Behavior normal.           LABS:    U/a performed in office today: yellow, ph 5, 1.010, trace protein, otherwise negative  Bladder scan performed by Nurse Selin.   ml      Lab Results   Component Value Date    PSA 0.77 01/20/2022    PSA 0.56 07/02/2018    PSA 0.60 03/27/2017    PSA 0.48 10/26/2015    PSA 0.46 05/07/2009       IMPRESSION:  Encounter Diagnoses   Name Primary?    Benign prostatic hyperplasia, unspecified whether lower urinary tract symptoms present          PLAN:  Problem List Items Addressed This Visit    None     Visit Diagnoses     Benign prostatic hyperplasia, unspecified whether " lower urinary tract symptoms present        Relevant Orders    POCT Bladder Scan (Completed)    POCT URINE DIPSTICK WITHOUT MICROSCOPE (Completed)          1. bph with LUTs obstruction   - continue tamsulosin 0.8 mg po daily   - continue finasteride 5 mg daily   Referral to Dr. Molina to discuss possible surgical intervention   psa 0.77, macarena completed  2. Incomplete bladder emptying   - continue tamsulosin and finasteride  3.  RTC in 3 mths    I spent over 45 minutes with the patient. Over 50% of the visit was spent in counseling.       Caprice Cerda NP

## 2022-02-10 NOTE — TELEPHONE ENCOUNTER
----- Message from Sue Lopez sent at 2/10/2022 12:57 PM CST -----  Contact: [facility] [phone]  Requesting an RX refill or new RX.    Is this a refill or new RX: Refill    RX name and strength :  diclofenac sodium (VOLTAREN) 1 % Gel    Pharmacy name and phone # :  [facility] - [facility] [address]      Requesting an RX refill or new RX.    Is this a refill or new RX: Refill    RX name and strength :  lancets (ACCU-CHEK SOFTCLIX LANCETS)     Pharmacy name and phone # :  [facility] - [facility] [address] [place] [address]      Requesting an RX refill or new RX.    Is this a refill or new RX: Refill    RX name and strength :  lisinopriL-hydrochlorothiazide (PRINZIDE,ZESTORETIC) 10-12.5 mg per tablet    Pharmacy name and phone # :  [facility] [address] [place] [address]      Requesting an RX refill or new RX.    Is this a refill or new RX: Refill    RX name and strength :  metoprolol succinate (TOPROL-XL) 25 MG 24 hr tablet    Pharmacy name and phone # :  [facility] [address] [place] [address]      Requesting an RX refill or new RX.    Is this a refill or new RX: Refill    RX name and strength :  nitroGLYCERIN (NITROSTAT) 0.4 MG SL tablet    Pharmacy name and phone # :  [facility] [address]

## 2022-02-11 NOTE — PATIENT INSTRUCTIONS
"Patient Education       Benign Prostatic Hyperplasia (Enlarged Prostate)   The Basics   Written by the doctors and editors at [facility]   What is benign prostatic hyperplasia? -- "Benign prostatic hyperplasia" is the medical term for an enlarged prostate. The prostate is a gland that surrounds the urethra (the tube that carries urine from the bladder out through the penis) (figure 1). This gland often gets bigger a person gets older.  Benign prostatic hyperplasia, also called "BPH," is a common problem. It has nothing to do with prostate cancer. In fact, the word "benign" means "not cancer."  What are the symptoms of BPH? -- Many people with BPH have no symptoms at all. When symptoms do occur, they can include:  · Needing to urinate often, especially at night  · Having trouble starting to urinate (this means that you might have to wait or strain before urine will come out)  · Having a weak urine stream  · Leaking or dribbling urine  · Feeling as though your bladder is not empty even after you urinate  In rare cases, BPH makes it so a person cannot urinate at all. This is a serious problem. If you cannot urinate at all, call your doctor right away.  Is there a test for BPH? -- Yes. Your doctor can check for BPH by doing a rectal exam. That means that they will put a finger into your anus to check how big your prostate is and what it feels like (figure 2). Your doctor might also do urine or blood tests to see if your symptoms might be caused by another problem, such as a bladder infection.  Is there anything I can do on my own to feel better? -- Yes. You might be able to improve your BPH symptoms by:  · Reducing the amount of fluid you drink, especially just before bed  · Limiting the amount of alcohol and caffeine you drink. These drinks can make you urinate more often.  · Avoiding cold and allergy medicines that contain antihistamines or decongestants. These medicines can make the symptoms of BPH worse.  · Doing " "something doctors call "double voiding." That means that after you empty your bladder, you wait a moment, relax, and try to urinate again.  Should I see a doctor or nurse? -- If you have symptoms like the ones listed above, see your doctor or nurse to find out if BPH is really what's causing them. Those symptoms can be caused by other problems, so it's important to have them checked out.  If you do have BPH, your doctor can offer you different treatment options. But you don't have to get treated if your symptoms do not bother you. Unless you lose the ability to urinate completely, leaving BPH untreated will not hurt you.  How is BPH treated? -- Treatments options include:  · Watchful waiting - Watchful waiting means that you wait to see if your symptoms change, but you don't have treatment right away. If you choose this option, you can decide to try treatment later if your symptoms get worse or if your symptoms start to bother you more.  · Medicines - There are 2 types of medicine commonly used to treat BPH. One type relaxes the muscles that surround the urethra. The other type keeps the prostate from growing more or even helps the prostate shrink. In some cases, doctors suggest taking both types of medicine at the same time. Depending on your symptoms, your doctor might also suggest other medicines.  · Surgery - There are several ways to treat BPH with surgery. They can involve removing some of the prostate, shrinking the prostate, or making the urethra wider so more urine can flow through. For most of these procedures, a doctor inserts special tools into the urethra.   How do I choose which treatment to have? -- The right treatment for you will depend on:  · How much your symptoms bother you  · How you feel about the different treatment options  If your symptoms don't bother you very much, you might not need any treatment. On the other hand, if your symptoms do bother you, you probably should get treated.  Doctors " often suggest trying medicines first to see if they help. If medicines don't do enough, surgery is also an option. As you think about your choices, remember that treatments can have a downside. Medicines can cause side effects, for example. And surgery has some general risks, and can also sometimes cause sexual problems and other side effects.  When you're thinking about which treatment to have, ask your doctor or nurse these questions:  · How likely is it that this treatment will improve my symptoms?  · What are the risks or side effects of this treatment?  · What happens if I don't have this treatment?  All topics are updated as new evidence becomes available and our peer review process is complete.  This topic retrieved from [employer] on: Sep 21, 2021.  Topic 35418 Version 16.0  Release: 29.4.2 - C29.263  © 2021 UpToDate, Inc. and/or its affiliates. All rights reserved.  figure 1: Prostate gland     This drawing shows male internal organs and a close-up of the prostate gland.  Graphic 65924 Version 5.0    figure 2: Rectal exam     During a rectal exam, the doctor or nurse puts a finger inside your rectum and feels your prostate gland. That way he or she can see how big it is and whether it has bumps or dents or anything unusual.  Graphic 17227 Version 5.0    Consumer Information Use and Disclaimer   This information is not specific medical advice and does not replace information you receive from your health care provider. This is only a brief summary of general information. It does NOT include all information about conditions, illnesses, injuries, tests, procedures, treatments, therapies, discharge instructions or life-style choices that may apply to you. You must talk with your health care provider for complete information about your health and treatment options. This information should not be used to decide whether or not to accept your health care provider's advice, instructions or recommendations. Only your  health care provider has the knowledge and training to provide advice that is right for you. The use of this information is governed by the [employer] End User License Agreement, available at https://www.[employer]amkru/en/solutions/Tutor Assignment/about/sarah.The use of [employer] content is governed by the [employer] Terms of Use. ©2021 UpToDate, Inc. All rights reserved.  Copyright   © 2021 UpToDate, Inc. and/or its affiliates. All rights reserved.

## 2022-04-20 ENCOUNTER — OFFICE VISIT (OUTPATIENT)
Dept: OPTOMETRY | Facility: CLINIC | Age: 72
End: 2022-04-20
Payer: COMMERCIAL

## 2022-04-20 DIAGNOSIS — Z01.00 EYE EXAM, ROUTINE: Primary | ICD-10-CM

## 2022-04-20 DIAGNOSIS — H52.13 MYOPIA OF BOTH EYES WITH REGULAR ASTIGMATISM: ICD-10-CM

## 2022-04-20 DIAGNOSIS — H52.223 MYOPIA OF BOTH EYES WITH REGULAR ASTIGMATISM: ICD-10-CM

## 2022-04-20 PROCEDURE — 92015 DETERMINE REFRACTIVE STATE: CPT | Mod: S$GLB,,, | Performed by: OPTOMETRIST

## 2022-04-20 PROCEDURE — 92004 PR EYE EXAM, NEW PATIENT,COMPREHESV: ICD-10-PCS | Mod: S$GLB,,, | Performed by: OPTOMETRIST

## 2022-04-20 PROCEDURE — 99999 PR PBB SHADOW E&M-EST. PATIENT-LVL III: CPT | Mod: PBBFAC,,, | Performed by: OPTOMETRIST

## 2022-04-20 PROCEDURE — 92015 PR REFRACTION: ICD-10-PCS | Mod: S$GLB,,, | Performed by: OPTOMETRIST

## 2022-04-20 PROCEDURE — 99999 PR PBB SHADOW E&M-EST. PATIENT-LVL III: ICD-10-PCS | Mod: PBBFAC,,, | Performed by: OPTOMETRIST

## 2022-04-20 PROCEDURE — 92004 COMPRE OPH EXAM NEW PT 1/>: CPT | Mod: S$GLB,,, | Performed by: OPTOMETRIST

## 2022-04-20 NOTE — PROGRESS NOTES
HPI     [facility] -Refraction   Blurry vision distance and near  Silvestre for retina    Last edited by Hermes Brian, OD on 4/20/2022  2:32 PM. (History)            Assessment /Plan     For exam results, see Encounter Report.    Eye exam, routine  -cont'd care Silvestre    Myopia of both eyes with regular astigmatism  Eyeglass Final Rx     Eyeglass Final Rx       Sphere Cylinder Axis Dist VA Add    Right -0.25 +0.75 180 20/40 +2.75    Left -0.25 +0.75 180 20/50 +2.75    Type: PAL    Expiration Date: 4/20/2023                  RTC 1 yr

## 2022-04-28 ENCOUNTER — OFFICE VISIT (OUTPATIENT)
Dept: INTERNAL MEDICINE | Facility: CLINIC | Age: 72
End: 2022-04-28
Payer: MEDICARE

## 2022-04-28 VITALS
HEIGHT: 64 IN | SYSTOLIC BLOOD PRESSURE: 116 MMHG | DIASTOLIC BLOOD PRESSURE: 62 MMHG | HEART RATE: 71 BPM | OXYGEN SATURATION: 96 % | BODY MASS INDEX: 24.1 KG/M2 | WEIGHT: 141.13 LBS

## 2022-04-28 DIAGNOSIS — I71.40 ABDOMINAL AORTIC ANEURYSM (AAA) WITHOUT RUPTURE: ICD-10-CM

## 2022-04-28 DIAGNOSIS — I25.10 CORONARY ARTERIOSCLEROSIS IN NATIVE ARTERY: Chronic | ICD-10-CM

## 2022-04-28 DIAGNOSIS — E78.5 HYPERLIPIDEMIA ASSOCIATED WITH TYPE 2 DIABETES MELLITUS: Chronic | ICD-10-CM

## 2022-04-28 DIAGNOSIS — N40.0 BENIGN NON-NODULAR PROSTATIC HYPERPLASIA WITHOUT LOWER URINARY TRACT SYMPTOMS: Chronic | ICD-10-CM

## 2022-04-28 DIAGNOSIS — I10 ESSENTIAL HYPERTENSION: Chronic | ICD-10-CM

## 2022-04-28 DIAGNOSIS — E11.69 HYPERLIPIDEMIA ASSOCIATED WITH TYPE 2 DIABETES MELLITUS: Chronic | ICD-10-CM

## 2022-04-28 DIAGNOSIS — E11.3213 TYPE 2 DIABETES MELLITUS WITH BOTH EYES AFFECTED BY MILD NONPROLIFERATIVE RETINOPATHY AND MACULAR EDEMA, WITH LONG-TERM CURRENT USE OF INSULIN: ICD-10-CM

## 2022-04-28 DIAGNOSIS — Z79.4 TYPE 2 DIABETES MELLITUS WITH HYPERGLYCEMIA, WITH LONG-TERM CURRENT USE OF INSULIN: ICD-10-CM

## 2022-04-28 DIAGNOSIS — E11.59 TYPE 2 DIABETES MELLITUS WITH OTHER CIRCULATORY COMPLICATION, WITH LONG-TERM CURRENT USE OF INSULIN: ICD-10-CM

## 2022-04-28 DIAGNOSIS — Z79.4 TYPE 2 DIABETES MELLITUS WITH OTHER CIRCULATORY COMPLICATION, WITH LONG-TERM CURRENT USE OF INSULIN: ICD-10-CM

## 2022-04-28 DIAGNOSIS — I70.0 AORTIC ATHEROSCLEROSIS: Chronic | ICD-10-CM

## 2022-04-28 DIAGNOSIS — H40.1130 PRIMARY OPEN ANGLE GLAUCOMA OF BOTH EYES, UNSPECIFIED GLAUCOMA STAGE: Chronic | ICD-10-CM

## 2022-04-28 DIAGNOSIS — E11.65 TYPE 2 DIABETES MELLITUS WITH HYPERGLYCEMIA, WITH LONG-TERM CURRENT USE OF INSULIN: ICD-10-CM

## 2022-04-28 DIAGNOSIS — E27.8 ADRENAL NODULE: Chronic | ICD-10-CM

## 2022-04-28 DIAGNOSIS — Z79.4 TYPE 2 DIABETES MELLITUS WITH BOTH EYES AFFECTED BY MILD NONPROLIFERATIVE RETINOPATHY AND MACULAR EDEMA, WITH LONG-TERM CURRENT USE OF INSULIN: ICD-10-CM

## 2022-04-28 DIAGNOSIS — I20.89 CHRONIC STABLE ANGINA: Chronic | ICD-10-CM

## 2022-04-28 DIAGNOSIS — Z00.00 ENCOUNTER FOR PREVENTIVE HEALTH EXAMINATION: Primary | ICD-10-CM

## 2022-04-28 PROCEDURE — 3066F PR DOCUMENTATION OF TREATMENT FOR NEPHROPATHY: ICD-10-PCS | Mod: CPTII,S$GLB,, | Performed by: NURSE PRACTITIONER

## 2022-04-28 PROCEDURE — 3074F PR MOST RECENT SYSTOLIC BLOOD PRESSURE < 130 MM HG: ICD-10-PCS | Mod: CPTII,S$GLB,, | Performed by: NURSE PRACTITIONER

## 2022-04-28 PROCEDURE — 1126F AMNT PAIN NOTED NONE PRSNT: CPT | Mod: CPTII,S$GLB,, | Performed by: NURSE PRACTITIONER

## 2022-04-28 PROCEDURE — 3288F FALL RISK ASSESSMENT DOCD: CPT | Mod: CPTII,S$GLB,, | Performed by: NURSE PRACTITIONER

## 2022-04-28 PROCEDURE — 1126F PR PAIN SEVERITY QUANTIFIED, NO PAIN PRESENT: ICD-10-PCS | Mod: CPTII,S$GLB,, | Performed by: NURSE PRACTITIONER

## 2022-04-28 PROCEDURE — 3061F PR NEG MICROALBUMINURIA RESULT DOCUMENTED/REVIEW: ICD-10-PCS | Mod: CPTII,S$GLB,, | Performed by: NURSE PRACTITIONER

## 2022-04-28 PROCEDURE — 99499 RISK ADDL DX/OHS AUDIT: ICD-10-PCS | Mod: S$GLB,,, | Performed by: NURSE PRACTITIONER

## 2022-04-28 PROCEDURE — 3074F SYST BP LT 130 MM HG: CPT | Mod: CPTII,S$GLB,, | Performed by: NURSE PRACTITIONER

## 2022-04-28 PROCEDURE — 1101F PR PT FALLS ASSESS DOC 0-1 FALLS W/OUT INJ PAST YR: ICD-10-PCS | Mod: CPTII,S$GLB,, | Performed by: NURSE PRACTITIONER

## 2022-04-28 PROCEDURE — 99999 PR PBB SHADOW E&M-EST. PATIENT-LVL V: ICD-10-PCS | Mod: PBBFAC,,, | Performed by: NURSE PRACTITIONER

## 2022-04-28 PROCEDURE — 3066F NEPHROPATHY DOC TX: CPT | Mod: CPTII,S$GLB,, | Performed by: NURSE PRACTITIONER

## 2022-04-28 PROCEDURE — 3078F DIAST BP <80 MM HG: CPT | Mod: CPTII,S$GLB,, | Performed by: NURSE PRACTITIONER

## 2022-04-28 PROCEDURE — 3051F PR MOST RECENT HEMOGLOBIN A1C LEVEL 7.0 - < 8.0%: ICD-10-PCS | Mod: CPTII,S$GLB,, | Performed by: NURSE PRACTITIONER

## 2022-04-28 PROCEDURE — 3061F NEG MICROALBUMINURIA REV: CPT | Mod: CPTII,S$GLB,, | Performed by: NURSE PRACTITIONER

## 2022-04-28 PROCEDURE — 1170F PR FUNCTIONAL STATUS ASSESSED: ICD-10-PCS | Mod: CPTII,S$GLB,, | Performed by: NURSE PRACTITIONER

## 2022-04-28 PROCEDURE — 3072F LOW RISK FOR RETINOPATHY: CPT | Mod: CPTII,S$GLB,, | Performed by: NURSE PRACTITIONER

## 2022-04-28 PROCEDURE — 3008F PR BODY MASS INDEX (BMI) DOCUMENTED: ICD-10-PCS | Mod: CPTII,S$GLB,, | Performed by: NURSE PRACTITIONER

## 2022-04-28 PROCEDURE — 1170F FXNL STATUS ASSESSED: CPT | Mod: CPTII,S$GLB,, | Performed by: NURSE PRACTITIONER

## 2022-04-28 PROCEDURE — 1159F PR MEDICATION LIST DOCUMENTED IN MEDICAL RECORD: ICD-10-PCS | Mod: CPTII,S$GLB,, | Performed by: NURSE PRACTITIONER

## 2022-04-28 PROCEDURE — 3051F HG A1C>EQUAL 7.0%<8.0%: CPT | Mod: CPTII,S$GLB,, | Performed by: NURSE PRACTITIONER

## 2022-04-28 PROCEDURE — 1101F PT FALLS ASSESS-DOCD LE1/YR: CPT | Mod: CPTII,S$GLB,, | Performed by: NURSE PRACTITIONER

## 2022-04-28 PROCEDURE — G0439 PR MEDICARE ANNUAL WELLNESS SUBSEQUENT VISIT: ICD-10-PCS | Mod: S$GLB,,, | Performed by: NURSE PRACTITIONER

## 2022-04-28 PROCEDURE — 99999 PR PBB SHADOW E&M-EST. PATIENT-LVL V: CPT | Mod: PBBFAC,,, | Performed by: NURSE PRACTITIONER

## 2022-04-28 PROCEDURE — G0439 PPPS, SUBSEQ VISIT: HCPCS | Mod: S$GLB,,, | Performed by: NURSE PRACTITIONER

## 2022-04-28 PROCEDURE — 1160F PR REVIEW ALL MEDS BY PRESCRIBER/CLIN PHARMACIST DOCUMENTED: ICD-10-PCS | Mod: CPTII,S$GLB,, | Performed by: NURSE PRACTITIONER

## 2022-04-28 PROCEDURE — 3078F PR MOST RECENT DIASTOLIC BLOOD PRESSURE < 80 MM HG: ICD-10-PCS | Mod: CPTII,S$GLB,, | Performed by: NURSE PRACTITIONER

## 2022-04-28 PROCEDURE — 1160F RVW MEDS BY RX/DR IN RCRD: CPT | Mod: CPTII,S$GLB,, | Performed by: NURSE PRACTITIONER

## 2022-04-28 PROCEDURE — 99499 UNLISTED E&M SERVICE: CPT | Mod: S$GLB,,, | Performed by: NURSE PRACTITIONER

## 2022-04-28 PROCEDURE — 3288F PR FALLS RISK ASSESSMENT DOCUMENTED: ICD-10-PCS | Mod: CPTII,S$GLB,, | Performed by: NURSE PRACTITIONER

## 2022-04-28 PROCEDURE — 1159F MED LIST DOCD IN RCRD: CPT | Mod: CPTII,S$GLB,, | Performed by: NURSE PRACTITIONER

## 2022-04-28 PROCEDURE — 3008F BODY MASS INDEX DOCD: CPT | Mod: CPTII,S$GLB,, | Performed by: NURSE PRACTITIONER

## 2022-04-28 PROCEDURE — 3072F PR LOW RISK FOR RETINOPATHY: ICD-10-PCS | Mod: CPTII,S$GLB,, | Performed by: NURSE PRACTITIONER

## 2022-04-28 NOTE — PATIENT INSTRUCTIONS
Counseling and Referral of Other Preventative  (Italic type indicates deductible and co-insurance are waived)    Patient Name: Ned Oliveira  Today's Date: 4/28/2022    Health Maintenance       Date Due Completion Date    High Dose Statin Never done ---    Hemoglobin A1c 07/13/2022 1/13/2022    Foot Exam 10/20/2022 10/20/2021    Override on 7/9/2018: Done    Lipid Panel 10/20/2022 10/20/2021    Aspirin/Antiplatelet Therapy 01/01/2023 1/1/2022 (Done)    Override on 1/1/2022: Done    Diabetes Urine Screening 01/20/2023 1/20/2022    Eye Exam 04/20/2023 4/20/2022    TETANUS VACCINE 09/01/2025 9/1/2015    Colorectal Cancer Screening 02/08/2029 2/8/2019    Override on 4/3/2017: Declined        No orders of the defined types were placed in this encounter.    The following information is provided to all patients.  This information is to help you find resources for any of the problems found today that may be affecting your health:                Living healthy guide: www.[facility].[place].gov      Understanding Diabetes: www.diabetes.org      Eating healthy: www.cdc.gov/healthyweight      CDC home safety checklist: www.cdc.gov/steadi/patient.html      Agency on Aging: www.goea.[place].[facility]      Alcoholics anonymous (AA): www.aa.org      Physical Activity: www.sally.nih.gov/fy4ojss      Tobacco use: www.quitwithusla.org

## 2022-04-28 NOTE — PROGRESS NOTES
"  Ned Oliveira presented for a  Medicare AWV and comprehensive Health Risk Assessment today. The following components were reviewed and updated:    · Medical history  · Family History  · Social history  · Allergies and Current Medications  · Health Risk Assessment  · Health Maintenance  · Care Team         ** See Completed Assessments for Annual Wellness Visit within the encounter summary.**         The following assessments were completed:  · Living Situation  · CAGE  · Depression Screening  · Timed Get Up and Go  · Whisper Test  · Cognitive Function Screening      ·   · Nutrition Screening  · ADL Screening  · PAQ Screening        Vitals:    04/28/22 1300 04/28/22 1311   BP:  116/62   BP Location:  Right arm   Patient Position:  Sitting   Pulse:  71   SpO2:  96%   Weight: 64 kg (141 lb 1.5 oz)    Height: 5' 4" (1.626 m)      Body mass index is 24.22 kg/m².  Physical Exam  Vitals and nursing note reviewed.   Constitutional:       Appearance: He is well-developed.   HENT:      Head: Normocephalic.   Cardiovascular:      Rate and Rhythm: Normal rate and regular rhythm.   Pulmonary:      Effort: Pulmonary effort is normal.      Breath sounds: Normal breath sounds.   Abdominal:      General: Bowel sounds are normal.      Palpations: Abdomen is soft.   Musculoskeletal:         General: Normal range of motion.   Neurological:      Mental Status: He is alert and oriented to person, place, and time.      Motor: No abnormal muscle tone.   Psychiatric:         Mood and Affect: Mood normal.               Diagnoses and health risks identified today and associated recommendations/orders:    1. Encounter for preventive health examination  Here for Health Risk Assessment/Annual Wellness Visit.  Health maintenance reviewed and updated. Follow up in one year.    2. Essential hypertension  Chronic, stable on current medications. Followed by PCP.    3. Aortic atherosclerosis  Chronic, stable on current medications. Noted CT " Abdomen/Pelvis 5/21/16. Followed by PCP.    4. Coronary arteriosclerosis in native artery  Chronic, stable on current medications. Reports he is no longer taking ASA. Advised to resume ASA until seen by Cardiology. Followed by PCP. Follow up scheduled with Cardiology.    5. Chronic stable angina  Chronic, stable on current medications. Reporting no episodes of chest pain. Followed by PCP. Follow up scheduled with Cardiology.    6. Abdominal aortic aneurysm (AAA) without rupture  Chronic, stable on current medications. Followed by PCP. Follow up scheduled with Cardiology.    7. Hyperlipidemia associated with type 2 diabetes mellitus  Chronic, stable on current medication. Followed by PCP. Follow up scheduled with Cardiology.    8. Type 2 diabetes mellitus with both eyes affected by mild nonproliferative retinopathy and macular edema, with long-term current use of insulin  Chronic, not at goal. Last A1c 7.4. Reports he is not longer taking metformin. Followed by PCP.    9. Type 2 diabetes mellitus with hyperglycemia, with long-term current use of insulin  Chronic, not at goal. Last A1c 7.4. Reports he is not longer taking metformin. Followed by PCP.    10. Type 2 diabetes mellitus with other circulatory complication, with long-term current use of insulin  Chronic, not at goal. Last A1c 7.4. Reports he is not longer taking metformin. Followed by PCP.    11. Benign non-nodular prostatic hyperplasia without lower urinary tract symptoms  Chronic, stable on current medications. Followed by PCP, Urology.    12. Primary open angle glaucoma of both eyes, unspecified glaucoma stage  Chronic, stable on current medications. Followed by Ophthalmology.    13. Adrenal nodule  Chronic, stable. Followed by PCP.      Provided Ned with a 5-10 year written screening schedule and personal prevention plan. Recommendations were developed using the USPSTF age appropriate recommendations. Education, counseling, and referrals were provided  as needed. After Visit Summary printed and given to patient which includes a list of additional screenings\tests needed.    Follow up in about 3 months (around 7/20/2022).with PCP    Mary Arshad NP

## 2022-05-04 ENCOUNTER — OFFICE VISIT (OUTPATIENT)
Dept: CARDIOLOGY | Facility: CLINIC | Age: 72
End: 2022-05-04
Payer: MEDICARE

## 2022-05-04 VITALS
WEIGHT: 144.19 LBS | HEIGHT: 64 IN | BODY MASS INDEX: 24.62 KG/M2 | DIASTOLIC BLOOD PRESSURE: 63 MMHG | SYSTOLIC BLOOD PRESSURE: 105 MMHG | OXYGEN SATURATION: 97 % | HEART RATE: 68 BPM

## 2022-05-04 DIAGNOSIS — I25.10 CORONARY ARTERIOSCLEROSIS IN NATIVE ARTERY: Primary | Chronic | ICD-10-CM

## 2022-05-04 DIAGNOSIS — I10 ESSENTIAL HYPERTENSION: Chronic | ICD-10-CM

## 2022-05-04 DIAGNOSIS — I70.0 AORTIC ATHEROSCLEROSIS: Chronic | ICD-10-CM

## 2022-05-04 DIAGNOSIS — I71.40 ABDOMINAL AORTIC ANEURYSM (AAA) WITHOUT RUPTURE: ICD-10-CM

## 2022-05-04 DIAGNOSIS — E11.59 TYPE 2 DIABETES MELLITUS WITH OTHER CIRCULATORY COMPLICATION, WITH LONG-TERM CURRENT USE OF INSULIN: ICD-10-CM

## 2022-05-04 DIAGNOSIS — R29.818 SUSPECTED SLEEP APNEA: ICD-10-CM

## 2022-05-04 DIAGNOSIS — I20.9 ANGINA PECTORIS: ICD-10-CM

## 2022-05-04 DIAGNOSIS — I25.118 CORONARY ARTERY DISEASE WITH STABLE ANGINA PECTORIS, UNSPECIFIED VESSEL OR LESION TYPE, UNSPECIFIED WHETHER NATIVE OR TRANSPLANTED HEART: ICD-10-CM

## 2022-05-04 DIAGNOSIS — E11.69 HYPERLIPIDEMIA ASSOCIATED WITH TYPE 2 DIABETES MELLITUS: Chronic | ICD-10-CM

## 2022-05-04 DIAGNOSIS — E78.5 HYPERLIPIDEMIA ASSOCIATED WITH TYPE 2 DIABETES MELLITUS: Chronic | ICD-10-CM

## 2022-05-04 DIAGNOSIS — I20.89 CHRONIC STABLE ANGINA: Chronic | ICD-10-CM

## 2022-05-04 DIAGNOSIS — Z79.4 TYPE 2 DIABETES MELLITUS WITH OTHER CIRCULATORY COMPLICATION, WITH LONG-TERM CURRENT USE OF INSULIN: ICD-10-CM

## 2022-05-04 PROCEDURE — 3288F PR FALLS RISK ASSESSMENT DOCUMENTED: ICD-10-PCS | Mod: CPTII,S$GLB,, | Performed by: PHYSICIAN ASSISTANT

## 2022-05-04 PROCEDURE — 1160F PR REVIEW ALL MEDS BY PRESCRIBER/CLIN PHARMACIST DOCUMENTED: ICD-10-PCS | Mod: CPTII,S$GLB,, | Performed by: PHYSICIAN ASSISTANT

## 2022-05-04 PROCEDURE — 3072F LOW RISK FOR RETINOPATHY: CPT | Mod: CPTII,S$GLB,, | Performed by: PHYSICIAN ASSISTANT

## 2022-05-04 PROCEDURE — 3078F PR MOST RECENT DIASTOLIC BLOOD PRESSURE < 80 MM HG: ICD-10-PCS | Mod: CPTII,S$GLB,, | Performed by: PHYSICIAN ASSISTANT

## 2022-05-04 PROCEDURE — 1126F AMNT PAIN NOTED NONE PRSNT: CPT | Mod: CPTII,S$GLB,, | Performed by: PHYSICIAN ASSISTANT

## 2022-05-04 PROCEDURE — 1101F PR PT FALLS ASSESS DOC 0-1 FALLS W/OUT INJ PAST YR: ICD-10-PCS | Mod: CPTII,S$GLB,, | Performed by: PHYSICIAN ASSISTANT

## 2022-05-04 PROCEDURE — 3061F PR NEG MICROALBUMINURIA RESULT DOCUMENTED/REVIEW: ICD-10-PCS | Mod: CPTII,S$GLB,, | Performed by: PHYSICIAN ASSISTANT

## 2022-05-04 PROCEDURE — 3008F BODY MASS INDEX DOCD: CPT | Mod: CPTII,S$GLB,, | Performed by: PHYSICIAN ASSISTANT

## 2022-05-04 PROCEDURE — 3051F HG A1C>EQUAL 7.0%<8.0%: CPT | Mod: CPTII,S$GLB,, | Performed by: PHYSICIAN ASSISTANT

## 2022-05-04 PROCEDURE — 3051F PR MOST RECENT HEMOGLOBIN A1C LEVEL 7.0 - < 8.0%: ICD-10-PCS | Mod: CPTII,S$GLB,, | Performed by: PHYSICIAN ASSISTANT

## 2022-05-04 PROCEDURE — 99214 OFFICE O/P EST MOD 30 MIN: CPT | Mod: S$GLB,,, | Performed by: PHYSICIAN ASSISTANT

## 2022-05-04 PROCEDURE — 3061F NEG MICROALBUMINURIA REV: CPT | Mod: CPTII,S$GLB,, | Performed by: PHYSICIAN ASSISTANT

## 2022-05-04 PROCEDURE — 3074F PR MOST RECENT SYSTOLIC BLOOD PRESSURE < 130 MM HG: ICD-10-PCS | Mod: CPTII,S$GLB,, | Performed by: PHYSICIAN ASSISTANT

## 2022-05-04 PROCEDURE — 3072F PR LOW RISK FOR RETINOPATHY: ICD-10-PCS | Mod: CPTII,S$GLB,, | Performed by: PHYSICIAN ASSISTANT

## 2022-05-04 PROCEDURE — 99999 PR PBB SHADOW E&M-EST. PATIENT-LVL V: ICD-10-PCS | Mod: PBBFAC,,, | Performed by: PHYSICIAN ASSISTANT

## 2022-05-04 PROCEDURE — 1159F MED LIST DOCD IN RCRD: CPT | Mod: CPTII,S$GLB,, | Performed by: PHYSICIAN ASSISTANT

## 2022-05-04 PROCEDURE — 99214 PR OFFICE/OUTPT VISIT, EST, LEVL IV, 30-39 MIN: ICD-10-PCS | Mod: S$GLB,,, | Performed by: PHYSICIAN ASSISTANT

## 2022-05-04 PROCEDURE — 1159F PR MEDICATION LIST DOCUMENTED IN MEDICAL RECORD: ICD-10-PCS | Mod: CPTII,S$GLB,, | Performed by: PHYSICIAN ASSISTANT

## 2022-05-04 PROCEDURE — 1126F PR PAIN SEVERITY QUANTIFIED, NO PAIN PRESENT: ICD-10-PCS | Mod: CPTII,S$GLB,, | Performed by: PHYSICIAN ASSISTANT

## 2022-05-04 PROCEDURE — 3008F PR BODY MASS INDEX (BMI) DOCUMENTED: ICD-10-PCS | Mod: CPTII,S$GLB,, | Performed by: PHYSICIAN ASSISTANT

## 2022-05-04 PROCEDURE — 1160F RVW MEDS BY RX/DR IN RCRD: CPT | Mod: CPTII,S$GLB,, | Performed by: PHYSICIAN ASSISTANT

## 2022-05-04 PROCEDURE — 3078F DIAST BP <80 MM HG: CPT | Mod: CPTII,S$GLB,, | Performed by: PHYSICIAN ASSISTANT

## 2022-05-04 PROCEDURE — 3074F SYST BP LT 130 MM HG: CPT | Mod: CPTII,S$GLB,, | Performed by: PHYSICIAN ASSISTANT

## 2022-05-04 PROCEDURE — 3066F NEPHROPATHY DOC TX: CPT | Mod: CPTII,S$GLB,, | Performed by: PHYSICIAN ASSISTANT

## 2022-05-04 PROCEDURE — 3288F FALL RISK ASSESSMENT DOCD: CPT | Mod: CPTII,S$GLB,, | Performed by: PHYSICIAN ASSISTANT

## 2022-05-04 PROCEDURE — 99999 PR PBB SHADOW E&M-EST. PATIENT-LVL V: CPT | Mod: PBBFAC,,, | Performed by: PHYSICIAN ASSISTANT

## 2022-05-04 PROCEDURE — 1101F PT FALLS ASSESS-DOCD LE1/YR: CPT | Mod: CPTII,S$GLB,, | Performed by: PHYSICIAN ASSISTANT

## 2022-05-04 PROCEDURE — 3066F PR DOCUMENTATION OF TREATMENT FOR NEPHROPATHY: ICD-10-PCS | Mod: CPTII,S$GLB,, | Performed by: PHYSICIAN ASSISTANT

## 2022-05-04 RX ORDER — LISINOPRIL AND HYDROCHLOROTHIAZIDE 10; 12.5 MG/1; MG/1
1 TABLET ORAL DAILY
Qty: 90 TABLET | Refills: 3 | Status: SHIPPED | OUTPATIENT
Start: 2022-05-04 | End: 2023-04-20 | Stop reason: SDUPTHER

## 2022-05-04 RX ORDER — ATORVASTATIN CALCIUM 80 MG/1
80 TABLET, FILM COATED ORAL DAILY
Qty: 90 TABLET | Refills: 3 | Status: SHIPPED | OUTPATIENT
Start: 2022-05-04 | End: 2023-06-16 | Stop reason: SDUPTHER

## 2022-05-04 RX ORDER — METOPROLOL SUCCINATE 25 MG/1
12.5 TABLET, EXTENDED RELEASE ORAL DAILY
Qty: 45 TABLET | Refills: 3 | Status: SHIPPED | OUTPATIENT
Start: 2022-05-04 | End: 2022-09-27 | Stop reason: SDUPTHER

## 2022-05-04 RX ORDER — NITROGLYCERIN 0.4 MG/1
0.4 TABLET SUBLINGUAL EVERY 5 MIN PRN
Qty: 30 TABLET | Refills: 0 | Status: SHIPPED | OUTPATIENT
Start: 2022-05-04 | End: 2023-08-18 | Stop reason: SDUPTHER

## 2022-05-04 NOTE — PROGRESS NOTES
"    General Cardiology Clinic Note  Reason for Visit: Follow up   Last Clinic Visit: 6/4/2020 with Dr. Mendoza     HPI:   Ned Oliveira is a 71 y.o. male who presents for follow up.     Problems:  Multivessel CAD (on medical management)  Hypertension  Hyperlipidemia  T2DM  Former smoker   KETAN    Interval History   Pt presents for follow up. He denies chest pain, but states he gets epigastric pain with aerobic activity and PARIS. He takes Nitroglycerin "sometimes" (not able to be more specific) and is not clear whether it helps. He also takes Pepto Bismol frequently. It has been getting worse. He also gets the epigastric pain if he eats something greasy. He is very active at his job going up and down 18 flights of stairs all day and this is when he experiences the abdominal pain and PARIS. He denies pedal edema, orthopnea, PND, lightheadedness, syncope, palpitations. His wife is with him today and states he snores very loudly and sometimes stops breathing.    6/4/2020 (Dr. Mendoza)  Pt is a 68 y/o man here for follow-up.  He has seen Dr. Parker and Dr. Milton in the past.  He has T2DM, HTN, HLD and CAD and was a former smoker.  In 2009 he had an abnormal stress echo (developed angina) and LHC demonstrated diffuse "3VD".  Referred to CT surgery for CABG considering DMII, but was lost to follow up because of financial reasons.  He was placed on medical therapy which has controlled his angina quite well.      On assessment today, the patient states that he denies any exertional chest discomfort, exertional dyspnea, palpitations, TIA's, syncope or presyncope. Stopped working April 2020 due to COVID-19. Reports that he has been experiencing fatigue over the last year, but has worsened since he stopped working (prior he was a  and was walking several miles a day).      Has been taking SL NTG occasionally, reports only when he feels "tingling" in the left side of his chest, above clavicle usually when " he is sitting. Associated with fatigue.      Reports compliance with ASA, statin.   ROS:      Review of Systems   Constitutional: Negative for diaphoresis, malaise/fatigue, weight gain and weight loss.   HENT: Negative for nosebleeds.    Eyes: Negative for vision loss in left eye, vision loss in right eye and visual disturbance.   Cardiovascular: Positive for dyspnea on exertion. Negative for chest pain, claudication, irregular heartbeat, leg swelling, near-syncope, orthopnea, palpitations, paroxysmal nocturnal dyspnea and syncope.   Respiratory: Positive for snoring. Negative for cough, shortness of breath, sleep disturbances due to breathing and wheezing.    Hematologic/Lymphatic: Negative for bleeding problem. Does not bruise/bleed easily.   Skin: Negative for poor wound healing and rash.   Musculoskeletal: Negative for muscle cramps and myalgias.   Gastrointestinal: Positive for abdominal pain. Negative for bloating, diarrhea, heartburn, melena, nausea and vomiting.   Genitourinary: Negative for hematuria and nocturia.   Neurological: Negative for brief paralysis, dizziness, headaches, light-headedness, numbness and weakness.   Psychiatric/Behavioral: Negative for depression.   Allergic/Immunologic: Negative for hives.       PMH:     Past Medical History:   Diagnosis Date    Adrenal adenoma     BPH (benign prostatic hyperplasia)     Cataract     Coronary artery disease     Diabetes mellitus, type 2     Diabetic retinopathy     Glaucoma     Hyperlipidemia     Hypertension     Inguinal hernia of left side without obstruction or gangrene 2/6/2019    Nephrolithiasis 5/26/2016    Steroid responders, left 4/14/2020     Past Surgical History:   Procedure Laterality Date    BICEPS TENDON REPAIR Right     CATARACT EXTRACTION W/  INTRAOCULAR LENS IMPLANT Left 10/21/2020    COMPLEX PHACO IOL  AND BAERVELDT ()    COLONOSCOPY N/A 2/8/2019    Procedure: COLONOSCOPY;  Surgeon: Tavon Montes,  MD;  Location: [facility] ENDO (4TH FLR);  Service: Colon and Rectal;  Laterality: N/A;  will need . pt requesting ASAP. gaudencio Perez (international) ok to schedule at this time. will send  up for 6:45 am arrival time    IMPLANTATION OF DEVICE FOR GLAUCOMA Left 10/21/2020    Procedure: INSERTION, DEVICE, FOR GLAUCOMA;  Surgeon: Saranya Monge MD;  Location: [facility] OR 1ST FLR;  Service: Ophthalmology;  Laterality: Left;    INTRAOCULAR PROSTHESES INSERTION Left 10/21/2020    Procedure: INSERTION, IOL PROSTHESIS;  Surgeon: Saranya Monge MD;  Location: [facility] OR 1ST FLR;  Service: Ophthalmology;  Laterality: Left;    PHACOEMULSIFICATION OF CATARACT Left 10/21/2020    Procedure: PHACOEMULSIFICATION, CATARACT;  Surgeon: Saranya Monge MD;  Location: [facility] OR 1ST FLR;  Service: Ophthalmology;  Laterality: Left;    REFORMATION OF ANTERIOR CHAMBER Left 12/23/2020    WITH HEALON ()    YAG LASER  TO CYCLITIC MEMBRANE Left 12/10/2020         Allergies:     Review of patient's allergies indicates:   Allergen Reactions    Percocet [oxycodone-acetaminophen] Itching     Medications:     Current Outpatient Medications on File Prior to Visit   Medication Sig Dispense Refill    aspirin (ECOTRIN) 81 MG EC tablet Take 1 tablet (81 mg total) by mouth once daily.  0    atorvastatin (LIPITOR) 80 MG tablet Take 1 tablet (80 mg total) by mouth once daily. 90 tablet 3    atropine 1% (ISOPTO ATROPINE) 1 % Drop Place 1 drop into the left eye 2 (two) times daily. (Patient not taking: Reported on 4/28/2022) 1 Bottle 1    AVASTIN 25 mg/mL injection       diclofenac sodium (VOLTAREN) 1 % Gel Apply 2 g topically 3 (three) times daily. (Patient not taking: Reported on 4/28/2022) 50 g 2    dorzolamide-timolol 2-0.5% (COSOPT) 22.3-6.8 mg/mL ophthalmic solution Place 1 drop into the right eye 2 (two) times daily. 20 mL 3    finasteride (PROSCAR) 5 mg tablet Take 1 tablet (5 mg  "total) by mouth once daily. 30 tablet 11    ILUVIEN 0.19 mg intravitreal implant       insulin (LANTUS SOLOSTAR U-100 INSULIN) glargine 100 units/mL (3mL) SubQ pen Inject 20 Units into the skin every evening. 18 mL 3    lancets (ACCU-CHEK SOFTCLIX LANCETS) Misc 1 Box by Misc.(Non-Drug; Combo Route) route 2 hours after meals and at bedtime. 100 each 2    lisinopriL-hydrochlorothiazide (PRINZIDE,ZESTORETIC) 10-12.5 mg per tablet Take 1 tablet by mouth once daily. 90 tablet 3    metFORMIN (GLUCOPHAGE) 1000 MG tablet Take 1 tablet (1,000 mg total) by mouth 2 (two) times daily with meals. (Patient not taking: Reported on 2022) 180 tablet 3    metoprolol succinate (TOPROL-XL) 25 MG 24 hr tablet Take 0.5 tablets (12.5 mg total) by mouth once daily. 45 tablet 3    nitroGLYCERIN (NITROSTAT) 0.4 MG SL tablet Place 1 tablet (0.4 mg total) under the tongue every 5 (five) minutes as needed for Chest pain (ONLY IF HAVE CHEST PAIN,). 30 tablet 0    OZURDEX 0.7 mg Impl intravitreal implant       pen needle, diabetic (BD ULTRA-FINE ORIG PEN NEEDLE) 29 gauge x 1/2" Ndle 20 Units by Misc.(Non-Drug; Combo Route) route once daily. 90 each 1    tamsulosin (FLOMAX) 0.4 mg Cap Take 2 capsules (0.8 mg total) by mouth every evening. 60 capsule 11     No current facility-administered medications on file prior to visit.     Social History:     Social History     Tobacco Use    Smoking status: Former Smoker     Packs/day: 2.00     Years: 10.00     Pack years: 20.00     Types: Cigarettes     Start date: 8/15/1987     Quit date: 8/15/1996     Years since quittin.7    Smokeless tobacco: Never Used   Substance Use Topics    Alcohol use: No     Alcohol/week: 0.0 standard drinks     Family History:     Family History   Problem Relation Age of Onset    Diabetes Mother     Heart disease Mother     Heart disease Sister     No Known Problems Brother     Kidney failure Daughter         ESRD on HD    Autism Son     Asthma Son  " "   No Known Problems Sister     No Known Problems Sister     No Known Problems Sister     No Known Problems Brother     Diabetes Brother     Stroke Neg Hx     Amblyopia Neg Hx     Blindness Neg Hx     Cataracts Neg Hx     Glaucoma Neg Hx     Macular degeneration Neg Hx     Retinal detachment Neg Hx     Strabismus Neg Hx      Physical Exam:   /63 (BP Location: Left arm, Patient Position: Sitting, BP Method: Medium (Automatic))   Pulse 68   Ht 5' 4" (1.626 m)   Wt 65.4 kg (144 lb 2.9 oz)   SpO2 97%   BMI 24.75 kg/m²        Physical Exam  Vitals and nursing note reviewed.   Constitutional:       General: He is not in acute distress.     Appearance: Normal appearance.   HENT:      Head: Normocephalic and atraumatic.      Nose: Nose normal.   Eyes:      General: No scleral icterus.     Extraocular Movements: Extraocular movements intact.      Conjunctiva/sclera: Conjunctivae normal.   Neck:      Thyroid: No thyromegaly.      Vascular: No carotid bruit or JVD.   Cardiovascular:      Rate and Rhythm: Normal rate and regular rhythm.      Pulses: Normal pulses.      Heart sounds: Normal heart sounds. No murmur heard.    No friction rub. No gallop.   Pulmonary:      Effort: Pulmonary effort is normal.      Breath sounds: Normal breath sounds. No wheezing, rhonchi or rales.   Chest:      Chest wall: No tenderness.   Abdominal:      General: Bowel sounds are normal. There is no distension.      Palpations: Abdomen is soft.      Tenderness: There is no abdominal tenderness.   Musculoskeletal:      Cervical back: Neck supple.      Right lower leg: No edema.      Left lower leg: No edema.   Skin:     General: Skin is warm and dry.      Coloration: Skin is not pale.      Findings: No erythema or rash.      Nails: There is no clubbing.   Neurological:      Mental Status: He is alert and oriented to person, place, and time.   Psychiatric:         Mood and Affect: Mood and affect normal.         Behavior: " Behavior normal.          Labs:     Lab Results   Component Value Date     02/18/2021    K 4.5 02/18/2021     02/18/2021    CO2 31 (H) 02/18/2021    BUN 16 02/18/2021    CREATININE 1.1 02/18/2021    ANIONGAP 5 (L) 02/18/2021     Lab Results   Component Value Date    HGBA1C 7.4 (H) 01/13/2022     Lab Results   Component Value Date    BNP 23 07/21/2009    Lab Results   Component Value Date    WBC 7.15 05/19/2020    HGB 15.2 05/19/2020    HCT 46.4 05/19/2020    HCT 45 05/21/2016     05/19/2020    GRAN 4.0 05/19/2020    GRAN 56.6 05/19/2020     Lab Results   Component Value Date    CHOL 115 (L) 10/20/2021    HDL 47 10/20/2021    LDLCALC 49.2 (L) 10/20/2021    TRIG 94 10/20/2021          Imaging:   Echocardiograms:   TTE 10/10/2016    1 - Normal left ventricular systolic function (EF 55-60%).     2 - Normal left ventricular diastolic function.     3 - Mild mitral regurgitation.     4 - Normal right ventricular systolic function .     Stress Tests:   PET Stress Test 8/11/2020    There is a small sized, moderate intensity  apical reversible perfusion abnormality involving 7% of the LV myocardium in the distribution of theLAD indicative of focal coronary stenosis.    Within the perfusion abnormality absolute myocardial perfusion (cc/min/gm) averaged 0.84 cc/min/g at rest, 0.94 cc/min/g at stress and CFR was 1.12 cc/min/g, which equates to moderately to severely reduced coronary flow capacity in 18% of the myocardium (within the LAD territory) .    Whole heart absolute myocardial perfusion (cc/min/g) averaged 0.95 cc/min/g at rest, which is reduced, 1.56 cc/min/g at stress, which is severely reduced, and 1.65  CFR, which is severely reduced.    Gated perfusion images showed an ejection fraction of 58% at rest and 76% during stress. Normal ejection fraction is greater than 51%.    Wall motion was normal at rest and during stress.    LV cavity size is normal at rest and stress.    The EKG portion of  this study is negative for ischemia.    There were no arrhythmias during stress.    The patient reported no chest pain during the stress test.    There are no prior studies for comparison.      Caths:   None    Other:  Abdominal Aorta Ultrasound 10/26/2021  Mild fusiform dilatation of the proximal abdominal aorta (3.0 x 2.1 cm).    Abdominal Aorta Ultrasound 3/25/2019  · No AAA.      EKG 8/11/2020: normal sinus rhythm, no blocks or conduction defects, no ischemic changes    Assessment:     1. Coronary arteriosclerosis in native artery    2. Chronic stable angina    3. Abdominal aortic aneurysm (AAA) without rupture    4. Essential hypertension    5. Hyperlipidemia associated with type 2 diabetes mellitus    6. Aortic atherosclerosis    7. Type 2 diabetes mellitus with other circulatory complication, with long-term current use of insulin    8. Coronary artery disease with stable angina pectoris, unspecified vessel or lesion type, unspecified whether native or transplanted heart    9. Suspected sleep apnea    10. Angina pectoris       Plan:     Coronary artery disease  PET 2020 with small defect in LAD territory. Continued on medical management  Patient reports an epigastric discomfort with aerobic activity as well as PARIS that has been worsening, concerning for angina vs GI etiology.   Obtain PET stress test to evaluate for worsening ischemia  Continue ASA 81 mg daily   Continue Lipitor 80 mg daily     Hypertension  Controlled  Continue Lisinopril-HCTZ  Continue Metoprolol succinate    Hyperlipidemia  LDL at goal on last check  Continue Lipitor     AAA  Small (3.0 x 2.1 cm) dilation of proximal ascending aorta on ultrasound 10/2021.   Will repeat in 1-2 years.     DM Type 2   A1c above goal  Needs to establish care with new PCP    Suspected sleep apnea  Refer to sleep medicine for KETAN eval .    Follow up in 6 months or sooner depending on PET stress test results.     Signed:  Kimi Ferrer PA-C  Cardiology    [phone] - General  5/4/2022 11:46 AM

## 2022-05-11 ENCOUNTER — OFFICE VISIT (OUTPATIENT)
Dept: UROLOGY | Facility: CLINIC | Age: 72
End: 2022-05-11
Payer: MEDICARE

## 2022-05-11 VITALS
HEIGHT: 64 IN | DIASTOLIC BLOOD PRESSURE: 64 MMHG | HEART RATE: 65 BPM | SYSTOLIC BLOOD PRESSURE: 98 MMHG | WEIGHT: 140.63 LBS | BODY MASS INDEX: 24.01 KG/M2

## 2022-05-11 DIAGNOSIS — N40.0 BENIGN PROSTATIC HYPERPLASIA, UNSPECIFIED WHETHER LOWER URINARY TRACT SYMPTOMS PRESENT: ICD-10-CM

## 2022-05-11 DIAGNOSIS — N39.0 URINARY TRACT INFECTION WITHOUT HEMATURIA, SITE UNSPECIFIED: Primary | ICD-10-CM

## 2022-05-11 PROCEDURE — 3066F PR DOCUMENTATION OF TREATMENT FOR NEPHROPATHY: ICD-10-PCS | Mod: CPTII,S$GLB,, | Performed by: UROLOGY

## 2022-05-11 PROCEDURE — 87186 SC STD MICRODIL/AGAR DIL: CPT | Performed by: UROLOGY

## 2022-05-11 PROCEDURE — 99499 UNLISTED E&M SERVICE: CPT | Mod: ,,, | Performed by: UROLOGY

## 2022-05-11 PROCEDURE — 1126F PR PAIN SEVERITY QUANTIFIED, NO PAIN PRESENT: ICD-10-PCS | Mod: CPTII,S$GLB,, | Performed by: UROLOGY

## 2022-05-11 PROCEDURE — 3066F NEPHROPATHY DOC TX: CPT | Mod: CPTII,S$GLB,, | Performed by: UROLOGY

## 2022-05-11 PROCEDURE — 3074F PR MOST RECENT SYSTOLIC BLOOD PRESSURE < 130 MM HG: ICD-10-PCS | Mod: CPTII,S$GLB,, | Performed by: UROLOGY

## 2022-05-11 PROCEDURE — 99999 PR PBB SHADOW E&M-EST. PATIENT-LVL IV: ICD-10-PCS | Mod: PBBFAC,,, | Performed by: UROLOGY

## 2022-05-11 PROCEDURE — 1101F PT FALLS ASSESS-DOCD LE1/YR: CPT | Mod: CPTII,S$GLB,, | Performed by: UROLOGY

## 2022-05-11 PROCEDURE — 87088 URINE BACTERIA CULTURE: CPT | Performed by: UROLOGY

## 2022-05-11 PROCEDURE — 3078F PR MOST RECENT DIASTOLIC BLOOD PRESSURE < 80 MM HG: ICD-10-PCS | Mod: CPTII,S$GLB,, | Performed by: UROLOGY

## 2022-05-11 PROCEDURE — 3061F NEG MICROALBUMINURIA REV: CPT | Mod: CPTII,S$GLB,, | Performed by: UROLOGY

## 2022-05-11 PROCEDURE — 3078F DIAST BP <80 MM HG: CPT | Mod: CPTII,S$GLB,, | Performed by: UROLOGY

## 2022-05-11 PROCEDURE — 3074F SYST BP LT 130 MM HG: CPT | Mod: CPTII,S$GLB,, | Performed by: UROLOGY

## 2022-05-11 PROCEDURE — 1126F AMNT PAIN NOTED NONE PRSNT: CPT | Mod: CPTII,S$GLB,, | Performed by: UROLOGY

## 2022-05-11 PROCEDURE — 3051F PR MOST RECENT HEMOGLOBIN A1C LEVEL 7.0 - < 8.0%: ICD-10-PCS | Mod: CPTII,S$GLB,, | Performed by: UROLOGY

## 2022-05-11 PROCEDURE — 3072F LOW RISK FOR RETINOPATHY: CPT | Mod: CPTII,S$GLB,, | Performed by: UROLOGY

## 2022-05-11 PROCEDURE — 99214 PR OFFICE/OUTPT VISIT, EST, LEVL IV, 30-39 MIN: ICD-10-PCS | Mod: S$GLB,,, | Performed by: UROLOGY

## 2022-05-11 PROCEDURE — 3288F PR FALLS RISK ASSESSMENT DOCUMENTED: ICD-10-PCS | Mod: CPTII,S$GLB,, | Performed by: UROLOGY

## 2022-05-11 PROCEDURE — 1159F PR MEDICATION LIST DOCUMENTED IN MEDICAL RECORD: ICD-10-PCS | Mod: CPTII,S$GLB,, | Performed by: UROLOGY

## 2022-05-11 PROCEDURE — 99999 PR PBB SHADOW E&M-EST. PATIENT-LVL IV: CPT | Mod: PBBFAC,,, | Performed by: UROLOGY

## 2022-05-11 PROCEDURE — 3288F FALL RISK ASSESSMENT DOCD: CPT | Mod: CPTII,S$GLB,, | Performed by: UROLOGY

## 2022-05-11 PROCEDURE — 4010F PR ACE/ARB THEARPY RXD/TAKEN: ICD-10-PCS | Mod: CPTII,S$GLB,, | Performed by: UROLOGY

## 2022-05-11 PROCEDURE — 3072F PR LOW RISK FOR RETINOPATHY: ICD-10-PCS | Mod: CPTII,S$GLB,, | Performed by: UROLOGY

## 2022-05-11 PROCEDURE — 3008F BODY MASS INDEX DOCD: CPT | Mod: CPTII,S$GLB,, | Performed by: UROLOGY

## 2022-05-11 PROCEDURE — 4010F ACE/ARB THERAPY RXD/TAKEN: CPT | Mod: CPTII,S$GLB,, | Performed by: UROLOGY

## 2022-05-11 PROCEDURE — 99499 RISK ADDL DX/OHS AUDIT: ICD-10-PCS | Mod: ,,, | Performed by: UROLOGY

## 2022-05-11 PROCEDURE — 1159F MED LIST DOCD IN RCRD: CPT | Mod: CPTII,S$GLB,, | Performed by: UROLOGY

## 2022-05-11 PROCEDURE — 99214 OFFICE O/P EST MOD 30 MIN: CPT | Mod: S$GLB,,, | Performed by: UROLOGY

## 2022-05-11 PROCEDURE — 1101F PR PT FALLS ASSESS DOC 0-1 FALLS W/OUT INJ PAST YR: ICD-10-PCS | Mod: CPTII,S$GLB,, | Performed by: UROLOGY

## 2022-05-11 PROCEDURE — 87086 URINE CULTURE/COLONY COUNT: CPT | Performed by: UROLOGY

## 2022-05-11 PROCEDURE — 3061F PR NEG MICROALBUMINURIA RESULT DOCUMENTED/REVIEW: ICD-10-PCS | Mod: CPTII,S$GLB,, | Performed by: UROLOGY

## 2022-05-11 PROCEDURE — 3051F HG A1C>EQUAL 7.0%<8.0%: CPT | Mod: CPTII,S$GLB,, | Performed by: UROLOGY

## 2022-05-11 PROCEDURE — 3008F PR BODY MASS INDEX (BMI) DOCUMENTED: ICD-10-PCS | Mod: CPTII,S$GLB,, | Performed by: UROLOGY

## 2022-05-11 PROCEDURE — 87077 CULTURE AEROBIC IDENTIFY: CPT | Performed by: UROLOGY

## 2022-05-11 NOTE — PROGRESS NOTES
Subjective:       Patient ID: Ned Oliveira is a 71 y.o. male.    Chief Complaint: Follow-up (3 mth and to discuss TURP)    HPI patient is here for an elevated postvoid residual urine.  He has been on Flomax 0.8 mg and Proscar.  He strains to void and has incomplete emptying.  His postvoid residual is 269 cc. His urinalysis shows a few white blood cells I am going to get a urine culture.    Past Medical History:   Diagnosis Date    Adrenal adenoma     BPH (benign prostatic hyperplasia)     Cataract     Coronary artery disease     Diabetes mellitus, type 2     Diabetic retinopathy     Glaucoma     Hyperlipidemia     Hypertension     Inguinal hernia of left side without obstruction or gangrene 2/6/2019    Nephrolithiasis 5/26/2016    Steroid responders, left 4/14/2020       Past Surgical History:   Procedure Laterality Date    BICEPS TENDON REPAIR Right     CATARACT EXTRACTION W/  INTRAOCULAR LENS IMPLANT Left 10/21/2020    COMPLEX PHACO IOL  AND BAERVELDT ()    COLONOSCOPY N/A 2/8/2019    Procedure: COLONOSCOPY;  Surgeon: Tavon Montes MD;  Location: [facility] (4TH FLR);  Service: Colon and Rectal;  Laterality: N/A;  will need . pt requesting ASAP. per Ana (international) ok to schedule at this time. will send  up for 6:45 am arrival time    IMPLANTATION OF DEVICE FOR GLAUCOMA Left 10/21/2020    Procedure: INSERTION, DEVICE, FOR GLAUCOMA;  Surgeon: Saranya Monge MD;  Location: [facility] OR 1ST FLR;  Service: Ophthalmology;  Laterality: Left;    INTRAOCULAR PROSTHESES INSERTION Left 10/21/2020    Procedure: INSERTION, IOL PROSTHESIS;  Surgeon: Saranya Monge MD;  Location: [facility] OR [address] [place] [address];  Service: Ophthalmology;  Laterality: Left;    PHACOEMULSIFICATION OF CATARACT Left 10/21/2020    Procedure: PHACOEMULSIFICATION, CATARACT;  Surgeon: Saranya Monge MD;  Location: [facility] OR [address] [place] [address];  Service: Ophthalmology;  Laterality: Left;     REFORMATION OF ANTERIOR CHAMBER Left 2020    WITH HEALON ()    YAG LASER  TO CYCLITIC MEMBRANE Left 12/10/2020           Family History   Problem Relation Age of Onset    Diabetes Mother     Heart disease Mother     Heart disease Sister     No Known Problems Brother     Kidney failure Daughter         ESRD on HD    Autism Son     Asthma Son     No Known Problems Sister     No Known Problems Sister     No Known Problems Sister     No Known Problems Brother     Diabetes Brother     Stroke Neg Hx     Amblyopia Neg Hx     Blindness Neg Hx     Cataracts Neg Hx     Glaucoma Neg Hx     Macular degeneration Neg Hx     Retinal detachment Neg Hx     Strabismus Neg Hx        Social History     Socioeconomic History    Marital status:     Number of children: 2   Occupational History    Occupation: Maintenance    Tobacco Use    Smoking status: Former Smoker     Packs/day: 2.00     Years: 10.00     Pack years: 20.00     Types: Cigarettes     Start date: 8/15/1987     Quit date: 8/15/1996     Years since quittin.7    Smokeless tobacco: Never Used   Substance and Sexual Activity    Alcohol use: No     Alcohol/week: 0.0 standard drinks    Drug use: No    Sexual activity: Yes     Partners: Female     Social Determinants of Health     Financial Resource Strain: Low Risk     Difficulty of Paying Living Expenses: Not hard at all   Food Insecurity: No Food Insecurity    Worried About Running Out of Food in the Last Year: Never true    Ran Out of Food in the Last Year: Never true   Transportation Needs: No Transportation Needs    Lack of Transportation (Medical): No    Lack of Transportation (Non-Medical): No   Physical Activity: Sufficiently Active    Days of Exercise per Week: 5 days    Minutes of Exercise per Session: 140 min   Stress: No Stress Concern Present    Feeling of Stress : Not at all   Social Connections: Moderately Isolated    Frequency of  Communication with Friends and Family: Three times a week    Frequency of Social Gatherings with Friends and Family: Once a week    Attends Orthodox Services: Never    Active Member of Clubs or Organizations: No    Attends Club or Organization Meetings: Never    Marital Status:    Housing Stability: Unknown    Unable to Pay for Housing in the Last Year: No    Unstable Housing in the Last Year: No       Allergies:  Percocet [oxycodone-acetaminophen]    Medications:    Current Outpatient Medications:     atorvastatin (LIPITOR) 80 MG tablet, Take 1 tablet (80 mg total) by mouth once daily., Disp: 90 tablet, Rfl: 3    atropine 1% (ISOPTO ATROPINE) 1 % Drop, Place 1 drop into the left eye 2 (two) times daily., Disp: 1 Bottle, Rfl: 1    AVASTIN 25 mg/mL injection, , Disp: , Rfl:     diclofenac sodium (VOLTAREN) 1 % Gel, Apply 2 g topically 3 (three) times daily., Disp: 50 g, Rfl: 2    dorzolamide-timolol 2-0.5% (COSOPT) 22.3-6.8 mg/mL ophthalmic solution, Place 1 drop into the right eye 2 (two) times daily., Disp: 20 mL, Rfl: 3    finasteride (PROSCAR) 5 mg tablet, Take 1 tablet (5 mg total) by mouth once daily., Disp: 30 tablet, Rfl: 11    ILUVIEN 0.19 mg intravitreal implant, , Disp: , Rfl:     insulin (LANTUS SOLOSTAR U-100 INSULIN) glargine 100 units/mL (3mL) SubQ pen, Inject 20 Units into the skin every evening., Disp: 18 mL, Rfl: 3    lancets (ACCU-CHEK SOFTCLIX LANCETS) Misc, 1 Box by Misc.(Non-Drug; Combo Route) route 2 hours after meals and at bedtime., Disp: 100 each, Rfl: 2    lisinopriL-hydrochlorothiazide (PRINZIDE,ZESTORETIC) 10-12.5 mg per tablet, Take 1 tablet by mouth once daily., Disp: 90 tablet, Rfl: 3    metFORMIN (GLUCOPHAGE) 1000 MG tablet, Take 1 tablet (1,000 mg total) by mouth 2 (two) times daily with meals., Disp: 180 tablet, Rfl: 3    metoprolol succinate (TOPROL-XL) 25 MG 24 hr tablet, Take 0.5 tablets (12.5 mg total) by mouth once daily., Disp: 45 tablet, Rfl: 3     "nitroGLYCERIN (NITROSTAT) 0.4 MG SL tablet, Place 1 tablet (0.4 mg total) under the tongue every 5 (five) minutes as needed for Chest pain (ONLY IF HAVE CHEST PAIN,)., Disp: 30 tablet, Rfl: 0    OZURDEX 0.7 mg Impl intravitreal implant, , Disp: , Rfl:     pen needle, diabetic (BD ULTRA-FINE ORIG PEN NEEDLE) 29 gauge x 1/2" Ndle, 20 Units by Misc.(Non-Drug; Combo Route) route once daily., Disp: 90 each, Rfl: 1    aspirin (ECOTRIN) 81 MG EC tablet, Take 1 tablet (81 mg total) by mouth once daily., Disp: , Rfl: 0    tamsulosin (FLOMAX) 0.4 mg Cap, Take 2 capsules (0.8 mg total) by mouth every evening., Disp: 60 capsule, Rfl: 11    Review of Systems   Constitutional: Negative for activity change, appetite change, chills, diaphoresis, fatigue, fever and unexpected weight change.   HENT: Negative for congestion, dental problem, hearing loss, mouth sores, postnasal drip, rhinorrhea, sinus pressure and trouble swallowing.    Eyes: Negative for pain, discharge and itching.   Respiratory: Negative for apnea, cough, choking, chest tightness, shortness of breath and wheezing.    Cardiovascular: Negative for chest pain, palpitations and leg swelling.   Gastrointestinal: Negative for abdominal distention, abdominal pain, anal bleeding, blood in stool, constipation, diarrhea, nausea, rectal pain and vomiting.   Endocrine: Negative for polydipsia and polyuria.   Genitourinary: Positive for decreased urine volume, difficulty urinating and frequency. Negative for dysuria, enuresis, flank pain, genital sores, hematuria, penile discharge, penile pain, penile swelling, scrotal swelling, testicular pain and urgency.   Musculoskeletal: Negative for arthralgias, back pain and myalgias.   Skin: Negative for color change, rash and wound.   Neurological: Negative for dizziness, syncope, speech difficulty, light-headedness and headaches.   Hematological: Negative for adenopathy. Does not bruise/bleed easily.   Psychiatric/Behavioral: " Negative for behavioral problems, confusion, hallucinations and sleep disturbance.       Objective:      Physical Exam  Constitutional:       Appearance: He is well-developed.   HENT:      Head: Normocephalic.   Cardiovascular:      Rate and Rhythm: Normal rate.   Pulmonary:      Effort: Pulmonary effort is normal.   Abdominal:      Palpations: Abdomen is soft.   Genitourinary:     Prostate: Normal.   Skin:     General: Skin is warm.   Neurological:      Mental Status: He is alert.         Assessment:       1. Urinary tract infection without hematuria, site unspecified    2. Benign prostatic hyperplasia, unspecified whether lower urinary tract symptoms present        Plan:       Ned was seen today for follow-up.    Diagnoses and all orders for this visit:    Urinary tract infection without hematuria, site unspecified  -     Urine culture; Future    Benign prostatic hyperplasia, unspecified whether lower urinary tract symptoms present  -     Ambulatory referral/consult to Urology  -     US Retroperitoneal Complete; Future  -     Cystoscopy; Future  -     Transrectal ultrasound; Future  -     Simple urodynamics; Future    Workup he is a diabetic include simple urodynamics

## 2022-05-14 LAB — BACTERIA UR CULT: ABNORMAL

## 2022-05-18 RX ORDER — NITROFURANTOIN 25; 75 MG/1; MG/1
100 CAPSULE ORAL 2 TIMES DAILY
Qty: 20 CAPSULE | Refills: 1 | Status: SHIPPED | OUTPATIENT
Start: 2022-05-18 | End: 2022-09-27

## 2022-05-25 ENCOUNTER — HOSPITAL ENCOUNTER (OUTPATIENT)
Dept: RADIOLOGY | Facility: HOSPITAL | Age: 72
Discharge: HOME | End: 2022-05-25
Attending: UROLOGY
Payer: MEDICARE

## 2022-05-25 DIAGNOSIS — N40.0 BENIGN PROSTATIC HYPERPLASIA, UNSPECIFIED WHETHER LOWER URINARY TRACT SYMPTOMS PRESENT: ICD-10-CM

## 2022-05-25 PROCEDURE — 76770 US EXAM ABDO BACK WALL COMP: CPT | Mod: TC

## 2022-05-25 PROCEDURE — 76770 US EXAM ABDO BACK WALL COMP: CPT | Mod: 26,,, | Performed by: RADIOLOGY

## 2022-05-25 PROCEDURE — 76770 US RETROPERITONEAL COMPLETE: ICD-10-PCS | Mod: 26,,, | Performed by: RADIOLOGY

## 2022-05-30 ENCOUNTER — TELEPHONE (OUTPATIENT)
Dept: CARDIOLOGY | Facility: HOSPITAL | Age: 72
End: 2022-05-30
Payer: MEDICARE

## 2022-06-01 ENCOUNTER — TELEPHONE (OUTPATIENT)
Dept: CARDIOLOGY | Facility: CLINIC | Age: 72
End: 2022-06-01
Payer: MEDICARE

## 2022-06-01 ENCOUNTER — HOSPITAL ENCOUNTER (OUTPATIENT)
Dept: CARDIOLOGY | Facility: HOSPITAL | Age: 72
Discharge: HOME | End: 2022-06-01
Attending: PHYSICIAN ASSISTANT
Payer: MEDICARE

## 2022-06-01 VITALS
DIASTOLIC BLOOD PRESSURE: 62 MMHG | WEIGHT: 140 LBS | HEART RATE: 64 BPM | RESPIRATION RATE: 18 BRPM | SYSTOLIC BLOOD PRESSURE: 119 MMHG | HEIGHT: 64 IN | BODY MASS INDEX: 23.9 KG/M2

## 2022-06-01 DIAGNOSIS — I25.10 CORONARY ARTERIOSCLEROSIS IN NATIVE ARTERY: Chronic | ICD-10-CM

## 2022-06-01 DIAGNOSIS — I25.10 CORONARY ARTERIOSCLEROSIS IN NATIVE ARTERY: Primary | ICD-10-CM

## 2022-06-01 DIAGNOSIS — R94.39 ABNORMAL STRESS TEST: ICD-10-CM

## 2022-06-01 DIAGNOSIS — I20.9 ANGINA PECTORIS: ICD-10-CM

## 2022-06-01 LAB
CFR FLOW - ANTERIOR: 0.81
CFR FLOW - INFERIOR: 1.14
CFR FLOW - LATERAL: 0.73
CFR FLOW - MAX: 2.09
CFR FLOW - MIN: 0.5
CFR FLOW - SEPTAL: 1.47
CFR FLOW - WHOLE HEART: 1.04
CV STRESS BASE HR: 57 BPM
DIASTOLIC BLOOD PRESSURE: 78 MMHG
EJECTION FRACTION- HIGH: 65 %
END DIASTOLIC INDEX-HIGH: 153 ML/M2
END DIASTOLIC INDEX-LOW: 93 ML/M2
END SYSTOLIC INDEX-HIGH: 71 ML/M2
END SYSTOLIC INDEX-LOW: 31 ML/M2
NUC REST DIASTOLIC VOLUME INDEX: 104
NUC REST EJECTION FRACTION: 48
NUC REST SYSTOLIC VOLUME INDEX: 54
NUC STRESS DIASTOLIC VOLUME INDEX: 106
NUC STRESS EJECTION FRACTION: 32 %
NUC STRESS SYSTOLIC VOLUME INDEX: 72
OHS CV CPX 1 MINUTE RECOVERY HEART RATE: 90 BPM
OHS CV CPX 85 PERCENT MAX PREDICTED HEART RATE MALE: 128
OHS CV CPX MAX PREDICTED HEART RATE: 151
OHS CV CPX PATIENT IS FEMALE: 0
OHS CV CPX PATIENT IS MALE: 1
OHS CV CPX PEAK DIASTOLIC BLOOD PRESSURE: 55 MMHG
OHS CV CPX PEAK HEAR RATE: 52 BPM
OHS CV CPX PEAK RATE PRESSURE PRODUCT: 5980
OHS CV CPX PEAK SYSTOLIC BLOOD PRESSURE: 115 MMHG
OHS CV CPX PERCENT MAX PREDICTED HEART RATE ACHIEVED: 34
OHS CV CPX RATE PRESSURE PRODUCT PRESENTING: 6726
PERFUSION DEFECT 1 SIZE IN %: 50 %
PERFUSION DEFECT 2 SIZE IN %: 1 %
PERFUSION DEFECT WORSENS SIZE IN % 2: 5 %
REST FLOW - ANTERIOR: 0.42 CC/MIN/G
REST FLOW - INFERIOR: 0.48 CC/MIN/G
REST FLOW - LATERAL: 0.39 CC/MIN/G
REST FLOW - MAX: 0.61 CC/MIN/G
REST FLOW - MIN: 0.25 CC/MIN/G
REST FLOW - SEPTAL: 0.43 CC/MIN/G
REST FLOW - WHOLE HEART: 0.43 CC/MIN/G
RETIRED EF AND QEF - SEE NOTES: 53 %
STRESS FLOW - ANTERIOR: 0.34 CC/MIN/G
STRESS FLOW - INFERIOR: 0.55 CC/MIN/G
STRESS FLOW - LATERAL: 0.28 CC/MIN/G
STRESS FLOW - MAX: 1.02 CC/MIN/G
STRESS FLOW - MIN: 0.18 CC/MIN/G
STRESS FLOW - SEPTAL: 0.65 CC/MIN/G
STRESS FLOW - WHOLE HEART: 0.46 CC/MIN/G
STRESS ST DEPRESSION: 2 MM
SYSTOLIC BLOOD PRESSURE: 118 MMHG

## 2022-06-01 PROCEDURE — 93016 CV STRESS TEST SUPVJ ONLY: CPT | Mod: ,,, | Performed by: INTERNAL MEDICINE

## 2022-06-01 PROCEDURE — 93018 CV STRESS TEST I&R ONLY: CPT | Mod: ,,, | Performed by: INTERNAL MEDICINE

## 2022-06-01 PROCEDURE — 93016 CARDIAC PET SCAN STRESS (CUPID ONLY): ICD-10-PCS | Mod: ,,, | Performed by: INTERNAL MEDICINE

## 2022-06-01 PROCEDURE — 63600175 PHARM REV CODE 636 W HCPCS: Performed by: PHYSICIAN ASSISTANT

## 2022-06-01 PROCEDURE — 78434 AQMBF PET REST & RX STRESS: CPT | Mod: 26,,, | Performed by: INTERNAL MEDICINE

## 2022-06-01 PROCEDURE — 78431 MYOCRD IMG PET RST&STRS CT: CPT

## 2022-06-01 PROCEDURE — 78434 CARDIAC PET SCAN STRESS (CUPID ONLY): ICD-10-PCS | Mod: 26,,, | Performed by: INTERNAL MEDICINE

## 2022-06-01 PROCEDURE — 78431 CARDIAC PET SCAN STRESS (CUPID ONLY): ICD-10-PCS | Mod: 26,,, | Performed by: INTERNAL MEDICINE

## 2022-06-01 PROCEDURE — 78434 AQMBF PET REST & RX STRESS: CPT

## 2022-06-01 PROCEDURE — 78431 MYOCRD IMG PET RST&STRS CT: CPT | Mod: 26,,, | Performed by: INTERNAL MEDICINE

## 2022-06-01 PROCEDURE — 93018 CARDIAC PET SCAN STRESS (CUPID ONLY): ICD-10-PCS | Mod: ,,, | Performed by: INTERNAL MEDICINE

## 2022-06-01 RX ORDER — AMINOPHYLLINE 25 MG/ML
75 INJECTION, SOLUTION INTRAVENOUS ONCE
Status: COMPLETED | OUTPATIENT
Start: 2022-06-01 | End: 2022-06-01

## 2022-06-01 RX ORDER — REGADENOSON 0.08 MG/ML
0.4 INJECTION, SOLUTION INTRAVENOUS ONCE
Status: COMPLETED | OUTPATIENT
Start: 2022-06-01 | End: 2022-06-01

## 2022-06-01 RX ADMIN — AMINOPHYLLINE 75 MG: 25 INJECTION, SOLUTION INTRAVENOUS at 02:06

## 2022-06-01 RX ADMIN — REGADENOSON 0.4 MG: 0.08 INJECTION, SOLUTION INTRAVENOUS at 02:06

## 2022-06-01 NOTE — TELEPHONE ENCOUNTER
Discussed stress test results with patient and his wife showing a large area of ischemia involving 50% of the myocardium. He has known multivessel CAD, and previously referred for CABG in 2009 but lost to follow up and kept on medical management in the interim. Will refer to CTS and IC. Reiterated the importance of going to the ED for symptoms unrelieved with Nitroglycerin. All questions answered.

## 2022-06-02 ENCOUNTER — TELEPHONE (OUTPATIENT)
Dept: CARDIOTHORACIC SURGERY | Facility: CLINIC | Age: 72
End: 2022-06-02
Payer: MEDICARE

## 2022-06-02 NOTE — TELEPHONE ENCOUNTER
Called pt RE: referral for CT surgery for Coronary arteriosclerosis / Abnormal Stress Test, with the assistance of Janny Mcfarland Interpreter for [facility] (ext [phone]).  Pt scheduled to see Dr. Doty on 6/30, per Dr. Doty's request.  Appt slip mailed to pt.  requested for visit via [email or web address].

## 2022-06-10 DIAGNOSIS — I25.10 CORONARY ARTERIOSCLEROSIS IN NATIVE ARTERY: Primary | ICD-10-CM

## 2022-06-15 ENCOUNTER — OFFICE VISIT (OUTPATIENT)
Dept: FAMILY MEDICINE | Facility: CLINIC | Age: 72
End: 2022-06-15
Payer: MEDICARE

## 2022-06-15 VITALS
BODY MASS INDEX: 24.39 KG/M2 | DIASTOLIC BLOOD PRESSURE: 60 MMHG | HEIGHT: 64 IN | OXYGEN SATURATION: 96 % | SYSTOLIC BLOOD PRESSURE: 104 MMHG | HEART RATE: 59 BPM | WEIGHT: 142.88 LBS

## 2022-06-15 DIAGNOSIS — Z79.4 TYPE 2 DIABETES MELLITUS WITH HYPERGLYCEMIA, WITH LONG-TERM CURRENT USE OF INSULIN: ICD-10-CM

## 2022-06-15 DIAGNOSIS — R33.9 URINARY RETENTION: ICD-10-CM

## 2022-06-15 DIAGNOSIS — N13.8 BPH WITH OBSTRUCTION/LOWER URINARY TRACT SYMPTOMS: ICD-10-CM

## 2022-06-15 DIAGNOSIS — E11.65 TYPE 2 DIABETES MELLITUS WITH HYPERGLYCEMIA, WITH LONG-TERM CURRENT USE OF INSULIN: ICD-10-CM

## 2022-06-15 DIAGNOSIS — I10 ESSENTIAL HYPERTENSION: Primary | ICD-10-CM

## 2022-06-15 DIAGNOSIS — N40.1 BPH WITH OBSTRUCTION/LOWER URINARY TRACT SYMPTOMS: ICD-10-CM

## 2022-06-15 PROCEDURE — 1101F PR PT FALLS ASSESS DOC 0-1 FALLS W/OUT INJ PAST YR: ICD-10-PCS | Mod: CPTII,S$GLB,, | Performed by: FAMILY MEDICINE

## 2022-06-15 PROCEDURE — 1160F RVW MEDS BY RX/DR IN RCRD: CPT | Mod: CPTII,S$GLB,, | Performed by: FAMILY MEDICINE

## 2022-06-15 PROCEDURE — 3008F PR BODY MASS INDEX (BMI) DOCUMENTED: ICD-10-PCS | Mod: CPTII,S$GLB,, | Performed by: FAMILY MEDICINE

## 2022-06-15 PROCEDURE — 1159F PR MEDICATION LIST DOCUMENTED IN MEDICAL RECORD: ICD-10-PCS | Mod: CPTII,S$GLB,, | Performed by: FAMILY MEDICINE

## 2022-06-15 PROCEDURE — 3072F LOW RISK FOR RETINOPATHY: CPT | Mod: CPTII,S$GLB,, | Performed by: FAMILY MEDICINE

## 2022-06-15 PROCEDURE — 4010F PR ACE/ARB THEARPY RXD/TAKEN: ICD-10-PCS | Mod: CPTII,S$GLB,, | Performed by: FAMILY MEDICINE

## 2022-06-15 PROCEDURE — 1159F MED LIST DOCD IN RCRD: CPT | Mod: CPTII,S$GLB,, | Performed by: FAMILY MEDICINE

## 2022-06-15 PROCEDURE — 3078F DIAST BP <80 MM HG: CPT | Mod: CPTII,S$GLB,, | Performed by: FAMILY MEDICINE

## 2022-06-15 PROCEDURE — 99215 PR OFFICE/OUTPT VISIT, EST, LEVL V, 40-54 MIN: ICD-10-PCS | Mod: S$GLB,,, | Performed by: FAMILY MEDICINE

## 2022-06-15 PROCEDURE — 99999 PR PBB SHADOW E&M-EST. PATIENT-LVL III: ICD-10-PCS | Mod: PBBFAC,,, | Performed by: FAMILY MEDICINE

## 2022-06-15 PROCEDURE — 3066F PR DOCUMENTATION OF TREATMENT FOR NEPHROPATHY: ICD-10-PCS | Mod: CPTII,S$GLB,, | Performed by: FAMILY MEDICINE

## 2022-06-15 PROCEDURE — 3008F BODY MASS INDEX DOCD: CPT | Mod: CPTII,S$GLB,, | Performed by: FAMILY MEDICINE

## 2022-06-15 PROCEDURE — 3288F FALL RISK ASSESSMENT DOCD: CPT | Mod: CPTII,S$GLB,, | Performed by: FAMILY MEDICINE

## 2022-06-15 PROCEDURE — 3051F PR MOST RECENT HEMOGLOBIN A1C LEVEL 7.0 - < 8.0%: ICD-10-PCS | Mod: CPTII,S$GLB,, | Performed by: FAMILY MEDICINE

## 2022-06-15 PROCEDURE — 3061F PR NEG MICROALBUMINURIA RESULT DOCUMENTED/REVIEW: ICD-10-PCS | Mod: CPTII,S$GLB,, | Performed by: FAMILY MEDICINE

## 2022-06-15 PROCEDURE — 3061F NEG MICROALBUMINURIA REV: CPT | Mod: CPTII,S$GLB,, | Performed by: FAMILY MEDICINE

## 2022-06-15 PROCEDURE — 3074F PR MOST RECENT SYSTOLIC BLOOD PRESSURE < 130 MM HG: ICD-10-PCS | Mod: CPTII,S$GLB,, | Performed by: FAMILY MEDICINE

## 2022-06-15 PROCEDURE — 3066F NEPHROPATHY DOC TX: CPT | Mod: CPTII,S$GLB,, | Performed by: FAMILY MEDICINE

## 2022-06-15 PROCEDURE — 1126F AMNT PAIN NOTED NONE PRSNT: CPT | Mod: CPTII,S$GLB,, | Performed by: FAMILY MEDICINE

## 2022-06-15 PROCEDURE — 3051F HG A1C>EQUAL 7.0%<8.0%: CPT | Mod: CPTII,S$GLB,, | Performed by: FAMILY MEDICINE

## 2022-06-15 PROCEDURE — 3078F PR MOST RECENT DIASTOLIC BLOOD PRESSURE < 80 MM HG: ICD-10-PCS | Mod: CPTII,S$GLB,, | Performed by: FAMILY MEDICINE

## 2022-06-15 PROCEDURE — 1101F PT FALLS ASSESS-DOCD LE1/YR: CPT | Mod: CPTII,S$GLB,, | Performed by: FAMILY MEDICINE

## 2022-06-15 PROCEDURE — 3288F PR FALLS RISK ASSESSMENT DOCUMENTED: ICD-10-PCS | Mod: CPTII,S$GLB,, | Performed by: FAMILY MEDICINE

## 2022-06-15 PROCEDURE — 99215 OFFICE O/P EST HI 40 MIN: CPT | Mod: S$GLB,,, | Performed by: FAMILY MEDICINE

## 2022-06-15 PROCEDURE — 4010F ACE/ARB THERAPY RXD/TAKEN: CPT | Mod: CPTII,S$GLB,, | Performed by: FAMILY MEDICINE

## 2022-06-15 PROCEDURE — 1160F PR REVIEW ALL MEDS BY PRESCRIBER/CLIN PHARMACIST DOCUMENTED: ICD-10-PCS | Mod: CPTII,S$GLB,, | Performed by: FAMILY MEDICINE

## 2022-06-15 PROCEDURE — 99999 PR PBB SHADOW E&M-EST. PATIENT-LVL III: CPT | Mod: PBBFAC,,, | Performed by: FAMILY MEDICINE

## 2022-06-15 PROCEDURE — 3074F SYST BP LT 130 MM HG: CPT | Mod: CPTII,S$GLB,, | Performed by: FAMILY MEDICINE

## 2022-06-15 PROCEDURE — 1126F PR PAIN SEVERITY QUANTIFIED, NO PAIN PRESENT: ICD-10-PCS | Mod: CPTII,S$GLB,, | Performed by: FAMILY MEDICINE

## 2022-06-15 PROCEDURE — 3072F PR LOW RISK FOR RETINOPATHY: ICD-10-PCS | Mod: CPTII,S$GLB,, | Performed by: FAMILY MEDICINE

## 2022-06-15 RX ORDER — TAMSULOSIN HYDROCHLORIDE 0.4 MG/1
0.4 CAPSULE ORAL NIGHTLY
Qty: 90 CAPSULE | Refills: 3 | Status: SHIPPED | OUTPATIENT
Start: 2022-06-15 | End: 2023-06-07

## 2022-06-15 RX ORDER — PEN NEEDLE, DIABETIC 29 G X1/2"
20 NEEDLE, DISPOSABLE MISCELLANEOUS DAILY
Qty: 100 EACH | Refills: 6 | Status: SHIPPED | OUTPATIENT
Start: 2022-06-15 | End: 2023-08-03

## 2022-06-15 NOTE — PROGRESS NOTES
Subjective:       Patient ID: Ned Oliveira is a 72 y.o. male.    Chief Complaint: Diabetes    72 years old male came to the clinic for blood pressure check.  Blood pressure today was stable.  No chest pain, palpitation, orthopnea or PND.  Last A1c was 7.4.  No polyuria, polydipsia  or polyphagia.  He was taking Proscar with no significant improvement.    Review of Systems   Constitutional: Negative.    HENT: Negative.    Eyes: Negative.    Respiratory: Negative.    Cardiovascular: Negative.  Negative for chest pain, leg swelling and claudication.   Gastrointestinal: Negative.    Endocrine: Negative for polydipsia, polyphagia and polyuria.   Genitourinary: Positive for difficulty urinating.   Musculoskeletal: Negative.    Integumentary:  Negative.   Neurological: Negative.    Psychiatric/Behavioral: Negative.          Objective:      Physical Exam  Vitals and nursing note reviewed.   Constitutional:       General: He is not in acute distress.     Appearance: He is well-developed. He is not diaphoretic.   HENT:      Head: Normocephalic and atraumatic.      Right Ear: External ear normal.      Left Ear: External ear normal.      Nose: Nose normal.      Mouth/Throat:      Pharynx: No oropharyngeal exudate.   Eyes:      General: No scleral icterus.        Right eye: No discharge.         Left eye: No discharge.      Conjunctiva/sclera: Conjunctivae normal.      Pupils: Pupils are equal, round, and reactive to light.   Neck:      Thyroid: No thyromegaly.      Vascular: No JVD.      Trachea: No tracheal deviation.   Cardiovascular:      Rate and Rhythm: Normal rate and regular rhythm.      Heart sounds: Normal heart sounds. No murmur heard.    No friction rub. No gallop.   Pulmonary:      Effort: Pulmonary effort is normal. No respiratory distress.      Breath sounds: Normal breath sounds. No stridor. No wheezing or rales.   Chest:      Chest wall: No tenderness.   Abdominal:      General: Bowel sounds are normal. There  "is no distension.      Palpations: Abdomen is soft. There is no mass.      Tenderness: There is no abdominal tenderness. There is no guarding or rebound.   Musculoskeletal:         General: No tenderness. Normal range of motion.      Cervical back: Normal range of motion and neck supple.   Lymphadenopathy:      Cervical: No cervical adenopathy.   Skin:     General: Skin is warm and dry.      Coloration: Skin is not pale.      Findings: No erythema or rash.   Neurological:      Mental Status: He is alert and oriented to person, place, and time.      Cranial Nerves: No cranial nerve deficit.      Motor: No abnormal muscle tone.      Coordination: Coordination normal.      Deep Tendon Reflexes: Reflexes are normal and symmetric. Reflexes normal.   Psychiatric:         Behavior: Behavior normal.         Thought Content: Thought content normal.         Judgment: Judgment normal.         Assessment:       Problem List Items Addressed This Visit     Essential hypertension - Primary (Chronic)    Relevant Orders    CBC Auto Differential    Comprehensive Metabolic Panel    Lipid Panel    Type 2 diabetes mellitus with hyperglycemia, with long-term current use of insulin    Relevant Medications    pen needle, diabetic (BD ULTRA-FINE ORIG PEN NEEDLE) 29 gauge x 1/2" Ndle    Other Relevant Orders    Hemoglobin A1C    Microalbumin/Creatinine Ratio, Urine      Other Visit Diagnoses     Urinary retention        Relevant Medications    tamsulosin (FLOMAX) 0.4 mg Cap    BPH with obstruction/lower urinary tract symptoms        Relevant Medications    tamsulosin (FLOMAX) 0.4 mg Cap          Plan:         Ned was seen today for diabetes.    Diagnoses and all orders for this visit:    Essential hypertension  -     CBC Auto Differential; Future  -     Comprehensive Metabolic Panel; Future  -     Lipid Panel; Future    Type 2 diabetes mellitus with hyperglycemia, with long-term current use of insulin  -     Hemoglobin A1C; Future  -     " "Microalbumin/Creatinine Ratio, Urine; Future  -     pen needle, diabetic (BD ULTRA-FINE ORIG PEN NEEDLE) 29 gauge x 1/2" Ndle; 20 Units by Misc.(Non-Drug; Combo Route) route once daily.    Urinary retention  -     tamsulosin (FLOMAX) 0.4 mg Cap; Take 1 capsule (0.4 mg total) by mouth every evening.    BPH with obstruction/lower urinary tract symptoms  -     tamsulosin (FLOMAX) 0.4 mg Cap; Take 1 capsule (0.4 mg total) by mouth every evening.      Continue monitoring blood pressure at home, low sodium diet.  Continue monitoring blood sugar at home,ADA diet.    "

## 2022-06-16 ENCOUNTER — DOCUMENTATION ONLY (OUTPATIENT)
Dept: CARDIOLOGY | Facility: CLINIC | Age: 72
End: 2022-06-16
Payer: MEDICARE

## 2022-06-16 ENCOUNTER — LAB VISIT (OUTPATIENT)
Dept: LAB | Facility: HOSPITAL | Age: 72
End: 2022-06-16
Attending: INTERNAL MEDICINE
Payer: MEDICARE

## 2022-06-16 ENCOUNTER — OFFICE VISIT (OUTPATIENT)
Dept: CARDIOLOGY | Facility: CLINIC | Age: 72
End: 2022-06-16
Payer: MEDICARE

## 2022-06-16 VITALS
WEIGHT: 142.88 LBS | BODY MASS INDEX: 24.39 KG/M2 | SYSTOLIC BLOOD PRESSURE: 125 MMHG | HEART RATE: 61 BPM | OXYGEN SATURATION: 95 % | DIASTOLIC BLOOD PRESSURE: 74 MMHG | HEIGHT: 64 IN

## 2022-06-16 DIAGNOSIS — Z79.4 TYPE 2 DIABETES MELLITUS WITH HYPERGLYCEMIA, WITH LONG-TERM CURRENT USE OF INSULIN: ICD-10-CM

## 2022-06-16 DIAGNOSIS — E11.65 TYPE 2 DIABETES MELLITUS WITH HYPERGLYCEMIA, WITH LONG-TERM CURRENT USE OF INSULIN: ICD-10-CM

## 2022-06-16 DIAGNOSIS — I70.0 AORTIC ATHEROSCLEROSIS: Chronic | ICD-10-CM

## 2022-06-16 DIAGNOSIS — R94.39 ABNORMAL STRESS TEST: Primary | ICD-10-CM

## 2022-06-16 DIAGNOSIS — I25.10 CORONARY ARTERIOSCLEROSIS IN NATIVE ARTERY: ICD-10-CM

## 2022-06-16 DIAGNOSIS — I71.40 ABDOMINAL AORTIC ANEURYSM (AAA) WITHOUT RUPTURE: ICD-10-CM

## 2022-06-16 DIAGNOSIS — I20.89 CHRONIC STABLE ANGINA: Chronic | ICD-10-CM

## 2022-06-16 DIAGNOSIS — I10 ESSENTIAL HYPERTENSION: Chronic | ICD-10-CM

## 2022-06-16 DIAGNOSIS — E78.5 HYPERLIPIDEMIA ASSOCIATED WITH TYPE 2 DIABETES MELLITUS: Chronic | ICD-10-CM

## 2022-06-16 DIAGNOSIS — E11.3213 TYPE 2 DIABETES MELLITUS WITH BOTH EYES AFFECTED BY MILD NONPROLIFERATIVE RETINOPATHY AND MACULAR EDEMA, WITH LONG-TERM CURRENT USE OF INSULIN: ICD-10-CM

## 2022-06-16 DIAGNOSIS — Z79.4 TYPE 2 DIABETES MELLITUS WITH BOTH EYES AFFECTED BY MILD NONPROLIFERATIVE RETINOPATHY AND MACULAR EDEMA, WITH LONG-TERM CURRENT USE OF INSULIN: ICD-10-CM

## 2022-06-16 DIAGNOSIS — Z79.4 TYPE 2 DIABETES MELLITUS WITH OTHER CIRCULATORY COMPLICATION, WITH LONG-TERM CURRENT USE OF INSULIN: ICD-10-CM

## 2022-06-16 DIAGNOSIS — E11.69 HYPERLIPIDEMIA ASSOCIATED WITH TYPE 2 DIABETES MELLITUS: Chronic | ICD-10-CM

## 2022-06-16 DIAGNOSIS — E11.59 TYPE 2 DIABETES MELLITUS WITH OTHER CIRCULATORY COMPLICATION, WITH LONG-TERM CURRENT USE OF INSULIN: ICD-10-CM

## 2022-06-16 LAB
ABO + RH BLD: NORMAL
ANION GAP SERPL CALC-SCNC: 8 MMOL/L (ref 8–16)
BASOPHILS # BLD AUTO: 0.07 K/UL (ref 0–0.2)
BASOPHILS NFR BLD: 0.9 % (ref 0–1.9)
BLD GP AB SCN CELLS X3 SERPL QL: NORMAL
BUN SERPL-MCNC: 19 MG/DL (ref 8–23)
CALCIUM SERPL-MCNC: 9.7 MG/DL (ref 8.7–10.5)
CHLORIDE SERPL-SCNC: 103 MMOL/L (ref 95–110)
CO2 SERPL-SCNC: 28 MMOL/L (ref 23–29)
CREAT SERPL-MCNC: 1.1 MG/DL (ref 0.5–1.4)
DIFFERENTIAL METHOD: ABNORMAL
EOSINOPHIL # BLD AUTO: 0.4 K/UL (ref 0–0.5)
EOSINOPHIL NFR BLD: 5.3 % (ref 0–8)
ERYTHROCYTE [DISTWIDTH] IN BLOOD BY AUTOMATED COUNT: 11.9 % (ref 11.5–14.5)
EST. GFR  (AFRICAN AMERICAN): >60 ML/MIN/1.73 M^2
EST. GFR  (NON AFRICAN AMERICAN): >60 ML/MIN/1.73 M^2
GLUCOSE SERPL-MCNC: 170 MG/DL (ref 70–110)
HCT VFR BLD AUTO: 44.4 % (ref 40–54)
HGB BLD-MCNC: 15 G/DL (ref 14–18)
IMM GRANULOCYTES # BLD AUTO: 0.02 K/UL (ref 0–0.04)
IMM GRANULOCYTES NFR BLD AUTO: 0.3 % (ref 0–0.5)
LYMPHOCYTES # BLD AUTO: 2 K/UL (ref 1–4.8)
LYMPHOCYTES NFR BLD: 25.8 % (ref 18–48)
MCH RBC QN AUTO: 34 PG (ref 27–31)
MCHC RBC AUTO-ENTMCNC: 33.8 G/DL (ref 32–36)
MCV RBC AUTO: 101 FL (ref 82–98)
MONOCYTES # BLD AUTO: 0.5 K/UL (ref 0.3–1)
MONOCYTES NFR BLD: 7.1 % (ref 4–15)
NEUTROPHILS # BLD AUTO: 4.6 K/UL (ref 1.8–7.7)
NEUTROPHILS NFR BLD: 60.6 % (ref 38–73)
NRBC BLD-RTO: 0 /100 WBC
PLATELET # BLD AUTO: 176 K/UL (ref 150–450)
PMV BLD AUTO: 11.8 FL (ref 9.2–12.9)
POTASSIUM SERPL-SCNC: 4.5 MMOL/L (ref 3.5–5.1)
RBC # BLD AUTO: 4.41 M/UL (ref 4.6–6.2)
SODIUM SERPL-SCNC: 139 MMOL/L (ref 136–145)
WBC # BLD AUTO: 7.6 K/UL (ref 3.9–12.7)

## 2022-06-16 PROCEDURE — 86901 BLOOD TYPING SEROLOGIC RH(D): CPT | Performed by: INTERNAL MEDICINE

## 2022-06-16 PROCEDURE — 99999 PR PBB SHADOW E&M-EST. PATIENT-LVL IV: ICD-10-PCS | Mod: PBBFAC,,, | Performed by: INTERNAL MEDICINE

## 2022-06-16 PROCEDURE — 1159F PR MEDICATION LIST DOCUMENTED IN MEDICAL RECORD: ICD-10-PCS | Mod: CPTII,S$GLB,, | Performed by: INTERNAL MEDICINE

## 2022-06-16 PROCEDURE — 99999 PR PBB SHADOW E&M-EST. PATIENT-LVL IV: CPT | Mod: PBBFAC,,, | Performed by: INTERNAL MEDICINE

## 2022-06-16 PROCEDURE — 3078F PR MOST RECENT DIASTOLIC BLOOD PRESSURE < 80 MM HG: ICD-10-PCS | Mod: CPTII,S$GLB,, | Performed by: INTERNAL MEDICINE

## 2022-06-16 PROCEDURE — 3008F PR BODY MASS INDEX (BMI) DOCUMENTED: ICD-10-PCS | Mod: CPTII,S$GLB,, | Performed by: INTERNAL MEDICINE

## 2022-06-16 PROCEDURE — 1159F MED LIST DOCD IN RCRD: CPT | Mod: CPTII,S$GLB,, | Performed by: INTERNAL MEDICINE

## 2022-06-16 PROCEDURE — 3074F SYST BP LT 130 MM HG: CPT | Mod: CPTII,S$GLB,, | Performed by: INTERNAL MEDICINE

## 2022-06-16 PROCEDURE — 3066F NEPHROPATHY DOC TX: CPT | Mod: CPTII,S$GLB,, | Performed by: INTERNAL MEDICINE

## 2022-06-16 PROCEDURE — 3008F BODY MASS INDEX DOCD: CPT | Mod: CPTII,S$GLB,, | Performed by: INTERNAL MEDICINE

## 2022-06-16 PROCEDURE — 1126F AMNT PAIN NOTED NONE PRSNT: CPT | Mod: CPTII,S$GLB,, | Performed by: INTERNAL MEDICINE

## 2022-06-16 PROCEDURE — 85025 COMPLETE CBC W/AUTO DIFF WBC: CPT | Mod: 91 | Performed by: INTERNAL MEDICINE

## 2022-06-16 PROCEDURE — 3072F PR LOW RISK FOR RETINOPATHY: ICD-10-PCS | Mod: CPTII,S$GLB,, | Performed by: INTERNAL MEDICINE

## 2022-06-16 PROCEDURE — 99204 OFFICE O/P NEW MOD 45 MIN: CPT | Mod: S$GLB,,, | Performed by: INTERNAL MEDICINE

## 2022-06-16 PROCEDURE — 4010F PR ACE/ARB THEARPY RXD/TAKEN: ICD-10-PCS | Mod: CPTII,S$GLB,, | Performed by: INTERNAL MEDICINE

## 2022-06-16 PROCEDURE — 3072F LOW RISK FOR RETINOPATHY: CPT | Mod: CPTII,S$GLB,, | Performed by: INTERNAL MEDICINE

## 2022-06-16 PROCEDURE — 3051F HG A1C>EQUAL 7.0%<8.0%: CPT | Mod: CPTII,S$GLB,, | Performed by: INTERNAL MEDICINE

## 2022-06-16 PROCEDURE — 99204 PR OFFICE/OUTPT VISIT, NEW, LEVL IV, 45-59 MIN: ICD-10-PCS | Mod: S$GLB,,, | Performed by: INTERNAL MEDICINE

## 2022-06-16 PROCEDURE — 3051F PR MOST RECENT HEMOGLOBIN A1C LEVEL 7.0 - < 8.0%: ICD-10-PCS | Mod: CPTII,S$GLB,, | Performed by: INTERNAL MEDICINE

## 2022-06-16 PROCEDURE — 1126F PR PAIN SEVERITY QUANTIFIED, NO PAIN PRESENT: ICD-10-PCS | Mod: CPTII,S$GLB,, | Performed by: INTERNAL MEDICINE

## 2022-06-16 PROCEDURE — 1160F RVW MEDS BY RX/DR IN RCRD: CPT | Mod: CPTII,S$GLB,, | Performed by: INTERNAL MEDICINE

## 2022-06-16 PROCEDURE — 36415 COLL VENOUS BLD VENIPUNCTURE: CPT | Performed by: INTERNAL MEDICINE

## 2022-06-16 PROCEDURE — 3066F PR DOCUMENTATION OF TREATMENT FOR NEPHROPATHY: ICD-10-PCS | Mod: CPTII,S$GLB,, | Performed by: INTERNAL MEDICINE

## 2022-06-16 PROCEDURE — 3078F DIAST BP <80 MM HG: CPT | Mod: CPTII,S$GLB,, | Performed by: INTERNAL MEDICINE

## 2022-06-16 PROCEDURE — 3061F NEG MICROALBUMINURIA REV: CPT | Mod: CPTII,S$GLB,, | Performed by: INTERNAL MEDICINE

## 2022-06-16 PROCEDURE — 4010F ACE/ARB THERAPY RXD/TAKEN: CPT | Mod: CPTII,S$GLB,, | Performed by: INTERNAL MEDICINE

## 2022-06-16 PROCEDURE — 3074F PR MOST RECENT SYSTOLIC BLOOD PRESSURE < 130 MM HG: ICD-10-PCS | Mod: CPTII,S$GLB,, | Performed by: INTERNAL MEDICINE

## 2022-06-16 PROCEDURE — 1160F PR REVIEW ALL MEDS BY PRESCRIBER/CLIN PHARMACIST DOCUMENTED: ICD-10-PCS | Mod: CPTII,S$GLB,, | Performed by: INTERNAL MEDICINE

## 2022-06-16 PROCEDURE — 3061F PR NEG MICROALBUMINURIA RESULT DOCUMENTED/REVIEW: ICD-10-PCS | Mod: CPTII,S$GLB,, | Performed by: INTERNAL MEDICINE

## 2022-06-16 PROCEDURE — 80048 BASIC METABOLIC PNL TOTAL CA: CPT | Mod: XB | Performed by: INTERNAL MEDICINE

## 2022-06-16 RX ORDER — DIPHENHYDRAMINE HCL 50 MG
50 CAPSULE ORAL ONCE
Status: CANCELLED | OUTPATIENT
Start: 2022-06-16 | End: 2022-06-16

## 2022-06-16 RX ORDER — SODIUM CHLORIDE 9 MG/ML
INJECTION, SOLUTION INTRAVENOUS CONTINUOUS
Status: CANCELLED | OUTPATIENT
Start: 2022-06-16 | End: 2022-06-16

## 2022-06-16 RX ORDER — INSULIN GLARGINE 100 [IU]/ML
INJECTION, SOLUTION SUBCUTANEOUS
Qty: 9 ML | Refills: 0 | OUTPATIENT
Start: 2022-06-16

## 2022-06-16 RX ORDER — CLOPIDOGREL BISULFATE 75 MG/1
75 TABLET ORAL DAILY
Qty: 30 TABLET | Refills: 11 | Status: SHIPPED | OUTPATIENT
Start: 2022-06-16 | End: 2022-09-27 | Stop reason: SDUPTHER

## 2022-06-16 NOTE — TELEPHONE ENCOUNTER
Refill Decision Note   Ned Oliveira  is requesting a refill authorization.  Brief Assessment and Rationale for Refill:  Quick Discontinue     Medication Therapy Plan:    Pharmacy is requesting new scripts for the following medications without required information, (sig/ frequency/qty/etc)      Medication Reconciliation Completed: No     Comments: Pharmacies have been requesting medications for patients without required information, (sig, frequency, qty, etc.). In addition, requests are sent for medication(s) pt. are currently not taking, and medications patients have never taken.    We have spoken to the pharmacies about these request types and advised their teams previously that we are unable to assess these New Script requests and require all details for these requests. This is a known issue and has been reported.     Note composed:11:25 AM 06/16/2022

## 2022-06-16 NOTE — PROGRESS NOTES
OUTPATIENT CATHETERIZATION INSTRUCTIONS    You have been scheduled for a procedure in the catheterization lab on Friday, July 8, 2022.     Please report to the Cardiology Waiting Area on the Third floor of the hospital and check in at 7:30 AM.   You will then be taken to the SSCU (Short Stay Cardiac Unit) and prepared for your procedure. Please be aware that this is not the time of your procedure but the time you are to arrive. The procedures are scheduled on an hourly basis; however, emergency cases take precedence over all other cases.     1. No solid foods 8 hours prior to your procedure.  You may have clear liquids until the time of your admission which should be 2 hours prior to your procedure.  You are encouraged to drink at least 8 ounces of clear liquids prior to your admission to SSCU.  Patients with gastric emptying issues should be fasting for 6- 8 hours prior to the procedure.  Clear liquids include water, black coffee, clear juices, and performance drinks - no pulp or milk.    2. Heart failure or dialysis patients will be limited to 8 ounces (1 cup) of clear liquids up until 2 hours of the procedure.    3.   You may take your regular morning medications with water. If there are any medications that you should not take you will be instructed to hold them that morning. If you         are diabetic and on Metformin (Glucophage) do not take it the day before, the day of, and for 2 days after your procedure.  4.   If you are on Pradaxa, Eliquis or Coumadin , you can hold them 3 days prior to your procedure.    The procedure will take 1-2 hours to perform. After the procedure, you will return to SSCU on the third floor of the hospital. You will need to lie still (or keep your arm still) for the next 4 to 6 hours to minimize bleeding from the puncture site. Your family may remain in the room with you during this time.     You may be able to be discharged home that same afternoon if there is someone to drive you  home and there were no complications. If you have one of the balloon, stent, or device procedures you may spend the night in the hospital. Your doctor will determine, based on your progress, the date and time of your discharge. The results of your procedure will be discussed with you before you are discharged. Any further testing or procedures will be scheduled for you either before you leave or you will be called with these appointments.     If you should have any questions, concerns, or need to change the date of your procedure, please call  YAA Dorman @ [phone]    Special Instructions:    Don't take Metformin on THursday, Friday, Saturday and Sunday.  Take 1/2 of your regularly scheduled insulin dose the night before your procedure.  Take Plavix(clopidogrel) 75mg daily, including the day of the procedure.  Drink plenty of water the day before and after your procedure.       THE ABOVE INSTRUCTIONS WERE GIVEN TO THE PATIENT VERBALLY AND THEY VERBALIZED UNDERSTANDING.  THEY DO NOT REQUIRE ANY SPECIAL NEEDS AND DO NOT HAVE ANY LEARNING BARRIERS.          Directions for Reporting to Cardiology Waiting Area in the Hospital  If you park in the Parking Garage:  Take elevators to the1st floor of the parking garage.  Continue past the gift shop, coffee shop, and piano.  Take a right and go to the gold elevators. (Elevator B)  Take the elevator to the 3rd floor.  Follow the arrow on the sign on the wall that says Cath Lab Registration/EP Lab Registration.  Follow the long hallway all the way around until you come to a big open area.  This is the registration area.  Check in at Reception Desk.    OR    If family is dropping you off:  Have them drop you off at the front of the Hospital under the green overhang.  Enter through the doors and take a right.  Take the E elevators to the 3rd floor Cardiology Waiting Area.  Check in at the Reception Desk in the waiting room.

## 2022-06-16 NOTE — TELEPHONE ENCOUNTER
No new care gaps identified.  [facility] Embedded Care Gaps. Reference number: [id number]. 6/16/2022   11:23:24 AM TIGRET

## 2022-06-20 ENCOUNTER — TELEPHONE (OUTPATIENT)
Dept: UROLOGY | Facility: CLINIC | Age: 72
End: 2022-06-20
Payer: MEDICARE

## 2022-06-20 NOTE — TELEPHONE ENCOUNTER
----- Message from Linette Diane sent at 6/20/2022  4:24 PM CDT -----  Regarding: Cancel appt  Pt calling to cancel procedures tomorrow  [phone] (home) [phone]

## 2022-06-21 NOTE — H&P (VIEW-ONLY)
Subjective:    Patient ID:  Ned Oliveira is a 72 y.o. male who presents for evaluation of Abnormal Stress Test    Referring cardiologist: DA Christie  Interview conducted with [place] [employer]    HPI  Mr. Oliveira is a 71 y/o gentleman with HTN and HLD who reports fatigue and abd discomfort X 1 year.  He reports SOB when walking up stairs for the last year.  This has been getting worse over the last year.  He also reports chest pain when mowing his lawn and performing other activities around his home as well as when climbing stairs.  The chest pain is relieved with rest. Of note the patient reports he works in a building with mutliple flights of stairs.  He reports left LE ankle edema, but denies right LE edema.  He denies orthopnea or PND.  He denies palpitations or syncope except for one episode of syncope in 2007.    Past Medical History:   Diagnosis Date    Adrenal adenoma     BPH (benign prostatic hyperplasia)     Cataract     Coronary artery disease     Diabetes mellitus, type 2     Diabetic retinopathy     Glaucoma     Hyperlipidemia     Hypertension     Inguinal hernia of left side without obstruction or gangrene 2/6/2019    Nephrolithiasis 5/26/2016    Steroid responders, left 4/14/2020     Past Surgical History:   Procedure Laterality Date    BICEPS TENDON REPAIR Right     CATARACT EXTRACTION W/  INTRAOCULAR LENS IMPLANT Left 10/21/2020    COMPLEX PHACO IOL  AND BAERVELDT ()    COLONOSCOPY N/A 2/8/2019    Procedure: COLONOSCOPY;  Surgeon: Tavon Montes MD;  Location: [facility] (4TH FLR);  Service: Colon and Rectal;  Laterality: N/A;  will need . pt requesting ASAP. per Ana (international) ok to schedule at this time. will send  up for 6:45 am arrival time    IMPLANTATION OF DEVICE FOR GLAUCOMA Left 10/21/2020    Procedure: INSERTION, DEVICE, FOR GLAUCOMA;  Surgeon: Saranya Monge MD;  [facility];  Service:  Ophthalmology;  Laterality: Left;    INTRAOCULAR PROSTHESES INSERTION Left 10/21/2020    Procedure: INSERTION, IOL PROSTHESIS;  Surgeon: Saranya Monge MD;  Location: [facility] OR [address] [place] [address];  Service: Ophthalmology;  Laterality: Left;    PHACOEMULSIFICATION OF CATARACT Left 10/21/2020    Procedure: PHACOEMULSIFICATION, CATARACT;  Surgeon: Saranya Monge MD;  Location: [facility] OR [address] [place] [address];  Service: Ophthalmology;  Laterality: Left;    REFORMATION OF ANTERIOR CHAMBER Left 12/23/2020    WITH HEALON ()    YAG LASER  TO CYCLITIC MEMBRANE Left 12/10/2020         Current Outpatient Medications on File Prior to Visit   Medication Sig Dispense Refill    aspirin (ECOTRIN) 81 MG EC tablet Take 1 tablet (81 mg total) by mouth once daily.  0    atorvastatin (LIPITOR) 80 MG tablet Take 1 tablet (80 mg total) by mouth once daily. 90 tablet 3    atropine 1% (ISOPTO ATROPINE) 1 % Drop Place 1 drop into the left eye 2 (two) times daily. 1 Bottle 1    AVASTIN 25 mg/mL injection       diclofenac sodium (VOLTAREN) 1 % Gel Apply 2 g topically 3 (three) times daily. 50 g 2    dorzolamide-timolol 2-0.5% (COSOPT) 22.3-6.8 mg/mL ophthalmic solution Place 1 drop into the right eye 2 (two) times daily. 20 mL 3    ILUVIEN 0.19 mg intravitreal implant       insulin (LANTUS SOLOSTAR U-100 INSULIN) glargine 100 units/mL (3mL) SubQ pen Inject 20 Units into the skin every evening. 18 mL 3    lancets (ACCU-CHEK SOFTCLIX LANCETS) Misc 1 Box by Misc.(Non-Drug; Combo Route) route 2 hours after meals and at bedtime. 100 each 2    lisinopriL-hydrochlorothiazide (PRINZIDE,ZESTORETIC) 10-12.5 mg per tablet Take 1 tablet by mouth once daily. 90 tablet 3    metFORMIN (GLUCOPHAGE) 1000 MG tablet Take 1 tablet (1,000 mg total) by mouth 2 (two) times daily with meals. 180 tablet 3    metoprolol succinate (TOPROL-XL) 25 MG 24 hr tablet Take 0.5 tablets (12.5 mg total) by mouth once daily. 45 tablet 3    nitrofurantoin,  "macrocrystal-monohydrate, (MACROBID) 100 MG capsule Take 1 capsule (100 mg total) by mouth 2 (two) times daily. 20 capsule 1    nitroGLYCERIN (NITROSTAT) 0.4 MG SL tablet Place 1 tablet (0.4 mg total) under the tongue every 5 (five) minutes as needed for Chest pain (ONLY IF HAVE CHEST PAIN,). 30 tablet 0    OZURDEX 0.7 mg Impl intravitreal implant       pen needle, diabetic (BD ULTRA-FINE ORIG PEN NEEDLE) 29 gauge x 1/2" Ndle 20 Units by Misc.(Non-Drug; Combo Route) route once daily. 100 each 6    tamsulosin (FLOMAX) 0.4 mg Cap Take 1 capsule (0.4 mg total) by mouth every evening. 90 capsule 3     No current facility-administered medications on file prior to visit.     Review of patient's allergies indicates:   Allergen Reactions    Percocet [oxycodone-acetaminophen] Itching     Social History     Tobacco Use    Smoking status: Former Smoker     Packs/day: 2.00     Years: 10.00     Pack years: 20.00     Types: Cigarettes     Start date: 8/15/1987     Quit date: 8/15/1996     Years since quittin.8    Smokeless tobacco: Never Used   Substance Use Topics    Alcohol use: No     Alcohol/week: 0.0 standard drinks    Drug use: No     Family History   Problem Relation Age of Onset    Diabetes Mother     Heart disease Mother     Heart disease Sister     No Known Problems Brother     Kidney failure Daughter         ESRD on HD    Autism Son     Asthma Son     No Known Problems Sister     No Known Problems Sister     No Known Problems Sister     No Known Problems Brother     Diabetes Brother     Stroke Neg Hx     Amblyopia Neg Hx     Blindness Neg Hx     Cataracts Neg Hx     Glaucoma Neg Hx     Macular degeneration Neg Hx     Retinal detachment Neg Hx     Strabismus Neg Hx      Review of Systems   Constitutional: Negative for decreased appetite, diaphoresis, fever, malaise/fatigue, weight gain and weight loss.   HENT: Negative for congestion, nosebleeds and sore throat.    Eyes: Negative for " "blurred vision, vision loss in left eye, vision loss in right eye and visual disturbance.   Cardiovascular: Positive for chest pain. Negative for claudication, dyspnea on exertion, leg swelling, near-syncope, orthopnea, palpitations, paroxysmal nocturnal dyspnea and syncope.   Respiratory: Negative for cough, hemoptysis, shortness of breath and wheezing.    Endocrine: Negative for polyuria.   Hematologic/Lymphatic: Does not bruise/bleed easily.   Skin: Negative for nail changes and rash.   Musculoskeletal: Negative for back pain, muscle cramps and myalgias.   Gastrointestinal: Negative for abdominal pain, change in bowel habit, diarrhea, heartburn, hematemesis, hematochezia, melena, nausea and vomiting.   Genitourinary: Negative for bladder incontinence, dysuria, frequency and hematuria.   Psychiatric/Behavioral: Negative for depression.   Allergic/Immunologic: Negative for hives.        Objective:  Vitals:    06/16/22 1458 06/16/22 1459   BP: 115/65 125/74   BP Location: Right arm Left arm   Patient Position: Sitting Sitting   BP Method: Large (Automatic) Large (Automatic)   Pulse: 61 61   SpO2: 95%    Weight: 64.8 kg (142 lb 13.7 oz)    Height: 5' 4" (1.626 m)          Physical Exam  Constitutional:       Appearance: Normal appearance. He is well-developed.   HENT:      Head: Normocephalic and atraumatic.   Eyes:      Pupils: Pupils are equal, round, and reactive to light.   Neck:      Thyroid: No thyromegaly.      Vascular: No JVD.   Cardiovascular:      Rate and Rhythm: Normal rate and regular rhythm.      Chest Wall: PMI is displaced.      Pulses:           Carotid pulses are 2+ on the right side and 2+ on the left side.       Radial pulses are 2+ on the right side and 2+ on the left side.        Femoral pulses are 2+ on the right side and 2+ on the left side.       Dorsalis pedis pulses are 2+ on the right side and 2+ on the left side.        Posterior tibial pulses are 2+ on the right side and 2+ on the left " side.      Heart sounds: No murmur heard.    No friction rub. No gallop.      Comments: Normal right radial Luan's test.  Pulmonary:      Effort: Pulmonary effort is normal.      Breath sounds: No wheezing, rhonchi or rales.   Abdominal:      General: There is no distension.      Palpations: Abdomen is soft.      Tenderness: There is no abdominal tenderness.   Musculoskeletal:      Cervical back: Neck supple.   Neurological:      Mental Status: He is alert and oriented to person, place, and time.      Gait: Gait normal.           Assessment:       1. Abnormal stress test    2. Coronary arteriosclerosis in native artery    3. Aortic atherosclerosis    4. Chronic stable angina    5. Essential hypertension    6. Hyperlipidemia associated with type 2 diabetes mellitus    7. Abdominal aortic aneurysm (AAA) without rupture    8. Type 2 diabetes mellitus with both eyes affected by mild nonproliferative retinopathy and macular edema, with long-term current use of insulin    9. Type 2 diabetes mellitus with other circulatory complication, with long-term current use of insulin         Plan:       1) Angina.  The patient reports symptoms consistent with exertional angina.  He has CAD risk factors of HTN and HLD.  He had an abnormal stress test on 6/1/22. I therefore discussed cardiac catheterization and possible PCI with the patient.  I also discussed the need for long term DAPT after PCI and the associated bleeing risk.  The patient stated he would like to proceed with cardiac catheterization and possible PCI.   -6Fr R radial access  -continue EC ASA 81mg po qday  -start Plavix 75mg po qday  The risks, benefits, and alternatives of cardiac catheterization and possible intervention were discussed with the patient.  All questions were answered and informed consent was obtained.    2) HTN.  The patient's blood pressure is adequately controlled in clinic today;  -continue current medications    3) Dyslipidemia. Continue Liptior  80mg po qday    4) DM2. HgbAc1 on 6/16/22 was 7.7; rec tight glycemic control    All of the patient's questions were answered.      -

## 2022-06-21 NOTE — PROGRESS NOTES
Subjective:    Patient ID:  Ned Oliveira is a 72 y.o. male who presents for evaluation of Abnormal Stress Test    Referring cardiologist: DA Christie  Interview conducted with [employer]    HPI  Mr. Oliveira is a 71 y/o gentleman with HTN and HLD who reports fatigue and abd discomfort X 1 year.  He reports SOB when walking up stairs for the last year.  This has been getting worse over the last year.  He also reports chest pain when mowing his lawn and performing other activities around his home as well as when climbing stairs.  The chest pain is relieved with rest. Of note the patient reports he works in a building with mutliple flights of stairs.  He reports left LE ankle edema, but denies right LE edema.  He denies orthopnea or PND.  He denies palpitations or syncope except for one episode of syncope in 2007.    Past Medical History:   Diagnosis Date    Adrenal adenoma     BPH (benign prostatic hyperplasia)     Cataract     Coronary artery disease     Diabetes mellitus, type 2     Diabetic retinopathy     Glaucoma     Hyperlipidemia     Hypertension     Inguinal hernia of left side without obstruction or gangrene 2/6/2019    Nephrolithiasis 5/26/2016    Steroid responders, left 4/14/2020     Past Surgical History:   Procedure Laterality Date    BICEPS TENDON REPAIR Right     CATARACT EXTRACTION W/  INTRAOCULAR LENS IMPLANT Left 10/21/2020    COMPLEX PHACO IOL  AND BAERVELDT ()    COLONOSCOPY N/A 2/8/2019    Procedure: COLONOSCOPY;  Surgeon: Tavon Montes MD;  Location: [facility] (4TH FLR);  Service: Colon and Rectal;  Laterality: N/A;  will need . pt requesting ASAP. per Ana (international) ok to schedule at this time. will send  up for 6:45 am arrival time    IMPLANTATION OF DEVICE FOR GLAUCOMA Left 10/21/2020    Procedure: INSERTION, DEVICE, FOR GLAUCOMA;  Surgeon: Saranya Monge MD;  [facility];  Service:  Ophthalmology;  Laterality: Left;    INTRAOCULAR PROSTHESES INSERTION Left 10/21/2020    Procedure: INSERTION, IOL PROSTHESIS;  Surgeon: Saranya Monge MD;  Location: [facility] OR [address] [place] [address];  Service: Ophthalmology;  Laterality: Left;    PHACOEMULSIFICATION OF CATARACT Left 10/21/2020    Procedure: PHACOEMULSIFICATION, CATARACT;  Surgeon: Saranya Monge MD;  Location: [facility] OR [address] [place] [address];  Service: Ophthalmology;  Laterality: Left;    REFORMATION OF ANTERIOR CHAMBER Left 12/23/2020    WITH HEALON ()    YAG LASER  TO CYCLITIC MEMBRANE Left 12/10/2020         Current Outpatient Medications on File Prior to Visit   Medication Sig Dispense Refill    aspirin (ECOTRIN) 81 MG EC tablet Take 1 tablet (81 mg total) by mouth once daily.  0    atorvastatin (LIPITOR) 80 MG tablet Take 1 tablet (80 mg total) by mouth once daily. 90 tablet 3    atropine 1% (ISOPTO ATROPINE) 1 % Drop Place 1 drop into the left eye 2 (two) times daily. 1 Bottle 1    AVASTIN 25 mg/mL injection       diclofenac sodium (VOLTAREN) 1 % Gel Apply 2 g topically 3 (three) times daily. 50 g 2    dorzolamide-timolol 2-0.5% (COSOPT) 22.3-6.8 mg/mL ophthalmic solution Place 1 drop into the right eye 2 (two) times daily. 20 mL 3    ILUVIEN 0.19 mg intravitreal implant       insulin (LANTUS SOLOSTAR U-100 INSULIN) glargine 100 units/mL (3mL) SubQ pen Inject 20 Units into the skin every evening. 18 mL 3    lancets (ACCU-CHEK SOFTCLIX LANCETS) Misc 1 Box by Misc.(Non-Drug; Combo Route) route 2 hours after meals and at bedtime. 100 each 2    lisinopriL-hydrochlorothiazide (PRINZIDE,ZESTORETIC) 10-12.5 mg per tablet Take 1 tablet by mouth once daily. 90 tablet 3    metFORMIN (GLUCOPHAGE) 1000 MG tablet Take 1 tablet (1,000 mg total) by mouth 2 (two) times daily with meals. 180 tablet 3    metoprolol succinate (TOPROL-XL) 25 MG 24 hr tablet Take 0.5 tablets (12.5 mg total) by mouth once daily. 45 tablet 3    nitrofurantoin,  "macrocrystal-monohydrate, (MACROBID) 100 MG capsule Take 1 capsule (100 mg total) by mouth 2 (two) times daily. 20 capsule 1    nitroGLYCERIN (NITROSTAT) 0.4 MG SL tablet Place 1 tablet (0.4 mg total) under the tongue every 5 (five) minutes as needed for Chest pain (ONLY IF HAVE CHEST PAIN,). 30 tablet 0    OZURDEX 0.7 mg Impl intravitreal implant       pen needle, diabetic (BD ULTRA-FINE ORIG PEN NEEDLE) 29 gauge x 1/2" Ndle 20 Units by Misc.(Non-Drug; Combo Route) route once daily. 100 each 6    tamsulosin (FLOMAX) 0.4 mg Cap Take 1 capsule (0.4 mg total) by mouth every evening. 90 capsule 3     No current facility-administered medications on file prior to visit.     Review of patient's allergies indicates:   Allergen Reactions    Percocet [oxycodone-acetaminophen] Itching     Social History     Tobacco Use    Smoking status: Former Smoker     Packs/day: 2.00     Years: 10.00     Pack years: 20.00     Types: Cigarettes     Start date: 8/15/1987     Quit date: 8/15/1996     Years since quittin.8    Smokeless tobacco: Never Used   Substance Use Topics    Alcohol use: No     Alcohol/week: 0.0 standard drinks    Drug use: No     Family History   Problem Relation Age of Onset    Diabetes Mother     Heart disease Mother     Heart disease Sister     No Known Problems Brother     Kidney failure Daughter         ESRD on HD    Autism Son     Asthma Son     No Known Problems Sister     No Known Problems Sister     No Known Problems Sister     No Known Problems Brother     Diabetes Brother     Stroke Neg Hx     Amblyopia Neg Hx     Blindness Neg Hx     Cataracts Neg Hx     Glaucoma Neg Hx     Macular degeneration Neg Hx     Retinal detachment Neg Hx     Strabismus Neg Hx      Review of Systems   Constitutional: Negative for decreased appetite, diaphoresis, fever, malaise/fatigue, weight gain and weight loss.   HENT: Negative for congestion, nosebleeds and sore throat.    Eyes: Negative for " "blurred vision, vision loss in left eye, vision loss in right eye and visual disturbance.   Cardiovascular: Positive for chest pain. Negative for claudication, dyspnea on exertion, leg swelling, near-syncope, orthopnea, palpitations, paroxysmal nocturnal dyspnea and syncope.   Respiratory: Negative for cough, hemoptysis, shortness of breath and wheezing.    Endocrine: Negative for polyuria.   Hematologic/Lymphatic: Does not bruise/bleed easily.   Skin: Negative for nail changes and rash.   Musculoskeletal: Negative for back pain, muscle cramps and myalgias.   Gastrointestinal: Negative for abdominal pain, change in bowel habit, diarrhea, heartburn, hematemesis, hematochezia, melena, nausea and vomiting.   Genitourinary: Negative for bladder incontinence, dysuria, frequency and hematuria.   Psychiatric/Behavioral: Negative for depression.   Allergic/Immunologic: Negative for hives.        Objective:  Vitals:    06/16/22 1458 06/16/22 1459   BP: 115/65 125/74   BP Location: Right arm Left arm   Patient Position: Sitting Sitting   BP Method: Large (Automatic) Large (Automatic)   Pulse: 61 61   SpO2: 95%    Weight: 64.8 kg (142 lb 13.7 oz)    Height: 5' 4" (1.626 m)          Physical Exam  Constitutional:       Appearance: Normal appearance. He is well-developed.   HENT:      Head: Normocephalic and atraumatic.   Eyes:      Pupils: Pupils are equal, round, and reactive to light.   Neck:      Thyroid: No thyromegaly.      Vascular: No JVD.   Cardiovascular:      Rate and Rhythm: Normal rate and regular rhythm.      Chest Wall: PMI is displaced.      Pulses:           Carotid pulses are 2+ on the right side and 2+ on the left side.       Radial pulses are 2+ on the right side and 2+ on the left side.        Femoral pulses are 2+ on the right side and 2+ on the left side.       Dorsalis pedis pulses are 2+ on the right side and 2+ on the left side.        Posterior tibial pulses are 2+ on the right side and 2+ on the left " side.      Heart sounds: No murmur heard.    No friction rub. No gallop.      Comments: Normal right radial Luan's test.  Pulmonary:      Effort: Pulmonary effort is normal.      Breath sounds: No wheezing, rhonchi or rales.   Abdominal:      General: There is no distension.      Palpations: Abdomen is soft.      Tenderness: There is no abdominal tenderness.   Musculoskeletal:      Cervical back: Neck supple.   Neurological:      Mental Status: He is alert and oriented to person, place, and time.      Gait: Gait normal.           Assessment:       1. Abnormal stress test    2. Coronary arteriosclerosis in native artery    3. Aortic atherosclerosis    4. Chronic stable angina    5. Essential hypertension    6. Hyperlipidemia associated with type 2 diabetes mellitus    7. Abdominal aortic aneurysm (AAA) without rupture    8. Type 2 diabetes mellitus with both eyes affected by mild nonproliferative retinopathy and macular edema, with long-term current use of insulin    9. Type 2 diabetes mellitus with other circulatory complication, with long-term current use of insulin         Plan:       1) Angina.  The patient reports symptoms consistent with exertional angina.  He has CAD risk factors of HTN and HLD.  He had an abnormal stress test on 6/1/22. I therefore discussed cardiac catheterization and possible PCI with the patient.  I also discussed the need for long term DAPT after PCI and the associated bleeing risk.  The patient stated he would like to proceed with cardiac catheterization and possible PCI.   -6Fr R radial access  -continue EC ASA 81mg po qday  -start Plavix 75mg po qday  The risks, benefits, and alternatives of cardiac catheterization and possible intervention were discussed with the patient.  All questions were answered and informed consent was obtained.    2) HTN.  The patient's blood pressure is adequately controlled in clinic today;  -continue current medications    3) Dyslipidemia. Continue Liptior  80mg po qday    4) DM2. HgbAc1 on 6/16/22 was 7.7; rec tight glycemic control    All of the patient's questions were answered.      -

## 2022-06-23 ENCOUNTER — OFFICE VISIT (OUTPATIENT)
Dept: FAMILY MEDICINE | Facility: CLINIC | Age: 72
End: 2022-06-23
Payer: MEDICARE

## 2022-06-23 VITALS
DIASTOLIC BLOOD PRESSURE: 64 MMHG | WEIGHT: 142.88 LBS | HEART RATE: 63 BPM | SYSTOLIC BLOOD PRESSURE: 112 MMHG | TEMPERATURE: 99 F | BODY MASS INDEX: 24.39 KG/M2 | OXYGEN SATURATION: 98 % | HEIGHT: 64 IN

## 2022-06-23 DIAGNOSIS — U07.1 COVID-19 VIRUS INFECTION: Primary | ICD-10-CM

## 2022-06-23 DIAGNOSIS — J02.9 SORE THROAT: ICD-10-CM

## 2022-06-23 DIAGNOSIS — J06.9 UPPER RESPIRATORY TRACT INFECTION, UNSPECIFIED TYPE: ICD-10-CM

## 2022-06-23 LAB
CTP QC/QA: YES
FLUAV AG NPH QL: NEGATIVE
FLUBV AG NPH QL: NEGATIVE
S PYO RRNA THROAT QL PROBE: NEGATIVE
SARS-COV-2 RDRP RESP QL NAA+PROBE: POSITIVE

## 2022-06-23 PROCEDURE — 3078F PR MOST RECENT DIASTOLIC BLOOD PRESSURE < 80 MM HG: ICD-10-PCS | Mod: CPTII,S$GLB,, | Performed by: FAMILY MEDICINE

## 2022-06-23 PROCEDURE — 3061F NEG MICROALBUMINURIA REV: CPT | Mod: CPTII,S$GLB,, | Performed by: FAMILY MEDICINE

## 2022-06-23 PROCEDURE — 87804 INFLUENZA ASSAY W/OPTIC: CPT | Mod: QW,S$GLB,, | Performed by: FAMILY MEDICINE

## 2022-06-23 PROCEDURE — U0002 COVID-19 LAB TEST NON-CDC: HCPCS | Mod: QW,S$GLB,, | Performed by: FAMILY MEDICINE

## 2022-06-23 PROCEDURE — 1101F PR PT FALLS ASSESS DOC 0-1 FALLS W/OUT INJ PAST YR: ICD-10-PCS | Mod: CPTII,S$GLB,, | Performed by: FAMILY MEDICINE

## 2022-06-23 PROCEDURE — 99999 PR PBB SHADOW E&M-EST. PATIENT-LVL IV: ICD-10-PCS | Mod: PBBFAC,,, | Performed by: FAMILY MEDICINE

## 2022-06-23 PROCEDURE — 3078F DIAST BP <80 MM HG: CPT | Mod: CPTII,S$GLB,, | Performed by: FAMILY MEDICINE

## 2022-06-23 PROCEDURE — 99999 PR PBB SHADOW E&M-EST. PATIENT-LVL IV: CPT | Mod: PBBFAC,,, | Performed by: FAMILY MEDICINE

## 2022-06-23 PROCEDURE — 1159F PR MEDICATION LIST DOCUMENTED IN MEDICAL RECORD: ICD-10-PCS | Mod: CPTII,S$GLB,, | Performed by: FAMILY MEDICINE

## 2022-06-23 PROCEDURE — 4010F ACE/ARB THERAPY RXD/TAKEN: CPT | Mod: CPTII,S$GLB,, | Performed by: FAMILY MEDICINE

## 2022-06-23 PROCEDURE — 3008F BODY MASS INDEX DOCD: CPT | Mod: CPTII,S$GLB,, | Performed by: FAMILY MEDICINE

## 2022-06-23 PROCEDURE — U0002: ICD-10-PCS | Mod: QW,S$GLB,, | Performed by: FAMILY MEDICINE

## 2022-06-23 PROCEDURE — 1126F PR PAIN SEVERITY QUANTIFIED, NO PAIN PRESENT: ICD-10-PCS | Mod: CPTII,S$GLB,, | Performed by: FAMILY MEDICINE

## 2022-06-23 PROCEDURE — 87880 STREP A ASSAY W/OPTIC: CPT | Mod: QW,S$GLB,, | Performed by: FAMILY MEDICINE

## 2022-06-23 PROCEDURE — 99214 OFFICE O/P EST MOD 30 MIN: CPT | Mod: S$GLB,,, | Performed by: FAMILY MEDICINE

## 2022-06-23 PROCEDURE — 4010F PR ACE/ARB THEARPY RXD/TAKEN: ICD-10-PCS | Mod: CPTII,S$GLB,, | Performed by: FAMILY MEDICINE

## 2022-06-23 PROCEDURE — 3061F PR NEG MICROALBUMINURIA RESULT DOCUMENTED/REVIEW: ICD-10-PCS | Mod: CPTII,S$GLB,, | Performed by: FAMILY MEDICINE

## 2022-06-23 PROCEDURE — 1159F MED LIST DOCD IN RCRD: CPT | Mod: CPTII,S$GLB,, | Performed by: FAMILY MEDICINE

## 2022-06-23 PROCEDURE — 99214 PR OFFICE/OUTPT VISIT, EST, LEVL IV, 30-39 MIN: ICD-10-PCS | Mod: S$GLB,,, | Performed by: FAMILY MEDICINE

## 2022-06-23 PROCEDURE — 3288F PR FALLS RISK ASSESSMENT DOCUMENTED: ICD-10-PCS | Mod: CPTII,S$GLB,, | Performed by: FAMILY MEDICINE

## 2022-06-23 PROCEDURE — 87880 POCT RAPID STREP A: ICD-10-PCS | Mod: QW,S$GLB,, | Performed by: FAMILY MEDICINE

## 2022-06-23 PROCEDURE — 3072F LOW RISK FOR RETINOPATHY: CPT | Mod: CPTII,S$GLB,, | Performed by: FAMILY MEDICINE

## 2022-06-23 PROCEDURE — 3066F NEPHROPATHY DOC TX: CPT | Mod: CPTII,S$GLB,, | Performed by: FAMILY MEDICINE

## 2022-06-23 PROCEDURE — 3072F PR LOW RISK FOR RETINOPATHY: ICD-10-PCS | Mod: CPTII,S$GLB,, | Performed by: FAMILY MEDICINE

## 2022-06-23 PROCEDURE — 3074F PR MOST RECENT SYSTOLIC BLOOD PRESSURE < 130 MM HG: ICD-10-PCS | Mod: CPTII,S$GLB,, | Performed by: FAMILY MEDICINE

## 2022-06-23 PROCEDURE — 3074F SYST BP LT 130 MM HG: CPT | Mod: CPTII,S$GLB,, | Performed by: FAMILY MEDICINE

## 2022-06-23 PROCEDURE — 87804 POCT INFLUENZA A/B: ICD-10-PCS | Mod: QW,S$GLB,, | Performed by: FAMILY MEDICINE

## 2022-06-23 PROCEDURE — 1101F PT FALLS ASSESS-DOCD LE1/YR: CPT | Mod: CPTII,S$GLB,, | Performed by: FAMILY MEDICINE

## 2022-06-23 PROCEDURE — 3066F PR DOCUMENTATION OF TREATMENT FOR NEPHROPATHY: ICD-10-PCS | Mod: CPTII,S$GLB,, | Performed by: FAMILY MEDICINE

## 2022-06-23 PROCEDURE — 3008F PR BODY MASS INDEX (BMI) DOCUMENTED: ICD-10-PCS | Mod: CPTII,S$GLB,, | Performed by: FAMILY MEDICINE

## 2022-06-23 PROCEDURE — 3051F HG A1C>EQUAL 7.0%<8.0%: CPT | Mod: CPTII,S$GLB,, | Performed by: FAMILY MEDICINE

## 2022-06-23 PROCEDURE — 3051F PR MOST RECENT HEMOGLOBIN A1C LEVEL 7.0 - < 8.0%: ICD-10-PCS | Mod: CPTII,S$GLB,, | Performed by: FAMILY MEDICINE

## 2022-06-23 PROCEDURE — 3288F FALL RISK ASSESSMENT DOCD: CPT | Mod: CPTII,S$GLB,, | Performed by: FAMILY MEDICINE

## 2022-06-23 PROCEDURE — 1126F AMNT PAIN NOTED NONE PRSNT: CPT | Mod: CPTII,S$GLB,, | Performed by: FAMILY MEDICINE

## 2022-06-23 RX ORDER — PROMETHAZINE HYDROCHLORIDE AND DEXTROMETHORPHAN HYDROBROMIDE 6.25; 15 MG/5ML; MG/5ML
5 SYRUP ORAL 4 TIMES DAILY PRN
Qty: 240 ML | Refills: 0 | Status: SHIPPED | OUTPATIENT
Start: 2022-06-23 | End: 2022-07-05

## 2022-06-23 NOTE — PROGRESS NOTES
Subjective:       Patient ID: Ned Oliveira is a 72 y.o. male.    Chief Complaint: Sore Throat    72 years old male came to the clinic with sore throat within dry cough for the last couple days.  No fever or chills.  No shortness of breath.    Review of Systems   Constitutional: Negative.  Negative for fever.   HENT: Positive for sore throat.    Eyes: Negative.    Respiratory: Positive for cough. Negative for shortness of breath.    Cardiovascular: Negative.    Gastrointestinal: Negative.    Genitourinary: Negative.    Musculoskeletal: Negative.    Integumentary:  Negative.   Neurological: Negative.    Psychiatric/Behavioral: Negative.          Objective:      Physical Exam  Vitals and nursing note reviewed.   Constitutional:       General: He is not in acute distress.     Appearance: He is well-developed. He is not diaphoretic.   HENT:      Head: Normocephalic and atraumatic.      Right Ear: External ear normal.      Left Ear: External ear normal.      Nose: Nose normal.      Mouth/Throat:      Pharynx: Posterior oropharyngeal erythema present. No oropharyngeal exudate.   Eyes:      General: No scleral icterus.        Right eye: No discharge.         Left eye: No discharge.      Conjunctiva/sclera: Conjunctivae normal.      Pupils: Pupils are equal, round, and reactive to light.   Neck:      Thyroid: No thyromegaly.      Vascular: No JVD.      Trachea: No tracheal deviation.   Cardiovascular:      Rate and Rhythm: Normal rate and regular rhythm.      Heart sounds: Normal heart sounds. No murmur heard.    No friction rub. No gallop.   Pulmonary:      Effort: Pulmonary effort is normal. No respiratory distress.      Breath sounds: Normal breath sounds. No stridor. No wheezing or rales.   Chest:      Chest wall: No tenderness.   Abdominal:      General: Bowel sounds are normal. There is no distension.      Palpations: Abdomen is soft. There is no mass.      Tenderness: There is no abdominal tenderness. There is no  guarding or rebound.   Musculoskeletal:         General: No tenderness. Normal range of motion.      Cervical back: Normal range of motion and neck supple.   Lymphadenopathy:      Cervical: No cervical adenopathy.   Skin:     General: Skin is warm and dry.      Coloration: Skin is not pale.      Findings: No erythema or rash.   Neurological:      Mental Status: He is alert and oriented to person, place, and time.      Cranial Nerves: No cranial nerve deficit.      Motor: No abnormal muscle tone.      Coordination: Coordination normal.      Deep Tendon Reflexes: Reflexes are normal and symmetric. Reflexes normal.   Psychiatric:         Behavior: Behavior normal.         Thought Content: Thought content normal.         Judgment: Judgment normal.         Assessment:       Problem List Items Addressed This Visit    None     Visit Diagnoses     COVID-19 virus infection    -  Primary    Relevant Medications    nirmatrelvir-ritonavir 150 mg x 2- 100 mg copackaged tablets (EUA)    promethazine-dextromethorphan (PROMETHAZINE-DM) 6.25-15 mg/5 mL Syrp    Other Relevant Orders    POCT COVID-19 Rapid Screening (Completed)    Sore throat        Relevant Orders    POCT Rapid Strep A (Completed)    Upper respiratory tract infection, unspecified type        Relevant Medications    promethazine-dextromethorphan (PROMETHAZINE-DM) 6.25-15 mg/5 mL Syrp    Other Relevant Orders    POCT Influenza A/B (Completed)          Plan:         Ned was seen today for sore throat.    Diagnoses and all orders for this visit:    COVID-19 virus infection  -     POCT COVID-19 Rapid Screening  -     nirmatrelvir-ritonavir 150 mg x 2- 100 mg copackaged tablets (EUA); Take 3 tablets by mouth 2 (two) times daily for 5 days. Each dose contains 2 nirmatrelvir (pink tablets) and 1 ritonavir (white tablet). Take all 3 tablets together  -     promethazine-dextromethorphan (PROMETHAZINE-DM) 6.25-15 mg/5 mL Syrp; Take 5 mLs by mouth 4 (four) times daily as  needed.    Sore throat  -     POCT Rapid Strep A    Upper respiratory tract infection, unspecified type  -     POCT Influenza A/B  -     promethazine-dextromethorphan (PROMETHAZINE-DM) 6.25-15 mg/5 mL Syrp; Take 5 mLs by mouth 4 (four) times daily as needed.

## 2022-06-27 ENCOUNTER — TELEPHONE (OUTPATIENT)
Dept: CARDIOTHORACIC SURGERY | Facility: CLINIC | Age: 72
End: 2022-06-27
Payer: MEDICARE

## 2022-06-27 NOTE — TELEPHONE ENCOUNTER
Patient tested positive for Covid on 6/23.    Called patient via [facility]  Bruna Scherer. Explained we could reschedule his appointment with Dr Doty on July 14th at 9:15. Patient is in agreement with his appointment being rescheduled.    Will mail appointment reminder in the mail.

## 2022-06-30 ENCOUNTER — TELEPHONE (OUTPATIENT)
Dept: FAMILY MEDICINE | Facility: CLINIC | Age: 72
End: 2022-06-30
Payer: MEDICARE

## 2022-06-30 NOTE — TELEPHONE ENCOUNTER
----- Message from Jessica Govea sent at 6/30/2022 11:34 AM CDT -----  Regarding: Patient advice  Contact: Pt  Pt called in regards to getting a letter to return back to work from having COVID , Pt stated that employer stated he needs to negative test to return back       Please advise , Pt can be reached at [phone]

## 2022-07-08 ENCOUNTER — HOSPITAL ENCOUNTER (OUTPATIENT)
Facility: HOSPITAL | Age: 72
Discharge: HOME | End: 2022-07-08
Attending: INTERNAL MEDICINE | Admitting: INTERNAL MEDICINE
Payer: MEDICARE

## 2022-07-08 VITALS
RESPIRATION RATE: 16 BRPM | BODY MASS INDEX: 24.59 KG/M2 | HEIGHT: 64 IN | OXYGEN SATURATION: 98 % | SYSTOLIC BLOOD PRESSURE: 145 MMHG | WEIGHT: 144 LBS | HEART RATE: 73 BPM | DIASTOLIC BLOOD PRESSURE: 74 MMHG | TEMPERATURE: 98 F

## 2022-07-08 DIAGNOSIS — I25.10 CORONARY ARTERIOSCLEROSIS IN NATIVE ARTERY: ICD-10-CM

## 2022-07-08 DIAGNOSIS — R94.39 ABNORMAL STRESS TEST: ICD-10-CM

## 2022-07-08 DIAGNOSIS — I70.0 AORTIC ATHEROSCLEROSIS: Primary | ICD-10-CM

## 2022-07-08 LAB
ABO + RH BLD: NORMAL
ANION GAP SERPL CALC-SCNC: 5 MMOL/L (ref 8–16)
BASOPHILS # BLD AUTO: 0.03 K/UL (ref 0–0.2)
BASOPHILS NFR BLD: 0.5 % (ref 0–1.9)
BLD GP AB SCN CELLS X3 SERPL QL: NORMAL
BUN SERPL-MCNC: 15 MG/DL (ref 8–23)
CALCIUM SERPL-MCNC: 8.9 MG/DL (ref 8.7–10.5)
CHLORIDE SERPL-SCNC: 110 MMOL/L (ref 95–110)
CO2 SERPL-SCNC: 27 MMOL/L (ref 23–29)
CREAT SERPL-MCNC: 1 MG/DL (ref 0.5–1.4)
DIFFERENTIAL METHOD: ABNORMAL
EOSINOPHIL # BLD AUTO: 0.2 K/UL (ref 0–0.5)
EOSINOPHIL NFR BLD: 2.7 % (ref 0–8)
ERYTHROCYTE [DISTWIDTH] IN BLOOD BY AUTOMATED COUNT: 12.2 % (ref 11.5–14.5)
EST. GFR  (AFRICAN AMERICAN): >60 ML/MIN/1.73 M^2
EST. GFR  (NON AFRICAN AMERICAN): >60 ML/MIN/1.73 M^2
GLUCOSE SERPL-MCNC: 172 MG/DL (ref 70–110)
HCT VFR BLD AUTO: 37.2 % (ref 40–54)
HGB BLD-MCNC: 12.9 G/DL (ref 14–18)
IMM GRANULOCYTES # BLD AUTO: 0.02 K/UL (ref 0–0.04)
IMM GRANULOCYTES NFR BLD AUTO: 0.3 % (ref 0–0.5)
LYMPHOCYTES # BLD AUTO: 1.5 K/UL (ref 1–4.8)
LYMPHOCYTES NFR BLD: 24.2 % (ref 18–48)
MCH RBC QN AUTO: 34 PG (ref 27–31)
MCHC RBC AUTO-ENTMCNC: 34.7 G/DL (ref 32–36)
MCV RBC AUTO: 98 FL (ref 82–98)
MONOCYTES # BLD AUTO: 0.6 K/UL (ref 0.3–1)
MONOCYTES NFR BLD: 9 % (ref 4–15)
NEUTROPHILS # BLD AUTO: 3.9 K/UL (ref 1.8–7.7)
NEUTROPHILS NFR BLD: 63.3 % (ref 38–73)
NRBC BLD-RTO: 0 /100 WBC
PLATELET # BLD AUTO: 173 K/UL (ref 150–450)
PMV BLD AUTO: 11.1 FL (ref 9.2–12.9)
POCT GLUCOSE: 175 MG/DL (ref 70–110)
POTASSIUM SERPL-SCNC: 3.7 MMOL/L (ref 3.5–5.1)
RBC # BLD AUTO: 3.79 M/UL (ref 4.6–6.2)
SODIUM SERPL-SCNC: 142 MMOL/L (ref 136–145)
WBC # BLD AUTO: 6.2 K/UL (ref 3.9–12.7)

## 2022-07-08 PROCEDURE — 25000003 PHARM REV CODE 250: Performed by: INTERNAL MEDICINE

## 2022-07-08 PROCEDURE — 99152 MOD SED SAME PHYS/QHP 5/>YRS: CPT | Performed by: INTERNAL MEDICINE

## 2022-07-08 PROCEDURE — 93454 CORONARY ARTERY ANGIO S&I: CPT | Performed by: INTERNAL MEDICINE

## 2022-07-08 PROCEDURE — 63600175 PHARM REV CODE 636 W HCPCS: Performed by: INTERNAL MEDICINE

## 2022-07-08 PROCEDURE — 93454 PR CATH PLACE/CORONARY ANGIO, IMG SUPER/INTERP: ICD-10-PCS | Mod: 26,,, | Performed by: INTERNAL MEDICINE

## 2022-07-08 PROCEDURE — 25500020 PHARM REV CODE 255: Performed by: INTERNAL MEDICINE

## 2022-07-08 PROCEDURE — 85025 COMPLETE CBC W/AUTO DIFF WBC: CPT | Performed by: INTERNAL MEDICINE

## 2022-07-08 PROCEDURE — 99152 PR MOD CONSCIOUS SEDATION, SAME PHYS, 5+ YRS, FIRST 15 MIN: ICD-10-PCS | Mod: ,,, | Performed by: INTERNAL MEDICINE

## 2022-07-08 PROCEDURE — 80048 BASIC METABOLIC PNL TOTAL CA: CPT | Performed by: INTERNAL MEDICINE

## 2022-07-08 PROCEDURE — C1894 INTRO/SHEATH, NON-LASER: HCPCS | Performed by: INTERNAL MEDICINE

## 2022-07-08 PROCEDURE — C1769 GUIDE WIRE: HCPCS | Performed by: INTERNAL MEDICINE

## 2022-07-08 PROCEDURE — 99152 MOD SED SAME PHYS/QHP 5/>YRS: CPT | Mod: ,,, | Performed by: INTERNAL MEDICINE

## 2022-07-08 PROCEDURE — 86901 BLOOD TYPING SEROLOGIC RH(D): CPT | Performed by: INTERNAL MEDICINE

## 2022-07-08 PROCEDURE — C1887 CATHETER, GUIDING: HCPCS | Performed by: INTERNAL MEDICINE

## 2022-07-08 PROCEDURE — 93454 CORONARY ARTERY ANGIO S&I: CPT | Mod: 26,,, | Performed by: INTERNAL MEDICINE

## 2022-07-08 RX ORDER — MIDAZOLAM HYDROCHLORIDE 1 MG/ML
INJECTION, SOLUTION INTRAMUSCULAR; INTRAVENOUS
Status: DISCONTINUED | OUTPATIENT
Start: 2022-07-08 | End: 2022-07-08 | Stop reason: HOSPADM

## 2022-07-08 RX ORDER — SODIUM CHLORIDE 9 MG/ML
INJECTION, SOLUTION INTRAVENOUS CONTINUOUS
Status: DISCONTINUED | OUTPATIENT
Start: 2022-07-08 | End: 2022-07-08 | Stop reason: HOSPADM

## 2022-07-08 RX ORDER — HEPARIN SOD,PORCINE/0.9 % NACL 1000/500ML
INTRAVENOUS SOLUTION INTRAVENOUS
Status: DISCONTINUED | OUTPATIENT
Start: 2022-07-08 | End: 2022-07-08 | Stop reason: HOSPADM

## 2022-07-08 RX ORDER — DIPHENHYDRAMINE HCL 50 MG
50 CAPSULE ORAL ONCE
Status: COMPLETED | OUTPATIENT
Start: 2022-07-08 | End: 2022-07-08

## 2022-07-08 RX ORDER — NAPROXEN SODIUM 220 MG/1
81 TABLET, FILM COATED ORAL ONCE
Status: COMPLETED | OUTPATIENT
Start: 2022-07-08 | End: 2022-07-08

## 2022-07-08 RX ORDER — CLOPIDOGREL BISULFATE 75 MG/1
75 TABLET ORAL ONCE
Status: COMPLETED | OUTPATIENT
Start: 2022-07-08 | End: 2022-07-08

## 2022-07-08 RX ORDER — SODIUM CHLORIDE 9 MG/ML
INJECTION, SOLUTION INTRAVENOUS CONTINUOUS
Status: ACTIVE | OUTPATIENT
Start: 2022-07-08 | End: 2022-07-08

## 2022-07-08 RX ORDER — HEPARIN SODIUM 1000 [USP'U]/ML
INJECTION INTRAVENOUS; SUBCUTANEOUS
Status: DISCONTINUED | OUTPATIENT
Start: 2022-07-08 | End: 2022-07-08 | Stop reason: HOSPADM

## 2022-07-08 RX ORDER — FENTANYL CITRATE 50 UG/ML
INJECTION, SOLUTION INTRAMUSCULAR; INTRAVENOUS
Status: DISCONTINUED | OUTPATIENT
Start: 2022-07-08 | End: 2022-07-08 | Stop reason: HOSPADM

## 2022-07-08 RX ORDER — LIDOCAINE HYDROCHLORIDE 20 MG/ML
INJECTION, SOLUTION EPIDURAL; INFILTRATION; INTRACAUDAL; PERINEURAL
Status: DISCONTINUED | OUTPATIENT
Start: 2022-07-08 | End: 2022-07-08 | Stop reason: HOSPADM

## 2022-07-08 RX ADMIN — CLOPIDOGREL 75 MG: 75 TABLET, FILM COATED ORAL at 10:07

## 2022-07-08 RX ADMIN — DIPHENHYDRAMINE HYDROCHLORIDE 50 MG: 50 CAPSULE ORAL at 08:07

## 2022-07-08 RX ADMIN — ASPIRIN 81 MG CHEWABLE TABLET 81 MG: 81 TABLET CHEWABLE at 10:07

## 2022-07-08 RX ADMIN — SODIUM CHLORIDE: 0.9 INJECTION, SOLUTION INTRAVENOUS at 08:07

## 2022-07-08 NOTE — INTERVAL H&P NOTE
The patient has been examined and the H&P has been reviewed:    I concur with the findings and no changes have occurred since H&P was written.    Procedure risks, benefits and alternative options discussed and understood by patient/family.          Active Hospital Problems    Diagnosis  POA    *Positive cardiac stress test [R94.39]  Yes    Hyperlipidemia associated with type 2 diabetes mellitus [E11.69, E78.5]  Yes     Chronic    Coronary arteriosclerosis in native artery [I25.10]  Yes     Chronic    Essential hypertension [I10]  Yes     Chronic      Resolved Hospital Problems   No resolved problems to display.

## 2022-07-08 NOTE — BRIEF OP NOTE
Brief Operative Note:    : Drew Peralta MD     Referring Physician: Kimi Ferrer     All Operators: Surgeon(s):  MD Juan Hernandez MD     Preoperative Diagnosis: Abnormal stress test [R94.39]Coronary arteriosclerosis in native artery [I25.10]     Postop Diagnosis: Multi-vessel CAD    Treatments/Procedures: Procedure(s) (LRB):  Left heart cath (Left)    Access: Right radial artery    Findings:Severe triple vessel coronary artery disease disease is present.     See catheterization report for full details.    Intervention: heart team approach    See catheterization report for full details.    Closure device: VascBand       Plan:  - Post cath protocol   - IVF @ 150 cc/kg/hr x 3 hours  - Continue aspirin 81 mg daily indefinitely  - Continue Plavix for minimum 6 months, ideally up to 1 year  - Continue high intensity statin therapy (LDL goal < 70)  - Risk factor reduction (BP <130/80 mmHg, glycemic control, etc)  - CTS consult    Estimated Blood loss: 20 cc    Specimens removed: No    Juan Sellers MD  Interventional Cardiology PGY-7  07/08/2022

## 2022-07-08 NOTE — PLAN OF CARE
Pt arrived to unit accompanied by his spouse.  Pre op orders and assessment initiated.   used for admit assessment.  Pt in no acute distress and verbalizes no complaints.  Will continue to monitor.  Call bell within reach.

## 2022-07-08 NOTE — Clinical Note
The catheter was inserted into the aorta.  I have reviewed and confirmed nurses' notes for patient's medications, allergies, medical history, and surgical history.

## 2022-07-08 NOTE — DISCHARGE SUMMARY
Discharge Summary  Interventional Cardiology      Admit Date: 7/8/2022    Discharge Date:  7/8/2022    Attending Physician: Drew Peralta MD    Discharge Physician: Drew Peralta MD    Principal Diagnoses: Positive cardiac stress test  Indication for Admission: Left heart cath (Left)    Discharged Condition: Good    Hospital Course:   Patient presented for outpatient coronary angiogram which went without complication. Coronary angiogram revealed severe multivessel CAD. See full cath report in Epic for details. Hemostasis of patient's R Radial access site was achieved with VascBand. Patient was monitored per post-cath protocol, and his R radial access site was c/d/i with no hematoma. Patient was able to ambulate without difficulty. He was feeling well and anticipating discharge home today.     Outpatient Plan:  - There were no medication changes  - Plan for CTS consult for consideration of CABG    Diet: Cardiac diet    Activity: Ad khushi    Disposition: Home or Self Care    Follow Up: as scheduled      Discharge Medications:      Medication List      CONTINUE taking these medications    aspirin 81 MG EC tablet  Commonly known as: ECOTRIN  Take 1 tablet (81 mg total) by mouth once daily.     atorvastatin 80 MG tablet  Commonly known as: LIPITOR  Take 1 tablet (80 mg total) by mouth once daily.     atropine 1% 1 % Drop  Commonly known as: ISOPTO ATROPINE  Place 1 drop into the left eye 2 (two) times daily.     AVASTIN 25 mg/mL injection  Generic drug: bevacizumab     clopidogreL 75 mg tablet  Commonly known as: PLAVIX  Take 1 tablet (75 mg total) by mouth once daily.     diclofenac sodium 1 % Gel  Commonly known as: VOLTAREN  Apply 2 g topically 3 (three) times daily.     dorzolamide-timolol 2-0.5% 22.3-6.8 mg/mL ophthalmic solution  Commonly known as: COSOPT  Place 1 drop into the right eye 2 (two) times daily.     ILUVIEN 0.19 mg intravitreal implant  Generic drug: fluocinolone     lancets Misc  Commonly  "known as: ACCU-CHEK SOFTCLIX LANCETS  1 Box by Misc.(Non-Drug; Combo Route) route 2 hours after meals and at bedtime.     LANTUS SOLOSTAR U-100 INSULIN glargine 100 units/mL SubQ pen  Generic drug: insulin  Inject 20 Units into the skin every evening.     lisinopriL-hydrochlorothiazide 10-12.5 mg per tablet  Commonly known as: PRINZIDE,ZESTORETIC  Take 1 tablet by mouth once daily.     metFORMIN 1000 MG tablet  Commonly known as: GLUCOPHAGE  Take 1 tablet (1,000 mg total) by mouth 2 (two) times daily with meals.     metoprolol succinate 25 MG 24 hr tablet  Commonly known as: TOPROL-XL  Take 0.5 tablets (12.5 mg total) by mouth once daily.     nitrofurantoin (macrocrystal-monohydrate) 100 MG capsule  Commonly known as: MACROBID  Take 1 capsule (100 mg total) by mouth 2 (two) times daily.     nitroGLYCERIN 0.4 MG SL tablet  Commonly known as: NITROSTAT  Place 1 tablet (0.4 mg total) under the tongue every 5 (five) minutes as needed for Chest pain (ONLY IF HAVE CHEST PAIN,).     OZURDEX 0.7 mg Impl intravitreal implant  Generic drug: dexAMETHasone     pen needle, diabetic 29 gauge x 1/2" Ndle  Commonly known as: BD ULTRA-FINE ORIG PEN NEEDLE  20 Units by Misc.(Non-Drug; Combo Route) route once daily.     tamsulosin 0.4 mg Cap  Commonly known as: FLOMAX  Take 1 capsule (0.4 mg total) by mouth every evening.            Juan Sellers  Interventional Cardiology Fellow PGY-7  07/08/2022    "

## 2022-07-08 NOTE — PROGRESS NOTES
Report received from [facility] Sultana. Patient s/p C. R radial vasc band intact. No bleeding or hematoma noted. No complaints from patient. Vss. Connected to cont tele monitoring. Call light in reach.

## 2022-07-11 ENCOUNTER — HOSPITAL ENCOUNTER (OUTPATIENT)
Dept: CARDIOLOGY | Facility: HOSPITAL | Age: 72
Discharge: HOME | End: 2022-07-11
Attending: INTERNAL MEDICINE
Payer: MEDICARE

## 2022-07-11 DIAGNOSIS — I70.0 AORTIC ATHEROSCLEROSIS: ICD-10-CM

## 2022-07-11 LAB
ABDOMINAL IMA AP: 1.35 CM
ABDOMINAL IMA ED VEL: 0 CM/S
ABDOMINAL IMA PS VEL: 83 CM/S
ABDOMINAL IMA TRANS: 1.4 CM
ABDOMINAL INFRARENAL AORTA AP: 1.78 CM
ABDOMINAL INFRARENAL AORTA ED VEL: 0 CM/S
ABDOMINAL INFRARENAL AORTA PS VEL: 72 CM/S
ABDOMINAL INFRARENAL AORTA TRANS: 1.62 CM
ABDOMINAL JUXTARENAL AORTA AP: 1.76 CM
ABDOMINAL JUXTARENAL AORTA ED VEL: 0 CM/S
ABDOMINAL JUXTARENAL AORTA PS VEL: 63 CM/S
ABDOMINAL JUXTARENAL AORTA TRANS: 1.78 CM
ABDOMINAL LT COM ILIAC AP: 0.9 CM
ABDOMINAL LT COM ILIAC TRANS: 1 CM
ABDOMINAL LT COM ILIAC VEL: 103 CM/S
ABDOMINAL LT COM ILLIAC ED VEL: 0 CM/S
ABDOMINAL RT COM ILIAC AP: 1.16 CM
ABDOMINAL RT COM ILIAC TRANS: 1.2 CM
ABDOMINAL RT COM ILIAC VEL: 89 CM/S
ABDOMINAL RT COM ILLIAC ED VEL: 0 CM/S
ABDOMINAL SUPRARENAL AORTA AP: 3.04 CM
ABDOMINAL SUPRARENAL AORTA ED VEL: 0 CM/S
ABDOMINAL SUPRARENAL AORTA PS VEL: 57 CM/S
ABDOMINAL SUPRARENAL AORTA TRANS: 2.8 CM
OHS CV AAA LARGEST SIZE: 3 CM

## 2022-07-11 PROCEDURE — 93978 VASCULAR STUDY: CPT

## 2022-07-11 PROCEDURE — 93978 VASCULAR STUDY: CPT | Mod: 26,,, | Performed by: INTERNAL MEDICINE

## 2022-07-11 PROCEDURE — 93978 CV US ABDOMINAL AORTA EVALUATION (CUPID ONLY): ICD-10-PCS | Mod: 26,,, | Performed by: INTERNAL MEDICINE

## 2022-07-12 ENCOUNTER — TELEPHONE (OUTPATIENT)
Dept: CARDIOLOGY | Facility: CLINIC | Age: 72
End: 2022-07-12
Payer: MEDICARE

## 2022-07-12 NOTE — TELEPHONE ENCOUNTER
----- Message from Drew Peralta MD sent at 7/11/2022  7:29 PM CDT -----  Please call this patient and let him or his wife know that he has a supra-renal AAA that is 3cm.  Rec follow-up scan in 1 year.  Thanks

## 2022-07-12 NOTE — TELEPHONE ENCOUNTER
Notified patient of supra renal AAA aneurysm 3 cm. Will see patient in 1 year with ultrasound. All questions answered.

## 2022-07-13 ENCOUNTER — TELEPHONE (OUTPATIENT)
Dept: CARDIOTHORACIC SURGERY | Facility: CLINIC | Age: 72
End: 2022-07-13
Payer: MEDICARE

## 2022-07-13 NOTE — PROGRESS NOTES
Subjective:      Patient ID: Ned Oliveira is a 72 y.o. male.    Chief Complaint: No chief complaint on file.      HPI:  Ned Oliveira is a 72 y.o. male who presents for surgical evaluation of CAD. Medical conditions include DM2, BPH, adrenal adenoma, HLD, HTN, glaucoma. Reports fatigue and abd discomfort X 6 months.  He reports SOB when walking up stairs. Does not have any trouble on flat ground. Reports having exertional chest pain that does not radiate. Lasts around 5 minutes and is relieved with rest. Patient had an abnormal stress test and underwent LHC which revealed multivessel disease. States that he has intermittent swelling in his left leg. No dizziness. No prior strokes, seizures, blood clots, stents, or sternotomies. No drinking history. Quit smoking 27 years ago. Does not check his blood glucose because the meter is broken but wife reports that it is up and down.       Family and social history reviewed    Current Outpatient Medications   Medication Instructions    aspirin (ECOTRIN) 81 mg, Oral, Daily    atorvastatin (LIPITOR) 80 mg, Oral, Daily    atropine 1% (ISOPTO ATROPINE) 1 % Drop 1 drop, Left Eye, 2 times daily    AVASTIN 25 mg/mL injection No dose, route, or frequency recorded.    clopidogreL (PLAVIX) 75 mg, Oral, Daily    diclofenac sodium (VOLTAREN) 2 g, Topical (Top), 3 times daily    dorzolamide-timolol 2-0.5% (COSOPT) 22.3-6.8 mg/mL ophthalmic solution 1 drop, Right Eye, 2 times daily    ILUVIEN 0.19 mg intravitreal implant No dose, route, or frequency recorded.    lancets (ACCU-CHEK SOFTCLIX LANCETS) Misc 1 Box, Misc.(Non-Drug; Combo Route), 4 times daily after meals & nightly    LANTUS SOLOSTAR U-100 INSULIN 20 Units, Subcutaneous, Nightly    lisinopriL-hydrochlorothiazide (PRINZIDE,ZESTORETIC) 10-12.5 mg per tablet 1 tablet, Oral, Daily    metFORMIN (GLUCOPHAGE) 1,000 mg, Oral, 2 times daily with meals    metoprolol succinate (TOPROL-XL) 12.5 mg, Oral, Daily     nitrofurantoin, macrocrystal-monohydrate, (MACROBID) 100 MG capsule 100 mg, Oral, 2 times daily    nitroGLYCERIN (NITROSTAT) 0.4 mg, Sublingual, Every 5 min PRN    OZURDEX 0.7 mg Impl intravitreal implant No dose, route, or frequency recorded.    pen needle, diabetic (BD ULTRA-FINE ORIG PEN NEEDLE) 20 Units, Misc.(Non-Drug; Combo Route), Daily    tamsulosin (FLOMAX) 0.4 mg, Oral, Nightly         Review of patient's allergies indicates:   Allergen Reactions    Percocet [oxycodone-acetaminophen] Itching     Past Medical History:   Diagnosis Date    Adrenal adenoma     BPH (benign prostatic hyperplasia)     Cataract     Coronary artery disease     Diabetes mellitus, type 2     Diabetic retinopathy     Glaucoma     Hyperlipidemia     Hypertension     Inguinal hernia of left side without obstruction or gangrene 2/6/2019    Nephrolithiasis 5/26/2016    Steroid responders, left 4/14/2020     Past Surgical History:   Procedure Laterality Date    BICEPS TENDON REPAIR Right     CATARACT EXTRACTION W/  INTRAOCULAR LENS IMPLANT Left 10/21/2020    COMPLEX PHACO IOL  AND BAERVELDT ()    COLONOSCOPY N/A 02/08/2019    Procedure: COLONOSCOPY;  Surgeon: Tavon Montes MD;  Location: [facility] (4TH FLR);  Service: Colon and Rectal;  Laterality: N/A;  will need . pt requesting ASAP. gaudencio Perez (international) ok to schedule at this time. will send  up for 6:45 am arrival time    EYE SURGERY      HERNIA REPAIR      IMPLANTATION OF DEVICE FOR GLAUCOMA Left 10/21/2020    Procedure: INSERTION, DEVICE, FOR GLAUCOMA;  Surgeon: Saranya Monge MD;  Location: [facility] OR 1ST FLR;  Service: Ophthalmology;  Laterality: Left;    INTRAOCULAR PROSTHESES INSERTION Left 10/21/2020    Procedure: INSERTION, IOL PROSTHESIS;  Surgeon: Saranya Monge MD;  Location: [facility] OR 1ST FLR;  Service: Ophthalmology;  Laterality: Left;    LEFT HEART CATHETERIZATION Left 7/8/2022     Procedure: Left heart cath;  Surgeon: Drew Peralta MD;  Location: [facility] CATH LAB;  Service: Cardiology;  Laterality: Left;    PHACOEMULSIFICATION OF CATARACT Left 10/21/2020    Procedure: PHACOEMULSIFICATION, CATARACT;  Surgeon: Saranya Monge MD;  Location: [facility] OR [address] [place] [address];  Service: Ophthalmology;  Laterality: Left;    REFORMATION OF ANTERIOR CHAMBER Left 2020    WITH HEALON ()    YAG LASER  TO CYCLITIC MEMBRANE Left 12/10/2020         Family History     Problem Relation (Age of Onset)    Asthma Son    Autism Son    Diabetes Mother, Brother    Heart disease Mother, Sister    Kidney failure Daughter    No Known Problems Brother, Sister, Sister, Sister, Brother        Social History     Socioeconomic History    Marital status:     Number of children: 2   Occupational History    Occupation: Maintenance    Tobacco Use    Smoking status: Former Smoker     Packs/day: 2.00     Years: 10.00     Pack years: 20.00     Types: Cigarettes     Start date: 8/15/1987     Quit date: 8/15/1996     Years since quittin.9    Smokeless tobacco: Never Used   Substance and Sexual Activity    Alcohol use: No     Alcohol/week: 0.0 standard drinks    Drug use: No    Sexual activity: Yes     Partners: Female     Social Determinants of Health     Financial Resource Strain: Low Risk     Difficulty of Paying Living Expenses: Not hard at all   Food Insecurity: No Food Insecurity    Worried About Running Out of Food in the Last Year: Never true    Ran Out of Food in the Last Year: Never true   Transportation Needs: No Transportation Needs    Lack of Transportation (Medical): No    Lack of Transportation (Non-Medical): No   Physical Activity: Sufficiently Active    Days of Exercise per Week: 5 days    Minutes of Exercise per Session: 140 min   Stress: No Stress Concern Present    Feeling of Stress : Not at all   Social Connections: Moderately Isolated    Frequency of  Communication with Friends and Family: Three times a week    Frequency of Social Gatherings with Friends and Family: Once a week    Attends Confucianist Services: Never    Active Member of Clubs or Organizations: No    Attends Club or Organization Meetings: Never    Marital Status:    Housing Stability: Unknown    Unable to Pay for Housing in the Last Year: No    Unstable Housing in the Last Year: No       Current medications Reviewed    Review of Systems   Constitutional: Positive for activity change.   HENT: Negative for nosebleeds.    Eyes: Negative for visual disturbance.   Respiratory: Positive for shortness of breath.    Cardiovascular: Positive for chest pain and leg swelling.   Gastrointestinal: Negative for nausea.   Musculoskeletal: Negative for gait problem.   Skin: Negative for color change.   Neurological: Negative for dizziness and seizures.   Hematological: Does not bruise/bleed easily.   Psychiatric/Behavioral: Negative for sleep disturbance.     Objective:   Physical Exam  Vitals reviewed.   Constitutional:       General: He is not in acute distress.     Appearance: He is well-developed. He is not diaphoretic.   HENT:      Head: Normocephalic and atraumatic.   Eyes:      Pupils: Pupils are equal, round, and reactive to light.   Neck:      Vascular: No JVD.   Cardiovascular:      Rate and Rhythm: Normal rate.   Pulmonary:      Effort: Pulmonary effort is normal. No respiratory distress.   Abdominal:      General: There is no distension.   Musculoskeletal:         General: No swelling. Normal range of motion.      Cervical back: Normal range of motion.   Skin:     Coloration: Skin is not pale.   Neurological:      Mental Status: He is alert and oriented to person, place, and time.   Psychiatric:         Speech: Speech normal.         Behavior: Behavior normal.         Thought Content: Thought content normal.         Judgment: Judgment normal.         Diagnostic Results: reviewed   [facility]  7/8/22  · Multi-vessel CAD  - images reviewed by Dr. Doty     Pet Stress 6/1/22    There are two significant perfusion abnormalities.    Perfusion Abnormality #1 - There is a large sized, severe intensity basal to apical anterior, anterolateral and lateral reversible perfusion abnormality involving 50% of LV myocardium.    Perfusion Abnormality #2 - There is a small sized, mild to moderate intensity basal inferolateral resting perfusion abnormality involving 1% of LV myocardium in the distribution of the PLB territory. After pharmacologic stress, this perfusion abnormality is larger and more severe involving 5% of the LV myocardium indicative of ischemia with underlying infarct.    There are no other significant perfusion abnormalities.    The whole heart absolute myocardial perfusion values averaged 0.43 cc/min/g at rest, which is reduced; 0.46 cc/min/g at stress, which is severely reduced; and CFR is 1.04 , which is severely reduced.    CT attenuation images demonstrate severe diffuse coronary calcifications in the LAD, LCX and RCA territory and moderate diffuse aortic calcifications in the descending aorta.    The gated perfusion images showed an ejection fraction of 48% at rest and 32% during stress. A normal ejection fraction is greater than 53%.    There is basal to apical anterior, lateral and anterolateral wall severe hypokinesis during stress.    There is mild global hypokinesis at rest.    The EKG portion of this study is positive for ischemia.    There were no arrhythmias during stress.    The patient reported no chest pain during the stress test.    Assessment:   CAD  Plan:   Will schedule CT chest, carotids, and TTE. Plan to follow up results and determine final surgical plan.     Attending Attestation  Mr Oliveira is a very nice 72M with multivessel coronary disease. He has small targets but has excellent functional status. We spoke about stenting versus surgery. I told him given that his  targets are small we would have to provide a better risk assessment for him, with ECHO, CT scan, carotids. If his EF is reasonable I will discuss planning surgery for him. CABG 2-3. I spoke with Dr Peralta today as well about the possibility of him stenting his right side after bypass if we were to perform the procedure. I will see him next Thursday to discuss his options.    Aldo Doty  CV Surgery

## 2022-07-13 NOTE — TELEPHONE ENCOUNTER
Called Mr Oliveira with assistance of  ([facility] #[id number]) to remind him of appointment time tomorrow morning. He verbalizes understanding.

## 2022-07-14 ENCOUNTER — HOSPITAL ENCOUNTER (OUTPATIENT)
Dept: PULMONOLOGY | Facility: CLINIC | Age: 72
Discharge: HOME | End: 2022-07-14
Attending: STUDENT IN AN ORGANIZED HEALTH CARE EDUCATION/TRAINING PROGRAM
Payer: MEDICARE

## 2022-07-14 ENCOUNTER — HOSPITAL ENCOUNTER (OUTPATIENT)
Dept: CARDIOLOGY | Facility: HOSPITAL | Age: 72
Discharge: HOME | End: 2022-07-14
Attending: STUDENT IN AN ORGANIZED HEALTH CARE EDUCATION/TRAINING PROGRAM
Payer: MEDICARE

## 2022-07-14 ENCOUNTER — HOSPITAL ENCOUNTER (OUTPATIENT)
Dept: RADIOLOGY | Facility: HOSPITAL | Age: 72
Discharge: HOME | End: 2022-07-14
Attending: STUDENT IN AN ORGANIZED HEALTH CARE EDUCATION/TRAINING PROGRAM
Payer: MEDICARE

## 2022-07-14 ENCOUNTER — OFFICE VISIT (OUTPATIENT)
Dept: CARDIOTHORACIC SURGERY | Facility: CLINIC | Age: 72
End: 2022-07-14
Payer: MEDICARE

## 2022-07-14 ENCOUNTER — HOSPITAL ENCOUNTER (OUTPATIENT)
Dept: VASCULAR SURGERY | Facility: CLINIC | Age: 72
Discharge: HOME | End: 2022-07-14
Attending: STUDENT IN AN ORGANIZED HEALTH CARE EDUCATION/TRAINING PROGRAM
Payer: MEDICARE

## 2022-07-14 VITALS
HEIGHT: 64 IN | WEIGHT: 140 LBS | HEART RATE: 62 BPM | BODY MASS INDEX: 23.9 KG/M2 | SYSTOLIC BLOOD PRESSURE: 125 MMHG | DIASTOLIC BLOOD PRESSURE: 80 MMHG

## 2022-07-14 VITALS
DIASTOLIC BLOOD PRESSURE: 68 MMHG | WEIGHT: 140.88 LBS | BODY MASS INDEX: 24.05 KG/M2 | SYSTOLIC BLOOD PRESSURE: 118 MMHG | HEIGHT: 64 IN | HEART RATE: 64 BPM | TEMPERATURE: 99 F | OXYGEN SATURATION: 99 %

## 2022-07-14 DIAGNOSIS — R94.39 POSITIVE CARDIAC STRESS TEST: ICD-10-CM

## 2022-07-14 DIAGNOSIS — Z79.01 CURRENT USE OF LONG TERM ANTICOAGULATION: ICD-10-CM

## 2022-07-14 DIAGNOSIS — I25.10 CORONARY ARTERIOSCLEROSIS IN NATIVE ARTERY: Primary | ICD-10-CM

## 2022-07-14 DIAGNOSIS — I25.10 CORONARY ARTERIOSCLEROSIS IN NATIVE ARTERY: ICD-10-CM

## 2022-07-14 DIAGNOSIS — Z01.818 PRE-OP TESTING: ICD-10-CM

## 2022-07-14 DIAGNOSIS — R94.2 ABNORMAL RESULTS OF PULMONARY FUNCTION STUDIES: ICD-10-CM

## 2022-07-14 DIAGNOSIS — R94.39 ABNORMAL STRESS TEST: ICD-10-CM

## 2022-07-14 DIAGNOSIS — I70.0 AORTIC ATHEROSCLEROSIS: ICD-10-CM

## 2022-07-14 DIAGNOSIS — I65.23 BILATERAL CAROTID ARTERY STENOSIS: ICD-10-CM

## 2022-07-14 LAB
ASCENDING AORTA: 3.17 CM
AV INDEX (PROSTH): 0.94
AV MEAN GRADIENT: 3 MMHG
AV PEAK GRADIENT: 5 MMHG
AV VALVE AREA: 3.35 CM2
AV VELOCITY RATIO: 0.93
BSA FOR ECHO PROCEDURE: 1.69 M2
CV ECHO LV RWT: 0.32 CM
DLCO ADJ PRE: 18.41 ML/(MIN*MMHG) (ref 13.51–27.37)
DLCO SINGLE BREATH LLN: 13.51
DLCO SINGLE BREATH PRE REF: 85.4 %
DLCO SINGLE BREATH REF: 20.44
DLCOC SBVA LLN: 2.23
DLCOC SBVA PRE REF: 112.6 %
DLCOC SBVA REF: 3.71
DLCOC SINGLE BREATH LLN: 13.51
DLCOC SINGLE BREATH PRE REF: 90.1 %
DLCOC SINGLE BREATH REF: 20.44
DLCOCSBVAULN: 5.2
DLCOCSINGLEBREATHULN: 27.37
DLCOSINGLEBREATHULN: 27.37
DLCOVA LLN: 2.23
DLCOVA PRE REF: 106.8 %
DLCOVA PRE: 3.97 ML/(MIN*MMHG*L) (ref 2.23–5.2)
DLCOVA REF: 3.71
DLCOVAULN: 5.2
DLVAADJ PRE: 4.18 ML/(MIN*MMHG*L) (ref 2.23–5.2)
DOP CALC AO PEAK VEL: 1.16 M/S
DOP CALC AO VTI: 22.88 CM
DOP CALC LVOT AREA: 3.6 CM2
DOP CALC LVOT DIAMETER: 2.13 CM
DOP CALC LVOT PEAK VEL: 1.08 M/S
DOP CALC LVOT STROKE VOLUME: 76.57 CM3
DOP CALCLVOT PEAK VEL VTI: 21.5 CM
E WAVE DECELERATION TIME: 234.96 MSEC
E/A RATIO: 0.86
E/E' RATIO: 13.27 M/S
ECHO LV POSTERIOR WALL: 0.84 CM (ref 0.6–1.1)
EJECTION FRACTION: 50 %
FEF 25 75 LLN: 0.86
FEF 25 75 PRE REF: 90.2 %
FEF 25 75 REF: 1.99
FEV05 LLN: 0.76
FEV05 REF: 1.89
FEV1 FVC LLN: 64
FEV1 FVC PRE REF: 96.5 %
FEV1 FVC REF: 77
FEV1 LLN: 1.77
FEV1 PRE REF: 99.7 %
FEV1 REF: 2.45
FRACTIONAL SHORTENING: 24 % (ref 28–44)
FVC LLN: 2.35
FVC PRE REF: 103.3 %
FVC REF: 3.17
INTERVENTRICULAR SEPTUM: 0.82 CM (ref 0.6–1.1)
IVC PRE: 3.04 L (ref 2.35–4)
IVC SINGLE BREATH LLN: 2.35
IVC SINGLE BREATH PRE REF: 95.9 %
IVC SINGLE BREATH REF: 3.17
IVCSINGLEBREATHULN: 4
LA MAJOR: 4.45 CM
LA MINOR: 4.35 CM
LA WIDTH: 3.97 CM
LEFT ATRIUM SIZE: 4.3 CM
LEFT ATRIUM VOLUME INDEX MOD: 25.8 ML/M2
LEFT ATRIUM VOLUME INDEX: 38 ML/M2
LEFT ATRIUM VOLUME MOD: 43.27 CM3
LEFT ATRIUM VOLUME: 63.84 CM3
LEFT INTERNAL DIMENSION IN SYSTOLE: 3.95 CM (ref 2.1–4)
LEFT VENTRICLE DIASTOLIC VOLUME INDEX: 78.1 ML/M2
LEFT VENTRICLE DIASTOLIC VOLUME: 131.21 ML
LEFT VENTRICLE MASS INDEX: 91 G/M2
LEFT VENTRICLE SYSTOLIC VOLUME INDEX: 40.5 ML/M2
LEFT VENTRICLE SYSTOLIC VOLUME: 68.08 ML
LEFT VENTRICULAR INTERNAL DIMENSION IN DIASTOLE: 5.23 CM (ref 3.5–6)
LEFT VENTRICULAR MASS: 153.71 G
LV LATERAL E/E' RATIO: 10.43 M/S
LV SEPTAL E/E' RATIO: 18.25 M/S
MV A" WAVE DURATION": 15.98 MSEC
MV PEAK A VEL: 0.85 M/S
MV PEAK E VEL: 0.73 M/S
MV STENOSIS PRESSURE HALF TIME: 68.14 MS
MV VALVE AREA P 1/2 METHOD: 3.23 CM2
MVV LLN: 77
MVV PRE REF: 121.3 %
MVV REF: 90
PEF LLN: 4.84
PEF PRE REF: 123.3 %
PEF REF: 6.66
PHYSICIAN COMMENT: ABNORMAL
PISA TR MAX VEL: 2.15 M/S
PRE DLCO: 17.46 ML/(MIN*MMHG) (ref 13.51–27.37)
PRE FEF 25 75: 1.79 L/S (ref 0.86–3.58)
PRE FET 100: 8.11 SEC
PRE FEV05 REF: 109.2 %
PRE FEV1 FVC: 74.74 % (ref 63.79–89.54)
PRE FEV1: 2.45 L (ref 1.77–3.09)
PRE FEV5: 2.06 L (ref 0.76–3.03)
PRE FVC: 3.27 L (ref 2.35–4)
PRE MVV: 109.26 L/MIN (ref 76.56–103.58)
PRE PEF: 8.21 L/S (ref 4.84–8.48)
PULM VEIN S/D RATIO: 1.05
PV PEAK D VEL: 0.43 M/S
PV PEAK S VEL: 0.45 M/S
QEF: 49 %
RA MAJOR: 4.5 CM
RA PRESSURE: 8 MMHG
RA WIDTH: 3.95 CM
RIGHT VENTRICULAR END-DIASTOLIC DIMENSION: 4.19 CM
RV TISSUE DOPPLER FREE WALL SYSTOLIC VELOCITY 1 (APICAL 4 CHAMBER VIEW): 9.34 CM/S
SINUS: 3.19 CM
STJ: 2.31 CM
TDI LATERAL: 0.07 M/S
TDI SEPTAL: 0.04 M/S
TDI: 0.06 M/S
TR MAX PG: 18 MMHG
TRICUSPID ANNULAR PLANE SYSTOLIC EXCURSION: 2.81 CM
TV REST PULMONARY ARTERY PRESSURE: 26 MMHG
VA PRE: 4.4 L (ref 5.35–5.35)
VA SINGLE BREATH LLN: 5.35
VA SINGLE BREATH PRE REF: 82.2 %
VA SINGLE BREATH REF: 5.35
VASINGLEBREATHULN: 5.35

## 2022-07-14 PROCEDURE — 3008F PR BODY MASS INDEX (BMI) DOCUMENTED: ICD-10-PCS | Mod: CPTII,S$GLB,, | Performed by: STUDENT IN AN ORGANIZED HEALTH CARE EDUCATION/TRAINING PROGRAM

## 2022-07-14 PROCEDURE — 71250 CT THORAX DX C-: CPT | Mod: TC

## 2022-07-14 PROCEDURE — 3066F PR DOCUMENTATION OF TREATMENT FOR NEPHROPATHY: ICD-10-PCS | Mod: CPTII,S$GLB,, | Performed by: STUDENT IN AN ORGANIZED HEALTH CARE EDUCATION/TRAINING PROGRAM

## 2022-07-14 PROCEDURE — 71250 CT THORAX DX C-: CPT | Mod: 26,,, | Performed by: RADIOLOGY

## 2022-07-14 PROCEDURE — 3051F HG A1C>EQUAL 7.0%<8.0%: CPT | Mod: CPTII,S$GLB,, | Performed by: STUDENT IN AN ORGANIZED HEALTH CARE EDUCATION/TRAINING PROGRAM

## 2022-07-14 PROCEDURE — 93306 TTE W/DOPPLER COMPLETE: CPT

## 2022-07-14 PROCEDURE — 1126F AMNT PAIN NOTED NONE PRSNT: CPT | Mod: CPTII,S$GLB,, | Performed by: STUDENT IN AN ORGANIZED HEALTH CARE EDUCATION/TRAINING PROGRAM

## 2022-07-14 PROCEDURE — 3072F PR LOW RISK FOR RETINOPATHY: ICD-10-PCS | Mod: CPTII,S$GLB,, | Performed by: STUDENT IN AN ORGANIZED HEALTH CARE EDUCATION/TRAINING PROGRAM

## 2022-07-14 PROCEDURE — 1159F PR MEDICATION LIST DOCUMENTED IN MEDICAL RECORD: ICD-10-PCS | Mod: CPTII,S$GLB,, | Performed by: STUDENT IN AN ORGANIZED HEALTH CARE EDUCATION/TRAINING PROGRAM

## 2022-07-14 PROCEDURE — 93306 TTE W/DOPPLER COMPLETE: CPT | Mod: 26,,, | Performed by: INTERNAL MEDICINE

## 2022-07-14 PROCEDURE — 3072F LOW RISK FOR RETINOPATHY: CPT | Mod: CPTII,S$GLB,, | Performed by: STUDENT IN AN ORGANIZED HEALTH CARE EDUCATION/TRAINING PROGRAM

## 2022-07-14 PROCEDURE — 71250 CT CHEST WITHOUT CONTRAST: ICD-10-PCS | Mod: 26,,, | Performed by: RADIOLOGY

## 2022-07-14 PROCEDURE — 4010F ACE/ARB THERAPY RXD/TAKEN: CPT | Mod: CPTII,S$GLB,, | Performed by: STUDENT IN AN ORGANIZED HEALTH CARE EDUCATION/TRAINING PROGRAM

## 2022-07-14 PROCEDURE — 99205 OFFICE O/P NEW HI 60 MIN: CPT | Mod: S$GLB,,, | Performed by: STUDENT IN AN ORGANIZED HEALTH CARE EDUCATION/TRAINING PROGRAM

## 2022-07-14 PROCEDURE — 1101F PR PT FALLS ASSESS DOC 0-1 FALLS W/OUT INJ PAST YR: ICD-10-PCS | Mod: CPTII,S$GLB,, | Performed by: STUDENT IN AN ORGANIZED HEALTH CARE EDUCATION/TRAINING PROGRAM

## 2022-07-14 PROCEDURE — 3288F PR FALLS RISK ASSESSMENT DOCUMENTED: ICD-10-PCS | Mod: CPTII,S$GLB,, | Performed by: STUDENT IN AN ORGANIZED HEALTH CARE EDUCATION/TRAINING PROGRAM

## 2022-07-14 PROCEDURE — 3066F NEPHROPATHY DOC TX: CPT | Mod: CPTII,S$GLB,, | Performed by: STUDENT IN AN ORGANIZED HEALTH CARE EDUCATION/TRAINING PROGRAM

## 2022-07-14 PROCEDURE — 3074F PR MOST RECENT SYSTOLIC BLOOD PRESSURE < 130 MM HG: ICD-10-PCS | Mod: CPTII,S$GLB,, | Performed by: STUDENT IN AN ORGANIZED HEALTH CARE EDUCATION/TRAINING PROGRAM

## 2022-07-14 PROCEDURE — 94729 PR C02/MEMBANE DIFFUSE CAPACITY: ICD-10-PCS | Mod: S$GLB,,, | Performed by: INTERNAL MEDICINE

## 2022-07-14 PROCEDURE — 1159F MED LIST DOCD IN RCRD: CPT | Mod: CPTII,S$GLB,, | Performed by: STUDENT IN AN ORGANIZED HEALTH CARE EDUCATION/TRAINING PROGRAM

## 2022-07-14 PROCEDURE — 3061F PR NEG MICROALBUMINURIA RESULT DOCUMENTED/REVIEW: ICD-10-PCS | Mod: CPTII,S$GLB,, | Performed by: STUDENT IN AN ORGANIZED HEALTH CARE EDUCATION/TRAINING PROGRAM

## 2022-07-14 PROCEDURE — 93880 PR DUPLEX SCAN EXTRACRANIAL,BILAT: ICD-10-PCS | Mod: S$GLB,,, | Performed by: SURGERY

## 2022-07-14 PROCEDURE — 3074F SYST BP LT 130 MM HG: CPT | Mod: CPTII,S$GLB,, | Performed by: STUDENT IN AN ORGANIZED HEALTH CARE EDUCATION/TRAINING PROGRAM

## 2022-07-14 PROCEDURE — 1126F PR PAIN SEVERITY QUANTIFIED, NO PAIN PRESENT: ICD-10-PCS | Mod: CPTII,S$GLB,, | Performed by: STUDENT IN AN ORGANIZED HEALTH CARE EDUCATION/TRAINING PROGRAM

## 2022-07-14 PROCEDURE — 94010 BREATHING CAPACITY TEST: ICD-10-PCS | Mod: S$GLB,,, | Performed by: INTERNAL MEDICINE

## 2022-07-14 PROCEDURE — 93880 EXTRACRANIAL BILAT STUDY: CPT | Mod: S$GLB,,, | Performed by: SURGERY

## 2022-07-14 PROCEDURE — 93306 ECHO (CUPID ONLY): ICD-10-PCS | Mod: 26,,, | Performed by: INTERNAL MEDICINE

## 2022-07-14 PROCEDURE — 3078F DIAST BP <80 MM HG: CPT | Mod: CPTII,S$GLB,, | Performed by: STUDENT IN AN ORGANIZED HEALTH CARE EDUCATION/TRAINING PROGRAM

## 2022-07-14 PROCEDURE — 3051F PR MOST RECENT HEMOGLOBIN A1C LEVEL 7.0 - < 8.0%: ICD-10-PCS | Mod: CPTII,S$GLB,, | Performed by: STUDENT IN AN ORGANIZED HEALTH CARE EDUCATION/TRAINING PROGRAM

## 2022-07-14 PROCEDURE — 3061F NEG MICROALBUMINURIA REV: CPT | Mod: CPTII,S$GLB,, | Performed by: STUDENT IN AN ORGANIZED HEALTH CARE EDUCATION/TRAINING PROGRAM

## 2022-07-14 PROCEDURE — 94729 DIFFUSING CAPACITY: CPT | Mod: S$GLB,,, | Performed by: INTERNAL MEDICINE

## 2022-07-14 PROCEDURE — 99999 PR PBB SHADOW E&M-EST. PATIENT-LVL IV: CPT | Mod: PBBFAC,,, | Performed by: STUDENT IN AN ORGANIZED HEALTH CARE EDUCATION/TRAINING PROGRAM

## 2022-07-14 PROCEDURE — 3078F PR MOST RECENT DIASTOLIC BLOOD PRESSURE < 80 MM HG: ICD-10-PCS | Mod: CPTII,S$GLB,, | Performed by: STUDENT IN AN ORGANIZED HEALTH CARE EDUCATION/TRAINING PROGRAM

## 2022-07-14 PROCEDURE — 99999 PR PBB SHADOW E&M-EST. PATIENT-LVL IV: ICD-10-PCS | Mod: PBBFAC,,, | Performed by: STUDENT IN AN ORGANIZED HEALTH CARE EDUCATION/TRAINING PROGRAM

## 2022-07-14 PROCEDURE — 3288F FALL RISK ASSESSMENT DOCD: CPT | Mod: CPTII,S$GLB,, | Performed by: STUDENT IN AN ORGANIZED HEALTH CARE EDUCATION/TRAINING PROGRAM

## 2022-07-14 PROCEDURE — 99205 PR OFFICE/OUTPT VISIT, NEW, LEVL V, 60-74 MIN: ICD-10-PCS | Mod: S$GLB,,, | Performed by: STUDENT IN AN ORGANIZED HEALTH CARE EDUCATION/TRAINING PROGRAM

## 2022-07-14 PROCEDURE — 94010 BREATHING CAPACITY TEST: CPT | Mod: S$GLB,,, | Performed by: INTERNAL MEDICINE

## 2022-07-14 PROCEDURE — 1101F PT FALLS ASSESS-DOCD LE1/YR: CPT | Mod: CPTII,S$GLB,, | Performed by: STUDENT IN AN ORGANIZED HEALTH CARE EDUCATION/TRAINING PROGRAM

## 2022-07-14 PROCEDURE — 4010F PR ACE/ARB THEARPY RXD/TAKEN: ICD-10-PCS | Mod: CPTII,S$GLB,, | Performed by: STUDENT IN AN ORGANIZED HEALTH CARE EDUCATION/TRAINING PROGRAM

## 2022-07-14 PROCEDURE — 3008F BODY MASS INDEX DOCD: CPT | Mod: CPTII,S$GLB,, | Performed by: STUDENT IN AN ORGANIZED HEALTH CARE EDUCATION/TRAINING PROGRAM

## 2022-07-20 ENCOUNTER — TELEPHONE (OUTPATIENT)
Dept: CARDIOTHORACIC SURGERY | Facility: CLINIC | Age: 72
End: 2022-07-20
Payer: MEDICARE

## 2022-07-20 NOTE — TELEPHONE ENCOUNTER
Used [facility] to speak with patient. Patient is [place]ian speaking.    Spoke with patient and reminded him of his appointments tomorrow, starting with Dr Doty at 9:15am. Explained appointment with CT will be followed by CXR, labs, and EKG.  He understands and will be here tomorrow.    Julie Haase RN  CTS Nurse Navigator [phone]

## 2022-07-21 ENCOUNTER — OFFICE VISIT (OUTPATIENT)
Dept: CARDIOTHORACIC SURGERY | Facility: CLINIC | Age: 72
End: 2022-07-21
Payer: MEDICARE

## 2022-07-21 VITALS
HEIGHT: 64 IN | WEIGHT: 142.19 LBS | RESPIRATION RATE: 16 BRPM | OXYGEN SATURATION: 98 % | HEART RATE: 61 BPM | DIASTOLIC BLOOD PRESSURE: 82 MMHG | SYSTOLIC BLOOD PRESSURE: 128 MMHG | BODY MASS INDEX: 24.28 KG/M2 | TEMPERATURE: 98 F

## 2022-07-21 DIAGNOSIS — I25.10 CORONARY ARTERIOSCLEROSIS IN NATIVE ARTERY: Primary | Chronic | ICD-10-CM

## 2022-07-21 PROCEDURE — 3066F NEPHROPATHY DOC TX: CPT | Mod: CPTII,S$GLB,, | Performed by: STUDENT IN AN ORGANIZED HEALTH CARE EDUCATION/TRAINING PROGRAM

## 2022-07-21 PROCEDURE — 3051F HG A1C>EQUAL 7.0%<8.0%: CPT | Mod: CPTII,S$GLB,, | Performed by: STUDENT IN AN ORGANIZED HEALTH CARE EDUCATION/TRAINING PROGRAM

## 2022-07-21 PROCEDURE — 3288F PR FALLS RISK ASSESSMENT DOCUMENTED: ICD-10-PCS | Mod: CPTII,S$GLB,, | Performed by: STUDENT IN AN ORGANIZED HEALTH CARE EDUCATION/TRAINING PROGRAM

## 2022-07-21 PROCEDURE — 1126F AMNT PAIN NOTED NONE PRSNT: CPT | Mod: CPTII,S$GLB,, | Performed by: STUDENT IN AN ORGANIZED HEALTH CARE EDUCATION/TRAINING PROGRAM

## 2022-07-21 PROCEDURE — 3051F PR MOST RECENT HEMOGLOBIN A1C LEVEL 7.0 - < 8.0%: ICD-10-PCS | Mod: CPTII,S$GLB,, | Performed by: STUDENT IN AN ORGANIZED HEALTH CARE EDUCATION/TRAINING PROGRAM

## 2022-07-21 PROCEDURE — 1126F PR PAIN SEVERITY QUANTIFIED, NO PAIN PRESENT: ICD-10-PCS | Mod: CPTII,S$GLB,, | Performed by: STUDENT IN AN ORGANIZED HEALTH CARE EDUCATION/TRAINING PROGRAM

## 2022-07-21 PROCEDURE — 3008F BODY MASS INDEX DOCD: CPT | Mod: CPTII,S$GLB,, | Performed by: STUDENT IN AN ORGANIZED HEALTH CARE EDUCATION/TRAINING PROGRAM

## 2022-07-21 PROCEDURE — 99999 PR PBB SHADOW E&M-EST. PATIENT-LVL IV: ICD-10-PCS | Mod: PBBFAC,,, | Performed by: STUDENT IN AN ORGANIZED HEALTH CARE EDUCATION/TRAINING PROGRAM

## 2022-07-21 PROCEDURE — 1159F PR MEDICATION LIST DOCUMENTED IN MEDICAL RECORD: ICD-10-PCS | Mod: CPTII,S$GLB,, | Performed by: STUDENT IN AN ORGANIZED HEALTH CARE EDUCATION/TRAINING PROGRAM

## 2022-07-21 PROCEDURE — 3072F PR LOW RISK FOR RETINOPATHY: ICD-10-PCS | Mod: CPTII,S$GLB,, | Performed by: STUDENT IN AN ORGANIZED HEALTH CARE EDUCATION/TRAINING PROGRAM

## 2022-07-21 PROCEDURE — 3288F FALL RISK ASSESSMENT DOCD: CPT | Mod: CPTII,S$GLB,, | Performed by: STUDENT IN AN ORGANIZED HEALTH CARE EDUCATION/TRAINING PROGRAM

## 2022-07-21 PROCEDURE — 1101F PR PT FALLS ASSESS DOC 0-1 FALLS W/OUT INJ PAST YR: ICD-10-PCS | Mod: CPTII,S$GLB,, | Performed by: STUDENT IN AN ORGANIZED HEALTH CARE EDUCATION/TRAINING PROGRAM

## 2022-07-21 PROCEDURE — 4010F PR ACE/ARB THEARPY RXD/TAKEN: ICD-10-PCS | Mod: CPTII,S$GLB,, | Performed by: STUDENT IN AN ORGANIZED HEALTH CARE EDUCATION/TRAINING PROGRAM

## 2022-07-21 PROCEDURE — 3079F DIAST BP 80-89 MM HG: CPT | Mod: CPTII,S$GLB,, | Performed by: STUDENT IN AN ORGANIZED HEALTH CARE EDUCATION/TRAINING PROGRAM

## 2022-07-21 PROCEDURE — 3074F PR MOST RECENT SYSTOLIC BLOOD PRESSURE < 130 MM HG: ICD-10-PCS | Mod: CPTII,S$GLB,, | Performed by: STUDENT IN AN ORGANIZED HEALTH CARE EDUCATION/TRAINING PROGRAM

## 2022-07-21 PROCEDURE — 1159F MED LIST DOCD IN RCRD: CPT | Mod: CPTII,S$GLB,, | Performed by: STUDENT IN AN ORGANIZED HEALTH CARE EDUCATION/TRAINING PROGRAM

## 2022-07-21 PROCEDURE — 99215 OFFICE O/P EST HI 40 MIN: CPT | Mod: S$GLB,,, | Performed by: STUDENT IN AN ORGANIZED HEALTH CARE EDUCATION/TRAINING PROGRAM

## 2022-07-21 PROCEDURE — 99215 PR OFFICE/OUTPT VISIT, EST, LEVL V, 40-54 MIN: ICD-10-PCS | Mod: S$GLB,,, | Performed by: STUDENT IN AN ORGANIZED HEALTH CARE EDUCATION/TRAINING PROGRAM

## 2022-07-21 PROCEDURE — 1101F PT FALLS ASSESS-DOCD LE1/YR: CPT | Mod: CPTII,S$GLB,, | Performed by: STUDENT IN AN ORGANIZED HEALTH CARE EDUCATION/TRAINING PROGRAM

## 2022-07-21 PROCEDURE — 3061F NEG MICROALBUMINURIA REV: CPT | Mod: CPTII,S$GLB,, | Performed by: STUDENT IN AN ORGANIZED HEALTH CARE EDUCATION/TRAINING PROGRAM

## 2022-07-21 PROCEDURE — 3079F PR MOST RECENT DIASTOLIC BLOOD PRESSURE 80-89 MM HG: ICD-10-PCS | Mod: CPTII,S$GLB,, | Performed by: STUDENT IN AN ORGANIZED HEALTH CARE EDUCATION/TRAINING PROGRAM

## 2022-07-21 PROCEDURE — 3061F PR NEG MICROALBUMINURIA RESULT DOCUMENTED/REVIEW: ICD-10-PCS | Mod: CPTII,S$GLB,, | Performed by: STUDENT IN AN ORGANIZED HEALTH CARE EDUCATION/TRAINING PROGRAM

## 2022-07-21 PROCEDURE — 3072F LOW RISK FOR RETINOPATHY: CPT | Mod: CPTII,S$GLB,, | Performed by: STUDENT IN AN ORGANIZED HEALTH CARE EDUCATION/TRAINING PROGRAM

## 2022-07-21 PROCEDURE — 3008F PR BODY MASS INDEX (BMI) DOCUMENTED: ICD-10-PCS | Mod: CPTII,S$GLB,, | Performed by: STUDENT IN AN ORGANIZED HEALTH CARE EDUCATION/TRAINING PROGRAM

## 2022-07-21 PROCEDURE — 3074F SYST BP LT 130 MM HG: CPT | Mod: CPTII,S$GLB,, | Performed by: STUDENT IN AN ORGANIZED HEALTH CARE EDUCATION/TRAINING PROGRAM

## 2022-07-21 PROCEDURE — 4010F ACE/ARB THERAPY RXD/TAKEN: CPT | Mod: CPTII,S$GLB,, | Performed by: STUDENT IN AN ORGANIZED HEALTH CARE EDUCATION/TRAINING PROGRAM

## 2022-07-21 PROCEDURE — 3066F PR DOCUMENTATION OF TREATMENT FOR NEPHROPATHY: ICD-10-PCS | Mod: CPTII,S$GLB,, | Performed by: STUDENT IN AN ORGANIZED HEALTH CARE EDUCATION/TRAINING PROGRAM

## 2022-07-21 PROCEDURE — 99999 PR PBB SHADOW E&M-EST. PATIENT-LVL IV: CPT | Mod: PBBFAC,,, | Performed by: STUDENT IN AN ORGANIZED HEALTH CARE EDUCATION/TRAINING PROGRAM

## 2022-07-21 NOTE — PROGRESS NOTES
Subjective:      Patient ID: Ned Oliveira is a 72 y.o. male.    Chief Complaint: No chief complaint on file.      HPI:  Ned Oliveira is a 72 y.o. male who presents to discuss if he would surgical revascularization or high risk PCI secondary to multivessel coronary artery disease. Medical conditions include DM2, BPH, adrenal adenoma, HLD, HTN, glaucoma. He denies any changes since his last visit to the CTS clinic. Per his last clinic visit, reports fatigue and abd discomfort X 6 months.  He reports SOB when walking up stairs. Does not have any trouble on flat ground. Reports having exertional chest pain that does not radiate. Lasts around 5 minutes and is relieved with rest. Patient had an abnormal stress test and underwent LHC which revealed multivessel disease. States that he has intermittent swelling in his left leg. No dizziness. No prior strokes, seizures, blood clots, stents, or sternotomies. No drinking history. Quit smoking 27 years ago. Does not check his blood glucose because the meter is broken but wife reports that it is up and down.       Current Outpatient Medications:     aspirin (ECOTRIN) 81 MG EC tablet, Take 1 tablet (81 mg total) by mouth once daily., Disp: , Rfl: 0    atorvastatin (LIPITOR) 80 MG tablet, Take 1 tablet (80 mg total) by mouth once daily., Disp: 90 tablet, Rfl: 3    atropine 1% (ISOPTO ATROPINE) 1 % Drop, Place 1 drop into the left eye 2 (two) times daily., Disp: 1 Bottle, Rfl: 1    AVASTIN 25 mg/mL injection, , Disp: , Rfl:     clopidogreL (PLAVIX) 75 mg tablet, Take 1 tablet (75 mg total) by mouth once daily., Disp: 30 tablet, Rfl: 11    diclofenac sodium (VOLTAREN) 1 % Gel, Apply 2 g topically 3 (three) times daily. (Patient not taking: No sig reported), Disp: 50 g, Rfl: 2    dorzolamide-timolol 2-0.5% (COSOPT) 22.3-6.8 mg/mL ophthalmic solution, Place 1 drop into the right eye 2 (two) times daily., Disp: 20 mL, Rfl: 3    ILUVIEN 0.19 mg intravitreal implant, , Disp: ,  "Rfl:     insulin (LANTUS SOLOSTAR U-100 INSULIN) glargine 100 units/mL (3mL) SubQ pen, Inject 20 Units into the skin every evening., Disp: 18 mL, Rfl: 3    lancets (ACCU-CHEK SOFTCLIX LANCETS) Misc, 1 Box by Misc.(Non-Drug; Combo Route) route 2 hours after meals and at bedtime., Disp: 100 each, Rfl: 2    lisinopriL-hydrochlorothiazide (PRINZIDE,ZESTORETIC) 10-12.5 mg per tablet, Take 1 tablet by mouth once daily., Disp: 90 tablet, Rfl: 3    metFORMIN (GLUCOPHAGE) 1000 MG tablet, Take 1 tablet (1,000 mg total) by mouth 2 (two) times daily with meals., Disp: 180 tablet, Rfl: 3    metoprolol succinate (TOPROL-XL) 25 MG 24 hr tablet, Take 0.5 tablets (12.5 mg total) by mouth once daily., Disp: 45 tablet, Rfl: 3    nitrofurantoin, macrocrystal-monohydrate, (MACROBID) 100 MG capsule, Take 1 capsule (100 mg total) by mouth 2 (two) times daily. (Patient not taking: No sig reported), Disp: 20 capsule, Rfl: 1    nitroGLYCERIN (NITROSTAT) 0.4 MG SL tablet, Place 1 tablet (0.4 mg total) under the tongue every 5 (five) minutes as needed for Chest pain (ONLY IF HAVE CHEST PAIN,). (Patient not taking: Reported on 7/14/2022), Disp: 30 tablet, Rfl: 0    OZURDEX 0.7 mg Impl intravitreal implant, , Disp: , Rfl:     pen needle, diabetic (BD ULTRA-FINE ORIG PEN NEEDLE) 29 gauge x 1/2" Ndle, 20 Units by Misc.(Non-Drug; Combo Route) route once daily., Disp: 100 each, Rfl: 6    tamsulosin (FLOMAX) 0.4 mg Cap, Take 1 capsule (0.4 mg total) by mouth every evening., Disp: 90 capsule, Rfl: 3  Current medications Reviewed    Review of Systems   Constitutional: Positive for activity change and fatigue. Negative for appetite change and fever.   HENT: Negative for nosebleeds.    Respiratory: Positive for shortness of breath. Negative for cough.    Cardiovascular: Positive for chest pain and leg swelling. Negative for palpitations.   Gastrointestinal: Negative for abdominal distention, abdominal pain and nausea.   Genitourinary: Negative " for frequency.   Musculoskeletal: Negative for arthralgias and myalgias.   Skin: Negative for rash.   Neurological: Negative for dizziness and numbness.   Hematological: Does not bruise/bleed easily.     Objective:   Physical Exam  HENT:      Head: Normocephalic and atraumatic.   Eyes:      Extraocular Movements: Extraocular movements intact.   Cardiovascular:      Rate and Rhythm: Normal rate and regular rhythm.   Pulmonary:      Effort: Pulmonary effort is normal.   Abdominal:      General: Abdomen is flat.      Palpations: Abdomen is soft.   Musculoskeletal:         General: Normal range of motion.      Cervical back: Normal range of motion.   Skin:     General: Skin is warm and dry.      Capillary Refill: Capillary refill takes less than 2 seconds.   Neurological:      General: No focal deficit present.         Diagnotic Results: Reviewed  Carotids:  Impression   =========     RIGHT SIDE:   40-59% Right ICA stenosis.   Calcific plaque noted in the right internal carotid artery.   Minimal heterogeneous plaque noted in the right common carotid artery.   Antegrade flow noted in the right vertebral artery.     LEFT SIDE:   1-39% Left ICA stenosis.   Calcific plaque noted in the left internal carotid artery.   Minimal heterogeneous plaque noted in the left common carotid artery.   Antegrade flow noted in the left vertebral artery.      CT Chest:  1. No acute intrathoracic abnormality.  2. Severe bilateral coronary artery calcific disease.  3. 1.3 cm right adrenal fat containing adenoma.    ECHO:  · The left ventricle is mildly enlarged with low normal systolic function.  · Mild left atrial enlargement.  · The estimated ejection fraction is 50%.  · Indeterminate left ventricular diastolic function.  · Mild mitral regurgitation.  · There are segmental left ventricular wall motion abnormalities.  · The quantitatively derived ejection fraction is 49%.  · Normal right ventricular size with normal right ventricular  systolic function.  · Mild tricuspid regurgitation.  · Intermediate central venous pressure (8 mmHg).  · The estimated PA systolic pressure is 26 mmHg.    [facility] 7/8/22  · Multi-vessel CAD  - images reviewed by Dr. Doty      Pet Stress 6/1/22    There are two significant perfusion abnormalities.    Perfusion Abnormality #1 - There is a large sized, severe intensity basal to apical anterior, anterolateral and lateral reversible perfusion abnormality involving 50% of LV myocardium.    Perfusion Abnormality #2 - There is a small sized, mild to moderate intensity basal inferolateral resting perfusion abnormality involving 1% of LV myocardium in the distribution of the PLB territory. After pharmacologic stress, this perfusion abnormality is larger and more severe involving 5% of the LV myocardium indicative of ischemia with underlying infarct.    There are no other significant perfusion abnormalities.    The whole heart absolute myocardial perfusion values averaged 0.43 cc/min/g at rest, which is reduced; 0.46 cc/min/g at stress, which is severely reduced; and CFR is 1.04 , which is severely reduced.    CT attenuation images demonstrate severe diffuse coronary calcifications in the LAD, LCX and RCA territory and moderate diffuse aortic calcifications in the descending aorta.    The gated perfusion images showed an ejection fraction of 48% at rest and 32% during stress. A normal ejection fraction is greater than 53%.    There is basal to apical anterior, lateral and anterolateral wall severe hypokinesis during stress.    There is mild global hypokinesis at rest.    The EKG portion of this study is positive for ischemia.    There were no arrhythmias during stress.    The patient reported no chest pain during the stress test.  Assessment:   1. Multivessel Coronary Artery Disease  Plan:   I had a long discussion with Mr Oliveira and his wife today. His coronary arteries are small on angiogram and I am not confident  he has adequate targets for bypass. We performed a full workup for him and based on that he would be a surgical candidate, given EF, labs and PFTs. On his CT scan his coronaries are extremely calcified. Given how small they are and how calcified I told him that we could attempt the surgery but there was a good chance we could not perfrom the number of bypass possible because of inadequate targets and we would have performed a sternotomy and stopped his heart for not much gain. Given his age and his ability to still function I will ask Dr Peralta if he can stent him to relieve him of his angina. I consulted several of my partners and mentors and they all agree the risk of not being able to revascularize is too high in this patient. I explained to him and his wife thoroughly with the use of a .    Will follow up with Dr. Peralta to discuss PCI    Aldo Doty  CV Surgery

## 2022-08-04 ENCOUNTER — LAB VISIT (OUTPATIENT)
Dept: LAB | Facility: HOSPITAL | Age: 72
End: 2022-08-04
Attending: STUDENT IN AN ORGANIZED HEALTH CARE EDUCATION/TRAINING PROGRAM
Payer: MEDICARE

## 2022-08-04 ENCOUNTER — OFFICE VISIT (OUTPATIENT)
Dept: CARDIOLOGY | Facility: CLINIC | Age: 72
End: 2022-08-04
Payer: MEDICARE

## 2022-08-04 ENCOUNTER — DOCUMENTATION ONLY (OUTPATIENT)
Dept: CARDIOLOGY | Facility: CLINIC | Age: 72
End: 2022-08-04
Payer: MEDICARE

## 2022-08-04 VITALS
OXYGEN SATURATION: 98 % | HEIGHT: 63 IN | WEIGHT: 140.44 LBS | DIASTOLIC BLOOD PRESSURE: 73 MMHG | SYSTOLIC BLOOD PRESSURE: 121 MMHG | HEART RATE: 54 BPM | BODY MASS INDEX: 24.88 KG/M2

## 2022-08-04 DIAGNOSIS — I25.118 CORONARY ARTERY DISEASE OF NATIVE ARTERY OF NATIVE HEART WITH STABLE ANGINA PECTORIS: ICD-10-CM

## 2022-08-04 DIAGNOSIS — I25.82 CHRONIC TOTAL OCCLUSION OF NATIVE CORONARY ARTERY: ICD-10-CM

## 2022-08-04 DIAGNOSIS — I70.0 AORTIC ATHEROSCLEROSIS: ICD-10-CM

## 2022-08-04 DIAGNOSIS — I25.10 CHRONIC TOTAL OCCLUSION OF NATIVE CORONARY ARTERY: ICD-10-CM

## 2022-08-04 DIAGNOSIS — I20.89 CHRONIC STABLE ANGINA: Primary | Chronic | ICD-10-CM

## 2022-08-04 DIAGNOSIS — I25.10 CORONARY ARTERIOSCLEROSIS IN NATIVE ARTERY: ICD-10-CM

## 2022-08-04 DIAGNOSIS — Z01.818 PRE-OP TESTING: ICD-10-CM

## 2022-08-04 LAB
ABO + RH BLD: NORMAL
ALBUMIN SERPL BCP-MCNC: 3.8 G/DL (ref 3.5–5.2)
ALP SERPL-CCNC: 104 U/L (ref 55–135)
ALT SERPL W/O P-5'-P-CCNC: 36 U/L (ref 10–44)
ANION GAP SERPL CALC-SCNC: 4 MMOL/L (ref 8–16)
AST SERPL-CCNC: 27 U/L (ref 10–40)
BASOPHILS # BLD AUTO: 0.05 K/UL (ref 0–0.2)
BASOPHILS NFR BLD: 0.8 % (ref 0–1.9)
BILIRUB SERPL-MCNC: 0.9 MG/DL (ref 0.1–1)
BLD GP AB SCN CELLS X3 SERPL QL: NORMAL
BUN SERPL-MCNC: 15 MG/DL (ref 8–23)
CALCIUM SERPL-MCNC: 9.1 MG/DL (ref 8.7–10.5)
CHLORIDE SERPL-SCNC: 104 MMOL/L (ref 95–110)
CO2 SERPL-SCNC: 31 MMOL/L (ref 23–29)
CREAT SERPL-MCNC: 0.9 MG/DL (ref 0.5–1.4)
DIFFERENTIAL METHOD: ABNORMAL
EOSINOPHIL # BLD AUTO: 0.2 K/UL (ref 0–0.5)
EOSINOPHIL NFR BLD: 3.2 % (ref 0–8)
ERYTHROCYTE [DISTWIDTH] IN BLOOD BY AUTOMATED COUNT: 12.6 % (ref 11.5–14.5)
EST. GFR  (NO RACE VARIABLE): >60 ML/MIN/1.73 M^2
GLUCOSE SERPL-MCNC: 148 MG/DL (ref 70–110)
HCT VFR BLD AUTO: 42.3 % (ref 40–54)
HGB BLD-MCNC: 14.8 G/DL (ref 14–18)
IMM GRANULOCYTES # BLD AUTO: 0.01 K/UL (ref 0–0.04)
IMM GRANULOCYTES NFR BLD AUTO: 0.2 % (ref 0–0.5)
LYMPHOCYTES # BLD AUTO: 1.9 K/UL (ref 1–4.8)
LYMPHOCYTES NFR BLD: 29 % (ref 18–48)
MCH RBC QN AUTO: 34.7 PG (ref 27–31)
MCHC RBC AUTO-ENTMCNC: 35 G/DL (ref 32–36)
MCV RBC AUTO: 99 FL (ref 82–98)
MONOCYTES # BLD AUTO: 0.5 K/UL (ref 0.3–1)
MONOCYTES NFR BLD: 6.8 % (ref 4–15)
NEUTROPHILS # BLD AUTO: 4 K/UL (ref 1.8–7.7)
NEUTROPHILS NFR BLD: 60 % (ref 38–73)
NRBC BLD-RTO: 0 /100 WBC
PLATELET # BLD AUTO: 150 K/UL (ref 150–450)
PMV BLD AUTO: 11 FL (ref 9.2–12.9)
POTASSIUM SERPL-SCNC: 4.5 MMOL/L (ref 3.5–5.1)
PROT SERPL-MCNC: 6.3 G/DL (ref 6–8.4)
RBC # BLD AUTO: 4.27 M/UL (ref 4.6–6.2)
SODIUM SERPL-SCNC: 139 MMOL/L (ref 136–145)
WBC # BLD AUTO: 6.59 K/UL (ref 3.9–12.7)

## 2022-08-04 PROCEDURE — 80053 COMPREHEN METABOLIC PANEL: CPT | Performed by: STUDENT IN AN ORGANIZED HEALTH CARE EDUCATION/TRAINING PROGRAM

## 2022-08-04 PROCEDURE — 1126F PR PAIN SEVERITY QUANTIFIED, NO PAIN PRESENT: ICD-10-PCS | Mod: CPTII,S$GLB,, | Performed by: INTERNAL MEDICINE

## 2022-08-04 PROCEDURE — 3061F PR NEG MICROALBUMINURIA RESULT DOCUMENTED/REVIEW: ICD-10-PCS | Mod: CPTII,S$GLB,, | Performed by: INTERNAL MEDICINE

## 2022-08-04 PROCEDURE — 3078F PR MOST RECENT DIASTOLIC BLOOD PRESSURE < 80 MM HG: ICD-10-PCS | Mod: CPTII,S$GLB,, | Performed by: INTERNAL MEDICINE

## 2022-08-04 PROCEDURE — 36415 COLL VENOUS BLD VENIPUNCTURE: CPT | Performed by: STUDENT IN AN ORGANIZED HEALTH CARE EDUCATION/TRAINING PROGRAM

## 2022-08-04 PROCEDURE — 1126F AMNT PAIN NOTED NONE PRSNT: CPT | Mod: CPTII,S$GLB,, | Performed by: INTERNAL MEDICINE

## 2022-08-04 PROCEDURE — 85025 COMPLETE CBC W/AUTO DIFF WBC: CPT | Performed by: STUDENT IN AN ORGANIZED HEALTH CARE EDUCATION/TRAINING PROGRAM

## 2022-08-04 PROCEDURE — 99999 PR PBB SHADOW E&M-EST. PATIENT-LVL V: CPT | Mod: PBBFAC,,, | Performed by: INTERNAL MEDICINE

## 2022-08-04 PROCEDURE — 99214 PR OFFICE/OUTPT VISIT, EST, LEVL IV, 30-39 MIN: ICD-10-PCS | Mod: S$GLB,,, | Performed by: INTERNAL MEDICINE

## 2022-08-04 PROCEDURE — 99999 PR PBB SHADOW E&M-EST. PATIENT-LVL V: ICD-10-PCS | Mod: PBBFAC,,, | Performed by: INTERNAL MEDICINE

## 2022-08-04 PROCEDURE — 3074F SYST BP LT 130 MM HG: CPT | Mod: CPTII,S$GLB,, | Performed by: INTERNAL MEDICINE

## 2022-08-04 PROCEDURE — 86901 BLOOD TYPING SEROLOGIC RH(D): CPT | Performed by: STUDENT IN AN ORGANIZED HEALTH CARE EDUCATION/TRAINING PROGRAM

## 2022-08-04 PROCEDURE — 99214 OFFICE O/P EST MOD 30 MIN: CPT | Mod: S$GLB,,, | Performed by: INTERNAL MEDICINE

## 2022-08-04 PROCEDURE — 1160F PR REVIEW ALL MEDS BY PRESCRIBER/CLIN PHARMACIST DOCUMENTED: ICD-10-PCS | Mod: CPTII,S$GLB,, | Performed by: INTERNAL MEDICINE

## 2022-08-04 PROCEDURE — 3008F PR BODY MASS INDEX (BMI) DOCUMENTED: ICD-10-PCS | Mod: CPTII,S$GLB,, | Performed by: INTERNAL MEDICINE

## 2022-08-04 PROCEDURE — 4010F ACE/ARB THERAPY RXD/TAKEN: CPT | Mod: CPTII,S$GLB,, | Performed by: INTERNAL MEDICINE

## 2022-08-04 PROCEDURE — 1160F RVW MEDS BY RX/DR IN RCRD: CPT | Mod: CPTII,S$GLB,, | Performed by: INTERNAL MEDICINE

## 2022-08-04 PROCEDURE — 3072F PR LOW RISK FOR RETINOPATHY: ICD-10-PCS | Mod: CPTII,S$GLB,, | Performed by: INTERNAL MEDICINE

## 2022-08-04 PROCEDURE — 3066F PR DOCUMENTATION OF TREATMENT FOR NEPHROPATHY: ICD-10-PCS | Mod: CPTII,S$GLB,, | Performed by: INTERNAL MEDICINE

## 2022-08-04 PROCEDURE — 3074F PR MOST RECENT SYSTOLIC BLOOD PRESSURE < 130 MM HG: ICD-10-PCS | Mod: CPTII,S$GLB,, | Performed by: INTERNAL MEDICINE

## 2022-08-04 PROCEDURE — 3051F HG A1C>EQUAL 7.0%<8.0%: CPT | Mod: CPTII,S$GLB,, | Performed by: INTERNAL MEDICINE

## 2022-08-04 PROCEDURE — 1159F MED LIST DOCD IN RCRD: CPT | Mod: CPTII,S$GLB,, | Performed by: INTERNAL MEDICINE

## 2022-08-04 PROCEDURE — 3078F DIAST BP <80 MM HG: CPT | Mod: CPTII,S$GLB,, | Performed by: INTERNAL MEDICINE

## 2022-08-04 PROCEDURE — 3008F BODY MASS INDEX DOCD: CPT | Mod: CPTII,S$GLB,, | Performed by: INTERNAL MEDICINE

## 2022-08-04 PROCEDURE — 3061F NEG MICROALBUMINURIA REV: CPT | Mod: CPTII,S$GLB,, | Performed by: INTERNAL MEDICINE

## 2022-08-04 PROCEDURE — 3072F LOW RISK FOR RETINOPATHY: CPT | Mod: CPTII,S$GLB,, | Performed by: INTERNAL MEDICINE

## 2022-08-04 PROCEDURE — 1159F PR MEDICATION LIST DOCUMENTED IN MEDICAL RECORD: ICD-10-PCS | Mod: CPTII,S$GLB,, | Performed by: INTERNAL MEDICINE

## 2022-08-04 PROCEDURE — 4010F PR ACE/ARB THEARPY RXD/TAKEN: ICD-10-PCS | Mod: CPTII,S$GLB,, | Performed by: INTERNAL MEDICINE

## 2022-08-04 PROCEDURE — 3066F NEPHROPATHY DOC TX: CPT | Mod: CPTII,S$GLB,, | Performed by: INTERNAL MEDICINE

## 2022-08-04 PROCEDURE — 3051F PR MOST RECENT HEMOGLOBIN A1C LEVEL 7.0 - < 8.0%: ICD-10-PCS | Mod: CPTII,S$GLB,, | Performed by: INTERNAL MEDICINE

## 2022-08-04 RX ORDER — SODIUM CHLORIDE 9 MG/ML
INJECTION, SOLUTION INTRAVENOUS CONTINUOUS
Status: CANCELLED | OUTPATIENT
Start: 2022-08-04 | End: 2022-08-04

## 2022-08-04 RX ORDER — DIPHENHYDRAMINE HCL 50 MG
50 CAPSULE ORAL ONCE
Status: CANCELLED | OUTPATIENT
Start: 2022-08-04 | End: 2022-08-04

## 2022-08-04 RX ORDER — FINASTERIDE 5 MG/1
5 TABLET, FILM COATED ORAL DAILY
COMMUNITY
End: 2024-01-24 | Stop reason: SDUPTHER

## 2022-08-04 NOTE — PROGRESS NOTES
Subjective:    Patient ID:  Ned Oliveira is a 72 y.o. male with PMH for IDDM (a1c 7.7), HTN, DLD who is here for evaluation regarding PCI after he was declined by CTS for CABG for not being a suitable surgical candidate.   Patient has epigastric discomfort x 1 year that comes with exertion and relives with rest, no radiation and typically lasts for 5 minutes. It is very distressing for the patient. He also had dyspnea with exertion with activities like cutting grass and says QOL has reduced compared to before. He didn't have to stop from parking lot to office today. He denies fall, syncope or dizziness. He appears to be euvolemic and has no leg swelling. He quit smoking 27 years ago after having smoked 1 ppd x 4 years. He drinks socially.   His LHC on 7/8/22 showed severe MV disease:    Left Anterior Descending   Collaterals   Mid LAD filled by collaterals from Mid LAD.      Mid LAD lesion was 100% stenosed. There was chronic total occlusion.   First Diagonal Branch   The vessel is large.   1st Diag lesion was 99% stenosed.   Left Circumflex   Mid Cx lesion was 80% stenosed.   First Obtuse Marginal Branch   1st Mrg lesion was 99% stenosed.   Right Coronary Artery   Prox RCA lesion was 75% stenosed.   Mid RCA lesion was 90% stenosed.   Dist RCA lesion was 80% stenosed.   Right Posterior Descending Artery   RPDA lesion was 50% stenosed.   Right Posterior Atrioventricular Artery   There is mild diffuse disease throughout the vessel.   First Right Posterolateral Branch   There is mild diffuse disease throughout the vessel.       Review of Systems   Constitutional: Negative for fever.   HENT: Negative for ear pain.    Eyes: Negative for double vision.   Cardiovascular: Positive for dyspnea on exertion. Negative for chest pain and palpitations.   Respiratory: Positive for sleep disturbances due to breathing and snoring.    Endocrine: Negative for heat intolerance.   Hematologic/Lymphatic: Negative for adenopathy.   Skin:  Negative for dry skin.   Musculoskeletal: Negative for falls.   Gastrointestinal: Positive for abdominal pain (with exertion, relieves with rest). Negative for anorexia.   Genitourinary: Negative for flank pain.   Neurological: Negative for disturbances in coordination.   Psychiatric/Behavioral: Negative for depression.        Objective:    Physical Exam  Constitutional:       Appearance: Normal appearance.   HENT:      Head: Normocephalic.      Nose: Nose normal.      Mouth/Throat:      Mouth: Mucous membranes are moist.   Eyes:      Pupils: Pupils are equal, round, and reactive to light.   Cardiovascular:      Rate and Rhythm: Normal rate and regular rhythm.      Pulses: Normal pulses.   Pulmonary:      Effort: Pulmonary effort is normal.      Breath sounds: Normal breath sounds.   Abdominal:      General: Abdomen is flat.   Musculoskeletal:         General: Normal range of motion.      Cervical back: Normal range of motion.   Skin:     General: Skin is warm.   Neurological:      General: No focal deficit present.      Mental Status: He is alert and oriented to person, place, and time.   Psychiatric:         Mood and Affect: Mood normal.         Behavior: Behavior normal.       Current Outpatient Medications   Medication Instructions    aspirin (ECOTRIN) 81 mg, Oral, Daily    atorvastatin (LIPITOR) 80 mg, Oral, Daily    atropine 1% (ISOPTO ATROPINE) 1 % Drop 1 drop, Left Eye, 2 times daily    AVASTIN 25 mg/mL injection No dose, route, or frequency recorded.    clopidogreL (PLAVIX) 75 mg, Oral, Daily    diclofenac sodium (VOLTAREN) 2 g, Topical (Top), 3 times daily    dorzolamide-timolol 2-0.5% (COSOPT) 22.3-6.8 mg/mL ophthalmic solution 1 drop, Right Eye, 2 times daily    finasteride (PROSCAR) 5 mg, Oral, Daily    ILUVIEN 0.19 mg intravitreal implant No dose, route, or frequency recorded.    lancets (ACCU-CHEK SOFTCLIX LANCETS) Misc 1 Box, Misc.(Non-Drug; Combo Route), 4 times daily after meals & nightly  "   LANTUS SOLOSTAR U-100 INSULIN 20 Units, Subcutaneous, Nightly    lisinopriL-hydrochlorothiazide (PRINZIDE,ZESTORETIC) 10-12.5 mg per tablet 1 tablet, Oral, Daily    metFORMIN (GLUCOPHAGE) 1,000 mg, Oral, 2 times daily with meals    metoprolol succinate (TOPROL-XL) 12.5 mg, Oral, Daily    nitrofurantoin, macrocrystal-monohydrate, (MACROBID) 100 MG capsule 100 mg, Oral, 2 times daily    nitroGLYCERIN (NITROSTAT) 0.4 mg, Sublingual, Every 5 min PRN    OZURDEX 0.7 mg Impl intravitreal implant No dose, route, or frequency recorded.    pen needle, diabetic (BD ULTRA-FINE ORIG PEN NEEDLE) 20 Units, Misc.(Non-Drug; Combo Route), Daily    tamsulosin (FLOMAX) 0.4 mg, Oral, Nightly     Vitals:    08/04/22 0846 08/04/22 0847   BP: 119/66 121/73   BP Location: Left arm Right arm   Patient Position: Sitting Sitting   BP Method: Large (Automatic) Large (Automatic)   Pulse: (!) 54 (!) 54   SpO2: 98%    Weight: 63.7 kg (140 lb 6.9 oz)    Height: 5' 3" (1.6 m)            Assessment:       1. Chronic stable angina    2. Coronary artery disease of native artery of native heart with stable angina pectoris    3. Chronic total occlusion of native coronary artery         Plan:       After discussion with patient regarding medical management alone vs PCI and medical management patient would like to proceed with PCI  Continue aspirin, plavix and lipitor 80  Recommended talking to primary doctor regarding sleep apnea evaluation   Consents signed  R CFA access 6 Fr    Lj Castro MD  Cardiology Fellow    1) Angina.  The patient reports symptoms consistent with exertional angina.  He has CAD risk factors of HTN and HLD.  He had an abnormal stress test on 6/1/22. I reviewed his cardiac cath from 7/8/22 with the patient and his wfei. He was found to have diffuse LAD and CLX disease and high grade RCA stneoses.  He was turned down for CABG by CTS. I discussed RCA PCI and the risks.  The patiet and his wife stated he would like to " proceed  -continue EC ASA 81mg po qday  -continue Plavix 75mg po qday  The risks, benefits, and alternatives of cardiac catheterization and possible intervention were discussed with the patient.  All questions were answered and informed consent was obtained.     2) HTN.  The patient's blood pressure is adequately controlled in clinic today;  -continue current medications     3) Dyslipidemia. Continue Liptior 80mg po qday     4) DM2. HgbAc1 on 6/16/22 was 7.7; rec tight glycemic control     All of the patient's and his wife questions were answered.

## 2022-08-04 NOTE — PROGRESS NOTES
OUTPATIENT CATHETERIZATION INSTRUCTIONS    You have been scheduled for a procedure in the catheterization lab on Monday, September 26, 2022.     Please report to the Cardiology Waiting Area on the Third floor of the hospital and check in at 6 AM.   You will then be taken to the SSCU (Short Stay Cardiac Unit) and prepared for your procedure. Please be aware that this is not the time of your procedure but the time you are to arrive. The procedures are scheduled on an hourly basis; however, emergency cases take precedence over all other cases.     1. No solid foods 8 hours prior to your procedure.  You may have clear liquids until the time of your admission which should be 2 hours prior to your procedure.  You are encouraged to drink at least 8 ounces of clear liquids prior to your admission to SSCU.  Patients with gastric emptying issues should be fasting for 6- 8 hours prior to the procedure.  Clear liquids include water, black coffee, clear juices, and performance drinks - no pulp or milk.    2. Heart failure or dialysis patients will be limited to 8 ounces (1 cup) of clear liquids up until 2 hours of the procedure.    3.   You may take your regular morning medications with water. If there are any medications that you should not take you will be instructed to hold them that morning. If you         are diabetic and on Metformin (Glucophage) do not take it the day before, the day of, and for 2 days after your procedure.  4.   If you are on Pradaxa, Eliquis or Coumadin , you can hold them 3 days prior to your procedure.  Do not stop your Aspirin, Plavix, Effient, or Brilinta.    The procedure will take 1-2 hours to perform. After the procedure, you will return to SSCU on the third floor of the hospital. You will need to lie still (or keep your arm still) for the next 4 to 6 hours to minimize bleeding from the puncture site. Your family may remain in the room with you during this time.     You may be able to be  discharged home that same afternoon if there is someone to drive you home and there were no complications. If you have one of the balloon, stent, or device procedures you may spend the night in the hospital. Your doctor will determine, based on your progress, the date and time of your discharge. The results of your procedure will be discussed with you before you are discharged. Any further testing or procedures will be scheduled for you either before you leave or you will be called with these appointments.     If you should have any questions, concerns, or need to change the date of your procedure, please call  YAA Dorman @ [phone]    Special Instructions:    Hold Metformin Sunday, Monday, Tuesday and Wednesday  Drink plenty of water the day before and after your procedure.   Take 1/2 of your regularly scheduled insulin dose the night before your procedure    THE ABOVE INSTRUCTIONS WERE GIVEN TO THE PATIENT VERBALLY AND THEY VERBALIZED UNDERSTANDING.  THEY DO NOT REQUIRE ANY SPECIAL NEEDS AND DO NOT HAVE ANY LEARNING BARRIERS.          Directions for Reporting to Cardiology Waiting Area in the Hospital  If you park in the Parking Garage:  Take elevators to the1st floor of the parking garage.  Continue past the gift shop, coffee shop, and piano.  Take a right and go to the gold elevators. (Elevator B)  Take the elevator to the 3rd floor.  Follow the arrow on the sign on the wall that says Cath Lab Registration/EP Lab Registration.  Follow the long hallway all the way around until you come to a big open area.  This is the registration area.  Check in at Reception Desk.    OR    If family is dropping you off:  Have them drop you off at the front of the Hospital under the green overhang.  Enter through the doors and take a right.  Take the E elevators to the 3rd floor Cardiology Waiting Area.  Check in at the Reception Desk in the waiting room.

## 2022-09-27 ENCOUNTER — OFFICE VISIT (OUTPATIENT)
Dept: FAMILY MEDICINE | Facility: CLINIC | Age: 72
End: 2022-09-27
Payer: MEDICARE

## 2022-09-27 VITALS
WEIGHT: 141.56 LBS | DIASTOLIC BLOOD PRESSURE: 60 MMHG | SYSTOLIC BLOOD PRESSURE: 108 MMHG | BODY MASS INDEX: 25.08 KG/M2 | HEART RATE: 63 BPM | OXYGEN SATURATION: 98 % | HEIGHT: 63 IN

## 2022-09-27 DIAGNOSIS — K21.9 GASTROESOPHAGEAL REFLUX DISEASE WITHOUT ESOPHAGITIS: ICD-10-CM

## 2022-09-27 DIAGNOSIS — I10 ESSENTIAL HYPERTENSION: Primary | ICD-10-CM

## 2022-09-27 DIAGNOSIS — M72.2 PLANTAR FASCIITIS, RIGHT: ICD-10-CM

## 2022-09-27 DIAGNOSIS — E11.65 TYPE 2 DIABETES MELLITUS WITH HYPERGLYCEMIA, WITH LONG-TERM CURRENT USE OF INSULIN: ICD-10-CM

## 2022-09-27 DIAGNOSIS — I25.10 CORONARY ARTERY DISEASE INVOLVING NATIVE HEART WITHOUT ANGINA PECTORIS, UNSPECIFIED VESSEL OR LESION TYPE: ICD-10-CM

## 2022-09-27 DIAGNOSIS — Z79.4 TYPE 2 DIABETES MELLITUS WITH HYPERGLYCEMIA, WITH LONG-TERM CURRENT USE OF INSULIN: ICD-10-CM

## 2022-09-27 PROCEDURE — 4010F PR ACE/ARB THEARPY RXD/TAKEN: ICD-10-PCS | Mod: CPTII,S$GLB,, | Performed by: FAMILY MEDICINE

## 2022-09-27 PROCEDURE — 1101F PR PT FALLS ASSESS DOC 0-1 FALLS W/OUT INJ PAST YR: ICD-10-PCS | Mod: CPTII,S$GLB,, | Performed by: FAMILY MEDICINE

## 2022-09-27 PROCEDURE — 99215 PR OFFICE/OUTPT VISIT, EST, LEVL V, 40-54 MIN: ICD-10-PCS | Mod: S$GLB,,, | Performed by: FAMILY MEDICINE

## 2022-09-27 PROCEDURE — 1160F PR REVIEW ALL MEDS BY PRESCRIBER/CLIN PHARMACIST DOCUMENTED: ICD-10-PCS | Mod: CPTII,S$GLB,, | Performed by: FAMILY MEDICINE

## 2022-09-27 PROCEDURE — 3288F FALL RISK ASSESSMENT DOCD: CPT | Mod: CPTII,S$GLB,, | Performed by: FAMILY MEDICINE

## 2022-09-27 PROCEDURE — 99999 PR PBB SHADOW E&M-EST. PATIENT-LVL V: ICD-10-PCS | Mod: PBBFAC,,, | Performed by: FAMILY MEDICINE

## 2022-09-27 PROCEDURE — 3008F PR BODY MASS INDEX (BMI) DOCUMENTED: ICD-10-PCS | Mod: CPTII,S$GLB,, | Performed by: FAMILY MEDICINE

## 2022-09-27 PROCEDURE — 1159F PR MEDICATION LIST DOCUMENTED IN MEDICAL RECORD: ICD-10-PCS | Mod: CPTII,S$GLB,, | Performed by: FAMILY MEDICINE

## 2022-09-27 PROCEDURE — 3288F PR FALLS RISK ASSESSMENT DOCUMENTED: ICD-10-PCS | Mod: CPTII,S$GLB,, | Performed by: FAMILY MEDICINE

## 2022-09-27 PROCEDURE — 3074F PR MOST RECENT SYSTOLIC BLOOD PRESSURE < 130 MM HG: ICD-10-PCS | Mod: CPTII,S$GLB,, | Performed by: FAMILY MEDICINE

## 2022-09-27 PROCEDURE — 3078F PR MOST RECENT DIASTOLIC BLOOD PRESSURE < 80 MM HG: ICD-10-PCS | Mod: CPTII,S$GLB,, | Performed by: FAMILY MEDICINE

## 2022-09-27 PROCEDURE — 3051F HG A1C>EQUAL 7.0%<8.0%: CPT | Mod: CPTII,S$GLB,, | Performed by: FAMILY MEDICINE

## 2022-09-27 PROCEDURE — 1160F RVW MEDS BY RX/DR IN RCRD: CPT | Mod: CPTII,S$GLB,, | Performed by: FAMILY MEDICINE

## 2022-09-27 PROCEDURE — 1159F MED LIST DOCD IN RCRD: CPT | Mod: CPTII,S$GLB,, | Performed by: FAMILY MEDICINE

## 2022-09-27 PROCEDURE — 3074F SYST BP LT 130 MM HG: CPT | Mod: CPTII,S$GLB,, | Performed by: FAMILY MEDICINE

## 2022-09-27 PROCEDURE — 1101F PT FALLS ASSESS-DOCD LE1/YR: CPT | Mod: CPTII,S$GLB,, | Performed by: FAMILY MEDICINE

## 2022-09-27 PROCEDURE — 1126F AMNT PAIN NOTED NONE PRSNT: CPT | Mod: CPTII,S$GLB,, | Performed by: FAMILY MEDICINE

## 2022-09-27 PROCEDURE — 3061F PR NEG MICROALBUMINURIA RESULT DOCUMENTED/REVIEW: ICD-10-PCS | Mod: CPTII,S$GLB,, | Performed by: FAMILY MEDICINE

## 2022-09-27 PROCEDURE — 1126F PR PAIN SEVERITY QUANTIFIED, NO PAIN PRESENT: ICD-10-PCS | Mod: CPTII,S$GLB,, | Performed by: FAMILY MEDICINE

## 2022-09-27 PROCEDURE — 3078F DIAST BP <80 MM HG: CPT | Mod: CPTII,S$GLB,, | Performed by: FAMILY MEDICINE

## 2022-09-27 PROCEDURE — 99999 PR PBB SHADOW E&M-EST. PATIENT-LVL V: CPT | Mod: PBBFAC,,, | Performed by: FAMILY MEDICINE

## 2022-09-27 PROCEDURE — 3072F LOW RISK FOR RETINOPATHY: CPT | Mod: CPTII,S$GLB,, | Performed by: FAMILY MEDICINE

## 2022-09-27 PROCEDURE — 3066F PR DOCUMENTATION OF TREATMENT FOR NEPHROPATHY: ICD-10-PCS | Mod: CPTII,S$GLB,, | Performed by: FAMILY MEDICINE

## 2022-09-27 PROCEDURE — 4010F ACE/ARB THERAPY RXD/TAKEN: CPT | Mod: CPTII,S$GLB,, | Performed by: FAMILY MEDICINE

## 2022-09-27 PROCEDURE — 3061F NEG MICROALBUMINURIA REV: CPT | Mod: CPTII,S$GLB,, | Performed by: FAMILY MEDICINE

## 2022-09-27 PROCEDURE — 3066F NEPHROPATHY DOC TX: CPT | Mod: CPTII,S$GLB,, | Performed by: FAMILY MEDICINE

## 2022-09-27 PROCEDURE — 3051F PR MOST RECENT HEMOGLOBIN A1C LEVEL 7.0 - < 8.0%: ICD-10-PCS | Mod: CPTII,S$GLB,, | Performed by: FAMILY MEDICINE

## 2022-09-27 PROCEDURE — 99215 OFFICE O/P EST HI 40 MIN: CPT | Mod: S$GLB,,, | Performed by: FAMILY MEDICINE

## 2022-09-27 PROCEDURE — 3008F BODY MASS INDEX DOCD: CPT | Mod: CPTII,S$GLB,, | Performed by: FAMILY MEDICINE

## 2022-09-27 PROCEDURE — 3072F PR LOW RISK FOR RETINOPATHY: ICD-10-PCS | Mod: CPTII,S$GLB,, | Performed by: FAMILY MEDICINE

## 2022-09-27 RX ORDER — INSULIN GLARGINE 100 [IU]/ML
20 INJECTION, SOLUTION SUBCUTANEOUS NIGHTLY
Qty: 18 ML | Refills: 3 | Status: SHIPPED | OUTPATIENT
Start: 2022-09-27 | End: 2023-03-01

## 2022-09-27 RX ORDER — METOPROLOL SUCCINATE 25 MG/1
12.5 TABLET, EXTENDED RELEASE ORAL DAILY
Qty: 45 TABLET | Refills: 3 | Status: SHIPPED | OUTPATIENT
Start: 2022-09-27 | End: 2023-08-03

## 2022-09-27 RX ORDER — PANTOPRAZOLE SODIUM 40 MG/1
40 TABLET, DELAYED RELEASE ORAL
Qty: 30 TABLET | Refills: 0 | Status: ON HOLD | OUTPATIENT
Start: 2022-09-27 | End: 2022-12-12 | Stop reason: HOSPADM

## 2022-09-27 RX ORDER — CLOPIDOGREL BISULFATE 75 MG/1
75 TABLET ORAL DAILY
Qty: 90 TABLET | Refills: 3 | Status: SHIPPED | OUTPATIENT
Start: 2022-09-27 | End: 2023-07-27

## 2022-09-27 NOTE — PROGRESS NOTES
Subjective:       Patient ID: Ned Oliveira is a 72 y.o. male.    Chief Complaint: Diabetes    72 years old male came to the clinic for blood pressure check.  Blood pressure today was stable.  No chest pain, palpitation, orthopnea or PND.  Patient with right foot pain over the medial plantar area.  The pain is worse with activity.  Patient with persistent reflux associated with epigastric discomfort.  Patient was not taking Plavix for coronary artery disease.  No recent A1c.  No polyuria, polydipsia or polyphagia.    Diabetes  Pertinent negatives for diabetes include no chest pain, no polydipsia, no polyphagia and no polyuria.   Review of Systems   Constitutional: Negative.    HENT: Negative.     Eyes: Negative.    Respiratory: Negative.     Cardiovascular: Negative.  Negative for chest pain, palpitations, leg swelling and claudication.   Gastrointestinal: Negative.    Endocrine: Negative for polydipsia, polyphagia and polyuria.   Genitourinary: Negative.    Musculoskeletal: Negative.    Integumentary:  Negative.   Neurological: Negative.    Psychiatric/Behavioral: Negative.         Objective:      Physical Exam  Vitals and nursing note reviewed.   Constitutional:       General: He is not in acute distress.     Appearance: He is well-developed. He is not diaphoretic.   HENT:      Head: Normocephalic and atraumatic.      Right Ear: External ear normal.      Left Ear: External ear normal.      Nose: Nose normal.      Mouth/Throat:      Pharynx: No oropharyngeal exudate.   Eyes:      General: No scleral icterus.        Right eye: No discharge.         Left eye: No discharge.      Conjunctiva/sclera: Conjunctivae normal.      Pupils: Pupils are equal, round, and reactive to light.   Neck:      Thyroid: No thyromegaly.      Vascular: No JVD.      Trachea: No tracheal deviation.   Cardiovascular:      Rate and Rhythm: Normal rate and regular rhythm.      Heart sounds: Normal heart sounds. No murmur heard.    No friction  rub. No gallop.   Pulmonary:      Effort: Pulmonary effort is normal. No respiratory distress.      Breath sounds: Normal breath sounds. No stridor. No wheezing or rales.   Chest:      Chest wall: No tenderness.   Abdominal:      General: Bowel sounds are normal. There is no distension.      Palpations: Abdomen is soft. There is no mass.      Tenderness: There is no abdominal tenderness. There is no guarding or rebound.   Musculoskeletal:         General: No tenderness. Normal range of motion.      Cervical back: Normal range of motion and neck supple.        Feet:    Lymphadenopathy:      Cervical: No cervical adenopathy.   Skin:     General: Skin is warm and dry.      Coloration: Skin is not pale.      Findings: No erythema or rash.   Neurological:      Mental Status: He is alert and oriented to person, place, and time.      Cranial Nerves: No cranial nerve deficit.      Motor: No abnormal muscle tone.      Coordination: Coordination normal.      Deep Tendon Reflexes: Reflexes are normal and symmetric. Reflexes normal.   Psychiatric:         Behavior: Behavior normal.         Thought Content: Thought content normal.         Judgment: Judgment normal.       Assessment:       Problem List Items Addressed This Visit       Essential hypertension - Primary (Chronic)    Relevant Medications    metoprolol succinate (TOPROL-XL) 25 MG 24 hr tablet    Other Relevant Orders    CBC Auto Differential    Comprehensive Metabolic Panel    Lipid Panel    Type 2 diabetes mellitus with hyperglycemia, with long-term current use of insulin    Relevant Medications    insulin (LANTUS SOLOSTAR U-100 INSULIN) glargine 100 units/mL SubQ pen    Other Relevant Orders    CBC Auto Differential    Comprehensive Metabolic Panel    Hemoglobin A1C    Lipid Panel    Microalbumin/Creatinine Ratio, Urine     Other Visit Diagnoses       Coronary artery disease involving native heart without angina pectoris, unspecified vessel or lesion type         Relevant Medications    clopidogreL (PLAVIX) 75 mg tablet    Other Relevant Orders    Lipid Panel    Gastroesophageal reflux disease without esophagitis        Relevant Medications    pantoprazole (PROTONIX) 40 MG tablet    Other Relevant Orders    H. pylori antigen, stool    Case Request Endoscopy: ESOPHAGOGASTRODUODENOSCOPY (EGD) (Completed)    Plantar fasciitis, right        Relevant Orders    Ambulatory referral/consult to Podiatry              Plan:         Ned was seen today for diabetes.    Diagnoses and all orders for this visit:    Essential hypertension  -     metoprolol succinate (TOPROL-XL) 25 MG 24 hr tablet; Take 0.5 tablets (12.5 mg total) by mouth once daily.  -     CBC Auto Differential; Future  -     Comprehensive Metabolic Panel; Future  -     Lipid Panel; Future    Type 2 diabetes mellitus with hyperglycemia, with long-term current use of insulin  -     insulin (LANTUS SOLOSTAR U-100 INSULIN) glargine 100 units/mL SubQ pen; Inject 20 Units into the skin every evening.  -     CBC Auto Differential; Future  -     Comprehensive Metabolic Panel; Future  -     Hemoglobin A1C; Future  -     Lipid Panel; Future  -     Microalbumin/Creatinine Ratio, Urine; Future    Coronary artery disease involving native heart without angina pectoris, unspecified vessel or lesion type  -     clopidogreL (PLAVIX) 75 mg tablet; Take 1 tablet (75 mg total) by mouth once daily.  -     Lipid Panel; Future    Gastroesophageal reflux disease without esophagitis  -     pantoprazole (PROTONIX) 40 MG tablet; Take 1 tablet (40 mg total) by mouth before breakfast.  -     H. pylori antigen, stool; Future  -     Case Request Endoscopy: ESOPHAGOGASTRODUODENOSCOPY (EGD)    Plantar fasciitis, right  -     Ambulatory referral/consult to Podiatry; Future   Continue monitoring blood sugar at home,ADA diet.   Continue monitoring blood pressure at home, low sodium diet.  Is

## 2022-09-28 ENCOUNTER — LAB VISIT (OUTPATIENT)
Dept: LAB | Facility: HOSPITAL | Age: 72
End: 2022-09-28
Attending: FAMILY MEDICINE
Payer: MEDICARE

## 2022-09-28 DIAGNOSIS — E11.65 TYPE 2 DIABETES MELLITUS WITH HYPERGLYCEMIA, WITH LONG-TERM CURRENT USE OF INSULIN: ICD-10-CM

## 2022-09-28 DIAGNOSIS — Z79.4 TYPE 2 DIABETES MELLITUS WITH HYPERGLYCEMIA, WITH LONG-TERM CURRENT USE OF INSULIN: ICD-10-CM

## 2022-09-28 LAB
ALBUMIN/CREAT UR: 7.3 UG/MG (ref 0–30)
CREAT UR-MCNC: 110 MG/DL (ref 23–375)
MICROALBUMIN UR DL<=1MG/L-MCNC: 8 UG/ML

## 2022-09-28 PROCEDURE — 82570 ASSAY OF URINE CREATININE: CPT | Performed by: FAMILY MEDICINE

## 2022-09-28 PROCEDURE — 82043 UR ALBUMIN QUANTITATIVE: CPT | Performed by: FAMILY MEDICINE

## 2022-11-02 ENCOUNTER — OFFICE VISIT (OUTPATIENT)
Dept: PODIATRY | Facility: CLINIC | Age: 72
End: 2022-11-02
Payer: MEDICARE

## 2022-11-02 VITALS
WEIGHT: 141 LBS | HEIGHT: 63 IN | HEART RATE: 69 BPM | SYSTOLIC BLOOD PRESSURE: 153 MMHG | DIASTOLIC BLOOD PRESSURE: 91 MMHG | BODY MASS INDEX: 24.98 KG/M2

## 2022-11-02 DIAGNOSIS — E11.9 ENCOUNTER FOR DIABETIC FOOT EXAM: Primary | ICD-10-CM

## 2022-11-02 DIAGNOSIS — M72.2 PLANTAR FASCIITIS, RIGHT: ICD-10-CM

## 2022-11-02 PROCEDURE — 3051F HG A1C>EQUAL 7.0%<8.0%: CPT | Mod: CPTII,S$GLB,, | Performed by: STUDENT IN AN ORGANIZED HEALTH CARE EDUCATION/TRAINING PROGRAM

## 2022-11-02 PROCEDURE — 99999 PR PBB SHADOW E&M-EST. PATIENT-LVL V: ICD-10-PCS | Mod: PBBFAC,,, | Performed by: STUDENT IN AN ORGANIZED HEALTH CARE EDUCATION/TRAINING PROGRAM

## 2022-11-02 PROCEDURE — 3288F FALL RISK ASSESSMENT DOCD: CPT | Mod: CPTII,S$GLB,, | Performed by: STUDENT IN AN ORGANIZED HEALTH CARE EDUCATION/TRAINING PROGRAM

## 2022-11-02 PROCEDURE — 3072F PR LOW RISK FOR RETINOPATHY: ICD-10-PCS | Mod: CPTII,S$GLB,, | Performed by: STUDENT IN AN ORGANIZED HEALTH CARE EDUCATION/TRAINING PROGRAM

## 2022-11-02 PROCEDURE — 3080F PR MOST RECENT DIASTOLIC BLOOD PRESSURE >= 90 MM HG: ICD-10-PCS | Mod: CPTII,S$GLB,, | Performed by: STUDENT IN AN ORGANIZED HEALTH CARE EDUCATION/TRAINING PROGRAM

## 2022-11-02 PROCEDURE — 1125F AMNT PAIN NOTED PAIN PRSNT: CPT | Mod: CPTII,S$GLB,, | Performed by: STUDENT IN AN ORGANIZED HEALTH CARE EDUCATION/TRAINING PROGRAM

## 2022-11-02 PROCEDURE — 99999 PR PBB SHADOW E&M-EST. PATIENT-LVL V: CPT | Mod: PBBFAC,,, | Performed by: STUDENT IN AN ORGANIZED HEALTH CARE EDUCATION/TRAINING PROGRAM

## 2022-11-02 PROCEDURE — 3072F LOW RISK FOR RETINOPATHY: CPT | Mod: CPTII,S$GLB,, | Performed by: STUDENT IN AN ORGANIZED HEALTH CARE EDUCATION/TRAINING PROGRAM

## 2022-11-02 PROCEDURE — 3080F DIAST BP >= 90 MM HG: CPT | Mod: CPTII,S$GLB,, | Performed by: STUDENT IN AN ORGANIZED HEALTH CARE EDUCATION/TRAINING PROGRAM

## 2022-11-02 PROCEDURE — 1101F PT FALLS ASSESS-DOCD LE1/YR: CPT | Mod: CPTII,S$GLB,, | Performed by: STUDENT IN AN ORGANIZED HEALTH CARE EDUCATION/TRAINING PROGRAM

## 2022-11-02 PROCEDURE — 3008F BODY MASS INDEX DOCD: CPT | Mod: CPTII,S$GLB,, | Performed by: STUDENT IN AN ORGANIZED HEALTH CARE EDUCATION/TRAINING PROGRAM

## 2022-11-02 PROCEDURE — 3061F NEG MICROALBUMINURIA REV: CPT | Mod: CPTII,S$GLB,, | Performed by: STUDENT IN AN ORGANIZED HEALTH CARE EDUCATION/TRAINING PROGRAM

## 2022-11-02 PROCEDURE — 1159F MED LIST DOCD IN RCRD: CPT | Mod: CPTII,S$GLB,, | Performed by: STUDENT IN AN ORGANIZED HEALTH CARE EDUCATION/TRAINING PROGRAM

## 2022-11-02 PROCEDURE — 3061F PR NEG MICROALBUMINURIA RESULT DOCUMENTED/REVIEW: ICD-10-PCS | Mod: CPTII,S$GLB,, | Performed by: STUDENT IN AN ORGANIZED HEALTH CARE EDUCATION/TRAINING PROGRAM

## 2022-11-02 PROCEDURE — 99214 OFFICE O/P EST MOD 30 MIN: CPT | Mod: S$GLB,,, | Performed by: STUDENT IN AN ORGANIZED HEALTH CARE EDUCATION/TRAINING PROGRAM

## 2022-11-02 PROCEDURE — 3288F PR FALLS RISK ASSESSMENT DOCUMENTED: ICD-10-PCS | Mod: CPTII,S$GLB,, | Performed by: STUDENT IN AN ORGANIZED HEALTH CARE EDUCATION/TRAINING PROGRAM

## 2022-11-02 PROCEDURE — 1101F PR PT FALLS ASSESS DOC 0-1 FALLS W/OUT INJ PAST YR: ICD-10-PCS | Mod: CPTII,S$GLB,, | Performed by: STUDENT IN AN ORGANIZED HEALTH CARE EDUCATION/TRAINING PROGRAM

## 2022-11-02 PROCEDURE — 3066F NEPHROPATHY DOC TX: CPT | Mod: CPTII,S$GLB,, | Performed by: STUDENT IN AN ORGANIZED HEALTH CARE EDUCATION/TRAINING PROGRAM

## 2022-11-02 PROCEDURE — 3077F PR MOST RECENT SYSTOLIC BLOOD PRESSURE >= 140 MM HG: ICD-10-PCS | Mod: CPTII,S$GLB,, | Performed by: STUDENT IN AN ORGANIZED HEALTH CARE EDUCATION/TRAINING PROGRAM

## 2022-11-02 PROCEDURE — 4010F ACE/ARB THERAPY RXD/TAKEN: CPT | Mod: CPTII,S$GLB,, | Performed by: STUDENT IN AN ORGANIZED HEALTH CARE EDUCATION/TRAINING PROGRAM

## 2022-11-02 PROCEDURE — 1159F PR MEDICATION LIST DOCUMENTED IN MEDICAL RECORD: ICD-10-PCS | Mod: CPTII,S$GLB,, | Performed by: STUDENT IN AN ORGANIZED HEALTH CARE EDUCATION/TRAINING PROGRAM

## 2022-11-02 PROCEDURE — 3008F PR BODY MASS INDEX (BMI) DOCUMENTED: ICD-10-PCS | Mod: CPTII,S$GLB,, | Performed by: STUDENT IN AN ORGANIZED HEALTH CARE EDUCATION/TRAINING PROGRAM

## 2022-11-02 PROCEDURE — 3066F PR DOCUMENTATION OF TREATMENT FOR NEPHROPATHY: ICD-10-PCS | Mod: CPTII,S$GLB,, | Performed by: STUDENT IN AN ORGANIZED HEALTH CARE EDUCATION/TRAINING PROGRAM

## 2022-11-02 PROCEDURE — 3051F PR MOST RECENT HEMOGLOBIN A1C LEVEL 7.0 - < 8.0%: ICD-10-PCS | Mod: CPTII,S$GLB,, | Performed by: STUDENT IN AN ORGANIZED HEALTH CARE EDUCATION/TRAINING PROGRAM

## 2022-11-02 PROCEDURE — 3077F SYST BP >= 140 MM HG: CPT | Mod: CPTII,S$GLB,, | Performed by: STUDENT IN AN ORGANIZED HEALTH CARE EDUCATION/TRAINING PROGRAM

## 2022-11-02 PROCEDURE — 1125F PR PAIN SEVERITY QUANTIFIED, PAIN PRESENT: ICD-10-PCS | Mod: CPTII,S$GLB,, | Performed by: STUDENT IN AN ORGANIZED HEALTH CARE EDUCATION/TRAINING PROGRAM

## 2022-11-02 PROCEDURE — 99214 PR OFFICE/OUTPT VISIT, EST, LEVL IV, 30-39 MIN: ICD-10-PCS | Mod: S$GLB,,, | Performed by: STUDENT IN AN ORGANIZED HEALTH CARE EDUCATION/TRAINING PROGRAM

## 2022-11-02 PROCEDURE — 4010F PR ACE/ARB THEARPY RXD/TAKEN: ICD-10-PCS | Mod: CPTII,S$GLB,, | Performed by: STUDENT IN AN ORGANIZED HEALTH CARE EDUCATION/TRAINING PROGRAM

## 2022-11-02 NOTE — PROGRESS NOTES
Subjective:      Patient ID: Ned Oliveira is a 72 y.o. male.    Chief Complaint: Foot Pain (Pain to right foot)    Ned is a 72 y.o. male who presents to the clinic upon referral from Dr. Hardwick  for evaluation and treatment of diabetic feet. 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 Patient relates no major problem with feet. Only complaints today consist of here for a diabetic foot exam and for right heel pain.  has sharp pain to bottom of right heel, worse first thing in the morning. Denies injury. Relates pain has been worsening over the past several weeks. No further pedal complaints. Denies numbness to his feet.     PCP: David Hardwick MD    Date Last Seen by PCP:     Current shoe gear: Casual shoes    Hemoglobin A1C   Date Value Ref Range Status   09/28/2022 7.7 (H) 4.0 - 5.6 % Final     Comment:     ADA Screening Guidelines:  5.7-6.4%  Consistent with prediabetes  >or=6.5%  Consistent with diabetes    High levels of fetal hemoglobin interfere with the HbA1C  assay. Heterozygous hemoglobin variants (HbS, HgC, etc)do  not significantly interfere with this assay.   However, presence of multiple variants may affect accuracy.     06/16/2022 7.7 (H) 4.0 - 5.6 % Final     Comment:     ADA Screening Guidelines:  5.7-6.4%  Consistent with prediabetes  >or=6.5%  Consistent with diabetes    High levels of fetal hemoglobin interfere with the HbA1C  assay. Heterozygous hemoglobin variants (HbS, HgC, etc)do  not significantly interfere with this assay.   However, presence of multiple variants may affect accuracy.     01/13/2022 7.4 (H) 4.0 - 5.6 % Final     Comment:     ADA Screening  Guidelines:  5.7-6.4%  Consistent with prediabetes  >or=6.5%  Consistent with diabetes    High levels of fetal hemoglobin interfere with the HbA1C  assay. Heterozygous hemoglobin variants (HbS, HgC, etc)do  not significantly interfere with this assay.   However, presence of multiple variants may affect accuracy.           Review of Systems   Constitutional: Negative for chills, decreased appetite, diaphoresis and fever.   HENT:  Negative for congestion and hearing loss.    Cardiovascular:  Negative for chest pain, claudication, leg swelling and syncope.   Respiratory:  Negative for cough and shortness of breath.    Skin:  Negative for color change, dry skin, flushing, itching, nail changes, poor wound healing and rash.   Musculoskeletal:  Negative for arthritis, back pain, joint pain and joint swelling.   Gastrointestinal:  Negative for nausea and vomiting.   Neurological:  Negative for focal weakness, numbness, paresthesias and weakness.   Psychiatric/Behavioral:  Negative for altered mental status. The patient is not nervous/anxious.          Objective:      Physical Exam  Constitutional:       General: He is not in acute distress.     Appearance: Normal appearance. He is well-developed. He is not diaphoretic.   Cardiovascular:      Comments: Dorsalis pedis and posterior tibial pulses are within normal limits. Skin temperature is within normal limits. Toes are cool to touch and feet are warm proximally. Hair growth is within normal limits. Skin is normotrophic and without hyperpigmentation. No edema noted. No varicosities or spider veins noted, bilaterally.   Musculoskeletal:         General: Tenderness present.      Comments: Adequate joint range of motion without pain, limitation, nor crepitation to foot and ankle joints. Muscle strength is 5/5 in all groups bilaterally.    Tenderness to right plantar heel at insertion of plantar fascia to medial tuberosity of the calcaneus       Feet:      Right foot:      Skin  integrity: Dry skin present. No ulcer, blister, erythema or warmth.      Left foot:      Skin integrity: Dry skin present. No ulcer, blister, erythema or warmth.   Skin:     General: Skin is warm and dry.      Capillary Refill: Capillary refill takes less than 2 seconds.      Comments: Skin is warm and dry, no acute signs of infection noted bilaterally      Toenails are well trimmed and of normal morphology    Otherwise, no open wounds, macerations or hyperkeratotic lesions, bilaterally            Neurological:      Mental Status: He is alert and oriented to person, place, and time.      Sensory: No sensory deficit.      Motor: No abnormal muscle tone.      Comments: Light touch within normal limits. [place]-Malinda 5.07 monofilamant testing is within normal limits. Vibratory sensation within normal limits, bilaterally.    Psychiatric:         Mood and Affect: Mood normal.         Behavior: Behavior normal.         Thought Content: Thought content normal.           Assessment:       Encounter Diagnoses   Name Primary?    Plantar fasciitis, right     Encounter for diabetic foot exam Yes         Plan:       Ned was seen today for foot pain.    Diagnoses and all orders for this visit:    Encounter for diabetic foot exam    Plantar fasciitis, right  -     Ambulatory referral/consult to Podiatry  -     X-Ray Foot Complete Right; Future    I counseled the patient on his conditions, their implications and medical management.  Xray ordered  Discussed importance of RICE therapy, supportive tennis shoes at all times while ambulating  Stretching and strengthening exercises dispensed, he declines  PT at this time, will consider in the future if needed  Shoe inspection. Diabetic Foot Education. Patient reminded of the importance of good nutrition and blood sugar control to help prevent podiatric complications of diabetes. Patient instructed on proper foot hygeine. We discussed wearing proper shoe gear, daily foot inspections,  never walking without protective shoe gear, never putting sharp instruments to feet, routine podiatric nail visits    Return to clinic in 4-6 weeks or PRN as discussed

## 2022-11-03 ENCOUNTER — HOSPITAL ENCOUNTER (OUTPATIENT)
Dept: RADIOLOGY | Facility: HOSPITAL | Age: 72
Discharge: HOME | End: 2022-11-03
Attending: STUDENT IN AN ORGANIZED HEALTH CARE EDUCATION/TRAINING PROGRAM
Payer: MEDICARE

## 2022-11-03 DIAGNOSIS — M72.2 PLANTAR FASCIITIS, RIGHT: ICD-10-CM

## 2022-11-03 PROCEDURE — 73630 XR FOOT COMPLETE 3 VIEW RIGHT: ICD-10-PCS | Mod: 26,RT,, | Performed by: RADIOLOGY

## 2022-11-03 PROCEDURE — 73630 X-RAY EXAM OF FOOT: CPT | Mod: TC,RT

## 2022-11-03 PROCEDURE — 73630 X-RAY EXAM OF FOOT: CPT | Mod: 26,RT,, | Performed by: RADIOLOGY

## 2022-11-10 ENCOUNTER — OFFICE VISIT (OUTPATIENT)
Dept: URGENT CARE | Facility: CLINIC | Age: 72
End: 2022-11-10
Payer: MEDICARE

## 2022-11-10 VITALS
TEMPERATURE: 99 F | HEART RATE: 76 BPM | WEIGHT: 141 LBS | DIASTOLIC BLOOD PRESSURE: 76 MMHG | OXYGEN SATURATION: 97 % | RESPIRATION RATE: 18 BRPM | BODY MASS INDEX: 24.98 KG/M2 | SYSTOLIC BLOOD PRESSURE: 118 MMHG | HEIGHT: 63 IN

## 2022-11-10 DIAGNOSIS — R50.9 FEVER AND CHILLS: Primary | ICD-10-CM

## 2022-11-10 DIAGNOSIS — J06.9 VIRAL URI WITH COUGH: ICD-10-CM

## 2022-11-10 DIAGNOSIS — U07.1 COVID: ICD-10-CM

## 2022-11-10 DIAGNOSIS — U07.1 COVID-19 VIRUS DETECTED: ICD-10-CM

## 2022-11-10 LAB
CTP QC/QA: YES
CTP QC/QA: YES
POC MOLECULAR INFLUENZA A AGN: NEGATIVE
POC MOLECULAR INFLUENZA B AGN: NEGATIVE
SARS-COV-2 AG RESP QL IA.RAPID: POSITIVE

## 2022-11-10 PROCEDURE — 3072F LOW RISK FOR RETINOPATHY: CPT | Mod: CPTII,S$GLB,, | Performed by: FAMILY MEDICINE

## 2022-11-10 PROCEDURE — 99214 OFFICE O/P EST MOD 30 MIN: CPT | Mod: S$GLB,CS,, | Performed by: FAMILY MEDICINE

## 2022-11-10 PROCEDURE — 87811 SARS-COV-2 COVID19 W/OPTIC: CPT | Mod: QW,S$GLB,, | Performed by: FAMILY MEDICINE

## 2022-11-10 PROCEDURE — 3072F PR LOW RISK FOR RETINOPATHY: ICD-10-PCS | Mod: CPTII,S$GLB,, | Performed by: FAMILY MEDICINE

## 2022-11-10 PROCEDURE — 1125F PR PAIN SEVERITY QUANTIFIED, PAIN PRESENT: ICD-10-PCS | Mod: CPTII,S$GLB,, | Performed by: FAMILY MEDICINE

## 2022-11-10 PROCEDURE — 3078F DIAST BP <80 MM HG: CPT | Mod: CPTII,S$GLB,, | Performed by: FAMILY MEDICINE

## 2022-11-10 PROCEDURE — 1159F MED LIST DOCD IN RCRD: CPT | Mod: CPTII,S$GLB,, | Performed by: FAMILY MEDICINE

## 2022-11-10 PROCEDURE — 3074F PR MOST RECENT SYSTOLIC BLOOD PRESSURE < 130 MM HG: ICD-10-PCS | Mod: CPTII,S$GLB,, | Performed by: FAMILY MEDICINE

## 2022-11-10 PROCEDURE — 4010F PR ACE/ARB THEARPY RXD/TAKEN: ICD-10-PCS | Mod: CPTII,S$GLB,, | Performed by: FAMILY MEDICINE

## 2022-11-10 PROCEDURE — 3074F SYST BP LT 130 MM HG: CPT | Mod: CPTII,S$GLB,, | Performed by: FAMILY MEDICINE

## 2022-11-10 PROCEDURE — 3008F BODY MASS INDEX DOCD: CPT | Mod: CPTII,S$GLB,, | Performed by: FAMILY MEDICINE

## 2022-11-10 PROCEDURE — 3061F PR NEG MICROALBUMINURIA RESULT DOCUMENTED/REVIEW: ICD-10-PCS | Mod: CPTII,S$GLB,, | Performed by: FAMILY MEDICINE

## 2022-11-10 PROCEDURE — 3066F PR DOCUMENTATION OF TREATMENT FOR NEPHROPATHY: ICD-10-PCS | Mod: CPTII,S$GLB,, | Performed by: FAMILY MEDICINE

## 2022-11-10 PROCEDURE — 1160F PR REVIEW ALL MEDS BY PRESCRIBER/CLIN PHARMACIST DOCUMENTED: ICD-10-PCS | Mod: CPTII,S$GLB,, | Performed by: FAMILY MEDICINE

## 2022-11-10 PROCEDURE — 1159F PR MEDICATION LIST DOCUMENTED IN MEDICAL RECORD: ICD-10-PCS | Mod: CPTII,S$GLB,, | Performed by: FAMILY MEDICINE

## 2022-11-10 PROCEDURE — 4010F ACE/ARB THERAPY RXD/TAKEN: CPT | Mod: CPTII,S$GLB,, | Performed by: FAMILY MEDICINE

## 2022-11-10 PROCEDURE — 3051F PR MOST RECENT HEMOGLOBIN A1C LEVEL 7.0 - < 8.0%: ICD-10-PCS | Mod: CPTII,S$GLB,, | Performed by: FAMILY MEDICINE

## 2022-11-10 PROCEDURE — 3078F PR MOST RECENT DIASTOLIC BLOOD PRESSURE < 80 MM HG: ICD-10-PCS | Mod: CPTII,S$GLB,, | Performed by: FAMILY MEDICINE

## 2022-11-10 PROCEDURE — 87502 INFLUENZA DNA AMP PROBE: CPT | Mod: QW,S$GLB,, | Performed by: FAMILY MEDICINE

## 2022-11-10 PROCEDURE — 87502 POCT INFLUENZA A/B MOLECULAR: ICD-10-PCS | Mod: QW,S$GLB,, | Performed by: FAMILY MEDICINE

## 2022-11-10 PROCEDURE — 87811 SARS CORONAVIRUS 2 ANTIGEN POCT, MANUAL READ: ICD-10-PCS | Mod: QW,S$GLB,, | Performed by: FAMILY MEDICINE

## 2022-11-10 PROCEDURE — 1160F RVW MEDS BY RX/DR IN RCRD: CPT | Mod: CPTII,S$GLB,, | Performed by: FAMILY MEDICINE

## 2022-11-10 PROCEDURE — 3051F HG A1C>EQUAL 7.0%<8.0%: CPT | Mod: CPTII,S$GLB,, | Performed by: FAMILY MEDICINE

## 2022-11-10 PROCEDURE — 1125F AMNT PAIN NOTED PAIN PRSNT: CPT | Mod: CPTII,S$GLB,, | Performed by: FAMILY MEDICINE

## 2022-11-10 PROCEDURE — 99214 PR OFFICE/OUTPT VISIT, EST, LEVL IV, 30-39 MIN: ICD-10-PCS | Mod: S$GLB,CS,, | Performed by: FAMILY MEDICINE

## 2022-11-10 PROCEDURE — 3008F PR BODY MASS INDEX (BMI) DOCUMENTED: ICD-10-PCS | Mod: CPTII,S$GLB,, | Performed by: FAMILY MEDICINE

## 2022-11-10 PROCEDURE — 3061F NEG MICROALBUMINURIA REV: CPT | Mod: CPTII,S$GLB,, | Performed by: FAMILY MEDICINE

## 2022-11-10 PROCEDURE — 3066F NEPHROPATHY DOC TX: CPT | Mod: CPTII,S$GLB,, | Performed by: FAMILY MEDICINE

## 2022-11-10 RX ORDER — PROMETHAZINE HYDROCHLORIDE AND DEXTROMETHORPHAN HYDROBROMIDE 6.25; 15 MG/5ML; MG/5ML
5 SYRUP ORAL EVERY 4 HOURS PRN
Qty: 240 ML | Refills: 0 | Status: SHIPPED | OUTPATIENT
Start: 2022-11-10 | End: 2022-11-20

## 2022-11-10 RX ORDER — BENZONATATE 200 MG/1
200 CAPSULE ORAL 3 TIMES DAILY PRN
Qty: 30 CAPSULE | Refills: 0 | Status: SHIPPED | OUTPATIENT
Start: 2022-11-10 | End: 2022-11-20

## 2022-11-10 NOTE — PROGRESS NOTES
"Subjective:       Patient ID: Ned Oliveira is a 72 y.o. male.      Chief Complaint: Fever    Video interpretor used [id number]  Pt states he has had symptoms since Monday. Pt states his symptoms have been the worse since today. Pt states he has tried theraflu and tylenol for relief and had mild relief.       Fever   This is a new problem. The current episode started in the past 7 days. The problem occurs 2 to 4 times per day. The problem has been gradually worsening. The maximum temperature noted was 100 to 100.9 F. The temperature was taken using an oral thermometer. Associated symptoms include congestion, coughing, headaches, muscle aches and a sore throat. Pertinent negatives include no chest pain, nausea, rash, urinary pain or vomiting. Treatments tried: tylenol. The treatment provided mild relief.     Constitution: Positive for chills, sweating and fever. Negative for activity change, appetite change, fatigue and generalized weakness.   HENT:  Positive for congestion and sore throat. Negative for ear discharge, facial swelling, sinus pressure, trouble swallowing and voice change.    Neck: Negative for neck stiffness and painful lymph nodes.   Cardiovascular:  Negative for chest pain, leg swelling, palpitations and sob on exertion.   Eyes:  Negative for vision loss.   Respiratory:  Positive for cough. Negative for chest tightness and shortness of breath.    Gastrointestinal:  Negative for nausea and vomiting.   Genitourinary:  Negative for dysuria.   Skin:  Negative for rash.   Neurological:  Positive for headaches. Negative for passing out, disorientation, altered mental status and numbness.   Hematologic/Lymphatic: Negative for swollen lymph nodes.   Psychiatric/Behavioral:  Negative for altered mental status, disorientation and confusion.      Objective:      Vitals:    11/10/22 1754   BP: 118/76   Pulse: 76   Resp: 18   Temp: 99 °F (37.2 °C)   TempSrc: Oral   SpO2: 97%   Weight: 64 kg (141 lb)   Height: 5' 3" " (1.6 m)      Physical Exam   Constitutional: He is oriented to person, place, and time. He appears well-developed. He is cooperative.  Non-toxic appearance. He does not appear ill. No distress.   HENT:   Head: Normocephalic and atraumatic.   Ears:   Right Ear: Hearing, tympanic membrane, external ear and ear canal normal.   Left Ear: Hearing, tympanic membrane, external ear and ear canal normal.   Nose: Congestion present. No mucosal edema, rhinorrhea or nasal deformity. No epistaxis. Right sinus exhibits no maxillary sinus tenderness and no frontal sinus tenderness. Left sinus exhibits no maxillary sinus tenderness and no frontal sinus tenderness.   Mouth/Throat: Uvula is midline and mucous membranes are normal. No trismus in the jaw. Normal dentition. No uvula swelling. Posterior oropharyngeal erythema present. No oropharyngeal exudate or posterior oropharyngeal edema.   Eyes: Conjunctivae and lids are normal. No scleral icterus.   Neck: Trachea normal and phonation normal. Neck supple. No edema present. No erythema present. No neck rigidity present.   Cardiovascular: Normal rate, regular rhythm, normal heart sounds and normal pulses.   Pulmonary/Chest: Effort normal and breath sounds normal. No respiratory distress. He has no decreased breath sounds. He has no rhonchi.   Abdominal: Normal appearance and bowel sounds are normal. Soft.   Musculoskeletal: Normal range of motion.         General: No deformity. Normal range of motion.   Neurological: no focal deficit. He is alert and oriented to person, place, and time. He exhibits normal muscle tone. Coordination normal.   Skin: Skin is warm, intact and not diaphoretic.   Psychiatric: His speech is normal and behavior is normal. Judgment and thought content normal.   Nursing note and vitals reviewed.      Results for orders placed or performed in visit on 11/10/22   POCT Influenza A/B MOLECULAR   Result Value Ref Range    POC Molecular Influenza A Ag Negative  "Negative, Not Reported    POC Molecular Influenza B Ag Negative Negative, Not Reported     Acceptable Yes    SARS Coronavirus 2 Antigen, POCT Manual Read   Result Value Ref Range    SARS Coronavirus 2 Antigen Positive (A) Negative     Acceptable Yes       Assessment:       1. Fever and chills    2. Viral URI with cough    3. COVID          Plan:         Fever and chills  -     POCT Influenza A/B MOLECULAR  Take tylenol as needed    2. Viral URI with cough  -     promethazine-dextromethorphan (PROMETHAZINE-DM) 6.25-15 mg/5 mL Syrp; Take 5 mLs by mouth every 4 (four) hours as needed (cough).  Dispense: 240 mL; Refill: 0  -     benzonatate (TESSALON) 200 MG capsule; Take 1 capsule (200 mg total) by mouth 3 (three) times daily as needed for Cough.  Dispense: 30 capsule; Refill: 0  -     SARS Coronavirus 2 Antigen, POCT Manual Read    3. COVID  -     Ambulatory referral/consult to [facility]  Discussed that he will need to hold statin with Paxlovid. Caution advised when Paxlovid is combined with Plavix, and Tamsulosin. Patient interested in antibody infusion.  Work excuse provided       Instrucciones para el domitila hospitalaria después de la COVID-19   Acerca de rahat gila   La enfermedad del coronavirus 2019 también se conoce filiberto "COVID-19". Es nori enfermedad viral que infecta los pulmones. Es causada por un virus llamado "coronavirus" asociado al SARS (SARS-CoV-2).  Los síntomas de la COVID-19 a menudo aparecen a los pocos días de haberse contagiado. En algunas personas los síntomas aparecen más tarde. Otras nunca presentan síntomas de la infección. Puede presentar tos, fiebre, escalofríos con temblores y puede tener dificultad para respirar. Puede sentirse muy cansado, tener dolor muscular, dolor de francisco o dolor de garganta. Algunas personas pueden tener malestar estomacal o diarrea. Otras pierden el sentido del gusto o del olfato. Quizás no tenga estos síntomas todo el tiempo y pueden " aparecer y desaparecer mientras está enfermo.  El virus se propaga con facilidad mediante las gotitas que expulsa cuando habla, estornuda o tose. Puede transmitir el virus a otras personas cuando habla cerca de ellas, canta, se abraza, comparte nori comida o se da la mano. Los gérmenes también sobreviven en superficies, filiberto mesas, perillas de bhupinder y teléfonos. Sin embargo, esta no es nori forma común de propagación de la COVID-19. Los médicos creen que las personas también propagan la infección aun cuando no tienen ningún síntoma, maggie no saben cómo sucede. Es por eso por lo que vacunarse es nori de las mejores maneras de disminuir la propagación del virus.  Algunas personas tienen un tiffanie leve de COVID-19 y pueden permanecer en ara blancas y lejos de los demás hasta que se sientan mejor. Es posible que otras necesiten estar en el hospital si están muy enfermas. Algunas personas con COVID-19 pueden tener algunos síntomas gurmeet semanas o meses. Las personas con COVID-19 deben aislarse. Pueden empezar a estar cerca de otras personas cuando el médico le indique que es seguro hacerlo.     ¿Qué cuidados se necesitan en casa?   Pregúntele a sanchez médico qué debe hacer cuando regrese a casa. Asegúrese de hacer preguntas si no comprende lo que dice el médico.  Sharifa mucha cantidad de agua, jugo o caldo para reemplazar los líquidos que pierde con la fiebre.  Puede usar un humidificador de aire frío para aliviar la congestión y la tos.  Use 2 o 3 almohadas para elevarse cuando se acueste para facilitar la respiración y el sueño.  No fume y no sharifa cerveza, vino ni bebidas mixtas (alcohol).  Para reducir la posibilidad de contagio de la infección a otros, reciba la vacuna contra la COVID-19.  Si no está completamente vacunado:  Use nori mascarilla que cubra la boca y la nariz si está cerca de otras personas que no están enfermas. Las mascarillas de dwight funcionan mejor si tienen más de nori capa de dwight.  Lávese las zbigniew con  frecuencia.  Quédese en sanchez casa en nori habitación separada, de ser posible, alejado de los demás. Solo salga para recibir atención médica.  Use un baño separado, de ser posible.  No prepare alimentos para los demás.  ¿Qué cuidados se necesitan en la etapa de seguimiento?   Es posible que los médicos le soliciten que visite el consultorio para evaluar sanchez progreso. No falte a estas citas. Asegúrese de usar nori mascarilla gurmeet estas visitas.  De ser posible, comunique con anticipación al personal que tiene COVID-19 para que se tomen las medidas necesarias para evitar el contagio de la enfermedad.  Puede llevar algunas semanas que sanchez lindsey vuelva a la normalidad.  ¿Qué medicamentos pueden ser necesarios?   Es posible que el médico le recete medicamentos para lo siguiente:  Ayudar con la respiración  Ayudar con la fiebre  Aliviar la hinchazón de las vías aéreas y los pulmones  Controlar la tos  Aliviar el dolor de garganta  Aliviar el goteo nasal o la nariz tapada  ¿Estará restringida la actividad física?   Es posible que deba restringir la actividad física. Hable con el médico acerca de la cantidad adecuada de actividad que puede realizar. Si bean estado muy enfermo con la COVID-19, es posible que le tome un tiempo recuperar la fuerza.  ¿Será necesario algún otro cuidado?   Los médicos no saben cuánto tiempo puede pasar el virus a otras personas después de enfermarse. Por eso es importante permanecer en nori habitación separada, si es posible, cuando esté enfermo. Por ahora, los médicos le están dando pautas generales que debe seguir después de sarah estado enfermo. Antes de estar cerca de otras personas, debe hacer lo siguiente:  No tener fiebre gurmeet 3 días sin ermelinda ninguna medicación para bajarla  No presentar síntomas de tos o falta de aire  Esperar, al menos, 10 días después de sarah presentado síntomas por primera vez o tener un examen positivo, y no tener síntomas filiberto se indica más arriba. Algunos expertos  sugieren esperar 14 días. La cantidad de tiempo que debe evitar a otras personas puede basarse en la gravedad de kelly síntomas o si tiene problemas con sanchez sistema inmunitario.  Hable con sanchez médico sobre las vacunas contra la COVID-19.  ¿Qué problemas podrían surgir?   Pérdida de líquidos. [place] se denomina deshidratación.  Daño a corto plazo o a claudy plazo en los pulmones  Problemas del corazón  Muerte  ¿Cuándo yajaira llamar al médico?   Le ambika tanto respirar que solo puede decir nori o dos palabras a la vez.  Necesita sentarse en posición vertical en todo momento para poder respirar o no puede acostarse.  Está muy confundido o no puede permanecer despierto.  Kelly labios o piel comienzan a ponerse azules o grises.  Aranza que podría estar teniendo nori emergencia médica. Algunos ejemplos de emergencias médicas son los siguientes:  Maryse de pecho graves.  No puedo hablar ni moverse con normalidad.  Tiene dificultad para respirar al hablar o quedarse sentado.  Sufre de falta de aire reciente.  Se siente débil o mareado.  Tiene nori orina muy oscura o no orina gurmeet más de 8 horas.  Tiene síntomas de COVID-19 nuevos o que empeoran filiberto los siguientes:  Fiebre  Tos  Sentir mucho cansancio  Escalofríos  Dolor de francisco  Dificultad para tragar  Vómitos  Heces blandas  Manchas de color púrpura rojizo en los dedos de las zbigniew o de los pies  Repita la enseñanza con kelly propias palabras (Teach Back): Ayudándolo a comprender   El método de enseñanza recíproca ayuda a comprender la información que se le está proporcionando. Después de hablar con el personal, cuente con kelly propias palabras lo que acaba de aprender. [place] le asegura que el personal ha descrito todos los aspectos necesarios de forma penny. También ayuda a explicar ciertas cosas que pueden sarah sido confusas. Antes de irse a sanchez casa, asegúrese de poder hacer lo siguiente:  Hablar sobre mi afección.  Decir qué me puede ayudar a respirar mejor.  Decir cómo evitar  contagiar a otras personas.  Decir qué haré en tiffanie de tener dificultad para respirar; si me siento somnoliento o confundido; o si tengo las puntas y uñas de los dedos de las zbigniew, la piel o los labios de color azulado.  Exención de responsabilidad y uso de la información del consumidor   Esta información no constituye asesoramiento médico específico y no reemplaza la información que usted recibe de sanchez proveedor de atención médica. Es solamente un breve resumen de información general. NO incluye toda la información sobre afecciones, enfermedades, lesiones, pruebas, procedimientos, tratamientos, terapias, instrucciones para el domitila hospitalaria u opciones de estilo de reinaldo que puedan ser pertinentes para usted. Debe hablar con el proveedor de atención médica para obtener la información completa sobre las opciones de tratamiento que posee y sobre sanchez lindsey. No se debe utilizar esta información para decidir si debe o no aceptar los consejos, instrucciones o recomendaciones del proveedor de atención médica. Solamente sanchez proveedor de atención médica tiene el conocimiento y la capacitación para aconsejarle sobre lo que es adecuado para usted.

## 2022-11-10 NOTE — LETTER
November 10, 2022      [facility] [place]  [address]  [place] [address]  Phone: [phone]  Fax: [phone]       Patient: Ned Oliveira   YOB: 1950  Date of Visit: 11/10/2022    To Whom It May Concern:    Elvira Oliveira  was at [facility] on 11/10/2022. Patient has an acute illness. Patient will return to work without restriction once symptoms improve as long as he is fever.  If you have any questions or concerns, or if I can be of further assistance, please do not hesitate to contact me.    Sincerely,     Saba Prasad MD

## 2022-11-10 NOTE — LETTER
[address] ? [place], [address] ? Phone [phone] ? Fax [phone]           Return to Work/School    Patient: Ned Oliveira  YOB: 1950   Date: 11/10/2022      To Whom It May Concern:     Ned Oliveira was in contact with/seen in my office on 11/10/2022. COVID-19 is present in our communities across the [facility]. Not all patients are eligible or appropriate to be tested. In this situation, your employee meets the following criteria:     Ned Oliveira has met the criteria for COVID-19 testing and has a POSITIVE result. He can return to work once they are asymptomatic for 24 hours without the use of fever reducing medications AND at least five days from the start of symptoms.      If you have any questions or concerns, or if I can be of further assistance, please do not hesitate to contact me.     Sincerely,     Saba Prasad MD         ----- Message from Bety Rmaírez PA-C sent at 11/9/2022  6:45 AM CST -----  Pls call pt and grandmother - labs were normal except slightly elevated red blood cell count.  This can occur if he is smoking or vaping.  If so, this should be discontinued and should be rechecked in 3 months.  Pls place order for CBC.  Is he still having upper abdominal discomfort after starting omeprazole? If so, then would recommend ultrasound for liver/gallbladder area.

## 2022-11-14 ENCOUNTER — TELEPHONE (OUTPATIENT)
Dept: INFECTIOUS DISEASES | Facility: HOSPITAL | Age: 72
End: 2022-11-14
Payer: MEDICARE

## 2022-11-17 ENCOUNTER — INFUSION (OUTPATIENT)
Dept: INFECTIOUS DISEASES | Facility: HOSPITAL | Age: 72
End: 2022-11-17
Attending: INTERNAL MEDICINE
Payer: MEDICARE

## 2022-11-17 VITALS
OXYGEN SATURATION: 99 % | HEART RATE: 55 BPM | BODY MASS INDEX: 24.8 KG/M2 | SYSTOLIC BLOOD PRESSURE: 121 MMHG | DIASTOLIC BLOOD PRESSURE: 65 MMHG | HEIGHT: 63 IN | RESPIRATION RATE: 16 BRPM | TEMPERATURE: 99 F | WEIGHT: 140 LBS

## 2022-11-17 DIAGNOSIS — U07.1 COVID-19: Primary | ICD-10-CM

## 2022-11-17 PROCEDURE — M0222 HC IV INJECTION, BEBTELOVIMAB, INCL POST ADMIN MONIT: HCPCS | Performed by: INTERNAL MEDICINE

## 2022-11-17 PROCEDURE — 63600175 PHARM REV CODE 636 W HCPCS: Performed by: INTERNAL MEDICINE

## 2022-11-17 RX ORDER — EPINEPHRINE 0.3 MG/.3ML
0.3 INJECTION SUBCUTANEOUS
Status: ACTIVE | OUTPATIENT
Start: 2022-11-17 | End: 2022-11-20

## 2022-11-17 RX ORDER — ONDANSETRON 4 MG/1
4 TABLET, ORALLY DISINTEGRATING ORAL
Status: ACTIVE | OUTPATIENT
Start: 2022-11-17 | End: 2022-11-18

## 2022-11-17 RX ORDER — ACETAMINOPHEN 325 MG/1
650 TABLET ORAL
Status: ACTIVE | OUTPATIENT
Start: 2022-11-17 | End: 2022-11-18

## 2022-11-17 RX ORDER — ALBUTEROL SULFATE 90 UG/1
2 AEROSOL, METERED RESPIRATORY (INHALATION)
Status: ACTIVE | OUTPATIENT
Start: 2022-11-17 | End: 2022-11-20

## 2022-11-17 RX ORDER — BEBTELOVIMAB 87.5 MG/ML
175 INJECTION, SOLUTION INTRAVENOUS
Status: COMPLETED | OUTPATIENT
Start: 2022-11-17 | End: 2022-11-17

## 2022-11-17 RX ORDER — DIPHENHYDRAMINE HYDROCHLORIDE 50 MG/ML
25 INJECTION INTRAMUSCULAR; INTRAVENOUS
Status: ACTIVE | OUTPATIENT
Start: 2022-11-17 | End: 2022-11-18

## 2022-11-17 RX ADMIN — BEBTELOVIMAB 175 MG: 87.5 INJECTION, SOLUTION INTRAVENOUS at 11:11

## 2022-11-17 NOTE — PROGRESS NOTES
11/17/2022 1240    Pt is 1 hour post Bebtelovimab infusion. Patient received infusion according to FDA recommendations and [facility] SOP without complications noted. No distress noted, VSS, AAOx4. Pt verbalized understanding of discharge instructions. Pt provided with fact sheet. Pt ambulated with steady gait from facility.

## 2022-11-17 NOTE — PROGRESS NOTES
1115  Patient arrives for Bebtelovimab IV Injection. Ambulatory. Pt AAox3. No distress noted. RR even and unlabored.     Symptoms and onset date:  11/10/22    Tested COVID + on 11/10/22

## 2022-11-17 NOTE — PROGRESS NOTES
11/17/2022 1150    15 minutes post Bebtelovimab injection. No signs or symptoms of transfusion reaction noted. VS as charted. Will continue to monitor for 1 hour post transfusion. Pt denies further needs at this time

## 2022-12-05 ENCOUNTER — TELEPHONE (OUTPATIENT)
Dept: FAMILY MEDICINE | Facility: CLINIC | Age: 72
End: 2022-12-05
Payer: MEDICARE

## 2022-12-05 DIAGNOSIS — K21.9 GASTROESOPHAGEAL REFLUX DISEASE WITHOUT ESOPHAGITIS: Primary | ICD-10-CM

## 2022-12-05 NOTE — TELEPHONE ENCOUNTER
I notified patient that Dr. Wheeler wanted for him to do a H. Pilory test. Patient said he is coming to  his H. Pilory testing on Wednesdays.

## 2022-12-08 ENCOUNTER — LAB VISIT (OUTPATIENT)
Dept: LAB | Facility: HOSPITAL | Age: 72
End: 2022-12-08
Attending: FAMILY MEDICINE
Payer: MEDICARE

## 2022-12-08 DIAGNOSIS — K21.9 GASTROESOPHAGEAL REFLUX DISEASE WITHOUT ESOPHAGITIS: ICD-10-CM

## 2022-12-08 PROCEDURE — 87338 HPYLORI STOOL AG IA: CPT | Performed by: FAMILY MEDICINE

## 2022-12-12 ENCOUNTER — HOSPITAL ENCOUNTER (OUTPATIENT)
Facility: HOSPITAL | Age: 72
Discharge: HOME | End: 2022-12-13
Attending: INTERNAL MEDICINE | Admitting: INTERNAL MEDICINE
Payer: MEDICARE

## 2022-12-12 DIAGNOSIS — Z79.4 TYPE 2 DIABETES MELLITUS WITH OTHER CIRCULATORY COMPLICATION, WITH LONG-TERM CURRENT USE OF INSULIN: Primary | ICD-10-CM

## 2022-12-12 DIAGNOSIS — E11.3213 TYPE 2 DIABETES MELLITUS WITH BOTH EYES AFFECTED BY MILD NONPROLIFERATIVE RETINOPATHY AND MACULAR EDEMA, WITH LONG-TERM CURRENT USE OF INSULIN: ICD-10-CM

## 2022-12-12 DIAGNOSIS — E11.65 TYPE 2 DIABETES MELLITUS WITH HYPERGLYCEMIA, WITH LONG-TERM CURRENT USE OF INSULIN: ICD-10-CM

## 2022-12-12 DIAGNOSIS — I25.10 CAD (CORONARY ARTERY DISEASE): ICD-10-CM

## 2022-12-12 DIAGNOSIS — Z79.4 TYPE 2 DIABETES MELLITUS WITH HYPERGLYCEMIA, WITH LONG-TERM CURRENT USE OF INSULIN: ICD-10-CM

## 2022-12-12 DIAGNOSIS — I25.118 CORONARY ARTERY DISEASE OF NATIVE ARTERY OF NATIVE HEART WITH STABLE ANGINA PECTORIS: ICD-10-CM

## 2022-12-12 DIAGNOSIS — E11.59 TYPE 2 DIABETES MELLITUS WITH OTHER CIRCULATORY COMPLICATION, WITH LONG-TERM CURRENT USE OF INSULIN: Primary | ICD-10-CM

## 2022-12-12 DIAGNOSIS — Z79.4 TYPE 2 DIABETES MELLITUS WITH BOTH EYES AFFECTED BY MILD NONPROLIFERATIVE RETINOPATHY AND MACULAR EDEMA, WITH LONG-TERM CURRENT USE OF INSULIN: ICD-10-CM

## 2022-12-12 DIAGNOSIS — I20.89 CHRONIC STABLE ANGINA: Chronic | ICD-10-CM

## 2022-12-12 LAB
ABO + RH BLD: NORMAL
ANION GAP SERPL CALC-SCNC: 9 MMOL/L (ref 8–16)
BASOPHILS # BLD AUTO: 0.04 K/UL (ref 0–0.2)
BASOPHILS NFR BLD: 0.6 % (ref 0–1.9)
BLD GP AB SCN CELLS X3 SERPL QL: NORMAL
BUN SERPL-MCNC: 16 MG/DL (ref 8–23)
CALCIUM SERPL-MCNC: 9.4 MG/DL (ref 8.7–10.5)
CHLORIDE SERPL-SCNC: 106 MMOL/L (ref 95–110)
CO2 SERPL-SCNC: 24 MMOL/L (ref 23–29)
CREAT SERPL-MCNC: 1 MG/DL (ref 0.5–1.4)
DIFFERENTIAL METHOD: ABNORMAL
EOSINOPHIL # BLD AUTO: 0.2 K/UL (ref 0–0.5)
EOSINOPHIL NFR BLD: 2.5 % (ref 0–8)
ERYTHROCYTE [DISTWIDTH] IN BLOOD BY AUTOMATED COUNT: 12.5 % (ref 11.5–14.5)
EST. GFR  (NO RACE VARIABLE): >60 ML/MIN/1.73 M^2
GLUCOSE SERPL-MCNC: 119 MG/DL (ref 70–110)
HCT VFR BLD AUTO: 40.2 % (ref 40–54)
HGB BLD-MCNC: 14.1 G/DL (ref 14–18)
IMM GRANULOCYTES # BLD AUTO: 0.01 K/UL (ref 0–0.04)
IMM GRANULOCYTES NFR BLD AUTO: 0.2 % (ref 0–0.5)
LYMPHOCYTES # BLD AUTO: 1.6 K/UL (ref 1–4.8)
LYMPHOCYTES NFR BLD: 25.8 % (ref 18–48)
MCH RBC QN AUTO: 33.9 PG (ref 27–31)
MCHC RBC AUTO-ENTMCNC: 35.1 G/DL (ref 32–36)
MCV RBC AUTO: 97 FL (ref 82–98)
MONOCYTES # BLD AUTO: 0.5 K/UL (ref 0.3–1)
MONOCYTES NFR BLD: 7.3 % (ref 4–15)
NEUTROPHILS # BLD AUTO: 4 K/UL (ref 1.8–7.7)
NEUTROPHILS NFR BLD: 63.6 % (ref 38–73)
NRBC BLD-RTO: 0 /100 WBC
PLATELET # BLD AUTO: 144 K/UL (ref 150–450)
PMV BLD AUTO: 11.4 FL (ref 9.2–12.9)
POCT GLUCOSE: 107 MG/DL (ref 70–110)
POCT GLUCOSE: 174 MG/DL (ref 70–110)
POTASSIUM SERPL-SCNC: 4 MMOL/L (ref 3.5–5.1)
RBC # BLD AUTO: 4.16 M/UL (ref 4.6–6.2)
SODIUM SERPL-SCNC: 139 MMOL/L (ref 136–145)
WBC # BLD AUTO: 6.32 K/UL (ref 3.9–12.7)

## 2022-12-12 PROCEDURE — 85025 COMPLETE CBC W/AUTO DIFF WBC: CPT | Performed by: INTERNAL MEDICINE

## 2022-12-12 PROCEDURE — 93010 ELECTROCARDIOGRAM REPORT: CPT | Mod: ,,, | Performed by: INTERNAL MEDICINE

## 2022-12-12 PROCEDURE — 25000003 PHARM REV CODE 250: Performed by: INTERNAL MEDICINE

## 2022-12-12 PROCEDURE — 99152 MOD SED SAME PHYS/QHP 5/>YRS: CPT | Mod: ,,, | Performed by: INTERNAL MEDICINE

## 2022-12-12 PROCEDURE — 93010 EKG 12-LEAD: ICD-10-PCS | Mod: ,,, | Performed by: INTERNAL MEDICINE

## 2022-12-12 PROCEDURE — 80048 BASIC METABOLIC PNL TOTAL CA: CPT | Performed by: INTERNAL MEDICINE

## 2022-12-12 PROCEDURE — 85347 COAGULATION TIME ACTIVATED: CPT | Performed by: INTERNAL MEDICINE

## 2022-12-12 PROCEDURE — C1887 CATHETER, GUIDING: HCPCS | Performed by: INTERNAL MEDICINE

## 2022-12-12 PROCEDURE — 99152 PR MOD CONSCIOUS SEDATION, SAME PHYS, 5+ YRS, FIRST 15 MIN: ICD-10-PCS | Mod: ,,, | Performed by: INTERNAL MEDICINE

## 2022-12-12 PROCEDURE — C1894 INTRO/SHEATH, NON-LASER: HCPCS | Performed by: INTERNAL MEDICINE

## 2022-12-12 PROCEDURE — 93005 ELECTROCARDIOGRAM TRACING: CPT

## 2022-12-12 PROCEDURE — 27201423 OPTIME MED/SURG SUP & DEVICES STERILE SUPPLY: Performed by: INTERNAL MEDICINE

## 2022-12-12 PROCEDURE — 99153 MOD SED SAME PHYS/QHP EA: CPT | Performed by: INTERNAL MEDICINE

## 2022-12-12 PROCEDURE — 92943 PRQ TRLUML REVSC CH OCC ANT: CPT | Mod: RC | Performed by: INTERNAL MEDICINE

## 2022-12-12 PROCEDURE — C1769 GUIDE WIRE: HCPCS | Performed by: INTERNAL MEDICINE

## 2022-12-12 PROCEDURE — 96372 THER/PROPH/DIAG INJ SC/IM: CPT | Mod: 59 | Performed by: INTERNAL MEDICINE

## 2022-12-12 PROCEDURE — 94761 N-INVAS EAR/PLS OXIMETRY MLT: CPT

## 2022-12-12 PROCEDURE — 63600175 PHARM REV CODE 636 W HCPCS: Performed by: INTERNAL MEDICINE

## 2022-12-12 PROCEDURE — 86850 RBC ANTIBODY SCREEN: CPT | Performed by: INTERNAL MEDICINE

## 2022-12-12 PROCEDURE — C1725 CATH, TRANSLUMIN NON-LASER: HCPCS | Performed by: INTERNAL MEDICINE

## 2022-12-12 PROCEDURE — G0378 HOSPITAL OBSERVATION PER HR: HCPCS

## 2022-12-12 PROCEDURE — 92943 PRQ TRLUML REVSC CH OCC ANT: CPT | Mod: RC,,, | Performed by: INTERNAL MEDICINE

## 2022-12-12 PROCEDURE — 99900035 HC TECH TIME PER 15 MIN (STAT)

## 2022-12-12 PROCEDURE — 99152 MOD SED SAME PHYS/QHP 5/>YRS: CPT | Performed by: INTERNAL MEDICINE

## 2022-12-12 PROCEDURE — 25500020 PHARM REV CODE 255: Performed by: INTERNAL MEDICINE

## 2022-12-12 PROCEDURE — 92943 PR CTO: ICD-10-PCS | Mod: RC,,, | Performed by: INTERNAL MEDICINE

## 2022-12-12 PROCEDURE — C1760 CLOSURE DEV, VASC: HCPCS | Performed by: INTERNAL MEDICINE

## 2022-12-12 DEVICE — ANGIO-SEAL VIP VASCULAR CLOSURE DEVICE
Type: IMPLANTABLE DEVICE | Site: LEG | Status: FUNCTIONAL
Brand: ANGIO-SEAL

## 2022-12-12 RX ORDER — SODIUM CHLORIDE 9 MG/ML
INJECTION, SOLUTION INTRAVENOUS CONTINUOUS
Status: ACTIVE | OUTPATIENT
Start: 2022-12-12 | End: 2022-12-12

## 2022-12-12 RX ORDER — AMOXICILLIN 250 MG
1 CAPSULE ORAL NIGHTLY
Status: DISCONTINUED | OUTPATIENT
Start: 2022-12-12 | End: 2022-12-13 | Stop reason: HOSPADM

## 2022-12-12 RX ORDER — MIDAZOLAM HYDROCHLORIDE 1 MG/ML
INJECTION, SOLUTION INTRAMUSCULAR; INTRAVENOUS
Status: DISCONTINUED | OUTPATIENT
Start: 2022-12-12 | End: 2022-12-12

## 2022-12-12 RX ORDER — ASPIRIN 81 MG/1
81 TABLET ORAL DAILY
Status: DISCONTINUED | OUTPATIENT
Start: 2022-12-13 | End: 2022-12-13 | Stop reason: HOSPADM

## 2022-12-12 RX ORDER — ATORVASTATIN CALCIUM 40 MG/1
80 TABLET, FILM COATED ORAL DAILY
Status: DISCONTINUED | OUTPATIENT
Start: 2022-12-13 | End: 2022-12-13 | Stop reason: HOSPADM

## 2022-12-12 RX ORDER — CEFAZOLIN SODIUM 1 G/3ML
INJECTION, POWDER, FOR SOLUTION INTRAMUSCULAR; INTRAVENOUS
Status: DISCONTINUED | OUTPATIENT
Start: 2022-12-12 | End: 2022-12-12

## 2022-12-12 RX ORDER — TALC
6 POWDER (GRAM) TOPICAL NIGHTLY PRN
Status: DISCONTINUED | OUTPATIENT
Start: 2022-12-12 | End: 2022-12-13 | Stop reason: HOSPADM

## 2022-12-12 RX ORDER — ASPIRIN 81 MG/1
81 TABLET ORAL DAILY
Qty: 365 TABLET | Refills: 0 | COMMUNITY
Start: 2022-12-12 | End: 2024-01-24 | Stop reason: SDUPTHER

## 2022-12-12 RX ORDER — HEPARIN SOD,PORCINE/0.9 % NACL 1000/500ML
INTRAVENOUS SOLUTION INTRAVENOUS
Status: DISCONTINUED | OUTPATIENT
Start: 2022-12-12 | End: 2022-12-12

## 2022-12-12 RX ORDER — SODIUM CHLORIDE 0.9 % (FLUSH) 0.9 %
10 SYRINGE (ML) INJECTION
Status: DISCONTINUED | OUTPATIENT
Start: 2022-12-12 | End: 2022-12-13 | Stop reason: HOSPADM

## 2022-12-12 RX ORDER — TIMOLOL MALEATE 5 MG/ML
1 SOLUTION/ DROPS OPHTHALMIC 2 TIMES DAILY
Status: DISCONTINUED | OUTPATIENT
Start: 2022-12-12 | End: 2022-12-13 | Stop reason: HOSPADM

## 2022-12-12 RX ORDER — DORZOLAMIDE HYDROCHLORIDE AND TIMOLOL MALEATE 20; 5 MG/ML; MG/ML
1 SOLUTION/ DROPS OPHTHALMIC 2 TIMES DAILY
Status: DISCONTINUED | OUTPATIENT
Start: 2022-12-12 | End: 2022-12-12

## 2022-12-12 RX ORDER — FAMOTIDINE 20 MG/1
20 TABLET, FILM COATED ORAL ONCE
Status: COMPLETED | OUTPATIENT
Start: 2022-12-12 | End: 2022-12-12

## 2022-12-12 RX ORDER — CLOPIDOGREL 300 MG/1
600 TABLET, FILM COATED ORAL ONCE
Status: COMPLETED | OUTPATIENT
Start: 2022-12-12 | End: 2022-12-12

## 2022-12-12 RX ORDER — GLUCAGON 1 MG
1 KIT INJECTION
Status: DISCONTINUED | OUTPATIENT
Start: 2022-12-12 | End: 2022-12-13 | Stop reason: HOSPADM

## 2022-12-12 RX ORDER — ACETAMINOPHEN 325 MG/1
650 TABLET ORAL EVERY 4 HOURS PRN
Status: DISCONTINUED | OUTPATIENT
Start: 2022-12-12 | End: 2022-12-13 | Stop reason: HOSPADM

## 2022-12-12 RX ORDER — IBUPROFEN 200 MG
16 TABLET ORAL
Status: DISCONTINUED | OUTPATIENT
Start: 2022-12-12 | End: 2022-12-13 | Stop reason: HOSPADM

## 2022-12-12 RX ORDER — IBUPROFEN 200 MG
24 TABLET ORAL
Status: DISCONTINUED | OUTPATIENT
Start: 2022-12-12 | End: 2022-12-13 | Stop reason: HOSPADM

## 2022-12-12 RX ORDER — FENTANYL CITRATE 50 UG/ML
INJECTION, SOLUTION INTRAMUSCULAR; INTRAVENOUS
Status: DISCONTINUED | OUTPATIENT
Start: 2022-12-12 | End: 2022-12-12

## 2022-12-12 RX ORDER — INSULIN ASPART 100 [IU]/ML
0-5 INJECTION, SOLUTION INTRAVENOUS; SUBCUTANEOUS
Status: DISCONTINUED | OUTPATIENT
Start: 2022-12-12 | End: 2022-12-13 | Stop reason: HOSPADM

## 2022-12-12 RX ORDER — ASPIRIN 325 MG
325 TABLET ORAL ONCE
Status: COMPLETED | OUTPATIENT
Start: 2022-12-12 | End: 2022-12-12

## 2022-12-12 RX ORDER — TAMSULOSIN HYDROCHLORIDE 0.4 MG/1
0.4 CAPSULE ORAL NIGHTLY
Status: DISCONTINUED | OUTPATIENT
Start: 2022-12-12 | End: 2022-12-13 | Stop reason: HOSPADM

## 2022-12-12 RX ORDER — NITROGLYCERIN 0.4 MG/1
0.4 TABLET SUBLINGUAL EVERY 5 MIN PRN
Status: DISCONTINUED | OUTPATIENT
Start: 2022-12-12 | End: 2022-12-13 | Stop reason: HOSPADM

## 2022-12-12 RX ORDER — CLOPIDOGREL BISULFATE 75 MG/1
75 TABLET ORAL DAILY
Status: DISCONTINUED | OUTPATIENT
Start: 2022-12-13 | End: 2022-12-13 | Stop reason: HOSPADM

## 2022-12-12 RX ORDER — DIPHENHYDRAMINE HCL 50 MG
50 CAPSULE ORAL ONCE
Status: COMPLETED | OUTPATIENT
Start: 2022-12-12 | End: 2022-12-12

## 2022-12-12 RX ORDER — LISINOPRIL 10 MG/1
10 TABLET ORAL DAILY
Status: COMPLETED | OUTPATIENT
Start: 2022-12-13 | End: 2022-12-13

## 2022-12-12 RX ORDER — FINASTERIDE 5 MG/1
5 TABLET, FILM COATED ORAL DAILY
Status: DISCONTINUED | OUTPATIENT
Start: 2022-12-13 | End: 2022-12-13 | Stop reason: HOSPADM

## 2022-12-12 RX ORDER — DORZOLAMIDE HCL 20 MG/ML
1 SOLUTION/ DROPS OPHTHALMIC 2 TIMES DAILY
Status: DISCONTINUED | OUTPATIENT
Start: 2022-12-12 | End: 2022-12-13 | Stop reason: HOSPADM

## 2022-12-12 RX ORDER — HEPARIN SODIUM 1000 [USP'U]/ML
INJECTION, SOLUTION INTRAVENOUS; SUBCUTANEOUS
Status: DISCONTINUED | OUTPATIENT
Start: 2022-12-12 | End: 2022-12-12

## 2022-12-12 RX ADMIN — INSULIN DETEMIR 15 UNITS: 100 INJECTION, SOLUTION SUBCUTANEOUS at 09:12

## 2022-12-12 RX ADMIN — CLOPIDOGREL BISULFATE 600 MG: 300 TABLET, FILM COATED ORAL at 12:12

## 2022-12-12 RX ADMIN — ASPIRIN 325 MG ORAL TABLET 325 MG: 325 PILL ORAL at 12:12

## 2022-12-12 RX ADMIN — SENNOSIDES AND DOCUSATE SODIUM 1 TABLET: 50; 8.6 TABLET ORAL at 08:12

## 2022-12-12 RX ADMIN — FAMOTIDINE 20 MG: 20 TABLET ORAL at 04:12

## 2022-12-12 RX ADMIN — TIMOLOL MALEATE 1 DROP: 5 SOLUTION/ DROPS OPHTHALMIC at 08:12

## 2022-12-12 RX ADMIN — TAMSULOSIN HYDROCHLORIDE 0.4 MG: 0.4 CAPSULE ORAL at 08:12

## 2022-12-12 RX ADMIN — SODIUM CHLORIDE: 0.9 INJECTION, SOLUTION INTRAVENOUS at 11:12

## 2022-12-12 RX ADMIN — DIPHENHYDRAMINE HYDROCHLORIDE 50 MG: 50 CAPSULE ORAL at 11:12

## 2022-12-12 RX ADMIN — DORZOLAMIDE HYDROCHLORIDE 1 DROP: 20 SOLUTION/ DROPS OPHTHALMIC at 08:12

## 2022-12-12 NOTE — H&P
Interventional H&P    Ned Oliveira is a 72 y.o. male with PMH for IDDM (a1c 7.7), HTN, DLD who is here for evaluation regarding PCI after he was declined by CTS for CABG for not being a suitable surgical candidate.   Patient has epigastric discomfort x 1 year that comes with exertion and relives with rest, no radiation and typically lasts for 5 minutes. It is very distressing for the patient. He also had dyspnea with exertion with activities like cutting grass and says QOL has reduced compared to before. He didn't have to stop from parking lot to office today. He denies fall, syncope or dizziness. He appears to be euvolemic and has no leg swelling. He quit smoking 27 years ago after having smoked 1 ppd x 4 years. He drinks socially.   His LHC on 7/8/22 showed severe MV disease:     Left Anterior Descending   Collaterals   Mid LAD filled by collaterals from Mid LAD.       Mid LAD lesion was 100% stenosed. There was chronic total occlusion.   First Diagonal Branch   The vessel is large.   1st Diag lesion was 99% stenosed.   Left Circumflex   Mid Cx lesion was 80% stenosed.   First Obtuse Marginal Branch   1st Mrg lesion was 99% stenosed.   Right Coronary Artery   Prox RCA lesion was 75% stenosed.   Mid RCA lesion was 90% stenosed.   Dist RCA lesion was 80% stenosed.   Right Posterior Descending Artery   RPDA lesion was 50% stenosed.   Right Posterior Atrioventricular Artery   There is mild diffuse disease throughout the vessel.   First Right Posterolateral Branch     Past Medical History:   Diagnosis Date    Adrenal adenoma     BPH (benign prostatic hyperplasia)     Cataract     Coronary artery disease     Diabetes mellitus, type 2     Diabetic retinopathy     Glaucoma     Hyperlipidemia     Hypertension     Inguinal hernia of left side without obstruction or gangrene 02/06/2019    Nephrolithiasis 05/26/2016    Steroid responders, left 04/14/2020      Past Surgical History:   Procedure Laterality Date    BICEPS  TENDON REPAIR Right     CATARACT EXTRACTION W/  INTRAOCULAR LENS IMPLANT Left 10/21/2020    COMPLEX PHACO IOL  AND BAERVELDT ()    COLONOSCOPY N/A 02/08/2019    Procedure: COLONOSCOPY;  Surgeon: Tavon Montes MD;  Location: [facility] ENDO (4TH FLR);  Service: Colon and Rectal;  Laterality: N/A;  will need . pt requesting ASAP. per Ana (international) ok to schedule at this time. will send  up for 6:45 am arrival time    EYE SURGERY      HERNIA REPAIR      IMPLANTATION OF DEVICE FOR GLAUCOMA Left 10/21/2020    Procedure: INSERTION, DEVICE, FOR GLAUCOMA;  Surgeon: Saranya Monge MD;  Location: [facility] OR 1ST FLR;  Service: Ophthalmology;  Laterality: Left;    INTRAOCULAR PROSTHESES INSERTION Left 10/21/2020    Procedure: INSERTION, IOL PROSTHESIS;  Surgeon: Saranya Monge MD;  Location: [facility] OR 1ST FLR;  Service: Ophthalmology;  Laterality: Left;    LEFT HEART CATHETERIZATION Left 7/8/2022    Procedure: Left heart cath;  Surgeon: Drew Peralta MD;  Location: [facility] CATH LAB;  Service: Cardiology;  Laterality: Left;    PHACOEMULSIFICATION OF CATARACT Left 10/21/2020    Procedure: PHACOEMULSIFICATION, CATARACT;  Surgeon: Saranya Monge MD;  [facility];  Service: Ophthalmology;  Laterality: Left;    REFORMATION OF ANTERIOR CHAMBER Left 12/23/2020    WITH HEALON ()    YAG LASER  TO CYCLITIC MEMBRANE Left 12/10/2020            ROS: 14 systems were reviewed, negative except above     Vitals:    12/12/22 1119   BP: (!) 150/76   Pulse:    Resp:    Temp:       Gen awake and alert  Neck supple  Heart RRR, no M/R  Lungs CTA B   Abdomen soft non tender  Ext no edema   Pulses +2 femoral pulses bilaterally   Component      Latest Ref Rng & Units 12/12/2022   WBC      3.90 - 12.70 K/uL 6.32   RBC      4.60 - 6.20 M/uL 4.16 (L)   HEMOGLOBIN      14.0 - 18.0 g/dL 14.1   HEMATOCRIT      40.0 - 54.0 % 40.2   MCV      82 - 98 fL 97    MCH      27.0 - 31.0 pg 33.9 (H)   MCHC      32.0 - 36.0 g/dL 35.1   RDW      11.5 - 14.5 % 12.5   Platelets      150 - 450 K/uL 144 (L)   MPV      9.2 - 12.9 fL 11.4   Immature Granulocytes      0.0 - 0.5 % 0.2   Gran # (ANC)      1.8 - 7.7 K/uL 4.0   Immature Grans (Abs)      0.00 - 0.04 K/uL 0.01   Lymph #      1.0 - 4.8 K/uL 1.6   Mono #      0.3 - 1.0 K/uL 0.5   Eos #      0.0 - 0.5 K/uL 0.2   Baso #      0.00 - 0.20 K/uL 0.04   nRBC      0 /100 WBC 0   Gran %      38.0 - 73.0 % 63.6   Lymph %      18.0 - 48.0 % 25.8   Mono %      4.0 - 15.0 % 7.3   Eosinophil %      0.0 - 8.0 % 2.5   Basophil %      0.0 - 1.9 % 0.6   Differential Method       Automated   Sodium      136 - 145 mmol/L 139   Potassium      3.5 - 5.1 mmol/L 4.0   Chloride      95 - 110 mmol/L 106   CO2      23 - 29 mmol/L 24   Glucose      70 - 110 mg/dL 119 (H)   BUN      8 - 23 mg/dL 16   Creatinine      0.5 - 1.4 mg/dL 1.0   Calcium      8.7 - 10.5 mg/dL 9.4   ANION GAP      8 - 16 mmol/L 9   eGFR      >60 mL/min/1.73 m:2 >60.0       Assessment and plan:    After discussion with patient regarding medical management alone vs PCI and medical management patient would like to proceed with PCI  Continue aspirin, plavix and lipitor 80  Recommended talking to primary doctor regarding sleep apnea evaluation   Consents signed  R CFA access 6 Fr     Lj Castro MD  Cardiology Fellow     1) Angina.  The patient reports symptoms consistent with exertional angina.  He has CAD risk factors of HTN and HLD.  He had an abnormal stress test on 6/1/22. I reviewed his cardiac cath from 7/8/22 with the patient and his wfei. He was found to have diffuse LAD and CLX disease and high grade RCA stneoses.  He was turned down for CABG by CTS. I discussed RCA PCI and the risks.  The patiet and his wife stated he would like to proceed  -continue EC ASA 81mg po qday  -continue Plavix 75mg po qday  The risks, benefits, and alternatives of cardiac catheterization and  possible intervention were discussed with the patient.  All questions were answered and informed consent was obtained.     2) HTN.  The patient's blood pressure is adequately controlled in clinic today;  -continue current medications     3) Dyslipidemia. Continue Liptior 80mg po qday     4) DM2. HgbAc1 on 6/16/22 was 7.7; rec tight glycemic control     All of the patient's and his wife questions were answered    Amara Corea MD

## 2022-12-12 NOTE — PROGRESS NOTES
Patient with some c/o epigastric pain rating 6/10. Patient states that this pain has been happening to him for a couple of months now. No other complaints. Dr Neri notified. Famotidine ordered and given.

## 2022-12-12 NOTE — Clinical Note
40 ml of contrast were injected throughout the case. 60 mL of contrast was the total wasted during the case. 100 mL was the total amount used during the case.

## 2022-12-12 NOTE — BRIEF OP NOTE
Brief Operative Note:    : Drew Peralta MD     Referring Physician: Drew Peralta     All Operators: Surgeon(s):  MD William Hernandez MD Imad Bagh, MD     Preoperative Diagnosis: Ischemic cardiomyopathy  Abnormal stress test     Postop Diagnosis: Ischemic cardiomyopathy  Abnormal stress test    Treatments/Procedures: Procedure(s) (LRB):  Stent, Drug Eluting, Single Vessel, Coronary (Right)  PTCA, Single Vessel  ANGIOGRAM, CORONARY ARTERY (N/A)    Access: Right CFA    Findings:Severe coronary artery disease is present.     See catheterization report for full details.    Intervention: Mamba Flex 135 to the RCA, followed by Major-Blue, followed by balloon angioplasty with 2 x 40 balloon to mid RCA    See catheterization report for full details.    Closure device: Angioseal     Plan:  - Post cath protocol   - IVF @ 150 cc/kg/hr x 6 hours  - Bed rest x 4 hours   - Continue aspirin 81 mg daily indefinitely  - Continue Plavix for minimum 6 months, ideally up to 1 year  - Continue high intensity statin therapy (LDL goal < 70)  - Risk factor reduction (BP <130/80 mmHg, glycemic control, etc)  - Cardiac rehab referral  - Follow up with outpatient cardiologist    Estimated Blood loss: 20 cc    Specimens removed: No    Supervised by Dr Fabian MD.    William Neri MD  Interventional Cardiology PGY-5  12/12/2022

## 2022-12-12 NOTE — NURSING TRANSFER
Nursing Transfer Note      12/12/2022     Reason patient is being transferred: monitor overnight post [facility]    Transfer To: 358    Transfer via stretcher    Transfer with cardiac monitoring    Transported by RN, PCT    Medicines sent: IVF    Any special needs or follow-up needed: bedrest/frequent checks hourly until 2015    Chart send with patient: Yes    Notified: spouse at bedside    Upon arrival to floor: cardiac monitor applied, patient oriented to room, call bell in reach, and bed in lowest position. Report given to YAA Beatty.

## 2022-12-12 NOTE — PLAN OF CARE
Received report from [facility]. Patient s/p [facility], AAOx3. VSS, no c/o pain or discomfort at this time, resp even and unlabored. Gauze/tegaderm dressing to R groin is CDI. No active bleeding. No hematoma noted. Post procedure protocol reviewed with patient. Understanding verbalized. Nurse call bell within reach. Will continue to monitor per post procedure protocol.

## 2022-12-12 NOTE — Clinical Note
Medication:   Requested Prescriptions     Pending Prescriptions Disp Refills    pravastatin (PRAVACHOL) 20 MG tablet [Pharmacy Med Name: PRAVASTATIN SODIUM 20 MG TAB] 90 tablet 0     Sig: TAKE ONE TABLET BY MOUTH DAILY      Last Filled:      Patient Phone Number: [phone] (home) [phone] (work)    Last appt: 9/25/2020   Next appt: 12/15/2020    Last OARRS:   RX Monitoring 2/7/2020   Attestation -   Acute Pain Prescriptions -   Periodic Controlled Substance Monitoring No signs of potential drug abuse or diversion identified.    Chronic Pain > 80 MEDD -     PDMP Monitoring:    Last PDMP Trista Zuniga as Reviewed [facility]):  Review User Review Instant Review Result   Hunter Javed 8/18/2020  3:18 PM Reviewed PDMP [1]     Preferred Pharmacy:   [facility], [address] [place] [address]  Phone: [phone] Fax: [phone] The closure device was deployed in the right femoral artery.

## 2022-12-13 ENCOUNTER — TELEPHONE (OUTPATIENT)
Dept: INTERNAL MEDICINE | Facility: CLINIC | Age: 72
End: 2022-12-13
Payer: MEDICARE

## 2022-12-13 ENCOUNTER — TELEPHONE (OUTPATIENT)
Dept: CARDIAC REHAB | Facility: CLINIC | Age: 72
End: 2022-12-13
Payer: MEDICARE

## 2022-12-13 VITALS
HEIGHT: 64 IN | HEART RATE: 80 BPM | WEIGHT: 138 LBS | OXYGEN SATURATION: 97 % | TEMPERATURE: 98 F | SYSTOLIC BLOOD PRESSURE: 90 MMHG | BODY MASS INDEX: 23.56 KG/M2 | RESPIRATION RATE: 18 BRPM | DIASTOLIC BLOOD PRESSURE: 56 MMHG

## 2022-12-13 DIAGNOSIS — Z98.61 POSTSURGICAL PERCUTANEOUS TRANSLUMINAL CORONARY ANGIOPLASTY STATUS: ICD-10-CM

## 2022-12-13 DIAGNOSIS — I25.10 ATHEROSCLEROSIS OF NATIVE CORONARY ARTERY OF NATIVE HEART WITHOUT ANGINA PECTORIS: Primary | ICD-10-CM

## 2022-12-13 LAB
ANION GAP SERPL CALC-SCNC: 7 MMOL/L (ref 8–16)
BASOPHILS # BLD AUTO: 0.03 K/UL (ref 0–0.2)
BASOPHILS NFR BLD: 0.4 % (ref 0–1.9)
BUN SERPL-MCNC: 14 MG/DL (ref 8–23)
CALCIUM SERPL-MCNC: 9 MG/DL (ref 8.7–10.5)
CHLORIDE SERPL-SCNC: 104 MMOL/L (ref 95–110)
CO2 SERPL-SCNC: 25 MMOL/L (ref 23–29)
CREAT SERPL-MCNC: 1 MG/DL (ref 0.5–1.4)
DIFFERENTIAL METHOD: ABNORMAL
EOSINOPHIL # BLD AUTO: 0.1 K/UL (ref 0–0.5)
EOSINOPHIL NFR BLD: 1.1 % (ref 0–8)
ERYTHROCYTE [DISTWIDTH] IN BLOOD BY AUTOMATED COUNT: 12.9 % (ref 11.5–14.5)
EST. GFR  (NO RACE VARIABLE): >60 ML/MIN/1.73 M^2
GLUCOSE SERPL-MCNC: 174 MG/DL (ref 70–110)
HCT VFR BLD AUTO: 39.7 % (ref 40–54)
HGB BLD-MCNC: 13.7 G/DL (ref 14–18)
IMM GRANULOCYTES # BLD AUTO: 0.02 K/UL (ref 0–0.04)
IMM GRANULOCYTES NFR BLD AUTO: 0.2 % (ref 0–0.5)
LYMPHOCYTES # BLD AUTO: 1.2 K/UL (ref 1–4.8)
LYMPHOCYTES NFR BLD: 14.5 % (ref 18–48)
MAGNESIUM SERPL-MCNC: 1.8 MG/DL (ref 1.6–2.6)
MCH RBC QN AUTO: 34.1 PG (ref 27–31)
MCHC RBC AUTO-ENTMCNC: 34.5 G/DL (ref 32–36)
MCV RBC AUTO: 99 FL (ref 82–98)
MONOCYTES # BLD AUTO: 0.7 K/UL (ref 0.3–1)
MONOCYTES NFR BLD: 7.7 % (ref 4–15)
NEUTROPHILS # BLD AUTO: 6.5 K/UL (ref 1.8–7.7)
NEUTROPHILS NFR BLD: 76.1 % (ref 38–73)
NRBC BLD-RTO: 0 /100 WBC
PLATELET # BLD AUTO: 135 K/UL (ref 150–450)
PMV BLD AUTO: 11.5 FL (ref 9.2–12.9)
POCT GLUCOSE: 142 MG/DL (ref 70–110)
POTASSIUM SERPL-SCNC: 3.9 MMOL/L (ref 3.5–5.1)
RBC # BLD AUTO: 4.02 M/UL (ref 4.6–6.2)
SODIUM SERPL-SCNC: 136 MMOL/L (ref 136–145)
WBC # BLD AUTO: 8.47 K/UL (ref 3.9–12.7)

## 2022-12-13 PROCEDURE — 25000003 PHARM REV CODE 250: Performed by: INTERNAL MEDICINE

## 2022-12-13 PROCEDURE — 25000242 PHARM REV CODE 250 ALT 637 W/ HCPCS: Performed by: INTERNAL MEDICINE

## 2022-12-13 PROCEDURE — 85025 COMPLETE CBC W/AUTO DIFF WBC: CPT | Performed by: INTERNAL MEDICINE

## 2022-12-13 PROCEDURE — 83735 ASSAY OF MAGNESIUM: CPT | Performed by: INTERNAL MEDICINE

## 2022-12-13 PROCEDURE — G0378 HOSPITAL OBSERVATION PER HR: HCPCS

## 2022-12-13 PROCEDURE — 36415 COLL VENOUS BLD VENIPUNCTURE: CPT | Performed by: INTERNAL MEDICINE

## 2022-12-13 PROCEDURE — 80048 BASIC METABOLIC PNL TOTAL CA: CPT | Performed by: INTERNAL MEDICINE

## 2022-12-13 RX ORDER — NITROGLYCERIN 0.4 MG/1
0.4 TABLET SUBLINGUAL ONCE
Status: COMPLETED | OUTPATIENT
Start: 2022-12-13 | End: 2022-12-13

## 2022-12-13 RX ADMIN — METOPROLOL SUCCINATE 12.5 MG: 25 TABLET, EXTENDED RELEASE ORAL at 08:12

## 2022-12-13 RX ADMIN — FINASTERIDE 5 MG: 5 TABLET, FILM COATED ORAL at 08:12

## 2022-12-13 RX ADMIN — ATORVASTATIN CALCIUM 80 MG: 40 TABLET, FILM COATED ORAL at 08:12

## 2022-12-13 RX ADMIN — DORZOLAMIDE HYDROCHLORIDE 1 DROP: 20 SOLUTION/ DROPS OPHTHALMIC at 09:12

## 2022-12-13 RX ADMIN — LISINOPRIL 10 MG: 10 TABLET ORAL at 08:12

## 2022-12-13 RX ADMIN — NITROGLYCERIN 0.4 MG: 0.4 TABLET, ORALLY DISINTEGRATING SUBLINGUAL at 11:12

## 2022-12-13 RX ADMIN — CLOPIDOGREL BISULFATE 75 MG: 75 TABLET ORAL at 08:12

## 2022-12-13 RX ADMIN — TIMOLOL MALEATE 1 DROP: 5 SOLUTION/ DROPS OPHTHALMIC at 09:12

## 2022-12-13 RX ADMIN — ASPIRIN 81 MG: 81 TABLET, COATED ORAL at 08:12

## 2022-12-13 NOTE — PLAN OF CARE
[facility] - Cardiology Stepdown  Initial Discharge Assessment       Primary Care Provider: David Hardwick MD    Admission Diagnosis: Chronic stable angina [I20.8]  Coronary artery disease of native artery of native heart with stable angina pectoris [I25.118]    Admission Date: 12/12/2022  Expected Discharge Date: 12/13/2022    Discharge Barriers Identified: None    Payor: HU[employer] MANAGED MEDICARE / Plan: HU[employer]NA MEDICARE HMO / Product Type: Capitation /     Extended Emergency Contact Information  Primary Emergency Contact: Ileana Ulloa  Address: [address] [place] [address]           [place] [address] [facility]  Home Phone: [phone]  Mobile Phone: [phone]  Relation: Spouse    Discharge Plan A: Home with family  Discharge Plan B: Home      [facility] [place] - [address] [facility]  [address] [facility]  [place] [address]  Phone: [phone] Fax: [phone]    [facility] - [place] [id number] [facility]  [address] [facility] [address]  Phone: [phone] Fax: [phone]    [facility] [place] [place] - [address] [place] [address] [address]  [address]  [facility] [address]  Phone: [phone] Fax: [phone]      Initial Assessment (most recent)       Adult Discharge Assessment - 12/13/22 1438          Discharge Assessment    Assessment Type Discharge Planning Assessment     Confirmed/corrected address, phone number and insurance Yes     Confirmed Demographics Correct on Facesheet     Source of Information patient; utilized     Reason For Admission Coronary artery disease involving native coronary artery     People in Home spouse;child(chandra), adult     Facility Arrived From: home     Do you expect to return to your current living situation? Yes     Do you have help at home or someone to help you manage your care at home? Yes     Who are your caregiver(s) and their phone number(s)? wife Ileana Ulloa [phone]     Prior to  hospitilization cognitive status: Alert/Oriented     Current cognitive status: Alert/Oriented     Equipment Currently Used at Home none     Patient currently being followed by outpatient case management? No     Do you currently have service(s) that help you manage your care at home? No     How do you get to doctors appointments? family or friend will provide     Are you on dialysis? No     Do you take coumadin? No     Discharge Plan A Home with family     Discharge Plan B Home     DME Needed Upon Discharge  none     Discharge Barriers Identified None

## 2022-12-13 NOTE — DISCHARGE SUMMARY
Discharge Summary  Interventional Cardiology    Admit Date: 12/12/2022    Discharge Date:  12/13/2022    Attending Physician: Drew Peralta MD    Discharge Physician: Drew Peralta MD    Principal Diagnoses: Coronary artery disease involving native coronary artery  Indication for Admission: Stent, Drug Eluting, Single Vessel, Coronary (Right), PTCA, Single Vessel, ANGIOGRAM, CORONARY ARTERY (N/A)    Discharged Condition: Good    Hospital Course:   Patient presented for outpatient coronary angiogram which went without complication. Coronary angiogram revealed RCA , Balloon angioplasty performed with STAR technique . See full cath report in Epic for details. Hemostasis of patient's R CFA access site was achieved with Angio-Seal closure device. Patient was monitored per post-cath protocol, and his R groin access site was c/d/i with no hematoma. Observed overnight for safety. Patient did well. Patient was able to ambulate without difficulty. He was feeling well and anticipating discharge home today.     Outpatient Plan:  - There were no medication changes    Diet: Cardiac diet    Activity: Ad khushi    Disposition: Home or Self Care    Follow Up:   Follow-up Information       [facility] - [facility] - [facility] Follow up in 3 week(s).    Specialty: Cardiology  Contact information:  [address]  [facility] [address] [phone]  Additional information:  Cardiology Services Clinics - 3rd floor                       Outpatient cardiologist in 3-4 weeks  PCP in 1-2 weeks    Discharge Medications:      Medication List        CONTINUE taking these medications      aspirin 81 MG EC tablet  Commonly known as: ECOTRIN  Take 1 tablet (81 mg total) by mouth once daily.     atorvastatin 80 MG tablet  Commonly known as: LIPITOR  Take 1 tablet (80 mg total) by mouth once daily.     clopidogreL 75 mg tablet  Commonly known as: PLAVIX  Take 1 tablet (75 mg total) by mouth once daily.     diclofenac sodium  "1 % Gel  Commonly known as: VOLTAREN  Apply 2 g topically 3 (three) times daily.     dorzolamide-timolol 2-0.5% 22.3-6.8 mg/mL ophthalmic solution  Commonly known as: COSOPT  Place 1 drop into the right eye 2 (two) times daily.     finasteride 5 mg tablet  Commonly known as: PROSCAR     insulin glargine 100 units/mL SubQ pen  Commonly known as: LANTUS SOLOSTAR U-100 INSULIN  Inject 20 Units into the skin every evening.     lancets Misc  Commonly known as: ACCU-CHEK SOFTCLIX LANCETS  1 Box by Misc.(Non-Drug; Combo Route) route 2 hours after meals and at bedtime.     lisinopriL-hydrochlorothiazide 10-12.5 mg per tablet  Commonly known as: PRINZIDE,ZESTORETIC  Take 1 tablet by mouth once daily.     metFORMIN 1000 MG tablet  Commonly known as: GLUCOPHAGE  Take 1 tablet (1,000 mg total) by mouth 2 (two) times daily with meals.     metoprolol succinate 25 MG 24 hr tablet  Commonly known as: TOPROL-XL  Take 0.5 tablets (12.5 mg total) by mouth once daily.     nitroGLYCERIN 0.4 MG SL tablet  Commonly known as: NITROSTAT  Place 1 tablet (0.4 mg total) under the tongue every 5 (five) minutes as needed for Chest pain (ONLY IF HAVE CHEST PAIN,).     OZURDEX 0.7 mg Impl intravitreal implant  Generic drug: dexAMETHasone     pen needle, diabetic 29 gauge x 1/2" Ndle  Commonly known as: BD ULTRA-FINE ORIG PEN NEEDLE  20 Units by Misc.(Non-Drug; Combo Route) route once daily.     tamsulosin 0.4 mg Cap  Commonly known as: FLOMAX  Take 1 capsule (0.4 mg total) by mouth every evening.            STOP taking these medications      pantoprazole 40 MG tablet  Commonly known as: PROTONIX               Where to Get Your Medications        You can get these medications from any pharmacy    You don't need a prescription for these medications  aspirin 81 MG EC tablet     Discussed with staff, Dr Fabian Neri  Interventional Cardiology Fellow PGY-5  12/13/2022  "

## 2022-12-13 NOTE — PLAN OF CARE
[facility] - Cardiology Stepdown  Discharge Final Note    Primary Care Provider: David Hardwick MD    Expected Discharge Date: 12/13/2022    Final Discharge Note (most recent)       Final Note - 12/13/22 1441          Final Note    Assessment Type Final Discharge Note     Anticipated Discharge Disposition Home or Self Care     What phone number can be called within the next 1-3 days to see how you are doing after discharge? [phone]     Hospital Resources/Appts/Education Provided Provided patient/caregiver with written discharge plan information   Per bedside nurse       Post-Acute Status    Discharge Delays None known at this time                     Important Message from Medicare             Contact Info       [facility] - [facility] - 3rd Fl   Specialty: Cardiology    [address]  [facility] [address]   Phone: [phone]       Next Steps: Follow up in 3 week(s)    David Hardwick MD   Specialty: Family Medicine   Relationship: PCP - General    [address] [facility]  [place] [address]   Phone: [phone]       Next Steps: Follow up    Instructions: hospital follow up Dr. Miranda Palma's staff will call with appointment date and time.        Patient medically ready for discharge to home with family.  This CM scheduled or requested necessary hospital follow-up appointments.  Family/patient aware of discharge.    Future Appointments   Date Time Provider Department Center   12/14/2022  2:30 PM Simona Good DPM [facility] [place]   12/28/2022  3:00 PM David Hardwick MD [facility]   3/28/2023  2:30 PM David Hardwick MD [facility]       Hi Carrillo RN CM  Case Management  [id number]

## 2022-12-14 LAB
H PYLORI AG STL QL IA: DETECTED
POC ACTIVATED CLOTTING TIME K: 215 SEC (ref 74–137)
POC ACTIVATED CLOTTING TIME K: 317 SEC (ref 74–137)
SAMPLE: ABNORMAL
SAMPLE: ABNORMAL
SPECIMEN SOURCE: ABNORMAL

## 2022-12-27 ENCOUNTER — TELEPHONE (OUTPATIENT)
Dept: CARDIAC REHAB | Facility: CLINIC | Age: 72
End: 2022-12-27
Payer: MEDICARE

## 2022-12-30 ENCOUNTER — TELEPHONE (OUTPATIENT)
Dept: CARDIAC REHAB | Facility: CLINIC | Age: 72
End: 2022-12-30
Payer: MEDICARE

## 2022-12-30 DIAGNOSIS — I25.10 ATHEROSCLEROSIS OF NATIVE CORONARY ARTERY OF NATIVE HEART WITHOUT ANGINA PECTORIS: ICD-10-CM

## 2022-12-30 DIAGNOSIS — Z98.61 POSTSURGICAL PERCUTANEOUS TRANSLUMINAL CORONARY ANGIOPLASTY STATUS: Primary | ICD-10-CM

## 2022-12-30 NOTE — TELEPHONE ENCOUNTER
Per  Adalberto #[id number] called pt re:scheduling for cardiac rehab. He left message for pt on voicemail.

## 2023-01-13 ENCOUNTER — TELEPHONE (OUTPATIENT)
Dept: CARDIAC REHAB | Facility: CLINIC | Age: 73
End: 2023-01-13
Payer: MEDICARE

## 2023-01-13 ENCOUNTER — DOCUMENTATION ONLY (OUTPATIENT)
Dept: CARDIAC REHAB | Facility: CLINIC | Age: 73
End: 2023-01-13
Payer: MEDICARE

## 2023-01-13 DIAGNOSIS — E11.59 CONTROLLED TYPE 2 DIABETES MELLITUS WITH OTHER CIRCULATORY COMPLICATION, WITH LONG-TERM CURRENT USE OF INSULIN: Primary | ICD-10-CM

## 2023-01-13 DIAGNOSIS — Z79.4 CONTROLLED TYPE 2 DIABETES MELLITUS WITH OTHER CIRCULATORY COMPLICATION, WITH LONG-TERM CURRENT USE OF INSULIN: Primary | ICD-10-CM

## 2023-01-13 NOTE — TELEPHONE ENCOUNTER
----- Message from Rodriguez Jones sent at 1/12/2023 12:38 PM CST -----  Regarding: Appt  Pt called to schedule appt.  PT can be reached @ [phone] thanks

## 2023-01-19 ENCOUNTER — HOSPITAL ENCOUNTER (OUTPATIENT)
Dept: CARDIOLOGY | Facility: HOSPITAL | Age: 73
Discharge: HOME | End: 2023-01-19
Attending: INTERNAL MEDICINE
Payer: MEDICARE

## 2023-01-19 VITALS
SYSTOLIC BLOOD PRESSURE: 107 MMHG | DIASTOLIC BLOOD PRESSURE: 71 MMHG | BODY MASS INDEX: 24.35 KG/M2 | WEIGHT: 142.63 LBS | HEART RATE: 61 BPM | HEIGHT: 64 IN

## 2023-01-19 DIAGNOSIS — Z98.61 POSTSURGICAL PERCUTANEOUS TRANSLUMINAL CORONARY ANGIOPLASTY STATUS: ICD-10-CM

## 2023-01-19 DIAGNOSIS — I25.10 ATHEROSCLEROSIS OF NATIVE CORONARY ARTERY OF NATIVE HEART WITHOUT ANGINA PECTORIS: ICD-10-CM

## 2023-01-19 LAB
CV STRESS BASE HR: 61 BPM
DIASTOLIC BLOOD PRESSURE: 71 MMHG
OHS CV CPX 1 MINUTE RECOVERY HEART RATE: 136 BPM
OHS CV CPX 85 PERCENT MAX PREDICTED HEART RATE MALE: 128
OHS CV CPX ABDOMINAL GIRTH: 34.6 CM
OHS CV CPX ANAEROBIC THRESHOLD DIASTOLIC BLOOD PRESSURE: 67 MMHG
OHS CV CPX ANAEROBIC THRESHOLD HEART RATE: 125
OHS CV CPX ANAEROBIC THRESHOLD RATE PRESSURE PRODUCT: NORMAL
OHS CV CPX ANAEROBIC THRESHOLD SYSTOLIC BLOOD PRESSURE: 88
OHS CV CPX DATA O2 SAT - PEAK: 85
OHS CV CPX DATA O2 SAT - REST: 98
OHS CV CPX DATA TIME - AT: 4.85
OHS CV CPX DATA TIME - PEAK: 6
OHS CV CPX DATA VE/VCO2 - AT: 39
OHS CV CPX DATA VE/VCO2 - PEAK: 39
OHS CV CPX DATA VE/VO2 - AT: 34
OHS CV CPX DATA VE/VO2 - PEAK: 42
OHS CV CPX DATA VO2 - AT: 7.2
OHS CV CPX DATA VO2 - PEAK: 11.6
OHS CV CPX DATA VO2 - REST: 2.7
OHS CV CPX ESTIMATED METS: 9
OHS CV CPX FEV1/FVC: 0.77
OHS CV CPX FORCED EXPIRATORY VOLUME: 2.26
OHS CV CPX FORCED VITAL CAPACITY (FVC): 2.95
OHS CV CPX HIGHEST VO: 29.4
OHS CV CPX MAX PREDICTED HEART RATE: 150
OHS CV CPX MAXIMAL VOLUNTARY VENTILATION (MVV) PREDICTED: 90.4
OHS CV CPX MAXIMAL VOLUNTARY VENTILATION (MVV): 85
OHS CV CPX MAXIUMUM EXERCISE VENTILATION (VE MAX): 31.8
OHS CV CPX PATIENT AGE: 70
OHS CV CPX PATIENT HEIGHT IN: 64
OHS CV CPX PATIENT IS FEMALE AGE 11-19: 0
OHS CV CPX PATIENT IS FEMALE AGE GREATER THAN 19: 0
OHS CV CPX PATIENT IS FEMALE AGE LESS THAN 11: 0
OHS CV CPX PATIENT IS FEMALE: 0
OHS CV CPX PATIENT IS MALE AGE 11-25: 0
OHS CV CPX PATIENT IS MALE AGE GREATER THAN 25: 1
OHS CV CPX PATIENT IS MALE AGE LESS THAN 11: 0
OHS CV CPX PATIENT IS MALE GREATER THAN 18: 1
OHS CV CPX PATIENT IS MALE LESS THAN OR EQUAL TO 18: 0
OHS CV CPX PATIENT IS MALE: 1
OHS CV CPX PATIENT WEIGHT RETURNED IN OZ: 2282.2
OHS CV CPX PEAK DIASTOLIC BLOOD PRESSURE: 58 MMHG
OHS CV CPX PEAK HEAR RATE: 136 BPM
OHS CV CPX PEAK RATE PRESSURE PRODUCT: NORMAL
OHS CV CPX PEAK SYSTOLIC BLOOD PRESSURE: 91 MMHG
OHS CV CPX PERCENT BODY FAT: 13.4
OHS CV CPX PERCENT MAX PREDICTED HEART RATE ACHIEVED: 91
OHS CV CPX PREDICTED VO2: 29.4 ML/KG/MIN
OHS CV CPX RATE PRESSURE PRODUCT PRESENTING: 6527
OHS CV CPX REST PET CO2: 25
OHS CV CPX VE/VCO2 SLOPE: 30
STRESS ECHO POST EXERCISE DUR MIN: 6 MINUTES
STRESS ECHO POST EXERCISE DUR SEC: 0 SECONDS
SYSTOLIC BLOOD PRESSURE: 107 MMHG

## 2023-01-19 PROCEDURE — 94621 CARDIOPULM EXERCISE TESTING: CPT

## 2023-01-19 PROCEDURE — 94621 CARDIOPULMONARY EXERCISE TESTING (CUPID ONLY): ICD-10-PCS | Mod: 26,,, | Performed by: INTERNAL MEDICINE

## 2023-01-19 PROCEDURE — 94621 CARDIOPULM EXERCISE TESTING: CPT | Mod: 26,,, | Performed by: INTERNAL MEDICINE

## 2023-01-20 NOTE — PROGRESS NOTES
HISTORY: S/P PTCA (12-12-22), CAD, AAA, HTN, HLP, DM, CHRONIC STABLE ANGINA, EF=50%(7-14-22)    ANTHROPOMETRICS:     PRE   Height (in) 64   Weight (lb) 143   BMI 24.5   Abdominal Girth (in) 34.6   % Body Fat 13.4%       LAB RESULTS:    Lab Results   Component Value Date    HGB 13.1 (L) 01/19/2023     Lab Results   Component Value Date    HCT 39.7 (L) 01/19/2023     Lab Results   Component Value Date    MPV 11.8 01/19/2023       Lab Results   Component Value Date    CHOL 130 09/28/2022     Lab Results   Component Value Date    HDL 44 09/28/2022     Lab Results   Component Value Date    LDLCALC 76.4 09/28/2022     Lab Results   Component Value Date    TRIG 48 09/28/2022     Lab Results   Component Value Date    CHOLHDL 33.8 09/28/2022       Lab Results   Component Value Date    GLUF 101 01/19/2023     Lab Results   Component Value Date    HGBA1C 8.8 (H) 01/19/2023        Lab Results   Component Value Date    HSCRP 0.49 01/19/2023         ALEX SCORES:     PRE   Anxiety 4   Depression 2   Somatic 4   Hostility 5     SF-36 SCORES:     PRE   Physical Function 19   Social Function 5   Mental Health 18   Pain 4   Change in Health 3   Physical Role Limitation 4   Mental Role Limitation 1   Energy/Fatigue 7   Health Perceptions 13   Total Score 74     PHQ-9:  PHQ-9 Depression Patient Health Questionnaire 2/2/2023   Over the last two weeks how often have you been bothered by little interest or pleasure in doing things 1   Over the last two weeks how often have you been bothered by feeling down, depressed or hopeless 0   Over the last two weeks how often have you been bothered by trouble falling or staying asleep, or sleeping too much 0   Over the last two weeks how often have you been bothered by feeling tired or having little energy 1   Over the last two weeks how often have you been bothered by a poor appetite or overeating 0   Over the last two weeks how often have you been bothered by feeling bad about yourself - or  that you are a failure or have let yourself or your family down 0   Over the last two weeks how often have you been bothered by trouble concentrating on things, such as reading the newspaper or watching television 1   Over the last two weeks how often have you been bothered by moving or speaking so slowly that other people could have noticed. 0   Over the last two weeks how often have you been bothered by thoughts that you would be better off dead, or of hurting yourself 0   If you checked off any problems, how difficult have these problems made it for you to do your work, take care of things at home or get along with other people? Somewhat difficult   Total Score 3             EDUCATION SCORES:     PRE   Education Score 30

## 2023-01-20 NOTE — PROGRESS NOTES
Session: Orientation   Cardiac Rehab Individual Treatment Plan - Initial Assessment      Patient Name: Ned Oliveira MRN: [id number]   : 1950   Age: 72 y.o.   Primary Diagnosis: PTCA  Date of Event: 22  EF: 50%  Risk Stratification: high  Referring Physician: SANDI   Exercise Assessment:     CPX/TM Date: 23 Results   RHR 61   Max    Peak VO2 (CPX only) 11.6   Actual METS (CPX only) 3.31   Estimated METS 9.0     Anthropometrics    Height 64 inches   Weight 143 lbs   BMI 24.5   Abdominal Girth 34.6   Body Composition 13.4%     ST Depression noted on Stress Test?:No  Angina with exercise?: No   Fall Risk: {YES NO:20508}   Assistive Devices:  {ambulatory status:70088}   Currently exercising? {Home Interventions:20862}  There were *** limitations noted by the patient.      Exercise Plan:   Goals:  CR Exercise Goals: Attend Cardiac Rehab 3 times/week: In Progress  Home Aerobic Exercise: 2 additional days/week for 30-60 minutes: In Progress  Intensity of 12-15 on the Rate of Perceived Exertion (RPE) scale: In Progress  30% increase in entry estimated METS: 11.7 : In Progress  5 days/week for 30-60 minutes: In Progress    Intervention:   Discussed importance of regular attendance to cardiac rehab class    Exercise Prescription:  THR Range 120-125   Mode: {Exercise Mode:77124:p}   Frequency:  3 days/week   Duration:  30 - 60 minutes   Intensity:  12 - 15 RPE   Resistance Training:  {YES No:81794}: *** lb weights with 10-15 reps based on strength and range of motion assessment     Home Prescription:  Mode Aerobic   Frequency: 2- 3 days/week   Duration: 30-60 minutes   Resistance Training: None        Education:  Orientation to Equipment; verbalizes understanding; Date: 23  Exercise Recommendations; verbalizes understanding; Date: 23  Exercise Safety; verbalizes understanding; Date: 23  Class Preparation: verbalizes understanding; Date: 23  Signs and symptoms to report: verbalizes  understanding; Date: 2-2-23  Caffeine/Hydration: verbalizes understanding; Date: 2-2-23  Exercise Terminology: verbalizes understanding; Date: 2-2-23  Resistance Training: verbalizes understanding; Date: 2-2-23    Comments:  ***    All consent forms were signed, proper attire and shoes were discussed.       *** will begin Cardiac Rehab on *** at *** .    The exercise prescription will be adjusted based on tolerance of exercise intensity by patient.    Sameer Shen., CEP

## 2023-01-31 ENCOUNTER — TELEPHONE (OUTPATIENT)
Dept: CARDIAC REHAB | Facility: CLINIC | Age: 73
End: 2023-01-31
Payer: MEDICARE

## 2023-02-02 ENCOUNTER — CLINICAL SUPPORT (OUTPATIENT)
Dept: CARDIAC REHAB | Facility: CLINIC | Age: 73
End: 2023-02-02
Payer: MEDICARE

## 2023-02-02 DIAGNOSIS — I25.10 ATHEROSCLEROSIS OF NATIVE CORONARY ARTERY OF NATIVE HEART WITHOUT ANGINA PECTORIS: ICD-10-CM

## 2023-02-02 DIAGNOSIS — Z98.61 POSTSURGICAL PERCUTANEOUS TRANSLUMINAL CORONARY ANGIOPLASTY STATUS: ICD-10-CM

## 2023-02-02 DIAGNOSIS — I25.10 ATHEROSCLEROSIS OF NATIVE CORONARY ARTERY OF NATIVE HEART, UNSPECIFIED WHETHER ANGINA PRESENT: Primary | ICD-10-CM

## 2023-02-02 DIAGNOSIS — E11.00 TYPE II DIABETES MELLITUS WITH HYPEROSMOLARITY, UNCONTROLLED: ICD-10-CM

## 2023-02-07 DIAGNOSIS — Z00.00 ENCOUNTER FOR MEDICARE ANNUAL WELLNESS EXAM: ICD-10-CM

## 2023-02-09 DIAGNOSIS — Z00.00 ENCOUNTER FOR MEDICARE ANNUAL WELLNESS EXAM: ICD-10-CM

## 2023-02-12 DIAGNOSIS — A04.8 H. PYLORI INFECTION: Primary | ICD-10-CM

## 2023-02-12 RX ORDER — CLARITHROMYCIN 500 MG/1
500 TABLET, FILM COATED ORAL 2 TIMES DAILY
Qty: 28 TABLET | Refills: 0 | Status: SHIPPED | OUTPATIENT
Start: 2023-02-12 | End: 2023-02-26

## 2023-02-12 RX ORDER — AMOXICILLIN 500 MG/1
1000 CAPSULE ORAL EVERY 12 HOURS
Qty: 56 CAPSULE | Refills: 0 | Status: SHIPPED | OUTPATIENT
Start: 2023-02-12 | End: 2023-02-26

## 2023-02-12 RX ORDER — OMEPRAZOLE 40 MG/1
40 CAPSULE, DELAYED RELEASE ORAL
Qty: 28 CAPSULE | Refills: 0 | Status: SHIPPED | OUTPATIENT
Start: 2023-02-12 | End: 2023-07-27

## 2023-02-15 ENCOUNTER — CLINICAL SUPPORT (OUTPATIENT)
Dept: DIABETES | Facility: CLINIC | Age: 73
End: 2023-02-15
Payer: MEDICARE

## 2023-02-15 DIAGNOSIS — I25.10 ATHEROSCLEROSIS OF NATIVE CORONARY ARTERY OF NATIVE HEART, UNSPECIFIED WHETHER ANGINA PRESENT: ICD-10-CM

## 2023-02-15 DIAGNOSIS — E11.00 TYPE II DIABETES MELLITUS WITH HYPEROSMOLARITY, UNCONTROLLED: ICD-10-CM

## 2023-02-15 PROCEDURE — G0108 DIAB MANAGE TRN  PER INDIV: HCPCS | Mod: S$GLB,,, | Performed by: DIETITIAN, REGISTERED

## 2023-02-15 PROCEDURE — G0108 PR DIAB MANAGE TRN  PER INDIV: ICD-10-PCS | Mod: S$GLB,,, | Performed by: DIETITIAN, REGISTERED

## 2023-02-15 NOTE — PROGRESS NOTES
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 and metformin 1000 b.i.d.; has strong adverse reactions to to prior trials of SGLT 2 inhibitors and only oral GLP 1 (rybelsus) is preferred level 5 via [employer]  -Denies episodes of hypoglycemia  -Most recent A1c: 8.8 (indolently increasing over the past year and a half); patient's wife attributes this to laxity of diet  -Takes aspirin everyday  -Takes a atorvastatin 80  -Denies diabetic neuropathy.  -Most recent microalbumin: good 6 months ago   -Pneumovax:  Up-to-date  -Optho exam:  Up-to-date  -Last foot exam:  Up-to-date (care gaps remains open for an unknown reason)    Family, social, surgical Hx reviewed     Health Maintenance:  Due for diabetic foot exam (podiatry referral ordered 4 months ago) and annual flu shot    Past Medical History:   Diagnosis Date    Adrenal adenoma     BPH (benign prostatic hyperplasia)     Cataract     Coronary artery disease     Diabetes mellitus, type 2     Diabetic retinopathy     Glaucoma     Hyperlipidemia     Hypertension     Inguinal hernia of left side without obstruction or gangrene 02/06/2019    Nephrolithiasis 05/26/2016    Steroid responders, left 04/14/2020     Past Surgical History:   Procedure Laterality Date    BICEPS TENDON REPAIR Right     CATARACT EXTRACTION W/  INTRAOCULAR LENS IMPLANT Left 10/21/2020    COMPLEX PHACO  IOL  AND BAERVELDT ()    COLONOSCOPY N/A 02/08/2019    Procedure: COLONOSCOPY;  Surgeon: Tavon Montes MD;  Location: [facility] (4TH FLR);  Service: Colon and Rectal;  Laterality: N/A;  will need . pt requesting ASAP. per Ana (international) ok to schedule at this time. will send  up for 6:45 am arrival time    CORONARY ANGIOGRAPHY N/A 12/12/2022    Procedure: ANGIOGRAM, CORONARY ARTERY;  Surgeon: Drew Peralta MD;  Location: [facility] CATH LAB;  Service: Cardiology;  Laterality: N/A;    EYE SURGERY      HERNIA REPAIR      IMPLANTATION OF DEVICE FOR GLAUCOMA Left 10/21/2020    Procedure: INSERTION, DEVICE, FOR GLAUCOMA;  Surgeon: Saranya Monge MD;  Location: [facility] OR [address] [place] [address];  Service: Ophthalmology;  Laterality: Left;    INTRAOCULAR PROSTHESES INSERTION Left 10/21/2020    Procedure: INSERTION, IOL PROSTHESIS;  Surgeon: Saranya Monge MD;  Location: [facility] OR [address] [place] [address];  Service: Ophthalmology;  Laterality: Left;    LEFT HEART CATHETERIZATION Left 7/8/2022    Procedure: Left heart cath;  Surgeon: Drew Peralta MD;  Location: [facility] CATH LAB;  Service: Cardiology;  Laterality: Left;    PHACOEMULSIFICATION OF CATARACT Left 10/21/2020    Procedure: PHACOEMULSIFICATION, CATARACT;  Surgeon: Saranya Monge MD;  Location: [facility] OR [address] [place] [address];  Service: Ophthalmology;  Laterality: Left;    PTCA, SINGLE VESSEL  12/12/2022    Procedure: PTCA, Single Vessel;  Surgeon: Drew Peralta MD;  Location: [facility] CATH LAB;  Service: Cardiology;;    REFORMATION OF ANTERIOR CHAMBER Left 12/23/2020    WITH HEALON ()    STENT, DRUG ELUTING, SINGLE VESSEL, CORONARY Right 12/12/2022    Procedure: Stent, Drug Eluting, Single Vessel, Coronary;  Surgeon: Drew Peralta MD;  Location: [facility] CATH LAB;  Service: Cardiology;  Laterality: Right;  very low bleeding risk 0.9%    YAG LASER  TO CYCLITIC MEMBRANE Left 12/10/2020         Family History    Problem Relation Age of Onset    Diabetes Mother     Heart disease Mother     Heart disease Sister     No Known Problems Brother     Kidney failure Daughter         ESRD on HD    Autism Son     Asthma Son     No Known Problems Sister     No Known Problems Sister     No Known Problems Sister     No Known Problems Brother     Diabetes Brother     Stroke Neg Hx     Amblyopia Neg Hx     Blindness Neg Hx     Cataracts Neg Hx     Glaucoma Neg Hx     Macular degeneration Neg Hx     Retinal detachment Neg Hx     Strabismus Neg Hx      Social History     Socioeconomic History    Marital status:     Number of children: 2   Occupational History    Occupation: Maintenance    Tobacco Use    Smoking status: Former     Packs/day: 2.00     Years: 10.00     Pack years: 20.00     Types: Cigarettes     Start date: 8/15/1987     Quit date: 8/15/1996     Years since quittin.5    Smokeless tobacco: Never   Substance and Sexual Activity    Alcohol use: No     Alcohol/week: 0.0 standard drinks    Drug use: No    Sexual activity: Yes     Partners: Female     Social Determinants of Health     Financial Resource Strain: Low Risk     Difficulty of Paying Living Expenses: Not hard at all   Food Insecurity: No Food Insecurity    Worried About Running Out of Food in the Last Year: Never true    Ran Out of Food in the Last Year: Never true   Transportation Needs: No Transportation Needs    Lack of Transportation (Medical): No    Lack of Transportation (Non-Medical): No   Physical Activity: Sufficiently Active    Days of Exercise per Week: 5 days    Minutes of Exercise per Session: 140 min   Stress: No Stress Concern Present    Feeling of Stress : Not at all   Social Connections: Moderately Isolated    Frequency of Communication with Friends and Family: Three times a week    Frequency of Social Gatherings with Friends and Family: Once a week    Attends Buddhist Services: Never    Active Member of Clubs or Organizations: No    Attends  Club or Organization Meetings: Never    Marital Status:    Housing Stability: Unknown    Unable to Pay for Housing in the Last Year: No    Unstable Housing in the Last Year: No     Review of patient's allergies indicates:   Allergen Reactions    Percocet [oxycodone-acetaminophen] Itching     Ned Oliveira had no medications administered during this visit.    Review of Systems   Constitutional:  Negative for appetite change, chills and fever.   HENT: Negative.     Respiratory:  Negative for cough, chest tightness and shortness of breath.    Cardiovascular:  Negative for chest pain, palpitations and leg swelling.   Gastrointestinal:  Negative for abdominal distention, abdominal pain, blood in stool, constipation, diarrhea, nausea and vomiting.   Endocrine: Negative.    Genitourinary:  Negative for difficulty urinating, dysuria, frequency and hematuria.   Musculoskeletal: Negative.    Integumentary:  Negative.   Neurological: Negative.    Psychiatric/Behavioral: Negative.         Objective:      Vitals:    02/16/23 0912   BP: 108/72   Pulse: 65   Resp: 18   Temp: 97.5 °F (36.4 °C)      Physical Exam  Vitals reviewed.   Constitutional:       General: He is not in acute distress.     Appearance: Normal appearance.   HENT:      Head: Normocephalic and atraumatic.      Comments: Facial features are symmetric      Nose: Nose normal. No congestion or rhinorrhea.      Mouth/Throat:      Mouth: Mucous membranes are moist.      Pharynx: Oropharynx is clear. No oropharyngeal exudate or posterior oropharyngeal erythema.   Eyes:      General: No scleral icterus.     Extraocular Movements: Extraocular movements intact.      Conjunctiva/sclera: Conjunctivae normal.   Cardiovascular:      Rate and Rhythm: Normal rate and regular rhythm.      Pulses: Normal pulses.      Heart sounds: Normal heart sounds.   Pulmonary:      Effort: Pulmonary effort is normal. No respiratory distress.      Breath sounds: Normal breath sounds.    Musculoskeletal:         General: No deformity or signs of injury. Normal range of motion.      Cervical back: Normal range of motion.      Comments: Gait normal    Skin:     General: Skin is warm and dry.      Findings: No rash.   Neurological:      General: No focal deficit present.      Mental Status: He is alert and oriented to person, place, and time. Mental status is at baseline.   Psychiatric:         Mood and Affect: Mood normal.         Behavior: Behavior normal.         Thought Content: Thought content normal.     Current Outpatient Medications on File Prior to Visit   Medication Sig Dispense Refill    amoxicillin (AMOXIL) 500 MG capsule Take 2 capsules (1,000 mg total) by mouth every 12 (twelve) hours. for 14 days 56 capsule 0    aspirin (ECOTRIN) 81 MG EC tablet Take 1 tablet (81 mg total) by mouth once daily. 365 tablet 0    atorvastatin (LIPITOR) 80 MG tablet Take 1 tablet (80 mg total) by mouth once daily. 90 tablet 3    clarithromycin (BIAXIN) 500 MG tablet Take 1 tablet (500 mg total) by mouth 2 (two) times daily. for 14 days 28 tablet 0    clopidogreL (PLAVIX) 75 mg tablet Take 1 tablet (75 mg total) by mouth once daily. 90 tablet 3    diclofenac sodium (VOLTAREN) 1 % Gel Apply 2 g topically 3 (three) times daily. 50 g 2    dorzolamide-timolol 2-0.5% (COSOPT) 22.3-6.8 mg/mL ophthalmic solution Place 1 drop into the right eye 2 (two) times daily. 20 mL 3    finasteride (PROSCAR) 5 mg tablet Take 5 mg by mouth once daily.      insulin (LANTUS SOLOSTAR U-100 INSULIN) glargine 100 units/mL SubQ pen Inject 20 Units into the skin every evening. 18 mL 3    lisinopriL-hydrochlorothiazide (PRINZIDE,ZESTORETIC) 10-12.5 mg per tablet Take 1 tablet by mouth once daily. 90 tablet 3    metFORMIN (GLUCOPHAGE) 1000 MG tablet Take 1 tablet (1,000 mg total) by mouth 2 (two) times daily with meals. 180 tablet 3    metoprolol succinate (TOPROL-XL) 25 MG 24 hr tablet Take 0.5 tablets (12.5 mg total) by mouth once  "daily. 45 tablet 3    nitroGLYCERIN (NITROSTAT) 0.4 MG SL tablet Place 1 tablet (0.4 mg total) under the tongue every 5 (five) minutes as needed for Chest pain (ONLY IF HAVE CHEST PAIN,). 30 tablet 0    omeprazole (PRILOSEC) 40 MG capsule Take 1 capsule (40 mg total) by mouth 2 (two) times daily before meals. for 14 days 28 capsule 0    OZURDEX 0.7 mg Impl intravitreal implant       pen needle, diabetic (BD ULTRA-FINE ORIG PEN NEEDLE) 29 gauge x 1/2" Ndle 20 Units by Misc.(Non-Drug; Combo Route) route once daily. 100 each 6    tamsulosin (FLOMAX) 0.4 mg Cap Take 1 capsule (0.4 mg total) by mouth every evening. 90 capsule 3     No current facility-administered medications on file prior to visit.         Assessment:       1. Establishing care with new doctor, encounter for    2. Type 2 diabetes mellitus with both eyes affected by mild nonproliferative retinopathy and macular edema, with long-term current use of insulin    3. Coronary artery disease of native artery of native heart with stable angina pectoris    4. Aortic atherosclerosis        Plan:       Establishing care with new doctor, encounter for   - primary care assumed    Type 2 diabetes mellitus with both eyes affected by mild nonproliferative retinopathy and macular edema, with long-term current use of insulin  Coronary artery disease of native artery of native heart with stable angina pectoris  Aortic atherosclerosis  -     Ambulatory referral/consult to Endocrinology; Future; Expected date: 02/23/2023  -     Hemoglobin A1C; Future; Expected date: 02/16/2023  -     Microalbumin/Creatinine Ratio, Urine; Future; Expected date: 02/16/2023   - requires more aggressive diabetic control due to both vascular history and most recent A1c; would prefer addition of SGLT 2 given vascular history, but he as failed 2 prior trials.  GLP1'sdo not seem financially viable.  Will initiate most cost effective DDP 4 at lowest dose and reassess A1c in 3 months   - is averse to " checking blood sugar; will facilitate establishment with endocrinology for both assistance with blood sugar control and acquisition of continuous glucose monitor if deemed appropriate; recommendations and interventions appreciated    Return to clinic in 3 months     Other orders  -     SITagliptin phosphate (JANUVIA) 25 MG Tab; Take 1 tablet (25 mg total) by mouth once daily.  Dispense: 90 tablet; Refill: 1  -     blood-glucose meter kit; Use as instructed  Dispense: 1 each; Refill: 0  -     blood sugar diagnostic Strp; 1 strip by Misc.(Non-Drug; Combo Route) route 2 (two) times daily as needed (hypoglycemia).  Dispense: 100 strip; Refill: 11

## 2023-02-15 NOTE — PROGRESS NOTES
Diabetes Care Specialist Progress Note  Author: Norma Garcia RD, CDE  Date: 2/15/2023    Program Intake  Reason for Diabetes Program Visit:: Initial Diabetes Assessment  Current diabetes risk level:: moderate  In the last 12 months, have you:: been admitted to a hospital  Was the ER or hospital admission related to diabetes?: Yes  Permission to speak with others about care:: yes (Spouse present at visit and will interpret visit for patient today - declined over the phone )    Lab Results   Component Value Date    HGBA1C 8.8 (H) 01/19/2023       Clinical      Problem Review  Reviewed Problem List with Patient: yes  Active comorbidities affecting diabetes self-care.: yes  Comorbidities: Hypertension, Cardiovascular Disease, Retinopathy  Reviewed health maintenance: yes    Clinical Assessment  Current Diabetes Treatment: Oral Medication, Insulin  Have you ever experienced hypoglycemia (low blood sugar)?: no  Have you ever experienced hyperglycemia (high blood sugar)?: yes  Are you able to tell when your blood sugar is high?: Yes  What are your symptoms?: fatigue, frequent urination, thirst  Have you ever been hospitalized because your blood sugar was high?: yes (see comments)    Medication Information  How do you obtain your medications?: Patient drives  How many days a week do you miss your medications?: Never  Do you sometimes have difficulty refilling your medications?: No  Medication adherence impacting ability to self-manage diabetes?: No    Labs  Do you have regular lab work to monitor your medications?: Yes  Type of Regular Lab Work: A1c    Nutritional Status  Diet: Regular (3 meals; does not usually snack; drinks water, little lemonade mixed with water, coffee w/ splenda, some milk, juice rare, coke zero)  Change in appetite?: No  Dentation:: Intact  Recent Changes in Weight: No Recent Weight Change  Current nutritional status an area of need that is impacting patient's ability to self-manage  diabetes?: No    Additional Social History    Support  Does anyone support you with your diabetes care?: yes  Who supports you?: spouse, self  Who takes you to your medical appointments?: self, spouse  Does the current support meet the patient's needs?: Yes  Is Support an area impacting ability to self-manage diabetes?: No         Cognitive/Behavioral Health  Alert and Oriented: Yes  Difficulty Thinking: No  Requires Prompting: No  Requires assistance for routine expression?: No  Cognitive or behavioral barriers impacting ability to self-manage diabetes?: No    Culture/Anglican  Culture or Holiness beliefs that may impact ability to access healthcare: No    Communication  Language preference: Portuguese  Hearing Problems: No  Vision Problems: No  Communication needs impacting ability to self-manage diabetes?: No    Health Literacy  Preferred Learning Method: Face to Face  How often do you need to have someone help you read instructions, pamphlets, or written material from your doctor or pharmacy?: Never  Health literacy needs impacting ability to self-manage diabetes?: No      Diabetes Self-Management Skills Assessment    Diabetes Disease Process/Treatment Options  Patient/caregiver able to state what happens when someone has diabetes.: no  Patient/caregiver knows what type of diabetes they have.: yes  Diabetes Type : Type II  Patient/caregiver able to identify at least three signs and symptoms of diabetes.: yes  Identified signs and symptoms:: fatigue, frequent urination, increased thirst  Patient able to identify at least three risk factors for diabetes.: no  Diabetes Disease Process/Treatment Options: Skills Assessment Completed: Yes  Assessment indicates:: Instruction Needed  Area of need?: Yes    Nutrition/Healthy Eating  Method of carbohydrate measurement:: no method  Patient can identify foods that impact blood sugar.: yes  Patient-identified foods:: soda, sweets  Nutrition/Healthy Eating Skills Assessment  Completed:: Yes  Assessment indicates:: Instruction Needed  Area of need?: Yes    Physical Activity/Exercise  Patient's daily activity level:: moderately active (Reports active with job)  Patient formally exercises outside of work.: no  Patient can identify forms of physical activity.: yes  Stated forms of physical activity:: any movement performed by muscles that uses energy, manual activity at work  Patient can identify reasons why exercise/physical activity is important in diabetes management.: no  Physical Activity/Exercise Skills Assessment Completed: : Yes  Assessment indicates:: Instruction Needed  Area of need?: Yes    Medications  Patient is able to describe current diabetes management routine.: yes  Diabetes management routine:: oral medications, insulin  Patient is able to identify current diabetes medications, dosages, and appropriate timing of medications.: yes  Patient understands the purpose of the medications taken for diabetes.: no  Patient reports problems or concerns with current medication regimen.: no  Medication Skills Assessment Completed:: Yes  Assessment indicates:: Instruction Needed  Area of need?: Yes    Home Blood Glucose Monitoring  Patient states that blood sugar is checked at home daily.: no  Reasons for not monitoring:: device lost or stolen, meter broken  Home Blood Glucose Monitoring Skills Assessment Completed: : Yes  Assessment indicates:: Instruction Needed  Area of need?: Yes    Acute Complications  Patient is able to identify types of acute complications: No  Acute Complications Skills Assessment Completed: : Yes  Assessment indicates:: Instruction Needed  Area of need?: Yes    Chronic Complications  Patient can identify major chronic complications of diabetes.: yes  Stated chronic complications:: heart disease/heart attack, kidney disease, retinopathy  Patient can identify ways to prevent or delay diabetes complications.: yes  Stated ways to prevent complications::  controlling blood sugar  Patient is taking statin?: Yes  Chronic Complications Skills Assessment Completed: : Yes  Assessment indicates:: Instruction Needed  Area of need?: Yes    Psychosocial/Coping  Patient can identify ways of coping with chronic disease.: yes  Patient-stated ways of coping with chronic disease:: support from loved ones  Psychosocial/Coping Skills Assessment Completed: : Yes  Assessment indicates:: Adequate understanding  Area of need?: No    Assessment Summary and Plan    Based on today's diabetes care assessment, the following areas of need were identified:      Social 2/15/2023   Support No   Cognitive/Behavioral Health No   Culture/Bahai No   Communication No   Health Literacy No        Clinical 2/15/2023   Medication Adherence No   Nutritional Status No        Diabetes Self-Management Skills 2/15/2023   Diabetes Disease Process/Treatment Options Yes - reviewed diabetes disease process and treatment options   Nutrition/Healthy Eating Yes - reviewed CHO counting, label reading and addt'l resources to assist w/ CHO counting; plate method reviewed; alternatives to sugary beverages discussed   Physical Activity/Exercise Yes - reviewed goals and benefits   Medication Yes - reviewed MOA of medication and regimen   Home Blood Glucose Monitoring Yes - reviewed SMBG schedule and BG goals; Rx request sent to provider for meter/supplies   Acute Complications Yes - reviewed s/s and treatment of hypo/hyperglycemia   Chronic Complications Yes - reviewed standards of care   Psychosocial/Coping No      Today's interventions were provided through individual discussion, instruction, and written materials were provided.      Patient verbalized understanding of instruction and written materials.  Pt was able to return back demonstration of instructions today. Patient understood key points, needs reinforcement and further instruction.     Diabetes Self-Management Care Plan:    Today's Diabetes Self-Management  Care Plan was developed with Ned's input. Ned has agreed to work toward the following goal(s) to improve his/her overall diabetes control.      Care Plan: Diabetes Management   Updates made since 1/16/2023 12:00 AM        Problem: Medications         Long-Range Goal: Patient Agrees to take Diabetes Medication(s) as prescribed.    Start Date: 2/15/2023   Expected End Date: 8/15/2023   Priority: High   Barriers: No Barriers Identified        Task: Reviewed with patient all current diabetes medications and provided basic review of the purpose, dosage, frequency, side effects, and storage of both oral and injectable diabetes medications. Completed 2/15/2023       Follow Up Plan     Follow up in about 6 months (around 8/15/2023).    Today's care plan and follow up schedule was discussed with patient.  Ned verbalized understanding of the care plan, goals, and agrees to follow up plan.        The patient was encouraged to communicate with his/her health care provider/physician and care team regarding his/her condition(s) and treatment.  I provided the patient with my contact information today and encouraged to contact me via phone or [facility] Portal as needed.     Length of Visit   Total Time: 60 Minutes

## 2023-02-16 ENCOUNTER — OFFICE VISIT (OUTPATIENT)
Dept: INTERNAL MEDICINE | Facility: CLINIC | Age: 73
End: 2023-02-16
Payer: MEDICARE

## 2023-02-16 VITALS
DIASTOLIC BLOOD PRESSURE: 72 MMHG | HEART RATE: 65 BPM | TEMPERATURE: 98 F | OXYGEN SATURATION: 98 % | WEIGHT: 141.13 LBS | HEIGHT: 64 IN | SYSTOLIC BLOOD PRESSURE: 108 MMHG | RESPIRATION RATE: 18 BRPM | BODY MASS INDEX: 24.1 KG/M2

## 2023-02-16 DIAGNOSIS — I70.0 AORTIC ATHEROSCLEROSIS: ICD-10-CM

## 2023-02-16 DIAGNOSIS — Z79.4 TYPE 2 DIABETES MELLITUS WITH BOTH EYES AFFECTED BY MILD NONPROLIFERATIVE RETINOPATHY AND MACULAR EDEMA, WITH LONG-TERM CURRENT USE OF INSULIN: ICD-10-CM

## 2023-02-16 DIAGNOSIS — E11.3213 TYPE 2 DIABETES MELLITUS WITH BOTH EYES AFFECTED BY MILD NONPROLIFERATIVE RETINOPATHY AND MACULAR EDEMA, WITH LONG-TERM CURRENT USE OF INSULIN: ICD-10-CM

## 2023-02-16 DIAGNOSIS — Z76.89 ESTABLISHING CARE WITH NEW DOCTOR, ENCOUNTER FOR: Primary | ICD-10-CM

## 2023-02-16 DIAGNOSIS — I25.118 CORONARY ARTERY DISEASE OF NATIVE ARTERY OF NATIVE HEART WITH STABLE ANGINA PECTORIS: ICD-10-CM

## 2023-02-16 PROCEDURE — 3008F PR BODY MASS INDEX (BMI) DOCUMENTED: ICD-10-PCS | Mod: CPTII,S$GLB,, | Performed by: STUDENT IN AN ORGANIZED HEALTH CARE EDUCATION/TRAINING PROGRAM

## 2023-02-16 PROCEDURE — 3052F PR MOST RECENT HEMOGLOBIN A1C LEVEL 8.0 - < 9.0%: ICD-10-PCS | Mod: CPTII,S$GLB,, | Performed by: STUDENT IN AN ORGANIZED HEALTH CARE EDUCATION/TRAINING PROGRAM

## 2023-02-16 PROCEDURE — 99214 OFFICE O/P EST MOD 30 MIN: CPT | Mod: S$GLB,,, | Performed by: STUDENT IN AN ORGANIZED HEALTH CARE EDUCATION/TRAINING PROGRAM

## 2023-02-16 PROCEDURE — 1159F MED LIST DOCD IN RCRD: CPT | Mod: CPTII,S$GLB,, | Performed by: STUDENT IN AN ORGANIZED HEALTH CARE EDUCATION/TRAINING PROGRAM

## 2023-02-16 PROCEDURE — G0008 FLU VACCINE - QUADRIVALENT - ADJUVANTED: ICD-10-PCS | Mod: S$GLB,,, | Performed by: STUDENT IN AN ORGANIZED HEALTH CARE EDUCATION/TRAINING PROGRAM

## 2023-02-16 PROCEDURE — 4010F PR ACE/ARB THEARPY RXD/TAKEN: ICD-10-PCS | Mod: CPTII,S$GLB,, | Performed by: STUDENT IN AN ORGANIZED HEALTH CARE EDUCATION/TRAINING PROGRAM

## 2023-02-16 PROCEDURE — 90694 VACC AIIV4 NO PRSRV 0.5ML IM: CPT | Mod: S$GLB,,, | Performed by: STUDENT IN AN ORGANIZED HEALTH CARE EDUCATION/TRAINING PROGRAM

## 2023-02-16 PROCEDURE — 3078F DIAST BP <80 MM HG: CPT | Mod: CPTII,S$GLB,, | Performed by: STUDENT IN AN ORGANIZED HEALTH CARE EDUCATION/TRAINING PROGRAM

## 2023-02-16 PROCEDURE — 1101F PT FALLS ASSESS-DOCD LE1/YR: CPT | Mod: CPTII,S$GLB,, | Performed by: STUDENT IN AN ORGANIZED HEALTH CARE EDUCATION/TRAINING PROGRAM

## 2023-02-16 PROCEDURE — 1101F PR PT FALLS ASSESS DOC 0-1 FALLS W/OUT INJ PAST YR: ICD-10-PCS | Mod: CPTII,S$GLB,, | Performed by: STUDENT IN AN ORGANIZED HEALTH CARE EDUCATION/TRAINING PROGRAM

## 2023-02-16 PROCEDURE — 3074F SYST BP LT 130 MM HG: CPT | Mod: CPTII,S$GLB,, | Performed by: STUDENT IN AN ORGANIZED HEALTH CARE EDUCATION/TRAINING PROGRAM

## 2023-02-16 PROCEDURE — 3072F LOW RISK FOR RETINOPATHY: CPT | Mod: CPTII,S$GLB,, | Performed by: STUDENT IN AN ORGANIZED HEALTH CARE EDUCATION/TRAINING PROGRAM

## 2023-02-16 PROCEDURE — 1126F PR PAIN SEVERITY QUANTIFIED, NO PAIN PRESENT: ICD-10-PCS | Mod: CPTII,S$GLB,, | Performed by: STUDENT IN AN ORGANIZED HEALTH CARE EDUCATION/TRAINING PROGRAM

## 2023-02-16 PROCEDURE — 99999 PR PBB SHADOW E&M-EST. PATIENT-LVL IV: ICD-10-PCS | Mod: PBBFAC,,, | Performed by: STUDENT IN AN ORGANIZED HEALTH CARE EDUCATION/TRAINING PROGRAM

## 2023-02-16 PROCEDURE — 3078F PR MOST RECENT DIASTOLIC BLOOD PRESSURE < 80 MM HG: ICD-10-PCS | Mod: CPTII,S$GLB,, | Performed by: STUDENT IN AN ORGANIZED HEALTH CARE EDUCATION/TRAINING PROGRAM

## 2023-02-16 PROCEDURE — 3288F PR FALLS RISK ASSESSMENT DOCUMENTED: ICD-10-PCS | Mod: CPTII,S$GLB,, | Performed by: STUDENT IN AN ORGANIZED HEALTH CARE EDUCATION/TRAINING PROGRAM

## 2023-02-16 PROCEDURE — 1157F PR ADVANCE CARE PLAN OR EQUIV PRESENT IN MEDICAL RECORD: ICD-10-PCS | Mod: CPTII,S$GLB,, | Performed by: STUDENT IN AN ORGANIZED HEALTH CARE EDUCATION/TRAINING PROGRAM

## 2023-02-16 PROCEDURE — 90694 FLU VACCINE - QUADRIVALENT - ADJUVANTED: ICD-10-PCS | Mod: S$GLB,,, | Performed by: STUDENT IN AN ORGANIZED HEALTH CARE EDUCATION/TRAINING PROGRAM

## 2023-02-16 PROCEDURE — G0008 ADMIN INFLUENZA VIRUS VAC: HCPCS | Mod: S$GLB,,, | Performed by: STUDENT IN AN ORGANIZED HEALTH CARE EDUCATION/TRAINING PROGRAM

## 2023-02-16 PROCEDURE — 1126F AMNT PAIN NOTED NONE PRSNT: CPT | Mod: CPTII,S$GLB,, | Performed by: STUDENT IN AN ORGANIZED HEALTH CARE EDUCATION/TRAINING PROGRAM

## 2023-02-16 PROCEDURE — 4010F ACE/ARB THERAPY RXD/TAKEN: CPT | Mod: CPTII,S$GLB,, | Performed by: STUDENT IN AN ORGANIZED HEALTH CARE EDUCATION/TRAINING PROGRAM

## 2023-02-16 PROCEDURE — 3072F PR LOW RISK FOR RETINOPATHY: ICD-10-PCS | Mod: CPTII,S$GLB,, | Performed by: STUDENT IN AN ORGANIZED HEALTH CARE EDUCATION/TRAINING PROGRAM

## 2023-02-16 PROCEDURE — 99214 PR OFFICE/OUTPT VISIT, EST, LEVL IV, 30-39 MIN: ICD-10-PCS | Mod: S$GLB,,, | Performed by: STUDENT IN AN ORGANIZED HEALTH CARE EDUCATION/TRAINING PROGRAM

## 2023-02-16 PROCEDURE — 3008F BODY MASS INDEX DOCD: CPT | Mod: CPTII,S$GLB,, | Performed by: STUDENT IN AN ORGANIZED HEALTH CARE EDUCATION/TRAINING PROGRAM

## 2023-02-16 PROCEDURE — 3288F FALL RISK ASSESSMENT DOCD: CPT | Mod: CPTII,S$GLB,, | Performed by: STUDENT IN AN ORGANIZED HEALTH CARE EDUCATION/TRAINING PROGRAM

## 2023-02-16 PROCEDURE — 3052F HG A1C>EQUAL 8.0%<EQUAL 9.0%: CPT | Mod: CPTII,S$GLB,, | Performed by: STUDENT IN AN ORGANIZED HEALTH CARE EDUCATION/TRAINING PROGRAM

## 2023-02-16 PROCEDURE — 1159F PR MEDICATION LIST DOCUMENTED IN MEDICAL RECORD: ICD-10-PCS | Mod: CPTII,S$GLB,, | Performed by: STUDENT IN AN ORGANIZED HEALTH CARE EDUCATION/TRAINING PROGRAM

## 2023-02-16 PROCEDURE — 1157F ADVNC CARE PLAN IN RCRD: CPT | Mod: CPTII,S$GLB,, | Performed by: STUDENT IN AN ORGANIZED HEALTH CARE EDUCATION/TRAINING PROGRAM

## 2023-02-16 PROCEDURE — 99999 PR PBB SHADOW E&M-EST. PATIENT-LVL IV: CPT | Mod: PBBFAC,,, | Performed by: STUDENT IN AN ORGANIZED HEALTH CARE EDUCATION/TRAINING PROGRAM

## 2023-02-16 PROCEDURE — 3074F PR MOST RECENT SYSTOLIC BLOOD PRESSURE < 130 MM HG: ICD-10-PCS | Mod: CPTII,S$GLB,, | Performed by: STUDENT IN AN ORGANIZED HEALTH CARE EDUCATION/TRAINING PROGRAM

## 2023-02-16 RX ORDER — INSULIN PUMP SYRINGE, 3 ML
EACH MISCELLANEOUS
Qty: 1 EACH | Refills: 0 | Status: SHIPPED | OUTPATIENT
Start: 2023-02-16 | End: 2024-02-16

## 2023-02-27 ENCOUNTER — PATIENT OUTREACH (OUTPATIENT)
Dept: ADMINISTRATIVE | Facility: HOSPITAL | Age: 73
End: 2023-02-27
Payer: MEDICARE

## 2023-03-01 DIAGNOSIS — Z79.4 TYPE 2 DIABETES MELLITUS WITH HYPERGLYCEMIA, WITH LONG-TERM CURRENT USE OF INSULIN: ICD-10-CM

## 2023-03-01 DIAGNOSIS — E11.65 TYPE 2 DIABETES MELLITUS WITH HYPERGLYCEMIA, WITH LONG-TERM CURRENT USE OF INSULIN: ICD-10-CM

## 2023-03-01 RX ORDER — INSULIN GLARGINE 100 [IU]/ML
INJECTION, SOLUTION SUBCUTANEOUS
Qty: 30 ML | Refills: 1 | Status: SHIPPED | OUTPATIENT
Start: 2023-03-01 | End: 2023-05-17

## 2023-03-01 NOTE — TELEPHONE ENCOUNTER
No new care gaps identified.  [facility] Embedded Care Gaps. Reference number: [id number]. 3/01/2023   1:26:30 PM CST

## 2023-03-01 NOTE — TELEPHONE ENCOUNTER
Refill Routing Note   Medication(s) are not appropriate for processing by [facility] for the following reason(s):       No active prescription written by PCP    [facility] action(s):  Defer         Appointments  past 12m or future 3m with PCP    Date Provider   Last Visit   2/16/2023 Christian Chatterjee MD   Next Visit   5/17/2023 Christian Chatterjee MD   ED visits in past 90 days: 0        Note composed:2:04 PM 03/01/2023

## 2023-03-23 ENCOUNTER — PES CALL (OUTPATIENT)
Dept: ADMINISTRATIVE | Facility: CLINIC | Age: 73
End: 2023-03-23
Payer: MEDICARE

## 2023-03-29 ENCOUNTER — TELEPHONE (OUTPATIENT)
Dept: INTERNAL MEDICINE | Facility: CLINIC | Age: 73
End: 2023-03-29
Payer: MEDICARE

## 2023-03-29 NOTE — TELEPHONE ENCOUNTER
He's on Atorvastatin 80. [employer] is horrible. Please ask them to call the Pt or the provider (his cardiology PA) who prescribed it (name on the prescription that I assume they can see).     Thank you for your time.

## 2023-03-29 NOTE — TELEPHONE ENCOUNTER
----- Message from Rosenda Lang MA sent at 3/29/2023  3:49 PM CDT -----  Regarding: Statin  This patient is showing on [employer]'s Non-Compliant Statin list as needing a statin ordered. Can you please review & see if a prescription is needed?

## 2023-03-29 NOTE — TELEPHONE ENCOUNTER
Insurance company wants you to review this pt for a statin, claiming is on a non compliant list and they need an answer.

## 2023-04-03 ENCOUNTER — PATIENT MESSAGE (OUTPATIENT)
Dept: ADMINISTRATIVE | Facility: HOSPITAL | Age: 73
End: 2023-04-03
Payer: MEDICARE

## 2023-04-12 ENCOUNTER — OFFICE VISIT (OUTPATIENT)
Dept: FAMILY MEDICINE | Facility: CLINIC | Age: 73
End: 2023-04-12
Payer: MEDICARE

## 2023-04-12 ENCOUNTER — HOSPITAL ENCOUNTER (OUTPATIENT)
Dept: RADIOLOGY | Facility: HOSPITAL | Age: 73
Discharge: HOME | End: 2023-04-12
Attending: NURSE PRACTITIONER
Payer: MEDICARE

## 2023-04-12 VITALS
WEIGHT: 144.38 LBS | HEART RATE: 58 BPM | HEIGHT: 64 IN | OXYGEN SATURATION: 98 % | DIASTOLIC BLOOD PRESSURE: 60 MMHG | TEMPERATURE: 98 F | SYSTOLIC BLOOD PRESSURE: 92 MMHG | BODY MASS INDEX: 24.65 KG/M2

## 2023-04-12 DIAGNOSIS — R06.89 DECREASED LUNG SOUNDS: ICD-10-CM

## 2023-04-12 DIAGNOSIS — E11.65 TYPE 2 DIABETES MELLITUS WITH HYPERGLYCEMIA, WITH LONG-TERM CURRENT USE OF INSULIN: ICD-10-CM

## 2023-04-12 DIAGNOSIS — J06.9 VIRAL UPPER RESPIRATORY ILLNESS: Primary | ICD-10-CM

## 2023-04-12 DIAGNOSIS — Z79.4 TYPE 2 DIABETES MELLITUS WITH HYPERGLYCEMIA, WITH LONG-TERM CURRENT USE OF INSULIN: ICD-10-CM

## 2023-04-12 DIAGNOSIS — R05.9 COUGH, UNSPECIFIED TYPE: ICD-10-CM

## 2023-04-12 LAB
CTP QC/QA: YES
SARS-COV-2 RDRP RESP QL NAA+PROBE: NEGATIVE

## 2023-04-12 PROCEDURE — 1125F AMNT PAIN NOTED PAIN PRSNT: CPT | Mod: CPTII,S$GLB,, | Performed by: NURSE PRACTITIONER

## 2023-04-12 PROCEDURE — 71046 X-RAY EXAM CHEST 2 VIEWS: CPT | Mod: 26,,, | Performed by: RADIOLOGY

## 2023-04-12 PROCEDURE — 3078F PR MOST RECENT DIASTOLIC BLOOD PRESSURE < 80 MM HG: ICD-10-PCS | Mod: CPTII,S$GLB,, | Performed by: NURSE PRACTITIONER

## 2023-04-12 PROCEDURE — 3078F DIAST BP <80 MM HG: CPT | Mod: CPTII,S$GLB,, | Performed by: NURSE PRACTITIONER

## 2023-04-12 PROCEDURE — 71046 XR CHEST PA AND LATERAL: ICD-10-PCS | Mod: 26,,, | Performed by: RADIOLOGY

## 2023-04-12 PROCEDURE — 1125F PR PAIN SEVERITY QUANTIFIED, PAIN PRESENT: ICD-10-PCS | Mod: CPTII,S$GLB,, | Performed by: NURSE PRACTITIONER

## 2023-04-12 PROCEDURE — 1157F ADVNC CARE PLAN IN RCRD: CPT | Mod: CPTII,S$GLB,, | Performed by: NURSE PRACTITIONER

## 2023-04-12 PROCEDURE — 3052F HG A1C>EQUAL 8.0%<EQUAL 9.0%: CPT | Mod: CPTII,S$GLB,, | Performed by: NURSE PRACTITIONER

## 2023-04-12 PROCEDURE — 3008F BODY MASS INDEX DOCD: CPT | Mod: CPTII,S$GLB,, | Performed by: NURSE PRACTITIONER

## 2023-04-12 PROCEDURE — 3288F FALL RISK ASSESSMENT DOCD: CPT | Mod: CPTII,S$GLB,, | Performed by: NURSE PRACTITIONER

## 2023-04-12 PROCEDURE — 1157F PR ADVANCE CARE PLAN OR EQUIV PRESENT IN MEDICAL RECORD: ICD-10-PCS | Mod: CPTII,S$GLB,, | Performed by: NURSE PRACTITIONER

## 2023-04-12 PROCEDURE — 3072F LOW RISK FOR RETINOPATHY: CPT | Mod: CPTII,S$GLB,, | Performed by: NURSE PRACTITIONER

## 2023-04-12 PROCEDURE — 1101F PR PT FALLS ASSESS DOC 0-1 FALLS W/OUT INJ PAST YR: ICD-10-PCS | Mod: CPTII,S$GLB,, | Performed by: NURSE PRACTITIONER

## 2023-04-12 PROCEDURE — 1101F PT FALLS ASSESS-DOCD LE1/YR: CPT | Mod: CPTII,S$GLB,, | Performed by: NURSE PRACTITIONER

## 2023-04-12 PROCEDURE — 3052F PR MOST RECENT HEMOGLOBIN A1C LEVEL 8.0 - < 9.0%: ICD-10-PCS | Mod: CPTII,S$GLB,, | Performed by: NURSE PRACTITIONER

## 2023-04-12 PROCEDURE — 4010F PR ACE/ARB THEARPY RXD/TAKEN: ICD-10-PCS | Mod: CPTII,S$GLB,, | Performed by: NURSE PRACTITIONER

## 2023-04-12 PROCEDURE — 71046 X-RAY EXAM CHEST 2 VIEWS: CPT | Mod: TC,FY,PO

## 2023-04-12 PROCEDURE — 99999 PR PBB SHADOW E&M-EST. PATIENT-LVL V: CPT | Mod: PBBFAC,,, | Performed by: NURSE PRACTITIONER

## 2023-04-12 PROCEDURE — 1159F MED LIST DOCD IN RCRD: CPT | Mod: CPTII,S$GLB,, | Performed by: NURSE PRACTITIONER

## 2023-04-12 PROCEDURE — 87635: ICD-10-PCS | Mod: QW,S$GLB,, | Performed by: NURSE PRACTITIONER

## 2023-04-12 PROCEDURE — 3288F PR FALLS RISK ASSESSMENT DOCUMENTED: ICD-10-PCS | Mod: CPTII,S$GLB,, | Performed by: NURSE PRACTITIONER

## 2023-04-12 PROCEDURE — 3074F PR MOST RECENT SYSTOLIC BLOOD PRESSURE < 130 MM HG: ICD-10-PCS | Mod: CPTII,S$GLB,, | Performed by: NURSE PRACTITIONER

## 2023-04-12 PROCEDURE — 3008F PR BODY MASS INDEX (BMI) DOCUMENTED: ICD-10-PCS | Mod: CPTII,S$GLB,, | Performed by: NURSE PRACTITIONER

## 2023-04-12 PROCEDURE — 1160F PR REVIEW ALL MEDS BY PRESCRIBER/CLIN PHARMACIST DOCUMENTED: ICD-10-PCS | Mod: CPTII,S$GLB,, | Performed by: NURSE PRACTITIONER

## 2023-04-12 PROCEDURE — 99214 PR OFFICE/OUTPT VISIT, EST, LEVL IV, 30-39 MIN: ICD-10-PCS | Mod: S$GLB,,, | Performed by: NURSE PRACTITIONER

## 2023-04-12 PROCEDURE — 4010F ACE/ARB THERAPY RXD/TAKEN: CPT | Mod: CPTII,S$GLB,, | Performed by: NURSE PRACTITIONER

## 2023-04-12 PROCEDURE — 1160F RVW MEDS BY RX/DR IN RCRD: CPT | Mod: CPTII,S$GLB,, | Performed by: NURSE PRACTITIONER

## 2023-04-12 PROCEDURE — 1159F PR MEDICATION LIST DOCUMENTED IN MEDICAL RECORD: ICD-10-PCS | Mod: CPTII,S$GLB,, | Performed by: NURSE PRACTITIONER

## 2023-04-12 PROCEDURE — 3072F PR LOW RISK FOR RETINOPATHY: ICD-10-PCS | Mod: CPTII,S$GLB,, | Performed by: NURSE PRACTITIONER

## 2023-04-12 PROCEDURE — 99214 OFFICE O/P EST MOD 30 MIN: CPT | Mod: S$GLB,,, | Performed by: NURSE PRACTITIONER

## 2023-04-12 PROCEDURE — 99999 PR PBB SHADOW E&M-EST. PATIENT-LVL V: ICD-10-PCS | Mod: PBBFAC,,, | Performed by: NURSE PRACTITIONER

## 2023-04-12 PROCEDURE — 3074F SYST BP LT 130 MM HG: CPT | Mod: CPTII,S$GLB,, | Performed by: NURSE PRACTITIONER

## 2023-04-12 PROCEDURE — 87635 SARS-COV-2 COVID-19 AMP PRB: CPT | Mod: QW,S$GLB,, | Performed by: NURSE PRACTITIONER

## 2023-04-12 RX ORDER — METFORMIN HYDROCHLORIDE 1000 MG/1
1000 TABLET ORAL 2 TIMES DAILY WITH MEALS
Qty: 180 TABLET | Refills: 0 | Status: SHIPPED | OUTPATIENT
Start: 2023-04-12 | End: 2024-01-24 | Stop reason: SDUPTHER

## 2023-04-12 RX ORDER — GUAIFENESIN 600 MG/1
600 TABLET, EXTENDED RELEASE ORAL 2 TIMES DAILY
Qty: 20 TABLET | Refills: 0 | Status: SHIPPED | OUTPATIENT
Start: 2023-04-12 | End: 2023-04-22

## 2023-04-12 RX ORDER — ALBUTEROL SULFATE 90 UG/1
2 AEROSOL, METERED RESPIRATORY (INHALATION) EVERY 6 HOURS PRN
Qty: 18 G | Refills: 1 | Status: SHIPPED | OUTPATIENT
Start: 2023-04-12 | End: 2023-07-27

## 2023-04-12 RX ORDER — FLUTICASONE PROPIONATE 50 MCG
1 SPRAY, SUSPENSION (ML) NASAL DAILY
Qty: 16 G | Refills: 1 | Status: SHIPPED | OUTPATIENT
Start: 2023-04-12 | End: 2024-01-24 | Stop reason: SDUPTHER

## 2023-04-12 RX ORDER — LEVOCETIRIZINE DIHYDROCHLORIDE 5 MG/1
5 TABLET, FILM COATED ORAL NIGHTLY
Qty: 30 TABLET | Refills: 11 | Status: SHIPPED | OUTPATIENT
Start: 2023-04-12 | End: 2023-07-27

## 2023-04-12 NOTE — PROGRESS NOTES
Subjective     Patient ID: Ned Oliveira is a 72 y.o. male.    Chief Complaint: Cough, Sore Throat, Sinus Problem, Shortness of Breath, Fatigue, and Nasal Congestion    This is a pleasant 71 yo  male patient of Dr. Chatterjee who is new to me. He presents for same day appt with c/o URI symptoms. PMH includes    Patient Active Problem List:     Essential hypertension     Benign non-nodular prostatic hyperplasia without lower urinary tract symptoms     Adrenal nodule     Coronary artery disease involving native coronary artery     Aortic atherosclerosis     NS (nuclear sclerosis), right     Hyperlipidemia associated with type 2 diabetes mellitus     Type 2 diabetes mellitus with both eyes affected by mild nonproliferative retinopathy and macular edema, with long-term current use of insulin     Primary open angle glaucoma (POAG) of both eyes     Chronic stable angina     Type 2 diabetes mellitus with hyperglycemia, with long-term current use of insulin     Nuclear sclerotic cataract of left eye     Primary open-angle glaucoma, left eye, mild stage     Type 2 diabetes mellitus with circulatory disorder, with long-term current use of insulin     Abdominal aortic aneurysm (AAA) without rupture     Positive cardiac stress test     Bilateral carotid artery stenosis     Abnormal results of pulmonary function studies     Chronic total occlusion of native coronary artery     COVID    VSS today. Reports he has been suffering with URI symptoms as listed below x past week that have not worsened or improved since onset. Denies fever, CP, SOB, dyspnea. Reports occasional wheezing. Tolerating fluids and meals. No recent travel. 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 Has tried using Mucinex and old cough syrup that has offered relief but symptoms recur. Lives with wife who has also developed some URI symptoms.     Requesting refill of metformin at this time.    Review of  Systems   HENT:  Positive for nasal congestion, postnasal drip, rhinorrhea and sore throat.    Respiratory:  Positive for cough (productive) and wheezing (intermittent).    Otherwise negative     Objective     Physical Exam  Vitals reviewed.   Constitutional:       General: He is awake. He is not in acute distress.     Appearance: Normal appearance. He is well-developed, well-groomed and normal weight.   HENT:      Head: Normocephalic and atraumatic.      Right Ear: Tympanic membrane, ear canal and external ear normal.      Left Ear: Tympanic membrane, ear canal and external ear normal.      Nose:      Right Turbinates: Enlarged.      Left Turbinates: Enlarged.      Mouth/Throat:      Lips: Pink.      Mouth: Mucous membranes are moist.      Pharynx: Uvula midline. Posterior oropharyngeal erythema (minimal) present.      Comments: Postnasal drip visualized  Neck:      Vascular: No carotid bruit.   Cardiovascular:      Rate and Rhythm: Normal rate and regular rhythm.      Heart sounds: No murmur heard.  Pulmonary:      Effort: Pulmonary effort is normal. No respiratory distress.      Breath sounds: No wheezing or rhonchi.      Comments: Lung sounds diminished to bases  Lymphadenopathy:      Cervical: No cervical adenopathy.   Skin:     General: Skin is warm and dry.      Coloration: Skin is not pale.   Neurological:      Mental Status: He is alert and oriented to person, place, and time.      Coordination: Coordination normal.      Gait: Gait normal.   Psychiatric:         Attention and Perception: Attention normal.         Mood and Affect: Mood and affect normal.         Speech: Speech normal.         Behavior: Behavior normal. Behavior is cooperative.          Assessment and Plan     Problem List Items Addressed This Visit          Endocrine    Type 2 diabetes mellitus with hyperglycemia, with long-term current use of insulin    Relevant Medications    metFORMIN (GLUCOPHAGE) 1000 MG tablet     Other Visit Diagnoses        Viral upper respiratory illness    -  Primary    Relevant Medications    levocetirizine (XYZAL) 5 MG tablet    fluticasone propionate (FLONASE) 50 mcg/actuation nasal spray    albuterol (VENTOLIN HFA) 90 mcg/actuation inhaler    Decreased lung sounds        Relevant Orders    X-Ray Chest PA And Lateral (Completed)    Cough, unspecified type        Relevant Orders    POCT COVID-19 Rapid Screening (Completed)            Ned was seen today for cough, sore throat, sinus problem, shortness of breath, fatigue and nasal congestion.    Diagnoses and all orders for this visit:    Viral upper respiratory illness  -     levocetirizine (XYZAL) 5 MG tablet; Take 1 tablet (5 mg total) by mouth every evening.  -     fluticasone propionate (FLONASE) 50 mcg/actuation nasal spray; 1 spray (50 mcg total) by Each Nostril route once daily.  -     albuterol (VENTOLIN HFA) 90 mcg/actuation inhaler; Inhale 2 puffs into the lungs every 6 (six) hours as needed for Wheezing or Shortness of Breath. Rescue    Decreased lung sounds  -     X-Ray Chest PA And Lateral; Future    Cough, unspecified type  -     POCT COVID-19 Rapid Screening    Type 2 diabetes mellitus with hyperglycemia, with long-term current use of insulin  -     metFORMIN (GLUCOPHAGE) 1000 MG tablet; Take 1 tablet (1,000 mg total) by mouth 2 (two) times daily with meals.    Other orders  -     guaiFENesin (MUCINEX) 600 mg 12 hr tablet; Take 1 tablet (600 mg total) by mouth 2 (two) times daily. for 10 days        - Rapid COVID-19 testing done today: negative  - CXR ordered for decreased lung sounds  - Mucinex BID x next few days  - Continue with symptomatic relief treatments  - Drink plenty fluids and eat as tolerated  - Hot fluids and humidifier may help  - Warning signs discussed  - Refilled metformin per patient request  - RTC as needed           I spent a total of 30 minutes on the day of the visit.  This includes face to face time and non-face to face time preparing to  see the patient (eg, review of tests), obtaining and/or reviewing separately obtained history, documenting clinical information in the electronic or other health record, independently interpreting results and communicating results to the patient/family/caregiver, or care coordinator.

## 2023-04-26 ENCOUNTER — PATIENT OUTREACH (OUTPATIENT)
Dept: ADMINISTRATIVE | Facility: HOSPITAL | Age: 73
End: 2023-04-26
Payer: MEDICARE

## 2023-04-26 ENCOUNTER — PATIENT MESSAGE (OUTPATIENT)
Dept: ADMINISTRATIVE | Facility: HOSPITAL | Age: 73
End: 2023-04-26
Payer: MEDICARE

## 2023-04-26 NOTE — PROGRESS NOTES
2023 Care Everywhere updates requested and reviewed.  Immunizations reconciled. Media reports reviewed.  Duplicate HM overrides and  orders removed.  Overdue HM topic chart audit and/or requested.  Overdue lab testing linked to upcoming lab appointments if applies.  Lab shani, and [facility] reviewed     Portal outreached regarding Health maintenance     Health Maintenance Due   Topic Date Due    COVID-19 Vaccine (4 - Booster for Pfizer series) 2022    Hemoglobin A1c  2023    Eye Exam  2023

## 2023-04-27 ENCOUNTER — IMMUNIZATION (OUTPATIENT)
Dept: INTERNAL MEDICINE | Facility: CLINIC | Age: 73
End: 2023-04-27
Payer: MEDICARE

## 2023-04-27 ENCOUNTER — OFFICE VISIT (OUTPATIENT)
Dept: INTERNAL MEDICINE | Facility: CLINIC | Age: 73
End: 2023-04-27
Payer: MEDICARE

## 2023-04-27 VITALS
HEART RATE: 70 BPM | DIASTOLIC BLOOD PRESSURE: 60 MMHG | WEIGHT: 139.31 LBS | OXYGEN SATURATION: 98 % | SYSTOLIC BLOOD PRESSURE: 98 MMHG | RESPIRATION RATE: 16 BRPM | HEIGHT: 64 IN | BODY MASS INDEX: 23.78 KG/M2

## 2023-04-27 DIAGNOSIS — Z23 NEED FOR VACCINATION: Primary | ICD-10-CM

## 2023-04-27 DIAGNOSIS — E78.5 HYPERLIPIDEMIA ASSOCIATED WITH TYPE 2 DIABETES MELLITUS: Chronic | ICD-10-CM

## 2023-04-27 DIAGNOSIS — Z00.00 ENCOUNTER FOR MEDICARE ANNUAL WELLNESS EXAM: ICD-10-CM

## 2023-04-27 DIAGNOSIS — Z79.4 TYPE 2 DIABETES MELLITUS WITH DIABETIC PERIPHERAL ANGIOPATHY WITHOUT GANGRENE, WITH LONG-TERM CURRENT USE OF INSULIN: ICD-10-CM

## 2023-04-27 DIAGNOSIS — E11.65 TYPE 2 DIABETES MELLITUS WITH HYPERGLYCEMIA, WITH LONG-TERM CURRENT USE OF INSULIN: ICD-10-CM

## 2023-04-27 DIAGNOSIS — Z79.4 TYPE 2 DIABETES MELLITUS WITH BOTH EYES AFFECTED BY MILD NONPROLIFERATIVE RETINOPATHY AND MACULAR EDEMA, WITH LONG-TERM CURRENT USE OF INSULIN: ICD-10-CM

## 2023-04-27 DIAGNOSIS — E11.51 TYPE 2 DIABETES MELLITUS WITH DIABETIC PERIPHERAL ANGIOPATHY WITHOUT GANGRENE, WITH LONG-TERM CURRENT USE OF INSULIN: ICD-10-CM

## 2023-04-27 DIAGNOSIS — I71.40 ABDOMINAL AORTIC ANEURYSM (AAA) WITHOUT RUPTURE, UNSPECIFIED PART: ICD-10-CM

## 2023-04-27 DIAGNOSIS — I65.23 BILATERAL CAROTID ARTERY STENOSIS: ICD-10-CM

## 2023-04-27 DIAGNOSIS — E11.3213 TYPE 2 DIABETES MELLITUS WITH BOTH EYES AFFECTED BY MILD NONPROLIFERATIVE RETINOPATHY AND MACULAR EDEMA, WITH LONG-TERM CURRENT USE OF INSULIN: ICD-10-CM

## 2023-04-27 DIAGNOSIS — E11.69 HYPERLIPIDEMIA ASSOCIATED WITH TYPE 2 DIABETES MELLITUS: Chronic | ICD-10-CM

## 2023-04-27 DIAGNOSIS — E27.8 ADRENAL NODULE: Chronic | ICD-10-CM

## 2023-04-27 DIAGNOSIS — I70.0 AORTIC ATHEROSCLEROSIS: Chronic | ICD-10-CM

## 2023-04-27 DIAGNOSIS — I20.89 CHRONIC STABLE ANGINA: Chronic | ICD-10-CM

## 2023-04-27 DIAGNOSIS — Z00.00 ENCOUNTER FOR PREVENTIVE HEALTH EXAMINATION: Primary | ICD-10-CM

## 2023-04-27 DIAGNOSIS — Z79.4 TYPE 2 DIABETES MELLITUS WITH HYPERGLYCEMIA, WITH LONG-TERM CURRENT USE OF INSULIN: ICD-10-CM

## 2023-04-27 DIAGNOSIS — I25.10 CORONARY ARTERY DISEASE INVOLVING NATIVE CORONARY ARTERY OF NATIVE HEART WITHOUT ANGINA PECTORIS: ICD-10-CM

## 2023-04-27 PROCEDURE — G0439 PR MEDICARE ANNUAL WELLNESS SUBSEQUENT VISIT: ICD-10-PCS | Mod: S$GLB,,, | Performed by: NURSE PRACTITIONER

## 2023-04-27 PROCEDURE — 3008F PR BODY MASS INDEX (BMI) DOCUMENTED: ICD-10-PCS | Mod: CPTII,S$GLB,, | Performed by: NURSE PRACTITIONER

## 2023-04-27 PROCEDURE — 4010F ACE/ARB THERAPY RXD/TAKEN: CPT | Mod: CPTII,S$GLB,, | Performed by: NURSE PRACTITIONER

## 2023-04-27 PROCEDURE — 1159F MED LIST DOCD IN RCRD: CPT | Mod: CPTII,S$GLB,, | Performed by: NURSE PRACTITIONER

## 2023-04-27 PROCEDURE — 99999 PR PBB SHADOW E&M-EST. PATIENT-LVL V: ICD-10-PCS | Mod: PBBFAC,,, | Performed by: NURSE PRACTITIONER

## 2023-04-27 PROCEDURE — 1159F PR MEDICATION LIST DOCUMENTED IN MEDICAL RECORD: ICD-10-PCS | Mod: CPTII,S$GLB,, | Performed by: NURSE PRACTITIONER

## 2023-04-27 PROCEDURE — 3074F SYST BP LT 130 MM HG: CPT | Mod: CPTII,S$GLB,, | Performed by: NURSE PRACTITIONER

## 2023-04-27 PROCEDURE — 1170F FXNL STATUS ASSESSED: CPT | Mod: CPTII,S$GLB,, | Performed by: NURSE PRACTITIONER

## 2023-04-27 PROCEDURE — 91312 COVID-19, MRNA, LNP-S, BIVALENT BOOSTER, PF, 30 MCG/0.3 ML DOSE: ICD-10-PCS | Mod: S$GLB,,, | Performed by: INTERNAL MEDICINE

## 2023-04-27 PROCEDURE — 3078F PR MOST RECENT DIASTOLIC BLOOD PRESSURE < 80 MM HG: ICD-10-PCS | Mod: CPTII,S$GLB,, | Performed by: NURSE PRACTITIONER

## 2023-04-27 PROCEDURE — 1157F PR ADVANCE CARE PLAN OR EQUIV PRESENT IN MEDICAL RECORD: ICD-10-PCS | Mod: CPTII,S$GLB,, | Performed by: NURSE PRACTITIONER

## 2023-04-27 PROCEDURE — 3288F FALL RISK ASSESSMENT DOCD: CPT | Mod: CPTII,S$GLB,, | Performed by: NURSE PRACTITIONER

## 2023-04-27 PROCEDURE — 3072F LOW RISK FOR RETINOPATHY: CPT | Mod: CPTII,S$GLB,, | Performed by: NURSE PRACTITIONER

## 2023-04-27 PROCEDURE — G0439 PPPS, SUBSEQ VISIT: HCPCS | Mod: S$GLB,,, | Performed by: NURSE PRACTITIONER

## 2023-04-27 PROCEDURE — 91312 COVID-19, MRNA, LNP-S, BIVALENT BOOSTER, PF, 30 MCG/0.3 ML DOSE: CPT | Mod: S$GLB,,, | Performed by: INTERNAL MEDICINE

## 2023-04-27 PROCEDURE — 3074F PR MOST RECENT SYSTOLIC BLOOD PRESSURE < 130 MM HG: ICD-10-PCS | Mod: CPTII,S$GLB,, | Performed by: NURSE PRACTITIONER

## 2023-04-27 PROCEDURE — 3288F PR FALLS RISK ASSESSMENT DOCUMENTED: ICD-10-PCS | Mod: CPTII,S$GLB,, | Performed by: NURSE PRACTITIONER

## 2023-04-27 PROCEDURE — 3072F PR LOW RISK FOR RETINOPATHY: ICD-10-PCS | Mod: CPTII,S$GLB,, | Performed by: NURSE PRACTITIONER

## 2023-04-27 PROCEDURE — 99999 PR PBB SHADOW E&M-EST. PATIENT-LVL V: CPT | Mod: PBBFAC,,, | Performed by: NURSE PRACTITIONER

## 2023-04-27 PROCEDURE — 1101F PR PT FALLS ASSESS DOC 0-1 FALLS W/OUT INJ PAST YR: ICD-10-PCS | Mod: CPTII,S$GLB,, | Performed by: NURSE PRACTITIONER

## 2023-04-27 PROCEDURE — 1170F PR FUNCTIONAL STATUS ASSESSED: ICD-10-PCS | Mod: CPTII,S$GLB,, | Performed by: NURSE PRACTITIONER

## 2023-04-27 PROCEDURE — 3008F BODY MASS INDEX DOCD: CPT | Mod: CPTII,S$GLB,, | Performed by: NURSE PRACTITIONER

## 2023-04-27 PROCEDURE — 3052F HG A1C>EQUAL 8.0%<EQUAL 9.0%: CPT | Mod: CPTII,S$GLB,, | Performed by: NURSE PRACTITIONER

## 2023-04-27 PROCEDURE — 0124A COVID-19, MRNA, LNP-S, BIVALENT BOOSTER, PF, 30 MCG/0.3 ML DOSE: CPT | Mod: CV19,PBBFAC | Performed by: INTERNAL MEDICINE

## 2023-04-27 PROCEDURE — 3052F PR MOST RECENT HEMOGLOBIN A1C LEVEL 8.0 - < 9.0%: ICD-10-PCS | Mod: CPTII,S$GLB,, | Performed by: NURSE PRACTITIONER

## 2023-04-27 PROCEDURE — 3078F DIAST BP <80 MM HG: CPT | Mod: CPTII,S$GLB,, | Performed by: NURSE PRACTITIONER

## 2023-04-27 PROCEDURE — 1157F ADVNC CARE PLAN IN RCRD: CPT | Mod: CPTII,S$GLB,, | Performed by: NURSE PRACTITIONER

## 2023-04-27 PROCEDURE — 1101F PT FALLS ASSESS-DOCD LE1/YR: CPT | Mod: CPTII,S$GLB,, | Performed by: NURSE PRACTITIONER

## 2023-04-27 PROCEDURE — 4010F PR ACE/ARB THEARPY RXD/TAKEN: ICD-10-PCS | Mod: CPTII,S$GLB,, | Performed by: NURSE PRACTITIONER

## 2023-04-27 NOTE — PATIENT INSTRUCTIONS
Counseling and Referral of Other Preventative  (Italic type indicates deductible and co-insurance are waived)    Patient Name: Ned Oliveira  Today's Date: 4/27/2023    Health Maintenance         Date Due Completion Date    COVID-19 Vaccine (4 - Booster for Pfizer series) Ordered today 1/6/2022    Hemoglobin A1c Scheduled 5-10-23 1/19/2023    Eye Exam Ordered today 4/20/2022    Diabetes Urine Screening 09/28/2023 9/28/2022    Lipid Panel 09/28/2023 9/28/2022    Foot Exam 11/02/2023 11/2/2022    Override on 7/9/2018: Done    High Dose Statin 04/12/2024 4/12/2023    TETANUS VACCINE 09/01/2025 9/1/2015    Colorectal Cancer Screening 02/08/2029 2/8/2019    Override on 4/3/2017: Declined          Orders Placed This Encounter   Procedures    Diabetic Eye Screening Photo       The following information is provided to all patients.  This information is to help you find resources for any of the problems found today that may be affecting your health:                Living healthy guide: www.[facility].[place].gov      Understanding Diabetes: www.diabetes.org      Eating healthy: www.cdc.gov/healthyweight      CDC home safety checklist: www.cdc.gov/steadi/patient.html      Agency on Aging: www.goea.[place].gov      Alcoholics anonymous (AA): www.aa.org      Physical Activity: www.sally.nih.gov/fb4uxoz      Tobacco use: www.quitwithusla.org

## 2023-04-27 NOTE — PROGRESS NOTES
"  Ned Oliveira presented for a  Medicare AWV and comprehensive Health Risk Assessment today. The following components were reviewed and updated:    Medical history  Family History  Social history  Allergies and Current Medications  Health Risk Assessment  Health Maintenance  Care Team         ** See Completed Assessments for Annual Wellness Visit within the encounter summary.**         The following assessments were completed:  Living Situation  CAGE  Depression Screening  Timed Get Up and Go  Whisper Test  Cognitive Function Screening      Nutrition Screening  ADL Screening  PAQ Screening    Vitals:    04/27/23 1340   BP: 98/60   BP Location: Left arm   Patient Position: Sitting   BP Method: Small (Manual)   Pulse: 70   Resp: 16   SpO2: 98%   Weight: 63.2 kg (139 lb 5.3 oz)   Height: 5' 4" (1.626 m)     Body mass index is 23.92 kg/m².  Physical Exam  Constitutional:       Appearance: Normal appearance. He is normal weight.   HENT:      Head: Normocephalic.      Right Ear: External ear normal.      Left Ear: External ear normal.      Nose: Nose normal.   Cardiovascular:      Rate and Rhythm: Normal rate and regular rhythm.      Heart sounds: Normal heart sounds.   Pulmonary:      Effort: Pulmonary effort is normal.      Breath sounds: Normal breath sounds.   Musculoskeletal:         General: Normal range of motion.      Cervical back: Normal range of motion.   Skin:     General: Skin is warm and dry.      Capillary Refill: Capillary refill takes less than 2 seconds.   Neurological:      General: No focal deficit present.      Mental Status: He is alert and oriented to person, place, and time.   Psychiatric:         Mood and Affect: Mood normal.         Behavior: Behavior normal.         Thought Content: Thought content normal.         Judgment: Judgment normal.           Diagnoses and health risks identified today and associated recommendations/orders:    1. Encounter for preventive health examination  Exam " done    [facility] Maintenance updated    Records reviewed     2. Encounter for Medicare annual wellness exam    - Ambulatory Referral/Consult to Enhanced Annual Wellness Visit (eAWV)    3. Aortic atherosclerosis  Chronic, followed by Cardiology    4. Hyperlipidemia associated with type 2 diabetes mellitus  Chronic, followed by PCP     5. Chronic stable angina  Chronic, followed by Cardiology    6. Coronary artery disease involving native coronary artery of native heart without angina pectoris  Chronic, followed by Cardiology    7. Abdominal aortic aneurysm (AAA) without rupture, unspecified part  Chronic, followed by PCP     8. Bilateral carotid artery stenosis  Chronic, followed by PCP     9. Type 2 diabetes mellitus with both eyes affected by mild nonproliferative retinopathy and macular edema, with long-term current use of insulin  Chronic, followed by PCP   Diabetic eye exam ordered    10. Type 2 diabetes mellitus with hyperglycemia, with long-term current use of insulin  Chronic, followed by PCP   - Diabetic Eye Screening Photo; Future    11. Type 2 diabetes mellitus with diabetic peripheral angiopathy without gangrene, with long-term current use of insulin  Chronic, followed by PCP     12. Adrenal nodule  Chronic, followed by PCP     13. BMI 23.0-23.9, adult  BMI reviewed    Provided Ned with a 5-10 year written screening schedule and personal prevention plan. Recommendations were developed using the USPSTF age appropriate recommendations. Education, counseling, and referrals were provided as needed. After Visit Summary printed and given to patient which includes a list of additional screenings\tests needed.    Follow up in about 20 days (around 5/17/2023) for with PCP Dr. Chatterjee as scheduled.    Iris Art DNP      I offered to discuss advanced care planning, including how to pick a person who would make decisions for you if you were unable to make them for yourself, called a health care power of  , and what kind of decisions you might make such as use of life sustaining treatments such as ventilators and tube feeding when faced with a life limiting illness recorded on a living will that they will need to know. (How you want to be cared for as you near the end of your natural life)     X  Patient has advanced directives on file, which we reviewed, and they do not wish to make changes.

## 2023-05-02 ENCOUNTER — TELEPHONE (OUTPATIENT)
Dept: INTERNAL MEDICINE | Facility: CLINIC | Age: 73
End: 2023-05-02
Payer: MEDICARE

## 2023-05-02 ENCOUNTER — PATIENT OUTREACH (OUTPATIENT)
Dept: ADMINISTRATIVE | Facility: HOSPITAL | Age: 73
End: 2023-05-02
Payer: MEDICARE

## 2023-05-02 NOTE — TELEPHONE ENCOUNTER
. Canceled eye cam appointment. Patient is not a candidate for eye camera screening due to Dx. Called patient to explain he needs optometry appt. He verbalized understanding and asked to call back to schedule.    Germaine Shin LPN   Clinical Care Coordinator  Primary Care and Wellness

## 2023-05-02 NOTE — PROGRESS NOTES
Health Maintenance Due   Topic Date Due    Hemoglobin A1c  04/19/2023    Eye Exam  04/20/2023        updated. Scheduled urine micro lab with previously scheduled labs. Canceled eye cam appointment. Patient is not a candidate for eye camera screening due to Dx. Called patient to explain he needs optometry appt. He verbalized understanding and asked to call back to schedule.    Germaine Shin LPN   Clinical Care Coordinator  Primary Care and Wellness

## 2023-05-05 ENCOUNTER — TELEPHONE (OUTPATIENT)
Dept: FAMILY MEDICINE | Facility: CLINIC | Age: 73
End: 2023-05-05
Payer: MEDICARE

## 2023-05-05 NOTE — TELEPHONE ENCOUNTER
Spoke with pt stating appt will have to be rescheduled due to provider not being in office. Pt stated he will call back before the end of the day to reschedule appt. Pt verbalized understanding.

## 2023-05-16 NOTE — PROGRESS NOTES
Subjective:      Chief Complaint: Follow-up and Diabetes    HPI  Mr.Jose Oliveira is a 72 yr old man with a number of ophthalmological diagnoses, AAA, peripheral artery disease/bilateral carotid artery stenosis, coronary artery disease/stable angina, hypertension, hyperlipidemia, type 2 diabetes (with retinopathy and vascular complications), BPH, with bilateral inguinal hernia status post surgical repair presenting for diabetic follow-up:    Diabetes:  -Currently taking: Lantus 20 units q.h.s. and metformin 1000 b.i.d.; has strong adverse reactions to to prior trials of SGLT 2 inhibitors and only oral GLP 1 (rybelsus) is preferred level 5 via [employer]  -attempted to facilitate establishment with endocrinology between last appointment and now; no appointments available for over 6 months   -Denies episodes of hypoglycemia  -Most recent A1c: 8.8 (indolently increasing over the past year and a half); patient's wife attributes this to laxity of diet  -Takes aspirin everyday  -Takes a atorvastatin 80  -Denies diabetic neuropathy.  -Most recent microalbumin:  Normal  -Pneumovax:  Up-to-date  -Optho exam:  Up-to-date  -Last foot exam:  Up-to-date     Review of Systems   Constitutional:  Negative for appetite change, chills and fever.   HENT: Negative.     Respiratory:  Negative for cough, chest tightness and shortness of breath.    Cardiovascular:  Negative for chest pain, palpitations and leg swelling.   Gastrointestinal:  Negative for abdominal distention, abdominal pain, blood in stool, constipation, diarrhea, nausea and vomiting.   Endocrine: Negative.    Genitourinary:  Negative for difficulty urinating, dysuria, frequency and hematuria.   Musculoskeletal: Negative.    Integumentary:  Negative.   Neurological: Negative.    Psychiatric/Behavioral: Negative.         Objective:      Vitals:    05/17/23 1110   BP: (!) 98/50   Pulse: 62   Resp: 16   Temp: 98.5 °F (36.9 °C)      Physical Exam  Vitals reviewed.   Constitutional:        General: He is not in acute distress.     Appearance: Normal appearance.   HENT:      Head: Normocephalic and atraumatic.      Comments: Facial features are symmetric      Nose: Nose normal. No congestion or rhinorrhea.      Mouth/Throat:      Mouth: Mucous membranes are moist.      Pharynx: Oropharynx is clear. No oropharyngeal exudate or posterior oropharyngeal erythema.   Eyes:      General: No scleral icterus.     Extraocular Movements: Extraocular movements intact.      Conjunctiva/sclera: Conjunctivae normal.   Cardiovascular:      Rate and Rhythm: Normal rate and regular rhythm.      Pulses: Normal pulses.      Heart sounds: Normal heart sounds.   Pulmonary:      Effort: Pulmonary effort is normal. No respiratory distress.      Breath sounds: Normal breath sounds.   Musculoskeletal:         General: No deformity or signs of injury. Normal range of motion.      Cervical back: Normal range of motion.      Comments: Gait normal    Skin:     General: Skin is warm and dry.      Findings: No rash.   Neurological:      General: No focal deficit present.      Mental Status: He is alert and oriented to person, place, and time. Mental status is at baseline.   Psychiatric:         Mood and Affect: Mood normal.         Behavior: Behavior normal.         Thought Content: Thought content normal.     Current Outpatient Medications on File Prior to Visit   Medication Sig Dispense Refill    albuterol (VENTOLIN HFA) 90 mcg/actuation inhaler Inhale 2 puffs into the lungs every 6 (six) hours as needed for Wheezing or Shortness of Breath. Rescue 18 g 1    aspirin (ECOTRIN) 81 MG EC tablet Take 1 tablet (81 mg total) by mouth once daily. 365 tablet 0    blood sugar diagnostic Strp 1 strip by Misc.(Non-Drug; Combo Route) route 2 (two) times daily as needed (hypoglycemia). 100 strip 11    blood-glucose meter kit Use as instructed 1 each 0    clopidogreL (PLAVIX) 75 mg tablet Take 1 tablet (75 mg total) by mouth once daily. 90  "tablet 3    diclofenac sodium (VOLTAREN) 1 % Gel Apply 2 g topically 3 (three) times daily. 50 g 2    dorzolamide-timolol 2-0.5% (COSOPT) 22.3-6.8 mg/mL ophthalmic solution Place 1 drop into the right eye 2 (two) times daily. 20 mL 3    finasteride (PROSCAR) 5 mg tablet Take 5 mg by mouth once daily.      fluticasone propionate (FLONASE) 50 mcg/actuation nasal spray 1 spray (50 mcg total) by Each Nostril route once daily. 16 g 1    levocetirizine (XYZAL) 5 MG tablet Take 1 tablet (5 mg total) by mouth every evening. 30 tablet 11    lisinopriL-hydrochlorothiazide (PRINZIDE,ZESTORETIC) 10-12.5 mg per tablet Take 1 tablet by mouth once daily. 90 tablet 3    metFORMIN (GLUCOPHAGE) 1000 MG tablet Take 1 tablet (1,000 mg total) by mouth 2 (two) times daily with meals. 180 tablet 0    metoprolol succinate (TOPROL-XL) 25 MG 24 hr tablet Take 0.5 tablets (12.5 mg total) by mouth once daily. 45 tablet 3    OZURDEX 0.7 mg Impl intravitreal implant       pen needle, diabetic (BD ULTRA-FINE ORIG PEN NEEDLE) 29 gauge x 1/2" Ndle 20 Units by Misc.(Non-Drug; Combo Route) route once daily. 100 each 6    tamsulosin (FLOMAX) 0.4 mg Cap Take 1 capsule (0.4 mg total) by mouth every evening. 90 capsule 3    atorvastatin (LIPITOR) 80 MG tablet Take 1 tablet (80 mg total) by mouth once daily. 90 tablet 3    nitroGLYCERIN (NITROSTAT) 0.4 MG SL tablet Place 1 tablet (0.4 mg total) under the tongue every 5 (five) minutes as needed for Chest pain (ONLY IF HAVE CHEST PAIN,). 30 tablet 0    omeprazole (PRILOSEC) 40 MG capsule Take 1 capsule (40 mg total) by mouth 2 (two) times daily before meals. for 14 days 28 capsule 0    [DISCONTINUED] insulin (LANTUS SOLOSTAR U-100 INSULIN) glargine 100 units/mL SubQ pen INJECT  20 UNITS  INTO  THE  SKIN EVERY EVENING 30 mL 1    [DISCONTINUED] SITagliptin phosphate (JANUVIA) 25 MG Tab Take 1 tablet (25 mg total) by mouth once daily. 90 tablet 1     No current facility-administered medications on file prior " to visit.         Assessment:       1. Type 2 diabetes mellitus with both eyes affected by mild nonproliferative retinopathy and macular edema, with long-term current use of insulin        Plan:       Type 2 diabetes mellitus with both eyes affected by mild nonproliferative retinopathy and macular edema, with long-term current use of insulin  -     insulin glargine-lixisenatide (SOLIQUA 100/33) 100 unit-33 mcg/mL InPn pen; Inject 15 Units into the skin daily with breakfast. Can titrate up by 2 units daily as needed to get goal glucose 100 - 150 - max TDD 60 units/day.  Dispense: 15 pen; Refill: 3  -     blood-glucose sensor (DEXCOM G7 SENSOR) Khushi; 1 each by Misc.(Non-Drug; Combo Route) route every 10 days.  Dispense: 10 each; Refill: 3  -     blood-glucose meter,continuous (DEXCOM ) Misc; 1 Device by Misc.(Non-Drug; Combo Route) route continuous.  Dispense: 1 each; Refill: 0  -     Ambulatory referral/consult to Diabetes Education; Future; Expected date: 05/24/2023   - will transition long-acting insulin to AM Soliqua for dual insulin/GLP-1 effect while discontinuing Januvia and decreasing metformin to once daily/morning   - out of concern for impending hypoglycemia (has a history of 2 separate hypoglycemic episodes <50), and due to long-term insulin use, necessitates greater monitoring than and be had without a continuous glucose monitor; dex com order placed with associated referral for diabetic education; Thank you for your time.   - have patient follow back in excess of 3 months with A1c to consider further titration

## 2023-05-17 ENCOUNTER — LAB VISIT (OUTPATIENT)
Dept: LAB | Facility: HOSPITAL | Age: 73
End: 2023-05-17
Attending: STUDENT IN AN ORGANIZED HEALTH CARE EDUCATION/TRAINING PROGRAM
Payer: MEDICARE

## 2023-05-17 ENCOUNTER — OFFICE VISIT (OUTPATIENT)
Dept: INTERNAL MEDICINE | Facility: CLINIC | Age: 73
End: 2023-05-17
Payer: MEDICARE

## 2023-05-17 VITALS
WEIGHT: 138.88 LBS | HEART RATE: 62 BPM | DIASTOLIC BLOOD PRESSURE: 50 MMHG | HEIGHT: 64 IN | SYSTOLIC BLOOD PRESSURE: 98 MMHG | OXYGEN SATURATION: 96 % | RESPIRATION RATE: 16 BRPM | BODY MASS INDEX: 23.71 KG/M2 | TEMPERATURE: 99 F

## 2023-05-17 DIAGNOSIS — Z79.4 TYPE 2 DIABETES MELLITUS WITH BOTH EYES AFFECTED BY MILD NONPROLIFERATIVE RETINOPATHY AND MACULAR EDEMA, WITH LONG-TERM CURRENT USE OF INSULIN: ICD-10-CM

## 2023-05-17 DIAGNOSIS — Z79.4 TYPE 2 DIABETES MELLITUS WITH BOTH EYES AFFECTED BY MILD NONPROLIFERATIVE RETINOPATHY AND MACULAR EDEMA, WITH LONG-TERM CURRENT USE OF INSULIN: Primary | ICD-10-CM

## 2023-05-17 DIAGNOSIS — E11.3213 TYPE 2 DIABETES MELLITUS WITH BOTH EYES AFFECTED BY MILD NONPROLIFERATIVE RETINOPATHY AND MACULAR EDEMA, WITH LONG-TERM CURRENT USE OF INSULIN: ICD-10-CM

## 2023-05-17 DIAGNOSIS — E11.3213 TYPE 2 DIABETES MELLITUS WITH BOTH EYES AFFECTED BY MILD NONPROLIFERATIVE RETINOPATHY AND MACULAR EDEMA, WITH LONG-TERM CURRENT USE OF INSULIN: Primary | ICD-10-CM

## 2023-05-17 LAB
ESTIMATED AVG GLUCOSE: 166 MG/DL (ref 68–131)
HBA1C MFR BLD: 7.4 % (ref 4–5.6)

## 2023-05-17 PROCEDURE — 4010F PR ACE/ARB THEARPY RXD/TAKEN: ICD-10-PCS | Mod: CPTII,,, | Performed by: STUDENT IN AN ORGANIZED HEALTH CARE EDUCATION/TRAINING PROGRAM

## 2023-05-17 PROCEDURE — 1159F PR MEDICATION LIST DOCUMENTED IN MEDICAL RECORD: ICD-10-PCS | Mod: CPTII,,, | Performed by: STUDENT IN AN ORGANIZED HEALTH CARE EDUCATION/TRAINING PROGRAM

## 2023-05-17 PROCEDURE — 1157F PR ADVANCE CARE PLAN OR EQUIV PRESENT IN MEDICAL RECORD: ICD-10-PCS | Mod: CPTII,,, | Performed by: STUDENT IN AN ORGANIZED HEALTH CARE EDUCATION/TRAINING PROGRAM

## 2023-05-17 PROCEDURE — 36415 COLL VENOUS BLD VENIPUNCTURE: CPT | Mod: PO | Performed by: STUDENT IN AN ORGANIZED HEALTH CARE EDUCATION/TRAINING PROGRAM

## 2023-05-17 PROCEDURE — 3074F SYST BP LT 130 MM HG: CPT | Mod: CPTII,,, | Performed by: STUDENT IN AN ORGANIZED HEALTH CARE EDUCATION/TRAINING PROGRAM

## 2023-05-17 PROCEDURE — 1101F PR PT FALLS ASSESS DOC 0-1 FALLS W/OUT INJ PAST YR: ICD-10-PCS | Mod: CPTII,,, | Performed by: STUDENT IN AN ORGANIZED HEALTH CARE EDUCATION/TRAINING PROGRAM

## 2023-05-17 PROCEDURE — 4010F ACE/ARB THERAPY RXD/TAKEN: CPT | Mod: CPTII,,, | Performed by: STUDENT IN AN ORGANIZED HEALTH CARE EDUCATION/TRAINING PROGRAM

## 2023-05-17 PROCEDURE — 3072F PR LOW RISK FOR RETINOPATHY: ICD-10-PCS | Mod: CPTII,,, | Performed by: STUDENT IN AN ORGANIZED HEALTH CARE EDUCATION/TRAINING PROGRAM

## 2023-05-17 PROCEDURE — 3288F FALL RISK ASSESSMENT DOCD: CPT | Mod: CPTII,,, | Performed by: STUDENT IN AN ORGANIZED HEALTH CARE EDUCATION/TRAINING PROGRAM

## 2023-05-17 PROCEDURE — 3288F PR FALLS RISK ASSESSMENT DOCUMENTED: ICD-10-PCS | Mod: CPTII,,, | Performed by: STUDENT IN AN ORGANIZED HEALTH CARE EDUCATION/TRAINING PROGRAM

## 2023-05-17 PROCEDURE — 3052F PR MOST RECENT HEMOGLOBIN A1C LEVEL 8.0 - < 9.0%: ICD-10-PCS | Mod: CPTII,,, | Performed by: STUDENT IN AN ORGANIZED HEALTH CARE EDUCATION/TRAINING PROGRAM

## 2023-05-17 PROCEDURE — 3008F BODY MASS INDEX DOCD: CPT | Mod: CPTII,,, | Performed by: STUDENT IN AN ORGANIZED HEALTH CARE EDUCATION/TRAINING PROGRAM

## 2023-05-17 PROCEDURE — 99214 PR OFFICE/OUTPT VISIT, EST, LEVL IV, 30-39 MIN: ICD-10-PCS | Mod: ,,, | Performed by: STUDENT IN AN ORGANIZED HEALTH CARE EDUCATION/TRAINING PROGRAM

## 2023-05-17 PROCEDURE — 3078F PR MOST RECENT DIASTOLIC BLOOD PRESSURE < 80 MM HG: ICD-10-PCS | Mod: CPTII,,, | Performed by: STUDENT IN AN ORGANIZED HEALTH CARE EDUCATION/TRAINING PROGRAM

## 2023-05-17 PROCEDURE — 3072F LOW RISK FOR RETINOPATHY: CPT | Mod: CPTII,,, | Performed by: STUDENT IN AN ORGANIZED HEALTH CARE EDUCATION/TRAINING PROGRAM

## 2023-05-17 PROCEDURE — 3052F HG A1C>EQUAL 8.0%<EQUAL 9.0%: CPT | Mod: CPTII,,, | Performed by: STUDENT IN AN ORGANIZED HEALTH CARE EDUCATION/TRAINING PROGRAM

## 2023-05-17 PROCEDURE — 3078F DIAST BP <80 MM HG: CPT | Mod: CPTII,,, | Performed by: STUDENT IN AN ORGANIZED HEALTH CARE EDUCATION/TRAINING PROGRAM

## 2023-05-17 PROCEDURE — 1101F PT FALLS ASSESS-DOCD LE1/YR: CPT | Mod: CPTII,,, | Performed by: STUDENT IN AN ORGANIZED HEALTH CARE EDUCATION/TRAINING PROGRAM

## 2023-05-17 PROCEDURE — 83036 HEMOGLOBIN GLYCOSYLATED A1C: CPT | Performed by: STUDENT IN AN ORGANIZED HEALTH CARE EDUCATION/TRAINING PROGRAM

## 2023-05-17 PROCEDURE — 1157F ADVNC CARE PLAN IN RCRD: CPT | Mod: CPTII,,, | Performed by: STUDENT IN AN ORGANIZED HEALTH CARE EDUCATION/TRAINING PROGRAM

## 2023-05-17 PROCEDURE — 3008F PR BODY MASS INDEX (BMI) DOCUMENTED: ICD-10-PCS | Mod: CPTII,,, | Performed by: STUDENT IN AN ORGANIZED HEALTH CARE EDUCATION/TRAINING PROGRAM

## 2023-05-17 PROCEDURE — 99999 PR PBB SHADOW E&M-EST. PATIENT-LVL V: CPT | Mod: PBBFAC,,, | Performed by: STUDENT IN AN ORGANIZED HEALTH CARE EDUCATION/TRAINING PROGRAM

## 2023-05-17 PROCEDURE — 1126F PR PAIN SEVERITY QUANTIFIED, NO PAIN PRESENT: ICD-10-PCS | Mod: CPTII,,, | Performed by: STUDENT IN AN ORGANIZED HEALTH CARE EDUCATION/TRAINING PROGRAM

## 2023-05-17 PROCEDURE — 1159F MED LIST DOCD IN RCRD: CPT | Mod: CPTII,,, | Performed by: STUDENT IN AN ORGANIZED HEALTH CARE EDUCATION/TRAINING PROGRAM

## 2023-05-17 PROCEDURE — 99999 PR PBB SHADOW E&M-EST. PATIENT-LVL V: ICD-10-PCS | Mod: PBBFAC,,, | Performed by: STUDENT IN AN ORGANIZED HEALTH CARE EDUCATION/TRAINING PROGRAM

## 2023-05-17 PROCEDURE — 1126F AMNT PAIN NOTED NONE PRSNT: CPT | Mod: CPTII,,, | Performed by: STUDENT IN AN ORGANIZED HEALTH CARE EDUCATION/TRAINING PROGRAM

## 2023-05-17 PROCEDURE — 99214 OFFICE O/P EST MOD 30 MIN: CPT | Mod: ,,, | Performed by: STUDENT IN AN ORGANIZED HEALTH CARE EDUCATION/TRAINING PROGRAM

## 2023-05-17 PROCEDURE — 3074F PR MOST RECENT SYSTOLIC BLOOD PRESSURE < 130 MM HG: ICD-10-PCS | Mod: CPTII,,, | Performed by: STUDENT IN AN ORGANIZED HEALTH CARE EDUCATION/TRAINING PROGRAM

## 2023-05-17 RX ORDER — BLOOD-GLUCOSE SENSOR
1 EACH MISCELLANEOUS
Qty: 10 EACH | Refills: 3 | Status: SHIPPED | OUTPATIENT
Start: 2023-05-17 | End: 2023-07-27 | Stop reason: SDUPTHER

## 2023-05-17 RX ORDER — INSULIN GLARGINE AND LIXISENATIDE 100; 33 U/ML; UG/ML
15 INJECTION, SOLUTION SUBCUTANEOUS
Qty: 15 PEN | Refills: 3 | Status: SHIPPED | OUTPATIENT
Start: 2023-05-17 | End: 2023-07-27 | Stop reason: SDUPTHER

## 2023-06-02 ENCOUNTER — TELEPHONE (OUTPATIENT)
Dept: DIABETES | Facility: CLINIC | Age: 73
End: 2023-06-02
Payer: MEDICARE

## 2023-06-07 DIAGNOSIS — N40.1 BPH WITH OBSTRUCTION/LOWER URINARY TRACT SYMPTOMS: ICD-10-CM

## 2023-06-07 DIAGNOSIS — N13.8 BPH WITH OBSTRUCTION/LOWER URINARY TRACT SYMPTOMS: ICD-10-CM

## 2023-06-07 DIAGNOSIS — R33.9 URINARY RETENTION: ICD-10-CM

## 2023-06-07 RX ORDER — TAMSULOSIN HYDROCHLORIDE 0.4 MG/1
0.4 CAPSULE ORAL NIGHTLY
Qty: 90 CAPSULE | Refills: 3 | Status: SHIPPED | OUTPATIENT
Start: 2023-06-07 | End: 2024-01-24 | Stop reason: SDUPTHER

## 2023-06-07 NOTE — TELEPHONE ENCOUNTER
No care due was identified.  Health [facility] Embedded Care Due Messages. Reference number: [id number].   6/07/2023 12:52:26 PM CDT

## 2023-06-07 NOTE — TELEPHONE ENCOUNTER
Refill Routing Note   Medication(s) are not appropriate for processing by [facility] for the following reason(s):      Required vitals abnormal    [facility] action(s):  Defer None identified          Appointments  past 12m or future 3m with PCP    Date Provider   Last Visit   9/27/2022 David Hardwick MD   Next Visit   7/27/2023 David Hardwick MD   ED visits in past 90 days: 0        Note composed:1:56 PM 06/07/2023

## 2023-06-09 DIAGNOSIS — E11.65 TYPE 2 DIABETES MELLITUS WITH HYPERGLYCEMIA, WITH LONG-TERM CURRENT USE OF INSULIN: ICD-10-CM

## 2023-06-09 DIAGNOSIS — Z79.4 TYPE 2 DIABETES MELLITUS WITH HYPERGLYCEMIA, WITH LONG-TERM CURRENT USE OF INSULIN: ICD-10-CM

## 2023-06-09 RX ORDER — METFORMIN HYDROCHLORIDE 1000 MG/1
TABLET ORAL
Qty: 180 TABLET | Refills: 0 | OUTPATIENT
Start: 2023-06-09

## 2023-06-09 NOTE — TELEPHONE ENCOUNTER
[facility] Note  Quick DC. Inappropriate Request   Refill request requires further review by MD: NO   Medication Therapy Plan: Pharmacy is requesting new script(s) for the following medications without required information, (sig/ frequency/qty/etc)     [facility] action(s):  Quick Discontinue      Duplicate Pended Encounter(s)/ Last Prescribed Details:    Pharmacies have been requesting medications for patients without required information, (sig, frequency, qty, etc.). In addition, requests are sent for medication(s) pt. are currently not taking, and medications patients have never taken.    We have spoken to the pharmacies about these request types and advised their teams previously that we are unable to assess these New Script requests and require all details for these requests. This is a known issue and has been reported.        Medication related problems are not assessed for [facility].   Medication Reconciliation Completed? NO Were there pending details that required adjustment? NO     Automatic Epic Generated Protocol Data Below:   Requested Prescriptions   Pending Prescriptions Disp Refills    metFORMIN (GLUCOPHAGE) 1000 MG tablet [Pharmacy Med Name: METFORMIN 1000MG TAB] 180 tablet 0              Appointments      Date Provider   Last Visit   5/17/2023 Christian Chatterjee MD   Next Visit   9/20/2023 Christian Chatterjee MD        Note composed:1:54 PM 06/09/2023

## 2023-06-09 NOTE — TELEPHONE ENCOUNTER
No care due was identified.  Health [facility] Embedded Care Due Messages. Reference number: [id number].   6/09/2023 12:09:48 PM CDT

## 2023-06-16 ENCOUNTER — PATIENT OUTREACH (OUTPATIENT)
Dept: ADMINISTRATIVE | Facility: HOSPITAL | Age: 73
End: 2023-06-16
Payer: MEDICARE

## 2023-06-16 RX ORDER — ATORVASTATIN CALCIUM 80 MG/1
80 TABLET, FILM COATED ORAL DAILY
Qty: 90 TABLET | Refills: 3 | Status: SHIPPED | OUTPATIENT
Start: 2023-06-16 | End: 2023-08-18 | Stop reason: SDUPTHER

## 2023-06-16 NOTE — TELEPHONE ENCOUNTER
No care due was identified.  [facility] Embedded Care Due Messages. Reference number: [id number].   6/16/2023 12:27:39 PM CDT

## 2023-06-28 ENCOUNTER — TELEPHONE (OUTPATIENT)
Dept: OPHTHALMOLOGY | Facility: CLINIC | Age: 73
End: 2023-06-28
Payer: MEDICARE

## 2023-06-28 NOTE — TELEPHONE ENCOUNTER
----- Message from Juliana Root sent at 6/28/2023 11:20 AM CDT -----  Regarding: Appt Request  Pt called to schedule Follow-up last time pt was seen was 2021.    Please call back to further assist-[phone] Ileana

## 2023-07-05 ENCOUNTER — CLINICAL SUPPORT (OUTPATIENT)
Dept: DIABETES | Facility: CLINIC | Age: 73
End: 2023-07-05
Payer: MEDICARE

## 2023-07-05 DIAGNOSIS — Z79.4 TYPE 2 DIABETES MELLITUS WITH BOTH EYES AFFECTED BY MILD NONPROLIFERATIVE RETINOPATHY AND MACULAR EDEMA, WITH LONG-TERM CURRENT USE OF INSULIN: ICD-10-CM

## 2023-07-05 DIAGNOSIS — E11.3213 TYPE 2 DIABETES MELLITUS WITH BOTH EYES AFFECTED BY MILD NONPROLIFERATIVE RETINOPATHY AND MACULAR EDEMA, WITH LONG-TERM CURRENT USE OF INSULIN: ICD-10-CM

## 2023-07-05 PROCEDURE — G0108 PR DIAB MANAGE TRN  PER INDIV: ICD-10-PCS | Mod: S$GLB,,, | Performed by: DIETITIAN, REGISTERED

## 2023-07-05 PROCEDURE — G0108 DIAB MANAGE TRN  PER INDIV: HCPCS | Mod: S$GLB,,, | Performed by: DIETITIAN, REGISTERED

## 2023-07-05 NOTE — PROGRESS NOTES
"Diabetes Care Specialist Progress Note  Author: Norma Garcia RD, CDE  Date: 7/5/2023    Program Intake  Reason for Diabetes Program Visit:: Intervention  Type of Intervention:: Individual  Individual: Device Training  Device Training: Personal CGM    Lab Results   Component Value Date    HGBA1C 7.4 (H) 05/17/2023     DEXCOM G7 CGM TRAINING     Patient referred to clinic today for Dexcom G7 continuous glucose sensor system training.  Education was provided using "Quick Start Guide" per Dexcom protocol.     Pt will be using his  as the primary .  Overview:  5min glucose reading updates, trending arrows, BG graph screens, Clarity  Menus: Glucose, History, Connections, Profile    Settings:                      * Urgent Low: 55 mg/dL                      * Urgent Low Soon: on                      * Low alert: 80 mg/dl repeat 30 min                      * High alert: 250; delay 1st alert 3 hours                       * Rise rate: off                      * Fall Rate: off                      * Signal Loss: on repeat 20 min                           Reviewed additional setting options with patient, including Graph Height and Sensor ID.     Reviewed where to find sensor insertion time and sensor expiration date, replace sensor, and stop sensor session  Discussed no need to calibrate sensor during the entirety of the 10 day wear. Advised there is a 12 hour hiram period for changing out the G7.  Reviewed sensor site selection. Site selected and prepped using aseptic technique Inserted to left abdomen.   Patient able to demonstrate without difficulty.  Encouraged to review manual prior to starting another sensor.   Reviewed problem solving aspects of sensor transmission/ variables that can disrupt RF transmission.  range 20 feet, but the first 3 hrs keep within 3 feet of transmitter.  Pt instructed on lag time of interstitial fluid from CBG and was advised to tx hypoglycemia and dose insulin " based on SMBG values.  Dexcom technical support contact number given and examples of when to contact them discussed.   Patient advised to always check glucose with glucometer should readings do not match symptoms felt (ex. Hypo or hyperglycemia).      Assessment Summary and Plan    Based on today's diabetes care assessment, the following areas of need were identified:      Social 2/15/2023   Support No   Cognitive/Behavioral Health No   Culture/Hinduism No   Communication No   Health Literacy No        Clinical 2/15/2023   Medication Adherence No   Nutritional Status No        Diabetes Self-Management Skills 2/15/2023   Diabetes Disease Process/Treatment Options Yes   Nutrition/Healthy Eating Yes   Physical Activity/Exercise Yes   Medication Yes   Home Blood Glucose Monitoring Yes   Acute Complications Yes   Chronic Complications Yes   Psychosocial/Coping No          Today's interventions were provided through individual discussion, instruction, and written materials were provided.      Patient verbalized understanding of instruction and written materials.  Pt was able to return back demonstration of instructions today. Patient understood key points, needs reinforcement and further instruction.     Diabetes Self-Management Care Plan:    Today's Diabetes Self-Management Care Plan was developed with Ned's input. Ned has agreed to work toward the following goal(s) to improve his/her overall diabetes control.      There are no recently modified care plans to display for this patient.      Follow Up Plan     Follow up in about 11 weeks (around 9/20/2023) for Provider Visit. Patient declined follow up visit for Dexcom sensor change at this time due to work schedule. He will call if he requires further assistance.    Today's care plan and follow up schedule was discussed with patient.  Ned verbalized understanding of the care plan, goals, and agrees to follow up plan.        The patient was encouraged to communicate  with his/her health care provider/physician and care team regarding his/her condition(s) and treatment.  I provided the patient with my contact information today and encouraged to contact me via phone or [facility] Patient Portal as needed.     Length of Visit   Total Time: 60 Minutes

## 2023-07-13 ENCOUNTER — HOSPITAL ENCOUNTER (OUTPATIENT)
Dept: CARDIOLOGY | Facility: HOSPITAL | Age: 73
Discharge: HOME | End: 2023-07-13
Attending: INTERNAL MEDICINE
Payer: MEDICARE

## 2023-07-13 ENCOUNTER — OFFICE VISIT (OUTPATIENT)
Dept: CARDIOLOGY | Facility: CLINIC | Age: 73
End: 2023-07-13
Payer: MEDICARE

## 2023-07-13 VITALS
HEART RATE: 57 BPM | SYSTOLIC BLOOD PRESSURE: 97 MMHG | BODY MASS INDEX: 23.56 KG/M2 | WEIGHT: 138 LBS | OXYGEN SATURATION: 100 % | DIASTOLIC BLOOD PRESSURE: 61 MMHG | HEIGHT: 64 IN

## 2023-07-13 DIAGNOSIS — Z98.61 S/P CORONARY ANGIOPLASTY: Primary | ICD-10-CM

## 2023-07-13 DIAGNOSIS — Z79.4 TYPE 2 DIABETES MELLITUS WITH HYPERGLYCEMIA, WITH LONG-TERM CURRENT USE OF INSULIN: ICD-10-CM

## 2023-07-13 DIAGNOSIS — I25.10 CORONARY ARTERY DISEASE INVOLVING NATIVE CORONARY ARTERY OF NATIVE HEART WITHOUT ANGINA PECTORIS: ICD-10-CM

## 2023-07-13 DIAGNOSIS — E78.5 HYPERLIPIDEMIA ASSOCIATED WITH TYPE 2 DIABETES MELLITUS: Chronic | ICD-10-CM

## 2023-07-13 DIAGNOSIS — I71.43 INFRARENAL ABDOMINAL AORTIC ANEURYSM (AAA) WITHOUT RUPTURE: ICD-10-CM

## 2023-07-13 DIAGNOSIS — E11.65 TYPE 2 DIABETES MELLITUS WITH HYPERGLYCEMIA, WITH LONG-TERM CURRENT USE OF INSULIN: ICD-10-CM

## 2023-07-13 DIAGNOSIS — I10 ESSENTIAL HYPERTENSION: Chronic | ICD-10-CM

## 2023-07-13 DIAGNOSIS — I71.40 ABDOMINAL AORTIC ANEURYSM (AAA) WITHOUT RUPTURE, UNSPECIFIED PART: ICD-10-CM

## 2023-07-13 DIAGNOSIS — I70.0 AORTIC ATHEROSCLEROSIS: Chronic | ICD-10-CM

## 2023-07-13 DIAGNOSIS — E11.69 HYPERLIPIDEMIA ASSOCIATED WITH TYPE 2 DIABETES MELLITUS: Chronic | ICD-10-CM

## 2023-07-13 LAB
ABDOMINAL IMA AP: 1.71 CM
ABDOMINAL IMA ED VEL: 0 CM/S
ABDOMINAL IMA PS VEL: 86 CM/S
ABDOMINAL IMA TRANS: 1.45 CM
ABDOMINAL INFRARENAL AORTA AP: 1.4 CM
ABDOMINAL INFRARENAL AORTA ED VEL: 0 CM/S
ABDOMINAL INFRARENAL AORTA PS VEL: 77 CM/S
ABDOMINAL INFRARENAL AORTA TRANS: 1.57 CM
ABDOMINAL JUXTARENAL AORTA AP: 2.36 CM
ABDOMINAL JUXTARENAL AORTA ED VEL: 0 CM/S
ABDOMINAL JUXTARENAL AORTA PS VEL: 70 CM/S
ABDOMINAL JUXTARENAL AORTA TRANS: 2.32 CM
ABDOMINAL LT COM ILIAC AP: 1.12 CM
ABDOMINAL LT COM ILIAC TRANS: 0.92 CM
ABDOMINAL LT COM ILIAC VEL: 79 CM/S
ABDOMINAL LT COM ILLIAC ED VEL: 0 CM/S
ABDOMINAL RT COM ILIAC AP: 1.28 CM
ABDOMINAL RT COM ILIAC TRANS: 0.95 CM
ABDOMINAL RT COM ILIAC VEL: 39 CM/S
ABDOMINAL RT COM ILLIAC ED VEL: 0 CM/S
ABDOMINAL SUPRARENAL AORTA AP: 3.15 CM
ABDOMINAL SUPRARENAL AORTA ED VEL: 19 CM/S
ABDOMINAL SUPRARENAL AORTA PS VEL: 62 CM/S
ABDOMINAL SUPRARENAL AORTA TRANS: 2.98 CM
OHS CV AAA LARGEST SIZE: 3.15 CM

## 2023-07-13 PROCEDURE — 1160F PR REVIEW ALL MEDS BY PRESCRIBER/CLIN PHARMACIST DOCUMENTED: ICD-10-PCS | Mod: CPTII,S$GLB,, | Performed by: INTERNAL MEDICINE

## 2023-07-13 PROCEDURE — 3078F PR MOST RECENT DIASTOLIC BLOOD PRESSURE < 80 MM HG: ICD-10-PCS | Mod: CPTII,S$GLB,, | Performed by: INTERNAL MEDICINE

## 2023-07-13 PROCEDURE — 3008F BODY MASS INDEX DOCD: CPT | Mod: CPTII,S$GLB,, | Performed by: INTERNAL MEDICINE

## 2023-07-13 PROCEDURE — 99214 PR OFFICE/OUTPT VISIT, EST, LEVL IV, 30-39 MIN: ICD-10-PCS | Mod: S$GLB,,, | Performed by: INTERNAL MEDICINE

## 2023-07-13 PROCEDURE — 99999 PR PBB SHADOW E&M-EST. PATIENT-LVL V: CPT | Mod: PBBFAC,,, | Performed by: INTERNAL MEDICINE

## 2023-07-13 PROCEDURE — 3051F PR MOST RECENT HEMOGLOBIN A1C LEVEL 7.0 - < 8.0%: ICD-10-PCS | Mod: CPTII,S$GLB,, | Performed by: INTERNAL MEDICINE

## 2023-07-13 PROCEDURE — 93978 VASCULAR STUDY: CPT

## 2023-07-13 PROCEDURE — 3051F HG A1C>EQUAL 7.0%<8.0%: CPT | Mod: CPTII,S$GLB,, | Performed by: INTERNAL MEDICINE

## 2023-07-13 PROCEDURE — 93978 VASCULAR STUDY: CPT | Mod: 26,,, | Performed by: INTERNAL MEDICINE

## 2023-07-13 PROCEDURE — 4010F PR ACE/ARB THEARPY RXD/TAKEN: ICD-10-PCS | Mod: CPTII,S$GLB,, | Performed by: INTERNAL MEDICINE

## 2023-07-13 PROCEDURE — 3066F PR DOCUMENTATION OF TREATMENT FOR NEPHROPATHY: ICD-10-PCS | Mod: CPTII,S$GLB,, | Performed by: INTERNAL MEDICINE

## 2023-07-13 PROCEDURE — 99214 OFFICE O/P EST MOD 30 MIN: CPT | Mod: S$GLB,,, | Performed by: INTERNAL MEDICINE

## 2023-07-13 PROCEDURE — 1160F RVW MEDS BY RX/DR IN RCRD: CPT | Mod: CPTII,S$GLB,, | Performed by: INTERNAL MEDICINE

## 2023-07-13 PROCEDURE — 3066F NEPHROPATHY DOC TX: CPT | Mod: CPTII,S$GLB,, | Performed by: INTERNAL MEDICINE

## 2023-07-13 PROCEDURE — 1126F PR PAIN SEVERITY QUANTIFIED, NO PAIN PRESENT: ICD-10-PCS | Mod: CPTII,S$GLB,, | Performed by: INTERNAL MEDICINE

## 2023-07-13 PROCEDURE — 3074F PR MOST RECENT SYSTOLIC BLOOD PRESSURE < 130 MM HG: ICD-10-PCS | Mod: CPTII,S$GLB,, | Performed by: INTERNAL MEDICINE

## 2023-07-13 PROCEDURE — 1159F PR MEDICATION LIST DOCUMENTED IN MEDICAL RECORD: ICD-10-PCS | Mod: CPTII,S$GLB,, | Performed by: INTERNAL MEDICINE

## 2023-07-13 PROCEDURE — 3072F PR LOW RISK FOR RETINOPATHY: ICD-10-PCS | Mod: CPTII,S$GLB,, | Performed by: INTERNAL MEDICINE

## 2023-07-13 PROCEDURE — 1159F MED LIST DOCD IN RCRD: CPT | Mod: CPTII,S$GLB,, | Performed by: INTERNAL MEDICINE

## 2023-07-13 PROCEDURE — 99999 PR PBB SHADOW E&M-EST. PATIENT-LVL V: ICD-10-PCS | Mod: PBBFAC,,, | Performed by: INTERNAL MEDICINE

## 2023-07-13 PROCEDURE — 1126F AMNT PAIN NOTED NONE PRSNT: CPT | Mod: CPTII,S$GLB,, | Performed by: INTERNAL MEDICINE

## 2023-07-13 PROCEDURE — 4010F ACE/ARB THERAPY RXD/TAKEN: CPT | Mod: CPTII,S$GLB,, | Performed by: INTERNAL MEDICINE

## 2023-07-13 PROCEDURE — 3061F NEG MICROALBUMINURIA REV: CPT | Mod: CPTII,S$GLB,, | Performed by: INTERNAL MEDICINE

## 2023-07-13 PROCEDURE — 3078F DIAST BP <80 MM HG: CPT | Mod: CPTII,S$GLB,, | Performed by: INTERNAL MEDICINE

## 2023-07-13 PROCEDURE — 3072F LOW RISK FOR RETINOPATHY: CPT | Mod: CPTII,S$GLB,, | Performed by: INTERNAL MEDICINE

## 2023-07-13 PROCEDURE — 3061F PR NEG MICROALBUMINURIA RESULT DOCUMENTED/REVIEW: ICD-10-PCS | Mod: CPTII,S$GLB,, | Performed by: INTERNAL MEDICINE

## 2023-07-13 PROCEDURE — 93978 CV US ABDOMINAL AORTA EVALUATION (CUPID ONLY): ICD-10-PCS | Mod: 26,,, | Performed by: INTERNAL MEDICINE

## 2023-07-13 PROCEDURE — 3074F SYST BP LT 130 MM HG: CPT | Mod: CPTII,S$GLB,, | Performed by: INTERNAL MEDICINE

## 2023-07-13 PROCEDURE — 1157F PR ADVANCE CARE PLAN OR EQUIV PRESENT IN MEDICAL RECORD: ICD-10-PCS | Mod: CPTII,S$GLB,, | Performed by: INTERNAL MEDICINE

## 2023-07-13 PROCEDURE — 3008F PR BODY MASS INDEX (BMI) DOCUMENTED: ICD-10-PCS | Mod: CPTII,S$GLB,, | Performed by: INTERNAL MEDICINE

## 2023-07-13 PROCEDURE — 1157F ADVNC CARE PLAN IN RCRD: CPT | Mod: CPTII,S$GLB,, | Performed by: INTERNAL MEDICINE

## 2023-07-13 NOTE — PROGRESS NOTES
Subjective:    Patient ID:  Ned Oliveira is a 73 y.o. male who presents for follow-up of Coronary Artery Disease and Peripheral Arterial Disease      Referring physician: Self-referred    HPI  Mr. Oliveira is a 72 y.o. male with PMH for IDDM (a1c 7.7), HTN, and HLD.  On 12/12/22 he underwent PCI of mid RCA  using sub-intimal tracking and re-entry (STAR) technique followed by POBA with 2mm balloon.  Today he denies angina or SOB.  He denies LE edema, orthopnea, or PND.  He denies palpitations, syncope , or near syncope.  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  He gets short of breath upon climbing 5 flights of stairs without stopping, but never 1 or 2 flights. He rpeorts that he does push-up against a wall and toe touches daily without symptoms.      Past Medical History:   Diagnosis Date    Adrenal adenoma     BPH (benign prostatic hyperplasia)     Cataract     Coronary artery disease     Diabetes mellitus, type 2     Diabetic retinopathy     Glaucoma     Hyperlipidemia     Hypertension     Inguinal hernia of left side without obstruction or gangrene 02/06/2019    Nephrolithiasis 05/26/2016    Steroid responders, left 04/14/2020       Past Surgical History:   Procedure Laterality Date    BICEPS TENDON REPAIR Right     CATARACT EXTRACTION W/  INTRAOCULAR LENS IMPLANT Left 10/21/2020    COMPLEX PHACO IOL  AND BAERVELDT ()    COLONOSCOPY N/A 02/08/2019    Procedure: COLONOSCOPY;  Surgeon: Tavon Montes MD;  Location: [facility] ENDO [address]);  Service: Colon and Rectal;  Laterality: N/A;  will need . pt requesting ASAP. gaudencio Perez (international) ok to schedule at this time. will send  up for 6:45 am arrival time    CORONARY ANGIOGRAPHY N/A 12/12/2022    Procedure: ANGIOGRAM, CORONARY ARTERY;  Surgeon: Drew Peralta MD;  Location: [facility] CATH LAB;  Service: Cardiology;  Laterality: N/A;    EYE SURGERY      HERNIA REPAIR       IMPLANTATION OF DEVICE FOR GLAUCOMA Left 10/21/2020    Procedure: INSERTION, DEVICE, FOR GLAUCOMA;  Surgeon: Saranya Monge MD;  Location: [facility] OR [address] [place] [address];  Service: Ophthalmology;  Laterality: Left;    INTRAOCULAR PROSTHESES INSERTION Left 10/21/2020    Procedure: INSERTION, IOL PROSTHESIS;  Surgeon: Saranya Monge MD;  [facility];  Service: Ophthalmology;  Laterality: Left;    LEFT HEART CATHETERIZATION Left 7/8/2022    Procedure: Left heart cath;  Surgeon: Drew Peralta MD;  Location: [facility] CATH LAB;  Service: Cardiology;  Laterality: Left;    PHACOEMULSIFICATION OF CATARACT Left 10/21/2020    Procedure: PHACOEMULSIFICATION, CATARACT;  Surgeon: Saranya Monge MD;  Location: [facility] OR [address] [place] [address];  Service: Ophthalmology;  Laterality: Left;    PTCA, SINGLE VESSEL  12/12/2022    Procedure: PTCA, Single Vessel;  Surgeon: Drew Peralta MD;  Location: [facility] CATH LAB;  Service: Cardiology;;    REFORMATION OF ANTERIOR CHAMBER Left 12/23/2020    WITH UNIQUE ()    STENT, DRUG ELUTING, SINGLE VESSEL, CORONARY Right 12/12/2022    Procedure: Stent, Drug Eluting, Single Vessel, Coronary;  Surgeon: Drew Peralta MD;  Location: [facility] CATH LAB;  Service: Cardiology;  Laterality: Right;  very low bleeding risk 0.9%    YAG LASER  TO CYCLITIC MEMBRANE Left 12/10/2020           Current Outpatient Medications on File Prior to Visit   Medication Sig Dispense Refill    aspirin (ECOTRIN) 81 MG EC tablet Take 1 tablet (81 mg total) by mouth once daily. 365 tablet 0    atorvastatin (LIPITOR) 80 MG tablet Take 1 tablet (80 mg total) by mouth once daily. 90 tablet 3    diclofenac sodium (VOLTAREN) 1 % Gel Apply 2 g topically 3 (three) times daily. 50 g 2    dorzolamide-timolol 2-0.5% (COSOPT) 22.3-6.8 mg/mL ophthalmic solution Place 1 drop into the right eye 2 (two) times daily. 20 mL 3    finasteride (PROSCAR) 5 mg tablet Take 5 mg by mouth once daily.       fluticasone propionate (FLONASE) 50 mcg/actuation nasal spray 1 spray (50 mcg total) by Each Nostril route once daily. 16 g 1    insulin glargine-lixisenatide (SOLIQUA 100/33) 100 unit-33 mcg/mL InPn pen Inject 15 Units into the skin daily with breakfast. Can titrate up by 2 units daily as needed to get goal glucose 100 - 150 - max TDD 60 units/day. 15 pen 3    lisinopriL-hydrochlorothiazide (PRINZIDE,ZESTORETIC) 10-12.5 mg per tablet Take 1 tablet by mouth once daily. 90 tablet 3    metFORMIN (GLUCOPHAGE) 1000 MG tablet Take 1 tablet (1,000 mg total) by mouth 2 (two) times daily with meals. 180 tablet 0    metoprolol succinate (TOPROL-XL) 25 MG 24 hr tablet Take 0.5 tablets (12.5 mg total) by mouth once daily. 45 tablet 3    tamsulosin (FLOMAX) 0.4 mg Cap TAKE 1 CAPSULE (0.4 MG TOTAL) BY MOUTH EVERY EVENING. 90 capsule 3    albuterol (VENTOLIN HFA) 90 mcg/actuation inhaler Inhale 2 puffs into the lungs every 6 (six) hours as needed for Wheezing or Shortness of Breath. Rescue (Patient not taking: Reported on 7/13/2023) 18 g 1    blood sugar diagnostic Strp 1 strip by Misc.(Non-Drug; Combo Route) route 2 (two) times daily as needed (hypoglycemia). 100 strip 11    blood-glucose meter kit Use as instructed 1 each 0    blood-glucose meter,continuous (DEXCOM ) Misc 1 Device by Misc.(Non-Drug; Combo Route) route continuous. 1 each 0    blood-glucose sensor (DEXCOM G7 SENSOR) Khushi 1 each by Misc.(Non-Drug; Combo Route) route every 10 days. 10 each 3    clopidogreL (PLAVIX) 75 mg tablet Take 1 tablet (75 mg total) by mouth once daily. (Patient not taking: Reported on 7/13/2023) 90 tablet 3    levocetirizine (XYZAL) 5 MG tablet Take 1 tablet (5 mg total) by mouth every evening. (Patient not taking: Reported on 7/13/2023) 30 tablet 11    nitroGLYCERIN (NITROSTAT) 0.4 MG SL tablet Place 1 tablet (0.4 mg total) under the tongue every 5 (five) minutes as needed for Chest pain (ONLY IF HAVE CHEST PAIN,). (Patient not  "taking: Reported on 2023) 30 tablet 0    omeprazole (PRILOSEC) 40 MG capsule Take 1 capsule (40 mg total) by mouth 2 (two) times daily before meals. for 14 days (Patient not taking: Reported on 2023) 28 capsule 0    OZURDEX 0.7 mg Impl intravitreal implant       pen needle, diabetic (BD ULTRA-FINE ORIG PEN NEEDLE) 29 gauge x 1/2" Ndle 20 Units by Misc.(Non-Drug; Combo Route) route once daily. 100 each 6     No current facility-administered medications on file prior to visit.       Review of patient's allergies indicates:   Allergen Reactions    Percocet [oxycodone-acetaminophen] Itching       Social History     Tobacco Use    Smoking status: Former     Packs/day: 2.00     Years: 10.00     Pack years: 20.00     Types: Cigarettes     Start date: 8/15/1987     Quit date: 8/15/1996     Years since quittin.9    Smokeless tobacco: Never   Substance Use Topics    Alcohol use: No     Alcohol/week: 0.0 standard drinks    Drug use: No       Family History   Problem Relation Age of Onset    Diabetes Mother     Heart disease Mother     Heart disease Sister     No Known Problems Brother     Kidney failure Daughter         ESRD on HD    Autism Son     Asthma Son     No Known Problems Sister     No Known Problems Sister     No Known Problems Sister     No Known Problems Brother     Diabetes Brother     Stroke Neg Hx     Amblyopia Neg Hx     Blindness Neg Hx     Cataracts Neg Hx     Glaucoma Neg Hx     Macular degeneration Neg Hx     Retinal detachment Neg Hx     Strabismus Neg Hx          Review of Systems   Constitutional: Negative for decreased appetite, diaphoresis, fever, malaise/fatigue, weight gain and weight loss.   HENT:  Negative for congestion, nosebleeds and sore throat.    Eyes:  Negative for blurred vision, vision loss in left eye, vision loss in right eye and visual disturbance.   Cardiovascular:  Negative for chest pain, claudication, dyspnea on exertion, leg swelling, near-syncope, orthopnea, " "palpitations, paroxysmal nocturnal dyspnea and syncope.   Respiratory:  Negative for cough, hemoptysis, shortness of breath and wheezing.    Endocrine: Negative for polyuria.   Hematologic/Lymphatic: Does not bruise/bleed easily.   Skin:  Negative for nail changes and rash.   Musculoskeletal:  Negative for back pain, muscle cramps and myalgias.   Gastrointestinal:  Negative for abdominal pain, change in bowel habit, diarrhea, heartburn, hematemesis, hematochezia, melena, nausea and vomiting.   Genitourinary:  Negative for bladder incontinence, dysuria, frequency and hematuria.   Psychiatric/Behavioral:  Negative for depression.    Allergic/Immunologic: Negative for hives.      Objective:     Vitals:    07/13/23 1152 07/13/23 1155   BP: (!) 89/54 97/61   BP Location: Left arm Right arm   Patient Position: Sitting Sitting   BP Method: Large (Automatic) Large (Automatic)   Pulse: (!) 56 (!) 57   SpO2: 100%    Weight: 62.6 kg (138 lb 0.1 oz)    Height: 5' 4" (1.626 m)         Physical Exam  Constitutional:       Appearance: Normal appearance. He is well-developed.   HENT:      Head: Normocephalic and atraumatic.   Eyes:      Extraocular Movements: Extraocular movements intact.   Neck:      Thyroid: No thyromegaly.      Vascular: No JVD.   Cardiovascular:      Rate and Rhythm: Normal rate and regular rhythm.      Chest Wall: PMI is displaced.      Pulses:           Carotid pulses are 2+ on the right side and 2+ on the left side.       Radial pulses are 2+ on the right side and 2+ on the left side.        Femoral pulses are 2+ on the right side and 2+ on the left side.       Dorsalis pedis pulses are 2+ on the right side and 2+ on the left side.        Posterior tibial pulses are 2+ on the right side and 2+ on the left side.      Heart sounds: No murmur heard.    No friction rub. No gallop.      Comments: Normal right radial Luan's test.  Pulmonary:      Effort: Pulmonary effort is normal.      Breath sounds: Normal " breath sounds. No wheezing, rhonchi or rales.   Abdominal:      General: Bowel sounds are normal. There is no distension.      Palpations: Abdomen is soft.      Tenderness: There is no abdominal tenderness.   Musculoskeletal:      Cervical back: Neck supple.   Skin:     General: Skin is warm and dry.   Neurological:      Mental Status: He is alert and oriented to person, place, and time.      Gait: Gait normal.   Psychiatric:         Mood and Affect: Mood normal.         Behavior: Behavior normal.         Assessment:       1. S/P coronary angioplasty    2. Coronary artery disease involving native coronary artery of native heart without angina pectoris    3. Essential hypertension    4. Hyperlipidemia associated with type 2 diabetes mellitus    5. Infrarenal abdominal aortic aneurysm (AAA) without rupture    6. Aortic atherosclerosis    7. Type 2 diabetes mellitus with hyperglycemia, with long-term current use of insulin         Plan:       1) CAD.  The patient reports he no longer experiences exertional angina.  He has CAD risk factors of HTN and HLD.  The goal of treatment is secondary prevention of CAD;  -continue EC ASA 81mg po qday  -continue Plavix 75mg po qday  -continue Toprol XL 25mg po qday    2) HTN.  The patient's blood pressure is adequately controlled in clinic today;  -continue current medications     3) Dyslipidemia. Continue Liptior 80mg po qday     4) Type 2 diabetes. The patient's HgbA1c on 05/17/2023  was 7.4.  -rec tight glycemic control    5) AAA. Today's abdominal aorta duplex reviewed; no signficant change compared to 7/11/22 study; rec repeat in 1 year     All of the patient's and his wife questions were answered.

## 2023-07-15 PROBLEM — I20.89 CHRONIC STABLE ANGINA: Chronic | Status: RESOLVED | Noted: 2019-10-07 | Resolved: 2023-07-15

## 2023-07-18 ENCOUNTER — TELEPHONE (OUTPATIENT)
Dept: CARDIAC REHAB | Facility: CLINIC | Age: 73
End: 2023-07-18
Payer: MEDICARE

## 2023-07-18 NOTE — LETTER
July 18, 2023    Ned Oliveira  [address]  [place] [address]             [facility]  [address]  [place] [address]  Phone: [phone]                                  [facility]   [address]   [place] [address]  [phone]         [facility]   [address] [place] [address] [phone]         [facility]    [address]  [place] [address] [phone]   Re: Ned Oliveira  Clinic number: [id number]    Dear Mr. Oliveira:    You were recently admitted to an [facility] facility for cardiac (heart) problem.  Your physician has referred you to [facility].  Cardiac Rehab Phase 2 is an educational and exercise program, conducted in a outpatient setting, proven to help reduce your risk for recurrent heart events.    Cardiac rehab has two major parts:    1. Exercise training to help you achieve cardiovascular fitness while learning how to exercise safely and improve muscle strength and endurance.  Your exercise prescription will be based on the results of the cardiopulmonary stress test (CPX) which will be done before entering the program and at completion.  2. Education, counseling and training to help you understand your heart condition and find ways to reduce your risk of future heart problems.  The cardiac rehab team will help you learn how to cope with the stress of adjusting to a new lifestyle and to deal with your fears about the future.    Phase 2 is a 36-session program, meeting 3 times a week for 12 weeks.  Each session consists of an hour of exercise and half-hour dedicated to the educational topic of the day.  Class days vary per location.  Please contact your nearest facility for details.    Through cardiac rehab you will learn:  About your heart condition, medical therapies, and medication  Risk factors in y our lifestyle contributing to heart  disease  New strategies to modify your risk factors  About a healthy diet that can lower your blood cholesterol, control weight, help prevent or control high blood pressure, and diabetes  How to stop smoking  How to manage stress    If you are interested in getting started, call the [facility] of your choosing.     Sincerely,     [facility]

## 2023-07-18 NOTE — TELEPHONE ENCOUNTER
Letter regarding Phase II cardiac rehab was sent to patient.  Will contact patient in 2 weeks to see if interested.  Also, information letter sent to [facility].  Patient was  referred for rehab in December.  He did pre CPX/Labs & orientation, but was unable to attend due to work issues.  Mili French RN  Cardiac Rehab Nurse

## 2023-07-18 NOTE — LETTER
July 18, 2023    Ned Oliveira  [address]  [place] [address]             [facility]  [address]  [place] [address]  Phone: [phone]                                  [facility]   [address]   [place] [address]  [phone]         [facility]   [address] [place] [address] [phone]         [facility]    [address]  [place] [address] [phone]   Re: Ned Oliveira  Clinic number: [id number]    Dear Mr. Oliveira:    You were recently admitted to an [facility] facility for cardiac (heart) problem.  Your physician has referred you to [facility].  Cardiac Rehab Phase 2 is an educational and exercise program, conducted in a outpatient setting, proven to help reduce your risk for recurrent heart events.    Cardiac rehab has two major parts:    1. Exercise training to help you achieve cardiovascular fitness while learning how to exercise safely and improve muscle strength and endurance.  Your exercise prescription will be based on the results of the cardiopulmonary stress test (CPX) which will be done before entering the program and at completion.  2. Education, counseling and training to help you understand your heart condition and find ways to reduce your risk of future heart problems.  The cardiac rehab team will help you learn how to cope with the stress of adjusting to a new lifestyle and to deal with your fears about the future.    Phase 2 is a 36-session program, meeting 3 times a week for 12 weeks.  Each session consists of an hour of exercise and half-hour dedicated to the educational topic of the day.  Class days vary per location.  Please contact your nearest facility for details.    Through cardiac rehab you will learn:  About your heart condition, medical therapies, and medication  Risk factors in y our lifestyle contributing to heart  disease  New strategies to modify your risk factors  About a healthy diet that can lower your blood cholesterol, control weight, help prevent or control high blood pressure, and diabetes  How to stop smoking  How to manage stress    If you are interested in getting started, call the [facility] of your choosing.     Sincerely,     [facility]

## 2023-07-19 ENCOUNTER — CLINICAL SUPPORT (OUTPATIENT)
Dept: DIABETES | Facility: CLINIC | Age: 73
End: 2023-07-19
Payer: MEDICARE

## 2023-07-19 DIAGNOSIS — Z79.4 TYPE 2 DIABETES MELLITUS WITH BOTH EYES AFFECTED BY MILD NONPROLIFERATIVE RETINOPATHY AND MACULAR EDEMA, WITH LONG-TERM CURRENT USE OF INSULIN: Primary | ICD-10-CM

## 2023-07-19 DIAGNOSIS — E11.3213 TYPE 2 DIABETES MELLITUS WITH BOTH EYES AFFECTED BY MILD NONPROLIFERATIVE RETINOPATHY AND MACULAR EDEMA, WITH LONG-TERM CURRENT USE OF INSULIN: Primary | ICD-10-CM

## 2023-07-19 PROCEDURE — G0108 DIAB MANAGE TRN  PER INDIV: HCPCS | Mod: S$GLB,,, | Performed by: DIETITIAN, REGISTERED

## 2023-07-19 PROCEDURE — G0108 PR DIAB MANAGE TRN  PER INDIV: ICD-10-PCS | Mod: S$GLB,,, | Performed by: DIETITIAN, REGISTERED

## 2023-07-19 NOTE — PROGRESS NOTES
Diabetes Care Specialist Progress Note  Author: Norma Garcia RD, CDE  Date: 7/19/2023    Program Intake  Reason for Diabetes Program Visit:: Intervention  Type of Intervention:: Individual  Individual: Device Training  Device Training: Personal CGM    Lab Results   Component Value Date    HGBA1C 7.4 (H) 05/17/2023     Patient returns to clinic for assistance with Dexcom sensor change. He changed out the sensor Saturday night which would have been his first sensor change since starting the Dexcom on 7/5. He did get readings on Sunday. It looks like sensor stopped working around 12:30 AM on Monday morning. Patient did set sensor up correctly - provided tech support number for patient to call to request a replacement sensor. Patient was able to set up a new sensor in the clinic and sensor warm up was started.    Assessment Summary and Plan    Based on today's diabetes care assessment, the following areas of need were identified:      Social 2/15/2023   Support No   Cognitive/Behavioral Health No   Culture/Scientologist No   Communication No   Health Literacy No        Clinical 2/15/2023   Medication Adherence No   Nutritional Status No        Diabetes Self-Management Skills 2/15/2023   Diabetes Disease Process/Treatment Options Yes   Nutrition/Healthy Eating Yes   Physical Activity/Exercise Yes   Medication Yes   Home Blood Glucose Monitoring Yes   Acute Complications Yes   Chronic Complications Yes   Psychosocial/Coping No          Today's interventions were provided through individual discussion, instruction, and written materials were provided.      Patient verbalized understanding of instruction and written materials.  Pt was able to return back demonstration of instructions today. Patient understood key points, needs reinforcement and further instruction.     Diabetes Self-Management Care Plan:    Today's Diabetes Self-Management Care Plan was developed with Ned's input. Ned has agreed to work toward the following  goal(s) to improve his/her overall diabetes control.      There are no recently modified care plans to display for this patient.      Follow Up Plan     Follow up in about 8 days (around 7/27/2023) for Provider Visit.    Today's care plan and follow up schedule was discussed with patient.  Ned verbalized understanding of the care plan, goals, and agrees to follow up plan.        The patient was encouraged to communicate with his/her health care provider/physician and care team regarding his/her condition(s) and treatment.  I provided the patient with my contact information today and encouraged to contact me via phone or [facility] Patient Portal as needed.     Length of Visit   Total Time: 30 Minutes

## 2023-07-27 ENCOUNTER — OFFICE VISIT (OUTPATIENT)
Dept: FAMILY MEDICINE | Facility: CLINIC | Age: 73
End: 2023-07-27
Payer: MEDICARE

## 2023-07-27 VITALS
HEIGHT: 64 IN | BODY MASS INDEX: 23.68 KG/M2 | DIASTOLIC BLOOD PRESSURE: 64 MMHG | HEART RATE: 65 BPM | WEIGHT: 138.69 LBS | SYSTOLIC BLOOD PRESSURE: 108 MMHG | OXYGEN SATURATION: 98 %

## 2023-07-27 DIAGNOSIS — I25.10 CORONARY ARTERY DISEASE INVOLVING NATIVE HEART WITHOUT ANGINA PECTORIS, UNSPECIFIED VESSEL OR LESION TYPE: ICD-10-CM

## 2023-07-27 DIAGNOSIS — Z79.4 TYPE 2 DIABETES MELLITUS WITH BOTH EYES AFFECTED BY MILD NONPROLIFERATIVE RETINOPATHY AND MACULAR EDEMA, WITH LONG-TERM CURRENT USE OF INSULIN: ICD-10-CM

## 2023-07-27 DIAGNOSIS — E11.3213 TYPE 2 DIABETES MELLITUS WITH BOTH EYES AFFECTED BY MILD NONPROLIFERATIVE RETINOPATHY AND MACULAR EDEMA, WITH LONG-TERM CURRENT USE OF INSULIN: ICD-10-CM

## 2023-07-27 DIAGNOSIS — R10.13 EPIGASTRIC PAIN: ICD-10-CM

## 2023-07-27 DIAGNOSIS — I10 ESSENTIAL HYPERTENSION: Primary | Chronic | ICD-10-CM

## 2023-07-27 PROCEDURE — 3061F NEG MICROALBUMINURIA REV: CPT | Mod: CPTII,S$GLB,, | Performed by: FAMILY MEDICINE

## 2023-07-27 PROCEDURE — 3074F PR MOST RECENT SYSTOLIC BLOOD PRESSURE < 130 MM HG: ICD-10-PCS | Mod: CPTII,S$GLB,, | Performed by: FAMILY MEDICINE

## 2023-07-27 PROCEDURE — 4010F PR ACE/ARB THEARPY RXD/TAKEN: ICD-10-PCS | Mod: CPTII,S$GLB,, | Performed by: FAMILY MEDICINE

## 2023-07-27 PROCEDURE — 99999 PR PBB SHADOW E&M-EST. PATIENT-LVL V: CPT | Mod: PBBFAC,,, | Performed by: FAMILY MEDICINE

## 2023-07-27 PROCEDURE — 3061F PR NEG MICROALBUMINURIA RESULT DOCUMENTED/REVIEW: ICD-10-PCS | Mod: CPTII,S$GLB,, | Performed by: FAMILY MEDICINE

## 2023-07-27 PROCEDURE — 4010F ACE/ARB THERAPY RXD/TAKEN: CPT | Mod: CPTII,S$GLB,, | Performed by: FAMILY MEDICINE

## 2023-07-27 PROCEDURE — 1157F ADVNC CARE PLAN IN RCRD: CPT | Mod: CPTII,S$GLB,, | Performed by: FAMILY MEDICINE

## 2023-07-27 PROCEDURE — 3051F PR MOST RECENT HEMOGLOBIN A1C LEVEL 7.0 - < 8.0%: ICD-10-PCS | Mod: CPTII,S$GLB,, | Performed by: FAMILY MEDICINE

## 2023-07-27 PROCEDURE — 1101F PR PT FALLS ASSESS DOC 0-1 FALLS W/OUT INJ PAST YR: ICD-10-PCS | Mod: CPTII,S$GLB,, | Performed by: FAMILY MEDICINE

## 2023-07-27 PROCEDURE — 1160F RVW MEDS BY RX/DR IN RCRD: CPT | Mod: CPTII,S$GLB,, | Performed by: FAMILY MEDICINE

## 2023-07-27 PROCEDURE — 3288F FALL RISK ASSESSMENT DOCD: CPT | Mod: CPTII,S$GLB,, | Performed by: FAMILY MEDICINE

## 2023-07-27 PROCEDURE — 3072F LOW RISK FOR RETINOPATHY: CPT | Mod: CPTII,S$GLB,, | Performed by: FAMILY MEDICINE

## 2023-07-27 PROCEDURE — 1126F PR PAIN SEVERITY QUANTIFIED, NO PAIN PRESENT: ICD-10-PCS | Mod: CPTII,S$GLB,, | Performed by: FAMILY MEDICINE

## 2023-07-27 PROCEDURE — 1101F PT FALLS ASSESS-DOCD LE1/YR: CPT | Mod: CPTII,S$GLB,, | Performed by: FAMILY MEDICINE

## 2023-07-27 PROCEDURE — 99215 PR OFFICE/OUTPT VISIT, EST, LEVL V, 40-54 MIN: ICD-10-PCS | Mod: S$GLB,,, | Performed by: FAMILY MEDICINE

## 2023-07-27 PROCEDURE — 3072F PR LOW RISK FOR RETINOPATHY: ICD-10-PCS | Mod: CPTII,S$GLB,, | Performed by: FAMILY MEDICINE

## 2023-07-27 PROCEDURE — 3051F HG A1C>EQUAL 7.0%<8.0%: CPT | Mod: CPTII,S$GLB,, | Performed by: FAMILY MEDICINE

## 2023-07-27 PROCEDURE — 1126F AMNT PAIN NOTED NONE PRSNT: CPT | Mod: CPTII,S$GLB,, | Performed by: FAMILY MEDICINE

## 2023-07-27 PROCEDURE — 1157F PR ADVANCE CARE PLAN OR EQUIV PRESENT IN MEDICAL RECORD: ICD-10-PCS | Mod: CPTII,S$GLB,, | Performed by: FAMILY MEDICINE

## 2023-07-27 PROCEDURE — 3078F DIAST BP <80 MM HG: CPT | Mod: CPTII,S$GLB,, | Performed by: FAMILY MEDICINE

## 2023-07-27 PROCEDURE — 1159F PR MEDICATION LIST DOCUMENTED IN MEDICAL RECORD: ICD-10-PCS | Mod: CPTII,S$GLB,, | Performed by: FAMILY MEDICINE

## 2023-07-27 PROCEDURE — 1160F PR REVIEW ALL MEDS BY PRESCRIBER/CLIN PHARMACIST DOCUMENTED: ICD-10-PCS | Mod: CPTII,S$GLB,, | Performed by: FAMILY MEDICINE

## 2023-07-27 PROCEDURE — 3066F PR DOCUMENTATION OF TREATMENT FOR NEPHROPATHY: ICD-10-PCS | Mod: CPTII,S$GLB,, | Performed by: FAMILY MEDICINE

## 2023-07-27 PROCEDURE — 3074F SYST BP LT 130 MM HG: CPT | Mod: CPTII,S$GLB,, | Performed by: FAMILY MEDICINE

## 2023-07-27 PROCEDURE — 3078F PR MOST RECENT DIASTOLIC BLOOD PRESSURE < 80 MM HG: ICD-10-PCS | Mod: CPTII,S$GLB,, | Performed by: FAMILY MEDICINE

## 2023-07-27 PROCEDURE — 3288F PR FALLS RISK ASSESSMENT DOCUMENTED: ICD-10-PCS | Mod: CPTII,S$GLB,, | Performed by: FAMILY MEDICINE

## 2023-07-27 PROCEDURE — 3008F PR BODY MASS INDEX (BMI) DOCUMENTED: ICD-10-PCS | Mod: CPTII,S$GLB,, | Performed by: FAMILY MEDICINE

## 2023-07-27 PROCEDURE — 99999 PR PBB SHADOW E&M-EST. PATIENT-LVL V: ICD-10-PCS | Mod: PBBFAC,,, | Performed by: FAMILY MEDICINE

## 2023-07-27 PROCEDURE — 3008F BODY MASS INDEX DOCD: CPT | Mod: CPTII,S$GLB,, | Performed by: FAMILY MEDICINE

## 2023-07-27 PROCEDURE — 1159F MED LIST DOCD IN RCRD: CPT | Mod: CPTII,S$GLB,, | Performed by: FAMILY MEDICINE

## 2023-07-27 PROCEDURE — 3066F NEPHROPATHY DOC TX: CPT | Mod: CPTII,S$GLB,, | Performed by: FAMILY MEDICINE

## 2023-07-27 PROCEDURE — 99215 OFFICE O/P EST HI 40 MIN: CPT | Mod: S$GLB,,, | Performed by: FAMILY MEDICINE

## 2023-07-27 RX ORDER — BLOOD-GLUCOSE SENSOR
1 EACH MISCELLANEOUS
Qty: 10 EACH | Refills: 3 | Status: SHIPPED | OUTPATIENT
Start: 2023-07-27 | End: 2024-07-26

## 2023-07-27 RX ORDER — CLOPIDOGREL BISULFATE 75 MG/1
75 TABLET ORAL DAILY
Qty: 90 TABLET | Refills: 3 | Status: SHIPPED | OUTPATIENT
Start: 2023-07-27 | End: 2024-07-26

## 2023-07-27 RX ORDER — PANTOPRAZOLE SODIUM 40 MG/1
40 TABLET, DELAYED RELEASE ORAL
Qty: 90 TABLET | Refills: 0 | Status: SHIPPED | OUTPATIENT
Start: 2023-07-27 | End: 2024-01-24

## 2023-07-27 RX ORDER — INSULIN GLARGINE AND LIXISENATIDE 100; 33 U/ML; UG/ML
15 INJECTION, SOLUTION SUBCUTANEOUS
Qty: 5 PEN | Refills: 3 | Status: SHIPPED | OUTPATIENT
Start: 2023-07-27 | End: 2023-10-03

## 2023-07-27 NOTE — PROGRESS NOTES
Subjective     Patient ID: Ned Oliveira is a 73 y.o. male.    Chief Complaint: Follow-up (/)    73 years old male came to the clinic for blood pressure check.  Blood pressure today was stable.  Last A1c was stable.  No polyuria, polydipsia or polyphagia.  He reports episodic epigastric pain.  Patient with stent placement last December.  He was not taking Plavix.    Follow-up  Associated symptoms include abdominal pain. Pertinent negatives include no chest pain.   Review of Systems   Constitutional: Negative.    HENT: Negative.     Eyes: Negative.    Respiratory: Negative.     Cardiovascular: Negative.  Negative for chest pain, palpitations, leg swelling and claudication.   Gastrointestinal:  Positive for abdominal pain.   Genitourinary: Negative.    Musculoskeletal: Negative.    Integumentary:  Negative.   Neurological: Negative.    Psychiatric/Behavioral: Negative.          Objective     Physical Exam  Vitals and nursing note reviewed.   Constitutional:       General: He is not in acute distress.     Appearance: He is well-developed. He is not diaphoretic.   HENT:      Head: Normocephalic and atraumatic.      Right Ear: External ear normal.      Left Ear: External ear normal.      Nose: Nose normal.      Mouth/Throat:      Pharynx: No oropharyngeal exudate.   Eyes:      General: No scleral icterus.        Right eye: No discharge.         Left eye: No discharge.      Conjunctiva/sclera: Conjunctivae normal.      Pupils: Pupils are equal, round, and reactive to light.   Neck:      Thyroid: No thyromegaly.      Vascular: No JVD.      Trachea: No tracheal deviation.   Cardiovascular:      Rate and Rhythm: Normal rate and regular rhythm.      Heart sounds: Normal heart sounds. No murmur heard.    No friction rub. No gallop.   Pulmonary:      Effort: Pulmonary effort is normal. No respiratory distress.      Breath sounds: Normal breath sounds. No stridor. No wheezing or rales.   Chest:      Chest wall: No tenderness.    Abdominal:      General: Bowel sounds are normal. There is no distension.      Palpations: Abdomen is soft. There is no mass.      Tenderness: There is abdominal tenderness in the epigastric area. There is no guarding or rebound.   Musculoskeletal:         General: No tenderness. Normal range of motion.      Cervical back: Normal range of motion and neck supple.   Lymphadenopathy:      Cervical: No cervical adenopathy.   Skin:     General: Skin is warm and dry.      Coloration: Skin is not pale.      Findings: No erythema or rash.   Neurological:      Mental Status: He is alert and oriented to person, place, and time.      Cranial Nerves: No cranial nerve deficit.      Motor: No abnormal muscle tone.      Coordination: Coordination normal.      Deep Tendon Reflexes: Reflexes are normal and symmetric. Reflexes normal.   Psychiatric:         Behavior: Behavior normal.         Thought Content: Thought content normal.         Judgment: Judgment normal.          Assessment and Plan     1. Essential hypertension    2. Type 2 diabetes mellitus with both eyes affected by mild nonproliferative retinopathy and macular edema, with long-term current use of insulin  -     insulin glargine-lixisenatide (SOLIQUA 100/33) 100 unit-33 mcg/mL InPn pen; Inject 15 Units into the skin daily with breakfast. Can titrate up by 2 units daily as needed to get goal glucose 100 - 150 - max TDD 60 units/day.  Dispense: 5 pen ; Refill: 3  -     blood-glucose sensor (DEXCOM G7 SENSOR) Khushi; 1 each by Misc.(Non-Drug; Combo Route) route every 10 days.  Dispense: 10 each; Refill: 3    3. Epigastric pain  -     pantoprazole (PROTONIX) 40 MG tablet; Take 1 tablet (40 mg total) by mouth before breakfast.  Dispense: 90 tablet; Refill: 0  -     US Abdomen Complete; Future; Expected date: 07/27/2023    4. Coronary artery disease involving native heart without angina pectoris, unspecified vessel or lesion type  -     clopidogreL (PLAVIX) 75 mg tablet;  Take 1 tablet (75 mg total) by mouth once daily.  Dispense: 90 tablet; Refill: 3        Continue monitoring blood pressure at home, low sodium diet.   Continue monitoring blood sugar at home,ADA diet.          Follow up in about 4 months (around 11/27/2023), or if symptoms worsen or fail to improve.

## 2023-08-01 ENCOUNTER — TELEPHONE (OUTPATIENT)
Dept: CARDIAC REHAB | Facility: CLINIC | Age: 73
End: 2023-08-01
Payer: MEDICARE

## 2023-08-03 DIAGNOSIS — E11.65 TYPE 2 DIABETES MELLITUS WITH HYPERGLYCEMIA, WITH LONG-TERM CURRENT USE OF INSULIN: ICD-10-CM

## 2023-08-03 DIAGNOSIS — I10 ESSENTIAL HYPERTENSION: ICD-10-CM

## 2023-08-03 DIAGNOSIS — Z79.4 TYPE 2 DIABETES MELLITUS WITH HYPERGLYCEMIA, WITH LONG-TERM CURRENT USE OF INSULIN: ICD-10-CM

## 2023-08-03 RX ORDER — METOPROLOL SUCCINATE 25 MG/1
TABLET, EXTENDED RELEASE ORAL
Qty: 45 TABLET | Refills: 3 | Status: SHIPPED | OUTPATIENT
Start: 2023-08-03

## 2023-08-03 RX ORDER — PEN NEEDLE, DIABETIC 29 G X1/2"
NEEDLE, DISPOSABLE MISCELLANEOUS
Qty: 100 EACH | Refills: 3 | Status: SHIPPED | OUTPATIENT
Start: 2023-08-03

## 2023-08-03 NOTE — TELEPHONE ENCOUNTER
Pt medicated per MAR. Pt to imaging.    Refill Routing Note   Medication(s) are not appropriate for processing by [facility] for the following reason(s):      No active prescription written by PCP    [facility] action(s):  Defer Care Due:  Labs due: CMP/lipid panel due 9/23/23          Appointments  past 12m or future 3m with PCP    Date Provider   Last Visit   5/17/2023 Christian Chatterjee MD   Next Visit   9/20/2023 Christian Chatterjee MD   ED visits in past 90 days: 0        Note composed:3:47 PM 08/03/2023

## 2023-08-03 NOTE — TELEPHONE ENCOUNTER
Care Due:                  Date            Visit Type   Department     Provider  --------------------------------------------------------------------------------                                EP -                              PRIMARY      [facility] INTERNAL  Last Visit: 05-      CARE ([facility])   [facility]       Christian Chatterjee                              EP -                              PRIMARY      [facility] INTERNAL  Next Visit: 09-      CARE ([facility])   [facility]  Sen                                                            Last  Test          Frequency    Reason                     Performed    Due Date  --------------------------------------------------------------------------------    CMP.........  12 months..  atorvastatin.............  09- 09-    Lipid Panel.  12 months..  atorvastatin.............  09- 09-    [employer] Embedded Care Due Messages. Reference number: [id number].   8/03/2023 10:52:45 AM CDT

## 2023-08-09 ENCOUNTER — HOSPITAL ENCOUNTER (OUTPATIENT)
Dept: RADIOLOGY | Facility: HOSPITAL | Age: 73
Discharge: HOME | End: 2023-08-09
Attending: FAMILY MEDICINE
Payer: MEDICARE

## 2023-08-09 DIAGNOSIS — R10.13 EPIGASTRIC PAIN: ICD-10-CM

## 2023-08-09 PROCEDURE — 76700 US ABDOMEN COMPLETE: ICD-10-PCS | Mod: 26,,, | Performed by: RADIOLOGY

## 2023-08-09 PROCEDURE — 76700 US EXAM ABDOM COMPLETE: CPT | Mod: TC

## 2023-08-09 PROCEDURE — 76700 US EXAM ABDOM COMPLETE: CPT | Mod: 26,,, | Performed by: RADIOLOGY

## 2023-08-21 RX ORDER — ATORVASTATIN CALCIUM 80 MG/1
80 TABLET, FILM COATED ORAL DAILY
Qty: 90 TABLET | Refills: 3 | Status: SHIPPED | OUTPATIENT
Start: 2023-08-21 | End: 2024-08-20

## 2023-08-21 RX ORDER — NITROGLYCERIN 0.4 MG/1
0.4 TABLET SUBLINGUAL EVERY 5 MIN PRN
Qty: 30 TABLET | Refills: 0 | Status: SHIPPED | OUTPATIENT
Start: 2023-08-21 | End: 2024-08-20

## 2023-09-09 ENCOUNTER — OFFICE VISIT (OUTPATIENT)
Dept: URGENT CARE | Facility: CLINIC | Age: 73
End: 2023-09-09
Payer: MEDICARE

## 2023-09-09 VITALS
HEIGHT: 64 IN | TEMPERATURE: 98 F | SYSTOLIC BLOOD PRESSURE: 92 MMHG | HEART RATE: 62 BPM | RESPIRATION RATE: 16 BRPM | WEIGHT: 138 LBS | DIASTOLIC BLOOD PRESSURE: 57 MMHG | BODY MASS INDEX: 23.56 KG/M2 | OXYGEN SATURATION: 97 %

## 2023-09-09 DIAGNOSIS — Z79.4 TYPE 2 DIABETES MELLITUS WITH HYPERGLYCEMIA, WITH LONG-TERM CURRENT USE OF INSULIN: Primary | ICD-10-CM

## 2023-09-09 DIAGNOSIS — E11.65 TYPE 2 DIABETES MELLITUS WITH HYPERGLYCEMIA, WITH LONG-TERM CURRENT USE OF INSULIN: Primary | ICD-10-CM

## 2023-09-09 DIAGNOSIS — S91.332A PUNCTURE WOUND OF LEFT FOOT, INITIAL ENCOUNTER: ICD-10-CM

## 2023-09-09 PROCEDURE — 90471 TDAP VACCINE GREATER THAN OR EQUAL TO 7YO IM: ICD-10-PCS | Mod: S$GLB,,,

## 2023-09-09 PROCEDURE — 90715 TDAP VACCINE 7 YRS/> IM: CPT | Mod: S$GLB,,,

## 2023-09-09 PROCEDURE — 99213 PR OFFICE/OUTPT VISIT, EST, LEVL III, 20-29 MIN: ICD-10-PCS | Mod: 25,S$GLB,,

## 2023-09-09 PROCEDURE — 90715 TDAP VACCINE GREATER THAN OR EQUAL TO 7YO IM: ICD-10-PCS | Mod: S$GLB,,,

## 2023-09-09 PROCEDURE — 90471 IMMUNIZATION ADMIN: CPT | Mod: S$GLB,,,

## 2023-09-09 PROCEDURE — 99213 OFFICE O/P EST LOW 20 MIN: CPT | Mod: 25,S$GLB,,

## 2023-09-09 RX ORDER — CEPHALEXIN 500 MG/1
500 CAPSULE ORAL EVERY 6 HOURS
Qty: 28 CAPSULE | Refills: 0 | Status: SHIPPED | OUTPATIENT
Start: 2023-09-09 | End: 2023-09-12

## 2023-09-09 NOTE — PATIENT INSTRUCTIONS
- Rest.    - Drink plenty of fluids.      - You have been given an antibiotic to treat your condition today.    - Please complete the antibiotic as directed on the bottle.   - you can take over-the-counter probiotics during and after antibiotic use to help preserve gut deshaun and reduce gastrointestinal symptoms    - Epsom salt soaks to the foot 3 times a day for 10 minutes at a time. \    - Acetaminophen (tylenol) as directed as needed for fever/pain. Avoid tylenol if you have a history of liver disease.  - Tylenol dosing for adults: [By mouth route, immediate-release form] Dose: 325-1000 mg by mouth every 4-6h as needed; Max: 1 g/4h and 4 g/day from all sources. [By mouth route, extended-release form] Dose: 650-1300 mg Extended Release by mouth every 8h as needed; Max: 4 g/day from all sources.     - You must understand that you have received an Urgent Care treatment only and that you may be released before all of your medical problems are known or treated.   - You, the patient, will arrange for follow up care as instructed.   - If your condition worsens or fails to improve we recommend that you receive another evaluation at the ER immediately or contact your PCP to discuss your concerns or return here.   - Follow up with your PCP or specialty clinic as directed in the next 1-2 weeks if not improved or as needed.  You can call [phone] to schedule an appointment with the appropriate provider.    If your symptoms do not improve or worsen, go to the emergency room immediately.

## 2023-09-09 NOTE — PROGRESS NOTES
"Subjective:      Patient ID: Ned Oliveira is a 73 y.o. male.    Vitals:  height is 5' 4" (1.626 m) and weight is 62.6 kg (138 lb). His oral temperature is 97.8 °F (36.6 °C). His blood pressure is 92/57 (abnormal) and his pulse is 62. His respiration is 16 and oxygen saturation is 97%.     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 The nail went through a shoe while cutting grass. No pain with walking or ROM of the foot. Patient concerned about tetanus and would like vaccine today.       Patients BP usually ranges on the low side based on pervious visits.     Foot Injury   The incident occurred 3 to 6 hours ago. The incident occurred at home. There was no injury mechanism. The pain is present in the left foot. Pertinent negatives include no inability to bear weight, loss of motion, loss of sensation, muscle weakness, numbness or tingling. Foreign body present: jose nail. Nothing aggravates the symptoms. He has tried nothing for the symptoms.     Musculoskeletal:  Positive for pain and trauma. Negative for joint pain, joint swelling and pain with walking.   Skin:  Negative for erythema and hives.   Allergic/Immunologic: Negative for hives and itching.   Neurological:  Negative for disorientation, altered mental status and numbness.   Psychiatric/Behavioral:  Negative for altered mental status and disorientation.       Objective:     Physical Exam   Constitutional: He is oriented to person, place, and time. He appears well-developed.   HENT:   Head: Normocephalic and atraumatic. Head is without abrasion, without contusion and without laceration.   Ears:   Right Ear: External ear normal.   Left Ear: External ear normal.   Nose: Nose normal.   Mouth/Throat: Oropharynx is clear and moist and mucous membranes are normal.   Eyes: Conjunctivae, EOM and " lids are normal. Pupils are equal, round, and reactive to light.   Neck: Trachea normal and phonation normal. Neck supple.   Cardiovascular: Normal rate, regular rhythm and normal heart sounds.   Pulmonary/Chest: Effort normal and breath sounds normal. No stridor. No respiratory distress.   Musculoskeletal: Normal range of motion.         General: Normal range of motion.        Feet:    Neurological: He is alert and oriented to person, place, and time.   Skin: Skin is warm, dry, intact and no rash. Capillary refill takes less than 2 seconds. No abrasion, No burn, No bruising, No erythema and No ecchymosis         Comments: Puncture wound to the left plantar surface over the 5th metatarsal. Tender to palpation. Normal gait. No surrounding erythema or swelling noted.    Psychiatric: His speech is normal and behavior is normal. Judgment and thought content normal.   Nursing note and vitals reviewed.      Assessment:     1. Type 2 diabetes mellitus with hyperglycemia, with long-term current use of insulin    2. 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 The nail went through a shoe. I would like to cover patient for potential S. Aureus infection and Pseudamonas infection. Patient unable to take flouroquinolones due to AAA. No other oral antipseudomonal agents. Will start patietn on keflex to cover S. Aureus and give patient strict ED precautions for new or worsening symptoms. Tdap given to patient in clinic. Patient expresses understanding and agrees with plan.     Type 2 diabetes mellitus with hyperglycemia, with long-term current use of insulin    Puncture wound of left foot, initial encounter  -     (In Office Administered) Tdap Vaccine  -     cephALEXin (KEFLEX) 500 MG capsule; Take 1 capsule (500 mg total) by mouth  every 6 (six) hours. for 7 days  Dispense: 28 capsule; Refill: 0                Patient Instructions   - Rest.    - Drink plenty of fluids.      - You have been given an antibiotic to treat your condition today.    - Please complete the antibiotic as directed on the bottle.   - you can take over-the-counter probiotics during and after antibiotic use to help preserve gut deshaun and reduce gastrointestinal symptoms    - Epsom salt soaks to the foot 3 times a day for 10 minutes at a time. \    - Acetaminophen (tylenol) as directed as needed for fever/pain. Avoid tylenol if you have a history of liver disease.  - Tylenol dosing for adults: [By mouth route, immediate-release form] Dose: 325-1000 mg by mouth every 4-6h as needed; Max: 1 g/4h and 4 g/day from all sources. [By mouth route, extended-release form] Dose: 650-1300 mg Extended Release by mouth every 8h as needed; Max: 4 g/day from all sources.     - You must understand that you have received an Urgent Care treatment only and that you may be released before all of your medical problems are known or treated.   - You, the patient, will arrange for follow up care as instructed.   - If your condition worsens or fails to improve we recommend that you receive another evaluation at the ER immediately or contact your PCP to discuss your concerns or return here.   - Follow up with your PCP or specialty clinic as directed in the next 1-2 weeks if not improved or as needed.  You can call [phone] to schedule an appointment with the appropriate provider.    If your symptoms do not improve or worsen, go to the emergency room immediately.

## 2023-09-12 ENCOUNTER — TELEPHONE (OUTPATIENT)
Dept: INTERNAL MEDICINE | Facility: CLINIC | Age: 73
End: 2023-09-12
Payer: MEDICARE

## 2023-09-12 ENCOUNTER — NURSE TRIAGE (OUTPATIENT)
Dept: ADMINISTRATIVE | Facility: CLINIC | Age: 73
End: 2023-09-12
Payer: MEDICARE

## 2023-09-12 ENCOUNTER — CLINICAL SUPPORT (OUTPATIENT)
Dept: URGENT CARE | Facility: CLINIC | Age: 73
End: 2023-09-12
Payer: MEDICARE

## 2023-09-12 VITALS
SYSTOLIC BLOOD PRESSURE: 116 MMHG | HEIGHT: 64 IN | TEMPERATURE: 98 F | DIASTOLIC BLOOD PRESSURE: 71 MMHG | RESPIRATION RATE: 18 BRPM | WEIGHT: 138 LBS | BODY MASS INDEX: 23.56 KG/M2 | HEART RATE: 87 BPM | OXYGEN SATURATION: 97 %

## 2023-09-12 DIAGNOSIS — Z79.4 TYPE 2 DIABETES MELLITUS WITH BOTH EYES AFFECTED BY MILD NONPROLIFERATIVE RETINOPATHY AND MACULAR EDEMA, WITH LONG-TERM CURRENT USE OF INSULIN: ICD-10-CM

## 2023-09-12 DIAGNOSIS — L50.9 HIVES: ICD-10-CM

## 2023-09-12 DIAGNOSIS — T78.40XA ALLERGIC REACTION, INITIAL ENCOUNTER: Primary | ICD-10-CM

## 2023-09-12 DIAGNOSIS — Z79.4 TYPE 2 DIABETES MELLITUS WITH BOTH EYES AFFECTED BY MILD NONPROLIFERATIVE RETINOPATHY AND MACULAR EDEMA, WITH LONG-TERM CURRENT USE OF INSULIN: Primary | ICD-10-CM

## 2023-09-12 DIAGNOSIS — E11.3213 TYPE 2 DIABETES MELLITUS WITH BOTH EYES AFFECTED BY MILD NONPROLIFERATIVE RETINOPATHY AND MACULAR EDEMA, WITH LONG-TERM CURRENT USE OF INSULIN: Primary | ICD-10-CM

## 2023-09-12 DIAGNOSIS — S91.332A PUNCTURE WOUND OF LEFT FOOT, INITIAL ENCOUNTER: ICD-10-CM

## 2023-09-12 DIAGNOSIS — E11.3213 TYPE 2 DIABETES MELLITUS WITH BOTH EYES AFFECTED BY MILD NONPROLIFERATIVE RETINOPATHY AND MACULAR EDEMA, WITH LONG-TERM CURRENT USE OF INSULIN: ICD-10-CM

## 2023-09-12 PROCEDURE — 99214 OFFICE O/P EST MOD 30 MIN: CPT | Mod: S$GLB,,, | Performed by: FAMILY MEDICINE

## 2023-09-12 PROCEDURE — 99214 PR OFFICE/OUTPT VISIT, EST, LEVL IV, 30-39 MIN: ICD-10-PCS | Mod: S$GLB,,, | Performed by: FAMILY MEDICINE

## 2023-09-12 RX ORDER — HYDROXYZINE HYDROCHLORIDE 25 MG/1
25 TABLET, FILM COATED ORAL 3 TIMES DAILY PRN
Qty: 45 TABLET | Refills: 0 | Status: SHIPPED | OUTPATIENT
Start: 2023-09-12 | End: 2023-09-27

## 2023-09-12 RX ORDER — TRIAMCINOLONE ACETONIDE 0.25 MG/G
CREAM TOPICAL 2 TIMES DAILY
Qty: 80 G | Refills: 0 | Status: SHIPPED | OUTPATIENT
Start: 2023-09-12 | End: 2023-10-25

## 2023-09-12 NOTE — PATIENT INSTRUCTIONS
General Discharge Instructions   PLEASE READ YOUR DISCHARGE INSTRUCTIONS ENTIRELY AS IT CONTAINS IMPORTANT INFORMATION.  If you were prescribed a narcotic or controlled medication, do not drive or operate heavy equipment or machinery while taking these medications.  If you were prescribed antibiotics, please take them to completion.  You must understand that you've received an Urgent Care treatment only and that you may be released before all your medical problems are known or treated. You, the patient, will arrange for follow up care as instructed.    OVER THE COUNTER RECOMMENDATIONS/SUGGESTIONS.    Make sure to stay well hydrated.    Use Nasal Saline to mechanically move any post nasal drip from your eustachian tube or from the back of your throat.    Use warm salt water gargles to ease your throat pain. Warm salt water gargles as needed for sore throat- 1/2 tsp salt to 1 cup warm water, gargle as desired.    Use an antihistamine such as Claritin, Zyrtec or Allegra to dry you out.    Use pseudoephedrine (behind the counter) to decongest. Pseudoephedrine 30 mg up to 240 mg /day. It can raise your blood pressure and give you palpitations.    Use mucinex (guaifenesin) to break up mucous up to 2400mg/day to loosen any mucous.    The mucinex DM pill has a cough suppressant that can be sedating. It can be used at night to stop the tickle at the back of your throat.    You can use Mucinex D (it has guaifenesin and a high dose of pseudoephedrine) in the mornings to help decongest.    Use Afrin in each nare for no longer than 3 days, as it is addictive. It can also dry out your mucous membranes and cause elevated blood pressure. This is especially useful if you are flying.    Use Flonase 1-2 sprays/nostril per day. It is a local acting steroid nasal spray, if you develop a bloody nose, stop using the medication immediately.    Sometimes Nyquil at night is beneficial to help you get some rest, however it is sedating and it  does have an antihistamine, and tylenol.    Honey is a natural cough suppressant that can be used.    Tylenol up to 4,000 mg a day is safe for short periods and can be used for body aches, pain, and fever. However in high doses and prolonged use it can cause liver irritation.    Ibuprofen is a non-steroidal anti-inflammatory that can be used for body aches, pain, and fever.However it can also cause stomach irritation if over used.     Follow up with your PCP or specialty clinic as instructed in the next 2-3 days if not improved or as needed. You can call [phone] to schedule an appointment with appropriate provider.      If you condition worsens, we recommend that you receive another evaluation at the emergency room immediately or contact your primary medical clinic's after hours call service to discuss your concerns.      Please return here or go to the Emergency Department for any concerns or worsening condition.   You can also call [phone] to schedule an appointment with the appropriate provider.    Please return here or go to the Emergency Department for any concerns or worsening of condition.    Thank you for choosing [facility] [place]!    Our goal in the Urgent Care is to always provide outstanding medical care. You may receive a survey by mail or e-mail in the next week regarding your experience today. We would greatly appreciate you completing and returning the survey. Your feedback provides us with a way to recognize our staff who provide very good care, and it helps us learn how to improve when your experience was below our aspiration of excellence.      We appreciate you trusting us with your medical care. We hope you feel better soon. We will be happy to take care of you for all of your future medical needs.    Sincerely,    SERGEY Cortés                                                       Allergic Reaction   If your condition worsens or fails to improve we recommend that you  receive another evaluation at the ER immediately or contact your PCP to discuss your concerns or return here. You must understand that you've received an urgent care treatment only and that you may be released before all your medical problems are known or treated. You the patient will arrange for followup care as instructed.   Increase fluids and rest are important  Please take over the counter Pepcid as directed for the next 3-5 days as directed.  Please take Claritin or Zyrtec or Allegra (24 hours) daily for the next 3-5 days.    You can add Benadryl or Hydroxyzine as needed for itching, however these may make you drowsy, so do not  drive or operate heavy equipment or machinery while taking these medications.    If you were given a steroid shot in the clinic and have also been given a prescription for a steroid such as Prednisone or a Medrol Dose Pack, please begin taking them tomorrow. If you received a steroid shot today - this can elevate your blood pressure, elevate your blood sugar, water weight gain, nervous energy, redness to the face and dimpling of the skin where the shot goes in.      If you develop additional symptoms such as tongue swelling or trouble breathing go immediately to the ER.     If you were stung by an insect rarely do these get infected. However if it gets painful, increase redness or drainage you need to be rechecked either here or call your PCP.    In the future make sure to keep benadryl with you or an Epi-pen if you were prescribed one.      General Referral to [facility]   You were referred to [facility] for follow-up care on your condition.    Please call [phone] to schedule your appointment.    Please go to the Emergency Department for any concerns or worsening of condition.  Please follow up with your primary care doctor or specialist in the next 48-72hrs as needed.    If you  smoke, please stop smoking.  You have been given an [facility] doctor  referral. If you don't hear from them in a few days call [phone]

## 2023-09-12 NOTE — TELEPHONE ENCOUNTER
I spoke to pt's wife, pt maybe having a reaction to his antibiotics. Pt felt ok after receiving vaccine (Tdap). Pt developed a rash after starting antibiotic.Pt is on his way home from work.  Pt should go back to  for eval.  She verbalized understanding

## 2023-09-12 NOTE — TELEPHONE ENCOUNTER
----- Message from Geneva Delacruz sent at 9/12/2023  2:51 PM CDT -----  Regarding: Lab Orders  Good afternoon,    Pt is scheduled for labs on tomorrow but there isn't any orders available to attach to appt. Please attach necessary orders.    Thank you!

## 2023-09-12 NOTE — PROGRESS NOTES
"Subjective:      Patient ID: Ned Oliveira is a 73 y.o. male.    Vitals:  height is 5' 4" (1.626 m) and weight is 62.6 kg (138 lb). His temperature is 98.2 °F (36.8 °C). His blood pressure is 116/71 and his pulse is 87. His respiration is 18 and oxygen saturation is 97%.     Chief Complaint: Allergic Reaction    This is a 73 y.o. male with a chief complaint of allergic rxn    Sx started Sunday night. Pt started taking cephalexin Saturday after being seen here for nail in foot. Pt believes it is the antibiotic that is causing the rxn so he stopped taking it.    Sx include redness and itchiness over entire body, loss of sleep due rxn,     Pt has taken benadryl for sx with no relief    Provider note begins below:  Past Medical History:  No date: Adrenal adenoma  No date: BPH (benign prostatic hyperplasia)  No date: Cataract  No date: Coronary artery disease  No date: Diabetes mellitus, type 2  No date: Diabetic retinopathy  No date: Glaucoma  No date: Hyperlipidemia  No date: Hypertension  02/06/2019: Inguinal hernia of left side without obstruction or   gangrene  05/26/2016: Nephrolithiasis  04/14/2020: Steroid responders, left   Pt with above pmh presents with c/o possible allergic reaction to keflex which he started 9/9. He also had tdap updated. Sunday night he started with itching and red rash. He denies any hx of allergic reaction to keflex, denies taking this in the past. He reports his puncture wound to left foot is healing denies any  pain, drainage, slightly ttt. His last dose of kelfex was Monday night. He took benadryl without relief. He says he has some eyelid swelling on the left. He is bothered mostly by the itching. Denies any oral problems, drooling, cp or sob.  The pain has improved from the puncture wound to the left foot. Denies any fever or chills.      used for entire encounter: [id number]    Allergic Reaction  This is a new problem. The current episode started 2 days ago. The problem has " been gradually worsening since onset. The problem is severe. The patient was exposed to an antibiotic. The time of exposure was just prior to onset. Associated symptoms include eye itching, itching and a rash. Pertinent negatives include no abdominal pain, chest pain, chest pressure, coughing, diarrhea, difficulty breathing, drooling, eye redness, eye watering, globus sensation, hyperventilation, skin blistering, stridor, trouble swallowing, vomiting or wheezing. Treatments tried: benadryl. The treatment provided no relief.       Constitution: Negative for activity change, appetite change, chills, sweating, fatigue, fever and unexpected weight change.   HENT:  Negative for drooling, foreign body in nose, postnasal drip, sinus pain, sinus pressure, sore throat, trouble swallowing and voice change.    Neck: Negative for neck stiffness.   Cardiovascular:  Negative for chest pain, leg swelling, palpitations and sob on exertion.   Eyes:  Positive for eye itching and eyelid swelling. Negative for eye trauma, foreign body in eye, eye discharge, eye pain, eye redness, photophobia, vision loss, double vision and blurred vision.   Respiratory:  Negative for sleep apnea, chest tightness, cough, sputum production, bloody sputum, COPD, shortness of breath, stridor, wheezing and asthma.    Gastrointestinal:  Negative for abdominal pain, vomiting and diarrhea.   Skin:  Positive for rash.   Allergic/Immunologic: Positive for itching. Negative for asthma.      Objective:     Physical Exam   Constitutional: He is oriented to person, place, and time. He appears well-developed.  Non-toxic appearance. He does not appear ill. No distress.   HENT:   Head: Normocephalic and atraumatic.   Ears:   Right Ear: External ear normal.   Left Ear: External ear normal.   Nose: Nose normal.   Mouth/Throat: Uvula is midline, oropharynx is clear and moist and mucous membranes are normal. Mucous membranes are not pale (no cracking of the lips, erythema  or swelling.), not dry and not cyanotic. No trismus in the jaw. No uvula swelling. No oropharyngeal exudate, posterior oropharyngeal edema, posterior oropharyngeal erythema, tonsillar abscesses or cobblestoning. No tonsillar exudate.   Eyes: Conjunctivae, EOM and lids are normal. Pupils are equal, round, and reactive to light. Right conjunctiva is not injected. Left conjunctiva is not injected.      Comments: Left upper eyelid swelling that is slight.   EOMI without pain.    Neck: Trachea normal and phonation normal. Neck supple.   Musculoskeletal: Normal range of motion.         General: Normal range of motion.   Neurological: He is alert and oriented to person, place, and time.   Skin: Skin is warm, dry, intact, not diaphoretic and rash (generalized hives to his back, chest, bue, ble).   Psychiatric: His speech is normal and behavior is normal. Judgment and thought content normal.   Nursing note and vitals reviewed.      Assessment:     1. Allergic reaction, initial encounter    2. Puncture wound of left foot, initial encounter    3. Hives    4. Type 2 diabetes mellitus with both eyes affected by mild nonproliferative retinopathy and macular edema, with long-term current use of insulin        Plan:     Cbg is currently 210, decided to hold off on steroids at this time, will try antihistamines, and have him fu with podiatry and allergy. His foot is improving, pain is decreasing, area without erythema, he denies any fever or chills.     Discussed results/diagnosis/plan with patient in clinic. Strict precautions given to patient to monitor for worsening signs and symptoms. Advised to follow up with PCP or specialist.    Explained side effects of medications prescribed with patient and informed him/her to discontinue use if he/she has any side effects and to inform UC or PCP if this occurs. All questions answered. Strict ED verses clinic return precautions stressed and given in depth. Advised if symptoms worsens of  fail to improve he/she should go to the Emergency Room. Discharge and follow-up instructions given verbally/printed with the patient who expressed understanding and willingness to comply with my recommendations. Patient voiced understanding and in agreement with current treatment plan. Patient exits the exam room in no acute distress. Conversant and engaged during discharge discussion, verbalized understanding.      Allergic reaction, initial encounter  -     Ambulatory referral/consult to Allergy  -     hydrOXYzine HCL (ATARAX) 25 MG tablet; Take 1 tablet (25 mg total) by mouth 3 (three) times daily as needed for Itching.  Dispense: 45 tablet; Refill: 0  -     triamcinolone acetonide 0.025% (KENALOG) 0.025 % cream; Apply topically 2 (two) times daily.  Dispense: 80 g; Refill: 0    Puncture wound of left foot, initial encounter  -     Ambulatory referral/consult to Podiatry    Hives  -     triamcinolone acetonide 0.025% (KENALOG) 0.025 % cream; Apply topically 2 (two) times daily.  Dispense: 80 g; Refill: 0    Type 2 diabetes mellitus with both eyes affected by mild nonproliferative retinopathy and macular edema, with long-term current use of insulin  -     Ambulatory referral/consult to Podiatry          Medical Decision Making:   History:   Old Medical Records: I decided to obtain old medical records.  Old Records Summarized: records from clinic visits.    Additional MDM:     Heart Failure Score:   COPD = No        Patient Instructions   General Discharge Instructions   PLEASE READ YOUR DISCHARGE INSTRUCTIONS ENTIRELY AS IT CONTAINS IMPORTANT INFORMATION.  If you were prescribed a narcotic or controlled medication, do not drive or operate heavy equipment or machinery while taking these medications.  If you were prescribed antibiotics, please take them to completion.  You must understand that you've received an Urgent Care treatment only and that you may be released before all your medical problems are known or  treated. You, the patient, will arrange for follow up care as instructed.    OVER THE COUNTER RECOMMENDATIONS/SUGGESTIONS.    Make sure to stay well hydrated.    Use Nasal Saline to mechanically move any post nasal drip from your eustachian tube or from the back of your throat.    Use warm salt water gargles to ease your throat pain. Warm salt water gargles as needed for sore throat- 1/2 tsp salt to 1 cup warm water, gargle as desired.    Use an antihistamine such as Claritin, Zyrtec or Allegra to dry you out.    Use pseudoephedrine (behind the counter) to decongest. Pseudoephedrine 30 mg up to 240 mg /day. It can raise your blood pressure and give you palpitations.    Use mucinex (guaifenesin) to break up mucous up to 2400mg/day to loosen any mucous.    The mucinex DM pill has a cough suppressant that can be sedating. It can be used at night to stop the tickle at the back of your throat.    You can use Mucinex D (it has guaifenesin and a high dose of pseudoephedrine) in the mornings to help decongest.    Use Afrin in each nare for no longer than 3 days, as it is addictive. It can also dry out your mucous membranes and cause elevated blood pressure. This is especially useful if you are flying.    Use Flonase 1-2 sprays/nostril per day. It is a local acting steroid nasal spray, if you develop a bloody nose, stop using the medication immediately.    Sometimes Nyquil at night is beneficial to help you get some rest, however it is sedating and it does have an antihistamine, and tylenol.    Honey is a natural cough suppressant that can be used.    Tylenol up to 4,000 mg a day is safe for short periods and can be used for body aches, pain, and fever. However in high doses and prolonged use it can cause liver irritation.    Ibuprofen is a non-steroidal anti-inflammatory that can be used for body aches, pain, and fever.However it can also cause stomach irritation if over used.     Follow up with your PCP or specialty clinic  as instructed in the next 2-3 days if not improved or as needed. You can call [phone] to schedule an appointment with appropriate provider.      If you condition worsens, we recommend that you receive another evaluation at the emergency room immediately or contact your primary medical clinic's after hours call service to discuss your concerns.      Please return here or go to the Emergency Department for any concerns or worsening condition.   You can also call [phone] to schedule an appointment with the appropriate provider.    Please return here or go to the Emergency Department for any concerns or worsening of condition.    Thank you for choosing [facility]!    Our goal in the Urgent Care is to always provide outstanding medical care. You may receive a survey by mail or e-mail in the next week regarding your experience today. We would greatly appreciate you completing and returning the survey. Your feedback provides us with a way to recognize our staff who provide very good care, and it helps us learn how to improve when your experience was below our aspiration of excellence.      We appreciate you trusting us with your medical care. We hope you feel better soon. We will be happy to take care of you for all of your future medical needs.    Sincerely,    SERGEY Cortés                                                       Allergic Reaction   If your condition worsens or fails to improve we recommend that you receive another evaluation at the ER immediately or contact your PCP to discuss your concerns or return here. You must understand that you've received an urgent care treatment only and that you may be released before all your medical problems are known or treated. You the patient will arrange for followup care as instructed.   Increase fluids and rest are important  Please take over the counter Pepcid as directed for the next 3-5 days as directed.  Please take Claritin or Zyrtec or  Allegra (24 hours) daily for the next 3-5 days.    You can add Benadryl or Hydroxyzine as needed for itching, however these may make you drowsy, so do not  drive or operate heavy equipment or machinery while taking these medications.    If you were given a steroid shot in the clinic and have also been given a prescription for a steroid such as Prednisone or a Medrol Dose Pack, please begin taking them tomorrow. If you received a steroid shot today - this can elevate your blood pressure, elevate your blood sugar, water weight gain, nervous energy, redness to the face and dimpling of the skin where the shot goes in.      If you develop additional symptoms such as tongue swelling or trouble breathing go immediately to the ER.     If you were stung by an insect rarely do these get infected. However if it gets painful, increase redness or drainage you need to be rechecked either here or call your PCP.    In the future make sure to keep benadryl with you or an Epi-pen if you were prescribed one.      General Referral to [facility]   You were referred to [facility], allergy for follow-up care on your condition.    Please call [phone] to schedule your appointment.    Please go to the Emergency Department for any concerns or worsening of condition.  Please follow up with your primary care doctor or specialist in the next 48-72hrs as needed.    If you  smoke, please stop smoking.  You have been given an [facility] doctor referral. If you don't hear from them in a few days call [phone]

## 2023-09-12 NOTE — TELEPHONE ENCOUNTER
Patient's wife called and states patient was seen in Urgent Care due to stepping on a nail. Wife states patient was prescribed an antibiotic and given a Tdap immunization. Wife states she believes patient is having an allergic reaction. Wife states patient is not with her at this time and is currently at work and should be arriving home.     Patient's wife advised to call back to the [facility] Triage Service to complete triage/assessment when patient arrives home. Wife states understanding at this time.     Additional Information   Negative: Caller has already spoken with the PCP (or office), and has no further questions   Negative: Caller has already spoken with another triager and has no further questions   Negative: Caller has already spoken with another triager or PCP (or office), and has further questions and triager able to answer questions.   Negative: Busy signal.  First attempt to contact caller.  Follow-up call scheduled within 15 minutes.   Negative: Message left on identified voicemail   Negative: Message left on unidentified voice mail. Phone number verified.   Negative: No answer.  First attempt to contact caller.  Follow-up call scheduled within 15 minutes.   Negative: Message left with person in household   Negative: Wrong number reached. Phone number verified.   Negative: Second attempt to contact caller AND no contact made. Phone number verified.   Negative: Third attempt to contact caller AND no contact made. Phone number verified.   Negative: Cell phone out of range. Phone number verified.   Negative: Patient already left for the hospital/clinic   Negative: Caller has cancelled the call before the first contact   Negative: Unable to complete triage due to phone connection issues   Negative: Non-urgent call redirected to specialist's office because it is open    Protocols used: No Contact or Duplicate Contact Call-A-OH

## 2023-09-13 ENCOUNTER — LAB VISIT (OUTPATIENT)
Dept: LAB | Facility: HOSPITAL | Age: 73
End: 2023-09-13
Payer: MEDICARE

## 2023-09-13 DIAGNOSIS — E11.3213 TYPE 2 DIABETES MELLITUS WITH BOTH EYES AFFECTED BY MILD NONPROLIFERATIVE RETINOPATHY AND MACULAR EDEMA, WITH LONG-TERM CURRENT USE OF INSULIN: ICD-10-CM

## 2023-09-13 DIAGNOSIS — Z79.4 TYPE 2 DIABETES MELLITUS WITH BOTH EYES AFFECTED BY MILD NONPROLIFERATIVE RETINOPATHY AND MACULAR EDEMA, WITH LONG-TERM CURRENT USE OF INSULIN: ICD-10-CM

## 2023-09-13 LAB
ESTIMATED AVG GLUCOSE: 163 MG/DL (ref 68–131)
HBA1C MFR BLD: 7.3 % (ref 4–5.6)

## 2023-09-13 PROCEDURE — 83036 HEMOGLOBIN GLYCOSYLATED A1C: CPT | Performed by: STUDENT IN AN ORGANIZED HEALTH CARE EDUCATION/TRAINING PROGRAM

## 2023-09-13 PROCEDURE — 36415 COLL VENOUS BLD VENIPUNCTURE: CPT | Mod: PO | Performed by: STUDENT IN AN ORGANIZED HEALTH CARE EDUCATION/TRAINING PROGRAM

## 2023-09-14 ENCOUNTER — TELEPHONE (OUTPATIENT)
Dept: DERMATOLOGY | Facility: CLINIC | Age: 73
End: 2023-09-14
Payer: MEDICARE

## 2023-09-14 NOTE — TELEPHONE ENCOUNTER
----- Message from Rogerio Schroeder MA sent at 9/13/2023  4:02 PM CDT -----  Regarding: FW: Patient advice  Contact: Pt    ----- Message -----  From: Jessica Govea  Sent: 9/13/2023   2:32 PM CDT  To: [facility] Clinical Support Triage  Subject: Patient advice                                   Pt called in regard to scheduling an appointment ,Unable to schedule Pt until 12/23 , Pt would like to be seen sooner due to being an diabetic and having health concerns       Pt can be reached at [phone]

## 2023-09-19 ENCOUNTER — TELEPHONE (OUTPATIENT)
Dept: ADMINISTRATIVE | Facility: HOSPITAL | Age: 73
End: 2023-09-19
Payer: MEDICARE

## 2023-09-20 ENCOUNTER — OFFICE VISIT (OUTPATIENT)
Dept: DERMATOLOGY | Facility: CLINIC | Age: 73
End: 2023-09-20
Payer: MEDICARE

## 2023-09-20 DIAGNOSIS — R21 MORBILLIFORM RASH: Primary | ICD-10-CM

## 2023-09-20 PROCEDURE — 99203 PR OFFICE/OUTPT VISIT, NEW, LEVL III, 30-44 MIN: ICD-10-PCS | Mod: S$GLB,,, | Performed by: STUDENT IN AN ORGANIZED HEALTH CARE EDUCATION/TRAINING PROGRAM

## 2023-09-20 PROCEDURE — 3072F LOW RISK FOR RETINOPATHY: CPT | Mod: CPTII,S$GLB,, | Performed by: STUDENT IN AN ORGANIZED HEALTH CARE EDUCATION/TRAINING PROGRAM

## 2023-09-20 PROCEDURE — 3051F PR MOST RECENT HEMOGLOBIN A1C LEVEL 7.0 - < 8.0%: ICD-10-PCS | Mod: CPTII,S$GLB,, | Performed by: STUDENT IN AN ORGANIZED HEALTH CARE EDUCATION/TRAINING PROGRAM

## 2023-09-20 PROCEDURE — 99999 PR PBB SHADOW E&M-EST. PATIENT-LVL III: CPT | Mod: PBBFAC,,, | Performed by: STUDENT IN AN ORGANIZED HEALTH CARE EDUCATION/TRAINING PROGRAM

## 2023-09-20 PROCEDURE — 3288F PR FALLS RISK ASSESSMENT DOCUMENTED: ICD-10-PCS | Mod: CPTII,S$GLB,, | Performed by: STUDENT IN AN ORGANIZED HEALTH CARE EDUCATION/TRAINING PROGRAM

## 2023-09-20 PROCEDURE — 3072F PR LOW RISK FOR RETINOPATHY: ICD-10-PCS | Mod: CPTII,S$GLB,, | Performed by: STUDENT IN AN ORGANIZED HEALTH CARE EDUCATION/TRAINING PROGRAM

## 2023-09-20 PROCEDURE — 99203 OFFICE O/P NEW LOW 30 MIN: CPT | Mod: S$GLB,,, | Performed by: STUDENT IN AN ORGANIZED HEALTH CARE EDUCATION/TRAINING PROGRAM

## 2023-09-20 PROCEDURE — 1159F MED LIST DOCD IN RCRD: CPT | Mod: CPTII,S$GLB,, | Performed by: STUDENT IN AN ORGANIZED HEALTH CARE EDUCATION/TRAINING PROGRAM

## 2023-09-20 PROCEDURE — 99999 PR PBB SHADOW E&M-EST. PATIENT-LVL III: ICD-10-PCS | Mod: PBBFAC,,, | Performed by: STUDENT IN AN ORGANIZED HEALTH CARE EDUCATION/TRAINING PROGRAM

## 2023-09-20 PROCEDURE — 3061F NEG MICROALBUMINURIA REV: CPT | Mod: CPTII,S$GLB,, | Performed by: STUDENT IN AN ORGANIZED HEALTH CARE EDUCATION/TRAINING PROGRAM

## 2023-09-20 PROCEDURE — 3051F HG A1C>EQUAL 7.0%<8.0%: CPT | Mod: CPTII,S$GLB,, | Performed by: STUDENT IN AN ORGANIZED HEALTH CARE EDUCATION/TRAINING PROGRAM

## 2023-09-20 PROCEDURE — 1101F PR PT FALLS ASSESS DOC 0-1 FALLS W/OUT INJ PAST YR: ICD-10-PCS | Mod: CPTII,S$GLB,, | Performed by: STUDENT IN AN ORGANIZED HEALTH CARE EDUCATION/TRAINING PROGRAM

## 2023-09-20 PROCEDURE — 1126F PR PAIN SEVERITY QUANTIFIED, NO PAIN PRESENT: ICD-10-PCS | Mod: CPTII,S$GLB,, | Performed by: STUDENT IN AN ORGANIZED HEALTH CARE EDUCATION/TRAINING PROGRAM

## 2023-09-20 PROCEDURE — 4010F ACE/ARB THERAPY RXD/TAKEN: CPT | Mod: CPTII,S$GLB,, | Performed by: STUDENT IN AN ORGANIZED HEALTH CARE EDUCATION/TRAINING PROGRAM

## 2023-09-20 PROCEDURE — 1159F PR MEDICATION LIST DOCUMENTED IN MEDICAL RECORD: ICD-10-PCS | Mod: CPTII,S$GLB,, | Performed by: STUDENT IN AN ORGANIZED HEALTH CARE EDUCATION/TRAINING PROGRAM

## 2023-09-20 PROCEDURE — 1157F ADVNC CARE PLAN IN RCRD: CPT | Mod: CPTII,S$GLB,, | Performed by: STUDENT IN AN ORGANIZED HEALTH CARE EDUCATION/TRAINING PROGRAM

## 2023-09-20 PROCEDURE — 3066F NEPHROPATHY DOC TX: CPT | Mod: CPTII,S$GLB,, | Performed by: STUDENT IN AN ORGANIZED HEALTH CARE EDUCATION/TRAINING PROGRAM

## 2023-09-20 PROCEDURE — 3061F PR NEG MICROALBUMINURIA RESULT DOCUMENTED/REVIEW: ICD-10-PCS | Mod: CPTII,S$GLB,, | Performed by: STUDENT IN AN ORGANIZED HEALTH CARE EDUCATION/TRAINING PROGRAM

## 2023-09-20 PROCEDURE — 1101F PT FALLS ASSESS-DOCD LE1/YR: CPT | Mod: CPTII,S$GLB,, | Performed by: STUDENT IN AN ORGANIZED HEALTH CARE EDUCATION/TRAINING PROGRAM

## 2023-09-20 PROCEDURE — 3066F PR DOCUMENTATION OF TREATMENT FOR NEPHROPATHY: ICD-10-PCS | Mod: CPTII,S$GLB,, | Performed by: STUDENT IN AN ORGANIZED HEALTH CARE EDUCATION/TRAINING PROGRAM

## 2023-09-20 PROCEDURE — 3288F FALL RISK ASSESSMENT DOCD: CPT | Mod: CPTII,S$GLB,, | Performed by: STUDENT IN AN ORGANIZED HEALTH CARE EDUCATION/TRAINING PROGRAM

## 2023-09-20 PROCEDURE — 1157F PR ADVANCE CARE PLAN OR EQUIV PRESENT IN MEDICAL RECORD: ICD-10-PCS | Mod: CPTII,S$GLB,, | Performed by: STUDENT IN AN ORGANIZED HEALTH CARE EDUCATION/TRAINING PROGRAM

## 2023-09-20 PROCEDURE — 1126F AMNT PAIN NOTED NONE PRSNT: CPT | Mod: CPTII,S$GLB,, | Performed by: STUDENT IN AN ORGANIZED HEALTH CARE EDUCATION/TRAINING PROGRAM

## 2023-09-20 PROCEDURE — 4010F PR ACE/ARB THEARPY RXD/TAKEN: ICD-10-PCS | Mod: CPTII,S$GLB,, | Performed by: STUDENT IN AN ORGANIZED HEALTH CARE EDUCATION/TRAINING PROGRAM

## 2023-09-20 RX ORDER — HYDROCORTISONE 25 MG/G
CREAM TOPICAL 2 TIMES DAILY
Qty: 30 G | Refills: 2 | Status: SHIPPED | OUTPATIENT
Start: 2023-09-20 | End: 2023-10-25

## 2023-09-20 RX ORDER — MOMETASONE FUROATE 1 MG/G
CREAM TOPICAL
Qty: 45 G | Refills: 1 | Status: SHIPPED | OUTPATIENT
Start: 2023-09-20 | End: 2023-10-25

## 2023-09-20 NOTE — PROGRESS NOTES
Subjective:      Patient ID:  Ned Oliveira is a 73 y.o. male who presents for   Chief Complaint   Patient presents with    Allergic Reaction     Diffuse     73 y.o. male presents today for a rash due to a suspected allergic reaction.  Speaks English, declines , here with wife    Location: Diffuse  Duration: started 9/9/23 when pt stepped on a nail, ER doctor gave pt tetanus vaccine and Cephalexin 500mg - next day (evening 9/10) pt broke out all over and also experienced some swelling of face  Symptoms: itchy, swelling, redness  Current treatments: none  Prior treatments tried: hydroxyzine HCL 25mg (took 2 pills), triamcinolone 0.025% cream made pt more itchy  Other pertinent history:  Rash is getting better, he could not sleep and could not eat  No fevers/chills  2/10 pruritus       Review of Systems   Constitutional:  Negative for fever, chills, fatigue and malaise.   Respiratory:  Negative for cough.    Skin:  Positive for itching and rash.   Hematologic/Lymphatic: Negative for adenopathy.   Allergic/Immunologic: Positive for environmental allergies.     Objective:   Physical Exam   Constitutional: He appears well-developed and well-nourished. No distress.   Neurological: He is alert and oriented to person, place, and time. He is not disoriented.   Psychiatric: He has a normal mood and affect.      Diagram Legend     Erythematous scaling macule/papule c/w actinic keratosis       Vascular papule c/w angioma      Pigmented verrucoid papule/plaque c/w seborrheic keratosis      Yellow umbilicated papule c/w sebaceous hyperplasia      Irregularly shaped tan macule c/w lentigo     1-2 mm smooth white papules consistent with Milia      Movable subcutaneous cyst with punctum c/w epidermal inclusion cyst      Subcutaneous movable cyst c/w pilar cyst      Firm pink to brown papule c/w dermatofibroma      Pedunculated fleshy papule(s) c/w skin tag(s)      Evenly pigmented macule c/w junctional nevus     Mildly  variegated pigmented, slightly irregular-bordered macule c/w mildly atypical nevus      Flesh colored to evenly pigmented papule c/w intradermal nevus       Pink pearly papule/plaque c/w basal cell carcinoma      Erythematous hyperkeratotic cursted plaque c/w SCC      Surgical scar with no sign of skin cancer recurrence      Open and closed comedones      Inflammatory papules and pustules      Verrucoid papule consistent consistent with wart     Erythematous eczematous patches and plaques     Dystrophic onycholytic nail with subungual debris c/w onychomycosis     Umbilicated papule    Erythematous-base heme-crusted tan verrucoid plaque consistent with inflamed seborrheic keratosis     Erythematous Silvery Scaling Plaque c/w Psoriasis     See annotation      Assessment / Plan:      Morbilliform drug eruption  Resolving, faintly erythematous patches over chest, upper arm, face with desquamation   No LAD (lymph nodes examined) no f/c  - Likely 2/2 Keflex, typically occurs 2-7 d after starting med (he was day 2) and resolves over 1-2 weeks  - Resolving and improving on its own, still with mild pruritus   - Start mometasome cream bid to affected area of body, hydrocortisone 2.5% cream bid to affected area of face  - Hydroxyzine 25 mg qhs prn itching (he already has this prescribed)   - Start CeraVe cream or OTC moisturizing cream within 5 minutes of bathing  - Contact office if rash worsens or fails to improve in 2 weeks    RTC prn

## 2023-10-03 DIAGNOSIS — Z79.4 TYPE 2 DIABETES MELLITUS WITH BOTH EYES AFFECTED BY MILD NONPROLIFERATIVE RETINOPATHY AND MACULAR EDEMA, WITH LONG-TERM CURRENT USE OF INSULIN: ICD-10-CM

## 2023-10-03 DIAGNOSIS — E11.3213 TYPE 2 DIABETES MELLITUS WITH BOTH EYES AFFECTED BY MILD NONPROLIFERATIVE RETINOPATHY AND MACULAR EDEMA, WITH LONG-TERM CURRENT USE OF INSULIN: ICD-10-CM

## 2023-10-03 RX ORDER — INSULIN GLARGINE AND LIXISENATIDE 100; 33 U/ML; UG/ML
INJECTION, SOLUTION SUBCUTANEOUS
Qty: 45 PEN | Refills: 0 | Status: SHIPPED | OUTPATIENT
Start: 2023-10-03

## 2023-10-03 NOTE — TELEPHONE ENCOUNTER
No care due was identified.  [facility] Embedded Care Due Messages. Reference number: [id number].   10/03/2023 1:23:02 PM CDT

## 2023-10-03 NOTE — TELEPHONE ENCOUNTER
Refill Decision Note   Ned Oliveira  is requesting a refill authorization.  Brief Assessment and Rationale for Refill:  Approve     Medication Therapy Plan:         Comments:     Note composed:2:01 PM 10/03/2023

## 2023-10-04 ENCOUNTER — OFFICE VISIT (OUTPATIENT)
Dept: INTERNAL MEDICINE | Facility: CLINIC | Age: 73
End: 2023-10-04
Payer: MEDICARE

## 2023-10-04 VITALS
WEIGHT: 136 LBS | HEART RATE: 63 BPM | RESPIRATION RATE: 16 BRPM | SYSTOLIC BLOOD PRESSURE: 78 MMHG | HEIGHT: 64 IN | OXYGEN SATURATION: 98 % | TEMPERATURE: 97 F | DIASTOLIC BLOOD PRESSURE: 50 MMHG | BODY MASS INDEX: 23.22 KG/M2

## 2023-10-04 DIAGNOSIS — E11.69 HYPERLIPIDEMIA ASSOCIATED WITH TYPE 2 DIABETES MELLITUS: Chronic | ICD-10-CM

## 2023-10-04 DIAGNOSIS — Z79.4 TYPE 2 DIABETES MELLITUS WITH HYPERGLYCEMIA, WITH LONG-TERM CURRENT USE OF INSULIN: ICD-10-CM

## 2023-10-04 DIAGNOSIS — D69.6 THROMBOCYTOPENIA, UNSPECIFIED: ICD-10-CM

## 2023-10-04 DIAGNOSIS — E11.65 TYPE 2 DIABETES MELLITUS WITH HYPERGLYCEMIA, WITH LONG-TERM CURRENT USE OF INSULIN: ICD-10-CM

## 2023-10-04 DIAGNOSIS — I10 ESSENTIAL HYPERTENSION: Chronic | ICD-10-CM

## 2023-10-04 DIAGNOSIS — E55.9 VITAMIN D DEFICIENCY: ICD-10-CM

## 2023-10-04 DIAGNOSIS — E78.5 HYPERLIPIDEMIA ASSOCIATED WITH TYPE 2 DIABETES MELLITUS: Chronic | ICD-10-CM

## 2023-10-04 DIAGNOSIS — Z00.00 PHYSICAL EXAM, ANNUAL: Primary | ICD-10-CM

## 2023-10-04 PROCEDURE — 3078F PR MOST RECENT DIASTOLIC BLOOD PRESSURE < 80 MM HG: ICD-10-PCS | Mod: CPTII,S$GLB,, | Performed by: STUDENT IN AN ORGANIZED HEALTH CARE EDUCATION/TRAINING PROGRAM

## 2023-10-04 PROCEDURE — G0008 FLU VACCINE - QUADRIVALENT - ADJUVANTED: ICD-10-PCS | Mod: S$GLB,,, | Performed by: STUDENT IN AN ORGANIZED HEALTH CARE EDUCATION/TRAINING PROGRAM

## 2023-10-04 PROCEDURE — 1126F PR PAIN SEVERITY QUANTIFIED, NO PAIN PRESENT: ICD-10-PCS | Mod: CPTII,S$GLB,, | Performed by: STUDENT IN AN ORGANIZED HEALTH CARE EDUCATION/TRAINING PROGRAM

## 2023-10-04 PROCEDURE — 1159F MED LIST DOCD IN RCRD: CPT | Mod: CPTII,S$GLB,, | Performed by: STUDENT IN AN ORGANIZED HEALTH CARE EDUCATION/TRAINING PROGRAM

## 2023-10-04 PROCEDURE — 1126F AMNT PAIN NOTED NONE PRSNT: CPT | Mod: CPTII,S$GLB,, | Performed by: STUDENT IN AN ORGANIZED HEALTH CARE EDUCATION/TRAINING PROGRAM

## 2023-10-04 PROCEDURE — 3074F SYST BP LT 130 MM HG: CPT | Mod: CPTII,S$GLB,, | Performed by: STUDENT IN AN ORGANIZED HEALTH CARE EDUCATION/TRAINING PROGRAM

## 2023-10-04 PROCEDURE — 1101F PT FALLS ASSESS-DOCD LE1/YR: CPT | Mod: CPTII,S$GLB,, | Performed by: STUDENT IN AN ORGANIZED HEALTH CARE EDUCATION/TRAINING PROGRAM

## 2023-10-04 PROCEDURE — 3288F FALL RISK ASSESSMENT DOCD: CPT | Mod: CPTII,S$GLB,, | Performed by: STUDENT IN AN ORGANIZED HEALTH CARE EDUCATION/TRAINING PROGRAM

## 2023-10-04 PROCEDURE — 1101F PR PT FALLS ASSESS DOC 0-1 FALLS W/OUT INJ PAST YR: ICD-10-PCS | Mod: CPTII,S$GLB,, | Performed by: STUDENT IN AN ORGANIZED HEALTH CARE EDUCATION/TRAINING PROGRAM

## 2023-10-04 PROCEDURE — 3072F PR LOW RISK FOR RETINOPATHY: ICD-10-PCS | Mod: CPTII,S$GLB,, | Performed by: STUDENT IN AN ORGANIZED HEALTH CARE EDUCATION/TRAINING PROGRAM

## 2023-10-04 PROCEDURE — 99397 PER PM REEVAL EST PAT 65+ YR: CPT | Mod: 25,S$GLB,, | Performed by: STUDENT IN AN ORGANIZED HEALTH CARE EDUCATION/TRAINING PROGRAM

## 2023-10-04 PROCEDURE — 99999 PR PBB SHADOW E&M-EST. PATIENT-LVL IV: ICD-10-PCS | Mod: PBBFAC,,, | Performed by: STUDENT IN AN ORGANIZED HEALTH CARE EDUCATION/TRAINING PROGRAM

## 2023-10-04 PROCEDURE — 3061F PR NEG MICROALBUMINURIA RESULT DOCUMENTED/REVIEW: ICD-10-PCS | Mod: CPTII,S$GLB,, | Performed by: STUDENT IN AN ORGANIZED HEALTH CARE EDUCATION/TRAINING PROGRAM

## 2023-10-04 PROCEDURE — 3072F LOW RISK FOR RETINOPATHY: CPT | Mod: CPTII,S$GLB,, | Performed by: STUDENT IN AN ORGANIZED HEALTH CARE EDUCATION/TRAINING PROGRAM

## 2023-10-04 PROCEDURE — 4010F ACE/ARB THERAPY RXD/TAKEN: CPT | Mod: CPTII,S$GLB,, | Performed by: STUDENT IN AN ORGANIZED HEALTH CARE EDUCATION/TRAINING PROGRAM

## 2023-10-04 PROCEDURE — 3051F HG A1C>EQUAL 7.0%<8.0%: CPT | Mod: CPTII,S$GLB,, | Performed by: STUDENT IN AN ORGANIZED HEALTH CARE EDUCATION/TRAINING PROGRAM

## 2023-10-04 PROCEDURE — 99999 PR PBB SHADOW E&M-EST. PATIENT-LVL IV: CPT | Mod: PBBFAC,,, | Performed by: STUDENT IN AN ORGANIZED HEALTH CARE EDUCATION/TRAINING PROGRAM

## 2023-10-04 PROCEDURE — 4010F PR ACE/ARB THEARPY RXD/TAKEN: ICD-10-PCS | Mod: CPTII,S$GLB,, | Performed by: STUDENT IN AN ORGANIZED HEALTH CARE EDUCATION/TRAINING PROGRAM

## 2023-10-04 PROCEDURE — 99397 PR PREVENTIVE VISIT,EST,65 & OVER: ICD-10-PCS | Mod: 25,S$GLB,, | Performed by: STUDENT IN AN ORGANIZED HEALTH CARE EDUCATION/TRAINING PROGRAM

## 2023-10-04 PROCEDURE — 1157F PR ADVANCE CARE PLAN OR EQUIV PRESENT IN MEDICAL RECORD: ICD-10-PCS | Mod: CPTII,S$GLB,, | Performed by: STUDENT IN AN ORGANIZED HEALTH CARE EDUCATION/TRAINING PROGRAM

## 2023-10-04 PROCEDURE — 3078F DIAST BP <80 MM HG: CPT | Mod: CPTII,S$GLB,, | Performed by: STUDENT IN AN ORGANIZED HEALTH CARE EDUCATION/TRAINING PROGRAM

## 2023-10-04 PROCEDURE — 1159F PR MEDICATION LIST DOCUMENTED IN MEDICAL RECORD: ICD-10-PCS | Mod: CPTII,S$GLB,, | Performed by: STUDENT IN AN ORGANIZED HEALTH CARE EDUCATION/TRAINING PROGRAM

## 2023-10-04 PROCEDURE — 3074F PR MOST RECENT SYSTOLIC BLOOD PRESSURE < 130 MM HG: ICD-10-PCS | Mod: CPTII,S$GLB,, | Performed by: STUDENT IN AN ORGANIZED HEALTH CARE EDUCATION/TRAINING PROGRAM

## 2023-10-04 PROCEDURE — 3008F BODY MASS INDEX DOCD: CPT | Mod: CPTII,S$GLB,, | Performed by: STUDENT IN AN ORGANIZED HEALTH CARE EDUCATION/TRAINING PROGRAM

## 2023-10-04 PROCEDURE — 3051F PR MOST RECENT HEMOGLOBIN A1C LEVEL 7.0 - < 8.0%: ICD-10-PCS | Mod: CPTII,S$GLB,, | Performed by: STUDENT IN AN ORGANIZED HEALTH CARE EDUCATION/TRAINING PROGRAM

## 2023-10-04 PROCEDURE — 3061F NEG MICROALBUMINURIA REV: CPT | Mod: CPTII,S$GLB,, | Performed by: STUDENT IN AN ORGANIZED HEALTH CARE EDUCATION/TRAINING PROGRAM

## 2023-10-04 PROCEDURE — 3066F NEPHROPATHY DOC TX: CPT | Mod: CPTII,S$GLB,, | Performed by: STUDENT IN AN ORGANIZED HEALTH CARE EDUCATION/TRAINING PROGRAM

## 2023-10-04 PROCEDURE — 3008F PR BODY MASS INDEX (BMI) DOCUMENTED: ICD-10-PCS | Mod: CPTII,S$GLB,, | Performed by: STUDENT IN AN ORGANIZED HEALTH CARE EDUCATION/TRAINING PROGRAM

## 2023-10-04 PROCEDURE — 90694 VACC AIIV4 NO PRSRV 0.5ML IM: CPT | Mod: S$GLB,,, | Performed by: STUDENT IN AN ORGANIZED HEALTH CARE EDUCATION/TRAINING PROGRAM

## 2023-10-04 PROCEDURE — 3288F PR FALLS RISK ASSESSMENT DOCUMENTED: ICD-10-PCS | Mod: CPTII,S$GLB,, | Performed by: STUDENT IN AN ORGANIZED HEALTH CARE EDUCATION/TRAINING PROGRAM

## 2023-10-04 PROCEDURE — 1157F ADVNC CARE PLAN IN RCRD: CPT | Mod: CPTII,S$GLB,, | Performed by: STUDENT IN AN ORGANIZED HEALTH CARE EDUCATION/TRAINING PROGRAM

## 2023-10-04 PROCEDURE — 3066F PR DOCUMENTATION OF TREATMENT FOR NEPHROPATHY: ICD-10-PCS | Mod: CPTII,S$GLB,, | Performed by: STUDENT IN AN ORGANIZED HEALTH CARE EDUCATION/TRAINING PROGRAM

## 2023-10-04 PROCEDURE — 90694 FLU VACCINE - QUADRIVALENT - ADJUVANTED: ICD-10-PCS | Mod: S$GLB,,, | Performed by: STUDENT IN AN ORGANIZED HEALTH CARE EDUCATION/TRAINING PROGRAM

## 2023-10-04 PROCEDURE — G0008 ADMIN INFLUENZA VIRUS VAC: HCPCS | Mod: S$GLB,,, | Performed by: STUDENT IN AN ORGANIZED HEALTH CARE EDUCATION/TRAINING PROGRAM

## 2023-10-04 RX ORDER — LISINOPRIL 10 MG/1
10 TABLET ORAL DAILY
Qty: 90 TABLET | Refills: 3 | Status: SHIPPED | OUTPATIENT
Start: 2023-10-04 | End: 2024-01-24 | Stop reason: SDUPTHER

## 2023-10-04 NOTE — PROGRESS NOTES
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), BPH, with bilateral inguinal hernia status post surgical repair presenting for diabetic follow-up:    Diabetes:  -recently established with a diabetic specialist and now maintained on Soliqua  -A1c is improving  -due for diabetic foot and eye exams    Hypertension:   -currently maintained on Toprol succinate 25 and combination lisinopril 10/hydrochlorothiazide 12.5  -hypotensive during rooming (recurrently based on records; has lost approximately 20 lb over the last 5 years)    Family, social, surgical Hx reviewed     Health Maintenance:  - due for annual screening labs    Past Medical History:   Diagnosis Date    Adrenal adenoma     BPH (benign prostatic hyperplasia)     Cataract     Coronary artery disease     Diabetes mellitus, type 2     Diabetic retinopathy     Glaucoma     Hyperlipidemia     Hypertension     Inguinal hernia of left side without obstruction or gangrene 02/06/2019    Nephrolithiasis 05/26/2016    Steroid responders, left 04/14/2020     Past Surgical History:   Procedure Laterality Date    BICEPS TENDON REPAIR Right     CATARACT EXTRACTION W/  INTRAOCULAR LENS IMPLANT Left 10/21/2020    COMPLEX PHACO IOL  AND BAERVELDT ()    COLONOSCOPY N/A 02/08/2019    Procedure: COLONOSCOPY;  Surgeon: Tavon Montes MD;  Location: [facility] ([address] [place] [address]);  Service: Colon and Rectal;  Laterality: N/A;  will need . pt requesting ASAP. per Ana (international) ok to schedule at this time. will send  up for 6:45 am arrival time    CORONARY ANGIOGRAPHY N/A 12/12/2022    Procedure: ANGIOGRAM, CORONARY ARTERY;  Surgeon: Drew Peralta MD;   Location: [facility] CATH LAB;  Service: Cardiology;  Laterality: N/A;    EYE SURGERY      HERNIA REPAIR      IMPLANTATION OF DEVICE FOR GLAUCOMA Left 10/21/2020    Procedure: INSERTION, DEVICE, FOR GLAUCOMA;  Surgeon: Saranya Monge MD;  Location: [facility] OR [address] [place] [address];  Service: Ophthalmology;  Laterality: Left;    INTRAOCULAR PROSTHESES INSERTION Left 10/21/2020    Procedure: INSERTION, IOL PROSTHESIS;  Surgeon: Saranya Monge MD;  Location: [address];  Service: Ophthalmology;  Laterality: Left;    LEFT HEART CATHETERIZATION Left 7/8/2022    Procedure: Left heart cath;  Surgeon: Drew Peralta MD;  Location: [facility] CATH LAB;  Service: Cardiology;  Laterality: Left;    PHACOEMULSIFICATION OF CATARACT Left 10/21/2020    Procedure: PHACOEMULSIFICATION, CATARACT;  Surgeon: Saranya Monge MD;  Location: [address];  Service: Ophthalmology;  Laterality: Left;    PTCA, SINGLE VESSEL  12/12/2022    Procedure: PTCA, Single Vessel;  Surgeon: Drew Peralta MD;  Location: [facility] CATH LAB;  Service: Cardiology;;    REFORMATION OF ANTERIOR CHAMBER Left 12/23/2020    WITH HEALON ()    STENT, DRUG ELUTING, SINGLE VESSEL, CORONARY Right 12/12/2022    Procedure: Stent, Drug Eluting, Single Vessel, Coronary;  Surgeon: Drew Peralta MD;  Location: [facility] CATH LAB;  Service: Cardiology;  Laterality: Right;  very low bleeding risk 0.9%    YAG LASER  TO CYCLITIC MEMBRANE Left 12/10/2020         Family History   Problem Relation Age of Onset    Diabetes Mother     Heart disease Mother     Heart disease Sister     No Known Problems Brother     Kidney failure Daughter         ESRD on HD    Autism Son     Asthma Son     No Known Problems Sister     No Known Problems Sister     No Known Problems Sister     No Known Problems Brother     Diabetes Brother     Stroke Neg Hx     Amblyopia Neg Hx     Blindness Neg Hx     Cataracts Neg Hx     Glaucoma Neg Hx     Macular degeneration Neg Hx      Retinal detachment Neg Hx     Strabismus Neg Hx      Social History     Socioeconomic History    Marital status:     Number of children: 2   Occupational History    Occupation: Maintenance    Tobacco Use    Smoking status: Former     Current packs/day: 0.00     Average packs/day: 2.0 packs/day for 10.0 years (20.0 ttl pk-yrs)     Types: Cigarettes     Start date: 8/15/1987     Quit date: 8/15/1996     Years since quittin.1    Smokeless tobacco: Never   Substance and Sexual Activity    Alcohol use: No     Alcohol/week: 0.0 standard drinks of alcohol    Drug use: No    Sexual activity: Yes     Partners: Female     Social Determinants of Health     Financial Resource Strain: Low Risk  (2023)    Overall Financial Resource Strain (CARDIA)     Difficulty of Paying Living Expenses: Not hard at all   Food Insecurity: No Food Insecurity (2023)    Hunger Vital Sign     Worried About Running Out of Food in the Last Year: Never true     Ran Out of Food in the Last Year: Never true   Transportation Needs: No Transportation Needs (2023)    PRAPARE - Transportation     Lack of Transportation (Medical): No     Lack of Transportation (Non-Medical): No   Physical Activity: Sufficiently Active (2023)    Exercise Vital Sign     Days of Exercise per Week: 5 days     Minutes of Exercise per Session: 140 min   Stress: No Stress Concern Present (2023)    [employer] - Occupational Stress Questionnaire     Feeling of Stress : Not at all   Social Connections: Moderately Isolated (2023)    Social Connection and Isolation Panel [NHANES]     Frequency of Communication with Friends and Family: Three times a week     Frequency of Social Gatherings with Friends and Family: Once a week     Attends Worship Services: Never     Active Member of Clubs or Organizations: No     Attends Club or Organization Meetings: Never     Marital Status:    Housing Stability: Low Risk   (4/27/2023)    Housing Stability Vital Sign     Unable to Pay for Housing in the Last Year: No     Number of Places Lived in the Last Year: 1     Unstable Housing in the Last Year: No     Review of patient's allergies indicates:   Allergen Reactions    Percocet [oxycodone-acetaminophen] Itching    Keflex [cephalexin] Rash     Mr. Ned Oliveira had no medications administered during this visit.      Review of Systems   Constitutional:  Negative for appetite change, chills and fever.   HENT: Negative.     Respiratory:  Negative for cough, chest tightness and shortness of breath.    Cardiovascular:  Negative for chest pain, palpitations and leg swelling.   Gastrointestinal:  Negative for abdominal distention, abdominal pain, blood in stool, constipation, diarrhea, nausea and vomiting.   Endocrine: Negative.    Genitourinary:  Negative for difficulty urinating, dysuria, frequency and hematuria.   Musculoskeletal: Negative.    Integumentary:  Negative.   Neurological: Negative.    Psychiatric/Behavioral: Negative.           Objective:      Vitals:    10/04/23 1441   BP: (!) 78/50   Pulse: 63   Resp: 16   Temp: 97 °F (36.1 °C)      Physical Exam  Vitals reviewed.   Constitutional:       General: He is not in acute distress.     Appearance: Normal appearance.   HENT:      Head: Normocephalic and atraumatic.      Comments: Facial features are symmetric      Nose: Nose normal. No congestion or rhinorrhea.      Mouth/Throat:      Mouth: Mucous membranes are moist.      Pharynx: Oropharynx is clear. No oropharyngeal exudate or posterior oropharyngeal erythema.   Eyes:      General: No scleral icterus.     Extraocular Movements: Extraocular movements intact.      Conjunctiva/sclera: Conjunctivae normal.   Cardiovascular:      Rate and Rhythm: Normal rate and regular rhythm.      Pulses: Normal pulses.      Heart sounds: Normal heart sounds.   Pulmonary:      Effort: Pulmonary effort is normal. No respiratory distress.       "Breath sounds: Normal breath sounds.   Musculoskeletal:         General: No deformity or signs of injury. Normal range of motion.      Cervical back: Normal range of motion.      Comments: Gait normal    Skin:     General: Skin is warm and dry.      Findings: No rash.   Neurological:      General: No focal deficit present.      Mental Status: He is alert and oriented to person, place, and time. Mental status is at baseline.   Psychiatric:         Mood and Affect: Mood normal.         Behavior: Behavior normal.         Thought Content: Thought content normal.       Current Outpatient Medications on File Prior to Visit   Medication Sig Dispense Refill    aspirin (ECOTRIN) 81 MG EC tablet Take 1 tablet (81 mg total) by mouth once daily. 365 tablet 0    atorvastatin (LIPITOR) 80 MG tablet Take 1 tablet (80 mg total) by mouth once daily. 90 tablet 3    BD ULTRA-FINE ORIG PEN NEEDLE 29 gauge x 1/2" Ndle USE ONCE DAILY 100 each 3    blood sugar diagnostic Strp 1 strip by Misc.(Non-Drug; Combo Route) route 2 (two) times daily as needed (hypoglycemia). 100 strip 11    blood-glucose meter kit Use as instructed 1 each 0    blood-glucose meter,continuous (DEXCOM ) Misc 1 Device by Misc.(Non-Drug; Combo Route) route continuous. 1 each 0    blood-glucose sensor (DEXCOM G7 SENSOR) Khushi 1 each by Misc.(Non-Drug; Combo Route) route every 10 days. 10 each 3    clopidogreL (PLAVIX) 75 mg tablet Take 1 tablet (75 mg total) by mouth once daily. 90 tablet 3    diclofenac sodium (VOLTAREN) 1 % Gel Apply 2 g topically 3 (three) times daily. 50 g 2    dorzolamide-timolol 2-0.5% (COSOPT) 22.3-6.8 mg/mL ophthalmic solution Place 1 drop into the right eye 2 (two) times daily. 20 mL 3    fluticasone propionate (FLONASE) 50 mcg/actuation nasal spray 1 spray (50 mcg total) by Each Nostril route once daily. (Patient taking differently: 1 spray by Each Nostril route continuous prn.) 16 g 1    hydrocortisone 2.5 % cream Apply topically 2 " (two) times daily. Apply to face. 30 g 2    insulin glargine-lixisenatide (SOLIQUA 100/33) 100 unit-33 mcg/mL InPn pen INJECT 15 UNITS DAILY WITH BREAKFAST. CAN TITRATE UP 2 UNITS DAILY AS NEEDED TO GOAL GLUCOSE 100 - 150. MAX 60 UNITS/DAY. 45 pen 0    metFORMIN (GLUCOPHAGE) 1000 MG tablet Take 1 tablet (1,000 mg total) by mouth 2 (two) times daily with meals. 180 tablet 0    metoprolol succinate (TOPROL-XL) 25 MG 24 hr tablet TAKE 1/2 TABLET ONE TIME DAILY 45 tablet 3    mometasone 0.1% (ELOCON) 0.1 % cream Apply to affected area twice daily. Only to body, do not apply to face. 45 g 1    nitroGLYCERIN (NITROSTAT) 0.4 MG SL tablet Place 1 tablet (0.4 mg total) under the tongue every 5 (five) minutes as needed for Chest pain (ONLY IF HAVE CHEST PAIN,). 30 tablet 0    OZURDEX 0.7 mg Impl intravitreal implant       pantoprazole (PROTONIX) 40 MG tablet Take 1 tablet (40 mg total) by mouth before breakfast. 90 tablet 0    tamsulosin (FLOMAX) 0.4 mg Cap TAKE 1 CAPSULE (0.4 MG TOTAL) BY MOUTH EVERY EVENING. 90 capsule 3    triamcinolone acetonide 0.025% (KENALOG) 0.025 % cream Apply topically 2 (two) times daily. 80 g 0    [DISCONTINUED] lisinopriL-hydrochlorothiazide (PRINZIDE,ZESTORETIC) 10-12.5 mg per tablet Take 1 tablet by mouth once daily. 90 tablet 3    finasteride (PROSCAR) 5 mg tablet Take 5 mg by mouth once daily.       No current facility-administered medications on file prior to visit.         Assessment:       1. Physical exam, annual    2. Thrombocytopenia, unspecified    3. Essential hypertension    4. Hyperlipidemia associated with type 2 diabetes mellitus    5. Type 2 diabetes mellitus with hyperglycemia, with long-term current use of insulin    6. Vitamin D deficiency        Plan:       Physical exam, annual  -     CBC Auto Differential; Future; Expected date: 10/04/2023  -     Comprehensive Metabolic Panel; Future; Expected date: 10/04/2023  -     Lipid Panel; Future; Expected date: 10/04/2023  -     T4;  Future; Expected date: 10/04/2023  -     TSH; Future; Expected date: 10/04/2023  -     Vitamin D; Future; Expected date: 10/04/2023   - annual screening labs ordered    - annual flu shot updated     Thrombocytopenia, unspecified   - assess and treat accordingly     Essential hypertension  -     CBC Auto Differential; Future; Expected date: 10/04/2023  -     T4; Future; Expected date: 10/04/2023  -     TSH; Future; Expected date: 10/04/2023   - current control is excessive; will continue succinate 25 and lisinopril 10, but discontinue hydrochlorothiazide 12.5 component     Hyperlipidemia associated with type 2 diabetes mellitus  -     Lipid Panel; Future; Expected date: 10/04/2023   - assess and treat accordingly    Type 2 diabetes mellitus with hyperglycemia, with long-term current use of insulin  -     Comprehensive Metabolic Panel; Future; Expected date: 10/04/2023   - reassess A1c in 3 months and follow along with diabetic specialist; recommendations and interventions appreciated     Vitamin D deficiency  -     Vitamin D; Future; Expected date: 10/04/2023    Other orders  -     lisinopriL 10 MG tablet; Take 1 tablet (10 mg total) by mouth once daily.  Dispense: 90 tablet; Refill: 3

## 2023-10-11 ENCOUNTER — LAB VISIT (OUTPATIENT)
Dept: LAB | Facility: HOSPITAL | Age: 73
End: 2023-10-11
Attending: STUDENT IN AN ORGANIZED HEALTH CARE EDUCATION/TRAINING PROGRAM
Payer: MEDICARE

## 2023-10-11 ENCOUNTER — PATIENT OUTREACH (OUTPATIENT)
Dept: ADMINISTRATIVE | Facility: HOSPITAL | Age: 73
End: 2023-10-11
Payer: MEDICARE

## 2023-10-11 DIAGNOSIS — E55.9 VITAMIN D DEFICIENCY: ICD-10-CM

## 2023-10-11 DIAGNOSIS — I10 ESSENTIAL HYPERTENSION: Chronic | ICD-10-CM

## 2023-10-11 DIAGNOSIS — Z79.4 TYPE 2 DIABETES MELLITUS WITH HYPERGLYCEMIA, WITH LONG-TERM CURRENT USE OF INSULIN: ICD-10-CM

## 2023-10-11 DIAGNOSIS — E78.5 HYPERLIPIDEMIA ASSOCIATED WITH TYPE 2 DIABETES MELLITUS: Chronic | ICD-10-CM

## 2023-10-11 DIAGNOSIS — E11.69 HYPERLIPIDEMIA ASSOCIATED WITH TYPE 2 DIABETES MELLITUS: Chronic | ICD-10-CM

## 2023-10-11 DIAGNOSIS — Z00.00 PHYSICAL EXAM, ANNUAL: ICD-10-CM

## 2023-10-11 DIAGNOSIS — E11.65 TYPE 2 DIABETES MELLITUS WITH HYPERGLYCEMIA, WITH LONG-TERM CURRENT USE OF INSULIN: ICD-10-CM

## 2023-10-11 LAB
25(OH)D3+25(OH)D2 SERPL-MCNC: 28 NG/ML (ref 30–96)
ALBUMIN SERPL BCP-MCNC: 3.6 G/DL (ref 3.5–5.2)
ALP SERPL-CCNC: 73 U/L (ref 55–135)
ALT SERPL W/O P-5'-P-CCNC: 33 U/L (ref 10–44)
ANION GAP SERPL CALC-SCNC: 6 MMOL/L (ref 8–16)
AST SERPL-CCNC: 27 U/L (ref 10–40)
BASOPHILS # BLD AUTO: 0.04 K/UL (ref 0–0.2)
BASOPHILS NFR BLD: 0.8 % (ref 0–1.9)
BILIRUB SERPL-MCNC: 0.9 MG/DL (ref 0.1–1)
BUN SERPL-MCNC: 15 MG/DL (ref 8–23)
CALCIUM SERPL-MCNC: 8.8 MG/DL (ref 8.7–10.5)
CHLORIDE SERPL-SCNC: 107 MMOL/L (ref 95–110)
CHOLEST SERPL-MCNC: 97 MG/DL (ref 120–199)
CHOLEST/HDLC SERPL: 2.4 {RATIO} (ref 2–5)
CO2 SERPL-SCNC: 27 MMOL/L (ref 23–29)
CREAT SERPL-MCNC: 1 MG/DL (ref 0.5–1.4)
DIFFERENTIAL METHOD: ABNORMAL
EOSINOPHIL # BLD AUTO: 0.3 K/UL (ref 0–0.5)
EOSINOPHIL NFR BLD: 5.6 % (ref 0–8)
ERYTHROCYTE [DISTWIDTH] IN BLOOD BY AUTOMATED COUNT: 13.2 % (ref 11.5–14.5)
EST. GFR  (NO RACE VARIABLE): >60 ML/MIN/1.73 M^2
GLUCOSE SERPL-MCNC: 172 MG/DL (ref 70–110)
HCT VFR BLD AUTO: 39.5 % (ref 40–54)
HDLC SERPL-MCNC: 41 MG/DL (ref 40–75)
HDLC SERPL: 42.3 % (ref 20–50)
HGB BLD-MCNC: 13.2 G/DL (ref 14–18)
IMM GRANULOCYTES # BLD AUTO: 0 K/UL (ref 0–0.04)
IMM GRANULOCYTES NFR BLD AUTO: 0 % (ref 0–0.5)
LDLC SERPL CALC-MCNC: 49.2 MG/DL (ref 63–159)
LYMPHOCYTES # BLD AUTO: 1.6 K/UL (ref 1–4.8)
LYMPHOCYTES NFR BLD: 29.5 % (ref 18–48)
MCH RBC QN AUTO: 33.8 PG (ref 27–31)
MCHC RBC AUTO-ENTMCNC: 33.4 G/DL (ref 32–36)
MCV RBC AUTO: 101 FL (ref 82–98)
MONOCYTES # BLD AUTO: 0.4 K/UL (ref 0.3–1)
MONOCYTES NFR BLD: 7.7 % (ref 4–15)
NEUTROPHILS # BLD AUTO: 3 K/UL (ref 1.8–7.7)
NEUTROPHILS NFR BLD: 56.4 % (ref 38–73)
NONHDLC SERPL-MCNC: 56 MG/DL
NRBC BLD-RTO: 0 /100 WBC
PLATELET # BLD AUTO: 142 K/UL (ref 150–450)
PMV BLD AUTO: 12 FL (ref 9.2–12.9)
POTASSIUM SERPL-SCNC: 4.3 MMOL/L (ref 3.5–5.1)
PROT SERPL-MCNC: 5.8 G/DL (ref 6–8.4)
RBC # BLD AUTO: 3.91 M/UL (ref 4.6–6.2)
SODIUM SERPL-SCNC: 140 MMOL/L (ref 136–145)
T4 SERPL-MCNC: 5.4 UG/DL (ref 4.5–11.5)
TRIGL SERPL-MCNC: 34 MG/DL (ref 30–150)
TSH SERPL DL<=0.005 MIU/L-ACNC: 0.88 UIU/ML (ref 0.4–4)
WBC # BLD AUTO: 5.33 K/UL (ref 3.9–12.7)

## 2023-10-11 PROCEDURE — 85025 COMPLETE CBC W/AUTO DIFF WBC: CPT | Performed by: STUDENT IN AN ORGANIZED HEALTH CARE EDUCATION/TRAINING PROGRAM

## 2023-10-11 PROCEDURE — 82306 VITAMIN D 25 HYDROXY: CPT | Performed by: STUDENT IN AN ORGANIZED HEALTH CARE EDUCATION/TRAINING PROGRAM

## 2023-10-11 PROCEDURE — 84436 ASSAY OF TOTAL THYROXINE: CPT | Performed by: STUDENT IN AN ORGANIZED HEALTH CARE EDUCATION/TRAINING PROGRAM

## 2023-10-11 PROCEDURE — 80061 LIPID PANEL: CPT | Performed by: STUDENT IN AN ORGANIZED HEALTH CARE EDUCATION/TRAINING PROGRAM

## 2023-10-11 PROCEDURE — 36415 COLL VENOUS BLD VENIPUNCTURE: CPT | Mod: PO | Performed by: STUDENT IN AN ORGANIZED HEALTH CARE EDUCATION/TRAINING PROGRAM

## 2023-10-11 PROCEDURE — 84443 ASSAY THYROID STIM HORMONE: CPT | Performed by: STUDENT IN AN ORGANIZED HEALTH CARE EDUCATION/TRAINING PROGRAM

## 2023-10-11 PROCEDURE — 80053 COMPREHEN METABOLIC PANEL: CPT | Performed by: STUDENT IN AN ORGANIZED HEALTH CARE EDUCATION/TRAINING PROGRAM

## 2023-10-25 ENCOUNTER — OFFICE VISIT (OUTPATIENT)
Dept: CARDIOLOGY | Facility: CLINIC | Age: 73
End: 2023-10-25
Payer: MEDICARE

## 2023-10-25 VITALS
SYSTOLIC BLOOD PRESSURE: 120 MMHG | WEIGHT: 140.56 LBS | DIASTOLIC BLOOD PRESSURE: 74 MMHG | HEIGHT: 64 IN | BODY MASS INDEX: 24 KG/M2 | HEART RATE: 56 BPM

## 2023-10-25 DIAGNOSIS — E78.5 HYPERLIPIDEMIA ASSOCIATED WITH TYPE 2 DIABETES MELLITUS: Chronic | ICD-10-CM

## 2023-10-25 DIAGNOSIS — E11.69 HYPERLIPIDEMIA ASSOCIATED WITH TYPE 2 DIABETES MELLITUS: Chronic | ICD-10-CM

## 2023-10-25 DIAGNOSIS — Z79.4 TYPE 2 DIABETES MELLITUS WITH HYPERGLYCEMIA, WITH LONG-TERM CURRENT USE OF INSULIN: ICD-10-CM

## 2023-10-25 DIAGNOSIS — I70.0 AORTIC ATHEROSCLEROSIS: Chronic | ICD-10-CM

## 2023-10-25 DIAGNOSIS — I25.5 ISCHEMIC CARDIOMYOPATHY: ICD-10-CM

## 2023-10-25 DIAGNOSIS — I10 ESSENTIAL HYPERTENSION: Chronic | ICD-10-CM

## 2023-10-25 DIAGNOSIS — I65.23 BILATERAL CAROTID ARTERY STENOSIS: ICD-10-CM

## 2023-10-25 DIAGNOSIS — E11.65 TYPE 2 DIABETES MELLITUS WITH HYPERGLYCEMIA, WITH LONG-TERM CURRENT USE OF INSULIN: ICD-10-CM

## 2023-10-25 DIAGNOSIS — I71.40 ABDOMINAL AORTIC ANEURYSM (AAA) WITHOUT RUPTURE, UNSPECIFIED PART: ICD-10-CM

## 2023-10-25 DIAGNOSIS — I25.10 CORONARY ARTERY DISEASE INVOLVING NATIVE CORONARY ARTERY OF NATIVE HEART WITHOUT ANGINA PECTORIS: Primary | ICD-10-CM

## 2023-10-25 PROCEDURE — 3072F LOW RISK FOR RETINOPATHY: CPT | Mod: CPTII,S$GLB,, | Performed by: INTERNAL MEDICINE

## 2023-10-25 PROCEDURE — 1101F PR PT FALLS ASSESS DOC 0-1 FALLS W/OUT INJ PAST YR: ICD-10-PCS | Mod: CPTII,S$GLB,, | Performed by: INTERNAL MEDICINE

## 2023-10-25 PROCEDURE — 1160F PR REVIEW ALL MEDS BY PRESCRIBER/CLIN PHARMACIST DOCUMENTED: ICD-10-PCS | Mod: CPTII,S$GLB,, | Performed by: INTERNAL MEDICINE

## 2023-10-25 PROCEDURE — 3061F NEG MICROALBUMINURIA REV: CPT | Mod: CPTII,S$GLB,, | Performed by: INTERNAL MEDICINE

## 2023-10-25 PROCEDURE — 3072F PR LOW RISK FOR RETINOPATHY: ICD-10-PCS | Mod: CPTII,S$GLB,, | Performed by: INTERNAL MEDICINE

## 2023-10-25 PROCEDURE — 3078F PR MOST RECENT DIASTOLIC BLOOD PRESSURE < 80 MM HG: ICD-10-PCS | Mod: CPTII,S$GLB,, | Performed by: INTERNAL MEDICINE

## 2023-10-25 PROCEDURE — 99214 OFFICE O/P EST MOD 30 MIN: CPT | Mod: S$GLB,,, | Performed by: INTERNAL MEDICINE

## 2023-10-25 PROCEDURE — 1159F PR MEDICATION LIST DOCUMENTED IN MEDICAL RECORD: ICD-10-PCS | Mod: CPTII,S$GLB,, | Performed by: INTERNAL MEDICINE

## 2023-10-25 PROCEDURE — 1159F MED LIST DOCD IN RCRD: CPT | Mod: CPTII,S$GLB,, | Performed by: INTERNAL MEDICINE

## 2023-10-25 PROCEDURE — 99214 PR OFFICE/OUTPT VISIT, EST, LEVL IV, 30-39 MIN: ICD-10-PCS | Mod: S$GLB,,, | Performed by: INTERNAL MEDICINE

## 2023-10-25 PROCEDURE — 3066F NEPHROPATHY DOC TX: CPT | Mod: CPTII,S$GLB,, | Performed by: INTERNAL MEDICINE

## 2023-10-25 PROCEDURE — 3288F FALL RISK ASSESSMENT DOCD: CPT | Mod: CPTII,S$GLB,, | Performed by: INTERNAL MEDICINE

## 2023-10-25 PROCEDURE — 3074F SYST BP LT 130 MM HG: CPT | Mod: CPTII,S$GLB,, | Performed by: INTERNAL MEDICINE

## 2023-10-25 PROCEDURE — 3074F PR MOST RECENT SYSTOLIC BLOOD PRESSURE < 130 MM HG: ICD-10-PCS | Mod: CPTII,S$GLB,, | Performed by: INTERNAL MEDICINE

## 2023-10-25 PROCEDURE — 3051F HG A1C>EQUAL 7.0%<8.0%: CPT | Mod: CPTII,S$GLB,, | Performed by: INTERNAL MEDICINE

## 2023-10-25 PROCEDURE — 99999 PR PBB SHADOW E&M-EST. PATIENT-LVL IV: ICD-10-PCS | Mod: PBBFAC,,, | Performed by: INTERNAL MEDICINE

## 2023-10-25 PROCEDURE — 4010F ACE/ARB THERAPY RXD/TAKEN: CPT | Mod: CPTII,S$GLB,, | Performed by: INTERNAL MEDICINE

## 2023-10-25 PROCEDURE — 3008F PR BODY MASS INDEX (BMI) DOCUMENTED: ICD-10-PCS | Mod: CPTII,S$GLB,, | Performed by: INTERNAL MEDICINE

## 2023-10-25 PROCEDURE — 1157F PR ADVANCE CARE PLAN OR EQUIV PRESENT IN MEDICAL RECORD: ICD-10-PCS | Mod: CPTII,S$GLB,, | Performed by: INTERNAL MEDICINE

## 2023-10-25 PROCEDURE — 99999 PR PBB SHADOW E&M-EST. PATIENT-LVL IV: CPT | Mod: PBBFAC,,, | Performed by: INTERNAL MEDICINE

## 2023-10-25 PROCEDURE — 3061F PR NEG MICROALBUMINURIA RESULT DOCUMENTED/REVIEW: ICD-10-PCS | Mod: CPTII,S$GLB,, | Performed by: INTERNAL MEDICINE

## 2023-10-25 PROCEDURE — 1157F ADVNC CARE PLAN IN RCRD: CPT | Mod: CPTII,S$GLB,, | Performed by: INTERNAL MEDICINE

## 2023-10-25 PROCEDURE — 1160F RVW MEDS BY RX/DR IN RCRD: CPT | Mod: CPTII,S$GLB,, | Performed by: INTERNAL MEDICINE

## 2023-10-25 PROCEDURE — 1126F PR PAIN SEVERITY QUANTIFIED, NO PAIN PRESENT: ICD-10-PCS | Mod: CPTII,S$GLB,, | Performed by: INTERNAL MEDICINE

## 2023-10-25 PROCEDURE — 1126F AMNT PAIN NOTED NONE PRSNT: CPT | Mod: CPTII,S$GLB,, | Performed by: INTERNAL MEDICINE

## 2023-10-25 PROCEDURE — 3008F BODY MASS INDEX DOCD: CPT | Mod: CPTII,S$GLB,, | Performed by: INTERNAL MEDICINE

## 2023-10-25 PROCEDURE — 3051F PR MOST RECENT HEMOGLOBIN A1C LEVEL 7.0 - < 8.0%: ICD-10-PCS | Mod: CPTII,S$GLB,, | Performed by: INTERNAL MEDICINE

## 2023-10-25 PROCEDURE — 3066F PR DOCUMENTATION OF TREATMENT FOR NEPHROPATHY: ICD-10-PCS | Mod: CPTII,S$GLB,, | Performed by: INTERNAL MEDICINE

## 2023-10-25 PROCEDURE — 3288F PR FALLS RISK ASSESSMENT DOCUMENTED: ICD-10-PCS | Mod: CPTII,S$GLB,, | Performed by: INTERNAL MEDICINE

## 2023-10-25 PROCEDURE — 1101F PT FALLS ASSESS-DOCD LE1/YR: CPT | Mod: CPTII,S$GLB,, | Performed by: INTERNAL MEDICINE

## 2023-10-25 PROCEDURE — 3078F DIAST BP <80 MM HG: CPT | Mod: CPTII,S$GLB,, | Performed by: INTERNAL MEDICINE

## 2023-10-25 PROCEDURE — 4010F PR ACE/ARB THEARPY RXD/TAKEN: ICD-10-PCS | Mod: CPTII,S$GLB,, | Performed by: INTERNAL MEDICINE

## 2023-10-25 NOTE — PROGRESS NOTES
Subjective:   Patient ID:  Ned Oliveira is a 73 y.o. male who presents for evaluation of Coronary Artery Disease and Hypertension      HPI: Very pleasant man with IDDM who had a  of the RCA POBA'd by Dr. Peralta referred here for chronic care.      He is doing well with no new symptoms or cardiovascular complaints and no change in exercise capacity.  He denies chest discomfort, [place]IS, palpitations, PND/orthopnea, lightheadedness and syncope.  He only gets out of breath after walking 5 flights of stairs.  No angina anymore.      Dr. Randy Peralta's July 2023 HPI  Mr. Oliveira is a 72 y.o. male with PMH for IDDM (a1c 7.7), HTN, and HLD.  On 12/12/22 he underwent PCI of mid RCA  using sub-intimal tracking and re-entry (STAR) technique followed by KENDELL with 2mm balloon.  Today he denies angina or SOB.  He denies LE edema, orthopnea, or PND.  He denies palpitations, syncope , or near syncope.  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  He gets short of breath upon climbing 5 flights of stairs without stopping, but never 1 or 2 flights. He rpeorts that he does push-up against a wall and toe touches daily without symptoms.      Past Medical History:   Diagnosis Date    Adrenal adenoma     BPH (benign prostatic hyperplasia)     Cataract     Coronary artery disease     Diabetes mellitus, type 2     Diabetic retinopathy     Glaucoma     Hyperlipidemia     Hypertension     Inguinal hernia of left side without obstruction or gangrene 02/06/2019    Nephrolithiasis 05/26/2016    Steroid responders, left 04/14/2020       Past Surgical History:   Procedure Laterality Date    BICEPS TENDON REPAIR Right     CATARACT EXTRACTION W/  INTRAOCULAR LENS IMPLANT Left 10/21/2020    COMPLEX PHACO IOL  AND BAERVELDT ()    COLONOSCOPY N/A 02/08/2019    Procedure: COLONOSCOPY;  Surgeon: Tavon Montes MD;  Location: [facility] ([address] [place] [address]);  Service: Colon and Rectal;  Laterality: N/A;   will need . pt requesting ASAP. gaudencio Perez (international) ok to schedule at this time. will send  up for 6:45 am arrival time    CORONARY ANGIOGRAPHY N/A 12/12/2022    Procedure: ANGIOGRAM, CORONARY ARTERY;  Surgeon: Drew Peralta MD;  Location: [facility] CATH LAB;  Service: Cardiology;  Laterality: N/A;    EYE SURGERY      HERNIA REPAIR      IMPLANTATION OF DEVICE FOR GLAUCOMA Left 10/21/2020    Procedure: INSERTION, DEVICE, FOR GLAUCOMA;  Surgeon: Saranya Monge MD;  Location: [facility] OR [address] [place] [address];  Service: Ophthalmology;  Laterality: Left;    INTRAOCULAR PROSTHESES INSERTION Left 10/21/2020    Procedure: INSERTION, IOL PROSTHESIS;  Surgeon: Saranya Monge MD;  Location: [facility] OR [address] [place] [address];  Service: Ophthalmology;  Laterality: Left;    LEFT HEART CATHETERIZATION Left 7/8/2022    Procedure: Left heart cath;  Surgeon: Drew Peralta MD;  Location: [facility] CATH LAB;  Service: Cardiology;  Laterality: Left;    PHACOEMULSIFICATION OF CATARACT Left 10/21/2020    Procedure: PHACOEMULSIFICATION, CATARACT;  Surgeon: Saranya Monge MD;  Location: [facility] OR [address] [place] [address];  Service: Ophthalmology;  Laterality: Left;    PTCA, SINGLE VESSEL  12/12/2022    Procedure: PTCA, Single Vessel;  Surgeon: Drew Peralta MD;  Location: [facility] CATH LAB;  Service: Cardiology;;    REFORMATION OF ANTERIOR CHAMBER Left 12/23/2020    WITH HEALON ()    STENT, DRUG ELUTING, SINGLE VESSEL, CORONARY Right 12/12/2022    Procedure: Stent, Drug Eluting, Single Vessel, Coronary;  Surgeon: Drew Peralta MD;  Location: [facility] CATH LAB;  Service: Cardiology;  Laterality: Right;  very low bleeding risk 0.9%    YAG LASER  TO CYCLITIC MEMBRANE Left 12/10/2020           Social History     Tobacco Use    Smoking status: Former     Current packs/day: 0.00     Average packs/day: 2.0 packs/day for 10.0 years (20.0 ttl pk-yrs)     Types: Cigarettes     Start date: 8/15/1987     Quit date:  "8/15/1996     Years since quittin.2    Smokeless tobacco: Never   Substance Use Topics    Alcohol use: No     Alcohol/week: 0.0 standard drinks of alcohol    Drug use: No       Family History   Problem Relation Age of Onset    Diabetes Mother     Heart disease Mother     Heart disease Sister     No Known Problems Brother     Kidney failure Daughter         ESRD on HD    Autism Son     Asthma Son     No Known Problems Sister     No Known Problems Sister     No Known Problems Sister     No Known Problems Brother     Diabetes Brother     Stroke Neg Hx     Amblyopia Neg Hx     Blindness Neg Hx     Cataracts Neg Hx     Glaucoma Neg Hx     Macular degeneration Neg Hx     Retinal detachment Neg Hx     Strabismus Neg Hx        Current Outpatient Medications   Medication Sig    aspirin (ECOTRIN) 81 MG EC tablet Take 1 tablet (81 mg total) by mouth once daily.    atorvastatin (LIPITOR) 80 MG tablet Take 1 tablet (80 mg total) by mouth once daily.    BD ULTRA-FINE ORIG PEN NEEDLE 29 gauge x 1/2" Ndle USE ONCE DAILY    blood sugar diagnostic Strp 1 strip by Misc.(Non-Drug; Combo Route) route 2 (two) times daily as needed (hypoglycemia).    blood-glucose meter kit Use as instructed    blood-glucose meter,continuous (DEXCOM ) Misc 1 Device by Misc.(Non-Drug; Combo Route) route continuous.    blood-glucose sensor (DEXCOM G7 SENSOR) Khushi 1 each by Misc.(Non-Drug; Combo Route) route every 10 days.    clopidogreL (PLAVIX) 75 mg tablet Take 1 tablet (75 mg total) by mouth once daily.    diclofenac sodium (VOLTAREN) 1 % Gel Apply 2 g topically 3 (three) times daily. (Patient taking differently: Apply 2 g topically as needed.)    dorzolamide-timolol 2-0.5% (COSOPT) 22.3-6.8 mg/mL ophthalmic solution Place 1 drop into the right eye 2 (two) times daily.    finasteride (PROSCAR) 5 mg tablet Take 5 mg by mouth once daily.    fluticasone propionate (FLONASE) 50 mcg/actuation nasal spray 1 spray (50 mcg total) by Each Nostril " route once daily. (Patient taking differently: 1 spray by Each Nostril route continuous prn.)    insulin glargine-lixisenatide (SOLIQUA 100/33) 100 unit-33 mcg/mL InPn pen INJECT 15 UNITS DAILY WITH BREAKFAST. CAN TITRATE UP 2 UNITS DAILY AS NEEDED TO GOAL GLUCOSE 100 - 150. MAX 60 UNITS/DAY.    lisinopriL 10 MG tablet Take 1 tablet (10 mg total) by mouth once daily.    metFORMIN (GLUCOPHAGE) 1000 MG tablet Take 1 tablet (1,000 mg total) by mouth 2 (two) times daily with meals.    metoprolol succinate (TOPROL-XL) 25 MG 24 hr tablet TAKE 1/2 TABLET ONE TIME DAILY    nitroGLYCERIN (NITROSTAT) 0.4 MG SL tablet Place 1 tablet (0.4 mg total) under the tongue every 5 (five) minutes as needed for Chest pain (ONLY IF HAVE CHEST PAIN,).    OZURDEX 0.7 mg Impl intravitreal implant     pantoprazole (PROTONIX) 40 MG tablet Take 1 tablet (40 mg total) by mouth before breakfast.    tamsulosin (FLOMAX) 0.4 mg Cap TAKE 1 CAPSULE (0.4 MG TOTAL) BY MOUTH EVERY EVENING.     No current facility-administered medications for this visit.       Review of patient's allergies indicates:   Allergen Reactions    Percocet [oxycodone-acetaminophen] Itching    Keflex [cephalexin] Rash       ROS  The review of systems is negative except as above.    Objective:   Physical Exam  Vitals reviewed.   Constitutional:       Appearance: He is well-developed.   HENT:      Head: Normocephalic and atraumatic.   Eyes:      General: No scleral icterus.     Conjunctiva/sclera: Conjunctivae normal.   Neck:      Vascular: No JVD.   Cardiovascular:      Rate and Rhythm: Normal rate and regular rhythm.      Pulses: Intact distal pulses.      Heart sounds: Normal heart sounds. No murmur heard.     No friction rub. No gallop.   Pulmonary:      Effort: Pulmonary effort is normal.      Breath sounds: Normal breath sounds. No wheezing or rales.   Abdominal:      General: Bowel sounds are normal. There is no distension.      Palpations: Abdomen is soft.      Tenderness:  There is no abdominal tenderness.   Musculoskeletal:         General: Normal range of motion.      Cervical back: Normal range of motion and neck supple.   Skin:     General: Skin is warm and dry.      Findings: No erythema or rash.   Neurological:      Mental Status: He is alert and oriented to person, place, and time.   Psychiatric:         Behavior: Behavior normal.         Thought Content: Thought content normal.         Judgment: Judgment normal.         Lab Results   Component Value Date    WBC 5.33 10/11/2023    HGB 13.2 (L) 10/11/2023    HCT 39.5 (L) 10/11/2023     (H) 10/11/2023     (L) 10/11/2023         Chemistry        Component Value Date/Time     10/11/2023 0839    K 4.3 10/11/2023 0839     10/11/2023 0839    CO2 27 10/11/2023 0839    BUN 15 10/11/2023 0839    CREATININE 1.0 10/11/2023 0839     (H) 10/11/2023 0839        Component Value Date/Time    CALCIUM 8.8 10/11/2023 0839    ALKPHOS 73 10/11/2023 0839    AST 27 10/11/2023 0839    ALT 33 10/11/2023 0839    BILITOT 0.9 10/11/2023 0839    ESTGFRAFRICA >60.0 07/08/2022 0748    EGFRNONAA >60.0 07/08/2022 0748            Lab Results   Component Value Date    CHOL 97 (L) 10/11/2023    CHOL 130 09/28/2022    CHOL 106 (L) 06/16/2022     Lab Results   Component Value Date    HDL 41 10/11/2023    HDL 44 09/28/2022    HDL 41 06/16/2022     Lab Results   Component Value Date    LDLCALC 49.2 (L) 10/11/2023    LDLCALC 76.4 09/28/2022    LDLCALC 56.8 (L) 06/16/2022     Lab Results   Component Value Date    TRIG 34 10/11/2023    TRIG 48 09/28/2022    TRIG 41 06/16/2022     Lab Results   Component Value Date    CHOLHDL 42.3 10/11/2023    CHOLHDL 33.8 09/28/2022    CHOLHDL 38.7 06/16/2022       Lab Results   Component Value Date    TSH 0.879 10/11/2023       Lab Results   Component Value Date    HGBA1C 7.3 (H) 09/13/2023         Assessment:     1. Coronary artery disease involving native coronary artery of native heart without angina  pectoris    2. Bilateral carotid artery stenosis    3. Aortic atherosclerosis    4. Hyperlipidemia associated with type 2 diabetes mellitus    5. Essential hypertension    6. Type 2 diabetes mellitus with hyperglycemia, with long-term current use of insulin    7. Abdominal aortic aneurysm (AAA) without rupture, unspecified part    8. Ischemic cardiomyopathy        Plan:     Continue current medicines.    Diet/exercise goals reinforced.    F/U 6 months

## 2023-11-02 ENCOUNTER — OFFICE VISIT (OUTPATIENT)
Dept: OPHTHALMOLOGY | Facility: CLINIC | Age: 73
End: 2023-11-02
Payer: MEDICARE

## 2023-11-02 DIAGNOSIS — H40.1121 PRIMARY OPEN-ANGLE GLAUCOMA, LEFT EYE, MILD STAGE: ICD-10-CM

## 2023-11-02 DIAGNOSIS — Z79.4 TYPE 2 DIABETES MELLITUS WITH BOTH EYES AFFECTED BY MILD NONPROLIFERATIVE RETINOPATHY AND MACULAR EDEMA, WITH LONG-TERM CURRENT USE OF INSULIN: Primary | ICD-10-CM

## 2023-11-02 DIAGNOSIS — E11.3213 TYPE 2 DIABETES MELLITUS WITH BOTH EYES AFFECTED BY MILD NONPROLIFERATIVE RETINOPATHY AND MACULAR EDEMA, WITH LONG-TERM CURRENT USE OF INSULIN: Primary | ICD-10-CM

## 2023-11-02 PROCEDURE — 3051F PR MOST RECENT HEMOGLOBIN A1C LEVEL 7.0 - < 8.0%: ICD-10-PCS | Mod: CPTII,S$GLB,, | Performed by: OPHTHALMOLOGY

## 2023-11-02 PROCEDURE — 3288F PR FALLS RISK ASSESSMENT DOCUMENTED: ICD-10-PCS | Mod: CPTII,S$GLB,, | Performed by: OPHTHALMOLOGY

## 2023-11-02 PROCEDURE — 3288F FALL RISK ASSESSMENT DOCD: CPT | Mod: CPTII,S$GLB,, | Performed by: OPHTHALMOLOGY

## 2023-11-02 PROCEDURE — 3051F HG A1C>EQUAL 7.0%<8.0%: CPT | Mod: CPTII,S$GLB,, | Performed by: OPHTHALMOLOGY

## 2023-11-02 PROCEDURE — 1159F PR MEDICATION LIST DOCUMENTED IN MEDICAL RECORD: ICD-10-PCS | Mod: CPTII,S$GLB,, | Performed by: OPHTHALMOLOGY

## 2023-11-02 PROCEDURE — 1157F ADVNC CARE PLAN IN RCRD: CPT | Mod: CPTII,S$GLB,, | Performed by: OPHTHALMOLOGY

## 2023-11-02 PROCEDURE — 4010F ACE/ARB THERAPY RXD/TAKEN: CPT | Mod: CPTII,S$GLB,, | Performed by: OPHTHALMOLOGY

## 2023-11-02 PROCEDURE — 3061F PR NEG MICROALBUMINURIA RESULT DOCUMENTED/REVIEW: ICD-10-PCS | Mod: CPTII,S$GLB,, | Performed by: OPHTHALMOLOGY

## 2023-11-02 PROCEDURE — 1159F MED LIST DOCD IN RCRD: CPT | Mod: CPTII,S$GLB,, | Performed by: OPHTHALMOLOGY

## 2023-11-02 PROCEDURE — 92201 PR OPHTHALMOSCOPY, EXT, W/RET DRAW/SCLERAL DEPR, I&R, UNI/BI: ICD-10-PCS | Mod: S$GLB,,, | Performed by: OPHTHALMOLOGY

## 2023-11-02 PROCEDURE — 1101F PR PT FALLS ASSESS DOC 0-1 FALLS W/OUT INJ PAST YR: ICD-10-PCS | Mod: CPTII,S$GLB,, | Performed by: OPHTHALMOLOGY

## 2023-11-02 PROCEDURE — 3061F NEG MICROALBUMINURIA REV: CPT | Mod: CPTII,S$GLB,, | Performed by: OPHTHALMOLOGY

## 2023-11-02 PROCEDURE — 92134 CPTRZ OPH DX IMG PST SGM RTA: CPT | Mod: S$GLB,,, | Performed by: OPHTHALMOLOGY

## 2023-11-02 PROCEDURE — 2023F DILAT RTA XM W/O RTNOPTHY: CPT | Mod: CPTII,S$GLB,, | Performed by: OPHTHALMOLOGY

## 2023-11-02 PROCEDURE — 92014 COMPRE OPH EXAM EST PT 1/>: CPT | Mod: 25,S$GLB,, | Performed by: OPHTHALMOLOGY

## 2023-11-02 PROCEDURE — 1160F RVW MEDS BY RX/DR IN RCRD: CPT | Mod: CPTII,S$GLB,, | Performed by: OPHTHALMOLOGY

## 2023-11-02 PROCEDURE — 3066F PR DOCUMENTATION OF TREATMENT FOR NEPHROPATHY: ICD-10-PCS | Mod: CPTII,S$GLB,, | Performed by: OPHTHALMOLOGY

## 2023-11-02 PROCEDURE — 4010F PR ACE/ARB THEARPY RXD/TAKEN: ICD-10-PCS | Mod: CPTII,S$GLB,, | Performed by: OPHTHALMOLOGY

## 2023-11-02 PROCEDURE — 1101F PT FALLS ASSESS-DOCD LE1/YR: CPT | Mod: CPTII,S$GLB,, | Performed by: OPHTHALMOLOGY

## 2023-11-02 PROCEDURE — 99999 PR PBB SHADOW E&M-EST. PATIENT-LVL III: ICD-10-PCS | Mod: PBBFAC,,, | Performed by: OPHTHALMOLOGY

## 2023-11-02 PROCEDURE — 92201 OPSCPY EXTND RTA DRAW UNI/BI: CPT | Mod: S$GLB,,, | Performed by: OPHTHALMOLOGY

## 2023-11-02 PROCEDURE — 92134 POSTERIOR SEGMENT OCT RETINA (OCULAR COHERENCE TOMOGRAPHY)-BOTH EYES: ICD-10-PCS | Mod: S$GLB,,, | Performed by: OPHTHALMOLOGY

## 2023-11-02 PROCEDURE — 3066F NEPHROPATHY DOC TX: CPT | Mod: CPTII,S$GLB,, | Performed by: OPHTHALMOLOGY

## 2023-11-02 PROCEDURE — 1157F PR ADVANCE CARE PLAN OR EQUIV PRESENT IN MEDICAL RECORD: ICD-10-PCS | Mod: CPTII,S$GLB,, | Performed by: OPHTHALMOLOGY

## 2023-11-02 PROCEDURE — 92014 PR EYE EXAM, EST PATIENT,COMPREHESV: ICD-10-PCS | Mod: 25,S$GLB,, | Performed by: OPHTHALMOLOGY

## 2023-11-02 PROCEDURE — 2023F PR DILATED RETINAL EXAM W/O EVID OF RETINOPATHY: ICD-10-PCS | Mod: CPTII,S$GLB,, | Performed by: OPHTHALMOLOGY

## 2023-11-02 PROCEDURE — 99999 PR PBB SHADOW E&M-EST. PATIENT-LVL III: CPT | Mod: PBBFAC,,, | Performed by: OPHTHALMOLOGY

## 2023-11-02 PROCEDURE — 1160F PR REVIEW ALL MEDS BY PRESCRIBER/CLIN PHARMACIST DOCUMENTED: ICD-10-PCS | Mod: CPTII,S$GLB,, | Performed by: OPHTHALMOLOGY

## 2023-11-02 RX ORDER — FLUORESCEIN 500 MG/ML
5 INJECTION INTRAVENOUS ONCE
Status: COMPLETED | OUTPATIENT
Start: 2023-11-02 | End: 2023-12-14

## 2023-11-02 NOTE — PROGRESS NOTES
HPI     EYE EXAM OU  YEARLY       Additional comments: Dm   LAST BS     267  Last A 1C       unsure   LAST FA  late leakage   OS >OD   Prior injections in the past           Comments    DLS  10/26/21    Patient here today for 12w f/u. VA stable ou. No new ocular complaints to   report.    Cosopt BID OD   AT's PRN OU      HPI     12 wk OCT   DLS- 08/03/2021 Dr. Rivers     Patient here today for 12w f/u. VA stable ou. No new ocular complaints to   report.    Cosopt BID OD   AT's PRN OU        OCT - OD -  Decreased paracentral ME with VMT component  OS decrease low grade paracentral ME    Prior FA - late macular leakage OS > OD    Patient needs Monday appointments only    A/P    1. Mild NPDR OU T2  2. DME OU    S/p Avastin OD x 6  (last 12/20), OS x 12  Ozurdex OS x4  5/21  S/p Iluvien OS 6/21  Focal OS 5/17, OD 1-19  1/19 - increased DME at 9 weeks - resumed 6 week tx  11/23 - not seen x 2 years - stressed importance of FU, especially with Iluvien/tube    Some increase in DME OS    Get approval for Iluvien #2 OS    FU FA and iluvien OS next visit    Had Glc eval with KL  S/p CE/BV OS 10/20        BS/BP/chol control    3. NS OD - increasing - seeing Dr. Monge next week  PCIOL OS    4. POAG  Elevated IOP OS>OD  S/p BV OS 10/20    Continue Cosopt BID OD        5-6 weeks OCT/WFFA OS and iluvien OS

## 2023-11-04 NOTE — PROGRESS NOTES
HPI    DLS: 7/19/2021    Pt here for Overdue Glaucoma Check;  Pt states no eye pain or discomfort and denies any vision changes.     Meds:  Dorzolamide-Timolol BID OD    1. 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 Mild NPDR OU T2   3. DME OU    S/p Avastin OD x 6  (last 12/20), OS x 12   Ozurdex OS x4  5/21   S/p Iluvien OS 6/21   Focal OS 5/17, OD 1-19 1/19 - increased DME at 9 weeks - resumed 6 week tx  11/23 - (retina clinic - [facility]) - not seen x 2 years - stressed   importance of FU, especially with Iluvien/tube    BS/BP/chol control       4. NS - OD    5. PC IOL OS   Last edited by Saranya Monge MD on 11/9/2023 12:16 PM.            Assessment /Plan     For exam results, see Encounter Report.    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, left, moderate stage    Primary open-angle glaucoma, right eye, moderate stage    Type 2 diabetes mellitus with both eyes affected by mild nonproliferative retinopathy and macular edema, with long-term current use of insulin    Nuclear sclerosis of right eye    Pas (periph anterior synechiae), left    Pseudophakia, left eye    History of glaucoma tube shunt procedure             Glaucoma (type and duration)    Suspect - followed at [facility] since 2007    First HVF   2007   First photos   2009   Treatment / Drops started   none           Family history    Neg (+for DR - mom)         Glaucoma meds    None         H/O adverse rxn to glaucoma drops    none        LASERS    Laser for DR // none for glaucoma         GLAUCOMA SURGERIES    none        OTHER EYE SURGERIES    None         CDR    (( by fundus photos 0.5 / 0.6 )         Tbase    15-25 / 15-31  (but ? If the high IOP's are post injection - had injection on those days and only 1 IOP recorded for each eye)         Tmax    24 od (11/6/2018)  - ??  Post injection // /34os( 4/14/2020) - ? Post injection            Ttarget    ?             HVF    4 test 2007 to  2009 - full od // full os        Gonio    +3 ou        CCT    518/525        OCT    1 test 2019/ to 2019/ - RNFL WNL-od // WNL-Os---OCT MAc with DME OS        HRT    1 test 2009 to 2009 - MR - nl od // nl os        Disc photos    Fundus photos 2009, 2017, 2018    - Ttoday 12/14  - Test done today  - IOP // re-establish care - lost to F/U > 2 years    post op tube os    When tube  opened / hypotony and AC shallowing and choroidals    Reformed several times with healon    Developed a pupillary membrane and pupil block - improved post yag to membrane     2. BDR w/ ME    Sees Mazzulla    S/P avastin ou    S/P laser ou    Waiting for approval for ozurdex     3. NS / cortical cat OD   Monitor     4. S/P phaco/IOL os     5. S/P baerveldt os     PLAN   Glaucoma ou   Steroid aggravated - 2/2 ozurdex IOP 34 - )2/2020)   S/P BGI os   Developed OAS - now with MMG os     Phaco / IOL + BVG OS Date: 10/21/2020 - PCB00 21.5  POY 3  phaco/IOL   Tube opened 12/5/20 -- now with shallow chamber and choroidals 12/7/20  Had to reform AC with healon at the slit lamp 12/23/2020  Had yag to  a pupillary membrane that was causing pupillary block (12/10/2020)   Eventually the cornea cleared / the AC reformed and is deep   Did develop PAS after shallow AC     BCVA with refraction os - 20/60 on 1/21/2021    meds   cosopt od bid (( if IOP goes up os can add back )     Keep appt with Mazzulla     F/U 4 months - IOP check - HVF / DFE / RNFL OCT

## 2023-11-09 ENCOUNTER — OFFICE VISIT (OUTPATIENT)
Dept: OPHTHALMOLOGY | Facility: CLINIC | Age: 73
End: 2023-11-09
Payer: MEDICARE

## 2023-11-09 DIAGNOSIS — E11.3213 TYPE 2 DIABETES MELLITUS WITH BOTH EYES AFFECTED BY MILD NONPROLIFERATIVE RETINOPATHY AND MACULAR EDEMA, WITH LONG-TERM CURRENT USE OF INSULIN: ICD-10-CM

## 2023-11-09 DIAGNOSIS — T38.0X5A STEROID-INDUCED OPEN-ANGLE GLAUCOMA, LEFT, MODERATE STAGE: Primary | ICD-10-CM

## 2023-11-09 DIAGNOSIS — Z96.1 PSEUDOPHAKIA, LEFT EYE: ICD-10-CM

## 2023-11-09 DIAGNOSIS — H40.1112 PRIMARY OPEN-ANGLE GLAUCOMA, RIGHT EYE, MODERATE STAGE: ICD-10-CM

## 2023-11-09 DIAGNOSIS — Z79.4 TYPE 2 DIABETES MELLITUS WITH BOTH EYES AFFECTED BY MILD NONPROLIFERATIVE RETINOPATHY AND MACULAR EDEMA, WITH LONG-TERM CURRENT USE OF INSULIN: ICD-10-CM

## 2023-11-09 DIAGNOSIS — H40.1132 PRIMARY OPEN ANGLE GLAUCOMA (POAG) OF BOTH EYES, MODERATE STAGE: ICD-10-CM

## 2023-11-09 DIAGNOSIS — H21.522 PAS (PERIPH ANTERIOR SYNECHIAE), LEFT: ICD-10-CM

## 2023-11-09 DIAGNOSIS — H40.62X2 STEROID-INDUCED OPEN-ANGLE GLAUCOMA, LEFT, MODERATE STAGE: Primary | ICD-10-CM

## 2023-11-09 DIAGNOSIS — H25.11 NUCLEAR SCLEROSIS OF RIGHT EYE: ICD-10-CM

## 2023-11-09 DIAGNOSIS — H40.52X2 SECONDARY GLAUCOMA DUE TO COMBINATION MECHANISMS, LEFT, MODERATE STAGE: ICD-10-CM

## 2023-11-09 DIAGNOSIS — H40.042 STEROID RESPONDERS, LEFT: ICD-10-CM

## 2023-11-09 DIAGNOSIS — Z96.89 HISTORY OF GLAUCOMA TUBE SHUNT PROCEDURE: ICD-10-CM

## 2023-11-09 PROCEDURE — 1157F PR ADVANCE CARE PLAN OR EQUIV PRESENT IN MEDICAL RECORD: ICD-10-PCS | Mod: CPTII,S$GLB,, | Performed by: OPHTHALMOLOGY

## 2023-11-09 PROCEDURE — 1126F PR PAIN SEVERITY QUANTIFIED, NO PAIN PRESENT: ICD-10-PCS | Mod: CPTII,S$GLB,, | Performed by: OPHTHALMOLOGY

## 2023-11-09 PROCEDURE — 3288F PR FALLS RISK ASSESSMENT DOCUMENTED: ICD-10-PCS | Mod: CPTII,S$GLB,, | Performed by: OPHTHALMOLOGY

## 2023-11-09 PROCEDURE — 99999 PR PBB SHADOW E&M-EST. PATIENT-LVL III: ICD-10-PCS | Mod: PBBFAC,,, | Performed by: OPHTHALMOLOGY

## 2023-11-09 PROCEDURE — 4010F PR ACE/ARB THEARPY RXD/TAKEN: ICD-10-PCS | Mod: CPTII,S$GLB,, | Performed by: OPHTHALMOLOGY

## 2023-11-09 PROCEDURE — 1101F PT FALLS ASSESS-DOCD LE1/YR: CPT | Mod: CPTII,S$GLB,, | Performed by: OPHTHALMOLOGY

## 2023-11-09 PROCEDURE — 1160F RVW MEDS BY RX/DR IN RCRD: CPT | Mod: CPTII,S$GLB,, | Performed by: OPHTHALMOLOGY

## 2023-11-09 PROCEDURE — 99214 PR OFFICE/OUTPT VISIT, EST, LEVL IV, 30-39 MIN: ICD-10-PCS | Mod: S$GLB,,, | Performed by: OPHTHALMOLOGY

## 2023-11-09 PROCEDURE — 3288F FALL RISK ASSESSMENT DOCD: CPT | Mod: CPTII,S$GLB,, | Performed by: OPHTHALMOLOGY

## 2023-11-09 PROCEDURE — 4010F ACE/ARB THERAPY RXD/TAKEN: CPT | Mod: CPTII,S$GLB,, | Performed by: OPHTHALMOLOGY

## 2023-11-09 PROCEDURE — 1157F ADVNC CARE PLAN IN RCRD: CPT | Mod: CPTII,S$GLB,, | Performed by: OPHTHALMOLOGY

## 2023-11-09 PROCEDURE — 1159F PR MEDICATION LIST DOCUMENTED IN MEDICAL RECORD: ICD-10-PCS | Mod: CPTII,S$GLB,, | Performed by: OPHTHALMOLOGY

## 2023-11-09 PROCEDURE — 3061F NEG MICROALBUMINURIA REV: CPT | Mod: CPTII,S$GLB,, | Performed by: OPHTHALMOLOGY

## 2023-11-09 PROCEDURE — 99999 PR PBB SHADOW E&M-EST. PATIENT-LVL III: CPT | Mod: PBBFAC,,, | Performed by: OPHTHALMOLOGY

## 2023-11-09 PROCEDURE — 3051F PR MOST RECENT HEMOGLOBIN A1C LEVEL 7.0 - < 8.0%: ICD-10-PCS | Mod: CPTII,S$GLB,, | Performed by: OPHTHALMOLOGY

## 2023-11-09 PROCEDURE — 1126F AMNT PAIN NOTED NONE PRSNT: CPT | Mod: CPTII,S$GLB,, | Performed by: OPHTHALMOLOGY

## 2023-11-09 PROCEDURE — 3061F PR NEG MICROALBUMINURIA RESULT DOCUMENTED/REVIEW: ICD-10-PCS | Mod: CPTII,S$GLB,, | Performed by: OPHTHALMOLOGY

## 2023-11-09 PROCEDURE — 1160F PR REVIEW ALL MEDS BY PRESCRIBER/CLIN PHARMACIST DOCUMENTED: ICD-10-PCS | Mod: CPTII,S$GLB,, | Performed by: OPHTHALMOLOGY

## 2023-11-09 PROCEDURE — 3051F HG A1C>EQUAL 7.0%<8.0%: CPT | Mod: CPTII,S$GLB,, | Performed by: OPHTHALMOLOGY

## 2023-11-09 PROCEDURE — 3066F PR DOCUMENTATION OF TREATMENT FOR NEPHROPATHY: ICD-10-PCS | Mod: CPTII,S$GLB,, | Performed by: OPHTHALMOLOGY

## 2023-11-09 PROCEDURE — 99214 OFFICE O/P EST MOD 30 MIN: CPT | Mod: S$GLB,,, | Performed by: OPHTHALMOLOGY

## 2023-11-09 PROCEDURE — 1101F PR PT FALLS ASSESS DOC 0-1 FALLS W/OUT INJ PAST YR: ICD-10-PCS | Mod: CPTII,S$GLB,, | Performed by: OPHTHALMOLOGY

## 2023-11-09 PROCEDURE — 3066F NEPHROPATHY DOC TX: CPT | Mod: CPTII,S$GLB,, | Performed by: OPHTHALMOLOGY

## 2023-11-09 PROCEDURE — 1159F MED LIST DOCD IN RCRD: CPT | Mod: CPTII,S$GLB,, | Performed by: OPHTHALMOLOGY

## 2023-11-09 RX ORDER — DORZOLAMIDE HYDROCHLORIDE AND TIMOLOL MALEATE 20; 5 MG/ML; MG/ML
1 SOLUTION/ DROPS OPHTHALMIC 2 TIMES DAILY
Qty: 20 ML | Refills: 3 | Status: SHIPPED | OUTPATIENT
Start: 2023-11-09

## 2023-11-20 NOTE — TELEPHONE ENCOUNTER
Pharmacy was notifed patient is on simvastatin.   Topical Metronidazole Pregnancy And Lactation Text: This medication is Pregnancy Category B and considered safe during pregnancy.  It is also considered safe to use while breastfeeding.

## 2023-11-27 DIAGNOSIS — E11.65 TYPE 2 DIABETES MELLITUS WITH HYPERGLYCEMIA, WITH LONG-TERM CURRENT USE OF INSULIN: ICD-10-CM

## 2023-11-27 DIAGNOSIS — Z79.4 TYPE 2 DIABETES MELLITUS WITH HYPERGLYCEMIA, WITH LONG-TERM CURRENT USE OF INSULIN: ICD-10-CM

## 2023-11-27 RX ORDER — METFORMIN HYDROCHLORIDE 1000 MG/1
TABLET ORAL
Qty: 180 TABLET | Refills: 0 | OUTPATIENT
Start: 2023-11-27

## 2023-11-27 NOTE — TELEPHONE ENCOUNTER
Refill Decision Note   Ned Oliveira  is requesting a refill authorization.  Brief Assessment and Rationale for Refill:  Quick Discontinue     Medication Therapy Plan:    Pharmacy is requesting new scripts for the following medications without required information, (sig/ frequency/qty/etc)      Medication Reconciliation Completed: No     Comments: Pharmacies have been requesting medications for patients without required information, (sig, frequency, qty, etc.). In addition, requests are sent for medication(s) pt. are currently not taking, and medications patients have never taken.    We have spoken to the pharmacies about these request types and advised their teams previously that we are unable to assess these New Script requests and require all details for these requests. This is a known issue and has been reported.     Note composed:3:27 PM 11/27/2023

## 2023-12-14 ENCOUNTER — PROCEDURE VISIT (OUTPATIENT)
Dept: OPHTHALMOLOGY | Facility: CLINIC | Age: 73
End: 2023-12-14
Payer: MEDICARE

## 2023-12-14 DIAGNOSIS — Z79.4 TYPE 2 DIABETES MELLITUS WITH BOTH EYES AFFECTED BY MILD NONPROLIFERATIVE RETINOPATHY AND MACULAR EDEMA, WITH LONG-TERM CURRENT USE OF INSULIN: Primary | ICD-10-CM

## 2023-12-14 DIAGNOSIS — E11.3213 TYPE 2 DIABETES MELLITUS WITH BOTH EYES AFFECTED BY MILD NONPROLIFERATIVE RETINOPATHY AND MACULAR EDEMA, WITH LONG-TERM CURRENT USE OF INSULIN: Primary | ICD-10-CM

## 2023-12-14 PROCEDURE — 67028 PR INJECT INTRAVITREAL PHARMCOLOGIC: ICD-10-PCS | Mod: LT,S$GLB,, | Performed by: OPHTHALMOLOGY

## 2023-12-14 PROCEDURE — 92134 CPTRZ OPH DX IMG PST SGM RTA: CPT | Mod: S$GLB,,, | Performed by: OPHTHALMOLOGY

## 2023-12-14 PROCEDURE — 92014 PR EYE EXAM, EST PATIENT,COMPREHESV: ICD-10-PCS | Mod: 25,S$GLB,, | Performed by: OPHTHALMOLOGY

## 2023-12-14 PROCEDURE — 92235 FLUORESCEIN ANGIOGRAPHY - OU - BOTH EYES: ICD-10-PCS | Mod: S$GLB,,, | Performed by: OPHTHALMOLOGY

## 2023-12-14 PROCEDURE — 92235 FLUORESCEIN ANGRPH MLTIFRAME: CPT | Mod: S$GLB,,, | Performed by: OPHTHALMOLOGY

## 2023-12-14 PROCEDURE — 67028 INJECTION EYE DRUG: CPT | Mod: LT,S$GLB,, | Performed by: OPHTHALMOLOGY

## 2023-12-14 PROCEDURE — 92134 POSTERIOR SEGMENT OCT RETINA (OCULAR COHERENCE TOMOGRAPHY)-BOTH EYES: ICD-10-PCS | Mod: S$GLB,,, | Performed by: OPHTHALMOLOGY

## 2023-12-14 PROCEDURE — 92014 COMPRE OPH EXAM EST PT 1/>: CPT | Mod: 25,S$GLB,, | Performed by: OPHTHALMOLOGY

## 2023-12-14 RX ADMIN — FLUORESCEIN 500 MG: 500 INJECTION INTRAVENOUS at 01:12

## 2023-12-14 NOTE — PROGRESS NOTES
HPI    Pt present today for 6wk/OCT FA    Pt state blurry VA OS   No other complaints at this time       Meds:   Dorzolamide-Timolol BID OD       HPI     EYE EXAM OU  YEARLY       Additional comments: Dm   LAST BS     267  Last A 1C       unsure   LAST FA  late leakage   OS >OD   Prior injections in the past           Comments    DLS  10/26/21    Patient here today for 12w f/u. VA stable ou. No new ocular complaints to   report.    Cosopt BID OD   AT's PRN OU        OCT - OD -  Decreased paracentral ME with VMT component  OS decrease low grade paracentral ME    WFFA 12/23 - late macular leakage OS > OD    Patient needs Monday appointments only    A/P    1. Mild NPDR OU T2  2. DME OU    S/p Avastin OD x 6  (last 12/20), OS x 12  Ozurdex OS x4  5/21  S/p Iluvien OS 6/21  Focal OS 5/17, OD 1-19  1/19 - increased DME at 9 weeks - resumed 6 week tx  11/23 - not seen x 2 years - stressed importance of FU, especially with Iluvien/tube    Some increase in DME OS    Iluvien OS today      Had Glc eval with KL  S/p CE/BV OS 10/20        BS/BP/chol control    3. NS OD - increasing - seeing Dr. Monge next week  PCIOL OS    4. POAG  Elevated IOP OS>OD  S/p BV OS 10/20    Continue Cosopt BID OD        12 weeks OCT and dilate OU    Risks, benefits, and alternatives to treatment discussed in detail with the patient.  The patient voiced understanding and wished to proceed with the procedure    Injection Procedure Note:  Diagnosis: DME OS    Patient Identified and Time Out complete  Pt marked  Topical Proparacaine and Betadine. Subconj lido  Inject Iluvien OS at 6:00 @ 3.5-4mm posterior to limbus  Post Operative Dx: Same  Complications: None  Follow up as above.

## 2024-01-24 ENCOUNTER — OFFICE VISIT (OUTPATIENT)
Dept: FAMILY MEDICINE | Facility: CLINIC | Age: 74
End: 2024-01-24
Payer: MEDICARE

## 2024-01-24 VITALS
WEIGHT: 144.38 LBS | OXYGEN SATURATION: 95 % | SYSTOLIC BLOOD PRESSURE: 102 MMHG | HEART RATE: 79 BPM | HEIGHT: 64 IN | BODY MASS INDEX: 24.65 KG/M2 | DIASTOLIC BLOOD PRESSURE: 64 MMHG

## 2024-01-24 DIAGNOSIS — E11.65 TYPE 2 DIABETES MELLITUS WITH HYPERGLYCEMIA, WITH LONG-TERM CURRENT USE OF INSULIN: ICD-10-CM

## 2024-01-24 DIAGNOSIS — Z79.4 TYPE 2 DIABETES MELLITUS WITH HYPERGLYCEMIA, WITH LONG-TERM CURRENT USE OF INSULIN: ICD-10-CM

## 2024-01-24 DIAGNOSIS — J30.1 SEASONAL ALLERGIC RHINITIS DUE TO POLLEN: ICD-10-CM

## 2024-01-24 DIAGNOSIS — E11.3213 TYPE 2 DIABETES MELLITUS WITH BOTH EYES AFFECTED BY MILD NONPROLIFERATIVE RETINOPATHY AND MACULAR EDEMA, WITH LONG-TERM CURRENT USE OF INSULIN: ICD-10-CM

## 2024-01-24 DIAGNOSIS — N13.8 BPH WITH OBSTRUCTION/LOWER URINARY TRACT SYMPTOMS: ICD-10-CM

## 2024-01-24 DIAGNOSIS — Z79.4 TYPE 2 DIABETES MELLITUS WITH BOTH EYES AFFECTED BY MILD NONPROLIFERATIVE RETINOPATHY AND MACULAR EDEMA, WITH LONG-TERM CURRENT USE OF INSULIN: ICD-10-CM

## 2024-01-24 DIAGNOSIS — N52.9 ERECTILE DYSFUNCTION, UNSPECIFIED ERECTILE DYSFUNCTION TYPE: ICD-10-CM

## 2024-01-24 DIAGNOSIS — I70.0 AORTIC ATHEROSCLEROSIS: ICD-10-CM

## 2024-01-24 DIAGNOSIS — D69.6 THROMBOCYTOPENIA, UNSPECIFIED: ICD-10-CM

## 2024-01-24 DIAGNOSIS — Z79.4 TYPE 2 DIABETES MELLITUS WITH HYPERGLYCEMIA, WITH LONG-TERM CURRENT USE OF INSULIN: Primary | ICD-10-CM

## 2024-01-24 DIAGNOSIS — B35.4 TINEA CORPORIS: ICD-10-CM

## 2024-01-24 DIAGNOSIS — E27.8 ADRENAL NODULE: ICD-10-CM

## 2024-01-24 DIAGNOSIS — R33.9 URINARY RETENTION: ICD-10-CM

## 2024-01-24 DIAGNOSIS — E11.65 TYPE 2 DIABETES MELLITUS WITH HYPERGLYCEMIA, WITH LONG-TERM CURRENT USE OF INSULIN: Primary | ICD-10-CM

## 2024-01-24 DIAGNOSIS — N40.1 BPH WITH OBSTRUCTION/LOWER URINARY TRACT SYMPTOMS: ICD-10-CM

## 2024-01-24 PROCEDURE — 4010F ACE/ARB THERAPY RXD/TAKEN: CPT | Mod: CPTII,S$GLB,, | Performed by: FAMILY MEDICINE

## 2024-01-24 PROCEDURE — 1160F RVW MEDS BY RX/DR IN RCRD: CPT | Mod: CPTII,S$GLB,, | Performed by: FAMILY MEDICINE

## 2024-01-24 PROCEDURE — 1157F ADVNC CARE PLAN IN RCRD: CPT | Mod: CPTII,S$GLB,, | Performed by: FAMILY MEDICINE

## 2024-01-24 PROCEDURE — 3008F BODY MASS INDEX DOCD: CPT | Mod: CPTII,S$GLB,, | Performed by: FAMILY MEDICINE

## 2024-01-24 PROCEDURE — 1101F PT FALLS ASSESS-DOCD LE1/YR: CPT | Mod: CPTII,S$GLB,, | Performed by: FAMILY MEDICINE

## 2024-01-24 PROCEDURE — 99999 PR PBB SHADOW E&M-EST. PATIENT-LVL IV: CPT | Mod: PBBFAC,,, | Performed by: FAMILY MEDICINE

## 2024-01-24 PROCEDURE — 3288F FALL RISK ASSESSMENT DOCD: CPT | Mod: CPTII,S$GLB,, | Performed by: FAMILY MEDICINE

## 2024-01-24 PROCEDURE — 3074F SYST BP LT 130 MM HG: CPT | Mod: CPTII,S$GLB,, | Performed by: FAMILY MEDICINE

## 2024-01-24 PROCEDURE — 99215 OFFICE O/P EST HI 40 MIN: CPT | Mod: S$GLB,,, | Performed by: FAMILY MEDICINE

## 2024-01-24 PROCEDURE — 1159F MED LIST DOCD IN RCRD: CPT | Mod: CPTII,S$GLB,, | Performed by: FAMILY MEDICINE

## 2024-01-24 PROCEDURE — 3078F DIAST BP <80 MM HG: CPT | Mod: CPTII,S$GLB,, | Performed by: FAMILY MEDICINE

## 2024-01-24 RX ORDER — LISINOPRIL 5 MG/1
5 TABLET ORAL DAILY
Qty: 90 TABLET | Refills: 3 | Status: SHIPPED | OUTPATIENT
Start: 2024-01-24 | End: 2025-01-23

## 2024-01-24 RX ORDER — TAMSULOSIN HYDROCHLORIDE 0.4 MG/1
0.8 CAPSULE ORAL NIGHTLY
Qty: 180 CAPSULE | Refills: 3 | Status: SHIPPED | OUTPATIENT
Start: 2024-01-24 | End: 2025-01-23

## 2024-01-24 RX ORDER — METFORMIN HYDROCHLORIDE 1000 MG/1
1000 TABLET ORAL 2 TIMES DAILY WITH MEALS
Qty: 180 TABLET | Refills: 1 | Status: SHIPPED | OUTPATIENT
Start: 2024-01-24 | End: 2024-01-24 | Stop reason: SDUPTHER

## 2024-01-24 RX ORDER — FINASTERIDE 5 MG/1
5 TABLET, FILM COATED ORAL DAILY
Qty: 90 TABLET | Refills: 3 | Status: SHIPPED | OUTPATIENT
Start: 2024-01-24 | End: 2025-01-23

## 2024-01-24 RX ORDER — ASPIRIN 81 MG/1
81 TABLET ORAL DAILY
Qty: 365 TABLET | Refills: 0 | COMMUNITY
Start: 2024-01-24 | End: 2025-01-23

## 2024-01-24 RX ORDER — FLUTICASONE PROPIONATE 50 MCG
1 SPRAY, SUSPENSION (ML) NASAL DAILY
Qty: 16 G | Refills: 1 | Status: SHIPPED | OUTPATIENT
Start: 2024-01-24

## 2024-01-24 RX ORDER — METFORMIN HYDROCHLORIDE 1000 MG/1
1000 TABLET ORAL 2 TIMES DAILY WITH MEALS
Qty: 180 TABLET | Refills: 3 | Status: SHIPPED | OUTPATIENT
Start: 2024-01-24

## 2024-01-24 RX ORDER — FLUCONAZOLE 150 MG/1
150 TABLET ORAL DAILY
Qty: 5 TABLET | Refills: 0 | Status: SHIPPED | OUTPATIENT
Start: 2024-01-24 | End: 2024-01-29

## 2024-01-24 RX ORDER — TADALAFIL 10 MG/1
10 TABLET ORAL DAILY PRN
Qty: 30 TABLET | Refills: 6 | Status: SHIPPED | OUTPATIENT
Start: 2024-01-24 | End: 2024-05-16

## 2024-01-24 RX ORDER — CLOTRIMAZOLE AND BETAMETHASONE DIPROPIONATE 10; .64 MG/G; MG/G
CREAM TOPICAL 2 TIMES DAILY
Qty: 45 G | Refills: 1 | Status: SHIPPED | OUTPATIENT
Start: 2024-01-24

## 2024-01-24 NOTE — PROGRESS NOTES
Subjective     Patient ID: Ned Oliveira is a 73 y.o. male.    Chief Complaint: Follow-up    73 years old male came to the clinic for diabetes follow-up.  Last A1c was slightly elevated.  Patient was previously diagnosed with retinopathy.  No polyuria, polydipsia or polyphagia.  No flare ups of adrenal nodule.  He was previously diagnosed with thrombocytopenia.  Patient is requesting Flomax to help with the urinary retention ,he was not taking Proscar.  Patient is requesting a medicine to help with seasonal allergies.  He reports significant improvement using Cialis for erectile dysfunction.    Follow-up      Review of Systems   Constitutional: Negative.    HENT: Negative.     Eyes: Negative.    Respiratory: Negative.     Cardiovascular: Negative.    Gastrointestinal: Negative.    Genitourinary: Negative.    Musculoskeletal: Negative.    Integumentary:  Negative.   Neurological: Negative.    Psychiatric/Behavioral: Negative.            Objective     Physical Exam  Vitals and nursing note reviewed.   Constitutional:       General: He is not in acute distress.     Appearance: He is well-developed. He is not diaphoretic.   HENT:      Head: Normocephalic and atraumatic.      Right Ear: External ear normal.      Left Ear: External ear normal.      Nose: Nose normal.      Mouth/Throat:      Pharynx: No oropharyngeal exudate.   Eyes:      General: No scleral icterus.        Right eye: No discharge.         Left eye: No discharge.      Conjunctiva/sclera: Conjunctivae normal.      Pupils: Pupils are equal, round, and reactive to light.   Neck:      Thyroid: No thyromegaly.      Vascular: No JVD.      Trachea: No tracheal deviation.   Cardiovascular:      Rate and Rhythm: Normal rate and regular rhythm.      Heart sounds: Normal heart sounds. No murmur heard.     No friction rub. No gallop.   Pulmonary:      Effort: Pulmonary effort is normal. No respiratory distress.      Breath sounds: Normal breath sounds. No stridor. No  wheezing or rales.   Chest:      Chest wall: No tenderness.   Abdominal:      General: Bowel sounds are normal. There is no distension.      Palpations: Abdomen is soft. There is no mass.      Tenderness: There is no abdominal tenderness. There is no guarding or rebound.   Musculoskeletal:         General: No tenderness. Normal range of motion.      Cervical back: Normal range of motion and neck supple.   Lymphadenopathy:      Cervical: No cervical adenopathy.   Skin:     General: Skin is warm and dry.      Coloration: Skin is not pale.      Findings: Rash (Upper and lower extremities.) present. No erythema.   Neurological:      Mental Status: He is alert and oriented to person, place, and time.      Cranial Nerves: No cranial nerve deficit.      Motor: No abnormal muscle tone.      Coordination: Coordination normal.      Deep Tendon Reflexes: Reflexes are normal and symmetric. Reflexes normal.   Psychiatric:         Behavior: Behavior normal.         Thought Content: Thought content normal.         Judgment: Judgment normal.            Assessment and Plan     1. Type 2 diabetes mellitus with hyperglycemia, with long-term current use of insulin  -     Microalbumin/Creatinine Ratio, Urine; Future; Expected date: 01/24/2024  -     Lipid Panel; Future; Expected date: 01/24/2024  -     Hemoglobin A1C; Future; Expected date: 01/24/2024  -     Comprehensive Metabolic Panel; Future; Expected date: 01/24/2024  -     CBC Auto Differential; Future; Expected date: 01/24/2024  -     metFORMIN (GLUCOPHAGE) 1000 MG tablet; Take 1 tablet (1,000 mg total) by mouth 2 (two) times daily with meals.  Dispense: 180 tablet; Refill: 3  -     lisinopriL (PRINIVIL,ZESTRIL) 5 MG tablet; Take 1 tablet (5 mg total) by mouth once daily.  Dispense: 90 tablet; Refill: 3    2. Type 2 diabetes mellitus with both eyes affected by mild nonproliferative retinopathy and macular edema, with long-term current use of insulin  -     Hemoglobin A1C; Future;  Expected date: 01/24/2024    3. Adrenal nodule  -     Comprehensive Metabolic Panel; Future; Expected date: 01/24/2024    4. Thrombocytopenia, unspecified  -     CBC Auto Differential; Future; Expected date: 01/24/2024    5. Aortic atherosclerosis  -     Lipid Panel; Future; Expected date: 01/24/2024    6. Tinea corporis  -     fluconazole (DIFLUCAN) 150 MG Tab; Take 1 tablet (150 mg total) by mouth once daily. for 5 days  Dispense: 5 tablet; Refill: 0  -     clotrimazole-betamethasone 1-0.05% (LOTRISONE) cream; Apply topically 2 (two) times daily.  Dispense: 45 g; Refill: 1    7. Urinary retention  -     tamsulosin (FLOMAX) 0.4 mg Cap; Take 2 capsules (0.8 mg total) by mouth every evening.  Dispense: 180 capsule; Refill: 3    8. BPH with obstruction/lower urinary tract symptoms  -     tamsulosin (FLOMAX) 0.4 mg Cap; Take 2 capsules (0.8 mg total) by mouth every evening.  Dispense: 180 capsule; Refill: 3  -     finasteride (PROSCAR) 5 mg tablet; Take 1 tablet (5 mg total) by mouth once daily.  Dispense: 90 tablet; Refill: 3    9. Allergic rhinitis.  -     fluticasone propionate (FLONASE) 50 mcg/actuation nasal spray; 1 spray (50 mcg total) by Each Nostril route once daily.  Dispense: 16 g; Refill: 1    10. Erectile dysfunction, unspecified erectile dysfunction type  -     tadalafiL (CIALIS) 10 MG tablet; Take 1 tablet (10 mg total) by mouth daily as needed (ED).  Dispense: 30 tablet; Refill: 6      Continue monitoring blood sugar at home,ADA diet.            Follow up if symptoms worsen or fail to improve.

## 2024-01-24 NOTE — TELEPHONE ENCOUNTER
Care Due:                  Date            Visit Type   Department     Provider  --------------------------------------------------------------------------------                                EP -                              PRIMARY      MET INTERNAL  Last Visit: 10-      CARE ([facility])   MEDICINE       Christian Chatterjee  Next Visit: None Scheduled  None         None Found                                                            Last  Test          Frequency    Reason                     Performed    Due Date  --------------------------------------------------------------------------------    HBA1C.......  6 months...  insulin..................  09- 03-    [facility] [place] Embedded Care Due Messages. Reference number: [id number].   1/24/2024 3:45:16 PM CST

## 2024-01-31 ENCOUNTER — LAB VISIT (OUTPATIENT)
Dept: LAB | Facility: HOSPITAL | Age: 74
End: 2024-01-31
Attending: FAMILY MEDICINE
Payer: MEDICARE

## 2024-01-31 DIAGNOSIS — Z79.4 TYPE 2 DIABETES MELLITUS WITH BOTH EYES AFFECTED BY MILD NONPROLIFERATIVE RETINOPATHY AND MACULAR EDEMA, WITH LONG-TERM CURRENT USE OF INSULIN: ICD-10-CM

## 2024-01-31 DIAGNOSIS — D69.6 THROMBOCYTOPENIA, UNSPECIFIED: ICD-10-CM

## 2024-01-31 DIAGNOSIS — E11.3213 TYPE 2 DIABETES MELLITUS WITH BOTH EYES AFFECTED BY MILD NONPROLIFERATIVE RETINOPATHY AND MACULAR EDEMA, WITH LONG-TERM CURRENT USE OF INSULIN: ICD-10-CM

## 2024-01-31 DIAGNOSIS — E11.65 TYPE 2 DIABETES MELLITUS WITH HYPERGLYCEMIA, WITH LONG-TERM CURRENT USE OF INSULIN: ICD-10-CM

## 2024-01-31 DIAGNOSIS — E27.8 ADRENAL NODULE: ICD-10-CM

## 2024-01-31 DIAGNOSIS — Z79.4 TYPE 2 DIABETES MELLITUS WITH HYPERGLYCEMIA, WITH LONG-TERM CURRENT USE OF INSULIN: ICD-10-CM

## 2024-01-31 DIAGNOSIS — I70.0 AORTIC ATHEROSCLEROSIS: ICD-10-CM

## 2024-01-31 LAB
ALBUMIN SERPL BCP-MCNC: 3.7 G/DL (ref 3.5–5.2)
ALP SERPL-CCNC: 86 U/L (ref 55–135)
ALT SERPL W/O P-5'-P-CCNC: 35 U/L (ref 10–44)
ANION GAP SERPL CALC-SCNC: 5 MMOL/L (ref 8–16)
AST SERPL-CCNC: 29 U/L (ref 10–40)
BASOPHILS # BLD AUTO: 0.05 K/UL (ref 0–0.2)
BASOPHILS NFR BLD: 0.8 % (ref 0–1.9)
BILIRUB SERPL-MCNC: 1.2 MG/DL (ref 0.1–1)
BUN SERPL-MCNC: 17 MG/DL (ref 8–23)
CALCIUM SERPL-MCNC: 9.2 MG/DL (ref 8.7–10.5)
CHLORIDE SERPL-SCNC: 105 MMOL/L (ref 95–110)
CHOLEST SERPL-MCNC: 108 MG/DL (ref 120–199)
CHOLEST/HDLC SERPL: 2.6 {RATIO} (ref 2–5)
CO2 SERPL-SCNC: 28 MMOL/L (ref 23–29)
CREAT SERPL-MCNC: 1.1 MG/DL (ref 0.5–1.4)
DIFFERENTIAL METHOD BLD: ABNORMAL
EOSINOPHIL # BLD AUTO: 0.2 K/UL (ref 0–0.5)
EOSINOPHIL NFR BLD: 3 % (ref 0–8)
ERYTHROCYTE [DISTWIDTH] IN BLOOD BY AUTOMATED COUNT: 12.2 % (ref 11.5–14.5)
EST. GFR  (NO RACE VARIABLE): >60 ML/MIN/1.73 M^2
ESTIMATED AVG GLUCOSE: 189 MG/DL (ref 68–131)
GLUCOSE SERPL-MCNC: 107 MG/DL (ref 70–110)
HBA1C MFR BLD: 8.2 % (ref 4–5.6)
HCT VFR BLD AUTO: 44 % (ref 40–54)
HDLC SERPL-MCNC: 42 MG/DL (ref 40–75)
HDLC SERPL: 38.9 % (ref 20–50)
HGB BLD-MCNC: 14.8 G/DL (ref 14–18)
IMM GRANULOCYTES # BLD AUTO: 0.01 K/UL (ref 0–0.04)
IMM GRANULOCYTES NFR BLD AUTO: 0.2 % (ref 0–0.5)
LDLC SERPL CALC-MCNC: 59.4 MG/DL (ref 63–159)
LYMPHOCYTES # BLD AUTO: 1.7 K/UL (ref 1–4.8)
LYMPHOCYTES NFR BLD: 27.2 % (ref 18–48)
MCH RBC QN AUTO: 34.4 PG (ref 27–31)
MCHC RBC AUTO-ENTMCNC: 33.6 G/DL (ref 32–36)
MCV RBC AUTO: 102 FL (ref 82–98)
MONOCYTES # BLD AUTO: 0.5 K/UL (ref 0.3–1)
MONOCYTES NFR BLD: 8.6 % (ref 4–15)
NEUTROPHILS # BLD AUTO: 3.8 K/UL (ref 1.8–7.7)
NEUTROPHILS NFR BLD: 60.2 % (ref 38–73)
NONHDLC SERPL-MCNC: 66 MG/DL
NRBC BLD-RTO: 0 /100 WBC
PLATELET # BLD AUTO: 137 K/UL (ref 150–450)
PMV BLD AUTO: 13 FL (ref 9.2–12.9)
POTASSIUM SERPL-SCNC: 4.4 MMOL/L (ref 3.5–5.1)
PROT SERPL-MCNC: 6.2 G/DL (ref 6–8.4)
RBC # BLD AUTO: 4.3 M/UL (ref 4.6–6.2)
SODIUM SERPL-SCNC: 138 MMOL/L (ref 136–145)
TRIGL SERPL-MCNC: 33 MG/DL (ref 30–150)
WBC # BLD AUTO: 6.28 K/UL (ref 3.9–12.7)

## 2024-01-31 PROCEDURE — 85025 COMPLETE CBC W/AUTO DIFF WBC: CPT | Performed by: FAMILY MEDICINE

## 2024-01-31 PROCEDURE — 36415 COLL VENOUS BLD VENIPUNCTURE: CPT | Mod: PO | Performed by: FAMILY MEDICINE

## 2024-01-31 PROCEDURE — 80053 COMPREHEN METABOLIC PANEL: CPT | Performed by: FAMILY MEDICINE

## 2024-01-31 PROCEDURE — 80061 LIPID PANEL: CPT | Performed by: FAMILY MEDICINE

## 2024-01-31 PROCEDURE — 83036 HEMOGLOBIN GLYCOSYLATED A1C: CPT | Performed by: FAMILY MEDICINE

## 2024-03-03 NOTE — PROGRESS NOTES
HPI    DLS: 11/09/2023    Pt here for HVF review/OCT;  Pt states no eye pain or discomfort and no problems with his vision.     Meds;  Dorzolamide-Timolol BID OD    1. 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 Suspect OD on cosopt bid   3. Mild NPDR OU T2   4. DME OU    S/p Avastin OD x 6  (last 12/20), OS x 12    Ozurdex OS x4  5/21    S/p Iluvien OS 6/21    Focal OS 5/17, OD 1-19    1/19 - increased DME at 9 weeks - resumed 6 week tx    11/ 23 - ([facility]ina clinic - [facility]) - not seen x 2 years - stressed   importance of FU, especially with Iluvien/tube    BS/BP/chol control   5. NS - OD   6. PC IOL OS     Last edited by Saranya Monge MD on 3/7/2024  3:34 PM.              Assessment /Plan     For exam results, see Encounter Report.    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, left, moderate stage    Steroid-induced open-angle glaucoma, left, moderate stage    Primary open-angle glaucoma, right eye, moderate stage    Type 2 diabetes mellitus with both eyes affected by mild nonproliferative retinopathy and macular edema, with long-term current use of insulin    Nuclear sclerosis of right eye    Pas (periph anterior synechiae), left    Pseudophakia, left eye    History of glaucoma tube shunt procedure           Glaucoma (type and duration)    Suspect - followed at [facility] since 2007    First HVF   2007   First photos   2009   Treatment / Drops started   none           Family history    Neg (+for DR - mom)         Glaucoma meds    None         H/O adverse rxn to glaucoma drops    none        LASERS    Laser for DR // none for glaucoma         GLAUCOMA SURGERIES    none        OTHER EYE SURGERIES    None         CDR    (( by fundus photos 0.5 / 0.6 )         Tbase    15-25 / 15-31  (but ? If the high IOP's are post injection - had injection on those days and only 1 IOP recorded for each eye)         Tmax    24 od (11/6/2018)  - ??  Post injection // /34os( 4/14/2020) - ? Post injection            Ttarget    ?             HVF    4 test 2009 to  2024 - ? Early SAD  od //  non specific changes  os        Gonio    +3 ou        CCT    518/525        OCT    2 test 2019/ to 2024 - RNFL -  nl -od // bord G/TI os         HRT    1 test 2009 to 2009 - MR - nl od // nl os        Disc photos    Fundus photos 2009, 2017, 2018    - Ttoday 15/14   - Test done today  - IOP //  HVF / OCT    post op tube os    When tube  opened / hypotony and AC shallowing and choroidals    Reformed several times with healon    Developed a pupillary membrane and pupil block - improved post yag to membrane     2. BDR w/ ME    Sees Mazzulla    S/P avastin ou    S/P laser ou    Waiting for approval for ozurdex     3. NS / cortical cat OD   Monitor     4. S/P phaco/IOL os     5. S/P baerveldt os     PLAN   Glaucoma ou   Steroid aggravated - 2/2 ozurdex IOP 34 - )2/2020)   S/P BGI os   Developed OAS - now with MMG os     Phaco / IOL + BVG OS Date: 10/21/2020 - PCB00 21.5  POY 3  phaco/IOL   Tube opened 12/5/20 -- now with shallow chamber and choroidals 12/7/20  Had to reform AC with healon at the slit lamp 12/23/2020  Had yag to  a pupillary membrane that was causing pupillary block (12/10/2020)   Eventually the cornea cleared / the AC reformed and is deep   Did develop PAS after shallow AC     BCVA with refraction os - 20/60 on 1/21/2021    meds   cosopt od bid (( if IOP goes up os can add back )     Keep appt with Mazzulla - intravitreal injections - steroid prn     F/U 6 months with IOP / gonio

## 2024-03-05 ENCOUNTER — TELEPHONE (OUTPATIENT)
Dept: INTERNAL MEDICINE | Facility: CLINIC | Age: 74
End: 2024-03-05
Payer: MEDICARE

## 2024-03-05 NOTE — TELEPHONE ENCOUNTER
----- Message from Harriet López NP sent at 1/31/2024  5:26 PM CST -----  Normal cholesterol and triglyceride levels   Normal kidney and liver function   Mild anemia but improved compared to previous results   A1c at 8.2% showing increased blood sugar levels for the past 3 months  Please schedule appointment with Dr. Wheeler or with me for diabetes follow-up

## 2024-03-05 NOTE — TELEPHONE ENCOUNTER
Used language line ID# [id number] to contact pt in regard to Harriet NP result note. Pt verbalize understands. Asked pt if he wanted to see Harriet Zuniga or Dr. Wheeler pt chose Dr. Wheeler , pt agreed to schedule to May 16 at 8:30am

## 2024-03-07 ENCOUNTER — CLINICAL SUPPORT (OUTPATIENT)
Dept: OPHTHALMOLOGY | Facility: CLINIC | Age: 74
End: 2024-03-07
Payer: MEDICARE

## 2024-03-07 ENCOUNTER — OFFICE VISIT (OUTPATIENT)
Dept: OPHTHALMOLOGY | Facility: CLINIC | Age: 74
End: 2024-03-07
Payer: MEDICARE

## 2024-03-07 DIAGNOSIS — H40.62X2 STEROID-INDUCED OPEN-ANGLE GLAUCOMA, LEFT, MODERATE STAGE: ICD-10-CM

## 2024-03-07 DIAGNOSIS — H21.522 PAS (PERIPH ANTERIOR SYNECHIAE), LEFT: ICD-10-CM

## 2024-03-07 DIAGNOSIS — H25.11 NUCLEAR SCLEROSIS OF RIGHT EYE: ICD-10-CM

## 2024-03-07 DIAGNOSIS — Z96.89 HISTORY OF GLAUCOMA TUBE SHUNT PROCEDURE: ICD-10-CM

## 2024-03-07 DIAGNOSIS — H40.1112 PRIMARY OPEN-ANGLE GLAUCOMA, RIGHT EYE, MODERATE STAGE: ICD-10-CM

## 2024-03-07 DIAGNOSIS — E11.3213 TYPE 2 DIABETES MELLITUS WITH BOTH EYES AFFECTED BY MILD NONPROLIFERATIVE RETINOPATHY AND MACULAR EDEMA, WITH LONG-TERM CURRENT USE OF INSULIN: ICD-10-CM

## 2024-03-07 DIAGNOSIS — Z79.4 TYPE 2 DIABETES MELLITUS WITH BOTH EYES AFFECTED BY MILD NONPROLIFERATIVE RETINOPATHY AND MACULAR EDEMA, WITH LONG-TERM CURRENT USE OF INSULIN: ICD-10-CM

## 2024-03-07 DIAGNOSIS — H40.52X2 SECONDARY GLAUCOMA DUE TO COMBINATION MECHANISMS, LEFT, MODERATE STAGE: Primary | ICD-10-CM

## 2024-03-07 DIAGNOSIS — Z96.1 PSEUDOPHAKIA, LEFT EYE: ICD-10-CM

## 2024-03-07 DIAGNOSIS — T38.0X5A STEROID-INDUCED OPEN-ANGLE GLAUCOMA, LEFT, MODERATE STAGE: ICD-10-CM

## 2024-03-07 PROCEDURE — 1157F ADVNC CARE PLAN IN RCRD: CPT | Mod: CPTII,S$GLB,, | Performed by: OPHTHALMOLOGY

## 2024-03-07 PROCEDURE — 99999 PR PBB SHADOW E&M-EST. PATIENT-LVL III: CPT | Mod: PBBFAC,,, | Performed by: OPHTHALMOLOGY

## 2024-03-07 PROCEDURE — 3066F NEPHROPATHY DOC TX: CPT | Mod: CPTII,S$GLB,, | Performed by: OPHTHALMOLOGY

## 2024-03-07 PROCEDURE — 1101F PT FALLS ASSESS-DOCD LE1/YR: CPT | Mod: CPTII,S$GLB,, | Performed by: OPHTHALMOLOGY

## 2024-03-07 PROCEDURE — 3061F NEG MICROALBUMINURIA REV: CPT | Mod: CPTII,S$GLB,, | Performed by: OPHTHALMOLOGY

## 2024-03-07 PROCEDURE — 99214 OFFICE O/P EST MOD 30 MIN: CPT | Mod: S$GLB,,, | Performed by: OPHTHALMOLOGY

## 2024-03-07 PROCEDURE — 92133 CPTRZD OPH DX IMG PST SGM ON: CPT | Mod: S$GLB,,, | Performed by: OPHTHALMOLOGY

## 2024-03-07 PROCEDURE — 92083 EXTENDED VISUAL FIELD XM: CPT | Mod: S$GLB,,, | Performed by: OPHTHALMOLOGY

## 2024-03-07 PROCEDURE — 3052F HG A1C>EQUAL 8.0%<EQUAL 9.0%: CPT | Mod: CPTII,S$GLB,, | Performed by: OPHTHALMOLOGY

## 2024-03-07 PROCEDURE — 1159F MED LIST DOCD IN RCRD: CPT | Mod: CPTII,S$GLB,, | Performed by: OPHTHALMOLOGY

## 2024-03-07 PROCEDURE — 1160F RVW MEDS BY RX/DR IN RCRD: CPT | Mod: CPTII,S$GLB,, | Performed by: OPHTHALMOLOGY

## 2024-03-07 PROCEDURE — 4010F ACE/ARB THERAPY RXD/TAKEN: CPT | Mod: CPTII,S$GLB,, | Performed by: OPHTHALMOLOGY

## 2024-03-07 PROCEDURE — 1126F AMNT PAIN NOTED NONE PRSNT: CPT | Mod: CPTII,S$GLB,, | Performed by: OPHTHALMOLOGY

## 2024-03-07 PROCEDURE — 3288F FALL RISK ASSESSMENT DOCD: CPT | Mod: CPTII,S$GLB,, | Performed by: OPHTHALMOLOGY

## 2024-03-14 ENCOUNTER — TELEPHONE (OUTPATIENT)
Dept: OPTOMETRY | Facility: CLINIC | Age: 74
End: 2024-03-14
Payer: MEDICARE

## 2024-03-14 ENCOUNTER — OFFICE VISIT (OUTPATIENT)
Dept: OPHTHALMOLOGY | Facility: CLINIC | Age: 74
End: 2024-03-14
Payer: MEDICARE

## 2024-03-14 DIAGNOSIS — Z79.4 TYPE 2 DIABETES MELLITUS WITH BOTH EYES AFFECTED BY MILD NONPROLIFERATIVE RETINOPATHY AND MACULAR EDEMA, WITH LONG-TERM CURRENT USE OF INSULIN: Primary | ICD-10-CM

## 2024-03-14 DIAGNOSIS — H25.11 NS (NUCLEAR SCLEROSIS), RIGHT: ICD-10-CM

## 2024-03-14 DIAGNOSIS — H40.1121 PRIMARY OPEN-ANGLE GLAUCOMA, LEFT EYE, MILD STAGE: ICD-10-CM

## 2024-03-14 DIAGNOSIS — E11.3213 TYPE 2 DIABETES MELLITUS WITH BOTH EYES AFFECTED BY MILD NONPROLIFERATIVE RETINOPATHY AND MACULAR EDEMA, WITH LONG-TERM CURRENT USE OF INSULIN: Primary | ICD-10-CM

## 2024-03-14 PROCEDURE — 4010F ACE/ARB THERAPY RXD/TAKEN: CPT | Mod: CPTII,S$GLB,, | Performed by: OPHTHALMOLOGY

## 2024-03-14 PROCEDURE — 92014 COMPRE OPH EXAM EST PT 1/>: CPT | Mod: S$GLB,,, | Performed by: OPHTHALMOLOGY

## 2024-03-14 PROCEDURE — 1157F ADVNC CARE PLAN IN RCRD: CPT | Mod: CPTII,S$GLB,, | Performed by: OPHTHALMOLOGY

## 2024-03-14 PROCEDURE — 1160F RVW MEDS BY RX/DR IN RCRD: CPT | Mod: CPTII,S$GLB,, | Performed by: OPHTHALMOLOGY

## 2024-03-14 PROCEDURE — 1101F PT FALLS ASSESS-DOCD LE1/YR: CPT | Mod: CPTII,S$GLB,, | Performed by: OPHTHALMOLOGY

## 2024-03-14 PROCEDURE — 92134 CPTRZ OPH DX IMG PST SGM RTA: CPT | Mod: S$GLB,,, | Performed by: OPHTHALMOLOGY

## 2024-03-14 PROCEDURE — 1159F MED LIST DOCD IN RCRD: CPT | Mod: CPTII,S$GLB,, | Performed by: OPHTHALMOLOGY

## 2024-03-14 PROCEDURE — 92201 OPSCPY EXTND RTA DRAW UNI/BI: CPT | Mod: S$GLB,,, | Performed by: OPHTHALMOLOGY

## 2024-03-14 PROCEDURE — 3066F NEPHROPATHY DOC TX: CPT | Mod: CPTII,S$GLB,, | Performed by: OPHTHALMOLOGY

## 2024-03-14 PROCEDURE — 3061F NEG MICROALBUMINURIA REV: CPT | Mod: CPTII,S$GLB,, | Performed by: OPHTHALMOLOGY

## 2024-03-14 PROCEDURE — 99999 PR PBB SHADOW E&M-EST. PATIENT-LVL III: CPT | Mod: PBBFAC,,, | Performed by: OPHTHALMOLOGY

## 2024-03-14 PROCEDURE — 3288F FALL RISK ASSESSMENT DOCD: CPT | Mod: CPTII,S$GLB,, | Performed by: OPHTHALMOLOGY

## 2024-03-14 PROCEDURE — 2023F DILAT RTA XM W/O RTNOPTHY: CPT | Mod: CPTII,S$GLB,, | Performed by: OPHTHALMOLOGY

## 2024-03-14 PROCEDURE — 3052F HG A1C>EQUAL 8.0%<EQUAL 9.0%: CPT | Mod: CPTII,S$GLB,, | Performed by: OPHTHALMOLOGY

## 2024-03-14 NOTE — PROGRESS NOTES
HPI     4  MONTH FOLLOW UP   DM   AND OCT      Additional comments: DM   LAST BS   unsure  LAST  A1C    unsure  POAG   BLURRY  VA   NS     GTTS      TIMOLOL  BID  OD            Comments    DLS: 12/14/23    Pt here for HVF review/OCT;  Pt states no eye pain or discomfort and no problems with his vision.     Meds;  Dorzolamide-Timolol BID OD        OCT - OD -  Decreased paracentral ME with VMT component  OS decrease low grade paracentral ME    Prior WFFA 12/23 - late macular leakage OS > OD    Patient needs Monday appointments only    A/P    1. Mild NPDR OU T2  2. DME OU    S/p Avastin OD x 6  (last 12/20), OS x 12  Ozurdex OS x4  5/21  S/p Iluvien OS 12/23  Focal OS 5/17, OD 1-19  1/19 - increased DME at 9 weeks - resumed 6 week tx  11/23 - not seen x 2 years - stressed importance of FU, especially with Iluvien/tube    Much improved - obs today      Had Glc eval with KL  S/p CE/BV OS 10/20      BS/BP/chol control    3. NS OD - increasing - seeing Dr. Monge next week  PCIOL OS    4. POAG  Elevated IOP OS>OD  S/p BV OS 10/20    Continue Cosopt BID OD        4 months OCT and dilate

## 2024-03-14 NOTE — TELEPHONE ENCOUNTER
----- Message from Florence Helms sent at 3/14/2024  2:22 PM CDT -----  Plesae call pt for MRX  per dr valentino  thanks

## 2024-04-05 ENCOUNTER — OFFICE VISIT (OUTPATIENT)
Dept: URGENT CARE | Facility: CLINIC | Age: 74
End: 2024-04-05
Payer: MEDICARE

## 2024-04-05 VITALS
RESPIRATION RATE: 18 BRPM | HEIGHT: 64 IN | BODY MASS INDEX: 24.59 KG/M2 | HEART RATE: 73 BPM | WEIGHT: 144 LBS | OXYGEN SATURATION: 97 % | TEMPERATURE: 98 F | SYSTOLIC BLOOD PRESSURE: 126 MMHG | DIASTOLIC BLOOD PRESSURE: 66 MMHG

## 2024-04-05 DIAGNOSIS — R53.83 FATIGUE, UNSPECIFIED TYPE: ICD-10-CM

## 2024-04-05 DIAGNOSIS — R05.9 COUGH, UNSPECIFIED TYPE: ICD-10-CM

## 2024-04-05 DIAGNOSIS — R09.81 NASAL CONGESTION: ICD-10-CM

## 2024-04-05 DIAGNOSIS — U07.1 COVID-19 VIRUS INFECTION: Primary | ICD-10-CM

## 2024-04-05 PROBLEM — E27.8 OTHER SPECIFIED DISORDERS OF ADRENAL GLAND: Status: ACTIVE | Noted: 2024-04-05

## 2024-04-05 LAB
CTP QC/QA: YES
SARS-COV-2 AG RESP QL IA.RAPID: POSITIVE

## 2024-04-05 PROCEDURE — 99213 OFFICE O/P EST LOW 20 MIN: CPT | Mod: S$GLB,,, | Performed by: NURSE PRACTITIONER

## 2024-04-05 PROCEDURE — 87811 SARS-COV-2 COVID19 W/OPTIC: CPT | Mod: QW,S$GLB,, | Performed by: NURSE PRACTITIONER

## 2024-04-05 RX ORDER — BENZONATATE 100 MG/1
100 CAPSULE ORAL 3 TIMES DAILY PRN
Qty: 30 CAPSULE | Refills: 0 | Status: SHIPPED | OUTPATIENT
Start: 2024-04-05 | End: 2024-04-15

## 2024-04-05 RX ORDER — FLUOCINOLONE ACETONIDE 0.19 MG/1
IMPLANT INTRAVITREAL
COMMUNITY
Start: 2023-12-14

## 2024-04-05 NOTE — PROGRESS NOTES
"Subjective:      Patient ID: Ned Oliveira is a 73 y.o. male.    Vitals:  height is 5' 4.02" (1.626 m) and weight is 65.3 kg (144 lb). His oral temperature is 98.1 °F (36.7 °C). His blood pressure is 126/66 and his pulse is 73. His respiration is 18 and oxygen saturation is 97%.     Chief Complaint: Cough    This is a 73 y.o. male who presents today with a chief complaint of cough, congestion, headache and fatigue. Patient has been feeling bad for about 4 days and progressively has gotten worse.     73-year-old male presents to clinic with complaints of cough, nasal congestion headache, fatigue, postnasal drip, watery nasal secretions x 4 days. History of Pfizer covid vaccines x 4 doses, influenza vaccine 10/4/23; Positive for covid-19 infection  11/10/22 treated on 11/17/22 with Bebtelovimab infusion     Cough  This is a new problem. The current episode started in the past 7 days. The problem has been gradually worsening. The problem occurs constantly. The cough is Non-productive. Associated symptoms include headaches, nasal congestion, postnasal drip, rhinorrhea and a sore throat. Pertinent negatives include no chills, fever or myalgias. Nothing aggravates the symptoms. Treatments tried: tylenol. The treatment provided no relief.       Constitution: Positive for fatigue. Negative for chills, sweating and fever.   HENT:  Positive for congestion, postnasal drip and sore throat.    Respiratory:  Positive for cough.    Gastrointestinal:  Negative for abdominal pain, nausea and vomiting.   Musculoskeletal:  Negative for muscle ache.   Neurological:  Positive for headaches.      Objective:     Physical Exam   Constitutional: He is oriented to person, place, and time. He appears well-developed. He is cooperative.  Non-toxic appearance. He does not appear ill. No distress.   HENT:   Head: Normocephalic and atraumatic.   Ears:   Right Ear: Hearing, tympanic membrane, external ear and ear canal normal.   Left Ear: Hearing, " tympanic membrane, external ear and ear canal normal.   Nose: Rhinorrhea present. No mucosal edema or nasal deformity. No epistaxis. Right sinus exhibits no maxillary sinus tenderness and no frontal sinus tenderness. Left sinus exhibits no maxillary sinus tenderness and no frontal sinus tenderness.   Mouth/Throat: Uvula is midline, oropharynx is clear and moist and mucous membranes are normal. No trismus in the jaw. Normal dentition. No uvula swelling. No oropharyngeal exudate, posterior oropharyngeal edema or posterior oropharyngeal erythema.   Eyes: Conjunctivae and lids are normal. No scleral icterus.   Neck: Trachea normal and phonation normal. Neck supple. No edema present. No erythema present. No neck rigidity present.   Cardiovascular: Normal rate, regular rhythm and normal heart sounds.   Pulmonary/Chest: Effort normal and breath sounds normal. No respiratory distress. He has no decreased breath sounds. He has no rhonchi.   Abdominal: Normal appearance.   Musculoskeletal: Normal range of motion.         General: No deformity. Normal range of motion.   Neurological: He is alert and oriented to person, place, and time. He exhibits normal muscle tone. Coordination normal.   Skin: Skin is warm, dry, intact, not diaphoretic and not pale.   Psychiatric: His speech is normal and behavior is normal. Judgment and thought content normal.   Nursing note and vitals reviewed.      Assessment:     1. COVID-19 virus infection    2. Cough, unspecified type    3. Nasal congestion    4. Fatigue, unspecified type      Results for orders placed or performed in visit on 04/05/24   SARS Coronavirus 2 Antigen, POCT Manual Read   Result Value Ref Range    SARS Coronavirus 2 Antigen Positive (A) Negative     Acceptable Yes       Plan:       COVID-19 virus infection  -     molnupiravir 200 mg capsule (EUA); Take 4 capsules (800 mg total) by mouth every 12 (twelve) hours. for 5 days  Dispense: 40 capsule; Refill:  0    Cough, unspecified type  -     SARS Coronavirus 2 Antigen, POCT Manual Read  -     benzonatate (TESSALON) 100 MG capsule; Take 1 capsule (100 mg total) by mouth 3 (three) times daily as needed for Cough.  Dispense: 30 capsule; Refill: 0    Nasal congestion    Fatigue, unspecified type      6 covid risk score, discussed, questions encouraged and answered, molnupiravir fact sheet provided          Patient Instructions   PRUEBA DE COVID POSITIVA          Ha dado positivo por COVID-19 hoy. Tenga en cuenta que los CDC exigen que los pacientes que dan positivo en la prueba de COVID-19 se aíslen gurmeet 5 días después de que comenzaron ara síntomas.          Amalia aislamiento comienza desde el día en que desarrolló los síntomas por primera vez, no el día de sanchez prueba positiva. Por ejemplo, si ara síntomas comenzaron un lunes maggie dieron positivo el miércoles siguiente, sanchez aislamiento de 5 días comienza a partir de yoselin lunes, no del miércoles en que jaylyn positivo.          Sin embargo, si es asintomático (nori persona que no tiene ningún síntoma) y da positivo a COVID-19, sanchez aislamiento de 5 días comienza el día en que jaylyn positivo, independientemente de la fecha de exposición.          Además, según las pautas de los CDC, nori vez que hayan pasado los 5 días y no haya tenido fiebre superior a 100.4 F en las últimas 24 horas sin ermelinda ningún antifebril filiberto Tylenol (acetaminofeno) o Motrin (ibuprofeno), puede regresar a ara actividades normales, incluido el distanciamiento social, el uso de máscaras (continuamente gurmeet 5 días más) y el lavado de zbigniew frecuente. NO NECESITA OTRA PRUEBA PARA FINALIZAR SANCHEZ CUARENTENA.    Regrese aquí o vaya al Departamento de Emergencias si tiene alguna inquietud o empeora sanchez condición.          Rossana un seguimiento con sanchez médico de atención primaria o especialista según sea necesario.

## 2024-04-05 NOTE — PATIENT INSTRUCTIONS
PRUEBA DE COVID POSITIVA          Ha dado positivo por COVID-19 hoy. Tenga en cuenta que los CDC exigen que los pacientes que dan positivo en la prueba de COVID-19 se aíslen gurmeet 5 días después de que comenzaron ara síntomas.          Amalia aislamiento comienza desde el día en que desarrolló los síntomas por primera vez, no el día de sanchez prueba positiva. Por ejemplo, si ara síntomas comenzaron un lunes maggie dieron positivo el miércoles siguiente, sanchez aislamiento de 5 días comienza a partir de yoselin lunes, no del miércoles en que jaylyn positivo.          Sin embargo, si es asintomático (nori persona que no tiene ningún síntoma) y da positivo a COVID-19, sanchez aislamiento de 5 días comienza el día en que jaylyn positivo, independientemente de la fecha de exposición.          Además, según las pautas de los CDC, nori vez que hayan pasado los 5 días y no haya tenido fiebre superior a 100.4 F en las últimas 24 horas sin ermelinda ningún antifebril filiberto Tylenol (acetaminofeno) o Motrin (ibuprofeno), puede regresar a ara actividades normales, incluido el distanciamiento social, el uso de máscaras (continuamente gurmeet 5 días más) y el lavado de zbigniew frecuente. NO NECESITA OTRA PRUEBA PARA FINALIZAR SANCHEZ CUARENTENA.    Regrese aquí o vaya al Departamento de Emergencias si tiene alguna inquietud o empeora sanchez condición.          Rossana un seguimiento con sanchez médico de atención primaria o especialista según sea necesario.

## 2024-04-05 NOTE — LETTER
April 5, 2024      [facility] [place]  [address] [place] [address]  Phone: [phone]  Fax: [phone]       Patient: Ned Oliveira   YOB: 1950  Date of Visit: 04/05/2024    To Whom It May Concern:    Elvira Oliveira  was at [facility] on 04/05/2024. The patient may return to work/school on 04/08/2024 with mask restrictions. If you have any questions or concerns, or if I can be of further assistance, please do not hesitate to contact me.    Sincerely,     Belkys Ritchie NP

## 2024-04-12 DIAGNOSIS — U07.1 COVID-19 VIRUS DETECTED: ICD-10-CM

## 2024-04-23 ENCOUNTER — OFFICE VISIT (OUTPATIENT)
Dept: FAMILY MEDICINE | Facility: CLINIC | Age: 74
End: 2024-04-23
Payer: MEDICARE

## 2024-04-23 VITALS
BODY MASS INDEX: 23.75 KG/M2 | DIASTOLIC BLOOD PRESSURE: 72 MMHG | OXYGEN SATURATION: 99 % | HEART RATE: 70 BPM | HEIGHT: 64 IN | WEIGHT: 139.13 LBS | SYSTOLIC BLOOD PRESSURE: 128 MMHG

## 2024-04-23 DIAGNOSIS — F43.21 GRIEF AT LOSS OF CHILD: Primary | ICD-10-CM

## 2024-04-23 DIAGNOSIS — Z63.4 GRIEF AT LOSS OF CHILD: Primary | ICD-10-CM

## 2024-04-23 PROCEDURE — 1157F ADVNC CARE PLAN IN RCRD: CPT | Mod: HCNC,CPTII,S$GLB, | Performed by: STUDENT IN AN ORGANIZED HEALTH CARE EDUCATION/TRAINING PROGRAM

## 2024-04-23 PROCEDURE — 1125F AMNT PAIN NOTED PAIN PRSNT: CPT | Mod: HCNC,CPTII,S$GLB, | Performed by: STUDENT IN AN ORGANIZED HEALTH CARE EDUCATION/TRAINING PROGRAM

## 2024-04-23 PROCEDURE — 3078F DIAST BP <80 MM HG: CPT | Mod: HCNC,CPTII,S$GLB, | Performed by: STUDENT IN AN ORGANIZED HEALTH CARE EDUCATION/TRAINING PROGRAM

## 2024-04-23 PROCEDURE — 3066F NEPHROPATHY DOC TX: CPT | Mod: HCNC,CPTII,S$GLB, | Performed by: STUDENT IN AN ORGANIZED HEALTH CARE EDUCATION/TRAINING PROGRAM

## 2024-04-23 PROCEDURE — 99215 OFFICE O/P EST HI 40 MIN: CPT | Mod: HCNC,S$GLB,, | Performed by: STUDENT IN AN ORGANIZED HEALTH CARE EDUCATION/TRAINING PROGRAM

## 2024-04-23 PROCEDURE — 3052F HG A1C>EQUAL 8.0%<EQUAL 9.0%: CPT | Mod: HCNC,CPTII,S$GLB, | Performed by: STUDENT IN AN ORGANIZED HEALTH CARE EDUCATION/TRAINING PROGRAM

## 2024-04-23 PROCEDURE — 3074F SYST BP LT 130 MM HG: CPT | Mod: HCNC,CPTII,S$GLB, | Performed by: STUDENT IN AN ORGANIZED HEALTH CARE EDUCATION/TRAINING PROGRAM

## 2024-04-23 PROCEDURE — 99999 PR PBB SHADOW E&M-EST. PATIENT-LVL IV: CPT | Mod: PBBFAC,HCNC,, | Performed by: STUDENT IN AN ORGANIZED HEALTH CARE EDUCATION/TRAINING PROGRAM

## 2024-04-23 PROCEDURE — 1101F PT FALLS ASSESS-DOCD LE1/YR: CPT | Mod: HCNC,CPTII,S$GLB, | Performed by: STUDENT IN AN ORGANIZED HEALTH CARE EDUCATION/TRAINING PROGRAM

## 2024-04-23 PROCEDURE — 3288F FALL RISK ASSESSMENT DOCD: CPT | Mod: HCNC,CPTII,S$GLB, | Performed by: STUDENT IN AN ORGANIZED HEALTH CARE EDUCATION/TRAINING PROGRAM

## 2024-04-23 PROCEDURE — 1160F RVW MEDS BY RX/DR IN RCRD: CPT | Mod: HCNC,CPTII,S$GLB, | Performed by: STUDENT IN AN ORGANIZED HEALTH CARE EDUCATION/TRAINING PROGRAM

## 2024-04-23 PROCEDURE — 3061F NEG MICROALBUMINURIA REV: CPT | Mod: HCNC,CPTII,S$GLB, | Performed by: STUDENT IN AN ORGANIZED HEALTH CARE EDUCATION/TRAINING PROGRAM

## 2024-04-23 PROCEDURE — 3008F BODY MASS INDEX DOCD: CPT | Mod: HCNC,CPTII,S$GLB, | Performed by: STUDENT IN AN ORGANIZED HEALTH CARE EDUCATION/TRAINING PROGRAM

## 2024-04-23 PROCEDURE — 4010F ACE/ARB THERAPY RXD/TAKEN: CPT | Mod: HCNC,CPTII,S$GLB, | Performed by: STUDENT IN AN ORGANIZED HEALTH CARE EDUCATION/TRAINING PROGRAM

## 2024-04-23 PROCEDURE — 1159F MED LIST DOCD IN RCRD: CPT | Mod: HCNC,CPTII,S$GLB, | Performed by: STUDENT IN AN ORGANIZED HEALTH CARE EDUCATION/TRAINING PROGRAM

## 2024-04-23 SDOH — SOCIAL DETERMINANTS OF HEALTH (SDOH): DISSAPEARANCE AND DEATH OF FAMILY MEMBER: Z63.4

## 2024-04-23 NOTE — Clinical Note
Hello,  I saw this patient today. His daughter passed away on  and he wasn't able to see her before she  because he had COVID. He is taking her death pretty hard and may talk with you about starting medication if still doing poorly when he sees you next month.   Let me know if you have any questions or concerns.   Jason

## 2024-04-23 NOTE — PROGRESS NOTES
Progress Note  [facility]    Subjective:     Ned Oliveira is a 73 y.o. year old male who presents to clinic for work forms.  declined today.     Pt diagnosed with COVID on 4/5/24. Due to illness was unable to see daughter prior to her passing on 4/11. Has been out of work since COVID diagnosis and then starting 4/12 for grief. Has good support from wife.  Wife notes pt is angry and quick to anger with grief. Will go to the yard and scream and cry. Does not feel he can return to work due to acute grief.     Patient Active Problem List    Diagnosis Date Noted    Other specified disorders of adrenal gland 04/05/2024    Ischemic cardiomyopathy 10/25/2023    Thrombocytopenia, unspecified 10/04/2023    BMI 23.0-23.9, adult 04/27/2023    COVID 11/10/2022    Chronic total occlusion of native coronary artery 08/04/2022    Bilateral carotid artery stenosis     Abnormal results of pulmonary function studies     Positive cardiac stress test 07/08/2022    Type 2 diabetes mellitus with circulatory disorder, with long-term current use of insulin 04/28/2022    Abdominal aortic aneurysm (AAA) without rupture 04/28/2022    Nuclear sclerotic cataract of left eye 10/21/2020    Primary open-angle glaucoma, left eye, mild stage 10/21/2020    Type 2 diabetes mellitus with hyperglycemia, with long-term current use of insulin 10/09/2020    Primary open angle glaucoma (POAG) of both eyes 10/07/2019    Type 2 diabetes mellitus with both eyes affected by mild nonproliferative retinopathy and macular edema, with long-term current use of insulin 05/09/2019    Hyperlipidemia associated with type 2 diabetes mellitus 01/29/2018    NS (nuclear sclerosis), right 12/22/2016    Aortic atherosclerosis 10/06/2016    Coronary artery disease involving native coronary artery 08/15/2016    Adrenal nodule 05/26/2016    Benign non-nodular prostatic hyperplasia without lower urinary tract symptoms  02/17/2016    Essential hypertension 11/23/2015        Review of patient's allergies indicates:   Allergen Reactions    Percocet [oxycodone-acetaminophen] Itching    Keflex [cephalexin] Rash        Past Medical History:   Diagnosis Date    Adrenal adenoma     BPH (benign prostatic hyperplasia)     Cataract     Coronary artery disease     Diabetes mellitus, type 2     Diabetic retinopathy     Glaucoma     Hyperlipidemia     Hypertension     Inguinal hernia of left side without obstruction or gangrene 02/06/2019    Nephrolithiasis 05/26/2016    Steroid responders, left 04/14/2020      Past Surgical History:   Procedure Laterality Date    BICEPS TENDON REPAIR Right     CATARACT EXTRACTION W/  INTRAOCULAR LENS IMPLANT Left 10/21/2020    COMPLEX PHACO IOL  AND BAERVELDT ()    COLONOSCOPY N/A 02/08/2019    Procedure: COLONOSCOPY;  Surgeon: Tavon Montes MD;  Location: [facility] ENDO (4TH FLR);  Service: Colon and Rectal;  Laterality: N/A;  will need . pt requesting ASAP. per Ana (international) ok to schedule at this time. will send  up for 6:45 am arrival time    CORONARY ANGIOGRAPHY N/A 12/12/2022    Procedure: ANGIOGRAM, CORONARY ARTERY;  Surgeon: Drew Peralta MD;  Location: [facility] CATH LAB;  Service: Cardiology;  Laterality: N/A;    EYE SURGERY      HERNIA REPAIR      IMPLANTATION OF DEVICE FOR GLAUCOMA Left 10/21/2020    Procedure: INSERTION, DEVICE, FOR GLAUCOMA;  Surgeon: Saranya Monge MD;  Location: [facility] OR 1ST FLR;  Service: Ophthalmology;  Laterality: Left;    INTRAOCULAR PROSTHESES INSERTION Left 10/21/2020    Procedure: INSERTION, IOL PROSTHESIS;  Surgeon: Saranya Monge MD;  [facility];  Service: Ophthalmology;  Laterality: Left;    LEFT HEART CATHETERIZATION Left 7/8/2022    Procedure: Left heart cath;  Surgeon: Drew Peralta MD;  Location: [facility] CATH LAB;  Service: Cardiology;  Laterality: Left;    PHACOEMULSIFICATION OF  CATARACT Left 10/21/2020    Procedure: PHACOEMULSIFICATION, CATARACT;  Surgeon: Saranya Monge MD;  Location: [address];  Service: Ophthalmology;  Laterality: Left;    PTCA, SINGLE VESSEL  2022    Procedure: PTCA, Single Vessel;  Surgeon: Drew Peralta MD;  Location: [facility] CATH LAB;  Service: Cardiology;;    REFORMATION OF ANTERIOR CHAMBER Left 2020    WITH HEALON ()    STENT, DRUG ELUTING, SINGLE VESSEL, CORONARY Right 2022    Procedure: Stent, Drug Eluting, Single Vessel, Coronary;  Surgeon: Drew Peralta MD;  Location: [facility] CATH LAB;  Service: Cardiology;  Laterality: Right;  very low bleeding risk 0.9%    YAG LASER  TO CYCLITIC MEMBRANE Left 12/10/2020          Family History   Problem Relation Name Age of Onset    Diabetes Mother      Heart disease Mother      Heart disease Sister sruthi     No Known Problems Brother Ned Carlson     Kidney failure Daughter          ESRD on HD    Autism Son      Asthma Son      No Known Problems Sister mehdi     No Known Problems Sister varsha     No Known Problems Sister brenda     No Known Problems Brother Bobby     Diabetes Brother marjorie     Stroke Neg Hx      Amblyopia Neg Hx      Blindness Neg Hx      Cataracts Neg Hx      Glaucoma Neg Hx      Macular degeneration Neg Hx      Retinal detachment Neg Hx      Strabismus Neg Hx        Social History     Tobacco Use    Smoking status: Former     Current packs/day: 0.00     Average packs/day: 2.0 packs/day for 10.0 years (20.0 ttl pk-yrs)     Types: Cigarettes     Start date: 8/15/1987     Quit date: 8/15/1996     Years since quittin.7    Smokeless tobacco: Never   Substance Use Topics    Alcohol use: No     Alcohol/week: 0.0 standard drinks of alcohol        Objective:     Vitals:    24 1051   BP: 128/72   BP Location: Left arm   Patient Position: Sitting   BP Method: Large (Manual)   Pulse: 70   SpO2: 99%   Weight: 63.1 kg (139 lb 1.8 oz)   Height:  "5' 4" (1.626 m)     BMI: 23.88    Gen: No apparent distress, well nourished and developed, appears stated age  CV: RRR, S1 and S2 present, no LE edema  Resp: CTAB, normal respiratory effort  Psych: calm, cooperative, somewhat tearful when discussing daughter's passing    Assessment/Plan:     Ned Oliveira is a 73 y.o. year old male who presents to clinic for work forms    1. Grief at loss of child  Declined medication today. Discussed that he is having a normal grief reaction and anticipate mood will improve with time without medication. If still struggling in 3 weeks when he sees his PCP would like to discuss starting medications at that time. Requests he be out from work until May 10th with  and some events around daughter's passing. Pt appears to have good support from wife. Will completed paperwork as requested. Support provided.       Follow-up: with PCP as previously scheduled for grief at loss of daughter    I spent a total of 40 minutes on the day of the visit.This includes face to face time and non-face to face time preparing to see the patient (eg, review of tests), obtaining and/or reviewing separately obtained history, documenting clinical information in the electronic or other health record, independently interpreting results and communicating results to the patient/family/caregiver, or care coordinator.      Jason Johnson, DO  Family Medicine        "

## 2024-05-06 ENCOUNTER — TELEPHONE (OUTPATIENT)
Dept: FAMILY MEDICINE | Facility: CLINIC | Age: 74
End: 2024-05-06
Payer: MEDICARE

## 2024-05-06 NOTE — TELEPHONE ENCOUNTER
----- Message from David Hardwick MD sent at 5/5/2024  9:47 PM CDT -----  Patient was not evaluated by me for depression.  Please schedule follow-up with me or my nurse practitioner for possible extension of the medical certification.  ----- Message -----  From: Ritu Woods MA  Sent: 5/3/2024   1:01 PM CDT  To: David Hardwick MD    Wife states that pt job is requiring for pt to extend his medical leave due to depression.  Wife is requesting an extension  for  medical leave form 5/11 - 5/16. Can this be done?  ----- Message -----  From: Kiara Palencia  Sent: 5/3/2024  11:44 AM CDT  To: [facility] Staff    Type:  Sooner Apoointment Request    Caller is requesting a sooner appointment.  Caller declined first available appointment listed below.  Caller will not accept being placed on the waitlist and is requesting a message be sent to doctor.  Name of Caller: Pt wife Yazmin   When is the first available appointment? N/A  Symptoms: Check up/depression   Would the patient rather a call back or a response via [facility]?  call  Best Call Back Number: [phone]  Additional Information:  Wife states that pt job is requiring for pt to extend his medical leave due to depression.  Wife is requesting an extension  for  medical leave form 5/11 - 5/16. Wife would like a call back.

## 2024-05-15 DIAGNOSIS — E11.9 TYPE 2 DIABETES MELLITUS WITHOUT COMPLICATION: ICD-10-CM

## 2024-05-16 ENCOUNTER — OFFICE VISIT (OUTPATIENT)
Dept: FAMILY MEDICINE | Facility: CLINIC | Age: 74
End: 2024-05-16
Payer: MEDICARE

## 2024-05-16 VITALS
WEIGHT: 135.38 LBS | SYSTOLIC BLOOD PRESSURE: 104 MMHG | OXYGEN SATURATION: 99 % | DIASTOLIC BLOOD PRESSURE: 80 MMHG | BODY MASS INDEX: 23.23 KG/M2 | HEART RATE: 64 BPM

## 2024-05-16 DIAGNOSIS — N52.9 ERECTILE DYSFUNCTION, UNSPECIFIED ERECTILE DYSFUNCTION TYPE: Primary | ICD-10-CM

## 2024-05-16 DIAGNOSIS — I10 ESSENTIAL HYPERTENSION: ICD-10-CM

## 2024-05-16 DIAGNOSIS — E11.65 TYPE 2 DIABETES MELLITUS WITH HYPERGLYCEMIA, WITH LONG-TERM CURRENT USE OF INSULIN: ICD-10-CM

## 2024-05-16 DIAGNOSIS — Z79.4 TYPE 2 DIABETES MELLITUS WITH HYPERGLYCEMIA, WITH LONG-TERM CURRENT USE OF INSULIN: ICD-10-CM

## 2024-05-16 PROCEDURE — 3079F DIAST BP 80-89 MM HG: CPT | Mod: HCNC,CPTII,S$GLB, | Performed by: FAMILY MEDICINE

## 2024-05-16 PROCEDURE — 4010F ACE/ARB THERAPY RXD/TAKEN: CPT | Mod: HCNC,CPTII,S$GLB, | Performed by: FAMILY MEDICINE

## 2024-05-16 PROCEDURE — 3061F NEG MICROALBUMINURIA REV: CPT | Mod: HCNC,CPTII,S$GLB, | Performed by: FAMILY MEDICINE

## 2024-05-16 PROCEDURE — 1157F ADVNC CARE PLAN IN RCRD: CPT | Mod: HCNC,CPTII,S$GLB, | Performed by: FAMILY MEDICINE

## 2024-05-16 PROCEDURE — 3288F FALL RISK ASSESSMENT DOCD: CPT | Mod: HCNC,CPTII,S$GLB, | Performed by: FAMILY MEDICINE

## 2024-05-16 PROCEDURE — 1125F AMNT PAIN NOTED PAIN PRSNT: CPT | Mod: HCNC,CPTII,S$GLB, | Performed by: FAMILY MEDICINE

## 2024-05-16 PROCEDURE — 3066F NEPHROPATHY DOC TX: CPT | Mod: HCNC,CPTII,S$GLB, | Performed by: FAMILY MEDICINE

## 2024-05-16 PROCEDURE — 1101F PT FALLS ASSESS-DOCD LE1/YR: CPT | Mod: HCNC,CPTII,S$GLB, | Performed by: FAMILY MEDICINE

## 2024-05-16 PROCEDURE — 99999 PR PBB SHADOW E&M-EST. PATIENT-LVL IV: CPT | Mod: PBBFAC,HCNC,, | Performed by: FAMILY MEDICINE

## 2024-05-16 PROCEDURE — 99215 OFFICE O/P EST HI 40 MIN: CPT | Mod: HCNC,S$GLB,, | Performed by: FAMILY MEDICINE

## 2024-05-16 PROCEDURE — 1159F MED LIST DOCD IN RCRD: CPT | Mod: HCNC,CPTII,S$GLB, | Performed by: FAMILY MEDICINE

## 2024-05-16 PROCEDURE — 1160F RVW MEDS BY RX/DR IN RCRD: CPT | Mod: HCNC,CPTII,S$GLB, | Performed by: FAMILY MEDICINE

## 2024-05-16 PROCEDURE — 3008F BODY MASS INDEX DOCD: CPT | Mod: HCNC,CPTII,S$GLB, | Performed by: FAMILY MEDICINE

## 2024-05-16 PROCEDURE — 3074F SYST BP LT 130 MM HG: CPT | Mod: HCNC,CPTII,S$GLB, | Performed by: FAMILY MEDICINE

## 2024-05-16 PROCEDURE — 3052F HG A1C>EQUAL 8.0%<EQUAL 9.0%: CPT | Mod: HCNC,CPTII,S$GLB, | Performed by: FAMILY MEDICINE

## 2024-05-16 RX ORDER — DULAGLUTIDE 0.75 MG/.5ML
0.75 INJECTION, SOLUTION SUBCUTANEOUS
Qty: 12 PEN | Refills: 3 | Status: SHIPPED | OUTPATIENT
Start: 2024-05-16 | End: 2025-05-16

## 2024-05-16 RX ORDER — VARDENAFIL HYDROCHLORIDE 10 MG/1
10 TABLET ORAL DAILY PRN
Qty: 10 TABLET | Refills: 3 | Status: SHIPPED | OUTPATIENT
Start: 2024-05-16 | End: 2025-05-16

## 2024-05-16 NOTE — PROGRESS NOTES
Subjective     Patient ID: Ned Oliveira is a 73 y.o. male.    Chief Complaint: Back Pain    73 years old male came to the clinic for hypertension follow-up.  Pressure today was stable.  No chest pain, palpitation, orthopnea or PND.  Last A1c was elevated.  No polyuria, polydipsia or polyphagia.  Patient is requesting a medicine to help with erectile dysfunction.  He reports the Cialis and Viagra did not work before.    Hypertension  This is a chronic problem. The current episode started more than 1 year ago. The problem is unchanged. The problem is controlled. Pertinent negatives include no chest pain or palpitations. There are no associated agents to hypertension. Risk factors for coronary artery disease include male gender, diabetes mellitus and sedentary lifestyle. Past treatments include beta blockers and ACE inhibitors. The current treatment provides significant improvement. Compliance problems include diet.  There is no history of angina, kidney disease, CVA or heart failure. There is no history of chronic renal disease, a hypertension causing med or a thyroid problem.   Diabetes  He has type 2 diabetes mellitus. No MedicAlert identification noted. His disease course has been worsening. Pertinent negatives for diabetes include no chest pain, no polydipsia, no polyphagia and no polyuria. There are no hypoglycemic complications. Symptoms are worsening. Pertinent negatives for diabetic complications include no CVA. Risk factors for coronary artery disease include hypertension, male sex, diabetes mellitus and sedentary lifestyle. Current diabetic treatment includes insulin injections and oral agent (monotherapy). He is compliant with treatment most of the time. He is following a generally healthy diet. He has not had a previous visit with a dietitian. He never participates in exercise. His breakfast blood glucose range is generally 180-200 mg/dl. An ACE inhibitor/angiotensin II receptor blocker is being taken. He  does not see a podiatrist.Eye exam is current.     Review of Systems   Constitutional: Negative.    HENT: Negative.     Eyes: Negative.    Respiratory: Negative.     Cardiovascular: Negative.  Negative for chest pain, palpitations, leg swelling and claudication.   Gastrointestinal: Negative.    Endocrine: Negative for polydipsia, polyphagia and polyuria.   Genitourinary: Negative.    Musculoskeletal: Negative.    Integumentary:  Negative.   Neurological: Negative.    Psychiatric/Behavioral: Negative.            Objective     Physical Exam  Vitals and nursing note reviewed.   Constitutional:       General: He is not in acute distress.     Appearance: He is well-developed. He is not diaphoretic.   HENT:      Head: Normocephalic and atraumatic.      Right Ear: External ear normal.      Left Ear: External ear normal.      Nose: Nose normal.      Mouth/Throat:      Pharynx: No oropharyngeal exudate.   Eyes:      General: No scleral icterus.        Right eye: No discharge.         Left eye: No discharge.      Conjunctiva/sclera: Conjunctivae normal.      Pupils: Pupils are equal, round, and reactive to light.   Neck:      Thyroid: No thyromegaly.      Vascular: No JVD.      Trachea: No tracheal deviation.   Cardiovascular:      Rate and Rhythm: Normal rate and regular rhythm.      Heart sounds: Normal heart sounds. No murmur heard.     No friction rub. No gallop.   Pulmonary:      Effort: Pulmonary effort is normal. No respiratory distress.      Breath sounds: Normal breath sounds. No stridor. No wheezing or rales.   Chest:      Chest wall: No tenderness.   Abdominal:      General: Bowel sounds are normal. There is no distension.      Palpations: Abdomen is soft. There is no mass.      Tenderness: There is no abdominal tenderness. There is no guarding or rebound.   Musculoskeletal:         General: No tenderness. Normal range of motion.      Cervical back: Normal range of motion and neck supple.   Feet:      Right foot:       Protective Sensation: 10 sites tested.  10 sites sensed.      Skin integrity: No ulcer, blister, skin breakdown, erythema, warmth, callus, dry skin or fissure.      Left foot:      Protective Sensation: 10 sites tested.  10 sites sensed.      Skin integrity: No ulcer, blister, skin breakdown, erythema, warmth, callus, dry skin or fissure.   Lymphadenopathy:      Cervical: No cervical adenopathy.   Skin:     General: Skin is warm and dry.      Coloration: Skin is not pale.      Findings: No erythema or rash.   Neurological:      Mental Status: He is alert and oriented to person, place, and time.      Cranial Nerves: No cranial nerve deficit.      Motor: No abnormal muscle tone.      Coordination: Coordination normal.      Deep Tendon Reflexes: Reflexes are normal and symmetric. Reflexes normal.   Psychiatric:         Behavior: Behavior normal.         Thought Content: Thought content normal.         Judgment: Judgment normal.            Assessment and Plan     1. Erectile dysfunction, unspecified erectile dysfunction type  -     vardenafiL (LEVITRA) 10 MG tablet; Take 1 tablet (10 mg total) by mouth daily as needed for Erectile Dysfunction.  Dispense: 10 tablet; Refill: 3    2. Type 2 diabetes mellitus with hyperglycemia, with long-term current use of insulin  -     Microalbumin/Creatinine Ratio, Urine; Future; Expected date: 05/16/2024  -     Lipid Panel; Future; Expected date: 05/16/2024  -     Hemoglobin A1C; Future; Expected date: 05/16/2024  -     Comprehensive Metabolic Panel; Future; Expected date: 05/16/2024  -     dulaglutide (TRULICITY) 0.75 mg/0.5 mL pen injector; Inject 0.75 mg into the skin every 7 days.  Dispense: 12 pen ; Refill: 3    3. Essential hypertension  -     Lipid Panel; Future; Expected date: 05/16/2024  -     Comprehensive Metabolic Panel; Future; Expected date: 05/16/2024        Continue monitoring blood sugar at home,ADA diet.   Continue monitoring blood pressure at home, low sodium  diet.          Follow up in about 3 months (around 8/16/2024), or if symptoms worsen or fail to improve.

## 2024-05-20 ENCOUNTER — PATIENT OUTREACH (OUTPATIENT)
Dept: FAMILY MEDICINE | Facility: CLINIC | Age: 74
End: 2024-05-20
Payer: MEDICARE

## 2024-05-22 ENCOUNTER — LAB VISIT (OUTPATIENT)
Dept: LAB | Facility: HOSPITAL | Age: 74
End: 2024-05-22
Attending: FAMILY MEDICINE
Payer: MEDICARE

## 2024-05-22 DIAGNOSIS — I10 ESSENTIAL HYPERTENSION: ICD-10-CM

## 2024-05-22 DIAGNOSIS — Z79.4 TYPE 2 DIABETES MELLITUS WITH HYPERGLYCEMIA, WITH LONG-TERM CURRENT USE OF INSULIN: ICD-10-CM

## 2024-05-22 DIAGNOSIS — E11.65 TYPE 2 DIABETES MELLITUS WITH HYPERGLYCEMIA, WITH LONG-TERM CURRENT USE OF INSULIN: ICD-10-CM

## 2024-05-22 LAB
ALBUMIN SERPL BCP-MCNC: 3.4 G/DL (ref 3.5–5.2)
ALP SERPL-CCNC: 82 U/L (ref 55–135)
ALT SERPL W/O P-5'-P-CCNC: 47 U/L (ref 10–44)
ANION GAP SERPL CALC-SCNC: 7 MMOL/L (ref 8–16)
AST SERPL-CCNC: 31 U/L (ref 10–40)
BILIRUB SERPL-MCNC: 0.8 MG/DL (ref 0.1–1)
BUN SERPL-MCNC: 18 MG/DL (ref 8–23)
CALCIUM SERPL-MCNC: 8.8 MG/DL (ref 8.7–10.5)
CHLORIDE SERPL-SCNC: 110 MMOL/L (ref 95–110)
CHOLEST SERPL-MCNC: 179 MG/DL (ref 120–199)
CHOLEST/HDLC SERPL: 4.2 {RATIO} (ref 2–5)
CO2 SERPL-SCNC: 26 MMOL/L (ref 23–29)
CREAT SERPL-MCNC: 1.1 MG/DL (ref 0.5–1.4)
EST. GFR  (NO RACE VARIABLE): >60 ML/MIN/1.73 M^2
ESTIMATED AVG GLUCOSE: 146 MG/DL (ref 68–131)
GLUCOSE SERPL-MCNC: 79 MG/DL (ref 70–110)
HBA1C MFR BLD: 6.7 % (ref 4–5.6)
HDLC SERPL-MCNC: 43 MG/DL (ref 40–75)
HDLC SERPL: 24 % (ref 20–50)
LDLC SERPL CALC-MCNC: 125 MG/DL (ref 63–159)
NONHDLC SERPL-MCNC: 136 MG/DL
POTASSIUM SERPL-SCNC: 3.8 MMOL/L (ref 3.5–5.1)
PROT SERPL-MCNC: 5.6 G/DL (ref 6–8.4)
SODIUM SERPL-SCNC: 143 MMOL/L (ref 136–145)
TRIGL SERPL-MCNC: 55 MG/DL (ref 30–150)

## 2024-05-22 PROCEDURE — 80061 LIPID PANEL: CPT | Mod: HCNC | Performed by: FAMILY MEDICINE

## 2024-05-22 PROCEDURE — 80053 COMPREHEN METABOLIC PANEL: CPT | Mod: HCNC | Performed by: FAMILY MEDICINE

## 2024-05-22 PROCEDURE — 36415 COLL VENOUS BLD VENIPUNCTURE: CPT | Mod: HCNC,PO | Performed by: FAMILY MEDICINE

## 2024-05-22 PROCEDURE — 83036 HEMOGLOBIN GLYCOSYLATED A1C: CPT | Mod: HCNC | Performed by: FAMILY MEDICINE

## 2024-07-03 ENCOUNTER — TELEPHONE (OUTPATIENT)
Dept: INTERNAL MEDICINE | Facility: CLINIC | Age: 74
End: 2024-07-03
Payer: MEDICARE

## 2024-07-03 DIAGNOSIS — Z79.4 TYPE 2 DIABETES MELLITUS WITHOUT COMPLICATION, WITH LONG-TERM CURRENT USE OF INSULIN: ICD-10-CM

## 2024-07-03 DIAGNOSIS — E11.9 TYPE 2 DIABETES MELLITUS WITHOUT COMPLICATION, WITH LONG-TERM CURRENT USE OF INSULIN: ICD-10-CM

## 2024-07-03 RX ORDER — LANCETS
1 EACH MISCELLANEOUS
Qty: 100 EACH | Refills: 2 | Status: CANCELLED | OUTPATIENT
Start: 2024-07-03

## 2024-07-03 NOTE — TELEPHONE ENCOUNTER
LOV 10-4-23    Pt stopped by the clinic to report he is out of Dexcom.  Pt needs refills on Dexcom G7. Requesting form to [employer] to be completed.  He needs diabetic testing supplies until he's approved for Dexcom    Thanks

## 2024-07-17 NOTE — LETTER
February 4, 2019      [facility] 4th [facility]  [address]  [facility] [address]  Phone: [phone]       Patient: Ned Oliveira   YOB: 1950  Date of Visit: 02/04/2019    To Whom It May Concern:    Elvira Oliveira  was at [facility] on 02/04/2019. He may return to work/school on 2/5/2019 without restrictions.  If you have any questions or concerns, or if I can be of further assistance, please do not hesitate to contact me.    Sincerely,    Meghan Hernandez PA-C      Yes

## 2024-07-29 ENCOUNTER — TELEPHONE (OUTPATIENT)
Dept: FAMILY MEDICINE | Facility: CLINIC | Age: 74
End: 2024-07-29
Payer: MEDICARE

## 2024-07-29 DIAGNOSIS — I25.10 CORONARY ARTERY DISEASE INVOLVING NATIVE HEART WITHOUT ANGINA PECTORIS, UNSPECIFIED VESSEL OR LESION TYPE: ICD-10-CM

## 2024-07-29 NOTE — TELEPHONE ENCOUNTER
No care due was identified.  [facility] Embedded Care Due Messages. Reference number: [id number].   7/29/2024 11:39:11 AM CDT

## 2024-07-30 RX ORDER — CLOPIDOGREL BISULFATE 75 MG/1
75 TABLET ORAL
Qty: 90 TABLET | Refills: 3 | Status: SHIPPED | OUTPATIENT
Start: 2024-07-30

## 2024-07-30 NOTE — TELEPHONE ENCOUNTER
Refill Routing Note   Medication(s) are not appropriate for processing by [facility] for the following reason(s):        No active prescription written by provider    [facility] action(s):  Defer        Medication Therapy Plan: Prescription ; DEFER      Appointments  past 12m or future 3m with PCP    Date Provider   Last Visit   2024 David Hardwick MD   Next Visit   2024 David Hardwick MD   ED visits in past 90 days: 0        Note composed:4:41 AM 2024

## 2024-08-14 ENCOUNTER — OFFICE VISIT (OUTPATIENT)
Dept: FAMILY MEDICINE | Facility: CLINIC | Age: 74
End: 2024-08-14
Payer: MEDICARE

## 2024-08-14 VITALS
WEIGHT: 132.25 LBS | HEART RATE: 71 BPM | SYSTOLIC BLOOD PRESSURE: 120 MMHG | HEIGHT: 64 IN | DIASTOLIC BLOOD PRESSURE: 70 MMHG | OXYGEN SATURATION: 99 % | BODY MASS INDEX: 22.58 KG/M2

## 2024-08-14 DIAGNOSIS — E11.3213 TYPE 2 DIABETES MELLITUS WITH BOTH EYES AFFECTED BY MILD NONPROLIFERATIVE RETINOPATHY AND MACULAR EDEMA, WITH LONG-TERM CURRENT USE OF INSULIN: ICD-10-CM

## 2024-08-14 DIAGNOSIS — Z79.4 TYPE 2 DIABETES MELLITUS WITH BOTH EYES AFFECTED BY MILD NONPROLIFERATIVE RETINOPATHY AND MACULAR EDEMA, WITH LONG-TERM CURRENT USE OF INSULIN: ICD-10-CM

## 2024-08-14 DIAGNOSIS — Z79.4 TYPE 2 DIABETES MELLITUS WITH HYPERGLYCEMIA, WITH LONG-TERM CURRENT USE OF INSULIN: ICD-10-CM

## 2024-08-14 DIAGNOSIS — I10 ESSENTIAL HYPERTENSION: Chronic | ICD-10-CM

## 2024-08-14 DIAGNOSIS — E11.65 TYPE 2 DIABETES MELLITUS WITH HYPERGLYCEMIA, WITH LONG-TERM CURRENT USE OF INSULIN: ICD-10-CM

## 2024-08-14 DIAGNOSIS — H91.90 HEARING LOSS, UNSPECIFIED HEARING LOSS TYPE, UNSPECIFIED LATERALITY: ICD-10-CM

## 2024-08-14 DIAGNOSIS — H93.11 TINNITUS OF RIGHT EAR: ICD-10-CM

## 2024-08-14 DIAGNOSIS — I25.10 CORONARY ARTERY DISEASE INVOLVING NATIVE CORONARY ARTERY OF NATIVE HEART WITHOUT ANGINA PECTORIS: Primary | ICD-10-CM

## 2024-08-14 PROCEDURE — 3008F BODY MASS INDEX DOCD: CPT | Mod: HCNC,CPTII,S$GLB, | Performed by: FAMILY MEDICINE

## 2024-08-14 PROCEDURE — 1160F RVW MEDS BY RX/DR IN RCRD: CPT | Mod: HCNC,CPTII,S$GLB, | Performed by: FAMILY MEDICINE

## 2024-08-14 PROCEDURE — 99215 OFFICE O/P EST HI 40 MIN: CPT | Mod: HCNC,S$GLB,, | Performed by: FAMILY MEDICINE

## 2024-08-14 PROCEDURE — 99999 PR PBB SHADOW E&M-EST. PATIENT-LVL V: CPT | Mod: PBBFAC,HCNC,, | Performed by: FAMILY MEDICINE

## 2024-08-14 PROCEDURE — 3288F FALL RISK ASSESSMENT DOCD: CPT | Mod: HCNC,CPTII,S$GLB, | Performed by: FAMILY MEDICINE

## 2024-08-14 PROCEDURE — 3074F SYST BP LT 130 MM HG: CPT | Mod: HCNC,CPTII,S$GLB, | Performed by: FAMILY MEDICINE

## 2024-08-14 PROCEDURE — 1157F ADVNC CARE PLAN IN RCRD: CPT | Mod: HCNC,CPTII,S$GLB, | Performed by: FAMILY MEDICINE

## 2024-08-14 PROCEDURE — 4010F ACE/ARB THERAPY RXD/TAKEN: CPT | Mod: HCNC,CPTII,S$GLB, | Performed by: FAMILY MEDICINE

## 2024-08-14 PROCEDURE — 3066F NEPHROPATHY DOC TX: CPT | Mod: HCNC,CPTII,S$GLB, | Performed by: FAMILY MEDICINE

## 2024-08-14 PROCEDURE — 1125F AMNT PAIN NOTED PAIN PRSNT: CPT | Mod: HCNC,CPTII,S$GLB, | Performed by: FAMILY MEDICINE

## 2024-08-14 PROCEDURE — 3044F HG A1C LEVEL LT 7.0%: CPT | Mod: HCNC,CPTII,S$GLB, | Performed by: FAMILY MEDICINE

## 2024-08-14 PROCEDURE — 3061F NEG MICROALBUMINURIA REV: CPT | Mod: HCNC,CPTII,S$GLB, | Performed by: FAMILY MEDICINE

## 2024-08-14 PROCEDURE — 1159F MED LIST DOCD IN RCRD: CPT | Mod: HCNC,CPTII,S$GLB, | Performed by: FAMILY MEDICINE

## 2024-08-14 PROCEDURE — 3078F DIAST BP <80 MM HG: CPT | Mod: HCNC,CPTII,S$GLB, | Performed by: FAMILY MEDICINE

## 2024-08-14 PROCEDURE — 1101F PT FALLS ASSESS-DOCD LE1/YR: CPT | Mod: HCNC,CPTII,S$GLB, | Performed by: FAMILY MEDICINE

## 2024-08-14 RX ORDER — ROSUVASTATIN CALCIUM 5 MG/1
5 TABLET, COATED ORAL NIGHTLY
Qty: 90 TABLET | Refills: 3 | Status: SHIPPED | OUTPATIENT
Start: 2024-08-14 | End: 2025-08-14

## 2024-08-14 RX ORDER — NITROGLYCERIN 0.4 MG/1
0.4 TABLET SUBLINGUAL EVERY 5 MIN PRN
Qty: 30 TABLET | Refills: 0 | Status: SHIPPED | OUTPATIENT
Start: 2024-08-14 | End: 2025-08-14

## 2024-08-14 RX ORDER — DULAGLUTIDE 0.75 MG/.5ML
0.75 INJECTION, SOLUTION SUBCUTANEOUS
Qty: 12 PEN | Refills: 3 | Status: SHIPPED | OUTPATIENT
Start: 2024-08-14 | End: 2025-08-14

## 2024-08-14 RX ORDER — BLOOD-GLUCOSE SENSOR
1 EACH MISCELLANEOUS
Qty: 10 EACH | Refills: 3 | Status: SHIPPED | OUTPATIENT
Start: 2024-08-14 | End: 2025-08-14

## 2024-08-14 RX ORDER — LISINOPRIL 2.5 MG/1
2.5 TABLET ORAL DAILY
Qty: 90 TABLET | Refills: 3 | Status: SHIPPED | OUTPATIENT
Start: 2024-08-14 | End: 2025-08-14

## 2024-08-14 NOTE — PROGRESS NOTES
Subjective     Patient ID: Ned Oliveira is a 74 y.o. male.    Chief Complaint: Shortness of Breath, Chest Pain, and Diabetes    74 years old male came to the clinic with hearing loss associated with tinnitus.  He is requesting evaluation with ENT.  Last A1c was stable.  Patient is requesting refill of nitroglycerin for episodic chest pain.  Patient with coronary artery disease.    Chest Pain   This is a chronic problem. The current episode started more than 1 year ago. The onset quality is undetermined. The problem occurs rarely. The pain is present in the substernal region. The pain is mild. The pain does not radiate. Pertinent negatives include no orthopnea or palpitations.   His past medical history is significant for heart disease.   Pertinent negatives for past medical history include no thyroid problem.   Diabetes  He presents for his follow-up diabetic visit. He has type 2 diabetes mellitus. His disease course has been stable. There are no hypoglycemic associated symptoms. Associated symptoms include chest pain. Pertinent negatives for diabetes include no polydipsia, no polyphagia and no polyuria. There are no hypoglycemic complications. Symptoms are stable. Diabetic complications include heart disease and retinopathy. Risk factors for coronary artery disease include sedentary lifestyle, male sex, hypertension and diabetes mellitus. Current diabetic treatment includes insulin injections and oral agent (monotherapy). He is compliant with treatment most of the time. He is following a generally healthy diet. He never participates in exercise. An ACE inhibitor/angiotensin II receptor blocker is not being taken.   Hypertension  This is a chronic problem. The current episode started more than 1 year ago. The problem is unchanged. Associated symptoms include chest pain. Pertinent negatives include no orthopnea, palpitations or peripheral edema. There are no associated agents to hypertension. Risk factors for  coronary artery disease include male gender, diabetes mellitus and sedentary lifestyle. Past treatments include beta blockers. The current treatment provides significant improvement. Compliance problems include exercise.  Hypertensive end-organ damage includes angina and retinopathy. There is no history of a hypertension causing med or a thyroid problem.     Review of Systems   Constitutional: Negative.    HENT:  Positive for hearing loss and tinnitus.    Eyes: Negative.    Respiratory: Negative.     Cardiovascular:  Positive for chest pain. Negative for palpitations and orthopnea.   Gastrointestinal: Negative.    Endocrine: Negative for polydipsia, polyphagia and polyuria.   Genitourinary: Negative.    Musculoskeletal: Negative.    Integumentary:  Negative.   Neurological: Negative.    Psychiatric/Behavioral: Negative.            Objective     Physical Exam  Vitals and nursing note reviewed.   Constitutional:       General: He is not in acute distress.     Appearance: He is well-developed. He is not diaphoretic.   HENT:      Head: Normocephalic and atraumatic.      Right Ear: External ear normal.      Left Ear: External ear normal.      Nose: Nose normal.      Mouth/Throat:      Pharynx: No oropharyngeal exudate.   Eyes:      General: No scleral icterus.        Right eye: No discharge.         Left eye: No discharge.      Conjunctiva/sclera: Conjunctivae normal.      Pupils: Pupils are equal, round, and reactive to light.   Neck:      Thyroid: No thyromegaly.      Vascular: No JVD.      Trachea: No tracheal deviation.   Cardiovascular:      Rate and Rhythm: Normal rate and regular rhythm.      Heart sounds: Normal heart sounds. No murmur heard.     No friction rub. No gallop.   Pulmonary:      Effort: Pulmonary effort is normal. No respiratory distress.      Breath sounds: Normal breath sounds. No stridor. No wheezing or rales.   Chest:      Chest wall: No tenderness.   Abdominal:      General: Bowel sounds are  normal. There is no distension.      Palpations: Abdomen is soft. There is no mass.      Tenderness: There is no abdominal tenderness. There is no guarding or rebound.   Musculoskeletal:         General: No tenderness. Normal range of motion.      Cervical back: Normal range of motion and neck supple.   Lymphadenopathy:      Cervical: No cervical adenopathy.   Skin:     General: Skin is warm and dry.      Coloration: Skin is not pale.      Findings: No erythema or rash.   Neurological:      Mental Status: He is alert and oriented to person, place, and time.      Cranial Nerves: No cranial nerve deficit.      Motor: No abnormal muscle tone.      Coordination: Coordination normal.      Deep Tendon Reflexes: Reflexes are normal and symmetric. Reflexes normal.   Psychiatric:         Behavior: Behavior normal.         Thought Content: Thought content normal.         Judgment: Judgment normal.            Assessment and Plan     1. Coronary artery disease involving native coronary artery of native heart without angina pectoris  -     nitroGLYCERIN (NITROSTAT) 0.4 MG SL tablet; Place 1 tablet (0.4 mg total) under the tongue every 5 (five) minutes as needed for Chest pain (ONLY IF HAVE CHEST PAIN,).  Dispense: 30 tablet; Refill: 0  -     Lipid Panel; Future; Expected date: 08/14/2024    2. Type 2 diabetes mellitus with hyperglycemia, with long-term current use of insulin  -     dulaglutide (TRULICITY) 0.75 mg/0.5 mL pen injector; Inject 0.75 mg into the skin every 7 days.  Dispense: 12 pen ; Refill: 3  -     blood-glucose sensor (DEXCOM G7 SENSOR) Khushi; 1 each by Misc.(Non-Drug; Combo Route) route every 10 days.  Dispense: 10 each; Refill: 3  -     lisinopriL (PRINIVIL,ZESTRIL) 2.5 MG tablet; Take 1 tablet (2.5 mg total) by mouth once daily.  Dispense: 90 tablet; Refill: 3  -     Microalbumin/Creatinine Ratio, Urine; Future; Expected date: 08/14/2024  -     Lipid Panel; Future; Expected date: 08/14/2024  -     Hemoglobin  A1C; Future; Expected date: 08/14/2024  -     Comprehensive Metabolic Panel; Future; Expected date: 08/14/2024    3. Type 2 diabetes mellitus with both eyes affected by mild nonproliferative retinopathy and macular edema, with long-term current use of insulin  -     blood-glucose sensor (DEXCOM G7 SENSOR) Khushi; 1 each by Misc.(Non-Drug; Combo Route) route every 10 days.  Dispense: 10 each; Refill: 3  -     rosuvastatin (CRESTOR) 5 MG tablet; Take 1 tablet (5 mg total) by mouth every evening.  Dispense: 90 tablet; Refill: 3  -     Microalbumin/Creatinine Ratio, Urine; Future; Expected date: 08/14/2024  -     Lipid Panel; Future; Expected date: 08/14/2024  -     Hemoglobin A1C; Future; Expected date: 08/14/2024  -     Comprehensive Metabolic Panel; Future; Expected date: 08/14/2024    4. Essential hypertension  -     Lipid Panel; Future; Expected date: 08/14/2024  -     Comprehensive Metabolic Panel; Future; Expected date: 08/14/2024    5. Tinnitus of right ear  -     Ambulatory referral/consult to ENT; Future; Expected date: 08/21/2024  -     Ambulatory referral/consult to Audiology; Future; Expected date: 08/21/2024    6. Hearing loss, unspecified hearing loss type, unspecified laterality  -     Ambulatory referral/consult to ENT; Future; Expected date: 08/21/2024  -     Ambulatory referral/consult to Audiology; Future; Expected date: 08/21/2024      Continue monitoring blood sugar at home,ADA diet.   Continue monitoring blood pressure at home, low sodium diet.   I spent a total of 45 minutes on the day of the visit.This includes face to face time and non-face to face time preparing to see the patient (eg, review of tests), obtaining and/or reviewing separately obtained history, documenting clinical information in the electronic or other health record, independently interpreting results and communicating results to the patient/family/caregiver, or care coordinator.        Follow up in about 6 months (around  2/14/2025), or if symptoms worsen or fail to improve.

## 2024-09-05 DIAGNOSIS — E11.3213 TYPE 2 DIABETES MELLITUS WITH BOTH EYES AFFECTED BY MILD NONPROLIFERATIVE RETINOPATHY AND MACULAR EDEMA, WITH LONG-TERM CURRENT USE OF INSULIN: ICD-10-CM

## 2024-09-05 DIAGNOSIS — Z79.4 TYPE 2 DIABETES MELLITUS WITH HYPERGLYCEMIA, WITH LONG-TERM CURRENT USE OF INSULIN: ICD-10-CM

## 2024-09-05 DIAGNOSIS — E11.65 TYPE 2 DIABETES MELLITUS WITH HYPERGLYCEMIA, WITH LONG-TERM CURRENT USE OF INSULIN: ICD-10-CM

## 2024-09-05 DIAGNOSIS — Z79.4 TYPE 2 DIABETES MELLITUS WITH BOTH EYES AFFECTED BY MILD NONPROLIFERATIVE RETINOPATHY AND MACULAR EDEMA, WITH LONG-TERM CURRENT USE OF INSULIN: ICD-10-CM

## 2024-09-05 NOTE — TELEPHONE ENCOUNTER
Care Due:                  Date            Visit Type   Department     Provider  --------------------------------------------------------------------------------                                EP -                              PRIMARY      [facility]  Last Visit: 08-      CARE ([facility])   KEYANNA Hardwick                              EP -                              PRIMARY      [facility]  Next Visit: 02-      CARE ([facility])   KEYANNA Hardwick                                                            Last  Test          Frequency    Reason                     Performed    Due Date  --------------------------------------------------------------------------------    HBA1C.......  6 months...  dulaglutide, insulin,      05- 11-                             metFORMIN................    [employer] Embedded Care Due Messages. Reference number: [id number].   9/05/2024 9:57:06 AM CDT

## 2024-09-06 RX ORDER — BLOOD-GLUCOSE SENSOR
1 EACH MISCELLANEOUS
Qty: 27 EACH | Refills: 3 | Status: SHIPPED | OUTPATIENT
Start: 2024-09-06 | End: 2025-09-06

## 2024-10-02 ENCOUNTER — TELEPHONE (OUTPATIENT)
Dept: INTERNAL MEDICINE | Facility: CLINIC | Age: 74
End: 2024-10-02
Payer: MEDICARE

## 2024-10-02 NOTE — TELEPHONE ENCOUNTER
----- Message from Mercedez sent at 10/2/2024  2:14 PM CDT -----  Type:  Pharmacy Calling to Clarify an RX    Name of Caller:  Pharmacy Name:[facility]   Prescription Name:blood-glucose sensor (DEXCOM G7 SENSOR) Khushi  What do they need to clarify?:diagnosis code   Best Call Back Number:[phone]  Additional Information:

## 2024-10-21 DIAGNOSIS — I10 ESSENTIAL HYPERTENSION: ICD-10-CM

## 2024-10-21 NOTE — TELEPHONE ENCOUNTER
Unable to retrieve patient chart and identify care due.  [employer] [facility] Embedded Care Due Messages. Reference number: [id number].   10/21/2024 3:40:53 PM CDT

## 2024-10-22 ENCOUNTER — PATIENT OUTREACH (OUTPATIENT)
Dept: FAMILY MEDICINE | Facility: CLINIC | Age: 74
End: 2024-10-22
Payer: MEDICARE

## 2024-10-22 ENCOUNTER — OFFICE VISIT (OUTPATIENT)
Dept: FAMILY MEDICINE | Facility: CLINIC | Age: 74
End: 2024-10-22
Payer: MEDICARE

## 2024-10-22 VITALS
HEIGHT: 64 IN | WEIGHT: 132.5 LBS | SYSTOLIC BLOOD PRESSURE: 112 MMHG | HEART RATE: 67 BPM | BODY MASS INDEX: 22.62 KG/M2 | OXYGEN SATURATION: 99 % | DIASTOLIC BLOOD PRESSURE: 60 MMHG

## 2024-10-22 DIAGNOSIS — Z23 NEED FOR COVID-19 VACCINE: ICD-10-CM

## 2024-10-22 DIAGNOSIS — I10 ESSENTIAL HYPERTENSION: ICD-10-CM

## 2024-10-22 DIAGNOSIS — E11.69 HYPERLIPIDEMIA ASSOCIATED WITH TYPE 2 DIABETES MELLITUS: Chronic | ICD-10-CM

## 2024-10-22 DIAGNOSIS — E11.65 TYPE 2 DIABETES MELLITUS WITH HYPERGLYCEMIA, WITHOUT LONG-TERM CURRENT USE OF INSULIN: Primary | ICD-10-CM

## 2024-10-22 DIAGNOSIS — R79.89 ELEVATED LFTS: ICD-10-CM

## 2024-10-22 DIAGNOSIS — E78.5 HYPERLIPIDEMIA ASSOCIATED WITH TYPE 2 DIABETES MELLITUS: Chronic | ICD-10-CM

## 2024-10-22 PROBLEM — U07.1 COVID: Status: RESOLVED | Noted: 2022-11-10 | Resolved: 2024-10-22

## 2024-10-22 PROCEDURE — 1160F RVW MEDS BY RX/DR IN RCRD: CPT | Mod: HCNC,CPTII,S$GLB, | Performed by: NURSE PRACTITIONER

## 2024-10-22 PROCEDURE — G0008 ADMIN INFLUENZA VIRUS VAC: HCPCS | Mod: HCNC,S$GLB,, | Performed by: NURSE PRACTITIONER

## 2024-10-22 PROCEDURE — 99999 PR PBB SHADOW E&M-EST. PATIENT-LVL IV: CPT | Mod: PBBFAC,HCNC,, | Performed by: NURSE PRACTITIONER

## 2024-10-22 PROCEDURE — 3008F BODY MASS INDEX DOCD: CPT | Mod: HCNC,CPTII,S$GLB, | Performed by: NURSE PRACTITIONER

## 2024-10-22 PROCEDURE — 3066F NEPHROPATHY DOC TX: CPT | Mod: HCNC,CPTII,S$GLB, | Performed by: NURSE PRACTITIONER

## 2024-10-22 PROCEDURE — 3074F SYST BP LT 130 MM HG: CPT | Mod: HCNC,CPTII,S$GLB, | Performed by: NURSE PRACTITIONER

## 2024-10-22 PROCEDURE — 3078F DIAST BP <80 MM HG: CPT | Mod: HCNC,CPTII,S$GLB, | Performed by: NURSE PRACTITIONER

## 2024-10-22 PROCEDURE — 1159F MED LIST DOCD IN RCRD: CPT | Mod: HCNC,CPTII,S$GLB, | Performed by: NURSE PRACTITIONER

## 2024-10-22 PROCEDURE — 3061F NEG MICROALBUMINURIA REV: CPT | Mod: HCNC,CPTII,S$GLB, | Performed by: NURSE PRACTITIONER

## 2024-10-22 PROCEDURE — 1157F ADVNC CARE PLAN IN RCRD: CPT | Mod: HCNC,CPTII,S$GLB, | Performed by: NURSE PRACTITIONER

## 2024-10-22 PROCEDURE — 90653 IIV ADJUVANT VACCINE IM: CPT | Mod: HCNC,S$GLB,, | Performed by: NURSE PRACTITIONER

## 2024-10-22 PROCEDURE — 3288F FALL RISK ASSESSMENT DOCD: CPT | Mod: HCNC,CPTII,S$GLB, | Performed by: NURSE PRACTITIONER

## 2024-10-22 PROCEDURE — 1126F AMNT PAIN NOTED NONE PRSNT: CPT | Mod: HCNC,CPTII,S$GLB, | Performed by: NURSE PRACTITIONER

## 2024-10-22 PROCEDURE — 99214 OFFICE O/P EST MOD 30 MIN: CPT | Mod: HCNC,S$GLB,, | Performed by: NURSE PRACTITIONER

## 2024-10-22 PROCEDURE — 1101F PT FALLS ASSESS-DOCD LE1/YR: CPT | Mod: HCNC,CPTII,S$GLB, | Performed by: NURSE PRACTITIONER

## 2024-10-22 PROCEDURE — 3044F HG A1C LEVEL LT 7.0%: CPT | Mod: HCNC,CPTII,S$GLB, | Performed by: NURSE PRACTITIONER

## 2024-10-22 PROCEDURE — 4010F ACE/ARB THERAPY RXD/TAKEN: CPT | Mod: HCNC,CPTII,S$GLB, | Performed by: NURSE PRACTITIONER

## 2024-10-22 RX ORDER — METOPROLOL SUCCINATE 25 MG/1
TABLET, EXTENDED RELEASE ORAL
Qty: 45 TABLET | Refills: 3 | Status: SHIPPED | OUTPATIENT
Start: 2024-10-22

## 2024-10-22 NOTE — TELEPHONE ENCOUNTER
Refill Routing Note   Medication(s) are not appropriate for processing by [facility] for the following reason(s):        No active prescription written by provider    [facility] action(s):  Defer               Appointments  past 12m or future 3m with PCP    Date Provider   Last Visit   8/14/2024 David Hardwick MD   Next Visit   2/17/2025 David Hardwick MD   ED visits in past 90 days: 0        Note composed:7:27 PM 10/21/2024

## 2024-10-27 NOTE — PROGRESS NOTES
Subjective     Patient ID: Ned Oliveira is a 74 y.o. male.    Chief Complaint: Diabetes and Mood    HPI    This is a 74 y.o. yo male patient of Dr. Wheeler who is known to me. He presents today accompanied by his wife with c/o Diabetes and Mood  - PMH includes    Patient Active Problem List   Diagnosis    Essential hypertension    Benign non-nodular prostatic hyperplasia without lower urinary tract symptoms    Adrenal nodule    Coronary artery disease involving native coronary artery    Aortic atherosclerosis    NS (nuclear sclerosis), right    Hyperlipidemia associated with type 2 diabetes mellitus    Type 2 diabetes mellitus with both eyes affected by mild nonproliferative retinopathy and macular edema, with long-term current use of insulin    Primary open angle glaucoma (POAG) of both eyes    Type 2 diabetes mellitus with hyperglycemia, without long-term current use of insulin    Nuclear sclerotic cataract of left eye    Primary open-angle glaucoma, left eye, mild stage    Type 2 diabetes mellitus with circulatory disorder, with long-term current use of insulin    Abdominal aortic aneurysm (AAA) without rupture    Positive cardiac stress test    Bilateral carotid artery stenosis    Abnormal results of pulmonary function studies    Chronic total occlusion of native coronary artery    Body mass index (BMI) of 22.0 to 22.9 in adult    Thrombocytopenia, unspecified    Ischemic cardiomyopathy    Other specified disorders of adrenal gland     VSS today. Denies CP, SOB, dizziness, HA, visual changes. Tries to follow proper diet. Stays physically active at home. Appt note said this visit was for mood follow up but patient says he has no issue with his mood at all. Due for flu vaccine. Received [employer] BP kit and requesting assistance with Digital Medicine set-up. Reviewed lab results from May 2024. Normal cholesterol/triglyceride levels. Normal kidney function. LFT is a bit elevated, admits to eating some fatty foods.  A1C improved from 8.2 to 6.7%. denies polyuria, polydipsia, polyphagia.     Review of Systems  Otherwise negative     Objective     Physical Exam  Vitals reviewed.   Constitutional:       General: He is not in acute distress.     Appearance: Normal appearance. He is well-developed, well-groomed and normal weight.   HENT:      Head: Normocephalic.   Eyes:      General:         Right eye: No discharge.         Left eye: No discharge.   Neck:      Vascular: No carotid bruit.   Cardiovascular:      Rate and Rhythm: Normal rate and regular rhythm.      Pulses:           Radial pulses are 2+ on the right side and 2+ on the left side.      Heart sounds: No murmur heard.  Pulmonary:      Effort: Pulmonary effort is normal. No respiratory distress.      Breath sounds: Normal breath sounds. No wheezing or rhonchi.   Abdominal:      General: There is no distension.   Musculoskeletal:      Right lower leg: No edema.      Left lower leg: No edema.   Lymphadenopathy:      Cervical: No cervical adenopathy.   Skin:     General: Skin is warm and dry.      Coloration: Skin is not pale.   Neurological:      Mental Status: He is alert and oriented to person, place, and time.      Coordination: Coordination normal.      Gait: Gait normal.   Psychiatric:         Attention and Perception: Attention normal.         Mood and Affect: Mood and affect normal.         Speech: Speech normal.         Behavior: Behavior normal. Behavior is cooperative.         Thought Content: Thought content normal.            Assessment and Plan     1. Type 2 diabetes mellitus with hyperglycemia, without long-term current use of insulin  -     Diabetes Digital Medicine (DDMP) Enrollment Order  - ADA diet and regular exercise  - Continue using Trulicity weekly, and metformin daily    2. Hyperlipidemia associated with type 2 diabetes mellitus  - Low fat diet and regular exercise  - Continue with daily statin therapy    3. Essential hypertension  - Low salt diet  and regular exercise  - Continue taking lisinopril and metoprolol as ordered    4. Elevated LFTs  - Low fat diet  - Avoid alcohol and use of acetaminophen    5. Need for COVID-19 vaccine  -     influenza (adjuvanted) (Fluad) 45 mcg/0.5 mL IM vaccine (> or = 66 yo) 0.5 mL    6. Body mass index (BMI) of 22.0 to 22.9 in adult                 Follow up if symptoms worsen or fail to improve.        I spent a total of 30 minutes on the day of the visit. This includes face to face time and non-face to face time preparing to see the patient (eg, review of tests), obtaining and/or reviewing separately obtained history, documenting clinical information in the electronic or other health record, independently interpreting results and communicating results to the patient/family/caregiver, or care coordinator.        (Portions of this note were dictated using voice recognition software and may contain dictation related errors in spelling/grammar/syntax not found on text review)

## 2024-10-29 DIAGNOSIS — I25.10 CORONARY ARTERY DISEASE INVOLVING NATIVE CORONARY ARTERY OF NATIVE HEART WITHOUT ANGINA PECTORIS: ICD-10-CM

## 2024-10-30 RX ORDER — NITROGLYCERIN 0.4 MG/1
TABLET SUBLINGUAL
Qty: 100 TABLET | Refills: 3 | Status: SHIPPED | OUTPATIENT
Start: 2024-10-30

## 2024-11-12 DIAGNOSIS — H40.1132 PRIMARY OPEN ANGLE GLAUCOMA (POAG) OF BOTH EYES, MODERATE STAGE: ICD-10-CM

## 2024-11-12 DIAGNOSIS — H40.042 STEROID RESPONDERS, LEFT: ICD-10-CM

## 2024-11-12 RX ORDER — DORZOLAMIDE HYDROCHLORIDE AND TIMOLOL MALEATE 20; 5 MG/ML; MG/ML
1 SOLUTION/ DROPS OPHTHALMIC 2 TIMES DAILY
Qty: 20 ML | Refills: 3 | Status: SHIPPED | OUTPATIENT
Start: 2024-11-12

## 2024-11-20 DIAGNOSIS — Z79.4 TYPE 2 DIABETES MELLITUS WITH HYPERGLYCEMIA, WITH LONG-TERM CURRENT USE OF INSULIN: ICD-10-CM

## 2024-11-20 DIAGNOSIS — E11.65 TYPE 2 DIABETES MELLITUS WITH HYPERGLYCEMIA, WITH LONG-TERM CURRENT USE OF INSULIN: ICD-10-CM

## 2024-11-20 NOTE — TELEPHONE ENCOUNTER
Care Due:                  Date            Visit Type   Department     Provider  --------------------------------------------------------------------------------                                EP -                              PRIMARY      [facility]  Last Visit: 08-      CARE ([facility])   KEYANNA Hardwick                              EP -                              PRIMARY      [facility]  Next Visit: 02-      CARE ([facility])   KEYANNA Hardwick                                                            Last  Test          Frequency    Reason                     Performed    Due Date  --------------------------------------------------------------------------------    HBA1C.......  6 months...  dulaglutide, metFORMIN...  05- 11-    [facility] Embedded Care Due Messages. Reference number: [id number].   11/20/2024 11:27:43 AM CST

## 2024-11-20 NOTE — TELEPHONE ENCOUNTER
Refill Routing Note   Medication(s) are not appropriate for processing by [facility] for the following reason(s):        Required labs outdated    [facility] action(s):  Defer        Medication Therapy Plan: FLOS      Appointments  past 12m or future 3m with PCP    Date Provider   Last Visit   8/14/2024 David Hardwick MD   Next Visit   2/17/2025 David Hardwick MD   ED visits in past 90 days: 0        Note composed:1:03 PM 11/20/2024

## 2024-11-21 RX ORDER — DULAGLUTIDE 0.75 MG/.5ML
0.75 INJECTION, SOLUTION SUBCUTANEOUS
Qty: 12 PEN | Refills: 3 | Status: SHIPPED | OUTPATIENT
Start: 2024-11-21 | End: 2025-11-21

## 2024-12-11 ENCOUNTER — OFFICE VISIT (OUTPATIENT)
Dept: OTOLARYNGOLOGY | Facility: CLINIC | Age: 74
End: 2024-12-11
Payer: MEDICARE

## 2024-12-11 ENCOUNTER — CLINICAL SUPPORT (OUTPATIENT)
Dept: OTOLARYNGOLOGY | Facility: CLINIC | Age: 74
End: 2024-12-11
Payer: MEDICARE

## 2024-12-11 VITALS
SYSTOLIC BLOOD PRESSURE: 114 MMHG | DIASTOLIC BLOOD PRESSURE: 66 MMHG | BODY MASS INDEX: 22.58 KG/M2 | WEIGHT: 132.25 LBS | HEART RATE: 62 BPM | HEIGHT: 64 IN

## 2024-12-11 DIAGNOSIS — H93.11 TINNITUS OF RIGHT EAR: ICD-10-CM

## 2024-12-11 DIAGNOSIS — H93.11 TINNITUS OF RIGHT EAR: Chronic | ICD-10-CM

## 2024-12-11 DIAGNOSIS — H90.3 SENSORINEURAL HEARING LOSS (SNHL) OF BOTH EARS: Primary | Chronic | ICD-10-CM

## 2024-12-11 DIAGNOSIS — H91.90 HEARING LOSS, UNSPECIFIED HEARING LOSS TYPE, UNSPECIFIED LATERALITY: ICD-10-CM

## 2024-12-11 DIAGNOSIS — H90.3 SENSORINEURAL HEARING LOSS, BILATERAL: Primary | ICD-10-CM

## 2024-12-11 PROCEDURE — 92557 COMPREHENSIVE HEARING TEST: CPT | Mod: HCNC,S$GLB,, | Performed by: PHYSICIAN ASSISTANT

## 2024-12-11 PROCEDURE — 99999 PR PBB SHADOW E&M-EST. PATIENT-LVL I: CPT | Mod: PBBFAC,HCNC,, | Performed by: PHYSICIAN ASSISTANT

## 2024-12-11 PROCEDURE — 3078F DIAST BP <80 MM HG: CPT | Mod: HCNC,CPTII,S$GLB, | Performed by: OTOLARYNGOLOGY

## 2024-12-11 PROCEDURE — 99999 PR PBB SHADOW E&M-EST. PATIENT-LVL IV: CPT | Mod: PBBFAC,HCNC,, | Performed by: OTOLARYNGOLOGY

## 2024-12-11 PROCEDURE — 1159F MED LIST DOCD IN RCRD: CPT | Mod: HCNC,CPTII,S$GLB, | Performed by: OTOLARYNGOLOGY

## 2024-12-11 PROCEDURE — 1157F ADVNC CARE PLAN IN RCRD: CPT | Mod: HCNC,CPTII,S$GLB, | Performed by: OTOLARYNGOLOGY

## 2024-12-11 PROCEDURE — 1101F PT FALLS ASSESS-DOCD LE1/YR: CPT | Mod: HCNC,CPTII,S$GLB, | Performed by: OTOLARYNGOLOGY

## 2024-12-11 PROCEDURE — 3008F BODY MASS INDEX DOCD: CPT | Mod: HCNC,CPTII,S$GLB, | Performed by: OTOLARYNGOLOGY

## 2024-12-11 PROCEDURE — 4010F ACE/ARB THERAPY RXD/TAKEN: CPT | Mod: HCNC,CPTII,S$GLB, | Performed by: OTOLARYNGOLOGY

## 2024-12-11 PROCEDURE — 3061F NEG MICROALBUMINURIA REV: CPT | Mod: HCNC,CPTII,S$GLB, | Performed by: OTOLARYNGOLOGY

## 2024-12-11 PROCEDURE — 92567 TYMPANOMETRY: CPT | Mod: HCNC,S$GLB,, | Performed by: PHYSICIAN ASSISTANT

## 2024-12-11 PROCEDURE — 99204 OFFICE O/P NEW MOD 45 MIN: CPT | Mod: HCNC,S$GLB,, | Performed by: OTOLARYNGOLOGY

## 2024-12-11 PROCEDURE — 3044F HG A1C LEVEL LT 7.0%: CPT | Mod: HCNC,CPTII,S$GLB, | Performed by: OTOLARYNGOLOGY

## 2024-12-11 PROCEDURE — 1126F AMNT PAIN NOTED NONE PRSNT: CPT | Mod: HCNC,CPTII,S$GLB, | Performed by: OTOLARYNGOLOGY

## 2024-12-11 PROCEDURE — 3288F FALL RISK ASSESSMENT DOCD: CPT | Mod: HCNC,CPTII,S$GLB, | Performed by: OTOLARYNGOLOGY

## 2024-12-11 PROCEDURE — 3074F SYST BP LT 130 MM HG: CPT | Mod: HCNC,CPTII,S$GLB, | Performed by: OTOLARYNGOLOGY

## 2024-12-11 PROCEDURE — 3066F NEPHROPATHY DOC TX: CPT | Mod: HCNC,CPTII,S$GLB, | Performed by: OTOLARYNGOLOGY

## 2024-12-11 NOTE — PROGRESS NOTES
Chief Complaint   Patient presents with    Consult     Pt stated that he has trouble hearing    Note: patient seen with professional Bengali  via videoconferencing # [phone]      HPI:  Ned Oliveira is a very pleasant 74 y.o. male here to see me today for the first time for evaluation of hearing loss.  He reports hearing loss that has been gradually progressing over the last several years.  He has noted any difference in hearing between the ears, with the right ear being the worse hearing ear.  He has noted tinnitus in both ears.  He has not had any recent issues with ear pain or ear drainage.  He has not had any previous otologic surgery. He denies any history of significant loud noise exposure.        Past Medical History:   Diagnosis Date    Adrenal adenoma     BPH (benign prostatic hyperplasia)     Cataract     Coronary artery disease     Diabetes mellitus, type 2     Diabetic retinopathy     Glaucoma     Hyperlipidemia     Hypertension     Inguinal hernia of left side without obstruction or gangrene 2019    Nephrolithiasis 2016    Steroid responders, left 2020     Social History     Socioeconomic History    Marital status:     Number of children: 2   Occupational History    Occupation: Maintenance    Tobacco Use    Smoking status: Former     Current packs/day: 0.00     Average packs/day: 2.0 packs/day for 10.0 years (20.0 ttl pk-yrs)     Types: Cigarettes     Start date: 8/15/1987     Quit date: 8/15/1996     Years since quittin.3    Smokeless tobacco: Never   Substance and Sexual Activity    Alcohol use: No     Alcohol/week: 0.0 standard drinks of alcohol    Drug use: No    Sexual activity: Yes     Partners: Female     Social Drivers of Health     Financial Resource Strain: Low Risk  (2023)    Overall Financial Resource Strain (CARDIA)     Difficulty of Paying Living Expenses: Not hard at all   Food Insecurity: No Food Insecurity (2023)    Hunger  Vital Sign     Worried About Running Out of Food in the Last Year: Never true     Ran Out of Food in the Last Year: Never true   Transportation Needs: No Transportation Needs (4/27/2023)    PRAPARE - Transportation     Lack of Transportation (Medical): No     Lack of Transportation (Non-Medical): No   Physical Activity: Sufficiently Active (4/27/2023)    Exercise Vital Sign     Days of Exercise per Week: 5 days     Minutes of Exercise per Session: 140 min   Stress: No Stress Concern Present (4/27/2023)    [employer] [place] [employer] - Occupational Stress Questionnaire     Feeling of Stress : Not at all   Housing Stability: Low Risk  (4/27/2023)    Housing Stability Vital Sign     Unable to Pay for Housing in the Last Year: No     Number of Places Lived in the Last Year: 1     Unstable Housing in the Last Year: No     Past Surgical History:   Procedure Laterality Date    BICEPS TENDON REPAIR Right     CATARACT EXTRACTION W/  INTRAOCULAR LENS IMPLANT Left 10/21/2020    COMPLEX PHACO IOL  AND BAERVELDT ()    COLONOSCOPY N/A 02/08/2019    Procedure: COLONOSCOPY;  Surgeon: Tavon Montes MD;  Location: [facility] SEA (4TH FLR);  Service: Colon and Rectal;  Laterality: N/A;  will need . pt requesting ASAP. gaudencio Perez (international) ok to schedule at this time. will send  up for 6:45 am arrival time    CORONARY ANGIOGRAPHY N/A 12/12/2022    Procedure: ANGIOGRAM, CORONARY ARTERY;  Surgeon: Drew Peralta MD;  Location: [facility] CATH LAB;  Service: Cardiology;  Laterality: N/A;    EYE SURGERY      HERNIA REPAIR      IMPLANTATION OF DEVICE FOR GLAUCOMA Left 10/21/2020    Procedure: INSERTION, DEVICE, FOR GLAUCOMA;  Surgeon: Saranya Monge MD;  [facility];  Service: Ophthalmology;  Laterality: Left;    INTRAOCULAR PROSTHESES INSERTION Left 10/21/2020    Procedure: INSERTION, IOL PROSTHESIS;  Surgeon: Saranya Monge MD;  Location: [facility] OR [facility]  FLR;  Service: Ophthalmology;  Laterality: Left;    LEFT HEART CATHETERIZATION Left 7/8/2022    Procedure: Left heart cath;  Surgeon: Drew Peralta MD;  Location: [facility] CATH LAB;  Service: Cardiology;  Laterality: Left;    PHACOEMULSIFICATION OF CATARACT Left 10/21/2020    Procedure: PHACOEMULSIFICATION, CATARACT;  Surgeon: Saranya Monge MD;  Location: [facility] OR [facility] FLR;  Service: Ophthalmology;  Laterality: Left;    PTCA, SINGLE VESSEL  12/12/2022    Procedure: PTCA, Single Vessel;  Surgeon: Drew Peralta MD;  Location: [facility] CATH LAB;  Service: Cardiology;;    REFORMATION OF ANTERIOR CHAMBER Left 12/23/2020    WITH HEALON ()    STENT, DRUG ELUTING, SINGLE VESSEL, CORONARY Right 12/12/2022    Procedure: Stent, Drug Eluting, Single Vessel, Coronary;  Surgeon: Drew Peralta MD;  Location: [facility] CATH LAB;  Service: Cardiology;  Laterality: Right;  very low bleeding risk 0.9%    YAG LASER  TO CYCLITIC MEMBRANE Left 12/10/2020         Family History   Problem Relation Name Age of Onset    Diabetes Mother      Heart disease Mother      Heart disease Sister sruthi     No Known Problems Brother Ned Carlson     Kidney failure Daughter          ESRD on HD    Autism Son      Asthma Son      No Known Problems Sister mehdi     No Known Problems Sister varsha     No Known Problems Sister brenda     No Known Problems Brother Bobby     Diabetes Brother marjorie     Stroke Neg Hx      Amblyopia Neg Hx      Blindness Neg Hx      Cataracts Neg Hx      Glaucoma Neg Hx      Macular degeneration Neg Hx      Retinal detachment Neg Hx      Strabismus Neg Hx           Review of Systems  General: negative for chills, fever or weight loss  Psychological: negative for mood changes or depression  Ophthalmic: negative for blurry vision, photophobia or eye pain  ENT: see HPI  Respiratory: no cough, shortness of breath, or wheezing  Cardiovascular: no chest pain or dyspnea on  exertion  Gastrointestinal: no abdominal pain, change in bowel habits, or black/ bloody stools  Musculoskeletal: negative for gait disturbance or muscular weakness  Neurological: no syncope or seizures; no ataxia  Dermatological: negative for puritis,  rash and jaundice  Hematologic/lymphatic: no easy bruising, no new lumps or bumps      Physical Exam:    Vitals:    12/11/24 0916   BP: 114/66   Pulse: 62       Constitutional: Well appearing / communicating without difficutly.  NAD.  Eyes: EOM I Bilaterally  Head/Face: Normocephalic.  Negative paranasal sinus pressure/tenderness.  Salivary glands WNL.  House Brackmann I Bilaterally.    Right Ear: Auricle normal appearance. External Auditory Canal within normal limits no lesions or masses,TM w/o masses/lesions/perforations. TM mobility noted.   Left Ear: Auricle normal appearance. External Auditory Canal within normal limits no lesions or masses,TM w/o masses/lesions/perforations. TM mobility noted.  Rinne Air conduction >bone conduction bilaterally, Cortes midline.   Nose: No gross nasal septal deviation. Inferior Turbinates 3+ bilaterally. No septal perforation. No masses/lesions. External nasal skin appears normal without masses/lesions.  Oral Cavity: Gingiva/lips within normal limits.  Dentition/gingiva healthy appearing. Mucus membranes moist. Floor of mouth soft, no masses palpated. Oral Tongue mobile. Hard Palate appears normal.    Oropharynx: Base of tongue appears normal. No masses/lesions noted. Tonsillar fossa/pharyngeal wall without lesions. Posterior oropharynx WNL.  Soft palate without masses. Midline uvula.   Neck/Lymphatic: No LAD I-VI bilaterally.  No thyromegaly.  No masses noted on exam.      Diagnostic studies:  Audiogram interpreted personally by me and discussed in detail with the patient today. Mild sensorineural hearing loss on the left and mild-to-moderate sensorineural hearing loss on the right.  Type As tympanograms  bilaterally.      Assessment:    ICD-10-CM ICD-9-CM    1. Hearing loss, unspecified hearing loss type, unspecified laterality  H91.90 389.9 Ambulatory referral/consult to ENT      2. Tinnitus of right ear  H93.11 388.30 Ambulatory referral/consult to ENT        The primary encounter diagnosis was Hearing loss, unspecified hearing loss type, unspecified laterality. A diagnosis of Tinnitus of right ear was also pertinent to this visit.      Plan:  No orders of the defined types were placed in this encounter.          We reviewed the patient's recent audiogram and hearing loss in detail.  We also discussed that he is a good candidate for hearing aids, if and when he the patient is motivated.    We also discussed the use hearing protection when exposed to loud noise, including lawn equipment. Recommend audiogram in 1 year.    We also discussed that tinnitus is most often caused by a hearing loss, and that as the hair cells are damaged, either genetic or as a result of loud noise exposure, they then cause tinnitus.  Some patients find that restricting the salt or caffeine in their diet helps.  Tinnitus tends to be louder in times of stress and fatigue, and may decrease with time.  Sound machines may also be an effective masking technique if needed at night.    We also discussed that tinnitus is most often caused by a hearing loss, and that as the hair cells are damaged, either genetic or as a result of loud noise exposure, they then cause tinnitus.  Some patients find that restricting the salt or caffeine in their diet helps.  Tinnitus tends to be louder in times of stress and fatigue, and may decrease with time.  Sound machines may also be an effective masking technique if needed at night.      Thank you kindly for allowing me to participate in the patient's care.       Evie Arreola MD

## 2024-12-12 NOTE — PROGRESS NOTES
Ned Oliveira, a 74 y.o. male, was seen today in the clinic for an audiologic evaluation.  Patients main complaint was bilateral hearing loss, worse in the Right ear along with tinnitus. He denies ear pain or ear drainage.  He denies a family history of hearing loss or a history of noise exposure.    Audiogram results revealed a mild sensorineural hearing loss in the left ear and a mild to moderate sensorineural hearing loss in the right ear. Speech testing could not be completed as Nepali is his primary language. Tympanometry revealed Type As in the right ear and Type As in the left ear. The results of the audiogram were reviewed with the patient and the benefits of amplification were discussed.    Recommendations:  Otologic evaluation  Annual audiogram  Hearing protection when in noise  Hearing aid consultation

## 2025-01-13 DIAGNOSIS — Z00.00 ENCOUNTER FOR MEDICARE ANNUAL WELLNESS EXAM: ICD-10-CM

## 2025-02-20 ENCOUNTER — PATIENT MESSAGE (OUTPATIENT)
Dept: ADMINISTRATIVE | Facility: HOSPITAL | Age: 75
End: 2025-02-20
Payer: MEDICARE

## 2025-03-26 DIAGNOSIS — Z79.4 TYPE 2 DIABETES MELLITUS WITH HYPERGLYCEMIA, WITH LONG-TERM CURRENT USE OF INSULIN: ICD-10-CM

## 2025-03-26 DIAGNOSIS — E11.65 TYPE 2 DIABETES MELLITUS WITH HYPERGLYCEMIA, WITH LONG-TERM CURRENT USE OF INSULIN: ICD-10-CM

## 2025-03-26 NOTE — TELEPHONE ENCOUNTER
Care Due:                  Date            Visit Type   Department     Provider  --------------------------------------------------------------------------------                                EP -                              PRIMARY      [facility]  Last Visit: 08-      CARE ([facility])   MEDICINE       David Hardwick  Next Visit: None Scheduled  None         None Found                                                            Last  Test          Frequency    Reason                     Performed    Due Date  --------------------------------------------------------------------------------    CBC.........  12 months..  clopidogreL..............  01- 01-    CMP.........  12 months..  lisinopriL, metFORMIN,     05- 05-                             rosuvastatin.............    HBA1C.......  6 months...  dulaglutide, metFORMIN...  05- 11-    Lipid Panel.  12 months..  rosuvastatin.............  05- 05-    [facility] Embedded Care Due Messages. Reference number: [id number].   3/26/2025 11:07:27 AM CDT

## 2025-03-26 NOTE — TELEPHONE ENCOUNTER
Refill Routing Note   Medication(s) are not appropriate for processing by [facility] for the following reason(s):        Required labs outdated    [facility] action(s):  Defer   Requires labs : Yes - CMP, A1c, Lipid panel            Appointments  past 12m or future 3m with PCP    Date Provider   Last Visit   8/14/2024 David Hardwick MD   Next Visit   Visit date not found David Hardwick MD   ED visits in past 90 days: 0        Note composed:5:11 PM 03/26/2025

## 2025-03-27 RX ORDER — DULAGLUTIDE 0.75 MG/.5ML
INJECTION, SOLUTION SUBCUTANEOUS
Qty: 12 PEN | Refills: 3 | Status: SHIPPED | OUTPATIENT
Start: 2025-03-27

## 2025-03-28 ENCOUNTER — TELEPHONE (OUTPATIENT)
Dept: FAMILY MEDICINE | Facility: CLINIC | Age: 75
End: 2025-03-28
Payer: MEDICARE

## 2025-03-28 NOTE — TELEPHONE ENCOUNTER
Lvm for pt's wife informing her I was calling to set up a follow up visit for pt.  
No adenopathy or splenomegaly. No cervical or inguinal lymphadenopathy.

## 2025-04-29 DIAGNOSIS — N13.8 BPH WITH OBSTRUCTION/LOWER URINARY TRACT SYMPTOMS: ICD-10-CM

## 2025-04-29 DIAGNOSIS — N40.1 BPH WITH OBSTRUCTION/LOWER URINARY TRACT SYMPTOMS: ICD-10-CM

## 2025-04-29 DIAGNOSIS — R33.9 URINARY RETENTION: ICD-10-CM

## 2025-04-29 RX ORDER — TAMSULOSIN HYDROCHLORIDE 0.4 MG/1
2 CAPSULE ORAL NIGHTLY
Qty: 180 CAPSULE | Refills: 1 | Status: SHIPPED | OUTPATIENT
Start: 2025-04-29

## 2025-04-29 NOTE — TELEPHONE ENCOUNTER
No care due was identified.  [facility] Embedded Care Due Messages. Reference number: [id number].   4/29/2025 4:30:10 PM CDT

## 2025-04-30 NOTE — TELEPHONE ENCOUNTER
Refill Decision Note   Ned Oliveira  is requesting a refill authorization.  Brief Assessment and Rationale for Refill:  Approve     Medication Therapy Plan:         Comments:     Note composed:7:55 PM 04/29/2025             Appointments     Last Visit   8/14/2024 David Hardwick MD   Next Visit   Visit date not found David Hardwick MD

## 2025-05-07 ENCOUNTER — TELEPHONE (OUTPATIENT)
Dept: OPHTHALMOLOGY | Facility: CLINIC | Age: 75
End: 2025-05-07
Payer: MEDICARE

## 2025-05-07 NOTE — TELEPHONE ENCOUNTER
----- Message from Brad sent at 5/7/2025  2:34 PM CDT -----  Type:  Sooner Apoointment RequestCaller is requesting a sooner appointment.  Caller declined first available appointment listed below.  Caller will not accept being placed on the waitlist and is requesting a message be sent to doctor.Name of Caller:ptWhen is the first available appointment? None Symptoms:check up - Symptom: Vision Loss or ChangeOutcome: Schedule an appointment to be seen within 24 hours.Reason: Caller denied all higher acuity questionsThe caller accepted this outcome. Would the patient rather a call back or a response via [facility]? callBest Call Back Number:  [phone]  or [phone] Information: pt states they would like a call back from office to get an appt scheduled with provider asap. Thank you

## 2025-05-19 ENCOUNTER — OFFICE VISIT (OUTPATIENT)
Dept: URGENT CARE | Facility: CLINIC | Age: 75
End: 2025-05-19
Payer: MEDICARE

## 2025-05-19 VITALS
BODY MASS INDEX: 22.53 KG/M2 | DIASTOLIC BLOOD PRESSURE: 82 MMHG | SYSTOLIC BLOOD PRESSURE: 131 MMHG | WEIGHT: 132 LBS | RESPIRATION RATE: 20 BRPM | HEIGHT: 64 IN | HEART RATE: 76 BPM | OXYGEN SATURATION: 96 % | TEMPERATURE: 98 F

## 2025-05-19 DIAGNOSIS — R05.9 COUGH, UNSPECIFIED TYPE: Primary | ICD-10-CM

## 2025-05-19 DIAGNOSIS — J20.9 ACUTE BRONCHITIS, UNSPECIFIED ORGANISM: ICD-10-CM

## 2025-05-19 LAB
CTP QC/QA: YES
SARS-COV+SARS-COV-2 AG RESP QL IA.RAPID: NEGATIVE

## 2025-05-19 PROCEDURE — 87811 SARS-COV-2 COVID19 W/OPTIC: CPT | Mod: QW,S$GLB,,

## 2025-05-19 PROCEDURE — 99213 OFFICE O/P EST LOW 20 MIN: CPT | Mod: S$GLB,,,

## 2025-05-19 PROCEDURE — 71046 X-RAY EXAM CHEST 2 VIEWS: CPT | Mod: FY,S$GLB,, | Performed by: RADIOLOGY

## 2025-05-19 RX ORDER — PROMETHAZINE HYDROCHLORIDE AND DEXTROMETHORPHAN HYDROBROMIDE 6.25; 15 MG/5ML; MG/5ML
5 SYRUP ORAL EVERY 6 HOURS PRN
Qty: 118 ML | Refills: 0 | Status: SHIPPED | OUTPATIENT
Start: 2025-05-19 | End: 2025-05-29

## 2025-05-19 RX ORDER — FLUTICASONE PROPIONATE 50 MCG
1 SPRAY, SUSPENSION (ML) NASAL DAILY
Qty: 18.2 ML | Refills: 0 | Status: SHIPPED | OUTPATIENT
Start: 2025-05-19 | End: 2025-06-18

## 2025-05-19 RX ORDER — BENZONATATE 100 MG/1
200 CAPSULE ORAL 3 TIMES DAILY PRN
Qty: 42 CAPSULE | Refills: 0 | Status: SHIPPED | OUTPATIENT
Start: 2025-05-19 | End: 2025-06-02

## 2025-05-19 RX ORDER — CETIRIZINE HYDROCHLORIDE 10 MG/1
10 TABLET ORAL DAILY
Qty: 30 TABLET | Refills: 0 | Status: SHIPPED | OUTPATIENT
Start: 2025-05-19 | End: 2025-06-18

## 2025-05-19 RX ORDER — GUAIFENESIN 600 MG/1
1200 TABLET, EXTENDED RELEASE ORAL 2 TIMES DAILY
Qty: 56 TABLET | Refills: 0 | Status: SHIPPED | OUTPATIENT
Start: 2025-05-19 | End: 2025-06-02

## 2025-05-19 NOTE — PROGRESS NOTES
"Subjective:      Patient ID: Ned Oliveira is a 74 y.o. male.    Vitals:  height is 5' 4" (1.626 m) and weight is 59.9 kg (132 lb). His temperature is 98.2 °F (36.8 °C). His blood pressure is 131/82 and his pulse is 76. His respiration is 20 and oxygen saturation is 96%.     Chief Complaint: Cough    This is a 74 y.o. male   who presents today with a chief complaint of a cough that began five days ago. He's complaining of fatigue, congestion, muscle aches and a productive cough. He's been taking tylenol, theraflu and nyquil to help relieve his symptoms.  Denies fever nausea and vomiting.  Cough is worse at night    Cough  This is a new problem. The current episode started in the past 7 days. The problem has been gradually worsening. The problem occurs constantly. The cough is Productive of sputum. Associated symptoms include chest pain (chest tightness), headaches, myalgias and shortness of breath. Pertinent negatives include no chills, ear congestion, ear pain, fever, heartburn, hemoptysis, nasal congestion, postnasal drip, rash, rhinorrhea, sore throat, sweats, weight loss or wheezing. Nothing aggravates the symptoms. Treatments tried: tylenol, theraflu and nyquil. The treatment provided mild relief.     Constitution: Negative for chills and fever.   HENT:  Positive for congestion. Negative for ear pain, postnasal drip, sinus pain, sinus pressure and sore throat.    Cardiovascular:  Positive for chest pain (chest tightness).   Respiratory:  Positive for cough, sputum production and shortness of breath. Negative for bloody sputum and wheezing.    Gastrointestinal:  Negative for heartburn.   Musculoskeletal:  Positive for muscle ache.   Skin:  Negative for rash.   Neurological:  Positive for headaches.      Objective:     Physical Exam   Constitutional: He is oriented to person, place, and time. He does not appear ill. No distress. normal  HENT:   Ears:   Right Ear: Tympanic membrane normal.   Left Ear: Tympanic " membrane normal.   Nose: Nose normal.   Mouth/Throat: Mucous membranes are moist. Posterior oropharyngeal erythema present. Oropharynx is clear.   Eyes: Extraocular movement intact   Neck: Neck supple.   Cardiovascular: Normal rate, regular rhythm and normal heart sounds.   Pulmonary/Chest: Effort normal and breath sounds normal. No respiratory distress. He has no wheezes.   Abdominal: Normal appearance. Soft.   Musculoskeletal: Normal range of motion.         General: Normal range of motion.   Neurological: He is alert and oriented to person, place, and time.   Skin: Skin is warm and dry.   Nursing note and vitals reviewed.    Results for orders placed or performed in visit on 05/19/25   SARS Coronavirus 2 Antigen, POCT Manual Read    Collection Time: 05/19/25 11:14 AM   Result Value Ref Range    SARS Coronavirus 2 Antigen Negative Negative, Presumptive Negative     Acceptable Yes      XR CHEST PA AND LATERAL  Result Date: 5/19/2025  EXAMINATION: XR CHEST PA AND LATERAL CLINICAL HISTORY: Cough, unspecified TECHNIQUE: PA and lateral views of the chest were performed. COMPARISON: 04/12/2023 FINDINGS: The heart size is upper normal.  Mediastinum shows aortic atherosclerosis.  Lungs are expanded and clear.  No lung consolidation or pleural fluid is identified.  Skeletal structures show degenerative changes in the spine.     No acute cardiopulmonary disease Electronically signed by: Erick Blankenship MD Date:    05/19/2025 Time:    12:00          Assessment:     1. Cough, unspecified type    2. Acute bronchitis, unspecified organism        Plan:     Patient Instructions   Take medication as prescribed  Will call and send antibiotics if xray read shows pneumonia.  Hot tea and honey   Salt water gargles  Tylenol / ibuprofen for pain and fever > 100.4 F       Cough, unspecified type  -     SARS Coronavirus 2 Antigen, POCT Manual Read  -     XR CHEST PA AND LATERAL; Future; Expected date: 05/19/2025    Acute  bronchitis, unspecified organism    Other orders  -     benzonatate (TESSALON) 100 MG capsule; Take 2 capsules (200 mg total) by mouth 3 (three) times daily as needed for Cough.  Dispense: 42 capsule; Refill: 0  -     promethazine-dextromethorphan (PROMETHAZINE-DM) 6.25-15 mg/5 mL Syrp; Take 5 mLs by mouth every 6 (six) hours as needed (can take at night).  Dispense: 118 mL; Refill: 0  -     guaiFENesin (MUCINEX) 600 mg 12 hr tablet; Take 2 tablets (1,200 mg total) by mouth 2 (two) times daily. for 14 days  Dispense: 56 tablet; Refill: 0  -     fluticasone propionate (FLONASE) 50 mcg/actuation nasal spray; 1 spray (50 mcg total) by Each Nostril route once daily.  Dispense: 18.2 mL; Refill: 0  -     cetirizine (ZYRTEC) 10 MG tablet; Take 1 tablet (10 mg total) by mouth once daily.  Dispense: 30 tablet; Refill: 0

## 2025-05-19 NOTE — PATIENT INSTRUCTIONS
Take medication as prescribed  Will call and send antibiotics if xray read shows pneumonia.  Hot tea and honey   Salt water gargles  Tylenol / ibuprofen for pain and fever > 100.4 F

## 2025-05-21 ENCOUNTER — PATIENT OUTREACH (OUTPATIENT)
Dept: ADMINISTRATIVE | Facility: HOSPITAL | Age: 75
End: 2025-05-21
Payer: MEDICARE

## 2025-05-21 DIAGNOSIS — E11.3213 TYPE 2 DIABETES MELLITUS WITH BOTH EYES AFFECTED BY MILD NONPROLIFERATIVE RETINOPATHY AND MACULAR EDEMA, WITH LONG-TERM CURRENT USE OF INSULIN: Primary | ICD-10-CM

## 2025-05-21 DIAGNOSIS — Z79.4 TYPE 2 DIABETES MELLITUS WITH BOTH EYES AFFECTED BY MILD NONPROLIFERATIVE RETINOPATHY AND MACULAR EDEMA, WITH LONG-TERM CURRENT USE OF INSULIN: Primary | ICD-10-CM

## 2025-05-21 NOTE — PROGRESS NOTES
Health Maintenance Topic(s) Outreach Outcomes & Actions Taken:    Lab(s) - Outreach Outcomes & Actions Taken  : Overdue Lab(s) Ordered

## 2025-05-22 ENCOUNTER — PATIENT MESSAGE (OUTPATIENT)
Dept: ADMINISTRATIVE | Facility: HOSPITAL | Age: 75
End: 2025-05-22
Payer: MEDICARE

## 2025-06-03 ENCOUNTER — LAB VISIT (OUTPATIENT)
Dept: LAB | Facility: HOSPITAL | Age: 75
End: 2025-06-03
Attending: FAMILY MEDICINE
Payer: MEDICARE

## 2025-06-03 ENCOUNTER — OFFICE VISIT (OUTPATIENT)
Dept: FAMILY MEDICINE | Facility: CLINIC | Age: 75
End: 2025-06-03
Payer: MEDICARE

## 2025-06-03 VITALS
BODY MASS INDEX: 22.09 KG/M2 | OXYGEN SATURATION: 99 % | DIASTOLIC BLOOD PRESSURE: 60 MMHG | WEIGHT: 129.38 LBS | HEART RATE: 71 BPM | SYSTOLIC BLOOD PRESSURE: 100 MMHG | HEIGHT: 64 IN

## 2025-06-03 DIAGNOSIS — I10 ESSENTIAL HYPERTENSION: ICD-10-CM

## 2025-06-03 DIAGNOSIS — E11.69 HYPERLIPIDEMIA ASSOCIATED WITH TYPE 2 DIABETES MELLITUS: Chronic | ICD-10-CM

## 2025-06-03 DIAGNOSIS — R06.02 SOB (SHORTNESS OF BREATH): ICD-10-CM

## 2025-06-03 DIAGNOSIS — I10 ESSENTIAL HYPERTENSION: Chronic | ICD-10-CM

## 2025-06-03 DIAGNOSIS — Z87.891 FORMER SMOKER: ICD-10-CM

## 2025-06-03 DIAGNOSIS — J41.1 MUCOPURULENT CHRONIC BRONCHITIS: Primary | ICD-10-CM

## 2025-06-03 DIAGNOSIS — E11.65 TYPE 2 DIABETES MELLITUS WITH HYPERGLYCEMIA, WITH LONG-TERM CURRENT USE OF INSULIN: ICD-10-CM

## 2025-06-03 DIAGNOSIS — Z02.79 MEDICAL CERTIFICATE ISSUANCE: ICD-10-CM

## 2025-06-03 DIAGNOSIS — E11.69 HYPERLIPIDEMIA ASSOCIATED WITH TYPE 2 DIABETES MELLITUS: ICD-10-CM

## 2025-06-03 DIAGNOSIS — Z79.4 TYPE 2 DIABETES MELLITUS WITH HYPERGLYCEMIA, WITH LONG-TERM CURRENT USE OF INSULIN: ICD-10-CM

## 2025-06-03 DIAGNOSIS — E78.5 HYPERLIPIDEMIA ASSOCIATED WITH TYPE 2 DIABETES MELLITUS: Chronic | ICD-10-CM

## 2025-06-03 DIAGNOSIS — E78.5 HYPERLIPIDEMIA ASSOCIATED WITH TYPE 2 DIABETES MELLITUS: ICD-10-CM

## 2025-06-03 LAB
ABSOLUTE EOSINOPHIL (OHS): 0.22 K/UL
ABSOLUTE MONOCYTE (OHS): 0.49 K/UL (ref 0.3–1)
ABSOLUTE NEUTROPHIL COUNT (OHS): 2.91 K/UL (ref 1.8–7.7)
ALBUMIN SERPL BCP-MCNC: 3.6 G/DL (ref 3.5–5.2)
ALBUMIN/CREAT UR: 6.1 UG/MG
ALP SERPL-CCNC: 83 UNIT/L (ref 40–150)
ALT SERPL W/O P-5'-P-CCNC: 20 UNIT/L (ref 10–44)
ANION GAP (OHS): 7 MMOL/L (ref 8–16)
AST SERPL-CCNC: 23 UNIT/L (ref 11–45)
BASOPHILS # BLD AUTO: 0.06 K/UL
BASOPHILS NFR BLD AUTO: 1.1 %
BILIRUB SERPL-MCNC: 0.8 MG/DL (ref 0.1–1)
BNP SERPL-MCNC: 659 PG/ML (ref 0–99)
BUN SERPL-MCNC: 18 MG/DL (ref 8–23)
CALCIUM SERPL-MCNC: 8.7 MG/DL (ref 8.7–10.5)
CHLORIDE SERPL-SCNC: 108 MMOL/L (ref 95–110)
CHOLEST SERPL-MCNC: 182 MG/DL (ref 120–199)
CHOLEST/HDLC SERPL: 4.1 {RATIO} (ref 2–5)
CO2 SERPL-SCNC: 25 MMOL/L (ref 23–29)
CREAT SERPL-MCNC: 1.2 MG/DL (ref 0.5–1.4)
CREAT UR-MCNC: 132 MG/DL (ref 23–375)
EAG (OHS): 157 MG/DL (ref 68–131)
ERYTHROCYTE [DISTWIDTH] IN BLOOD BY AUTOMATED COUNT: 12.7 % (ref 11.5–14.5)
GFR SERPLBLD CREATININE-BSD FMLA CKD-EPI: >60 ML/MIN/1.73/M2
GLUCOSE SERPL-MCNC: 124 MG/DL (ref 70–110)
HBA1C MFR BLD: 7.1 % (ref 4–5.6)
HCT VFR BLD AUTO: 40.6 % (ref 40–54)
HDLC SERPL-MCNC: 44 MG/DL (ref 40–75)
HDLC SERPL: 24.2 % (ref 20–50)
HGB BLD-MCNC: 13.3 GM/DL (ref 14–18)
IMM GRANULOCYTES # BLD AUTO: 0.02 K/UL (ref 0–0.04)
IMM GRANULOCYTES NFR BLD AUTO: 0.4 % (ref 0–0.5)
LDLC SERPL CALC-MCNC: 128 MG/DL (ref 63–159)
LYMPHOCYTES # BLD AUTO: 1.84 K/UL (ref 1–4.8)
MCH RBC QN AUTO: 33.3 PG (ref 27–31)
MCHC RBC AUTO-ENTMCNC: 32.8 G/DL (ref 32–36)
MCV RBC AUTO: 102 FL (ref 82–98)
MICROALBUMIN UR-MCNC: 8 UG/ML (ref ?–5000)
NONHDLC SERPL-MCNC: 138 MG/DL
NUCLEATED RBC (/100WBC) (OHS): 0 /100 WBC
PLATELET # BLD AUTO: 166 K/UL (ref 150–450)
PMV BLD AUTO: 11.7 FL (ref 9.2–12.9)
POTASSIUM SERPL-SCNC: 3.9 MMOL/L (ref 3.5–5.1)
PROT SERPL-MCNC: 6 GM/DL (ref 6–8.4)
RBC # BLD AUTO: 4 M/UL (ref 4.6–6.2)
RELATIVE EOSINOPHIL (OHS): 4 %
RELATIVE LYMPHOCYTE (OHS): 33.2 % (ref 18–48)
RELATIVE MONOCYTE (OHS): 8.8 % (ref 4–15)
RELATIVE NEUTROPHIL (OHS): 52.5 % (ref 38–73)
SODIUM SERPL-SCNC: 140 MMOL/L (ref 136–145)
TRIGL SERPL-MCNC: 50 MG/DL (ref 30–150)
TSH SERPL-ACNC: 1.46 UIU/ML (ref 0.4–4)
WBC # BLD AUTO: 5.54 K/UL (ref 3.9–12.7)

## 2025-06-03 PROCEDURE — 36415 COLL VENOUS BLD VENIPUNCTURE: CPT | Mod: HCNC,PO

## 2025-06-03 PROCEDURE — 1160F RVW MEDS BY RX/DR IN RCRD: CPT | Mod: CPTII,HCNC,S$GLB, | Performed by: FAMILY MEDICINE

## 2025-06-03 PROCEDURE — 82043 UR ALBUMIN QUANTITATIVE: CPT | Mod: HCNC

## 2025-06-03 PROCEDURE — 3008F BODY MASS INDEX DOCD: CPT | Mod: CPTII,HCNC,S$GLB, | Performed by: FAMILY MEDICINE

## 2025-06-03 PROCEDURE — 99215 OFFICE O/P EST HI 40 MIN: CPT | Mod: HCNC,S$GLB,, | Performed by: FAMILY MEDICINE

## 2025-06-03 PROCEDURE — 83036 HEMOGLOBIN GLYCOSYLATED A1C: CPT | Mod: HCNC

## 2025-06-03 PROCEDURE — 83880 ASSAY OF NATRIURETIC PEPTIDE: CPT | Mod: HCNC

## 2025-06-03 PROCEDURE — 3078F DIAST BP <80 MM HG: CPT | Mod: CPTII,HCNC,S$GLB, | Performed by: FAMILY MEDICINE

## 2025-06-03 PROCEDURE — 3288F FALL RISK ASSESSMENT DOCD: CPT | Mod: CPTII,HCNC,S$GLB, | Performed by: FAMILY MEDICINE

## 2025-06-03 PROCEDURE — 1125F AMNT PAIN NOTED PAIN PRSNT: CPT | Mod: CPTII,HCNC,S$GLB, | Performed by: FAMILY MEDICINE

## 2025-06-03 PROCEDURE — 85025 COMPLETE CBC W/AUTO DIFF WBC: CPT | Mod: HCNC

## 2025-06-03 PROCEDURE — 1159F MED LIST DOCD IN RCRD: CPT | Mod: CPTII,HCNC,S$GLB, | Performed by: FAMILY MEDICINE

## 2025-06-03 PROCEDURE — 84443 ASSAY THYROID STIM HORMONE: CPT | Mod: HCNC

## 2025-06-03 PROCEDURE — 3074F SYST BP LT 130 MM HG: CPT | Mod: CPTII,HCNC,S$GLB, | Performed by: FAMILY MEDICINE

## 2025-06-03 PROCEDURE — 99999 PR PBB SHADOW E&M-EST. PATIENT-LVL III: CPT | Mod: PBBFAC,HCNC,, | Performed by: FAMILY MEDICINE

## 2025-06-03 PROCEDURE — 1157F ADVNC CARE PLAN IN RCRD: CPT | Mod: CPTII,HCNC,S$GLB, | Performed by: FAMILY MEDICINE

## 2025-06-03 PROCEDURE — 3072F LOW RISK FOR RETINOPATHY: CPT | Mod: CPTII,HCNC,S$GLB, | Performed by: FAMILY MEDICINE

## 2025-06-03 PROCEDURE — 80061 LIPID PANEL: CPT | Mod: HCNC

## 2025-06-03 PROCEDURE — 84075 ASSAY ALKALINE PHOSPHATASE: CPT | Mod: HCNC

## 2025-06-03 PROCEDURE — 1101F PT FALLS ASSESS-DOCD LE1/YR: CPT | Mod: CPTII,HCNC,S$GLB, | Performed by: FAMILY MEDICINE

## 2025-06-03 RX ORDER — PREDNISONE 5 MG/1
5 TABLET ORAL 2 TIMES DAILY
Qty: 10 TABLET | Refills: 0 | Status: SHIPPED | OUTPATIENT
Start: 2025-06-03 | End: 2025-06-08

## 2025-06-03 RX ORDER — LISINOPRIL 2.5 MG/1
2.5 TABLET ORAL DAILY
Qty: 90 TABLET | Refills: 3 | Status: SHIPPED | OUTPATIENT
Start: 2025-06-03 | End: 2026-06-03

## 2025-06-03 RX ORDER — ALBUTEROL SULFATE 90 UG/1
2 INHALANT RESPIRATORY (INHALATION) EVERY 6 HOURS PRN
Qty: 18 G | Refills: 0 | Status: SHIPPED | OUTPATIENT
Start: 2025-06-03 | End: 2025-07-03

## 2025-06-03 RX ORDER — FLUTICASONE PROPIONATE AND SALMETEROL 100; 50 UG/1; UG/1
1 POWDER RESPIRATORY (INHALATION) 2 TIMES DAILY
Qty: 60 EACH | Refills: 0 | Status: SHIPPED | OUTPATIENT
Start: 2025-06-03 | End: 2025-07-03

## 2025-06-03 RX ORDER — PROMETHAZINE HYDROCHLORIDE 6.25 MG/5ML
5 SYRUP ORAL EVERY 6 HOURS PRN
COMMUNITY

## 2025-06-03 RX ORDER — DOXYCYCLINE 100 MG/1
100 CAPSULE ORAL 2 TIMES DAILY
Qty: 14 CAPSULE | Refills: 0 | Status: SHIPPED | OUTPATIENT
Start: 2025-06-03 | End: 2025-06-10

## 2025-06-09 ENCOUNTER — PATIENT MESSAGE (OUTPATIENT)
Dept: ADMINISTRATIVE | Facility: HOSPITAL | Age: 75
End: 2025-06-09
Payer: MEDICARE

## 2025-06-27 ENCOUNTER — PATIENT OUTREACH (OUTPATIENT)
Dept: ADMINISTRATIVE | Facility: HOSPITAL | Age: 75
End: 2025-06-27
Payer: MEDICARE

## 2025-06-27 ENCOUNTER — PATIENT MESSAGE (OUTPATIENT)
Dept: ADMINISTRATIVE | Facility: HOSPITAL | Age: 75
End: 2025-06-27
Payer: MEDICARE

## 2025-06-27 NOTE — PROGRESS NOTES
06/27/2025  VB chart audit performed. Care Everywhere updates requested and reviewed  Overdue HM topic chart audit and/or requested. LINKS triggered and reconciled. Media reviewed portal sent regarding overdue health topics

## 2025-07-10 DIAGNOSIS — Z79.4 TYPE 2 DIABETES MELLITUS WITH HYPERGLYCEMIA, WITH LONG-TERM CURRENT USE OF INSULIN: ICD-10-CM

## 2025-07-10 DIAGNOSIS — E11.65 TYPE 2 DIABETES MELLITUS WITH HYPERGLYCEMIA, WITH LONG-TERM CURRENT USE OF INSULIN: ICD-10-CM

## 2025-07-10 RX ORDER — DULAGLUTIDE 0.75 MG/.5ML
INJECTION, SOLUTION SUBCUTANEOUS
Qty: 12 PEN | Refills: 1 | Status: SHIPPED | OUTPATIENT
Start: 2025-07-10

## 2025-07-10 NOTE — TELEPHONE ENCOUNTER
No care due was identified.  [facility] Embedded Care Due Messages. Reference number: [id number].   7/10/2025 8:55:04 AM CDT

## 2025-07-10 NOTE — TELEPHONE ENCOUNTER
Refill Decision Note   Ned Roxanne  is requesting a refill authorization.  Brief Assessment and Rationale for Refill:  Approve     Medication Therapy Plan:         Comments:     Note composed:12:40 PM 07/10/2025

## 2025-07-15 ENCOUNTER — OFFICE VISIT (OUTPATIENT)
Dept: FAMILY MEDICINE | Facility: CLINIC | Age: 75
End: 2025-07-15
Payer: MEDICARE

## 2025-07-15 VITALS
DIASTOLIC BLOOD PRESSURE: 60 MMHG | HEART RATE: 72 BPM | WEIGHT: 125 LBS | SYSTOLIC BLOOD PRESSURE: 102 MMHG | BODY MASS INDEX: 21.34 KG/M2 | OXYGEN SATURATION: 99 % | HEIGHT: 64 IN

## 2025-07-15 DIAGNOSIS — I25.10 CORONARY ARTERY DISEASE INVOLVING NATIVE CORONARY ARTERY OF NATIVE HEART WITHOUT ANGINA PECTORIS: ICD-10-CM

## 2025-07-15 DIAGNOSIS — B35.4 TINEA CORPORIS: ICD-10-CM

## 2025-07-15 DIAGNOSIS — I10 ESSENTIAL HYPERTENSION: Primary | Chronic | ICD-10-CM

## 2025-07-15 DIAGNOSIS — Z79.4 TYPE 2 DIABETES MELLITUS WITH BOTH EYES AFFECTED BY MILD NONPROLIFERATIVE RETINOPATHY AND MACULAR EDEMA, WITH LONG-TERM CURRENT USE OF INSULIN: ICD-10-CM

## 2025-07-15 DIAGNOSIS — R06.02 SOB (SHORTNESS OF BREATH) ON EXERTION: ICD-10-CM

## 2025-07-15 DIAGNOSIS — R79.89 ELEVATED BRAIN NATRIURETIC PEPTIDE (BNP) LEVEL: ICD-10-CM

## 2025-07-15 DIAGNOSIS — E11.3213 TYPE 2 DIABETES MELLITUS WITH BOTH EYES AFFECTED BY MILD NONPROLIFERATIVE RETINOPATHY AND MACULAR EDEMA, WITH LONG-TERM CURRENT USE OF INSULIN: ICD-10-CM

## 2025-07-15 PROCEDURE — 99215 OFFICE O/P EST HI 40 MIN: CPT | Mod: HCNC,S$GLB,, | Performed by: FAMILY MEDICINE

## 2025-07-15 PROCEDURE — 4010F ACE/ARB THERAPY RXD/TAKEN: CPT | Mod: CPTII,HCNC,S$GLB, | Performed by: FAMILY MEDICINE

## 2025-07-15 PROCEDURE — 1159F MED LIST DOCD IN RCRD: CPT | Mod: CPTII,HCNC,S$GLB, | Performed by: FAMILY MEDICINE

## 2025-07-15 PROCEDURE — 3066F NEPHROPATHY DOC TX: CPT | Mod: CPTII,HCNC,S$GLB, | Performed by: FAMILY MEDICINE

## 2025-07-15 PROCEDURE — 3288F FALL RISK ASSESSMENT DOCD: CPT | Mod: CPTII,HCNC,S$GLB, | Performed by: FAMILY MEDICINE

## 2025-07-15 PROCEDURE — 1160F RVW MEDS BY RX/DR IN RCRD: CPT | Mod: CPTII,HCNC,S$GLB, | Performed by: FAMILY MEDICINE

## 2025-07-15 PROCEDURE — 1101F PT FALLS ASSESS-DOCD LE1/YR: CPT | Mod: CPTII,HCNC,S$GLB, | Performed by: FAMILY MEDICINE

## 2025-07-15 PROCEDURE — 1157F ADVNC CARE PLAN IN RCRD: CPT | Mod: CPTII,HCNC,S$GLB, | Performed by: FAMILY MEDICINE

## 2025-07-15 PROCEDURE — 3072F LOW RISK FOR RETINOPATHY: CPT | Mod: CPTII,HCNC,S$GLB, | Performed by: FAMILY MEDICINE

## 2025-07-15 PROCEDURE — 3074F SYST BP LT 130 MM HG: CPT | Mod: CPTII,HCNC,S$GLB, | Performed by: FAMILY MEDICINE

## 2025-07-15 PROCEDURE — 3061F NEG MICROALBUMINURIA REV: CPT | Mod: CPTII,HCNC,S$GLB, | Performed by: FAMILY MEDICINE

## 2025-07-15 PROCEDURE — 3078F DIAST BP <80 MM HG: CPT | Mod: CPTII,HCNC,S$GLB, | Performed by: FAMILY MEDICINE

## 2025-07-15 PROCEDURE — 3051F HG A1C>EQUAL 7.0%<8.0%: CPT | Mod: CPTII,HCNC,S$GLB, | Performed by: FAMILY MEDICINE

## 2025-07-15 PROCEDURE — 3008F BODY MASS INDEX DOCD: CPT | Mod: CPTII,HCNC,S$GLB, | Performed by: FAMILY MEDICINE

## 2025-07-15 PROCEDURE — 99999 PR PBB SHADOW E&M-EST. PATIENT-LVL V: CPT | Mod: PBBFAC,HCNC,, | Performed by: FAMILY MEDICINE

## 2025-07-15 PROCEDURE — 1125F AMNT PAIN NOTED PAIN PRSNT: CPT | Mod: CPTII,HCNC,S$GLB, | Performed by: FAMILY MEDICINE

## 2025-07-15 RX ORDER — ALBUTEROL SULFATE 90 UG/1
2 INHALANT RESPIRATORY (INHALATION) EVERY 6 HOURS PRN
Qty: 18 G | Refills: 0 | Status: SHIPPED | OUTPATIENT
Start: 2025-07-15 | End: 2025-08-14

## 2025-07-15 RX ORDER — ALBUTEROL SULFATE 0.63 MG/3ML
0.63 SOLUTION RESPIRATORY (INHALATION) EVERY 6 HOURS PRN
Qty: 75 ML | Refills: 0 | Status: SHIPPED | OUTPATIENT
Start: 2025-07-15 | End: 2026-07-15

## 2025-07-15 RX ORDER — KETOCONAZOLE 20 MG/G
CREAM TOPICAL DAILY
Qty: 30 G | Refills: 0 | Status: SHIPPED | OUTPATIENT
Start: 2025-07-15

## 2025-07-15 NOTE — PROGRESS NOTES
Subjective     Patient ID: Ned Oliveira is a 75 y.o. male.    Chief Complaint: Headache, Hypertension, Diabetes, and Hyperlipidemia    75 years old male came to the clinic for medical certification to come back to work.  Patient is planning to return on July 22nd.  Patient is doing better but still with shortness of breath with exertion.  He was previously diagnosed with coronary artery disease.  Last BNP was elevated.  We discussed the possibility of heart failure.  No recent 2D echo.  Blood pressure today was stable.  Patient due for eye examination.  Patient previously diagnosed with retinopathy.  He is requesting a medicine for body rash.    Headache   This is a chronic problem. The current episode started more than 1 month ago. The problem occurs intermittently. The pain is located in the Bilateral region. The pain does not radiate. The pain quality is similar to prior headaches. The pain is moderate. He has tried nothing for the symptoms. The treatment provided no relief. There is no history of obesity.   Hypertension  This is a chronic problem. The current episode started more than 1 year ago. The problem is controlled. Associated symptoms include headaches and shortness of breath. Pertinent negatives include no chest pain, orthopnea or palpitations. There are no associated agents to hypertension. Risk factors for coronary artery disease include diabetes mellitus, male gender and sedentary lifestyle. Past treatments include ACE inhibitors and beta blockers. The current treatment provides significant improvement. Compliance problems include exercise.  There is no history of angina. There is no history of a hypertension causing med or a thyroid problem.   Diabetes  He presents for his follow-up diabetic visit. He has type 2 diabetes mellitus. His disease course has been stable. Hypoglycemia symptoms include headaches. Pertinent negatives for diabetes include no chest pain, no polydipsia, no polyphagia and no  polyuria. There are no hypoglycemic complications. Symptoms are stable. There are no diabetic complications. Risk factors for coronary artery disease include diabetes mellitus, hypertension, male sex and sedentary lifestyle. He is compliant with treatment all of the time. An ACE inhibitor/angiotensin II receptor blocker is being taken. Eye exam is not current.   Hyperlipidemia  This is a chronic problem. The problem is controlled. Recent lipid tests were reviewed and are normal. He has no history of obesity. Associated symptoms include shortness of breath. Pertinent negatives include no chest pain. Current antihyperlipidemic treatment includes statins. The current treatment provides significant improvement of lipids. Compliance problems include adherence to exercise.  Risk factors for coronary artery disease include hypertension, male sex, diabetes mellitus and a sedentary lifestyle.     Review of Systems   Constitutional: Negative.    HENT: Negative.     Eyes: Negative.    Respiratory:  Positive for shortness of breath.    Cardiovascular: Negative.  Negative for chest pain, palpitations and orthopnea.   Gastrointestinal: Negative.    Endocrine: Negative for polydipsia, polyphagia and polyuria.   Genitourinary: Negative.    Musculoskeletal: Negative.    Integumentary:  Positive for rash.   Neurological:  Positive for headaches.   Psychiatric/Behavioral: Negative.            Objective     Physical Exam  Vitals and nursing note reviewed.   Constitutional:       General: He is not in acute distress.     Appearance: He is well-developed. He is not diaphoretic.   HENT:      Head: Normocephalic and atraumatic.      Right Ear: External ear normal.      Left Ear: External ear normal.      Nose: Nose normal.      Mouth/Throat:      Pharynx: No oropharyngeal exudate.   Eyes:      General: No scleral icterus.        Right eye: No discharge.         Left eye: No discharge.      Conjunctiva/sclera: Conjunctivae normal.       Pupils: Pupils are equal, round, and reactive to light.   Neck:      Thyroid: No thyromegaly.      Vascular: No JVD.      Trachea: No tracheal deviation.   Cardiovascular:      Rate and Rhythm: Normal rate and regular rhythm.      Heart sounds: Normal heart sounds. No murmur heard.     No friction rub. No gallop.   Pulmonary:      Effort: Pulmonary effort is normal. No respiratory distress.      Breath sounds: Normal breath sounds. No stridor. No wheezing or rales.   Chest:      Chest wall: No tenderness.   Abdominal:      General: Bowel sounds are normal. There is no distension.      Palpations: Abdomen is soft. There is no mass.      Tenderness: There is no abdominal tenderness. There is no guarding or rebound.   Musculoskeletal:         General: No tenderness. Normal range of motion.      Cervical back: Normal range of motion and neck supple.   Lymphadenopathy:      Cervical: No cervical adenopathy.   Skin:     General: Skin is warm and dry.      Coloration: Skin is not pale.      Findings: No erythema or rash.   Neurological:      Mental Status: He is alert and oriented to person, place, and time.      Cranial Nerves: No cranial nerve deficit.      Motor: No abnormal muscle tone.      Coordination: Coordination normal.      Deep Tendon Reflexes: Reflexes are normal and symmetric. Reflexes normal.   Psychiatric:         Behavior: Behavior normal.         Thought Content: Thought content normal.         Judgment: Judgment normal.            Assessment and Plan     1. Essential hypertension  -     Lipid Panel; Future; Expected date: 07/15/2025  -     Comprehensive Metabolic Panel; Future; Expected date: 07/15/2025    2. Elevated brain natriuretic peptide (BNP) level  -     Echo Saline Bubble? No; Ultrasound enhancing contrast? Yes; Future  -     Ambulatory referral/consult to Cardiology; Future; Expected date: 07/22/2025    3. Coronary artery disease involving native coronary artery of native heart without angina  pectoris  -     Echo Saline Bubble? No; Ultrasound enhancing contrast? Yes; Future  -     Ambulatory referral/consult to Cardiology; Future; Expected date: 07/22/2025    4. SOB (shortness of breath) on exertion  -     Echo Saline Bubble? No; Ultrasound enhancing contrast? Yes; Future  -     Ambulatory referral/consult to Cardiology; Future; Expected date: 07/22/2025  -     NEBULIZER FOR HOME USE  -     albuterol (VENTOLIN HFA) 90 mcg/actuation inhaler; Inhale 2 puffs into the lungs every 6 (six) hours as needed for Wheezing or Shortness of Breath. Rescue  Dispense: 18 g; Refill: 0  -     albuterol (ACCUNEB) 0.63 mg/3 mL Nebu; Take 3 mLs (0.63 mg total) by nebulization every 6 (six) hours as needed (SOB). Rescue  Dispense: 75 mL; Refill: 0    5. Type 2 diabetes mellitus with both eyes affected by mild nonproliferative retinopathy and macular edema, with long-term current use of insulin  -     Ambulatory referral/consult to Ophthalmology; Future; Expected date: 07/22/2025  -     Microalbumin/Creatinine Ratio, Urine; Future; Expected date: 07/15/2025  -     Lipid Panel; Future; Expected date: 07/15/2025  -     Hemoglobin A1C; Future; Expected date: 07/15/2025  -     Comprehensive Metabolic Panel; Future; Expected date: 07/15/2025    6. Tinea corporis  -     ketoconazole (NIZORAL) 2 % cream; Apply topically once daily.  Dispense: 30 g; Refill: 0    Continue monitoring blood pressure at home, low sodium diet.   Continue monitoring blood sugar at home,ADA diet.   I spent a total of 41 minutes on the day of the visit.This includes face to face time and non-face to face time preparing to see the patient (eg, review of tests), obtaining and/or reviewing separately obtained history, documenting clinical information in the electronic or other health record, independently interpreting results and communicating results to the patient/family/caregiver, or care coordinator.          Follow up in about 3 months (around 10/15/2025),  or if symptoms worsen or fail to improve.

## 2025-07-17 ENCOUNTER — HOSPITAL ENCOUNTER (OUTPATIENT)
Dept: CARDIOLOGY | Facility: HOSPITAL | Age: 75
Discharge: HOME | End: 2025-07-17
Attending: FAMILY MEDICINE
Payer: MEDICARE

## 2025-07-17 VITALS
HEART RATE: 61 BPM | HEIGHT: 64 IN | WEIGHT: 125 LBS | BODY MASS INDEX: 21.34 KG/M2 | SYSTOLIC BLOOD PRESSURE: 118 MMHG | DIASTOLIC BLOOD PRESSURE: 68 MMHG

## 2025-07-17 DIAGNOSIS — R79.89 ELEVATED BRAIN NATRIURETIC PEPTIDE (BNP) LEVEL: ICD-10-CM

## 2025-07-17 DIAGNOSIS — R06.02 SOB (SHORTNESS OF BREATH) ON EXERTION: ICD-10-CM

## 2025-07-17 DIAGNOSIS — I25.10 CORONARY ARTERY DISEASE INVOLVING NATIVE CORONARY ARTERY OF NATIVE HEART WITHOUT ANGINA PECTORIS: ICD-10-CM

## 2025-07-17 LAB
AORTIC SIZE INDEX (SOV): 1.7 CM/M2
AORTIC SIZE INDEX: 2 CM/M2
ASCENDING AORTA: 3.2 CM
AV AREA BY CONTINUOUS VTI: 2.2 CM2
AV INDEX (PROSTH): 0.71
AV LVOT MEAN GRADIENT: 3 MMHG
AV LVOT PEAK GRADIENT: 5 MMHG
AV MEAN GRADIENT: 4 MMHG
AV PEAK GRADIENT: 8 MMHG
AV REGURGITATION PRESSURE HALF TIME: 552 MS
AV VALVE AREA BY VELOCITY RATIO: 2.7 CM²
AV VALVE AREA: 2.2 CM2
AV VELOCITY RATIO: 0.86
BSA FOR ECHO PROCEDURE: 1.6 M2
CV ECHO LV RWT: 0.21 CM
DOP CALC AO PEAK VEL: 1.4 M/S
DOP CALC AO VTI: 34 CM
DOP CALC LVOT AREA: 3.1 CM2
DOP CALC LVOT DIAMETER: 2 CM
DOP CALC LVOT PEAK VEL: 1.2 M/S
DOP CALC LVOT STROKE VOLUME: 75.4 CM3
DOP CALC RVOT AREA: 3.3 CM2
DOP CALC RVOT DIAMETER: 2.05 CM
DOP CALCLVOT PEAK VEL VTI: 24 CM
E WAVE DECELERATION TIME: 189 MS
E/A RATIO: 2.71
E/E' RATIO: 20 M/S
ECHO EF ESTIMATED: 44 %
ECHO LV POSTERIOR WALL: 0.7 CM (ref 0.6–1.1)
FRACTIONAL SHORTENING: 22.4 % (ref 28–44)
GLOBAL LONGITUIDAL STRAIN: 6 %
HR MV ECHO: 61 BPM
INTERVENTRICULAR SEPTUM: 0.7 CM (ref 0.6–1.1)
IVC DIAMETER: 2.24 CM
LA MAJOR: 5.1 CM
LA MINOR: 4.8 CM
LA WIDTH: 3.8 CM
LEFT ATRIUM SIZE: 4.7 CM
LEFT ATRIUM VOLUME INDEX MOD: 33 ML/M2
LEFT ATRIUM VOLUME INDEX: 47 ML/M2
LEFT ATRIUM VOLUME MOD: 53 ML
LEFT ATRIUM VOLUME: 75 CM3
LEFT INTERNAL DIMENSION IN SYSTOLE: 5.2 CM (ref 2.1–4)
LEFT VENTRICLE DIASTOLIC VOLUME INDEX: 145 ML/M2
LEFT VENTRICLE DIASTOLIC VOLUME: 232 ML
LEFT VENTRICLE MASS INDEX: 120.3 G/M2
LEFT VENTRICLE SYSTOLIC VOLUME INDEX: 80.6 ML/M2
LEFT VENTRICLE SYSTOLIC VOLUME: 129 ML
LEFT VENTRICULAR INTERNAL DIMENSION IN DIASTOLE: 6.7 CM (ref 3.5–6)
LEFT VENTRICULAR MASS: 192.5 G
LV LATERAL E/E' RATIO: 15.3
LV SEPTAL E/E' RATIO: 30.5
Lab: 1.7 CM/M
Lab: 2 CM/M
MV A" WAVE DURATION": 139.87 MS
MV MEAN GRADIENT: 2 MMHG
MV PEAK A VEL: 0.45 M/S
MV PEAK E VEL: 1.22 M/S
MV PEAK GRADIENT: 6 MMHG
OHS CV CPX PATIENT HEIGHT IN: 64
OHS CV RV/LV RATIO: 0.4 CM
OHS LV EJECTION FRACTION SIMPSONS BIPLANE MOD: 32 %
PISA TR MAX VEL: 3.6 M/S
PULM VEIN A" WAVE DURATION": 139.87 MS
PULM VEIN S/D RATIO: 1.79
PULMONIC VEIN PEAK A VELOCITY: 0.3 M/S
PV PEAK D VEL: 0.39 M/S
PV PEAK S VEL: 0.7 M/S
RA MAJOR: 4.51 CM
RA PRESSURE ESTIMATED: 15 MMHG
RA WIDTH: 3.64 CM
RIGHT ATRIAL AREA: 14.7 CM2
RIGHT VENTRICLE DIASTOLIC BASEL DIMENSION: 2.7 CM
RV TB RVSP: 19 MMHG
RV TISSUE DOPPLER FREE WALL SYSTOLIC VELOCITY 1 (APICAL 4 CHAMBER VIEW): 19.83 CM/S
SINUS: 2.7 CM
STJ: 3 CM
TDI LATERAL: 0.08 M/S
TDI SEPTAL: 0.04 M/S
TDI: 0.06 M/S
TRICUSPID ANNULAR PLANE SYSTOLIC EXCURSION: 2.3 CM
TV PEAK GRADIENT: 52 MMHG
TV REST PULMONARY ARTERY PRESSURE: 67 MMHG
Z-SCORE OF LEFT VENTRICULAR DIMENSION IN END DIASTOLE: 3.86
Z-SCORE OF LEFT VENTRICULAR DIMENSION IN END SYSTOLE: 4.83

## 2025-07-17 PROCEDURE — 93356 MYOCRD STRAIN IMG SPCKL TRCK: CPT | Mod: ,,, | Performed by: INTERNAL MEDICINE

## 2025-07-17 PROCEDURE — 93306 TTE W/DOPPLER COMPLETE: CPT

## 2025-07-17 PROCEDURE — 93306 TTE W/DOPPLER COMPLETE: CPT | Mod: 26,,, | Performed by: INTERNAL MEDICINE

## 2025-07-23 DIAGNOSIS — R33.9 URINARY RETENTION: ICD-10-CM

## 2025-07-23 DIAGNOSIS — N13.8 BPH WITH OBSTRUCTION/LOWER URINARY TRACT SYMPTOMS: ICD-10-CM

## 2025-07-23 DIAGNOSIS — N40.1 BPH WITH OBSTRUCTION/LOWER URINARY TRACT SYMPTOMS: ICD-10-CM

## 2025-07-23 DIAGNOSIS — R06.02 SOB (SHORTNESS OF BREATH) ON EXERTION: ICD-10-CM

## 2025-07-24 RX ORDER — TAMSULOSIN HYDROCHLORIDE 0.4 MG/1
2 CAPSULE ORAL NIGHTLY
Qty: 180 CAPSULE | Refills: 3 | Status: SHIPPED | OUTPATIENT
Start: 2025-07-24

## 2025-07-24 RX ORDER — ALBUTEROL SULFATE 0.63 MG/3ML
SOLUTION RESPIRATORY (INHALATION)
Qty: 1080 ML | Refills: 3 | Status: SHIPPED | OUTPATIENT
Start: 2025-07-24

## 2025-07-24 NOTE — TELEPHONE ENCOUNTER
No care due was identified.  [facility] Embedded Care Due Messages. Reference number: [id number].   7/23/2025 7:22:33 PM CDT

## 2025-07-24 NOTE — TELEPHONE ENCOUNTER
Refill Routing Note   Medication(s) are not appropriate for processing by [facility] for the following reason(s):        New or recently adjusted medication    [facility] action(s):  Defer  Approve             Appointments  past 12m or future 3m with PCP    Date Provider   Last Visit   7/15/2025 David Hardwick MD   Next Visit   10/16/2025 David Hardwick MD   ED visits in past 90 days: 0        Note composed:10:39 AM 07/24/2025

## 2025-07-31 ENCOUNTER — TELEPHONE (OUTPATIENT)
Dept: FAMILY MEDICINE | Facility: CLINIC | Age: 75
End: 2025-07-31
Payer: MEDICARE

## 2025-07-31 ENCOUNTER — OFFICE VISIT (OUTPATIENT)
Dept: OPHTHALMOLOGY | Facility: CLINIC | Age: 75
End: 2025-07-31
Payer: MEDICARE

## 2025-07-31 DIAGNOSIS — Z79.4 TYPE 2 DIABETES MELLITUS WITH BOTH EYES AFFECTED BY MILD NONPROLIFERATIVE RETINOPATHY AND MACULAR EDEMA, WITH LONG-TERM CURRENT USE OF INSULIN: Primary | ICD-10-CM

## 2025-07-31 DIAGNOSIS — H25.11 NS (NUCLEAR SCLEROSIS), RIGHT: ICD-10-CM

## 2025-07-31 DIAGNOSIS — H40.1132 PRIMARY OPEN ANGLE GLAUCOMA (POAG) OF BOTH EYES, MODERATE STAGE: Chronic | ICD-10-CM

## 2025-07-31 DIAGNOSIS — E11.3213 TYPE 2 DIABETES MELLITUS WITH BOTH EYES AFFECTED BY MILD NONPROLIFERATIVE RETINOPATHY AND MACULAR EDEMA, WITH LONG-TERM CURRENT USE OF INSULIN: Primary | ICD-10-CM

## 2025-07-31 PROCEDURE — 92134 CPTRZ OPH DX IMG PST SGM RTA: CPT | Mod: S$GLB,,, | Performed by: OPHTHALMOLOGY

## 2025-07-31 PROCEDURE — 3288F FALL RISK ASSESSMENT DOCD: CPT | Mod: CPTII,S$GLB,, | Performed by: OPHTHALMOLOGY

## 2025-07-31 PROCEDURE — 1160F RVW MEDS BY RX/DR IN RCRD: CPT | Mod: CPTII,S$GLB,, | Performed by: OPHTHALMOLOGY

## 2025-07-31 PROCEDURE — 1157F ADVNC CARE PLAN IN RCRD: CPT | Mod: CPTII,S$GLB,, | Performed by: OPHTHALMOLOGY

## 2025-07-31 PROCEDURE — 99999 PR PBB SHADOW E&M-EST. PATIENT-LVL III: CPT | Mod: PBBFAC,,, | Performed by: OPHTHALMOLOGY

## 2025-07-31 PROCEDURE — 1101F PT FALLS ASSESS-DOCD LE1/YR: CPT | Mod: CPTII,S$GLB,, | Performed by: OPHTHALMOLOGY

## 2025-07-31 PROCEDURE — 3061F NEG MICROALBUMINURIA REV: CPT | Mod: CPTII,S$GLB,, | Performed by: OPHTHALMOLOGY

## 2025-07-31 PROCEDURE — 1159F MED LIST DOCD IN RCRD: CPT | Mod: CPTII,S$GLB,, | Performed by: OPHTHALMOLOGY

## 2025-07-31 PROCEDURE — 3051F HG A1C>EQUAL 7.0%<8.0%: CPT | Mod: CPTII,S$GLB,, | Performed by: OPHTHALMOLOGY

## 2025-07-31 PROCEDURE — 4010F ACE/ARB THERAPY RXD/TAKEN: CPT | Mod: CPTII,S$GLB,, | Performed by: OPHTHALMOLOGY

## 2025-07-31 PROCEDURE — 92014 COMPRE OPH EXAM EST PT 1/>: CPT | Mod: S$GLB,,, | Performed by: OPHTHALMOLOGY

## 2025-07-31 PROCEDURE — 3066F NEPHROPATHY DOC TX: CPT | Mod: CPTII,S$GLB,, | Performed by: OPHTHALMOLOGY

## 2025-07-31 NOTE — TELEPHONE ENCOUNTER
----- Message from David Hardwick MD sent at 7/17/2025 10:30 AM CDT -----  Please notify the patient the 2D echo shows heart weakness or heart failure.  The normal heart function is 50% or more.  Patient with a heart function of about 35%.    I ordered  potassium supplement along with an small dose diuretic.  Blood test to check kidney function in 1 week.    Please advise the patient monitor his blood pressure and weight at least once daily.    Prescription was sent to the pharmacy.    Pending evaluation with the cardiologist.  ----- Message -----  From: Tab Pedersen MD  Sent: 7/17/2025   9:15 AM CDT  To: David Hardwick MD

## 2025-07-31 NOTE — PROGRESS NOTES
HPI     Diabetic Eye Exam     Additional comments: Dm   Last bs   136  Last A1c   unsure  me  Blurred va  ou  x last visit   Dls 03/14/24          Last edited by Idalmis Ybarra on 7/31/2025  1:28 PM.            OCT - OD -  Decreased paracentral ME with VMT component  OS decrease low grade paracentral ME    Prior WFFA 12/23 - late macular leakage OS > OD    Patient needs Monday appointments only    A/P    1. Mild NPDR OU T2  2. DME OU    S/p Avastin OD x 6  (last 12/20), OS x 12  Ozurdex OS x4  5/21  S/p Iluvien OS 12/23  Focal OS 5/17, OD 1-19  1/19 - increased DME at 9 weeks - resumed 6 week tx  11/23 - not seen x 2 years - stressed importance of FU, especially with Iluvien/tube    Much improved - obs today      Had Glc eval with KL  S/p CE/BV OS 10/20      BS/BP/chol control    3. NS OD - increasing - seeing Dr. Monge for CE   PCIOL OS    4. POAG  Elevated IOP OS>OD  S/p BV OS 10/20    Continue Cosopt BID OD        4 months OCT no dilate LDFE  7/25

## 2025-08-08 DIAGNOSIS — R06.02 SOB (SHORTNESS OF BREATH) ON EXERTION: ICD-10-CM

## 2025-08-08 RX ORDER — ALBUTEROL SULFATE 90 UG/1
INHALANT RESPIRATORY (INHALATION)
Qty: 54 G | Refills: 3 | Status: SHIPPED | OUTPATIENT
Start: 2025-08-08

## 2025-08-08 NOTE — TELEPHONE ENCOUNTER
Refill Routing Note   Medication(s) are not appropriate for processing by [facility] for the following reason(s):        New or recently adjusted medication    [facility] action(s):  Defer               Appointments  past 12m or future 3m with PCP    Date Provider   Last Visit   7/15/2025 David Hardwick MD   Next Visit   10/16/2025 David Hardwick MD   ED visits in past 90 days: 0        Note composed:3:16 PM 08/08/2025

## (undated) DEVICE — SPIKE CONTRAST CONTROLLER

## (undated) DEVICE — DRESSING TRANS 2X2 TEGADERM

## (undated) DEVICE — RING MALUYGEN

## (undated) DEVICE — SEE MEDLINE ITEM 157148

## (undated) DEVICE — INFLATOR ENCORE 26 BLLN INFL

## (undated) DEVICE — KIT CUSTOM MANIFOLD

## (undated) DEVICE — Device

## (undated) DEVICE — GOWN SURGICAL X-LARGE

## (undated) DEVICE — WIRE WHISPER MS 014 X 190

## (undated) DEVICE — MICROCATHETER MAMBA FLEX 135CM

## (undated) DEVICE — KIT PROBE COVER WITH GEL

## (undated) DEVICE — SPONGE WEC CEL SPEARS

## (undated) DEVICE — SHIELD COLLAGEN 12HR CORNEAL

## (undated) DEVICE — DRESSING TRANS 4X4 TEGADERM

## (undated) DEVICE — SEE MEDLINE ITEM 157117

## (undated) DEVICE — KIT GREY EYE

## (undated) DEVICE — SUT VICRYL PLUS 3-0 SH 18IN

## (undated) DEVICE — SHIELD EYE PLASTIC 3100G

## (undated) DEVICE — SOL IRR BSS OPHTH 500ML STRL

## (undated) DEVICE — SUT VICRYL 3-0 27 SH

## (undated) DEVICE — PROTECTION STATION PLUS

## (undated) DEVICE — TRAY CATH LAB OMC

## (undated) DEVICE — CATH ANGIO GUIDEZILLA 6FR

## (undated) DEVICE — CATH JACKY RADIAL 5FR 100CM

## (undated) DEVICE — APPLICATOR CHLORAPREP ORN 26ML

## (undated) DEVICE — SUT 2-0 VICRYL / CT-1

## (undated) DEVICE — NDL FLTR 5MCRN BLNT TIP 18GX1

## (undated) DEVICE — TRANSDUCER ADULT DISP

## (undated) DEVICE — KIT MICROINTRO 4F .018X40X7CM

## (undated) DEVICE — SEE MEDLINE ITEM 157187

## (undated) DEVICE — TRAY MINOR GEN SURG

## (undated) DEVICE — GUIDEWIRE GAIS SEC 190CM J

## (undated) DEVICE — WIRE PILOT .014X190

## (undated) DEVICE — ELECTRODE REM PLYHSV RETURN 9

## (undated) DEVICE — DRAIN PENROSE XRAY 12 X 1/4 ST

## (undated) DEVICE — SOL WATER STRL IRR 1000ML

## (undated) DEVICE — SHEATH INTRODUCER 6FR 11CM

## (undated) DEVICE — KIT MEROCEL INSTRUMENT WIPE

## (undated) DEVICE — GUIDEWIRE EMERALD 150CM PTFE

## (undated) DEVICE — CATH EMERGE MR 40X2.0MM

## (undated) DEVICE — ADHESIVE MASTISOL VIAL 48/BX

## (undated) DEVICE — NDL HYPO REG 25G X 1 1/2

## (undated) DEVICE — KIT COPILOT VALVE HEMO TOOL

## (undated) DEVICE — GLOVE BIOGEL ECLIPSE SZ 6.5

## (undated) DEVICE — GUIDEWIRE GLADIUS STR 190CM

## (undated) DEVICE — ADHESIVE DERMABOND ADVANCED

## (undated) DEVICE — GUIDE LAUNCHER 6FR JR 4.0

## (undated) DEVICE — KIT GLIDESHEATH SLEND 6FR 10CM

## (undated) DEVICE — SEE MEDLINE ITEM 146417

## (undated) DEVICE — SYR 3CC 21 X 1 LUER LOCK

## (undated) DEVICE — HEMOSTAT VASC BAND REG 24CM

## (undated) DEVICE — NDL FILTER 19GA X 1-1/2

## (undated) DEVICE — OMNIPAQUE 350 200ML

## (undated) DEVICE — SUT MCRYL PLUS 4-0 PS2 27IN

## (undated) DEVICE — DRAPE STERI-DRAPE APERTURE

## (undated) DEVICE — SEE MEDLINE ITEM 156894

## (undated) DEVICE — CATH EMERGE MR 20 X 2.50

## (undated) DEVICE — SHEATH 6FR 35CM

## (undated) DEVICE — GUIDEWIRE SION BLUE STR 190CM

## (undated) DEVICE — SOL BETADINE 5%

## (undated) DEVICE — SEE MEDLINE ITEM 157131